# Patient Record
Sex: FEMALE | Race: WHITE | NOT HISPANIC OR LATINO | Employment: OTHER | URBAN - METROPOLITAN AREA
[De-identification: names, ages, dates, MRNs, and addresses within clinical notes are randomized per-mention and may not be internally consistent; named-entity substitution may affect disease eponyms.]

---

## 2017-01-12 ENCOUNTER — HOSPITAL ENCOUNTER (OUTPATIENT)
Dept: LAB | Facility: MEDICAL CENTER | Age: 67
End: 2017-01-12
Attending: INTERNAL MEDICINE
Payer: MEDICARE

## 2017-01-12 ENCOUNTER — HOSPITAL ENCOUNTER (OUTPATIENT)
Dept: LAB | Facility: MEDICAL CENTER | Age: 67
End: 2017-01-12
Attending: FAMILY MEDICINE
Payer: MEDICARE

## 2017-01-12 LAB
ALBUMIN SERPL BCP-MCNC: 4.6 G/DL (ref 3.2–4.9)
ALBUMIN/GLOB SERPL: 1.5 G/DL
ALP SERPL-CCNC: 81 U/L (ref 30–99)
ALT SERPL-CCNC: 9 U/L (ref 2–50)
ANION GAP SERPL CALC-SCNC: 10 MMOL/L (ref 0–11.9)
AST SERPL-CCNC: 15 U/L (ref 12–45)
BASOPHILS # BLD AUTO: 0.11 K/UL (ref 0–0.12)
BASOPHILS NFR BLD AUTO: 1.5 % (ref 0–1.8)
BILIRUB SERPL-MCNC: 0.6 MG/DL (ref 0.1–1.5)
BUN SERPL-MCNC: 16 MG/DL (ref 8–22)
CALCIUM SERPL-MCNC: 9.7 MG/DL (ref 8.5–10.5)
CHLORIDE SERPL-SCNC: 105 MMOL/L (ref 96–112)
CHOLEST SERPL-MCNC: 195 MG/DL (ref 100–199)
CHOLEST SERPL-MCNC: 198 MG/DL (ref 100–199)
CO2 SERPL-SCNC: 26 MMOL/L (ref 20–33)
CREAT SERPL-MCNC: 0.93 MG/DL (ref 0.5–1.4)
EOSINOPHIL # BLD: 0.37 K/UL (ref 0–0.51)
EOSINOPHIL NFR BLD AUTO: 5.1 % (ref 0–6.9)
ERYTHROCYTE [DISTWIDTH] IN BLOOD BY AUTOMATED COUNT: 39.6 FL (ref 35.9–50)
GLOBULIN SER CALC-MCNC: 3 G/DL (ref 1.9–3.5)
GLUCOSE SERPL-MCNC: 89 MG/DL (ref 65–99)
HCT VFR BLD AUTO: 44.5 % (ref 37–47)
HDLC SERPL-MCNC: 53 MG/DL
HDLC SERPL-MCNC: 54 MG/DL
HGB BLD-MCNC: 15.3 G/DL (ref 12–16)
IMM GRANULOCYTES # BLD AUTO: 0.02 K/UL (ref 0–0.11)
IMM GRANULOCYTES NFR BLD AUTO: 0.3 % (ref 0–0.9)
LDLC SERPL CALC-MCNC: 121 MG/DL
LDLC SERPL CALC-MCNC: 124 MG/DL
LYMPHOCYTES # BLD: 1.97 K/UL (ref 1–4.8)
LYMPHOCYTES NFR BLD AUTO: 27.1 % (ref 22–41)
MCH RBC QN AUTO: 28.9 PG (ref 27–33)
MCHC RBC AUTO-ENTMCNC: 34.4 G/DL (ref 33.6–35)
MCV RBC AUTO: 84 FL (ref 81.4–97.8)
MONOCYTES # BLD: 0.58 K/UL (ref 0–0.85)
MONOCYTES NFR BLD AUTO: 8 % (ref 0–13.4)
NEUTROPHILS # BLD: 4.21 K/UL (ref 2–7.15)
NEUTROPHILS NFR BLD AUTO: 58 % (ref 44–72)
NRBC # BLD AUTO: 0 K/UL
NRBC BLD-RTO: 0 /100 WBC
PLATELET # BLD AUTO: 340 K/UL (ref 164–446)
PMV BLD AUTO: 10.1 FL (ref 9–12.9)
POTASSIUM SERPL-SCNC: 3.8 MMOL/L (ref 3.6–5.5)
PROT SERPL-MCNC: 7.6 G/DL (ref 6–8.2)
RBC # BLD AUTO: 5.3 M/UL (ref 4.2–5.4)
SODIUM SERPL-SCNC: 141 MMOL/L (ref 135–145)
T4 FREE SERPL-MCNC: 0.75 NG/DL (ref 0.53–1.43)
TRIGL SERPL-MCNC: 104 MG/DL (ref 0–149)
TRIGL SERPL-MCNC: 98 MG/DL (ref 0–149)
TSH SERPL DL<=0.005 MIU/L-ACNC: 1.39 UIU/ML (ref 0.3–3.7)
WBC # BLD AUTO: 7.3 K/UL (ref 4.8–10.8)

## 2017-01-12 PROCEDURE — 80061 LIPID PANEL: CPT

## 2017-01-13 ENCOUNTER — HOSPITAL ENCOUNTER (OUTPATIENT)
Dept: LAB | Facility: MEDICAL CENTER | Age: 67
End: 2017-01-13
Attending: FAMILY MEDICINE
Payer: MEDICARE

## 2017-01-13 LAB
APPEARANCE UR: CLEAR
BILIRUB UR QL STRIP.AUTO: NEGATIVE
COLOR UR AUTO: NORMAL
CULTURE IF INDICATED INDCX: NO UA CULTURE
GLUCOSE UR STRIP.AUTO-MCNC: NEGATIVE MG/DL
KETONES UR STRIP.AUTO-MCNC: NEGATIVE MG/DL
LEUKOCYTE ESTERASE UR QL STRIP.AUTO: NEGATIVE
MICRO URNS: NORMAL
NITRITE UR QL STRIP.AUTO: NEGATIVE
PH UR: 6 [PH]
PROT UR QL STRIP: NEGATIVE MG/DL
RBC UR QL AUTO: NEGATIVE
SP GR UR STRIP.AUTO: 1.01

## 2017-01-13 PROCEDURE — 81003 URINALYSIS AUTO W/O SCOPE: CPT

## 2017-01-20 ENCOUNTER — HOSPITAL ENCOUNTER (OUTPATIENT)
Dept: RADIOLOGY | Facility: MEDICAL CENTER | Age: 67
End: 2017-01-20
Attending: NEUROLOGICAL SURGERY
Payer: MEDICARE

## 2017-01-20 DIAGNOSIS — M48.061 SPINAL STENOSIS, LUMBAR REGION, WITHOUT NEUROGENIC CLAUDICATION: ICD-10-CM

## 2017-01-20 PROCEDURE — 85610 PROTHROMBIN TIME: CPT

## 2017-01-20 PROCEDURE — 36415 COLL VENOUS BLD VENIPUNCTURE: CPT

## 2017-01-20 PROCEDURE — 72110 X-RAY EXAM L-2 SPINE 4/>VWS: CPT

## 2017-01-20 PROCEDURE — 71020 DX-CHEST-2 VIEWS: CPT

## 2017-01-20 PROCEDURE — 87086 URINE CULTURE/COLONY COUNT: CPT

## 2017-01-20 PROCEDURE — 85730 THROMBOPLASTIN TIME PARTIAL: CPT

## 2017-01-20 PROCEDURE — 81001 URINALYSIS AUTO W/SCOPE: CPT

## 2017-01-20 PROCEDURE — 80048 BASIC METABOLIC PNL TOTAL CA: CPT

## 2017-01-20 PROCEDURE — 85027 COMPLETE CBC AUTOMATED: CPT

## 2017-01-20 RX ORDER — OMEPRAZOLE 20 MG/1
20 CAPSULE, DELAYED RELEASE ORAL DAILY
COMMUNITY
End: 2017-07-12

## 2017-01-20 RX ORDER — ASCORBIC ACID 500 MG
1000 TABLET ORAL DAILY
COMMUNITY
End: 2017-07-17

## 2017-01-20 RX ORDER — HYDROCODONE BITARTRATE AND ACETAMINOPHEN 7.5; 3 MG/1; MG/1
TABLET ORAL PRN
COMMUNITY
End: 2017-07-17

## 2017-01-20 RX ORDER — ASPIRIN 81 MG/1
TABLET ORAL DAILY
Status: ON HOLD | COMMUNITY
End: 2017-02-05

## 2017-01-20 RX ORDER — DULOXETIN HYDROCHLORIDE 60 MG/1
60 CAPSULE, DELAYED RELEASE ORAL DAILY
COMMUNITY
End: 2017-07-12 | Stop reason: SDUPTHER

## 2017-01-27 ENCOUNTER — HOSPITAL ENCOUNTER (OUTPATIENT)
Facility: MEDICAL CENTER | Age: 67
End: 2017-01-27
Attending: UROLOGY
Payer: MEDICARE

## 2017-01-27 PROCEDURE — 81003 URINALYSIS AUTO W/O SCOPE: CPT

## 2017-01-27 PROCEDURE — 87086 URINE CULTURE/COLONY COUNT: CPT

## 2017-01-28 LAB
APPEARANCE UR: CLEAR
BILIRUB UR QL STRIP.AUTO: NEGATIVE
COLOR UR AUTO: COLORLESS
GLUCOSE UR STRIP.AUTO-MCNC: NEGATIVE MG/DL
KETONES UR STRIP.AUTO-MCNC: NEGATIVE MG/DL
LEUKOCYTE ESTERASE UR QL STRIP.AUTO: NEGATIVE
MICRO URNS: NORMAL
NITRITE UR QL STRIP.AUTO: NEGATIVE
PH UR: 5.5 [PH]
PROT UR QL STRIP: NEGATIVE MG/DL
RBC UR QL AUTO: NEGATIVE
SP GR UR STRIP.AUTO: 1

## 2017-01-30 LAB
BACTERIA UR CULT: NORMAL
SIGNIFICANT IND 70042: NORMAL
SOURCE SOURCE: NORMAL

## 2017-02-04 ENCOUNTER — APPOINTMENT (OUTPATIENT)
Dept: RADIOLOGY | Facility: MEDICAL CENTER | Age: 67
End: 2017-02-04
Attending: NEUROLOGICAL SURGERY
Payer: MEDICARE

## 2017-02-04 ENCOUNTER — HOSPITAL ENCOUNTER (OUTPATIENT)
Facility: MEDICAL CENTER | Age: 67
End: 2017-02-05
Attending: NEUROLOGICAL SURGERY | Admitting: NEUROLOGICAL SURGERY
Payer: MEDICARE

## 2017-02-04 PROBLEM — M48.061 SPINAL STENOSIS, LUMBAR: Status: ACTIVE | Noted: 2017-02-04

## 2017-02-04 PROCEDURE — 160035 HCHG PACU - 1ST 60 MINS PHASE I: Performed by: NEUROLOGICAL SURGERY

## 2017-02-04 PROCEDURE — 500367 HCHG DRAIN KIT, HEMOVAC: Performed by: NEUROLOGICAL SURGERY

## 2017-02-04 PROCEDURE — 700111 HCHG RX REV CODE 636 W/ 250 OVERRIDE (IP)

## 2017-02-04 PROCEDURE — 700101 HCHG RX REV CODE 250

## 2017-02-04 PROCEDURE — 700101 HCHG RX REV CODE 250: Performed by: NURSE PRACTITIONER

## 2017-02-04 PROCEDURE — 110382 HCHG SHELL REV 271: Performed by: NEUROLOGICAL SURGERY

## 2017-02-04 PROCEDURE — 160002 HCHG RECOVERY MINUTES (STAT): Performed by: NEUROLOGICAL SURGERY

## 2017-02-04 PROCEDURE — G0378 HOSPITAL OBSERVATION PER HR: HCPCS

## 2017-02-04 PROCEDURE — 160036 HCHG PACU - EA ADDL 30 MINS PHASE I: Performed by: NEUROLOGICAL SURGERY

## 2017-02-04 PROCEDURE — 160041 HCHG SURGERY MINUTES - EA ADDL 1 MIN LEVEL 4: Performed by: NEUROLOGICAL SURGERY

## 2017-02-04 PROCEDURE — 502626 HCHG SURGIFLO HEMOSTATIC MATRIX 6ML: Performed by: NEUROLOGICAL SURGERY

## 2017-02-04 PROCEDURE — A4606 OXYGEN PROBE USED W OXIMETER: HCPCS | Performed by: NEUROLOGICAL SURGERY

## 2017-02-04 PROCEDURE — A6402 STERILE GAUZE <= 16 SQ IN: HCPCS | Performed by: NEUROLOGICAL SURGERY

## 2017-02-04 PROCEDURE — 96374 THER/PROPH/DIAG INJ IV PUSH: CPT

## 2017-02-04 PROCEDURE — 501838 HCHG SUTURE GENERAL: Performed by: NEUROLOGICAL SURGERY

## 2017-02-04 PROCEDURE — 502240 HCHG MISC OR SUPPLY RC 0272: Performed by: NEUROLOGICAL SURGERY

## 2017-02-04 PROCEDURE — A9270 NON-COVERED ITEM OR SERVICE: HCPCS | Performed by: NURSE PRACTITIONER

## 2017-02-04 PROCEDURE — 160029 HCHG SURGERY MINUTES - 1ST 30 MINS LEVEL 4: Performed by: NEUROLOGICAL SURGERY

## 2017-02-04 PROCEDURE — 110371 HCHG SHELL REV 272: Performed by: NEUROLOGICAL SURGERY

## 2017-02-04 PROCEDURE — 72020 X-RAY EXAM OF SPINE 1 VIEW: CPT

## 2017-02-04 PROCEDURE — 160009 HCHG ANES TIME/MIN: Performed by: NEUROLOGICAL SURGERY

## 2017-02-04 PROCEDURE — 160048 HCHG OR STATISTICAL LEVEL 1-5: Performed by: NEUROLOGICAL SURGERY

## 2017-02-04 PROCEDURE — 96375 TX/PRO/DX INJ NEW DRUG ADDON: CPT

## 2017-02-04 PROCEDURE — 700102 HCHG RX REV CODE 250 W/ 637 OVERRIDE(OP): Performed by: NURSE PRACTITIONER

## 2017-02-04 PROCEDURE — 700111 HCHG RX REV CODE 636 W/ 250 OVERRIDE (IP): Performed by: NURSE PRACTITIONER

## 2017-02-04 RX ORDER — OMEPRAZOLE 20 MG/1
20 CAPSULE, DELAYED RELEASE ORAL DAILY
Status: DISCONTINUED | OUTPATIENT
Start: 2017-02-05 | End: 2017-02-05 | Stop reason: HOSPADM

## 2017-02-04 RX ORDER — SODIUM CHLORIDE AND POTASSIUM CHLORIDE 150; 900 MG/100ML; MG/100ML
INJECTION, SOLUTION INTRAVENOUS CONTINUOUS
Status: DISCONTINUED | OUTPATIENT
Start: 2017-02-04 | End: 2017-02-05

## 2017-02-04 RX ORDER — ASCORBIC ACID 500 MG
1000 TABLET ORAL DAILY
Status: DISCONTINUED | OUTPATIENT
Start: 2017-02-04 | End: 2017-02-05 | Stop reason: HOSPADM

## 2017-02-04 RX ORDER — SODIUM CHLORIDE, SODIUM LACTATE, POTASSIUM CHLORIDE, CALCIUM CHLORIDE 600; 310; 30; 20 MG/100ML; MG/100ML; MG/100ML; MG/100ML
1000 INJECTION, SOLUTION INTRAVENOUS
Status: COMPLETED | OUTPATIENT
Start: 2017-02-04 | End: 2017-02-04

## 2017-02-04 RX ORDER — ENEMA 19; 7 G/133ML; G/133ML
1 ENEMA RECTAL
Status: DISCONTINUED | OUTPATIENT
Start: 2017-02-04 | End: 2017-02-05 | Stop reason: HOSPADM

## 2017-02-04 RX ORDER — AMOXICILLIN 250 MG
1 CAPSULE ORAL
Status: DISCONTINUED | OUTPATIENT
Start: 2017-02-04 | End: 2017-02-05 | Stop reason: HOSPADM

## 2017-02-04 RX ORDER — BUPIVACAINE HYDROCHLORIDE AND EPINEPHRINE 5; 5 MG/ML; UG/ML
INJECTION, SOLUTION EPIDURAL; INTRACAUDAL; PERINEURAL
Status: DISCONTINUED | OUTPATIENT
Start: 2017-02-04 | End: 2017-02-04 | Stop reason: HOSPADM

## 2017-02-04 RX ORDER — DIPHENHYDRAMINE HYDROCHLORIDE 50 MG/ML
INJECTION INTRAMUSCULAR; INTRAVENOUS
Status: COMPLETED
Start: 2017-02-04 | End: 2017-02-04

## 2017-02-04 RX ORDER — DIPHENHYDRAMINE HYDROCHLORIDE 50 MG/ML
25 INJECTION INTRAMUSCULAR; INTRAVENOUS EVERY 6 HOURS PRN
Status: DISCONTINUED | OUTPATIENT
Start: 2017-02-04 | End: 2017-02-05 | Stop reason: HOSPADM

## 2017-02-04 RX ORDER — DIPHENHYDRAMINE HCL 25 MG
25 TABLET ORAL EVERY 6 HOURS PRN
Status: DISCONTINUED | OUTPATIENT
Start: 2017-02-04 | End: 2017-02-05 | Stop reason: HOSPADM

## 2017-02-04 RX ORDER — POLYETHYLENE GLYCOL 3350 17 G/17G
1 POWDER, FOR SOLUTION ORAL 2 TIMES DAILY PRN
Status: DISCONTINUED | OUTPATIENT
Start: 2017-02-04 | End: 2017-02-05 | Stop reason: HOSPADM

## 2017-02-04 RX ORDER — MEPERIDINE HYDROCHLORIDE 25 MG/ML
INJECTION INTRAMUSCULAR; INTRAVENOUS; SUBCUTANEOUS
Status: COMPLETED
Start: 2017-02-04 | End: 2017-02-04

## 2017-02-04 RX ORDER — DULOXETIN HYDROCHLORIDE 60 MG/1
60 CAPSULE, DELAYED RELEASE ORAL DAILY
Status: DISCONTINUED | OUTPATIENT
Start: 2017-02-05 | End: 2017-02-05 | Stop reason: HOSPADM

## 2017-02-04 RX ORDER — TIZANIDINE 4 MG/1
2 TABLET ORAL EVERY 6 HOURS PRN
Status: DISCONTINUED | OUTPATIENT
Start: 2017-02-04 | End: 2017-02-05

## 2017-02-04 RX ORDER — CEFAZOLIN SODIUM 2 G/100ML
2 INJECTION, SOLUTION INTRAVENOUS EVERY 8 HOURS
Status: COMPLETED | OUTPATIENT
Start: 2017-02-04 | End: 2017-02-05

## 2017-02-04 RX ORDER — ONDANSETRON 4 MG/1
4 TABLET, ORALLY DISINTEGRATING ORAL EVERY 4 HOURS PRN
Status: DISCONTINUED | OUTPATIENT
Start: 2017-02-04 | End: 2017-02-05 | Stop reason: HOSPADM

## 2017-02-04 RX ORDER — HYDROCODONE BITARTRATE AND ACETAMINOPHEN 10; 325 MG/1; MG/1
1-2 TABLET ORAL EVERY 4 HOURS PRN
Status: DISCONTINUED | OUTPATIENT
Start: 2017-02-04 | End: 2017-02-04

## 2017-02-04 RX ORDER — ONDANSETRON 2 MG/ML
4 INJECTION INTRAMUSCULAR; INTRAVENOUS EVERY 4 HOURS PRN
Status: DISCONTINUED | OUTPATIENT
Start: 2017-02-04 | End: 2017-02-05 | Stop reason: HOSPADM

## 2017-02-04 RX ORDER — DOCUSATE SODIUM 100 MG/1
100 CAPSULE, LIQUID FILLED ORAL 2 TIMES DAILY
Status: DISCONTINUED | OUTPATIENT
Start: 2017-02-04 | End: 2017-02-05 | Stop reason: HOSPADM

## 2017-02-04 RX ORDER — DILTIAZEM HYDROCHLORIDE 240 MG/1
240 CAPSULE, COATED, EXTENDED RELEASE ORAL DAILY
Status: DISCONTINUED | OUTPATIENT
Start: 2017-02-05 | End: 2017-02-05 | Stop reason: HOSPADM

## 2017-02-04 RX ORDER — BISACODYL 10 MG
10 SUPPOSITORY, RECTAL RECTAL
Status: DISCONTINUED | OUTPATIENT
Start: 2017-02-04 | End: 2017-02-05 | Stop reason: HOSPADM

## 2017-02-04 RX ORDER — LIDOCAINE HYDROCHLORIDE 10 MG/ML
INJECTION, SOLUTION INFILTRATION; PERINEURAL
Status: COMPLETED
Start: 2017-02-04 | End: 2017-02-04

## 2017-02-04 RX ORDER — BACITRACIN 50000 [IU]/1
INJECTION, POWDER, FOR SOLUTION INTRAMUSCULAR
Status: DISCONTINUED | OUTPATIENT
Start: 2017-02-04 | End: 2017-02-04 | Stop reason: HOSPADM

## 2017-02-04 RX ADMIN — MEPERIDINE HYDROCHLORIDE 12.5 MG: 25 INJECTION INTRAMUSCULAR; INTRAVENOUS; SUBCUTANEOUS at 13:25

## 2017-02-04 RX ADMIN — OXYCODONE HYDROCHLORIDE AND ACETAMINOPHEN 1000 MG: 500 TABLET ORAL at 14:44

## 2017-02-04 RX ADMIN — LIDOCAINE HYDROCHLORIDE: 10 INJECTION, SOLUTION INFILTRATION; PERINEURAL at 09:22

## 2017-02-04 RX ADMIN — DIPHENHYDRAMINE HYDROCHLORIDE 6.25 MG: 50 INJECTION INTRAMUSCULAR; INTRAVENOUS at 13:56

## 2017-02-04 RX ADMIN — FENTANYL CITRATE 50 MCG: 50 INJECTION, SOLUTION INTRAMUSCULAR; INTRAVENOUS at 13:10

## 2017-02-04 RX ADMIN — FENTANYL CITRATE 50 MCG: 50 INJECTION, SOLUTION INTRAMUSCULAR; INTRAVENOUS at 13:05

## 2017-02-04 RX ADMIN — HYDROMORPHONE HYDROCHLORIDE: 2 INJECTION INTRAMUSCULAR; INTRAVENOUS; SUBCUTANEOUS at 13:45

## 2017-02-04 RX ADMIN — MEPERIDINE HYDROCHLORIDE 12.5 MG: 25 INJECTION INTRAMUSCULAR; INTRAVENOUS; SUBCUTANEOUS at 13:15

## 2017-02-04 RX ADMIN — POTASSIUM CHLORIDE AND SODIUM CHLORIDE: 900; 150 INJECTION, SOLUTION INTRAVENOUS at 14:44

## 2017-02-04 RX ADMIN — SODIUM CHLORIDE, SODIUM LACTATE, POTASSIUM CHLORIDE, CALCIUM CHLORIDE 1000 ML: 600; 310; 30; 20 INJECTION, SOLUTION INTRAVENOUS at 09:22

## 2017-02-04 RX ADMIN — CEFAZOLIN SODIUM 2 G: 2 INJECTION, SOLUTION INTRAVENOUS at 20:53

## 2017-02-04 ASSESSMENT — PATIENT HEALTH QUESTIONNAIRE - PHQ9
SUM OF ALL RESPONSES TO PHQ QUESTIONS 1-9: 0
2. FEELING DOWN, DEPRESSED, IRRITABLE, OR HOPELESS: NOT AT ALL
1. LITTLE INTEREST OR PLEASURE IN DOING THINGS: NOT AT ALL
SUM OF ALL RESPONSES TO PHQ9 QUESTIONS 1 AND 2: 0
SUM OF ALL RESPONSES TO PHQ QUESTIONS 1-9: 0
SUM OF ALL RESPONSES TO PHQ9 QUESTIONS 1 AND 2: 0
1. LITTLE INTEREST OR PLEASURE IN DOING THINGS: NOT AT ALL
2. FEELING DOWN, DEPRESSED, IRRITABLE, OR HOPELESS: NOT AT ALL

## 2017-02-04 ASSESSMENT — PAIN SCALES - GENERAL
PAINLEVEL_OUTOF10: 10
PAINLEVEL_OUTOF10: 9
PAINLEVEL_OUTOF10: 9

## 2017-02-04 ASSESSMENT — LIFESTYLE VARIABLES
DO YOU DRINK ALCOHOL: NO
ALCOHOL_USE: NO
EVER_SMOKED: NEVER

## 2017-02-04 NOTE — OR NURSING
Pt c/o pain despite fentanyl demerol and dilaudid pca. Pt reports pain. Posisitonal. Pain when pain moving in bed repositioning self. Encouraged pt to lay quietly until pain medication takes effect. Pt is calm. . Eyes closed. Respiratory rate 16. Updated her that family waiting for her. Transport here shortly.

## 2017-02-04 NOTE — IP AVS SNAPSHOT
dotCloud Access Code: Activation code not generated  Current dotCloud Status: Patient Declined    Your email address is not on file at [facility]  Email Addresses are required for you to sign up for dotCloud, please contact [phone] to verify your personal information and to provide your email address prior to attempting to register for dotCloud.    Augusta Rincond  [address] [place] [address] [place] [address]    Anacompt  A secure, online tool to manage your health information     [facility]’s dotCloud® is a secure, online tool that connects you to your personalized health information from the privacy of your home -- day or night - making it very easy for you to manage your healthcare. Once the activation process is completed, you can even access your medical information using the dotCloud zeynep, which is available for free in the Apple Zeynep store or Google Play store.     To learn more about dotCloud, visit [email or web address]    There are two levels of access available (as shown below):   My Chart Features  [facility] Primary Care Doctor [facility]  Specialists [facility]  Urgent  Care Non-[facility] Primary Care Doctor   Email your healthcare team securely and privately 24/7 X X X    Manage appointments: schedule your next appointment; view details of past/upcoming appointments X      Request prescription refills. X      View recent personal medical records, including lab and immunizations X X X X   View health record, including health history, allergies, medications X X X X   Read reports about your outpatient visits, procedures, consult and ER notes X X X X   See your discharge summary, which is a recap of your hospital and/or ER visit that includes your diagnosis, lab results, and care plan X X  X     How to register for Anacompt:  Once your e-mail address has been verified, follow the following steps to sign up for [employer]om[employer].     1. Go to  [email or web address]  2. Click on the Sign Up Now box, which takes you to the New Member  Sign Up page. You will need to provide the following information:  a. Enter your [facility] Access Code exactly as it appears at the top of this page. (You will not need to use this code after you’ve completed the sign-up process. If you do not sign up before the expiration date, you must request a new code.)   b. Enter your date of birth.   c. Enter your home email address.   d. Click Submit, and follow the next screen’s instructions.  3. Create a [facility] ID. This will be your [facility] login ID and cannot be changed, so think of one that is secure and easy to remember.  4. Create a [facility] password. You can change your password at any time.  5. Enter your Password Reset Question and Answer. This can be used at a later time if you forget your password.   6. Enter your e-mail address. This allows you to receive e-mail notifications when new information is available in [facility].  7. Click Sign Up. You can now view your health information.    For assistance activating your [facility] account, call [phone]

## 2017-02-04 NOTE — PROGRESS NOTES
Pt up to floor,  at bedside. Pt c/o pain 10/10 in back, RN set up PCA, pt tolerating well. Pt ambulated to bed, handheld assist x1. Bed alarm on, pt educated to call prior to ambulating. Hemovac noted to sx site, moderate drainage. Bed is in low and locked position, call light within reach and hourly rounding in place.

## 2017-02-04 NOTE — IP AVS SNAPSHOT
" <p align=\"LEFT\"><IMG SRC=\"[email or web address]\" alt=\"Image\" WIDTH=\"50%\" HEIGHT=\"200\" BORDER=\"\"></p>                   Name:Augusta Rodriguez  Medical Record Number:[id number]  CSN: [id number]    YOB: 1950   Age: 66 y.o.  Sex: female  HT:1.524 m (5') WT: 64.2 kg (141 lb 8.6 oz)          Admit Date: 2/4/2017     Discharge Date:   Today's Date: 2/5/2017  Attending Doctor:  Emil Hitchcock M.D.                  Allergies:  Biaxin; Clarithromycin; Doxycycline; Ees; Imitrex; Pcn; Shellfish allergy; and Trazodone             Medication List      Take these Medications        Instructions    ascorbic acid 500 MG Tabs   Commonly known as:  ascorbic acid    Take 1,000 mg by mouth every day.   Dose:  1000 mg       CARDIZEM  MG Cp24   Generic drug:  diltiazem CD    Take 240 mg by mouth every day.   Dose:  240 mg       duloxetine 60 MG Cpep delayed-release capsule   Commonly known as:  CYMBALTA    Take 60 mg by mouth every day.   Dose:  60 mg       omeprazole 20 MG delayed-release capsule   Commonly known as:  PRILOSEC    Take 20 mg by mouth every day.   Dose:  20 mg       RED YEAST RICE PO    Take  by mouth.       tizanidine 2 MG tablet   Commonly known as:  ZANAFLEX    Take 1 Tab by mouth every 6 hours as needed (muscle spasm).   Dose:  2 mg       VICODIN ES 7.5-300 MG Tabs   Generic drug:  Hydrocodone-Acetaminophen    Take  by mouth as needed.         "

## 2017-02-04 NOTE — IP AVS SNAPSHOT
Home Care Instructions                                                                                                                  Name:Augusta Rodriguez  Medical Record Number:[id number]  CSN: [id number]    YOB: 1950   Age: 66 y.o.  Sex: female  HT:1.524 m (5') WT: 64.2 kg (141 lb 8.6 oz)          Admit Date: 2/4/2017     Discharge Date:   Today's Date: 2/5/2017  Attending Doctor:  Emil Hitchcock M.D.                  Allergies:  Biaxin; Clarithromycin; Doxycycline; Ees; Imitrex; Pcn; Shellfish allergy; and Trazodone            Discharge Instructions             Discharge Instructions    Discharged to home by car with relative. Discharged via wheelchair, hospital escort: Yes.  Special equipment needed: C-Collar    Be sure to schedule a follow-up appointment with your primary care doctor or any specialists as instructed.     Discharge Plan:   Influenza Vaccine Indication: Patient Refuses    I understand that a diet low in cholesterol, fat, and sodium is recommended for good health. Unless I have been given specific instructions below for another diet, I accept this instruction as my diet prescription.   Other diet: regular      Special Instructions: None    · Is patient discharged on Warfarin / Coumadin?   No     · Is patient Post Blood Transfusion?  No    Depression / Suicide Risk    As you are discharged from this [facility] facility, it is important to learn how to keep safe from harming yourself.    Recognize the warning signs:  · Abrupt changes in personality, positive or negative- including increase in energy   · Giving away possessions  · Change in eating patterns- significant weight changes-  positive or negative  · Change in sleeping patterns- unable to sleep or sleeping all the time   · Unwillingness or inability to communicate  · Depression  · Unusual sadness, discouragement and loneliness  · Talk of wanting to die  · Neglect of personal appearance   · Rebelliousness- reckless  behavior  · Withdrawal from people/activities they love  · Confusion- inability to concentrate     If you or a loved one observes any of these behaviors or has concerns about self-harm, here's what you can do:  · Talk about it- your feelings and reasons for harming yourself  · Remove any means that you might use to hurt yourself (examples: pills, rope, extension cords, firearm)  · Get professional help from the community (Mental Health, Substance Abuse, psychological counseling)  · Do not be alone:Call your Safe Contact- someone whom you trust who will be there for you.  · Call your local CRISIS HOTLINE 874-5636 or 592-775-8867  · Call your local Children's Mobile Crisis Response Team [place] (303) 921-1419 or www.[employer]  · Call the toll free National Suicide Prevention Hotlines   · National Suicide Prevention Lifeline 680-896-LWTR (6779)  · Tourist[employer] Line Network 800-SUICIDE (130-3313)    Back Surgery  Back problems bother many people in all age groups regardless of sex or occupation. There are many things you can do to take care of your back. It is best for you to manage your back problems rather than your back problems managing you. Back surgery can be a valuable aid for recovery if all other conservative means (exercise, anti-inflammatory medications, therapeutic bed, and physical therapy) have failed. The majority of back problems do respond to conservative therapy. You are having surgery because your back problem has not responded to conservative treatment.  DIAGNOSIS AND TREATMENT  Successful treatment begins with evaluation and learning what is wrong (diagnosis). Your caregiver will perform a thorough medical evaluation consisting of history, a physical exam, and diagnostic tests. These often will include: X-rays, MRI, or CT (special forms of X-rays). X-rays help locate the problem area. EMGs (electromyogram) are sometimes done to determine if the nerves involved are still working  properly. Blood work may be done. Treatment options include conservative management (non-surgical) or surgery.  SYMPTOMS  · Loss of range of motion or pain with range of motion.   · Pain shooting down into the legs (sciatica).   · Numbness or weakness in your legs.   · Trouble controlling bladder or bowels (these are usually very late stage problems).   ANATOMY OF THE BACK  The more you know about your back, the better job you will be able to do in taking care of it. There are 24 bones in your back (vertebrae) and the cartilage cushioning (discs) between them are arranged in three natural curves: the neck (cervical), upper back (thoracic), and lower back (lumbar) curves. When these curves are in their normal (good posture) alignment, your body is in a balanced position, distributing the weight evenly on your back. This posture is best maintained with strong and exercised back muscles. Activities which disrupt this alignment such as too much forward bending (flexion), or backward arching (extension), put extra pressure on the spine. As a result, the vertebrae and discs wear out (degenerate) faster than normal, contributing to bulging or broken (ruptured) discs, arthritis, and instability.  ANATOMY OF THE LUMBAR CURVE (LOWER BACK)  The lumbar curve consists of five back bones (vertebrae) and their discs (the cartilage structure and its fibrous surrounding). The spinal cord passes through the spinal canal formed by the bony arch (lamina) on the back of the vertebral bodies. Nerve roots leading to the legs branch out from the spinal cord. Each emerges through a side opening formed between vertebrae (the foramen) that are next to each other. When a piece of disc material ruptures out of its normal position and presses on a nerve root (ruptured disc) it creates the severe low back pain associated with this. The spinal canal and foramina, which are passageways for the nerves, vary in size from person to person. People with  smaller passageways (stenosis or narrowing) are at higher risk for nerve irritation (inflammation) and back and leg pain. Although ruptured discs can occur any place in the back; they most frequently occur in the lower back (lumbar) area.  PREPARING FOR SURGERY  2. Stop smoking at least one week prior to surgery. This lowers risk during surgery.   3. Your caregiver may advise that you stop taking certain medications that may affect the outcome of the surgery and your ability to heal. For example, you may need to stop taking anti-inflammatories, such as aspirin, because of possible bleeding problems. Other medications may have interactions with anesthesia.   4. BE SURE TO LET YOUR CAREGIVER KNOW IF YOU HAVE BEEN ON STEROIDS (INCLUDING CREAMS) FOR LONG PERIODS OF TIME. THIS IS CRITICAL.   5. Your caregiver will discuss possible risks and complications with you before surgery. In addition to the usual risks of anesthesia, other common risks and complications include: blood loss and replacement; temporary increase in pain due to surgery; uncorrected back pain; infection; and new nerve damage (tingling, numbness, and pain).   LET YOUR CAREGIVER KNOW ABOUT:  2. Allergies.   3. Medications taken including herbs, eye drops, over-the-counter medications, and creams.   4. Use of steroids (by mouth or creams).   5. Previous problems with anesthetics or numbing medication.   6. Possibility of pregnancy, if this applies.   7. History of blood clots (thrombophlebitis).   8. History of bleeding or blood problems.   9. Previous surgery.   10. Other health problems.   BEFORE THE PROCEDURE  You should be present 60 minutes prior to your procedure or as directed.   AFTER THE PROCEDURE  After surgery, you will be taken to the recovery area where a nurse will watch and check your progress. Once you are awake, stable, and taking fluids well, barring other problems, you will be allowed to go home.  HOME CARE INSTRUCTIONS   2. Once home,  an ice pack applied to your operative site may help with discomfort and keep the swelling down.   3. Follow your caregiver's instructions as to activities, exercises, physical therapy, and driving a car.   4. Weight reduction may be beneficial.   5. Daily exercise is helpful to prevent return of problems. Maintain strength and range of motion as instructed.   6. Only take over-the-counter or prescription medicines for pain, discomfort, or fever as directed by your caregiver.   SEEK IMMEDIATE MEDICAL CARE IF:   2. There is increased bleeding (more than a small spot) from the wound.   3. You notice redness, swelling, or increasing pain in the wound.   4. Pus is coming from wound.   5. An unexplained oral temperature above 102° F (38.9° C) develops.   6. You notice a foul smell coming from the wound or dressing.   7. You develop a rash.   8. You have difficulty breathing.   9. Have any allergic problems.   Document Released: 03/26/2002 Document Revised: 03/11/2013 Document Reviewed: 07/20/2009  [employer]® Patient Information ©2013 [employer]Wound Care  Taking care of your wound properly can help to prevent pain and infection. It can also help your wound to heal more quickly.   HOW TO CARE FOR YOUR WOUND   · Take or apply over-the-counter and prescription medicines only as told by your health care provider.  · If you were prescribed antibiotic medicine, take or apply it as told by your health care provider. Do not stop using the antibiotic even if your condition improves.  · Clean the wound each day or as told by your health care provider.  1. Wash the wound with mild soap and water.  2. Rinse the wound with water to remove all soap.  3. Pat the wound dry with a clean towel. Do not rub it.  · There are many different ways to close and cover a wound. For example, a wound can be covered with stitches (sutures), skin glue, or adhesive strips. Follow instructions from your health care provider about:  1. How to take care  of your wound.  2. When and how you should change your bandage (dressing).  3. When you should remove your dressing.  4. Removing whatever was used to close your wound.  · Check your wound every day for signs of infection. Watch for:  1. Redness, swelling, or pain.  2. Fluid, blood, or pus.  · Keep the dressing dry until your health care provider says it can be removed. Do not take baths, swim, use a hot tub, or do anything that would put your wound underwater until your health care provider approves.  · Raise (elevate) the injured area above the level of your heart while you are sitting or lying down.  · Do not scratch or pick at the wound.  · Keep all follow-up visits as told by your health care provider. This is important.  SEEK MEDICAL CARE IF:  6. You received a tetanus shot and you have swelling, severe pain, redness, or bleeding at the injection site.  7. You have a fever.  8. Your pain is not controlled with medicine.  9. You have increased redness, swelling, or pain at the site of your wound.  10. You have fluid, blood, or pus coming from your wound.  11. You notice a bad smell coming from your wound or your dressing.  SEEK IMMEDIATE MEDICAL CARE IF:  11. You have a red streak going away from your wound.     This information is not intended to replace advice given to you by your health care provider. Make sure you discuss any questions you have with your health care provider.     Document Released: 09/26/2009 Document Revised: 05/03/2016 Document Reviewed: 12/14/2015  [employer] Interactive Patient Education ©2016 [employer]         Discharge Medication Instructions:    Below are the medications your physician expects you to take upon discharge:    Review all your home medications and newly ordered medications with your doctor and/or pharmacist. Follow medication instructions as directed by your doctor and/or pharmacist.    Please keep your medication list with you and share with your physician.                Medication List      START taking these medications        Instructions    tizanidine 2 MG tablet   Last time this was given:  2 mg on 2/5/2017 12:25 AM   Commonly known as:  ZANAFLEX    Take 1 Tab by mouth every 6 hours as needed (muscle spasm).   Dose:  2 mg         CONTINUE taking these medications        Instructions    ascorbic acid 500 MG Tabs   Last time this was given:  1,000 mg on 2/5/2017  8:33 AM   Commonly known as:  ascorbic acid    Take 1,000 mg by mouth every day.   Dose:  1000 mg       CARDIZEM  MG Cp24   Generic drug:  diltiazem CD    Take 240 mg by mouth every day.   Dose:  240 mg       duloxetine 60 MG Cpep delayed-release capsule   Commonly known as:  CYMBALTA    Take 60 mg by mouth every day.   Dose:  60 mg       omeprazole 20 MG delayed-release capsule   Commonly known as:  PRILOSEC    Take 20 mg by mouth every day.   Dose:  20 mg       RED YEAST RICE PO    Take  by mouth.       VICODIN ES 7.5-300 MG Tabs   Generic drug:  Hydrocodone-Acetaminophen    Take  by mouth as needed.         STOP taking these medications     aspirin 81 MG EC tablet               Instructions           Diet / Nutrition:    Follow any diet instructions given to you by your doctor or the dietician, including how much salt (sodium) you are allowed each day.    If you are overweight, talk to your doctor about a weight reduction plan.    Activity:    Remain physically active following your doctor's instructions about exercise and activity.    Rest often.     Any time you become even a little tired or short of breath, SIT DOWN and rest.    Worsening Symptoms:    Report any of the following signs and symptoms to the doctor's office immediately:    *Pain of jaw, arm, or neck  *Chest pain not relieved by medication                               *Dizziness or loss of consciousness  *Difficulty breathing even when at rest   *More tired than usual                                       *Bleeding drainage or swelling of  surgical site  *Swelling of feet, ankles, legs or stomach                 *Fever (>100ºF)  *Pink or blood tinged sputum  *Weight gain (3lbs/day or 5lbs /week)           *Shock from internal defibrillator (if applicable)  *Palpitations or irregular heartbeats                *Cool and/or numb extremities    Stroke Awareness    Common Risk Factors for Stroke include:    Age  Atrial Fibrillation  Carotid Artery Stenosis  Diabetes Mellitus  Excessive alcohol consumption  High blood pressure  Overweight   Physical inactivity  Smoking    Warning signs and symptoms of a stroke include:    *Sudden numbness or weakness of the face, arm or leg (especially on one side of the body).  *Sudden confusion, trouble speaking or understanding.  *Sudden trouble seeing in one or both eyes.  *Sudden trouble walking, dizziness, loss of balance or coordination.Sudden severe headache with no known cause.    It is very important to get treatment quickly when a stroke occurs. If you experience any of the above warning signs, call 911 immediately.                   Disclaimer         Quit Smoking / Tobacco Use:    I understand the use of any tobacco products increases my chance of suffering from future heart disease or stroke and could cause other illnesses which may shorten my life. Quitting the use of tobacco products is the single most important thing I can do to improve my health. For further information on smoking / tobacco cessation call a Toll Free Quit Line at 1-191.991.8136 (*National Cancer [place]) or 1-360.718.7956 (American Lung Association) or you can access the web based program at www.lungusa.org.    [place] Tobacco Users Help Line:  (421) 371-3872       Toll Free: 1-925.350.6921  Quit Tobacco Program [facility] (616)377-7543    Crisis Hotline:    [place] Crisis Hotline:  9-306-TYOGMVW or 1-562.386.7969    [place] Crisis Hotline:    1-377.718.9462 or 867-660-9533    Discharge Survey:   Thank you for  choosing [facility]. We hope we did everything we could to make your hospital stay a pleasant one. You may be receiving a phone survey and we would appreciate your time and participation in answering the questions. Your input is very valuable to us in our efforts to improve our service to our patients and their families.        My signature on this form indicates that:    1. I have reviewed and understand the above information.  2. My questions regarding this information have been answered to my satisfaction.  3. I have formulated a plan with my discharge nurse to obtain my prescribed medications for home.                  Disclaimer         __________________________________                     __________       ________                       Patient Signature                                                 Date                    Time

## 2017-02-04 NOTE — IP AVS SNAPSHOT
2/5/2017          Augusta Rodriguez  [address] [place] [address]    Dear Augusta:    [facility] wants to ensure your discharge home is safe and you or your loved ones have had all your questions answered regarding your care after you leave the hospital.    You may receive a telephone call within two days of your discharge.  This call is to make certain you understand your discharge instructions as well as ensure we provided you with the best care possible during your stay with us.     The call will only last approximately 3-5 minutes and will be done by a nurse.    Once again, we want to ensure your discharge home is safe and that you have a clear understanding of any next steps in your care.  If you have any questions or concerns, please do not hesitate to contact us, we are here for you.  Thank you for choosing [facility] for your healthcare needs.    Sincerely,    Irineo Jeffries    [facility]

## 2017-02-04 NOTE — OR SURGEON
Immediate Post-Operative Note      PreOp Diagnosis: L45, L5-S1 lumbar stenosis    PostOp Diagnosis: same    Procedure(s):  LUMBAR LAMINECTOMY DISKECTOMY POSTERIOR L4-S1     Surgeon(s):  PATRICIA Pagan M.D.    Anesthesiologist/Type of Anesthesia:  Anesthesiologist: Gen Mota Jr., M.D./* No anesthesia type entered *    Surgical Staff:  Circulator: Jason Dickson R.N.  Scrub Person: Yanyc Morris    Specimen: none    Estimated Blood Loss: 50cc    Findings: as above    Complications: none        2/4/2017 12:53 PM Emil Hitchcock

## 2017-02-05 VITALS
HEIGHT: 60 IN | SYSTOLIC BLOOD PRESSURE: 118 MMHG | DIASTOLIC BLOOD PRESSURE: 69 MMHG | RESPIRATION RATE: 18 BRPM | OXYGEN SATURATION: 97 % | WEIGHT: 141.54 LBS | BODY MASS INDEX: 27.79 KG/M2 | HEART RATE: 63 BPM | TEMPERATURE: 98 F

## 2017-02-05 PROCEDURE — G8980 MOBILITY D/C STATUS: HCPCS | Mod: CI

## 2017-02-05 PROCEDURE — G8979 MOBILITY GOAL STATUS: HCPCS | Mod: CI

## 2017-02-05 PROCEDURE — 96376 TX/PRO/DX INJ SAME DRUG ADON: CPT

## 2017-02-05 PROCEDURE — A9270 NON-COVERED ITEM OR SERVICE: HCPCS | Performed by: NURSE PRACTITIONER

## 2017-02-05 PROCEDURE — 97161 PT EVAL LOW COMPLEX 20 MIN: CPT

## 2017-02-05 PROCEDURE — 700111 HCHG RX REV CODE 636 W/ 250 OVERRIDE (IP): Performed by: NEUROLOGICAL SURGERY

## 2017-02-05 PROCEDURE — 700101 HCHG RX REV CODE 250: Performed by: NURSE PRACTITIONER

## 2017-02-05 PROCEDURE — 700111 HCHG RX REV CODE 636 W/ 250 OVERRIDE (IP): Performed by: NURSE PRACTITIONER

## 2017-02-05 PROCEDURE — 700102 HCHG RX REV CODE 250 W/ 637 OVERRIDE(OP): Performed by: NURSE PRACTITIONER

## 2017-02-05 PROCEDURE — G8978 MOBILITY CURRENT STATUS: HCPCS | Mod: CI

## 2017-02-05 PROCEDURE — G0378 HOSPITAL OBSERVATION PER HR: HCPCS

## 2017-02-05 PROCEDURE — 700112 HCHG RX REV CODE 229: Performed by: NURSE PRACTITIONER

## 2017-02-05 RX ORDER — TIZANIDINE 2 MG/1
2 TABLET ORAL EVERY 6 HOURS PRN
Qty: 90 TAB | Refills: 3 | Status: SHIPPED | OUTPATIENT
Start: 2017-02-05 | End: 2017-07-17

## 2017-02-05 RX ORDER — TIZANIDINE 4 MG/1
4 TABLET ORAL EVERY 6 HOURS PRN
Status: DISCONTINUED | OUTPATIENT
Start: 2017-02-05 | End: 2017-02-05 | Stop reason: HOSPADM

## 2017-02-05 RX ORDER — HYDROCODONE BITARTRATE AND ACETAMINOPHEN 7.5; 325 MG/1; MG/1
1-2 TABLET ORAL EVERY 4 HOURS PRN
Status: DISCONTINUED | OUTPATIENT
Start: 2017-02-05 | End: 2017-02-05 | Stop reason: HOSPADM

## 2017-02-05 RX ADMIN — DOCUSATE SODIUM 100 MG: 100 CAPSULE ORAL at 08:33

## 2017-02-05 RX ADMIN — OXYCODONE HYDROCHLORIDE AND ACETAMINOPHEN 1000 MG: 500 TABLET ORAL at 08:33

## 2017-02-05 RX ADMIN — HYDROMORPHONE HYDROCHLORIDE 5 MG: 2 INJECTION INTRAMUSCULAR; INTRAVENOUS; SUBCUTANEOUS at 02:40

## 2017-02-05 RX ADMIN — CEFAZOLIN SODIUM 2 G: 2 INJECTION, SOLUTION INTRAVENOUS at 04:09

## 2017-02-05 RX ADMIN — TIZANIDINE HYDROCHLORIDE 2 MG: 4 TABLET ORAL at 00:25

## 2017-02-05 RX ADMIN — POTASSIUM CHLORIDE AND SODIUM CHLORIDE: 900; 150 INJECTION, SOLUTION INTRAVENOUS at 00:25

## 2017-02-05 ASSESSMENT — GAIT ASSESSMENTS
DISTANCE (FEET): 500
GAIT LEVEL OF ASSIST: SUPERVISED

## 2017-02-05 ASSESSMENT — PAIN SCALES - GENERAL: PAINLEVEL_OUTOF10: 9

## 2017-02-05 NOTE — CARE PLAN
Problem: Safety  Goal: Will remain free from falls  Intervention: Implement fall precautions  Safety and fall precautions in place.  Bed in low position, upper side rails X 2, treaded socks applied.  Call light within reach.      Problem: Pain Management  Goal: Pain level will decrease to patient’s comfort goal  Outcome: PROGRESSING AS EXPECTED  Pain controlled with PCA pump ( see MAR).  Observed in bed resting comfortable.

## 2017-02-05 NOTE — DISCHARGE INSTRUCTIONS
Discharge Instructions    Discharged to home by car with relative. Discharged via wheelchair, hospital escort: Yes.  Special equipment needed: C-Collar    Be sure to schedule a follow-up appointment with your primary care doctor or any specialists as instructed.     Discharge Plan:   Influenza Vaccine Indication: Patient Refuses    I understand that a diet low in cholesterol, fat, and sodium is recommended for good health. Unless I have been given specific instructions below for another diet, I accept this instruction as my diet prescription.   Other diet: regular      Special Instructions: None    · Is patient discharged on Warfarin / Coumadin?   No     · Is patient Post Blood Transfusion?  No    Depression / Suicide Risk    As you are discharged from this [facility] facility, it is important to learn how to keep safe from harming yourself.    Recognize the warning signs:  · Abrupt changes in personality, positive or negative- including increase in energy   · Giving away possessions  · Change in eating patterns- significant weight changes-  positive or negative  · Change in sleeping patterns- unable to sleep or sleeping all the time   · Unwillingness or inability to communicate  · Depression  · Unusual sadness, discouragement and loneliness  · Talk of wanting to die  · Neglect of personal appearance   · Rebelliousness- reckless behavior  · Withdrawal from people/activities they love  · Confusion- inability to concentrate     If you or a loved one observes any of these behaviors or has concerns about self-harm, here's what you can do:  · Talk about it- your feelings and reasons for harming yourself  · Remove any means that you might use to hurt yourself (examples: pills, rope, extension cords, firearm)  · Get professional help from the community (Mental Health, Substance Abuse, psychological counseling)  · Do not be alone:Call your Safe Contact- someone whom you trust who will be there for you.  · Call your  local CRISIS HOTLINE 810-8722 or 839-357-3399  · Call your local Children's Mobile Crisis Response Team [place] (212) 529-5775 or www.[employer]  · Call the toll free National Suicide Prevention Hotlines   · National Suicide Prevention Lifeline 477-774-ITDT (1801)  · National Hope Line Network 800-SUICIDE (023-9909)    Back Surgery  Back problems bother many people in all age groups regardless of sex or occupation. There are many things you can do to take care of your back. It is best for you to manage your back problems rather than your back problems managing you. Back surgery can be a valuable aid for recovery if all other conservative means (exercise, anti-inflammatory medications, therapeutic bed, and physical therapy) have failed. The majority of back problems do respond to conservative therapy. You are having surgery because your back problem has not responded to conservative treatment.  DIAGNOSIS AND TREATMENT  Successful treatment begins with evaluation and learning what is wrong (diagnosis). Your caregiver will perform a thorough medical evaluation consisting of history, a physical exam, and diagnostic tests. These often will include: X-rays, MRI, or CT (special forms of X-rays). X-rays help locate the problem area. EMGs (electromyogram) are sometimes done to determine if the nerves involved are still working properly. Blood work may be done. Treatment options include conservative management (non-surgical) or surgery.  SYMPTOMS  · Loss of range of motion or pain with range of motion.   · Pain shooting down into the legs (sciatica).   · Numbness or weakness in your legs.   · Trouble controlling bladder or bowels (these are usually very late stage problems).   ANATOMY OF THE BACK  The more you know about your back, the better job you will be able to do in taking care of it. There are 24 bones in your back (vertebrae) and the cartilage cushioning (discs) between them are arranged in three natural  curves: the neck (cervical), upper back (thoracic), and lower back (lumbar) curves. When these curves are in their normal (good posture) alignment, your body is in a balanced position, distributing the weight evenly on your back. This posture is best maintained with strong and exercised back muscles. Activities which disrupt this alignment such as too much forward bending (flexion), or backward arching (extension), put extra pressure on the spine. As a result, the vertebrae and discs wear out (degenerate) faster than normal, contributing to bulging or broken (ruptured) discs, arthritis, and instability.  ANATOMY OF THE LUMBAR CURVE (LOWER BACK)  The lumbar curve consists of five back bones (vertebrae) and their discs (the cartilage structure and its fibrous surrounding). The spinal cord passes through the spinal canal formed by the bony arch (lamina) on the back of the vertebral bodies. Nerve roots leading to the legs branch out from the spinal cord. Each emerges through a side opening formed between vertebrae (the foramen) that are next to each other. When a piece of disc material ruptures out of its normal position and presses on a nerve root (ruptured disc) it creates the severe low back pain associated with this. The spinal canal and foramina, which are passageways for the nerves, vary in size from person to person. People with smaller passageways (stenosis or narrowing) are at higher risk for nerve irritation (inflammation) and back and leg pain. Although ruptured discs can occur any place in the back; they most frequently occur in the lower back (lumbar) area.  PREPARING FOR SURGERY  2. Stop smoking at least one week prior to surgery. This lowers risk during surgery.   3. Your caregiver may advise that you stop taking certain medications that may affect the outcome of the surgery and your ability to heal. For example, you may need to stop taking anti-inflammatories, such as aspirin, because of possible  bleeding problems. Other medications may have interactions with anesthesia.   4. BE SURE TO LET YOUR CAREGIVER KNOW IF YOU HAVE BEEN ON STEROIDS (INCLUDING CREAMS) FOR LONG PERIODS OF TIME. THIS IS CRITICAL.   5. Your caregiver will discuss possible risks and complications with you before surgery. In addition to the usual risks of anesthesia, other common risks and complications include: blood loss and replacement; temporary increase in pain due to surgery; uncorrected back pain; infection; and new nerve damage (tingling, numbness, and pain).   LET YOUR CAREGIVER KNOW ABOUT:  2. Allergies.   3. Medications taken including herbs, eye drops, over-the-counter medications, and creams.   4. Use of steroids (by mouth or creams).   5. Previous problems with anesthetics or numbing medication.   6. Possibility of pregnancy, if this applies.   7. History of blood clots (thrombophlebitis).   8. History of bleeding or blood problems.   9. Previous surgery.   10. Other health problems.   BEFORE THE PROCEDURE  You should be present 60 minutes prior to your procedure or as directed.   AFTER THE PROCEDURE  After surgery, you will be taken to the recovery area where a nurse will watch and check your progress. Once you are awake, stable, and taking fluids well, barring other problems, you will be allowed to go home.  HOME CARE INSTRUCTIONS   2. Once home, an ice pack applied to your operative site may help with discomfort and keep the swelling down.   3. Follow your caregiver's instructions as to activities, exercises, physical therapy, and driving a car.   4. Weight reduction may be beneficial.   5. Daily exercise is helpful to prevent return of problems. Maintain strength and range of motion as instructed.   6. Only take over-the-counter or prescription medicines for pain, discomfort, or fever as directed by your caregiver.   SEEK IMMEDIATE MEDICAL CARE IF:   2. There is increased bleeding (more than a small spot) from the wound.    3. You notice redness, swelling, or increasing pain in the wound.   4. Pus is coming from wound.   5. An unexplained oral temperature above 102° F (38.9° C) develops.   6. You notice a foul smell coming from the wound or dressing.   7. You develop a rash.   8. You have difficulty breathing.   9. Have any allergic problems.   Document Released: 03/26/2002 Document Revised: 03/11/2013 Document Reviewed: 07/20/2009  ExitCare® Patient Information ©2013 [employer].Wound Care  Taking care of your wound properly can help to prevent pain and infection. It can also help your wound to heal more quickly.   HOW TO CARE FOR YOUR WOUND   · Take or apply over-the-counter and prescription medicines only as told by your health care provider.  · If you were prescribed antibiotic medicine, take or apply it as told by your health care provider. Do not stop using the antibiotic even if your condition improves.  · Clean the wound each day or as told by your health care provider.  1. Wash the wound with mild soap and water.  2. Rinse the wound with water to remove all soap.  3. Pat the wound dry with a clean towel. Do not rub it.  · There are many different ways to close and cover a wound. For example, a wound can be covered with stitches (sutures), skin glue, or adhesive strips. Follow instructions from your health care provider about:  1. How to take care of your wound.  2. When and how you should change your bandage (dressing).  3. When you should remove your dressing.  4. Removing whatever was used to close your wound.  · Check your wound every day for signs of infection. Watch for:  1. Redness, swelling, or pain.  2. Fluid, blood, or pus.  · Keep the dressing dry until your health care provider says it can be removed. Do not take baths, swim, use a hot tub, or do anything that would put your wound underwater until your health care provider approves.  · Raise (elevate) the injured area above the level of your heart while you are  sitting or lying down.  · Do not scratch or pick at the wound.  · Keep all follow-up visits as told by your health care provider. This is important.  SEEK MEDICAL CARE IF:  6. You received a tetanus shot and you have swelling, severe pain, redness, or bleeding at the injection site.  7. You have a fever.  8. Your pain is not controlled with medicine.  9. You have increased redness, swelling, or pain at the site of your wound.  10. You have fluid, blood, or pus coming from your wound.  11. You notice a bad smell coming from your wound or your dressing.  SEEK IMMEDIATE MEDICAL CARE IF:  11. You have a red streak going away from your wound.     This information is not intended to replace advice given to you by your health care provider. Make sure you discuss any questions you have with your health care provider.     Document Released: 09/26/2009 Document Revised: 05/03/2016 Document Reviewed: 12/14/2015  [employer] Interactive Patient Education ©2016 [employer]

## 2017-02-05 NOTE — THERAPY
"65 y/o female presents with lumbar spinal stenosis S/P decompressive laminectomy L4-S1. Pt did not c/o pain , only grimacing with supine and OOB, no increased pain after mobilit.  pressent and very attentive.  Pt and  education (handout dispensed ) re: spinal precautions, lifting restrictions and proper body mechanics. Safe functional mobility without ambulatory assistive device. No further acute PT services required at this time.    Physical Therapy Evaluation completed.   Bed Mobility:  Supine to Sit: Supervised  Transfers: Sit to Stand: Supervised  Gait: Level Of Assist: Supervised with No Equipment Needed       Plan of Care: Patient with no further skilled PT needs in the acute care setting at this time  Discharge Recommendations: Equipment: No Equipment Needed. Post-acute therapy recommended after discharged home.    See \"Rehab Therapy-Acute\" Patient Summary Report for complete documentation.     "

## 2017-02-05 NOTE — OP REPORT
DATE OF SERVICE:  02/04/2017    PREOPERATIVE DIAGNOSIS:  L4-L5 and L5-S1 lumbar stenosis with neurogenic   claudication.    POSTOPERATIVE DIAGNOSIS:  L4-L5 and L5-S1 lumbar stenosis with neurogenic   claudication.    PROCEDURES:  Decompressive lumbar laminectomy L4-L5, L5-S1 with medial   facetectomy and foraminotomy.    SURGEON:  Emil Hitchcock MD    ASSISTANT:  Drake Granados MD    ANESTHESIA:  General anesthetic.    ANESTHESIOLOGIST:  Gen Mota MD    PREPARATION:  Routine.    DESCRIPTION OF PROCEDURE:  Patient was brought to the operating room and   following induction, patient was intubated and placed under general   anesthetic, rolled prone onto the operating table.  All pressure points were   padded.  Sequential stockings were in place and the back was prepped and   draped in sterile fashion.  We injected approximately 15 mL of Marcaine 0.5%   with epinephrine in the subcutaneous tissue and made an incision after   time-out at L4-L5 and L5-S1 and did a subperiosteal dissection.  With   retractors placed to maintain exposure spinous processes of L4 and L5 were   removed after intraoperative x-ray confirmed location.  We drilled the   junction of the lamina facet to a very thin shelf bilaterally and removed the   center portion of the bone with Leksell.  Remaining center portion of the   lamina and ligamentum flavum was removed with a Kerrison.  We were then able   to undercut at L4-L5 and L5-S1 with #3 and #4 Kerrisons.  At the facet joint   disk space complex, lateral recess stenosis was significant mostly at L4-L5,   but also at L5-S1, decompressed around the pedicle of L4-L5, and also around   the pedicle of S1, removing ligamentum flavum and hypertrophied facet done   bilaterally.  We decompressed the L4, L5 and S1 nerve roots bilateral.  The   area was copiously irrigated.  Meticulous hemostasis was obtained with   thrombin-soaked Gelfoam and bipolar cautery on the subcutaneous and muscular    tissue.    Fascia was closed over a medium size Hemovac with #1 Vicryl suture,   subcutaneous tissue with 2-0 Vicryl, subcuticular with 3-0 Vicryl, skin with   Steri-Strips.  All sponge and needle counts were correct.  Estimated blood   loss was less than 50 mL.       ____________________________________     MD DENNIS SHAH / JOANNA    DD:  02/04/2017 12:57:09  DT:  02/04/2017 16:14:57    D#:  [id number]  Job#:  [id number]

## 2017-02-23 ENCOUNTER — OFFICE VISIT (OUTPATIENT)
Dept: URGENT CARE | Facility: PHYSICIAN GROUP | Age: 67
End: 2017-02-23
Payer: MEDICARE

## 2017-02-23 ENCOUNTER — HOSPITAL ENCOUNTER (OUTPATIENT)
Dept: RADIOLOGY | Facility: MEDICAL CENTER | Age: 67
End: 2017-02-23
Attending: FAMILY MEDICINE
Payer: MEDICARE

## 2017-02-23 VITALS
DIASTOLIC BLOOD PRESSURE: 64 MMHG | HEIGHT: 61 IN | HEART RATE: 114 BPM | BODY MASS INDEX: 27.56 KG/M2 | WEIGHT: 146 LBS | RESPIRATION RATE: 16 BRPM | OXYGEN SATURATION: 96 % | TEMPERATURE: 98.1 F | SYSTOLIC BLOOD PRESSURE: 102 MMHG

## 2017-02-23 DIAGNOSIS — M54.50 ACUTE MIDLINE LOW BACK PAIN WITHOUT SCIATICA: ICD-10-CM

## 2017-02-23 PROCEDURE — 1036F TOBACCO NON-USER: CPT | Performed by: FAMILY MEDICINE

## 2017-02-23 PROCEDURE — 99213 OFFICE O/P EST LOW 20 MIN: CPT | Performed by: FAMILY MEDICINE

## 2017-02-23 PROCEDURE — 1101F PT FALLS ASSESS-DOCD LE1/YR: CPT | Mod: 8P | Performed by: FAMILY MEDICINE

## 2017-02-23 PROCEDURE — 3017F COLORECTAL CA SCREEN DOC REV: CPT | Performed by: FAMILY MEDICINE

## 2017-02-23 PROCEDURE — 4040F PNEUMOC VAC/ADMIN/RCVD: CPT | Mod: 8P | Performed by: FAMILY MEDICINE

## 2017-02-23 PROCEDURE — G8420 CALC BMI NORM PARAMETERS: HCPCS | Performed by: FAMILY MEDICINE

## 2017-02-23 PROCEDURE — 3014F SCREEN MAMMO DOC REV: CPT | Mod: 8P | Performed by: FAMILY MEDICINE

## 2017-02-23 PROCEDURE — G8432 DEP SCR NOT DOC, RNG: HCPCS | Performed by: FAMILY MEDICINE

## 2017-02-23 PROCEDURE — G8484 FLU IMMUNIZE NO ADMIN: HCPCS | Performed by: FAMILY MEDICINE

## 2017-02-23 PROCEDURE — 72100 X-RAY EXAM L-S SPINE 2/3 VWS: CPT

## 2017-02-23 ASSESSMENT — ENCOUNTER SYMPTOMS
FOCAL WEAKNESS: 0
WEIGHT LOSS: 0
SENSORY CHANGE: 0
FLANK PAIN: 0
BACK PAIN: 1

## 2017-02-23 NOTE — MR AVS SNAPSHOT
"        Augusta Rodriguez   2017 6:00 PM   Office Visit   MRN: [id number]    Department:  [facility]   Dept Phone:  [phone]    Description:  Female : 1950   Provider:  Francisco Gonzalez M.D.           Reason for Visit     Back Pain lower back pain, chronic issue, states back popped while having a BM. Has sutures in lower back from surgery       Allergies as of 2017     Allergen Noted Reactions    Biaxin [Clarithromycin] 11/15/2012       Clarithromycin 2007       Doxycycline 10/24/2013       Possible allergic reaction    Ees [Erythromycin] 2007       Imitrex [Sumatriptan] 2015   Swelling    Tongue swell    Pcn [Penicillins] 2007       Shellfish Allergy 2017       Trazodone 2009   Swelling      You were diagnosed with     Acute midline low back pain without sciatica   [[id number]]         Vital Signs     Blood Pressure Pulse Temperature Respirations Height Weight    102/64 mmHg 114 36.7 °C (98.1 °F) 16 1.549 m (5' 0.98\") 66.225 kg (146 lb)    Body Mass Index Oxygen Saturation Smoking Status             27.60 kg/m2 96% Never Smoker          Basic Information     Date Of Birth Sex Race Ethnicity Preferred Language    1950 Female White Non- English      Problem List              ICD-10-CM Priority Class Noted - Resolved    HTN (hypertension) I10   2010 - Present    Hyperlipidemia E78.5   2010 - Present    Osteopenia M85.80   2010 - Present    Tremor, essential G25.0   2010 - Present    Depression F32.9   2010 - Present    Tear of meniscus of knee joint S83.209A   2010 - Present    GERD (gastroesophageal reflux disease) K21.9   2010 - Present    Vitamin d deficiency    2010 - Present    Impaired fasting glucose R73.01   2010 - Present    Preventative health care Z00.00   2010 - Present    Trigger finger, acquired M65.30   2012 - Present    Spinal stenosis, lumbar M48.06   2017 - Present      "   Health Maintenance        Date Due Completion Dates    IMM DTaP/Tdap/Td Vaccine (1 - Tdap) 6/15/1970 ---    PAP SMEAR 6/15/1972 ---    COLONOSCOPY 6/15/2001 ---    IMM ZOSTER VACCINE 6/15/2011 ---    MAMMOGRAM 1/28/2016 1/28/2015, 1/8/2014, 10/6/2011, 2/25/2010, 2/25/2010, 1/26/2007    BONE DENSITY 6/15/2016 12/17/2009    IMM PNEUMOCOCCAL 65+ (ADULT) LOW/MEDIUM RISK SERIES (1 of 2 - PCV13) 6/15/2016 ---    IMM INFLUENZA (1) 9/1/2016 ---            Current Immunizations     No immunizations on file.      Below and/or attached are the medications your provider expects you to take. Review all of your home medications and newly ordered medications with your provider and/or pharmacist. Follow medication instructions as directed by your provider and/or pharmacist. Please keep your medication list with you and share with your provider. Update the information when medications are discontinued, doses are changed, or new medications (including over-the-counter products) are added; and carry medication information at all times in the event of emergency situations     Allergies:  BIAXIN - (reactions not documented)     CLARITHROMYCIN - (reactions not documented)     DOXYCYCLINE - (reactions not documented)     EES - (reactions not documented)     IMITREX - Swelling     PCN - (reactions not documented)     SHELLFISH ALLERGY - (reactions not documented)     TRAZODONE - Swelling               Medications  Valid as of: February 23, 2017 -  6:50 PM    Generic Name Brand Name Tablet Size Instructions for use    Ascorbic Acid (Tab) ascorbic acid 500 MG Take 1,000 mg by mouth every day.        DiltiaZEM HCl Coated Beads (CAPSULE SR 24 HR) CARDIZEM  MG Take 240 mg by mouth every day.        DULoxetine HCl (Cap DR Particles) CYMBALTA 60 MG Take 60 mg by mouth every day.        Hydrocodone-Acetaminophen (Tab) Hydrocodone-Acetaminophen 7.5-300 MG Take  by mouth as needed.        Omeprazole (CAPSULE DELAYED RELEASE) PRILOSEC 20 MG  Take 20 mg by mouth every day.        Red Yeast Rice Extract   Take  by mouth.        TiZANidine HCl (Tab) ZANAFLEX 2 MG Take 1 Tab by mouth every 6 hours as needed (muscle spasm).        .                 Medicines prescribed today were sent to:     [facility] [address] [place] [address], [place] - [address] [facility]    [address] [facility] [place] [address]    Phone: [phone] Fax: [phone]    Open 24 Hours?: No      Medication refill instructions:       If your prescription bottle indicates you have medication refills left, it is not necessary to call your provider’s office. Please contact your pharmacy and they will refill your medication.    If your prescription bottle indicates you do not have any refills left, you may request refills at any time through one of the following ways: The online [employer] system (except Urgent Care), by calling your provider’s office, or by asking your pharmacy to contact your provider’s office with a refill request. Medication refills are processed only during regular business hours and may not be available until the next business day. Your provider may request additional information or to have a follow-up visit with you prior to refilling your medication.   *Please Note: Medication refills are assigned a new Rx number when refilled electronically. Your pharmacy may indicate that no refills were authorized even though a new prescription for the same medication is available at the pharmacy. Please request the medicine by name with the pharmacy before contacting your provider for a refill.        Your To Do List     Future Labs/Procedures Complete By Expires    DX-LUMBAR SPINE-2 OR 3 VIEWS  As directed 2/23/2018         [employer] Status: Patient Declined

## 2017-02-24 NOTE — PROGRESS NOTES
"Subjective:      Augusta Rodriguez is a 66 y.o. female who presents with Back Pain            Back Pain  This is a recurrent problem. The current episode started today (Sudden onset of severe low back pain while straining with BM today. She is s/p lumbar laminectomy 2/4/2017 with Dr. Hitchcock. No radiation. No fever. No myelopathy. No OTC meds,  tried ice with min improvment. She has required opiate pain meds since surgery.). Pertinent negatives include no weight loss.   No other aggravating or alleviating factors.      Review of Systems   Constitutional: Negative for weight loss and malaise/fatigue.   Genitourinary: Negative for hematuria and flank pain.   Musculoskeletal: Positive for back pain.   Skin: Negative for itching and rash.   Neurological: Negative for sensory change and focal weakness.   .  Medications, Allergies, and current problem list reviewed today in Epic         Objective:     /64 mmHg  Pulse 114  Temp(Src) 36.7 °C (98.1 °F)  Resp 16  Ht 1.549 m (5' 0.98\")  Wt 66.225 kg (146 lb)  BMI 27.60 kg/m2  SpO2 96%     Physical Exam   Constitutional: She appears well-developed and well-nourished. No distress.   HENT:   Head: Normocephalic and atraumatic.   Musculoskeletal:        Back:    Neurological:   Bilateral lower extremity strength and sensory intact.  Negative straight leg raise.     Skin: Skin is warm and dry.               Assessment/Plan:   Xray per radiology  1.  Multiple compressed lower thoracic and lumbar vertebral bodies unchanged.  2.  Multilevel degenerative changes.  3.  Interval L3-4 through L5-S1 laminectomies.      1. Acute midline low back pain without sciatica  DX-LUMBAR SPINE-2 OR 3 VIEWS     F/u with back surgeon if pain persists.   "

## 2017-06-23 ENCOUNTER — HOSPITAL ENCOUNTER (OUTPATIENT)
Dept: RADIOLOGY | Facility: MEDICAL CENTER | Age: 67
End: 2017-06-23
Attending: NEUROLOGICAL SURGERY
Payer: MEDICARE

## 2017-06-23 DIAGNOSIS — M54.16 LUMBAR RADICULOPATHY: ICD-10-CM

## 2017-06-23 PROCEDURE — 72148 MRI LUMBAR SPINE W/O DYE: CPT

## 2017-07-01 ENCOUNTER — OFFICE VISIT (OUTPATIENT)
Dept: URGENT CARE | Facility: PHYSICIAN GROUP | Age: 67
End: 2017-07-01
Payer: MEDICARE

## 2017-07-01 ENCOUNTER — HOSPITAL ENCOUNTER (OUTPATIENT)
Facility: MEDICAL CENTER | Age: 67
End: 2017-07-01
Attending: PHYSICIAN ASSISTANT
Payer: MEDICARE

## 2017-07-01 VITALS
BODY MASS INDEX: 28.32 KG/M2 | HEIGHT: 61 IN | TEMPERATURE: 98.1 F | HEART RATE: 93 BPM | OXYGEN SATURATION: 98 % | RESPIRATION RATE: 18 BRPM | SYSTOLIC BLOOD PRESSURE: 130 MMHG | WEIGHT: 150 LBS | DIASTOLIC BLOOD PRESSURE: 96 MMHG

## 2017-07-01 DIAGNOSIS — R19.7 DIARRHEA, UNSPECIFIED TYPE: ICD-10-CM

## 2017-07-01 DIAGNOSIS — R11.0 NAUSEA: ICD-10-CM

## 2017-07-01 PROCEDURE — 87045 FECES CULTURE AEROBIC BACT: CPT

## 2017-07-01 PROCEDURE — 87324 CLOSTRIDIUM AG IA: CPT

## 2017-07-01 PROCEDURE — 99214 OFFICE O/P EST MOD 30 MIN: CPT | Performed by: PHYSICIAN ASSISTANT

## 2017-07-01 PROCEDURE — 87046 STOOL CULTR AEROBIC BACT EA: CPT

## 2017-07-01 PROCEDURE — 87493 C DIFF AMPLIFIED PROBE: CPT

## 2017-07-01 RX ORDER — ONDANSETRON 4 MG/1
4 TABLET, FILM COATED ORAL EVERY 4 HOURS PRN
Qty: 30 TAB | Refills: 0 | Status: SHIPPED | OUTPATIENT
Start: 2017-07-01 | End: 2017-07-17

## 2017-07-01 ASSESSMENT — ENCOUNTER SYMPTOMS
DIZZINESS: 0
DIAPHORESIS: 0
VOMITING: 1
WEAKNESS: 1
HEARTBURN: 0
NUMBER OF EPISODES OF EMESIS TODAY: 1
WEIGHT LOSS: 0
SHORTNESS OF BREATH: 0
COUGH: 0
BLOOD IN STOOL: 0
FEVER: 0
CONSTIPATION: 0
DIARRHEA: 1
FATIGUE: 1
NAUSEA: 1
ANOREXIA: 1
ABDOMINAL PAIN: 1
PALPITATIONS: 0
CHILLS: 0

## 2017-07-01 NOTE — PATIENT INSTRUCTIONS
Food Choices to Help Relieve Diarrhea, Adult  When you have diarrhea, the foods you eat and your eating habits are very important. Choosing the right foods and drinks can help relieve diarrhea. Also, because diarrhea can last up to 7 days, you need to replace lost fluids and electrolytes (such as sodium, potassium, and chloride) in order to help prevent dehydration.   WHAT GENERAL GUIDELINES DO I NEED TO FOLLOW?  · Slowly drink 1 cup (8 oz) of fluid for each episode of diarrhea. If you are getting enough fluid, your urine will be clear or pale yellow.  · Eat starchy foods. Some good choices include white rice, white toast, pasta, low-fiber cereal, baked potatoes (without the skin), saltine crackers, and bagels.  · Avoid large servings of any cooked vegetables.  · Limit fruit to two servings per day. A serving is ½ cup or 1 small piece.  · Choose foods with less than 2 g of fiber per serving.  · Limit fats to less than 8 tsp (38 g) per day.  · Avoid fried foods.  · Eat foods that have probiotics in them. Probiotics can be found in certain dairy products.  · Avoid foods and beverages that may increase the speed at which food moves through the stomach and intestines (gastrointestinal tract). Things to avoid include:  ¨ High-fiber foods, such as dried fruit, raw fruits and vegetables, nuts, seeds, and whole grain foods.  ¨ Spicy foods and high-fat foods.  ¨ Foods and beverages sweetened with high-fructose corn syrup, honey, or sugar alcohols such as xylitol, sorbitol, and mannitol.  WHAT FOODS ARE RECOMMENDED?  Grains  White rice. White, Finnish, or rhina breads (fresh or toasted), including plain rolls, buns, or bagels. White pasta. Saltine, soda, or hayden crackers. Pretzels. Low-fiber cereal. Cooked cereals made with water (such as cornmeal, farina, or cream cereals). Plain muffins. Matzo. Anita toast. Zwieback.   Vegetables  Potatoes (without the skin). Strained tomato and vegetable juices. Most well-cooked and canned  vegetables without seeds. Tender lettuce.  Fruits  Cooked or canned applesauce, apricots, cherries, fruit cocktail, grapefruit, peaches, pears, or plums. Fresh bananas, apples without skin, cherries, grapes, cantaloupe, grapefruit, peaches, oranges, or plums.   Meat and Other Protein Products  Baked or boiled chicken. Eggs. Tofu. Fish. Seafood. Smooth peanut butter. Ground or well-cooked tender beef, ham, veal, lamb, pork, or poultry.   Dairy  Plain yogurt, kefir, and unsweetened liquid yogurt. Lactose-free milk, buttermilk, or soy milk. Plain hard cheese.  Beverages  Sport drinks. Clear broths. Diluted fruit juices (except prune). Regular, caffeine-free sodas such as ginger ale. Water. Decaffeinated teas. Oral rehydration solutions. Sugar-free beverages not sweetened with sugar alcohols.  Other  Bouillon, broth, or soups made from recommended foods.   The items listed above may not be a complete list of recommended foods or beverages. Contact your dietitian for more options.  WHAT FOODS ARE NOT RECOMMENDED?  Grains  Whole grain, whole wheat, bran, or rye breads, rolls, pastas, crackers, and cereals. Wild or brown rice. Cereals that contain more than 2 g of fiber per serving. Corn tortillas or taco shells. Cooked or dry oatmeal. Granola. Popcorn.  Vegetables  Raw vegetables. Cabbage, broccoli, [place] sprouts, artichokes, baked beans, beet greens, corn, kale, legumes, peas, sweet potatoes, and yams. Potato skins. Cooked spinach and cabbage.  Fruits  Dried fruit, including raisins and dates. Raw fruits. Stewed or dried prunes. Fresh apples with skin, apricots, mangoes, pears, raspberries, and strawberries.   Meat and Other Protein Products  [place] peanut butter. Nuts and seeds. Beans and lentils. Leal.   Dairy  High-fat cheeses. Milk, chocolate milk, and beverages made with milk, such as milk shakes. Cream. Ice cream.  Sweets and Desserts  Sweet rolls, doughnuts, and sweet breads. Pancakes and waffles.  Fats and  Oils  Butter. Cream sauces. Margarine. Salad oils. Plain salad dressings. Olives. Avocados.   Beverages  Caffeinated beverages (such as coffee, tea, soda, or energy drinks). Alcoholic beverages. Fruit juices with pulp. Prune juice. Soft drinks sweetened with high-fructose corn syrup or sugar alcohols.  Other  Coconut. Hot sauce. Chili powder. Mayonnaise. Gravy. Cream-based or milk-based soups.   The items listed above may not be a complete list of foods and beverages to avoid. Contact your dietitian for more information.  WHAT SHOULD I DO IF I BECOME DEHYDRATED?  Diarrhea can sometimes lead to dehydration. Signs of dehydration include dark urine and dry mouth and skin. If you think you are dehydrated, you should rehydrate with an oral rehydration solution. These solutions can be purchased at pharmacies, retail stores, or online.   Drink ½-1 cup (120-240 mL) of oral rehydration solution each time you have an episode of diarrhea. If drinking this amount makes your diarrhea worse, try drinking smaller amounts more often. For example, drink 1-3 tsp (5-15 mL) every 5-10 minutes.   A general rule for staying hydrated is to drink 1½-2 L of fluid per day. Talk to your health care provider about the specific amount you should be drinking each day. Drink enough fluids to keep your urine clear or pale yellow.     This information is not intended to replace advice given to you by your health care provider. Make sure you discuss any questions you have with your health care provider.     Document Released: 03/09/2005 Document Revised: 01/08/2016 Document Reviewed: 11/10/2014  [employer] Interactive Patient Education ©2016 [employer]

## 2017-07-01 NOTE — MR AVS SNAPSHOT
"        Augusta Rodriguez   2017 12:45 PM   Office Visit   MRN: [id number]    Department:  [facility]   Dept Phone:  [phone]    Description:  Female : 1950   Provider:  Juancho Davis PA-C           Reason for Visit     Emesis NVD off and on x1 month      Allergies as of 2017     Allergen Noted Reactions    Biaxin [Clarithromycin] 11/15/2012       Clarithromycin 2007       Doxycycline 10/24/2013       Possible allergic reaction    Ees [Erythromycin] 2007       Imitrex [Sumatriptan] 2015   Swelling    Tongue swell    Pcn [Penicillins] 2007       Shellfish Allergy 2017       Trazodone 2009   Swelling      You were diagnosed with     Nausea   [499038]       Diarrhea, unspecified type   [5191231]         Vital Signs     Blood Pressure Pulse Temperature Respirations Height Weight    130/96 mmHg 93 36.7 °C (98.1 °F) 18 1.549 m (5' 0.98\") 68.04 kg (150 lb)    Body Mass Index Oxygen Saturation Smoking Status             28.36 kg/m2 98% Never Smoker          Basic Information     Date Of Birth Sex Race Ethnicity Preferred Language    1950 Female White Non- English      Problem List              ICD-10-CM Priority Class Noted - Resolved    HTN (hypertension) I10   2010 - Present    Hyperlipidemia E78.5   2010 - Present    Osteopenia M85.80   2010 - Present    Tremor, essential G25.0   2010 - Present    Depression F32.9   2010 - Present    Tear of meniscus of knee joint S83.209A   2010 - Present    GERD (gastroesophageal reflux disease) K21.9   2010 - Present    Vitamin d deficiency    2010 - Present    Impaired fasting glucose R73.01   2010 - Present    Preventative health care Z00.00   2010 - Present    Trigger finger, acquired M65.30   2012 - Present    Spinal stenosis, lumbar M48.06   2017 - Present      Health Maintenance        Date Due Completion Dates    IMM DTaP/Tdap/Td Vaccine (1 " - Tdap) 6/15/1970 ---    PAP SMEAR 6/15/1972 ---    COLONOSCOPY 6/15/2001 ---    IMM ZOSTER VACCINE 6/15/2011 ---    MAMMOGRAM 1/28/2016 1/28/2015, 1/8/2014, 10/6/2011, 2/25/2010, 2/25/2010, 1/26/2007    BONE DENSITY 6/15/2016 12/17/2009    IMM PNEUMOCOCCAL 65+ (ADULT) LOW/MEDIUM RISK SERIES (1 of 2 - PCV13) 6/15/2016 ---    IMM INFLUENZA (1) 9/1/2017 ---            Current Immunizations     No immunizations on file.      Below and/or attached are the medications your provider expects you to take. Review all of your home medications and newly ordered medications with your provider and/or pharmacist. Follow medication instructions as directed by your provider and/or pharmacist. Please keep your medication list with you and share with your provider. Update the information when medications are discontinued, doses are changed, or new medications (including over-the-counter products) are added; and carry medication information at all times in the event of emergency situations     Allergies:  BIAXIN - (reactions not documented)     CLARITHROMYCIN - (reactions not documented)     DOXYCYCLINE - (reactions not documented)     EES - (reactions not documented)     IMITREX - Swelling     PCN - (reactions not documented)     SHELLFISH ALLERGY - (reactions not documented)     TRAZODONE - Swelling               Medications  Valid as of: July 01, 2017 -  1:35 PM    Generic Name Brand Name Tablet Size Instructions for use    Ascorbic Acid (Tab) ascorbic acid 500 MG Take 1,000 mg by mouth every day.        DilTIAZem HCl Coated Beads (CAPSULE SR 24 HR) CARDIZEM  MG Take 240 mg by mouth every day.        DULoxetine HCl (Cap DR Particles) CYMBALTA 60 MG Take 60 mg by mouth every day.        Hydrocodone-Acetaminophen (Tab) Hydrocodone-Acetaminophen 7.5-300 MG Take  by mouth as needed.        Omeprazole (CAPSULE DELAYED RELEASE) PRILOSEC 20 MG Take 20 mg by mouth every day.        Ondansetron HCl (Tab) ZOFRAN 4 MG Take 1 Tab by  mouth every four hours as needed for Nausea/Vomiting.        Red Yeast Rice Extract   Take  by mouth.        TiZANidine HCl (Tab) ZANAFLEX 2 MG Take 1 Tab by mouth every 6 hours as needed (muscle spasm).        .                 Medicines prescribed today were sent to:     [facility] [address] [place] [address], [place] - [address] [facility]    [address] [facility] [place] [address]    Phone: [phone] Fax: [phone]    Open 24 Hours?: No      Medication refill instructions:       If your prescription bottle indicates you have medication refills left, it is not necessary to call your provider’s office. Please contact your pharmacy and they will refill your medication.    If your prescription bottle indicates you do not have any refills left, you may request refills at any time through one of the following ways: The online [employer] system (except Urgent Care), by calling your provider’s office, or by asking your pharmacy to contact your provider’s office with a refill request. Medication refills are processed only during regular business hours and may not be available until the next business day. Your provider may request additional information or to have a follow-up visit with you prior to refilling your medication.   *Please Note: Medication refills are assigned a new Rx number when refilled electronically. Your pharmacy may indicate that no refills were authorized even though a new prescription for the same medication is available at the pharmacy. Please request the medicine by name with the pharmacy before contacting your provider for a refill.        Your To Do List     Future Labs/Procedures Complete By Brian REDDINGFF BY PCR  As directed 7/2/2017    CULTURE STOOL  As directed 7/1/2018      Referral     A referral request has been sent to our patient care coordination department. Please allow 3-5 business days for us to process this request and contact you either by phone or mail. If you do not hear from us by the  5th business day, please call us at [phone].        Instructions    Food Choices to Help Relieve Diarrhea, Adult  When you have diarrhea, the foods you eat and your eating habits are very important. Choosing the right foods and drinks can help relieve diarrhea. Also, because diarrhea can last up to 7 days, you need to replace lost fluids and electrolytes (such as sodium, potassium, and chloride) in order to help prevent dehydration.   WHAT GENERAL GUIDELINES DO I NEED TO FOLLOW?  · Slowly drink 1 cup (8 oz) of fluid for each episode of diarrhea. If you are getting enough fluid, your urine will be clear or pale yellow.  · Eat starchy foods. Some good choices include white rice, white toast, pasta, low-fiber cereal, baked potatoes (without the skin), saltine crackers, and bagels.  · Avoid large servings of any cooked vegetables.  · Limit fruit to two servings per day. A serving is ½ cup or 1 small piece.  · Choose foods with less than 2 g of fiber per serving.  · Limit fats to less than 8 tsp (38 g) per day.  · Avoid fried foods.  · Eat foods that have probiotics in them. Probiotics can be found in certain dairy products.  · Avoid foods and beverages that may increase the speed at which food moves through the stomach and intestines (gastrointestinal tract). Things to avoid include:  ¨ High-fiber foods, such as dried fruit, raw fruits and vegetables, nuts, seeds, and whole grain foods.  ¨ Spicy foods and high-fat foods.  ¨ Foods and beverages sweetened with high-fructose corn syrup, honey, or sugar alcohols such as xylitol, sorbitol, and mannitol.  WHAT FOODS ARE RECOMMENDED?  Grains  White rice. White, Danish, or rhina breads (fresh or toasted), including plain rolls, buns, or bagels. White pasta. Saltine, soda, or hayden crackers. Pretzels. Low-fiber cereal. Cooked cereals made with water (such as cornmeal, farina, or cream cereals). Plain muffins. Matzo. Anita toast. Zwieback.   Vegetables  Potatoes (without  the skin). Strained tomato and vegetable juices. Most well-cooked and canned vegetables without seeds. Tender lettuce.  Fruits  Cooked or canned applesauce, apricots, cherries, fruit cocktail, grapefruit, peaches, pears, or plums. Fresh bananas, apples without skin, cherries, grapes, cantaloupe, grapefruit, peaches, oranges, or plums.   Meat and Other Protein Products  Baked or boiled chicken. Eggs. Tofu. Fish. Seafood. Smooth peanut butter. Ground or well-cooked tender beef, ham, veal, lamb, pork, or poultry.   Dairy  Plain yogurt, kefir, and unsweetened liquid yogurt. Lactose-free milk, buttermilk, or soy milk. Plain hard cheese.  Beverages  Sport drinks. Clear broths. Diluted fruit juices (except prune). Regular, caffeine-free sodas such as ginger ale. Water. Decaffeinated teas. Oral rehydration solutions. Sugar-free beverages not sweetened with sugar alcohols.  Other  Bouillon, broth, or soups made from recommended foods.   The items listed above may not be a complete list of recommended foods or beverages. Contact your dietitian for more options.  WHAT FOODS ARE NOT RECOMMENDED?  Grains  Whole grain, whole wheat, bran, or rye breads, rolls, pastas, crackers, and cereals. Wild or brown rice. Cereals that contain more than 2 g of fiber per serving. Corn tortillas or taco shells. Cooked or dry oatmeal. Granola. Popcorn.  Vegetables  Raw vegetables. Cabbage, broccoli, [place] sprouts, artichokes, baked beans, beet greens, corn, kale, legumes, peas, sweet potatoes, and yams. Potato skins. Cooked spinach and cabbage.  Fruits  Dried fruit, including raisins and dates. Raw fruits. Stewed or dried prunes. Fresh apples with skin, apricots, mangoes, pears, raspberries, and strawberries.   Meat and Other Protein Products  [place] peanut butter. Nuts and seeds. Beans and lentils. Leal.   Dairy  High-fat cheeses. Milk, chocolate milk, and beverages made with milk, such as milk shakes. Cream. Ice cream.  Sweets and  Desserts  Sweet rolls, doughnuts, and sweet breads. Pancakes and waffles.  Fats and Oils  Butter. Cream sauces. Margarine. Salad oils. Plain salad dressings. Olives. Avocados.   Beverages  Caffeinated beverages (such as coffee, tea, soda, or energy drinks). Alcoholic beverages. Fruit juices with pulp. Prune juice. Soft drinks sweetened with high-fructose corn syrup or sugar alcohols.  Other  Coconut. Hot sauce. Chili powder. Mayonnaise. Gravy. Cream-based or milk-based soups.   The items listed above may not be a complete list of foods and beverages to avoid. Contact your dietitian for more information.  WHAT SHOULD I DO IF I BECOME DEHYDRATED?  Diarrhea can sometimes lead to dehydration. Signs of dehydration include dark urine and dry mouth and skin. If you think you are dehydrated, you should rehydrate with an oral rehydration solution. These solutions can be purchased at pharmacies, retail stores, or online.   Drink ½-1 cup (120-240 mL) of oral rehydration solution each time you have an episode of diarrhea. If drinking this amount makes your diarrhea worse, try drinking smaller amounts more often. For example, drink 1-3 tsp (5-15 mL) every 5-10 minutes.   A general rule for staying hydrated is to drink 1½-2 L of fluid per day. Talk to your health care provider about the specific amount you should be drinking each day. Drink enough fluids to keep your urine clear or pale yellow.     This information is not intended to replace advice given to you by your health care provider. Make sure you discuss any questions you have with your health care provider.     Document Released: 03/09/2005 Document Revised: 01/08/2016 Document Reviewed: 11/10/2014  [employer] Interactive Patient Education ©2016 [employer]            MyChart Status: Patient Declined

## 2017-07-01 NOTE — PROGRESS NOTES
Subjective:      Augusta Rodriguez is a 67 y.o. female who presents with Emesis            Emesis  This is a recurrent problem. The current episode started more than 1 month ago. The problem occurs intermittently. The problem has been waxing and waning. Associated symptoms include abdominal pain, anorexia, fatigue, nausea, vomiting and weakness. Pertinent negatives include no chest pain, chills, coughing, diaphoresis or fever. Associated symptoms comments: Diarrhea  . Nothing aggravates the symptoms. Treatments tried: prilosec.       Review of Systems   Constitutional: Positive for malaise/fatigue and fatigue. Negative for fever, chills, weight loss and diaphoresis.   Respiratory: Negative for cough and shortness of breath.    Cardiovascular: Negative for chest pain and palpitations.   Gastrointestinal: Positive for nausea, vomiting, abdominal pain, diarrhea and anorexia. Negative for heartburn, constipation, blood in stool and melena.   Neurological: Positive for weakness. Negative for dizziness.     All other systems reviewed and are negative.  PMH:  has a past medical history of Psychiatric problem; Hypertension; Hypercholesteremia; Restless leg syndrome; Incontinence of urine; HTN (hypertension) (2/24/2010); Hyperlipidemia (2/24/2010); Osteopenia (2/24/2010); Tremor, essential (2/24/2010); Depression (2/24/2010); Tear of meniscus of knee joint (2/27/2010); GERD (gastroesophageal reflux disease) (2/27/2010); Vitamin d deficiency (2/27/2010); Impaired fasting glucose (2/27/2010); Preventative health care (2/27/2010); Indigestion; Urinary bladder disorder; Cold; Other specified disorder of intestines; Arthritis; Arrhythmia; Dental disorder; and Pain (11/5/12). She also has no past medical history of Ulcer (CMS-HCC) or Breast cancer (CMS-HCC).  MEDS:   Current outpatient prescriptions:   •  ondansetron (ZOFRAN) 4 MG Tab tablet, Take 1 Tab by mouth every four hours as needed for Nausea/Vomiting., Disp: 30 Tab, Rfl:  0  •  tizanidine (ZANAFLEX) 2 MG tablet, Take 1 Tab by mouth every 6 hours as needed (muscle spasm)., Disp: 90 Tab, Rfl: 3  •  duloxetine (CYMBALTA) 60 MG Cap DR Particles delayed-release capsule, Take 60 mg by mouth every day., Disp: , Rfl:   •  Hydrocodone-Acetaminophen (VICODIN ES) 7.5-300 MG Tab, Take  by mouth as needed., Disp: , Rfl:   •  omeprazole (PRILOSEC) 20 MG delayed-release capsule, Take 20 mg by mouth every day., Disp: , Rfl:   •  ascorbic acid (ASCORBIC ACID) 500 MG Tab, Take 1,000 mg by mouth every day., Disp: , Rfl:   •  Red Yeast Rice Extract (RED YEAST RICE PO), Take  by mouth., Disp: , Rfl:   •  diltiazem CD (CARDIZEM CD) 240 MG CP24, Take 240 mg by mouth every day., Disp: , Rfl:   ALLERGIES:   Allergies   Allergen Reactions   • Biaxin [Clarithromycin]    • Clarithromycin    • Doxycycline      Possible allergic reaction   • Ees [Erythromycin]    • Imitrex [Sumatriptan] Swelling     Tongue swell   • Pcn [Penicillins]    • Shellfish Allergy    • Trazodone Swelling     SURGHX:   Past Surgical History   Procedure Laterality Date   • Bladder suspension       bladder sling   • Grecia by laparoscopy  1997   • Primary c section  1970/1975   • Hysterectomy, total abdominal  1979   • Hip arthroscopy  2/23/2009     Performed by SHERLYN CALVO at [facility]   • Acetabular osteotomy  2/23/2009     Performed by SHERLYN CALVO at [facility]   • Mass excision ortho  2/23/2009     Performed by SHERLYN CALVO at [facility]   • Carpal tunnel endoscopic  11/17/2012     Performed by Heriberto Quiñones M.D. at [facility]   • Trigger finger release  11/17/2012     Performed by Heriberto Quiñones M.D. at [facility]   • Lumbar laminectomy diskectomy Bilateral 2/4/2017     Procedure: LUMBAR LAMINECTOMY DISKECTOMY POSTERIOR L4-S1 ;  Surgeon: Emil Hitchcock M.D.;  Location: [facility];  Service:      SOCHX:  reports that she has  "never smoked. She has never used smokeless tobacco. She reports that she drinks alcohol. She reports that she does not use illicit drugs.  FH: Family history was reviewed, no pertinent findings to report  Medications, Allergies, and current problem list reviewed today in Epic       Objective:     /96 mmHg  Pulse 93  Temp(Src) 36.7 °C (98.1 °F)  Resp 18  Ht 1.549 m (5' 0.98\")  Wt 68.04 kg (150 lb)  BMI 28.36 kg/m2  SpO2 98%     Physical Exam   Constitutional: She is oriented to person, place, and time. Vital signs are normal. She appears well-developed and well-nourished.  Non-toxic appearance. She does not have a sickly appearance. She does not appear ill. No distress.   Neck: Normal range of motion. Neck supple.   Cardiovascular: Normal rate and regular rhythm.    Pulmonary/Chest: Effort normal and breath sounds normal.   Abdominal: Soft. Bowel sounds are normal. She exhibits no shifting dullness, no distension, no abdominal bruit, no pulsatile midline mass and no mass. There is tenderness in the epigastric area. There is no rigidity, no rebound, no guarding, no CVA tenderness, no tenderness at McBurney's point and negative Palmer's sign. No hernia.   Neurological: She is alert and oriented to person, place, and time.   Skin: Skin is warm and dry.   Psychiatric: She has a normal mood and affect. Her behavior is normal. Judgment and thought content normal.   Vitals reviewed.              Assessment/Plan:   Patient is a 67-year-old female who presents with emesis and diarrhea for over one month. Currently she is not having symptoms. She states that the nausea, vomiting and diarrhea has been off-and-on. She has had a decreased appetite. She has taken antibiotics recently. Previous surgeries of gallbladder and hysterectomy. Vital signs normal.  Abdomen: Soft, nontender, nondistended. Normal bowel sounds. No hepatosplenomegaly or masses, or hernias. No rebound or guarding.      1. Nausea    - ondansetron " (ZOFRAN) 4 MG Tab tablet; Take 1 Tab by mouth every four hours as needed for Nausea/Vomiting.  Dispense: 30 Tab; Refill: 0    2. Diarrhea, unspecified type    - CDIFF BY PCR; Future  - CULTURE STOOL; Future  - REFERRAL TO GASTROENTEROLOGY    Differential diagnosis, natural history, supportive care, and indications for immediate follow-up discussed at length.   Follow-up with primary care provider within 4-5 days, emergency room precautions discussed.  Patient and/or family appears understanding of information.

## 2017-07-03 DIAGNOSIS — R19.7 DIARRHEA, UNSPECIFIED TYPE: ICD-10-CM

## 2017-07-03 LAB
C DIFF DNA SPEC QL NAA+PROBE: NEGATIVE
C DIFF TOX A+B STL QL IA: NEGATIVE
C DIFF TOX GENS STL QL NAA+PROBE: NEGATIVE

## 2017-07-06 LAB
BACTERIA STL CULT: NORMAL
SIGNIFICANT IND 70042: NORMAL
SOURCE SOURCE: NORMAL

## 2017-07-12 ENCOUNTER — OFFICE VISIT (OUTPATIENT)
Dept: MEDICAL GROUP | Facility: PHYSICIAN GROUP | Age: 67
End: 2017-07-12
Payer: MEDICARE

## 2017-07-12 VITALS
BODY MASS INDEX: 28.13 KG/M2 | WEIGHT: 149 LBS | OXYGEN SATURATION: 97 % | SYSTOLIC BLOOD PRESSURE: 118 MMHG | DIASTOLIC BLOOD PRESSURE: 100 MMHG | RESPIRATION RATE: 16 BRPM | TEMPERATURE: 98.2 F | HEART RATE: 95 BPM | HEIGHT: 61 IN

## 2017-07-12 DIAGNOSIS — F10.20 ALCOHOLISM (HCC): ICD-10-CM

## 2017-07-12 DIAGNOSIS — K21.9 GASTROESOPHAGEAL REFLUX DISEASE WITHOUT ESOPHAGITIS: ICD-10-CM

## 2017-07-12 DIAGNOSIS — E78.5 HYPERLIPIDEMIA, UNSPECIFIED HYPERLIPIDEMIA TYPE: ICD-10-CM

## 2017-07-12 DIAGNOSIS — R10.30 LOWER ABDOMINAL PAIN: ICD-10-CM

## 2017-07-12 DIAGNOSIS — I10 ESSENTIAL HYPERTENSION: ICD-10-CM

## 2017-07-12 DIAGNOSIS — N39.3 STRESS INCONTINENCE OF URINE: ICD-10-CM

## 2017-07-12 DIAGNOSIS — F32.A DEPRESSION, UNSPECIFIED DEPRESSION TYPE: ICD-10-CM

## 2017-07-12 PROBLEM — R32 URINARY INCONTINENCE: Status: ACTIVE | Noted: 2017-07-12

## 2017-07-12 PROCEDURE — 99214 OFFICE O/P EST MOD 30 MIN: CPT | Performed by: NURSE PRACTITIONER

## 2017-07-12 RX ORDER — DULOXETIN HYDROCHLORIDE 60 MG/1
60 CAPSULE, DELAYED RELEASE ORAL 2 TIMES DAILY
Qty: 180 CAP | Refills: 1 | Status: SHIPPED | OUTPATIENT
Start: 2017-07-12 | End: 2018-12-07 | Stop reason: CLARIF

## 2017-07-12 RX ORDER — PANTOPRAZOLE SODIUM 40 MG/1
40 TABLET, DELAYED RELEASE ORAL DAILY
COMMUNITY
End: 2022-09-13 | Stop reason: SDUPTHER

## 2017-07-12 ASSESSMENT — PATIENT HEALTH QUESTIONNAIRE - PHQ9
5. POOR APPETITE OR OVEREATING: 3 - NEARLY EVERY DAY
CLINICAL INTERPRETATION OF PHQ2 SCORE: 5
SUM OF ALL RESPONSES TO PHQ QUESTIONS 1-9: 17

## 2017-07-12 NOTE — ASSESSMENT & PLAN NOTE
Patient reports urinary incontinence for many years. She states that she gets frequent urinary tract infections. Symptoms include intermittent lower abdominal pain. She is requesting to have her urine checked. She has lab request from her cardiologist and will do the specimen at the same time. She denies any urinary frequency, urgency or dysuria.

## 2017-07-12 NOTE — ASSESSMENT & PLAN NOTE
Chronic Condition: Patient is currently treated with Cymbalta for ongoing depression without any side effects. She continues to have uncontrolled crying and anxiety and agitation. She denies insomnia, hallucinations, delusions, feelings of worthlessness,  loss of interest, suicidal or homicidal ideation. Patient was treated by previous PCP without psychiatry involvement

## 2017-07-12 NOTE — MR AVS SNAPSHOT
"        Augusta Rodriguez   2017 11:20 AM   Office Visit   MRN: [id number]    Department:  [facility]   Dept Phone:  [phone]    Description:  Female : 1950   Provider:  VIKAS Hoang           Reason for Visit     Establish Care     Depression Score 17       Allergies as of 2017     Allergen Noted Reactions    Biaxin [Clarithromycin] 11/15/2012       Clarithromycin 2007       Doxycycline 10/24/2013       Possible allergic reaction    Ees [Erythromycin] 2007       Imitrex [Sumatriptan] 2015   Swelling    Tongue swell    Pcn [Penicillins] 2007       Shellfish Allergy 2017       Trazodone 2009   Swelling      You were diagnosed with     Alcoholism (CMS-[facility])   [919023]       Depression, unspecified depression type   [6721944]       Gastroesophageal reflux disease without esophagitis   [833553]       Essential hypertension   [7196070]       Hyperlipidemia, unspecified hyperlipidemia type   [7939989]       Stress incontinence of urine   [5132891]       Lower abdominal pain   [413405]         Vital Signs     Blood Pressure Pulse Temperature Respirations Height Weight    118/100 mmHg 95 36.8 °C (98.2 °F) 16 1.549 m (5' 0.98\") 67.586 kg (149 lb)    Body Mass Index Oxygen Saturation Smoking Status             28.17 kg/m2 97% Never Smoker          Basic Information     Date Of Birth Sex Race Ethnicity Preferred Language    1950 Female White Non- English      Your appointments     2017 11:45 AM   Adult Draw/Collection with [facility]   LAB [facility] (--)    [address] [facility] [address]   [phone]            2017  1:45 PM   PROCEDURE 15 with Bertram Talavera M.D.   PAIN MANAGEMENT  (--)    [address] [place] [address] [place] [address] [phone]           Your procedure is scheduled at [facility] at [facility] located at [address] [place] [address] just east of the main campus. Please check in at the front " [employer] desk 1 hour prior to your appointment time. For your safety, please have a ride home with a responsible adult.               Problem List              ICD-10-CM Priority Class Noted - Resolved    HTN (hypertension) I10   2/24/2010 - Present    Hyperlipidemia E78.5   2/24/2010 - Present    Osteopenia M85.80   2/24/2010 - Present    Tremor, essential G25.0   2/24/2010 - Present    Depression F32.9   2/24/2010 - Present    Tear of meniscus of knee joint S83.209A   2/27/2010 - Present    GERD (gastroesophageal reflux disease) K21.9   2/27/2010 - Present    Vitamin d deficiency    2/27/2010 - Present    Impaired fasting glucose R73.01   2/27/2010 - Present    Preventative health care Z00.00   2/27/2010 - Present    Trigger finger, acquired M65.30   11/17/2012 - Present    Spinal stenosis, lumbar M48.06   2/4/2017 - Present    Alcoholism (CMS-HCC) F10.20   7/12/2017 - Present    Urinary incontinence R32   7/12/2017 - Present    Lower abdominal pain R10.30   7/12/2017 - Present      Health Maintenance        Date Due Completion Dates    IMM DTaP/Tdap/Td Vaccine (1 - Tdap) 6/15/1970 ---    PAP SMEAR 6/15/1972 ---    COLONOSCOPY 6/15/2001 ---    IMM ZOSTER VACCINE 6/15/2011 ---    MAMMOGRAM 1/28/2016 1/28/2015, 1/8/2014, 10/6/2011, 2/25/2010, 2/25/2010, 1/26/2007    BONE DENSITY 6/15/2016 12/17/2009    IMM PNEUMOCOCCAL 65+ (ADULT) LOW/MEDIUM RISK SERIES (1 of 2 - PCV13) 6/15/2016 ---    IMM INFLUENZA (1) 9/1/2017 ---            Current Immunizations     No immunizations on file.      Below and/or attached are the medications your provider expects you to take. Review all of your home medications and newly ordered medications with your provider and/or pharmacist. Follow medication instructions as directed by your provider and/or pharmacist. Please keep your medication list with you and share with your provider. Update the information when medications are discontinued, doses are changed, or new medications (including  over-the-counter products) are added; and carry medication information at all times in the event of emergency situations     Allergies:  BIAXIN - (reactions not documented)     CLARITHROMYCIN - (reactions not documented)     DOXYCYCLINE - (reactions not documented)     EES - (reactions not documented)     IMITREX - Swelling     PCN - (reactions not documented)     SHELLFISH ALLERGY - (reactions not documented)     TRAZODONE - Swelling               Medications  Valid as of: July 12, 2017 -  5:33 PM    Generic Name Brand Name Tablet Size Instructions for use    Ascorbic Acid (Tab) ascorbic acid 500 MG Take 1,000 mg by mouth every day.        DilTIAZem HCl Coated Beads (CAPSULE SR 24 HR) CARDIZEM  MG Take 240 mg by mouth every day.        DULoxetine HCl (Cap DR Particles) CYMBALTA 60 MG Take 1 Cap by mouth 2 times a day.        Hydrocodone-Acetaminophen (Tab) Hydrocodone-Acetaminophen 7.5-300 MG Take  by mouth as needed.        Ondansetron HCl (Tab) ZOFRAN 4 MG Take 1 Tab by mouth every four hours as needed for Nausea/Vomiting.        Pantoprazole Sodium (Tablet Delayed Response) PROTONIX 40 MG Take 40 mg by mouth every day.        Red Yeast Rice Extract   Take  by mouth.        TiZANidine HCl (Tab) ZANAFLEX 2 MG Take 1 Tab by mouth every 6 hours as needed (muscle spasm).        .                 Medicines prescribed today were sent to:     [facility] [address] [place] [id number]    Phone: [phone] Fax: [phone]    Open 24 Hours?: No      Medication refill instructions:       If your prescription bottle indicates you have medication refills left, it is not necessary to call your provider’s office. Please contact your pharmacy and they will refill your medication.    If your prescription bottle indicates you do not have any refills left, you may request refills at any time through one of the following ways: The online [employer] system (except  Urgent Care), by calling your provider’s office, or by asking your pharmacy to contact your provider’s office with a refill request. Medication refills are processed only during regular business hours and may not be available until the next business day. Your provider may request additional information or to have a follow-up visit with you prior to refilling your medication.   *Please Note: Medication refills are assigned a new Rx number when refilled electronically. Your pharmacy may indicate that no refills were authorized even though a new prescription for the same medication is available at the pharmacy. Please request the medicine by name with the pharmacy before contacting your provider for a refill.        Your To Do List     Future Labs/Procedures Complete By Expires    URINALYSIS  As directed 7/12/2018         MyChart Status: Patient Declined

## 2017-07-12 NOTE — ASSESSMENT & PLAN NOTE
Chronic Condition: Patient has hyperlipidemia and is currently taking red yeast rice. She denies any chest pain, diaphoresis, shortness of breath, headaches, dizziness, blurred vision or myalgias.

## 2017-07-12 NOTE — ASSESSMENT & PLAN NOTE
Patient has a history of alcoholism for approximately 50 years. She started drinking at the age of 16. Stop drinking 45 days ago and is currently attending AA.

## 2017-07-12 NOTE — ASSESSMENT & PLAN NOTE
Chronic condition: Patient is treated for hypertension with Cardizem 240 mg without side effects.  She denies any chest pain, diaphoresis, shortness of breath, or blurred vision. No She monitors blood pressure at home randomly.

## 2017-07-12 NOTE — ASSESSMENT & PLAN NOTE
Chronic condition: Patient reports GERD for years controlled well with Protonix. Patient denies chest pain, pyrosis, regurgitation, chronic cough, dysphagia, chronic sore throat or hoarseness. Patient is a  Non-smoker and has a history of alcoholism. She has never had an EGD and will be having EGD and colonoscopy at the end of the month.

## 2017-07-12 NOTE — PROGRESS NOTES
Chief Complaint   Patient presents with   • Establish Care   • Depression     Score 17        HPI:    Augusta Rodriguez is a 67 y.o. female here to establish care and to discuss the following:    Alcoholism (CMS-[facility])  Patient has a history of alcoholism for approximately 50 years. She started drinking at the age of 16. Stop drinking 45 days ago and is currently attending AA.    Depression  Chronic Condition: Patient is currently treated with Cymbalta for ongoing depression without any side effects. She continues to have uncontrolled crying and anxiety and agitation. She denies insomnia, hallucinations, delusions, feelings of worthlessness,  loss of interest, suicidal or homicidal ideation. Patient was treated by previous PCP without psychiatry involvement    GERD (Gastroesophageal Reflux Disease)  Chronic condition: Patient reports GERD for years controlled well with Protonix. Patient denies chest pain, pyrosis, regurgitation, chronic cough, dysphagia, chronic sore throat or hoarseness. Patient is a  Non-smoker and has a history of alcoholism. She has never had an EGD and will be having EGD and colonoscopy at the end of the month.    HTN (Hypertension)  Chronic condition: Patient is treated for hypertension with Cardizem 240 mg without side effects.  She denies any chest pain, diaphoresis, shortness of breath, or blurred vision. No She monitors blood pressure at home randomly.    Hyperlipidemia  Chronic Condition: Patient has hyperlipidemia and is currently taking red yeast rice. She denies any chest pain, diaphoresis, shortness of breath, headaches, dizziness, blurred vision or myalgias.     Urinary incontinence  Patient reports urinary incontinence for many years. She states that she gets frequent urinary tract infections. Symptoms include intermittent lower abdominal pain. She is requesting to have her urine checked. She has lab request from her cardiologist and will do the specimen at the same time. She denies any  urinary frequency, urgency or dysuria.        Current medicines (including changes today)  Current Outpatient Prescriptions   Medication Sig Dispense Refill   • pantoprazole (PROTONIX) 40 MG Tablet Delayed Response Take 40 mg by mouth every day.     • duloxetine (CYMBALTA) 60 MG Cap DR Particles delayed-release capsule Take 1 Cap by mouth 2 times a day. 180 Cap 1   • ondansetron (ZOFRAN) 4 MG Tab tablet Take 1 Tab by mouth every four hours as needed for Nausea/Vomiting. 30 Tab 0   • tizanidine (ZANAFLEX) 2 MG tablet Take 1 Tab by mouth every 6 hours as needed (muscle spasm). 90 Tab 3   • Hydrocodone-Acetaminophen (VICODIN ES) 7.5-300 MG Tab Take  by mouth as needed.     • ascorbic acid (ASCORBIC ACID) 500 MG Tab Take 1,000 mg by mouth every day.     • Red Yeast Rice Extract (RED YEAST RICE PO) Take  by mouth.     • diltiazem CD (CARDIZEM CD) 240 MG CP24 Take 240 mg by mouth every day.       No current facility-administered medications for this visit.     She  has a past medical history of Psychiatric problem; Hypertension; Hypercholesteremia; Restless leg syndrome; Incontinence of urine; HTN (hypertension) (2/24/2010); Hyperlipidemia (2/24/2010); Osteopenia (2/24/2010); Tremor, essential (2/24/2010); Depression (2/24/2010); Tear of meniscus of knee joint (2/27/2010); GERD (gastroesophageal reflux disease) (2/27/2010); Vitamin d deficiency (2/27/2010); Impaired fasting glucose (2/27/2010); Preventative health care (2/27/2010); Indigestion; Urinary bladder disorder; Cold; Other specified disorder of intestines; Arthritis; Arrhythmia; Dental disorder; and Pain (11/5/12). She also has no past medical history of Ulcer (CMS-HCC) or Breast cancer (CMS-HCC).  She  has past surgical history that includes bladder suspension; stephon by laparoscopy (1997); primary c section (1970/1975); hysterectomy, total abdominal (1979); hip arthroscopy (2/23/2009); acetabular osteotomy (2/23/2009); mass excision ortho (2/23/2009); carpal  "tunnel endoscopic (2012); trigger finger release (2012); lumbar laminectomy diskectomy (Bilateral, 2017); and hip arthroscopy ().  Social History   Substance Use Topics   • Smoking status: Never Smoker    • Smokeless tobacco: Never Used   • Alcohol Use: No      Comment: Stopped drinking 45 days ago 17 Going to      Social History     Social History Narrative     Family History   Problem Relation Age of Onset   • Hypertension     • GI Father      Crohn's   • Heart Disease Father      First MI age 30     Family Status   Relation Status Death Age   • Mother  44     Urosepsis   • Father  74     MI. Also had Crohn's   • Brother  56     Gallbladder cancer   • Sister Alive      Crohn's         ROS    Review of Systems   Constitutional: Negative for fever, chills, weight loss and malaise/fatigue.   HENT: Negative for ear pain, nosebleeds, congestion, sore throat and neck pain.    Eyes: Negative for blurred vision.   Respiratory: Negative for cough, sputum production, shortness of breath and wheezing.    Cardiovascular: Negative for chest pain, palpitations,  and leg swelling.   Gastrointestinal: Negative for  nausea, vomiting, diarrhea. Positive for intermittent lower abdominal pain and heartburn.   Genitourinary: Negative for dysuria, urgency and frequency.   Musculoskeletal: Negative for myalgias, back pain and joint pain.   Skin: Negative for rash and itching.   Neurological: Negative for dizziness, tingling, tremors, sensory change, focal weakness and headaches.   Endo/Heme/Allergies: Does not bruise/bleed easily.   Psychiatric/Behavioral: Negative for suicidal ideas, insomnia and memory loss. Positive for depression and alcoholism    All other systems reviewed and are negative except as in HPI.     Objective:     Blood pressure 118/100, pulse 95, temperature 36.8 °C (98.2 °F), resp. rate 16, height 1.549 m (5' 0.98\"), weight 67.586 kg (149 lb), SpO2 97 %. Body mass " index is 28.17 kg/(m^2).  Physical Exam:  Constitutional: Alert, no distress.  Skin: Warm, dry, good turgor, no rashes in visible areas.  Eye: Equal, round and reactive, conjunctiva clear, lids normal.  ENMT: Lips without lesions, good dentition, oropharynx clear. Ear canals are clear, TMs within normal limits bilaterally.   Neck: Trachea midline, no masses, no thyromegaly. No cervical or supraclavicular lymphadenopathy.  Respiratory: Unlabored respiratory effort, lungs clear to auscultation, no wheezes, no ronchi.  Cardiovascular: Normal S1, S2, no murmur, no edema.  Abdomen: Soft, tender to palpate right upper quadrant,no masses, no hepatosplenomegaly.  Psych: Alert and oriented x3, normal affect and mood.  MS: Ambulates independently with steady gait. Has full range of motion of all extremities and spine.  Neuro: Cranial nerves intact. DTRs within normal limits. No neurological deficits.    Assessment and Plan:   The following treatment plan was discussed   1. Alcoholism (CMS-HCC)     2. Depression, unspecified depression type  duloxetine (CYMBALTA) 60 MG Cap DR Particles delayed-release capsule   3. Gastroesophageal reflux disease without esophagitis     4. Essential hypertension     5. Hyperlipidemia, unspecified hyperlipidemia type     6. Stress incontinence of urine     7. Lower abdominal pain  URINALYSIS     Records requested.  Followup: Return in about 4 weeks (around 8/9/2017) for depression, Lab Review.   Please note that this dictation was created using voice recognition software. I have made every reasonable attempt to correct obvious errors, but I expect that there are errors of grammar and possibly content that I did not discover before finalizing the note.

## 2017-07-13 ENCOUNTER — HOSPITAL ENCOUNTER (OUTPATIENT)
Dept: LAB | Facility: MEDICAL CENTER | Age: 67
End: 2017-07-13
Attending: NURSE PRACTITIONER
Payer: MEDICARE

## 2017-07-13 ENCOUNTER — HOSPITAL ENCOUNTER (OUTPATIENT)
Dept: LAB | Facility: MEDICAL CENTER | Age: 67
End: 2017-07-13
Attending: INTERNAL MEDICINE
Payer: MEDICARE

## 2017-07-13 DIAGNOSIS — R10.30 LOWER ABDOMINAL PAIN: ICD-10-CM

## 2017-07-13 LAB
APPEARANCE UR: ABNORMAL
BACTERIA #/AREA URNS HPF: NEGATIVE /HPF
BILIRUB UR QL STRIP.AUTO: NEGATIVE
CHOLEST SERPL-MCNC: 190 MG/DL (ref 100–199)
COLOR UR: YELLOW
EPI CELLS #/AREA URNS HPF: NEGATIVE /HPF
GLUCOSE UR STRIP.AUTO-MCNC: NEGATIVE MG/DL
HDLC SERPL-MCNC: 50 MG/DL
HYALINE CASTS #/AREA URNS LPF: ABNORMAL /LPF
KETONES UR STRIP.AUTO-MCNC: NEGATIVE MG/DL
LDLC SERPL CALC-MCNC: 117 MG/DL
LEUKOCYTE ESTERASE UR QL STRIP.AUTO: NEGATIVE
MICRO URNS: ABNORMAL
NITRITE UR QL STRIP.AUTO: NEGATIVE
PH UR STRIP.AUTO: 6.5 [PH]
PROT UR QL STRIP: NEGATIVE MG/DL
RBC # URNS HPF: ABNORMAL /HPF
RBC UR QL AUTO: NEGATIVE
SP GR UR STRIP.AUTO: 1.02
TRIGL SERPL-MCNC: 114 MG/DL (ref 0–149)
UROBILINOGEN UR STRIP.AUTO-MCNC: 0.2 MG/DL
WBC #/AREA URNS HPF: ABNORMAL /HPF

## 2017-07-13 PROCEDURE — 80061 LIPID PANEL: CPT

## 2017-07-13 PROCEDURE — 36415 COLL VENOUS BLD VENIPUNCTURE: CPT

## 2017-07-17 ENCOUNTER — HOSPITAL ENCOUNTER (OUTPATIENT)
Dept: RADIOLOGY | Facility: REHABILITATION | Age: 67
End: 2017-07-17
Attending: ANESTHESIOLOGY
Payer: MEDICARE

## 2017-07-17 ENCOUNTER — HOSPITAL ENCOUNTER (OUTPATIENT)
Dept: PAIN MANAGEMENT | Facility: REHABILITATION | Age: 67
End: 2017-07-17
Attending: ANESTHESIOLOGY
Payer: MEDICARE

## 2017-07-17 VITALS
HEART RATE: 96 BPM | SYSTOLIC BLOOD PRESSURE: 183 MMHG | BODY MASS INDEX: 28.55 KG/M2 | OXYGEN SATURATION: 95 % | DIASTOLIC BLOOD PRESSURE: 119 MMHG | RESPIRATION RATE: 18 BRPM | WEIGHT: 151.24 LBS | HEIGHT: 61 IN | TEMPERATURE: 97.6 F

## 2017-07-17 PROCEDURE — 700111 HCHG RX REV CODE 636 W/ 250 OVERRIDE (IP)

## 2017-07-17 PROCEDURE — 700117 HCHG RX CONTRAST REV CODE 255

## 2017-07-17 PROCEDURE — 64483 NJX AA&/STRD TFRM EPI L/S 1: CPT

## 2017-07-17 RX ORDER — DEXAMETHASONE SODIUM PHOSPHATE 10 MG/ML
INJECTION, SOLUTION INTRAMUSCULAR; INTRAVENOUS
Status: COMPLETED
Start: 2017-07-17 | End: 2017-07-17

## 2017-07-17 RX ORDER — BUPIVACAINE HYDROCHLORIDE 2.5 MG/ML
INJECTION, SOLUTION EPIDURAL; INFILTRATION; INTRACAUDAL
Status: COMPLETED
Start: 2017-07-17 | End: 2017-07-17

## 2017-07-17 RX ADMIN — IOHEXOL 1 ML: 240 INJECTION, SOLUTION INTRATHECAL; INTRAVASCULAR; INTRAVENOUS; ORAL at 14:02

## 2017-07-17 RX ADMIN — DEXAMETHASONE SODIUM PHOSPHATE 10 MG: 10 INJECTION, SOLUTION INTRAMUSCULAR; INTRAVENOUS at 14:03

## 2017-07-17 RX ADMIN — BUPIVACAINE HYDROCHLORIDE 2 ML: 2.5 INJECTION, SOLUTION EPIDURAL; INFILTRATION; INTRACAUDAL; PERINEURAL at 14:03

## 2017-07-17 ASSESSMENT — PAIN SCALES - GENERAL
PAINLEVEL_OUTOF10: 1
PAINLEVEL_OUTOF10: 8

## 2017-07-17 NOTE — PROGRESS NOTES
Current medications reviewed with pt, see medications reconciliation form. Pt angel taking ASA or other blood thinners or anti-inflammatories.  Pt has a ride post-procedure( to drive).  Printed and verbal discharge instructions given to pt who verbalized understanding.

## 2017-08-03 ENCOUNTER — OFFICE VISIT (OUTPATIENT)
Dept: MEDICAL GROUP | Facility: PHYSICIAN GROUP | Age: 67
End: 2017-08-03
Payer: MEDICARE

## 2017-08-03 VITALS
TEMPERATURE: 99.3 F | DIASTOLIC BLOOD PRESSURE: 80 MMHG | OXYGEN SATURATION: 94 % | RESPIRATION RATE: 16 BRPM | HEIGHT: 60 IN | BODY MASS INDEX: 29.64 KG/M2 | WEIGHT: 151 LBS | HEART RATE: 121 BPM | SYSTOLIC BLOOD PRESSURE: 126 MMHG

## 2017-08-03 DIAGNOSIS — J01.40 ACUTE PANSINUSITIS, RECURRENCE NOT SPECIFIED: ICD-10-CM

## 2017-08-03 PROCEDURE — 99214 OFFICE O/P EST MOD 30 MIN: CPT | Performed by: NURSE PRACTITIONER

## 2017-08-03 RX ORDER — CEFDINIR 300 MG/1
300 CAPSULE ORAL 2 TIMES DAILY
Qty: 20 CAP | Refills: 0 | Status: SHIPPED | OUTPATIENT
Start: 2017-08-03 | End: 2017-08-13

## 2017-08-03 ASSESSMENT — PATIENT HEALTH QUESTIONNAIRE - PHQ9
CLINICAL INTERPRETATION OF PHQ2 SCORE: 1
5. POOR APPETITE OR OVEREATING: 1 - SEVERAL DAYS
SUM OF ALL RESPONSES TO PHQ QUESTIONS 1-9: 6

## 2017-08-04 NOTE — PROGRESS NOTES
Subjective:     Chief Complaint   Patient presents with   • Cough     x2 days/ headache/pt states they are feeling weak       HPI:  Augusta Rodriguez is a 67 y.o. female here today to discuss the following:    Acute pansinusitis  Patient reports non productive cough for 2 days, with headache, fever, chills and weakness. She denies any ear pain or sore throat. She has facial tenderness.           Current medicines (including changes today)  Current Outpatient Prescriptions   Medication Sig Dispense Refill   • cefdinir (OMNICEF) 300 MG Cap Take 1 Cap by mouth 2 times a day for 10 days. 20 Cap 0   • pantoprazole (PROTONIX) 40 MG Tablet Delayed Response Take 40 mg by mouth every day.     • duloxetine (CYMBALTA) 60 MG Cap DR Particles delayed-release capsule Take 1 Cap by mouth 2 times a day. 180 Cap 1   • Red Yeast Rice Extract (RED YEAST RICE PO) Take  by mouth.     • diltiazem CD (CARDIZEM CD) 240 MG CP24 Take 240 mg by mouth every day.       No current facility-administered medications for this visit.       She  has a past medical history of Psychiatric problem; Hypertension; Hypercholesteremia; Restless leg syndrome; Incontinence of urine; HTN (hypertension) (2/24/2010); Hyperlipidemia (2/24/2010); Osteopenia (2/24/2010); Tremor, essential (2/24/2010); Depression (2/24/2010); Tear of meniscus of knee joint (2/27/2010); GERD (gastroesophageal reflux disease) (2/27/2010); Vitamin d deficiency (2/27/2010); Impaired fasting glucose (2/27/2010); Preventative health care (2/27/2010); Indigestion; Urinary bladder disorder; Cold; Other specified disorder of intestines; Arthritis; Arrhythmia; Dental disorder; and Pain (11/5/12). She also has no past medical history of Ulcer (CMS-HCC) or Breast cancer (CMS-HCC).    ROS   Review of Systems   Constitutional: Negative for fever, chills, weight loss and malaise/fatigue. Positive for fever and chills  HENT: Negative for ear pain, nosebleeds,  sore throat and neck pain.  Positive for  nasal congestion  Respiratory: Negative for sputum production, shortness of breath and wheezing.  Positive for cough  Cardiovascular: Negative for chest pain, palpitations,  and leg swelling.   Gastrointestinal: Negative for heartburn, nausea, vomiting, diarrhea and abdominal pain.   Neurological: Negative for dizziness, tingling, tremors, sensory change, focal weakness and headaches.   Psychiatric/Behavioral: Negative for depression, anxiety, suicidal ideas, insomnia and memory loss.    All other systems reviewed and are negative except as in HPI.     Objective:   Physical Exam:  Blood pressure 126/80, pulse 121, temperature 37.4 °C (99.3 °F), resp. rate 16, height 1.524 m (5'), weight 68.493 kg (151 lb), SpO2 94 %. Body mass index is 29.49 kg/(m^2).   Physical Exam:  Alert, oriented in no acute distress.  Eye contact is good, speech goal directed, affect calm  HEENT: conjunctiva non-injected, sclera non-icteric.  Pinna normal. Ear canals are clear and TMs are within normal limits. Oropharynx is clear without erythema, edema or exudate. There is visible thck yellow post nasal drip. She has maxillary and tenderness to palpation.  Neck No adenopathy or masses in the neck or supraclavicular regions.  Lungs: clear to auscultation bilaterally with good excursion.  CV: regular rate and rhythm.   MS: Normal gait and station    Assessment and Plan:   The following treatment plan was discussed   1. Acute pansinusitis, recurrence not specified  cefdinir (OMNICEF) 300 MG Cap       Followup: Return if symptoms worsen or fail to improve.   Please note that this dictation was created using voice recognition software. I have made every reasonable attempt to correct obvious errors, but I expect that there are errors of grammar and possibly content that I did not discover before finalizing the note.

## 2017-08-04 NOTE — ASSESSMENT & PLAN NOTE
Patient reports non productive cough for 2 days, with headache, fever, chills and weakness. She denies any ear pain or sore throat. She has facial tenderness.

## 2017-08-16 ENCOUNTER — TELEPHONE (OUTPATIENT)
Dept: MEDICAL GROUP | Facility: PHYSICIAN GROUP | Age: 67
End: 2017-08-16

## 2017-08-16 RX ORDER — CEFDINIR 300 MG/1
300 CAPSULE ORAL 2 TIMES DAILY
Qty: 14 CAP | Refills: 0 | Status: SHIPPED | OUTPATIENT
Start: 2017-08-16 | End: 2017-11-10

## 2017-08-16 NOTE — TELEPHONE ENCOUNTER
1. Caller Name: Augusta Rodirguez                                           Call Back Number: Augusta Rodriguez        Patient approves a detailed voicemail message: N\A    Pt was seen 08/03 for acute pansinusitis and given rx for cifdiner.  She states she finished 08/13 and still has a cough.  She is asking if she can have 1 refill on abx.

## 2017-10-24 ENCOUNTER — OFFICE VISIT (OUTPATIENT)
Dept: URGENT CARE | Facility: PHYSICIAN GROUP | Age: 67
End: 2017-10-24
Payer: MEDICARE

## 2017-10-24 ENCOUNTER — HOSPITAL ENCOUNTER (OUTPATIENT)
Dept: RADIOLOGY | Facility: MEDICAL CENTER | Age: 67
End: 2017-10-24
Attending: PHYSICIAN ASSISTANT
Payer: MEDICARE

## 2017-10-24 VITALS
TEMPERATURE: 97.7 F | RESPIRATION RATE: 18 BRPM | SYSTOLIC BLOOD PRESSURE: 126 MMHG | OXYGEN SATURATION: 97 % | BODY MASS INDEX: 29.45 KG/M2 | HEIGHT: 60 IN | WEIGHT: 150 LBS | HEART RATE: 98 BPM | DIASTOLIC BLOOD PRESSURE: 78 MMHG

## 2017-10-24 DIAGNOSIS — J06.9 URI WITH COUGH AND CONGESTION: ICD-10-CM

## 2017-10-24 DIAGNOSIS — J40 BRONCHITIS: ICD-10-CM

## 2017-10-24 DIAGNOSIS — J40 BRONCHITIS: Primary | ICD-10-CM

## 2017-10-24 PROCEDURE — 99214 OFFICE O/P EST MOD 30 MIN: CPT | Performed by: PHYSICIAN ASSISTANT

## 2017-10-24 PROCEDURE — 71020 DX-CHEST-2 VIEWS: CPT

## 2017-10-24 RX ORDER — ALBUTEROL SULFATE 90 UG/1
2 AEROSOL, METERED RESPIRATORY (INHALATION) EVERY 6 HOURS PRN
Qty: 8.5 G | Refills: 0 | Status: SHIPPED | OUTPATIENT
Start: 2017-10-24 | End: 2017-11-03

## 2017-10-24 RX ORDER — ASPIRIN 81 MG/1
81 TABLET ORAL DAILY
COMMUNITY
End: 2023-08-21

## 2017-10-24 RX ORDER — IPRATROPIUM BROMIDE AND ALBUTEROL SULFATE 2.5; .5 MG/3ML; MG/3ML
3 SOLUTION RESPIRATORY (INHALATION) ONCE
Status: COMPLETED | OUTPATIENT
Start: 2017-10-24 | End: 2017-10-24

## 2017-10-24 RX ORDER — PROMETHAZINE HYDROCHLORIDE AND CODEINE PHOSPHATE 6.25; 1 MG/5ML; MG/5ML
5 SYRUP ORAL 4 TIMES DAILY PRN
Qty: 120 ML | Refills: 0 | Status: SHIPPED | OUTPATIENT
Start: 2017-10-24 | End: 2017-10-31

## 2017-10-24 RX ORDER — LEVOFLOXACIN 500 MG/1
500 TABLET, FILM COATED ORAL DAILY
Qty: 10 TAB | Refills: 0 | Status: SHIPPED | OUTPATIENT
Start: 2017-10-24 | End: 2017-11-03

## 2017-10-24 RX ADMIN — IPRATROPIUM BROMIDE AND ALBUTEROL SULFATE 3 ML: 2.5; .5 SOLUTION RESPIRATORY (INHALATION) at 11:50

## 2017-10-24 NOTE — PROGRESS NOTES
Subjective:      Pt is a 67 y.o. female who presents with Cough (x 1 weeks)            HPI  PT presents to  clinic today complaining of sore throat, pressure in ears, cough, fatigue, runny nose, wheezing and SOB. PT denies CP, NVD, abdominal pain, joint pain. PT states these symptoms began around 7 days ago. PT states the pain is a 7/10 with coughing, aching in nature and worse at night.  Pt has not taken any RX medications for this condition. The pt's medication list, problem list, and allergies have been evaluated and reviewed during today's visit.      PMH:  Past Medical History:   Diagnosis Date   • Arrhythmia     afib   • Arthritis     osteo   • Cold    • Dental disorder     upper dentures   • Depression 2/24/2010   • GERD (gastroesophageal reflux disease) 2/27/2010   • HTN (hypertension) 2/24/2010   • Hypercholesteremia    • Hyperlipidemia 2/24/2010   • Hypertension    • Impaired fasting glucose 2/27/2010   • Incontinence of urine    • Indigestion    • Osteopenia 2/24/2010   • Other specified disorder of intestines     constipation r/t meds   • Pain 11/5/12    left wrist/lower back   • Preventative health care 2/27/2010   • Psychiatric problem     anxiety   • Restless leg syndrome    • Tear of meniscus of knee joint 2/27/2010   • Tremor, essential 2/24/2010   • Urinary bladder disorder    • Vitamin d deficiency 2/27/2010       PSH:  Past Surgical History:   Procedure Laterality Date   • LUMBAR LAMINECTOMY DISKECTOMY Bilateral 2/4/2017    Procedure: LUMBAR LAMINECTOMY DISKECTOMY POSTERIOR L4-S1 ;  Surgeon: Emil Hitchcock M.D.;  Location: [facility];  Service:    • CARPAL TUNNEL ENDOSCOPIC  11/17/2012    Performed by Heriberto Quiñones M.D. at [facility]   • TRIGGER FINGER RELEASE  11/17/2012    Performed by Heriberto Quiñones M.D. at [facility]   • HIP ARTHROSCOPY  2/23/2009    Performed by SHERLYN CALVO at [facility]   • ACETABULAR OSTEOTOMY   2009    Performed by SHERLYN CALVO at SURGERY [facility]   • MASS EXCISION ORTHO  2009    Performed by SHERLYN CALVO at SURGERY [facility]   • HIP ARTHROSCOPY      done at [facility]   • HAJA BY LAPAROSCOPY     • HYSTERECTOMY, TOTAL ABDOMINAL  1979    oopherectomy lacie   • BLADDER SUSPENSION      bladder sling   • PRIMARY C SECTION         Fam Hx:    family history includes GI in her father; Heart Disease in her father.  Family Status   Relation Status   • Mother  at age 44    Urosepsis   • Father  at age 74    MI. Also had Crohn's   • Brother  at age 56    Gallbladder cancer   • Sister Alive    Crohn's   •         Soc HX:  Social History     Social History   • Marital status:      Spouse name: N/A   • Number of children: N/A   • Years of education: N/A     Occupational History   • Not on file.     Social History Main Topics   • Smoking status: Never Smoker   • Smokeless tobacco: Never Used   • Alcohol use No      Comment: Stopped drinking 45 days ago 17 Going to    • Drug use: No   • Sexual activity: Not on file     Other Topics Concern   • Not on file     Social History Narrative   • No narrative on file         Medications:    Current Outpatient Prescriptions:   •  aspirin (ASA) 81 MG Chew Tab chewable tablet, Take 81 mg by mouth every day., Disp: , Rfl:   •  levofloxacin (LEVAQUIN) 500 MG tablet, Take 1 Tab by mouth every day for 10 days., Disp: 10 Tab, Rfl: 0  •  albuterol 108 (90 Base) MCG/ACT Aero Soln inhalation aerosol, Inhale 2 Puffs by mouth every 6 hours as needed for Shortness of Breath for up to 10 days., Disp: 8.5 g, Rfl: 0  •  promethazine-codeine (PHENERGAN-CODEINE) 6.25-10 MG/5ML Syrup, Take 5 mL by mouth 4 times a day as needed for Cough for up to 7 days., Disp: 120 mL, Rfl: 0  •  pantoprazole (PROTONIX) 40 MG Tablet Delayed Response, Take 40 mg by mouth every day., Disp: , Rfl:   •  duloxetine (CYMBALTA) 60 MG Cap   Particles delayed-release capsule, Take 1 Cap by mouth 2 times a day., Disp: 180 Cap, Rfl: 1  •  diltiazem CD (CARDIZEM CD) 240 MG CP24, Take 240 mg by mouth every day., Disp: , Rfl:   •  cefdinir (OMNICEF) 300 MG Cap, Take 1 Cap by mouth 2 times a day., Disp: 14 Cap, Rfl: 0  •  Red Yeast Rice Extract (RED YEAST RICE PO), Take  by mouth., Disp: , Rfl:     Current Facility-Administered Medications:   •  ipratropium-albuterol (DUONEB) nebulizer solution 3 mL, 3 mL, Nebulization, Once, Rosendo Medina P.A.-C.      Allergies:  Biaxin [clarithromycin]; Clarithromycin; Doxycycline; Ees [erythromycin]; Imitrex [sumatriptan]; Pcn [penicillins]; Shellfish allergy; and Trazodone    ROS  Review of Systems   Constitutional: Positive for chills and malaise/fatigue. Negative for fever and diaphoresis.   HENT: Positive for congestion, ear pain and sore throat. Negative for ear discharge, hearing loss, nosebleeds and tinnitus.    Eyes: Negative for blurred vision, double vision and photophobia.   Respiratory: Positive for cough, sputum production, shortness of breath and wheezing. Negative for hemoptysis.    Cardiovascular: Negative for chest pain and palpitations.   Gastrointestinal: Negative for nausea, vomiting, abdominal pain, diarrhea and constipation.   Genitourinary: Negative for dysuria and flank pain.   Musculoskeletal: Negative for joint pain and myalgias.   Skin: Negative for itching and rash.   Neurological: Positive for headaches. Negative for dizziness, tingling and weakness.   Endo/Heme/Allergies: Does not bruise/bleed easily.   Psychiatric/Behavioral: Negative for depression. The patient is not nervous/anxious.             Objective:     /78   Pulse 98   Temp 36.5 °C (97.7 °F)   Resp 18   Ht 1.524 m (5')   Wt 68 kg (150 lb)   SpO2 97%   BMI 29.29 kg/m²      Physical Exam       Physical Exam   Constitutional: PT is oriented to person, place, and time. PT appears well-developed and well-nourished. No  distress.   HENT:   Head: Normocephalic and atraumatic.   Right Ear: Hearing, tympanic membrane, external ear and ear canal normal.   Left Ear: Hearing, tympanic membrane, external ear and ear canal normal.   Nose: Mucosal edema, rhinorrhea and sinus tenderness present. Right sinus exhibits frontal sinus tenderness. Left sinus exhibits frontal sinus tenderness.   Mouth/Throat: Uvula is midline. Mucous membranes are pale. Posterior oropharyngeal edema and posterior oropharyngeal erythema present. No oropharyngeal exudate.   Eyes: Conjunctivae normal and EOM are normal. Pupils are equal, round, and reactive to light. Right eye exhibits no discharge. Left eye exhibits no discharge.   Neck: Normal range of motion. Neck supple. No thyromegaly present.   Cardiovascular: Normal rate, regular rhythm, normal heart sounds and intact distal pulses.  Exam reveals no gallop and no friction rub.    No murmur heard.  Pulmonary/Chest: Effort normal. No respiratory distress. PT has wheezes. PT has no rales. PT exhibits tenderness.   Abdominal: Soft. Bowel sounds are normal. PT exhibits no distension and no mass. There is no tenderness. There is no rebound and no guarding.   Musculoskeletal: Normal range of motion. PT exhibits no edema and no tenderness.   Lymphadenopathy:     PT has no cervical adenopathy.   Neurological: Pt is alert and oriented to person, place, and time. Pt has normal reflexes. No cranial nerve deficit.   Skin: Skin is warm and dry. No rash noted. No erythema.   Psychiatric: PT has a normal mood and affect. Pt behavior is normal. Judgment and thought content normal.     RADS:  Narrative     10/24/2017 11:58 AM    HISTORY/REASON FOR EXAM:  Cough      TECHNIQUE/EXAM DESCRIPTION AND NUMBER OF VIEWS:  Two views of the chest.    COMPARISON:  1/20/2017    FINDINGS:    The cardiac silhouette  and mediastinal contours are normal.    No discrete opacity, pleural fluid or pneumothorax.    A compression deformity in the T12  vertebral body is unchanged.    Surgical clips project over the upper abdomen.   Impression     No acute cardiopulmonary findings.      Reading Provider Reading Date   Akua Alegria M.D. Oct 24, 2017      Signing Provider Signing Date Signing Time   Akua Alegria M.D. Oct 24, 2017 12:23 PM          Assessment/Plan:     1. Bronchitis    - DX-CHEST-2 VIEWS; Future  - ipratropium-albuterol (DUONEB) nebulizer solution 3 mL; 3 mL by Nebulization route Once.  - levofloxacin (LEVAQUIN) 500 MG tablet; Take 1 Tab by mouth every day for 10 days.  Dispense: 10 Tab; Refill: 0  - albuterol 108 (90 Base) MCG/ACT Aero Soln inhalation aerosol; Inhale 2 Puffs by mouth every 6 hours as needed for Shortness of Breath for up to 10 days.  Dispense: 8.5 g; Refill: 0    2. URI with cough and congestion    - DX-CHEST-2 VIEWS; Future  - ipratropium-albuterol (DUONEB) nebulizer solution 3 mL; 3 mL by Nebulization route Once.  - albuterol 108 (90 Base) MCG/ACT Aero Soln inhalation aerosol; Inhale 2 Puffs by mouth every 6 hours as needed for Shortness of Breath for up to 10 days.  Dispense: 8.5 g; Refill: 0  - promethazine-codeine (PHENERGAN-CODEINE) 6.25-10 MG/5ML Syrup; Take 5 mL by mouth 4 times a day as needed for Cough for up to 7 days.  Dispense: 120 mL; Refill: 0    Rest, fluids encouraged.  OTC decongestant for congestion/cough  AVS with medical info given.  Pt was in full understanding and agreement with the plan.  Follow-up as needed if symptoms worsen or fail to improve.

## 2017-10-24 NOTE — PATIENT INSTRUCTIONS
Acute Bronchitis  Bronchitis is inflammation of the airways that extend from the windpipe into the lungs (bronchi). The inflammation often causes mucus to develop. This leads to a cough, which is the most common symptom of bronchitis.   In acute bronchitis, the condition usually develops suddenly and goes away over time, usually in a couple weeks. Smoking, allergies, and asthma can make bronchitis worse. Repeated episodes of bronchitis may cause further lung problems.   CAUSES  Acute bronchitis is most often caused by the same virus that causes a cold. The virus can spread from person to person (contagious) through coughing, sneezing, and touching contaminated objects.  SIGNS AND SYMPTOMS   · Cough.    · Fever.    · Coughing up mucus.    · Body aches.    · Chest congestion.    · Chills.    · Shortness of breath.    · Sore throat.    DIAGNOSIS   Acute bronchitis is usually diagnosed through a physical exam. Your health care provider will also ask you questions about your medical history. Tests, such as chest X-rays, are sometimes done to rule out other conditions.   TREATMENT   Acute bronchitis usually goes away in a couple weeks. Oftentimes, no medical treatment is necessary. Medicines are sometimes given for relief of fever or cough. Antibiotic medicines are usually not needed but may be prescribed in certain situations. In some cases, an inhaler may be recommended to help reduce shortness of breath and control the cough. A cool mist vaporizer may also be used to help thin bronchial secretions and make it easier to clear the chest.   HOME CARE INSTRUCTIONS  · Get plenty of rest.    · Drink enough fluids to keep your urine clear or pale yellow (unless you have a medical condition that requires fluid restriction). Increasing fluids may help thin your respiratory secretions (sputum) and reduce chest congestion, and it will prevent dehydration.    · Take medicines only as directed by your health care provider.  · If  you were prescribed an antibiotic medicine, finish it all even if you start to feel better.  · Avoid smoking and secondhand smoke. Exposure to cigarette smoke or irritating chemicals will make bronchitis worse. If you are a smoker, consider using nicotine gum or skin patches to help control withdrawal symptoms. Quitting smoking will help your lungs heal faster.    · Reduce the chances of another bout of acute bronchitis by washing your hands frequently, avoiding people with cold symptoms, and trying not to touch your hands to your mouth, nose, or eyes.    · Keep all follow-up visits as directed by your health care provider.    SEEK MEDICAL CARE IF:  Your symptoms do not improve after 1 week of treatment.   SEEK IMMEDIATE MEDICAL CARE IF:  · You develop an increased fever or chills.    · You have chest pain.    · You have severe shortness of breath.  · You have bloody sputum.    · You develop dehydration.  · You faint or repeatedly feel like you are going to pass out.  · You develop repeated vomiting.  · You develop a severe headache.  MAKE SURE YOU:   · Understand these instructions.  · Will watch your condition.  · Will get help right away if you are not doing well or get worse.     This information is not intended to replace advice given to you by your health care provider. Make sure you discuss any questions you have with your health care provider.     Document Released: 01/25/2006 Document Revised: 01/08/2016 Document Reviewed: 06/10/2014  [employer] Patient Education ©2016 [employer]

## 2017-11-10 ENCOUNTER — OFFICE VISIT (OUTPATIENT)
Dept: MEDICAL GROUP | Facility: PHYSICIAN GROUP | Age: 67
End: 2017-11-10
Payer: MEDICARE

## 2017-11-10 VITALS
SYSTOLIC BLOOD PRESSURE: 142 MMHG | OXYGEN SATURATION: 98 % | RESPIRATION RATE: 18 BRPM | HEART RATE: 102 BPM | WEIGHT: 150 LBS | HEIGHT: 60 IN | DIASTOLIC BLOOD PRESSURE: 102 MMHG | TEMPERATURE: 99 F | BODY MASS INDEX: 29.45 KG/M2

## 2017-11-10 DIAGNOSIS — F32.A DEPRESSION, UNSPECIFIED DEPRESSION TYPE: ICD-10-CM

## 2017-11-10 DIAGNOSIS — R51.9 NONINTRACTABLE HEADACHE, UNSPECIFIED CHRONICITY PATTERN, UNSPECIFIED HEADACHE TYPE: ICD-10-CM

## 2017-11-10 PROCEDURE — 99214 OFFICE O/P EST MOD 30 MIN: CPT | Performed by: NURSE PRACTITIONER

## 2017-11-10 RX ORDER — AMITRIPTYLINE HYDROCHLORIDE 25 MG/1
25 TABLET, FILM COATED ORAL
Qty: 30 TAB | Refills: 2 | Status: SHIPPED | OUTPATIENT
Start: 2017-11-10 | End: 2018-02-20 | Stop reason: SDUPTHER

## 2017-11-10 RX ORDER — BUTALBITAL, ACETAMINOPHEN AND CAFFEINE 50; 325; 40 MG/1; MG/1; MG/1
1 TABLET ORAL EVERY 4 HOURS PRN
Qty: 30 TAB | Refills: 0 | Status: SHIPPED | OUTPATIENT
Start: 2017-11-10 | End: 2018-12-07 | Stop reason: CLARIF

## 2017-11-13 NOTE — PROGRESS NOTES
Subjective:     Chief Complaint   Patient presents with   • Migraine   • Follow-Up     depression       HPI:  Augusta Rodriguez is a 67 y.o. female here today to discuss the following:    Depression  Patient is currently taking cymbalta 60mg twice daily for depression. She denies any side effects, suicidal or homicidal ideation    Headache  Patient reports generalized headache and migraines three times weekly accompanied by nausea, phonophobia oand photophobia.. She has been treating it with OTC medications without relief. She denies sudden loss of vision or visual changes, weight loss, lacrimation, ptosis, scintillating light, or vomiting,            Current medicines (including changes today)  Current Outpatient Prescriptions   Medication Sig Dispense Refill   • acetaminophen/caffeine/butalbital 325-40-50 mg (FIORICET) -40 MG Tab Take 1 Tab by mouth every four hours as needed for Headache (No more than 2days a week, no more than 6 tabs in 24h). 30 Tab 0   • amitriptyline (ELAVIL) 25 MG Tab Take 1 Tab by mouth every bedtime. 30 Tab 2   • aspirin (ASA) 81 MG Chew Tab chewable tablet Take 81 mg by mouth every day.     • pantoprazole (PROTONIX) 40 MG Tablet Delayed Response Take 40 mg by mouth every day.     • duloxetine (CYMBALTA) 60 MG Cap DR Particles delayed-release capsule Take 1 Cap by mouth 2 times a day. 180 Cap 1   • Red Yeast Rice Extract (RED YEAST RICE PO) Take  by mouth.     • diltiazem CD (CARDIZEM CD) 240 MG CP24 Take 240 mg by mouth every day.       No current facility-administered medications for this visit.        She  has a past medical history of Arrhythmia; Arthritis; Cold; Dental disorder; Depression (2/24/2010); GERD (gastroesophageal reflux disease) (2/27/2010); HTN (hypertension) (2/24/2010); Hypercholesteremia; Hyperlipidemia (2/24/2010); Hypertension; Impaired fasting glucose (2/27/2010); Incontinence of urine; Indigestion; Osteopenia (2/24/2010); Other specified disorder of intestines;  Pain (11/5/12); Preventative health care (2/27/2010); Psychiatric problem; Restless leg syndrome; Tear of meniscus of knee joint (2/27/2010); Tremor, essential (2/24/2010); Urinary bladder disorder; and Vitamin d deficiency (2/27/2010). She also has no past medical history of Breast cancer (CMS-HCC) or Ulcer (CMS-HCC).    ROS   Review of Systems   Constitutional: Negative for fever, chills, weight loss and malaise/fatigue.   HENT: Negative for ear pain, nosebleeds, congestion, sore throat and neck pain.    Respiratory: Negative for cough, sputum production, shortness of breath and wheezing.    Cardiovascular: Negative for chest pain, palpitations,  and leg swelling.   Gastrointestinal: Negative for heartburn, nausea, vomiting, diarrhea and abdominal pain.   Neurological: Negative for dizziness, tingling, tremors, sensory change, focal weakness and Positive for headaches.   Psychiatric/Behavioral: Negative for anxiety, suicidal ideas, insomnia and memory loss.  Positive for depression  All other systems reviewed and are negative except as in HPI.     Objective:   Physical Exam:  Blood pressure 142/102, pulse (!) 102, temperature 37.2 °C (99 °F), resp. rate 18, height 1.524 m (5'), weight 68 kg (150 lb), SpO2 98 %. Body mass index is 29.29 kg/m².   Physical Exam:  Alert, oriented in no acute distress.  Eye contact is good, speech goal directed, affect calm  HEENT: conjunctiva non-injected, sclera non-icteric.  Pinna normal.   Neck No adenopathy or masses in the neck or supraclavicular regions.  Lungs: clear to auscultation bilaterally with good excursion.  CV: regular rate and rhythm.   Abdomen: soft, nontender, No CVAT. Normal bowel sounds.  Ext: no edema, color normal, vascularity normal, temperature normal  MS: Normal gait and station  Head is smooth and symmetrical without lacerations, hemotympanum, mastoid bruising, raccoon eyes, otorrhea or rhinorrhea. There is no  scalp tenderness or temporal tenderness. Pupils  are equal and reactive, and funduscopic exam is within normal limits, without papilledema. There is no lacrimation, or ptosis. There is full range of motion of the temporomandibular joint without crepitus or tenderness. Patient has full range of motion of neck without nuchal rigidity. Cranial nerves II through XII are intact. There are no cerebellar deficits as indicated by alternating hands, finger to nose, and steady gait.        Assessment and Plan:   The following treatment plan was discussed   1. Depression, unspecified depression type      continue current medication   2. Nonintractable headache, unspecified chronicity pattern, unspecified headache type      Elavil 25mg at bedtime       Followup: Return in about 4 weeks (around 12/8/2017) for headache.   Please note that this dictation was created using voice recognition software. I have made every reasonable attempt to correct obvious errors, but I expect that there are errors of grammar and possibly content that I did not discover before finalizing the note.

## 2017-11-13 NOTE — ASSESSMENT & PLAN NOTE
Patient reports generalized headache and migraines three times weekly accompanied by nausea, phonophobia oand photophobia.. She has been treating it with OTC medications without relief. She denies sudden loss of vision or visual changes, weight loss, lacrimation, ptosis, scintillating light, or vomiting,

## 2017-11-13 NOTE — ASSESSMENT & PLAN NOTE
Patient is currently taking cymbalta 60mg twice daily for depression. She denies any side effects, suicidal or homicidal ideation

## 2017-11-14 ENCOUNTER — OFFICE VISIT (OUTPATIENT)
Dept: MEDICAL GROUP | Facility: PHYSICIAN GROUP | Age: 67
End: 2017-11-14
Payer: MEDICARE

## 2017-11-14 VITALS
TEMPERATURE: 98.8 F | DIASTOLIC BLOOD PRESSURE: 92 MMHG | OXYGEN SATURATION: 97 % | HEART RATE: 86 BPM | HEIGHT: 60 IN | RESPIRATION RATE: 16 BRPM | SYSTOLIC BLOOD PRESSURE: 140 MMHG | BODY MASS INDEX: 29.45 KG/M2 | WEIGHT: 150 LBS

## 2017-11-14 DIAGNOSIS — I10 ESSENTIAL HYPERTENSION: ICD-10-CM

## 2017-11-14 DIAGNOSIS — F10.20 ALCOHOLISM (HCC): ICD-10-CM

## 2017-11-14 DIAGNOSIS — K21.9 GASTROESOPHAGEAL REFLUX DISEASE WITHOUT ESOPHAGITIS: ICD-10-CM

## 2017-11-14 DIAGNOSIS — F32.A DEPRESSION, UNSPECIFIED DEPRESSION TYPE: ICD-10-CM

## 2017-11-14 DIAGNOSIS — E78.5 HYPERLIPIDEMIA, UNSPECIFIED HYPERLIPIDEMIA TYPE: Chronic | ICD-10-CM

## 2017-11-14 DIAGNOSIS — M85.80 OSTEOPENIA, UNSPECIFIED LOCATION: ICD-10-CM

## 2017-11-14 DIAGNOSIS — M65.30 TRIGGER FINGER, ACQUIRED: ICD-10-CM

## 2017-11-14 DIAGNOSIS — Z12.31 ENCOUNTER FOR SCREENING MAMMOGRAM FOR BREAST CANCER: ICD-10-CM

## 2017-11-14 DIAGNOSIS — R73.01 IMPAIRED FASTING GLUCOSE: ICD-10-CM

## 2017-11-14 DIAGNOSIS — M48.061 SPINAL STENOSIS OF LUMBAR REGION, UNSPECIFIED WHETHER NEUROGENIC CLAUDICATION PRESENT: ICD-10-CM

## 2017-11-14 DIAGNOSIS — R51.9 NONINTRACTABLE HEADACHE, UNSPECIFIED CHRONICITY PATTERN, UNSPECIFIED HEADACHE TYPE: ICD-10-CM

## 2017-11-14 DIAGNOSIS — E55.9 VITAMIN D DEFICIENCY: ICD-10-CM

## 2017-11-14 DIAGNOSIS — N39.46 MIXED STRESS AND URGE URINARY INCONTINENCE: Chronic | ICD-10-CM

## 2017-11-14 PROBLEM — R10.30 LOWER ABDOMINAL PAIN: Status: RESOLVED | Noted: 2017-07-12 | Resolved: 2017-11-14

## 2017-11-14 PROBLEM — J01.40 ACUTE PANSINUSITIS: Status: RESOLVED | Noted: 2017-08-03 | Resolved: 2017-11-14

## 2017-11-14 PROCEDURE — 99999 PR INITIAL ANNUAL WELLNESS VISIT-INCLUDES PPPS: CPT | Performed by: NURSE PRACTITIONER

## 2017-11-14 PROCEDURE — G0402 INITIAL PREVENTIVE EXAM: HCPCS | Performed by: NURSE PRACTITIONER

## 2017-11-14 ASSESSMENT — PATIENT HEALTH QUESTIONNAIRE - PHQ9: CLINICAL INTERPRETATION OF PHQ2 SCORE: 0

## 2017-11-14 NOTE — ASSESSMENT & PLAN NOTE
Chronic Condition: Patient is currently taking cymbalta 60mg twice daily. for ongoing depression without any side effects. She denies agitation, insomnia, hallucinations, delusions, feelings of worthlessness,  loss of interest, suicidal or homicidal ideation.

## 2017-11-14 NOTE — ASSESSMENT & PLAN NOTE
Chronic condition: Patient reports GERDis controlled well with Protonix. Patient denies chest pain, pyrosis, regurgitation, chronic cough, dysphagia, chronic sore throat or hoarseness. Patient is a  Non-smoker and has a history of alcoholism

## 2017-11-14 NOTE — PROGRESS NOTES
Chief Complaint   Patient presents with   • Annual Wellness Visit         HPI:  Augusta is a 67 y.o. here for Medicare Annual Wellness Visit        Patient Active Problem List    Diagnosis Date Noted   • Headache 11/10/2017   • Alcoholism (CMS-HCC) 07/12/2017   • Urinary incontinence 07/12/2017   • Spinal stenosis, lumbar 02/04/2017   • Trigger finger, acquired 11/17/2012   • GERD (gastroesophageal reflux disease) 02/27/2010   • Vitamin D deficiency 02/27/2010   • Impaired fasting glucose 02/27/2010   • Preventative health care 02/27/2010   • HTN (hypertension) 02/24/2010   • Hyperlipidemia 02/24/2010   • Osteopenia 02/24/2010   • Depression 02/24/2010     Alcoholism (CMS-HCC)  Patient has had alcoholism for approximately 50 years. She is not attending . Last drink was  6 months ago.    Depression  Chronic Condition: Patient is currently taking cymbalta 60mg twice daily. for ongoing depression without any side effects. She denies agitation, insomnia, hallucinations, delusions, feelings of worthlessness,  loss of interest, suicidal or homicidal ideation.           GERD (Gastroesophageal Reflux Disease)  Chronic condition: Patient reports GERDis controlled well with Protonix. Patient denies chest pain, pyrosis, regurgitation, chronic cough, dysphagia, chronic sore throat or hoarseness. Patient is a  Non-smoker and has a history of alcoholism    Headache  Patient has headache with migraine 3 times weekly. She just started on amitryptyline prophylactically  And has not had a headache for 2 days    Trigger finger, acquired  Patient reports intermittent trigger finger Left 3 & 4 digit    Urinary incontinence  Patient has had urinary incontinence for many years and has a bladder sling. She gets frequent UTIs that affect both ADLs and sleep.    HTN (Hypertension)  Chronic condition: Patient is treated for hypertension with Cardizem 240 mg and blood pressure is controlled. She monitors her blood pressure at home randomly.   She denies any chest pain, diaphoresis, shortness of breath, headaches, dizziness or blurred vision.     Hyperlipidemia  Chronic Condition: Patient has hyperlipidemia and is currently taking Red Rice Yeast. She denies any chest pain, diaphoresis, shortness of breath, headaches, dizziness, blurred vision or myalgias.     Osteopenia  Patient was previously diagnosed with osteopenia, but is currently not taking medication. She denies recent fractures    Vitamin D deficiency  Patient was diagnosed with vitamin D deficiency 8 years ago. She is currently not taking medication.    Impaired Fasting Glucose  Patient was diagnosed with impaired glucose many years ago. Glucose is normal on recent labs    Spinal stenosis, lumbar  Stable with current regimen.  Appropriate medications and lab monitored routinely.    Current Outpatient Prescriptions   Medication Sig Dispense Refill   • acetaminophen/caffeine/butalbital 325-40-50 mg (FIORICET) -40 MG Tab Take 1 Tab by mouth every four hours as needed for Headache (No more than 2days a week, no more than 6 tabs in 24h). 30 Tab 0   • amitriptyline (ELAVIL) 25 MG Tab Take 1 Tab by mouth every bedtime. 30 Tab 2   • aspirin (ASA) 81 MG Chew Tab chewable tablet Take 81 mg by mouth every day.     • pantoprazole (PROTONIX) 40 MG Tablet Delayed Response Take 40 mg by mouth every day.     • duloxetine (CYMBALTA) 60 MG Cap DR Particles delayed-release capsule Take 1 Cap by mouth 2 times a day. 180 Cap 1   • Red Yeast Rice Extract (RED YEAST RICE PO) Take  by mouth.     • diltiazem CD (CARDIZEM CD) 240 MG CP24 Take 240 mg by mouth every day.       No current facility-administered medications for this visit.         Patient is taking medications as noted in medication list.  Current supplements as per medication list.     Allergies: Biaxin [clarithromycin]; Clarithromycin; Doxycycline; Ees [erythromycin]; Imitrex [sumatriptan]; Levaquin; Pcn [penicillins]; Shellfish allergy; and  Trazodone    Current social contact/activities: None     Is patient current with immunizations? No, due for Zostavax, Tdap. Patient is interested in receiving TDAP and ZOSTAVAX (Shingles) today.    She  reports that she has never smoked. She has never used smokeless tobacco. She reports that she does not drink alcohol or use drugs.  Counseling given: Yes        DPA/Advanced directive: Patient does not have an Advanced Directive.  A packet and workshop information was given on Advanced Directives.    ROS:    Gait: Uses no assistive device   Ostomy: no   Other tubes: no   Amputations: no   Chronic oxygen use no   Last eye exam 2016   Wears hearing aids: no   : Reports incontinence. Has a bladder sling. Affects ADLs and gets up 3 times at night    Depression Screening    Little interest or pleasure in doing things?  0 - not at all  Feeling down, depressed, or hopeless? 0 - not at all  Patient Health Questionnaire Score: 0    If depressive symptoms identified deferred to follow up visit unless specifically addressed in assessment and plan.    Interpretation of PHQ-9 Total Score   Score Severity   1-4 No Depression   5-9 Mild Depression   10-14 Moderate Depression   15-19 Moderately Severe Depression   20-27 Severe Depression    Screening for Cognitive Impairment    Three Minute Recall (apple, watch, dylan)  3/3    Draw clock face with all 12 numbers set to the hand to show 10 minutes past 11 o'clock  1    If cognitive concerns identified, deferred for follow up unless specifically addressed in assessment and plan.    Fall Risk Assessment    Has the patient had two or more falls in the last year or any fall with injury in the last year?  No  If fall risk identified, deferred for follow up unless specifically addressed in assessment and plan.    Safety Assessment    Throw rugs on floor.  No  Handrails on all stairs.  No  Good lighting in all hallways.  Yes  Difficulty hearing.  Yes  Patient counseled about all safety  risks that were identified.    Functional Assessment ADLs    Are there any barriers preventing you from cooking for yourself or meeting nutritional needs?  No.    Are there any barriers preventing you from driving safely or obtaining transportation?  No.    Are there any barriers preventing you from using a telephone or calling for help?  No.    Are there any barriers preventing you from shopping?  No.    Are there any barriers preventing you from taking care of your own finances?  No.    Are there any barriers preventing you from managing your medications?  No.    Are you currently engaging any exercise or physical activity?  No.       Health Maintenance Summary                Annual Wellness Visit Overdue 1950     IMM DTaP/Tdap/Td Vaccine Overdue 6/15/1970     PAP SMEAR Overdue 6/15/1972     IMM ZOSTER VACCINE Overdue 6/15/2011     MAMMOGRAM Overdue 1/28/2016      Done 1/28/2015 MA-SCREENING MAMMOGRAM W/ CAD     Patient has more history with this topic...    BONE DENSITY Overdue 6/15/2016      Done 12/17/2009 DS-BONE DENSITY STUDY (DEXA)    IMM PNEUMOCOCCAL 65+ (ADULT) LOW/MEDIUM RISK SERIES Overdue 6/15/2016     IMM INFLUENZA Postponed 11/24/2017 Originally 9/1/2017. Provider advises postponing for medical reasons    COLONOSCOPY Next Due 7/26/2022      Done 7/26/2017 REFERRAL TO GI FOR COLONOSCOPY          Patient Care Team:  VIKAS Hoang as PCP - General (Family Medicine)    Social History   Substance Use Topics   • Smoking status: Never Smoker   • Smokeless tobacco: Never Used   • Alcohol use No      Comment: Stopped drinking 45 days ago 7/12/17 Going to AA     Family History   Problem Relation Age of Onset   • GI Father      Crohn's   • Heart Disease Father      First MI age 30   • Hypertension       She  has a past medical history of Arrhythmia; Arthritis; Cold; Dental disorder; Depression (2/24/2010); GERD (gastroesophageal reflux disease) (2/27/2010); HTN (hypertension) (2/24/2010);  Hypercholesteremia; Hyperlipidemia (2/24/2010); Hypertension; Impaired fasting glucose (2/27/2010); Incontinence of urine; Indigestion; Osteopenia (2/24/2010); Other specified disorder of intestines; Pain (11/5/12); Preventative health care (2/27/2010); Psychiatric problem; Restless leg syndrome; Tear of meniscus of knee joint (2/27/2010); Tremor, essential (2/24/2010); Urinary bladder disorder; and Vitamin d deficiency (2/27/2010). She also has no past medical history of Breast cancer (CMS-HCC) or Ulcer (CMS-HCC).   Past Surgical History:   Procedure Laterality Date   • LUMBAR LAMINECTOMY DISKECTOMY Bilateral 2/4/2017    Procedure: LUMBAR LAMINECTOMY DISKECTOMY POSTERIOR L4-S1 ;  Surgeon: Emil Hitchcock M.D.;  Location: [facility];  Service:    • CARPAL TUNNEL ENDOSCOPIC  11/17/2012    Performed by Heriberto Quiñones M.D. at [facility]   • TRIGGER FINGER RELEASE  11/17/2012    Performed by Heriberto Quiñones M.D. at [facility]   • HIP ARTHROSCOPY  2/23/2009    Performed by SHERLYN CALVO at [facility]   • ACETABULAR OSTEOTOMY  2/23/2009    Performed by SHERLYN CALVO at [facility]   • MASS EXCISION ORTHO  2/23/2009    Performed by SHERLYN CALVO at [facility]   • HIP ARTHROSCOPY  2005    done at [facility]   • HAJA BY LAPAROSCOPY  1997   • HYSTERECTOMY, TOTAL ABDOMINAL  1979    oopherectomy lacie   • BLADDER SUSPENSION      bladder sling   • PRIMARY C SECTION  1970/1975           Exam:     Blood pressure 140/92, pulse 86, temperature 37.1 °C (98.8 °F), resp. rate 16, height 1.524 m (5'), weight 68 kg (150 lb), SpO2 97 %. Body mass index is 29.29 kg/m².    Hearing fair.    Dentition upper dentures. Sees dentist regularly  Alert, oriented in no acute distress.  Eye contact is good, speech goal directed, affect calm    Assessment and Plan. The following treatment and monitoring plan is recommended:    1. Alcoholism (CMS-[facility])  Initial  Wellness Visit - Includes PPPS ()    Stable. Continue to monitor   2. Depression, unspecified depression type  Initial Wellness Visit - Includes PPPS ()    Continue Cymbalta   3. Gastroesophageal reflux disease without esophagitis  Initial Wellness Visit - Includes PPPS ()    Continue Protonix   4. Nonintractable headache, unspecified chronicity pattern, unspecified headache type  Initial Wellness Visit - Includes PPPS ()    Continue Elavil   5. Trigger finger, acquired  Initial Wellness Visit - Includes PPPS ()    Stable. Continue to monitor   6. Vitamin D deficiency  Initial Wellness Visit - Includes PPPS ()    VITAMIN D,25 HYDROXY    Stable. Check labs   7. Impaired fasting glucose  Initial Wellness Visit - Includes PPPS ()    COMP METABOLIC PANEL    HEMOGLOBIN A1C    Stable. Check labs   8. Encounter for screening mammogram for breast cancer  Initial Wellness Visit - Includes PPPS ()    MA-SCREEN MAMMO W/CAD-BILAT    mammogram ordered   9. Mixed stress and urge urinary incontinence  Initial Wellness Visit - Includes PPPS ()    Stable. Continue to monitor   10. Essential hypertension  Initial Wellness Visit - Includes PPPS ()    Stable. Continue cardizem   11. Hyperlipidemia, unspecified hyperlipidemia type  Initial Wellness Visit - Includes PPPS ()    Stable. Continue Red Rice Yeast   12. Osteopenia, unspecified location  Initial Wellness Visit - Includes PPPS ()    Stable. no fractures. Continue to monitor   13. Spinal stenosis of lumbar region, unspecified whether neurogenic claudication present  Initial Wellness Visit - Includes PPPS ()    Stable. no fractures. Continue to monitor         Services suggested: No services needed at this time  Health Care Screening recommendations as per orders if indicated.  Referrals offered: PT/OT/Nutrition counseling/Behavioral Health/Smoking cessation as per orders if indicated.    Discussion today about  general wellness and lifestyle habits:    · Prevent falls and reduce trip hazards; Cautioned about securing or removing rugs.  · Have a working fire alarm and carbon monoxide detector;   · Engage in regular physical activity and social activities       Follow-up: Return if symptoms worsen or fail to improve, for Lab Review.

## 2017-11-14 NOTE — ASSESSMENT & PLAN NOTE
Patient has headache with migraine 3 times weekly. She just started on amitryptyline prophylactically  And has not had a headache for 2 days

## 2017-11-15 NOTE — ASSESSMENT & PLAN NOTE
Patient was previously diagnosed with osteopenia, but is currently not taking medication. She denies recent fractures

## 2017-11-15 NOTE — ASSESSMENT & PLAN NOTE
Chronic Condition: Patient has hyperlipidemia and is currently taking Red Rice Yeast. She denies any chest pain, diaphoresis, shortness of breath, headaches, dizziness, blurred vision or myalgias.

## 2017-11-15 NOTE — ASSESSMENT & PLAN NOTE
Patient has had urinary incontinence for many years and has a bladder sling. She gets frequent UTIs that affect both ADLs and sleep.

## 2017-11-15 NOTE — ASSESSMENT & PLAN NOTE
Patient was diagnosed with vitamin D deficiency 8 years ago. She is currently not taking medication.

## 2017-11-15 NOTE — ASSESSMENT & PLAN NOTE
Chronic condition: Patient is treated for hypertension with Cardizem 240 mg and blood pressure is controlled. She monitors her blood pressure at home randomly.  She denies any chest pain, diaphoresis, shortness of breath, headaches, dizziness or blurred vision.

## 2018-01-02 ENCOUNTER — OFFICE VISIT (OUTPATIENT)
Dept: MEDICAL GROUP | Facility: PHYSICIAN GROUP | Age: 68
End: 2018-01-02
Payer: MEDICARE

## 2018-01-02 VITALS
SYSTOLIC BLOOD PRESSURE: 140 MMHG | HEIGHT: 60 IN | DIASTOLIC BLOOD PRESSURE: 82 MMHG | BODY MASS INDEX: 31.22 KG/M2 | HEART RATE: 98 BPM | TEMPERATURE: 97.2 F | RESPIRATION RATE: 16 BRPM | WEIGHT: 159 LBS | OXYGEN SATURATION: 97 %

## 2018-01-02 DIAGNOSIS — N89.8 VAGINAL DISCHARGE: ICD-10-CM

## 2018-01-02 DIAGNOSIS — M79.601 PAIN OF RIGHT UPPER EXTREMITY: ICD-10-CM

## 2018-01-02 DIAGNOSIS — R51.9 NONINTRACTABLE HEADACHE, UNSPECIFIED CHRONICITY PATTERN, UNSPECIFIED HEADACHE TYPE: ICD-10-CM

## 2018-01-02 DIAGNOSIS — R04.0 EPISTAXIS: ICD-10-CM

## 2018-01-02 PROCEDURE — 99214 OFFICE O/P EST MOD 30 MIN: CPT | Performed by: NURSE PRACTITIONER

## 2018-01-02 RX ORDER — BACLOFEN 10 MG/1
10 TABLET ORAL 3 TIMES DAILY
Qty: 90 TAB | Refills: 1 | Status: SHIPPED | OUTPATIENT
Start: 2018-01-02 | End: 2018-12-07 | Stop reason: CLARIF

## 2018-01-02 RX ORDER — FLUCONAZOLE 150 MG/1
150 TABLET ORAL DAILY
Qty: 2 TAB | Refills: 0 | Status: SHIPPED | OUTPATIENT
Start: 2018-01-02 | End: 2018-02-05

## 2018-01-02 NOTE — ASSESSMENT & PLAN NOTE
Patient was having migraine headaches 3 times weekly. She started on prophylactic amitriptyline 25 mg at bedtime and has not had subsequent headache.

## 2018-01-02 NOTE — ASSESSMENT & PLAN NOTE
Patient reports pain in the right upper arm for 3 months which is described as constant aching. Aggravating factor are movement or lifting. Pain started after she completed a course of Levaquin.

## 2018-01-05 ENCOUNTER — TELEPHONE (OUTPATIENT)
Dept: MEDICAL GROUP | Facility: PHYSICIAN GROUP | Age: 68
End: 2018-01-05

## 2018-01-05 NOTE — TELEPHONE ENCOUNTER
That is a very unusual side effect for Baclofen.  I would recommend that she stop that medication for the next week.  My guess is that she likely has a cold on top of it.  She may use warm heating pad to sore/aching muscles with tylenol to help with pain.

## 2018-01-05 NOTE — ASSESSMENT & PLAN NOTE
Patient reports having white thick vaginal discharge with itching and is requesting medication for yeast infection.

## 2018-01-05 NOTE — PROGRESS NOTES
Subjective:     Chief Complaint   Patient presents with   • Arm Pain     Rt    • Epistaxis     x 1 week        HPI:  Augusta Rodriguez is a 67 y.o. female here today to discuss the following:    Headache  Patient was having migraine headaches 3 times weekly. She started on prophylactic amitriptyline 25 mg at bedtime and has not had subsequent headache.    Epistaxis  Patient reports epistaxis x 1 week. Denies any injury or trauma. States blood is bright red and occurs when she blows her nose.    Pain of right upper extremity  Patient reports pain in the right upper arm for 3 months which is described as constant aching. Aggravating factor are movement or lifting. Pain started after she completed a course of Levaquin.    Vaginal discharge  Patient reports having white thick vaginal discharge with itching and is requesting medication for yeast infection.     Current medicines (including changes today)  Current Outpatient Prescriptions   Medication Sig Dispense Refill   • baclofen (LIORESAL) 10 MG Tab Take 1 Tab by mouth 3 times a day. 90 Tab 1   • fluconazole (DIFLUCAN) 150 MG tablet Take 1 Tab by mouth every day. 2 Tab 0   • acetaminophen/caffeine/butalbital 325-40-50 mg (FIORICET) -40 MG Tab Take 1 Tab by mouth every four hours as needed for Headache (No more than 2days a week, no more than 6 tabs in 24h). 30 Tab 0   • amitriptyline (ELAVIL) 25 MG Tab Take 1 Tab by mouth every bedtime. 30 Tab 2   • aspirin (ASA) 81 MG Chew Tab chewable tablet Take 81 mg by mouth every day.     • pantoprazole (PROTONIX) 40 MG Tablet Delayed Response Take 40 mg by mouth every day.     • duloxetine (CYMBALTA) 60 MG Cap DR Particles delayed-release capsule Take 1 Cap by mouth 2 times a day. 180 Cap 1   • Red Yeast Rice Extract (RED YEAST RICE PO) Take  by mouth.     • diltiazem CD (CARDIZEM CD) 240 MG CP24 Take 240 mg by mouth every day.       No current facility-administered medications for this visit.        She  has a past medical  history of Arrhythmia; Arthritis; Cold; Dental disorder; Depression (2/24/2010); GERD (gastroesophageal reflux disease) (2/27/2010); HTN (hypertension) (2/24/2010); Hypercholesteremia; Hyperlipidemia (2/24/2010); Hypertension; Impaired fasting glucose (2/27/2010); Incontinence of urine; Indigestion; Osteopenia (2/24/2010); Other specified disorder of intestines; Pain (11/5/12); Preventative health care (2/27/2010); Psychiatric problem; Restless leg syndrome; Tear of meniscus of knee joint (2/27/2010); Tremor, essential (2/24/2010); Urinary bladder disorder; and Vitamin d deficiency (2/27/2010). She also has no past medical history of Breast cancer (CMS-HCC) or Ulcer (CMS-HCC).    ROS   Review of Systems   Constitutional: Negative for fever, chills, weight loss and malaise/fatigue.   HENT: Negative for ear pain, nosebleeds, congestion, sore throat and neck pain.  positive for epistaxis  Respiratory: Negative for cough, sputum production, shortness of breath and wheezing.    Cardiovascular: Negative for chest pain, palpitations,  and leg swelling.   Gastrointestinal: Negative for heartburn, nausea, vomiting, diarrhea and abdominal pain.   MS: Positive for right upper arm pain  Neurological: Negative for dizziness, tingling, tremors, sensory change, focal weakness and headaches.   Psychiatric/Behavioral: Negative for depression, anxiety, suicidal ideas, insomnia and memory loss.    All other systems reviewed and are negative except as in HPI.   Objective:   Physical Exam:  Blood pressure 140/82, pulse 98, temperature 36.2 °C (97.2 °F), resp. rate 16, height 1.524 m (5'), weight 72.1 kg (159 lb), SpO2 97 %. Body mass index is 31.05 kg/m².   Physical Exam:  Alert, oriented in no acute distress.  Eye contact is good, speech goal directed, affect calm  HEENT: conjunctiva non-injected, sclera non-icteric.  Pinna normal. Ear canals are clear and TMs within normal limits. Oropharynx is clear without erythema or edema. Nares  patent and erythematous bilaterally. Lesion right nares.  Neck No adenopathy or masses in the neck or supraclavicular regions.  Lungs: clear to auscultation bilaterally with good excursion.  CV: regular rate and rhythm.   Ext:right upper arm  Tender to palpate. Full range of motion  MS: Normal gait and station    Health Maintenance Due   Topic Date Due   • IMM DTaP/Tdap/Td Vaccine (1 - Tdap) 06/15/1970   • IMM ZOSTER VACCINE  06/15/2011   • MAMMOGRAM  01/28/2016   • BONE DENSITY  06/15/2016   • IMM PNEUMOCOCCAL 65+ (ADULT) LOW/MEDIUM RISK SERIES (1 of 2 - PCV13) 06/15/2016   • Annual Wellness Visit  06/15/2016   • IMM INFLUENZA (1) 09/01/2017     Assessment and Plan:   The following treatment plan was discussed   1. Epistaxis  REFERRAL TO ENT   2. Nonintractable headache, unspecified chronicity pattern, unspecified headache type     3. Pain of right upper extremity  baclofen (LIORESAL) 10 MG Tab   4. Vaginal discharge  fluconazole (DIFLUCAN) 150 MG tablet       Followup: Return if symptoms worsen or fail to improve.   Please note that this dictation was created using voice recognition software. I have made every reasonable attempt to correct obvious errors, but I expect that there are errors of grammar and possibly content that I did not discover before finalizing the note.

## 2018-01-05 NOTE — TELEPHONE ENCOUNTER
1. Caller Name: Augusta Rodriguez                                           Call Back Number: [phone] (home)         Patient approves a detailed voicemail message: N\A    Pt states she started taking baclofen and it has caused her to cough.  She states it is keeping her up at night.  She is asking if something else can be tried

## 2018-01-18 ENCOUNTER — HOSPITAL ENCOUNTER (OUTPATIENT)
Dept: LAB | Facility: MEDICAL CENTER | Age: 68
End: 2018-01-18
Attending: NURSE PRACTITIONER
Payer: MEDICARE

## 2018-01-18 ENCOUNTER — HOSPITAL ENCOUNTER (OUTPATIENT)
Dept: RADIOLOGY | Facility: MEDICAL CENTER | Age: 68
End: 2018-01-18
Attending: NURSE PRACTITIONER
Payer: MEDICARE

## 2018-01-18 DIAGNOSIS — Z12.31 ENCOUNTER FOR SCREENING MAMMOGRAM FOR BREAST CANCER: ICD-10-CM

## 2018-01-18 DIAGNOSIS — E55.9 VITAMIN D DEFICIENCY: ICD-10-CM

## 2018-01-18 DIAGNOSIS — R73.01 IMPAIRED FASTING GLUCOSE: ICD-10-CM

## 2018-01-18 LAB
25(OH)D3 SERPL-MCNC: 27 NG/ML (ref 30–100)
ALBUMIN SERPL BCP-MCNC: 4 G/DL (ref 3.2–4.9)
ALBUMIN/GLOB SERPL: 1.4 G/DL
ALP SERPL-CCNC: 101 U/L (ref 30–99)
ALT SERPL-CCNC: 13 U/L (ref 2–50)
ANION GAP SERPL CALC-SCNC: 8 MMOL/L (ref 0–11.9)
AST SERPL-CCNC: 15 U/L (ref 12–45)
BILIRUB SERPL-MCNC: 0.9 MG/DL (ref 0.1–1.5)
BUN SERPL-MCNC: 18 MG/DL (ref 8–22)
CALCIUM SERPL-MCNC: 9.3 MG/DL (ref 8.5–10.5)
CHLORIDE SERPL-SCNC: 105 MMOL/L (ref 96–112)
CO2 SERPL-SCNC: 29 MMOL/L (ref 20–33)
CREAT SERPL-MCNC: 1.06 MG/DL (ref 0.5–1.4)
EST. AVERAGE GLUCOSE BLD GHB EST-MCNC: 117 MG/DL
GLOBULIN SER CALC-MCNC: 2.9 G/DL (ref 1.9–3.5)
GLUCOSE SERPL-MCNC: 89 MG/DL (ref 65–99)
HBA1C MFR BLD: 5.7 % (ref 0–5.6)
POTASSIUM SERPL-SCNC: 4.3 MMOL/L (ref 3.6–5.5)
PROT SERPL-MCNC: 6.9 G/DL (ref 6–8.2)
SODIUM SERPL-SCNC: 142 MMOL/L (ref 135–145)

## 2018-01-18 PROCEDURE — 80053 COMPREHEN METABOLIC PANEL: CPT

## 2018-01-18 PROCEDURE — 82306 VITAMIN D 25 HYDROXY: CPT

## 2018-01-18 PROCEDURE — 36415 COLL VENOUS BLD VENIPUNCTURE: CPT

## 2018-01-18 PROCEDURE — 77067 SCR MAMMO BI INCL CAD: CPT

## 2018-01-18 PROCEDURE — 83036 HEMOGLOBIN GLYCOSYLATED A1C: CPT

## 2018-02-05 ENCOUNTER — OFFICE VISIT (OUTPATIENT)
Dept: MEDICAL GROUP | Facility: PHYSICIAN GROUP | Age: 68
End: 2018-02-05
Payer: MEDICARE

## 2018-02-05 VITALS
HEART RATE: 98 BPM | TEMPERATURE: 97.5 F | RESPIRATION RATE: 16 BRPM | DIASTOLIC BLOOD PRESSURE: 88 MMHG | BODY MASS INDEX: 31.02 KG/M2 | SYSTOLIC BLOOD PRESSURE: 122 MMHG | WEIGHT: 158 LBS | HEIGHT: 60 IN | OXYGEN SATURATION: 98 %

## 2018-02-05 DIAGNOSIS — R05.9 COUGH: ICD-10-CM

## 2018-02-05 PROCEDURE — 99213 OFFICE O/P EST LOW 20 MIN: CPT | Performed by: INTERNAL MEDICINE

## 2018-02-05 NOTE — PROGRESS NOTES
PRIMARY CARE CLINIC ACUTE VISIT  Chief Complaint   Patient presents with   • Sinus Problem     x 1 week      History of Present Illness     Cough  Has been having a persistent cough for the past week. Has post nasal drip. She was prescribed levaquin 10/2017 but she thinks she got a biceps tendon rupture from it on her right arm. Denies fevers, chills. Cough is worse at night. Has been taking cough drops which helps for only about 10 minutes. Has controlled reflux on protonix (from her GI doctor). Has a history of epistaxis s/p cautery with ENT but had another episode of epistaxis this morning.     Current Outpatient Prescriptions   Medication Sig Dispense Refill   • baclofen (LIORESAL) 10 MG Tab Take 1 Tab by mouth 3 times a day. 90 Tab 1   • amitriptyline (ELAVIL) 25 MG Tab Take 1 Tab by mouth every bedtime. 30 Tab 2   • aspirin (ASA) 81 MG Chew Tab chewable tablet Take 81 mg by mouth every day.     • pantoprazole (PROTONIX) 40 MG Tablet Delayed Response Take 40 mg by mouth every day.     • duloxetine (CYMBALTA) 60 MG Cap DR Particles delayed-release capsule Take 1 Cap by mouth 2 times a day. 180 Cap 1   • diltiazem CD (CARDIZEM CD) 240 MG CP24 Take 240 mg by mouth every day.     • acetaminophen/caffeine/butalbital 325-40-50 mg (FIORICET) -40 MG Tab Take 1 Tab by mouth every four hours as needed for Headache (No more than 2days a week, no more than 6 tabs in 24h). 30 Tab 0   • Red Yeast Rice Extract (RED YEAST RICE PO) Take  by mouth.       No current facility-administered medications for this visit.      Past Medical History:   Diagnosis Date   • Arrhythmia     afib   • Arthritis     osteo   • Cold    • Dental disorder     upper dentures   • Depression 2/24/2010   • GERD (gastroesophageal reflux disease) 2/27/2010   • HTN (hypertension) 2/24/2010   • Hypercholesteremia    • Hyperlipidemia 2/24/2010   • Hypertension    • Impaired fasting glucose 2/27/2010   • Incontinence of urine    • Indigestion    •  Osteopenia 2010   • Other specified disorder of intestines     constipation r/t meds   • Pain 12    left wrist/lower back   • Preventative health care 2010   • Psychiatric problem     anxiety   • Restless leg syndrome    • Tear of meniscus of knee joint 2010   • Tremor, essential 2010   • Urinary bladder disorder    • Vitamin d deficiency 2010     Past Surgical History:   Procedure Laterality Date   • LUMBAR LAMINECTOMY DISKECTOMY Bilateral 2017    Procedure: LUMBAR LAMINECTOMY DISKECTOMY POSTERIOR L4-S1 ;  Surgeon: Emil Hitchcock M.D.;  Location: SURGERY [facility];  Service:    • CARPAL TUNNEL ENDOSCOPIC  2012    Performed by Heriberto Quiñones M.D. at SURGERY [facility]   • TRIGGER FINGER RELEASE  2012    Performed by Heriberto Quiñones M.D. at SURGERY [facility]   • HIP ARTHROSCOPY  2009    Performed by SHERLYN CALVO at SURGERY [facility]   • ACETABULAR OSTEOTOMY  2009    Performed by SHERLYN CALVO at SURGERY [facility]   • MASS EXCISION ORTHO  2009    Performed by SHERLYN CALVO at SURGERY [facility]   • HIP ARTHROSCOPY      done at [facility]   • HAJA BY LAPAROSCOPY     • HYSTERECTOMY, TOTAL ABDOMINAL  1979    oopherectomy lacie   • BLADDER SUSPENSION      bladder sling   • PRIMARY C SECTION       Social History   Substance Use Topics   • Smoking status: Never Smoker   • Smokeless tobacco: Never Used   • Alcohol use No      Comment: Stopped drinking 45 days ago 17 Going to      Social History     Social History Narrative   • No narrative on file     Family History   Problem Relation Age of Onset   • GI Father      Crohn's   • Heart Disease Father      First MI age 30   • Hypertension       Family Status   Relation Status   • Mother  at age 44    Urosepsis   • Father  at age 74    MI. Also had Crohn's   • Brother  at age 56    Gallbladder cancer   • Sister Alive     Crohn's   •       Allergies: Biaxin [clarithromycin]; Clarithromycin; Doxycycline; Ees [erythromycin]; Imitrex [sumatriptan]; Levaquin; Pcn [penicillins]; Shellfish allergy; and Trazodone    ROS  As per HPI above. All other systems reviewed and negative.        Objective   Blood pressure 122/88, pulse 98, temperature 36.4 °C (97.5 °F), resp. rate 16, height 1.524 m (5'), weight 71.7 kg (158 lb), SpO2 98 %. Body mass index is 30.86 kg/m².    General: alert and oriented, pleasant, cooperative  HEENT: Normocephalic, atraumatic. No thyroid masses. Oropharynx clear with mild injection. Erythema of bilateral nares  Cardiovascular: regular rate and rhythm, normal S1/S2  Pulmonary: lungs clear to auscultation bilaterally  Lymphatics: no cervical or supraclavicular lymphadenopathy   Skin: warm and dry, no lesions or rashes  Psychiatric: appropriate mood and affect. Good insight and appropriate judgment       Assessment and Plan   The following treatment plan was discussed     1. Cough  Most likely exacerbated by recurrent epistaxis. No signs and symptoms of an infection at this time. Advised to use afrin spray and return to follow up with her ENT Dr. Puente since she likely needs cautery again.       Healthcare maintenance     Health Maintenance Due   Topic Date Due   • IMM DTaP/Tdap/Td Vaccine (1 - Tdap) 06/15/1970   • IMM ZOSTER VACCINE  06/15/2011   • BONE DENSITY  06/15/2016   • IMM PNEUMOCOCCAL 65+ (ADULT) LOW/MEDIUM RISK SERIES (1 of 2 - PCV13) 06/15/2016   • Annual Wellness Visit  06/15/2016   • IMM INFLUENZA (1) 09/01/2017       Return if symptoms worsen or fail to improve.    Ehsan Finney MD  Internal Medicine  [facility]

## 2018-02-05 NOTE — ASSESSMENT & PLAN NOTE
Has been having a persistent cough for the past week. Has post nasal drip. She was prescribed levaquin 10/2017 but she thinks she got a biceps tendon rupture from it on her right arm. Denies fevers, chills. Cough is worse at night. Has been taking cough drops which helps for only about 10 minutes. Has controlled reflux on protonix (from her GI doctor). Has a history of epistaxis s/p cautery with ENT but had another episode of epistaxis this morning.

## 2018-02-20 RX ORDER — AMITRIPTYLINE HYDROCHLORIDE 25 MG/1
25 TABLET, FILM COATED ORAL
Qty: 90 TAB | Refills: 0 | Status: SHIPPED | OUTPATIENT
Start: 2018-02-20 | End: 2018-06-18 | Stop reason: SDUPTHER

## 2018-04-02 ENCOUNTER — OFFICE VISIT (OUTPATIENT)
Dept: MEDICAL GROUP | Facility: PHYSICIAN GROUP | Age: 68
End: 2018-04-02
Payer: MEDICARE

## 2018-04-02 VITALS
WEIGHT: 158 LBS | OXYGEN SATURATION: 95 % | TEMPERATURE: 97.5 F | DIASTOLIC BLOOD PRESSURE: 80 MMHG | RESPIRATION RATE: 16 BRPM | HEIGHT: 61 IN | HEART RATE: 98 BPM | SYSTOLIC BLOOD PRESSURE: 130 MMHG | BODY MASS INDEX: 29.83 KG/M2

## 2018-04-02 DIAGNOSIS — N76.0 ACUTE VAGINITIS: ICD-10-CM

## 2018-04-02 DIAGNOSIS — Z76.89 ENCOUNTER TO ESTABLISH CARE WITH NEW DOCTOR: ICD-10-CM

## 2018-04-02 DIAGNOSIS — F32.A DEPRESSION, UNSPECIFIED DEPRESSION TYPE: ICD-10-CM

## 2018-04-02 DIAGNOSIS — K21.9 GASTROESOPHAGEAL REFLUX DISEASE WITHOUT ESOPHAGITIS: ICD-10-CM

## 2018-04-02 DIAGNOSIS — M79.601 PAIN OF RIGHT UPPER EXTREMITY: ICD-10-CM

## 2018-04-02 DIAGNOSIS — I10 ESSENTIAL HYPERTENSION: ICD-10-CM

## 2018-04-02 DIAGNOSIS — M25.511 RIGHT SHOULDER PAIN, UNSPECIFIED CHRONICITY: ICD-10-CM

## 2018-04-02 PROBLEM — R04.0 EPISTAXIS: Status: RESOLVED | Noted: 2018-01-02 | Resolved: 2018-04-02

## 2018-04-02 PROBLEM — R05.9 COUGH: Status: RESOLVED | Noted: 2018-02-05 | Resolved: 2018-04-02

## 2018-04-02 PROBLEM — N89.8 VAGINAL DISCHARGE: Status: RESOLVED | Noted: 2018-01-02 | Resolved: 2018-04-02

## 2018-04-02 PROBLEM — F10.20 ALCOHOLISM (HCC): Status: RESOLVED | Noted: 2017-07-12 | Resolved: 2018-04-02

## 2018-04-02 PROCEDURE — 99214 OFFICE O/P EST MOD 30 MIN: CPT | Performed by: NURSE PRACTITIONER

## 2018-04-02 RX ORDER — CELECOXIB 200 MG/1
200 CAPSULE ORAL DAILY
Qty: 30 CAP | Refills: 0 | Status: SHIPPED | OUTPATIENT
Start: 2018-04-02 | End: 2018-12-07 | Stop reason: CLARIF

## 2018-04-02 RX ORDER — FLUCONAZOLE 150 MG/1
TABLET ORAL
Qty: 1 TAB | Refills: 1 | Status: SHIPPED | OUTPATIENT
Start: 2018-04-02 | End: 2018-12-07 | Stop reason: CLARIF

## 2018-04-02 NOTE — ASSESSMENT & PLAN NOTE
Had had vaginal itching and discharge since taking an antibiotic.  Would like a prescription for Diflucan.

## 2018-04-02 NOTE — PROGRESS NOTES
Chief Complaint   Patient presents with   • Nasal Congestion   • Vaginal Itching       HISTORY OF PRESENT ILLNESS: Patient is a 67 y.o. female new patient who presents today to discuss the following issues:    Encounter to establish care with new doctor  Is here to establish with a new primary care provider.  Was previously seen by Jazzmine Lopez.    Pain of right upper extremity  Reports that's she has had pain in her right upper arm, shoulder, and into her clavicle since she took a prescription for Levaquin.  She was told that she needs an MRI.  Will proceed with MRI.  States that baclofen helps a little bit, but she needs a refill.  Discussed that she would probably benefit more from an antiinflammatory.  Will try Celebrex.    HTN (Hypertension)  Chronic in nature.  Stable on meds.      Acute vaginitis  Had had vaginal itching and discharge since taking an antibiotic.  Would like a prescription for Diflucan.    Depression  Mood stable on Cymbalta and Elavil at bedtime.  Denies SI/HI.    GERD (Gastroesophageal Reflux Disease)  Stable on protonix.      Patient Active Problem List    Diagnosis Date Noted   • Encounter to establish care with new doctor 04/02/2018   • Acute vaginitis 04/02/2018   • Pain of right upper extremity 01/02/2018   • Headache 11/10/2017   • Urinary incontinence 07/12/2017   • Spinal stenosis, lumbar 02/04/2017   • Trigger finger, acquired 11/17/2012   • GERD (gastroesophageal reflux disease) 02/27/2010   • Vitamin D deficiency 02/27/2010   • Impaired fasting glucose 02/27/2010   • HTN (hypertension) 02/24/2010   • Hyperlipidemia 02/24/2010   • Osteopenia 02/24/2010   • Depression 02/24/2010       Allergies:Biaxin [clarithromycin]; Clarithromycin; Doxycycline; Ees [erythromycin]; Imitrex [sumatriptan]; Levaquin; Pcn [penicillins]; Shellfish allergy; and Trazodone    Current Outpatient Prescriptions   Medication Sig Dispense Refill   • fluconazole (DIFLUCAN) 150 MG tablet Take one tab by  mouth now. 1 Tab 1   • celecoxib (CELEBREX) 200 MG Cap Take 1 Cap by mouth every day. 30 Cap 0   • amitriptyline (ELAVIL) 25 MG Tab Take 1 Tab by mouth every bedtime. 90 Tab 0   • baclofen (LIORESAL) 10 MG Tab Take 1 Tab by mouth 3 times a day. 90 Tab 1   • acetaminophen/caffeine/butalbital 325-40-50 mg (FIORICET) -40 MG Tab Take 1 Tab by mouth every four hours as needed for Headache (No more than 2days a week, no more than 6 tabs in 24h). 30 Tab 0   • duloxetine (CYMBALTA) 60 MG Cap DR Particles delayed-release capsule Take 1 Cap by mouth 2 times a day. 180 Cap 1   • aspirin (ASA) 81 MG Chew Tab chewable tablet Take 81 mg by mouth every day.     • pantoprazole (PROTONIX) 40 MG Tablet Delayed Response Take 40 mg by mouth every day.     • Red Yeast Rice Extract (RED YEAST RICE PO) Take  by mouth.     • diltiazem CD (CARDIZEM CD) 240 MG CP24 Take 240 mg by mouth every day.       No current facility-administered medications for this visit.        Social History   Substance Use Topics   • Smoking status: Never Smoker   • Smokeless tobacco: Never Used   • Alcohol use No      Comment: Stopped drinking 45 days ago 17 Going to AA       Family Status   Relation Status   • Mother  at age 44    Urosepsis   • Father  at age 74    MI. Also had Crohn's   • Brother  at age 56    Gallbladder cancer   • Sister Alive    Crohn's   •       Family History   Problem Relation Age of Onset   • GI Father      Crohn's   • Heart Disease Father      First MI age 30   • Hypertension         Review of Systems:   Constitutional: Negative for fever, chills, weight loss and malaise/fatigue.   HENT: Negative for ear pain, nosebleeds, congestion, sore throat and neck pain.    Eyes: Negative for blurred vision.   Respiratory: Negative for cough, sputum production, shortness of breath and wheezing.    Cardiovascular: Negative for chest pain, palpitations, orthopnea and leg swelling.   Gastrointestinal: Negative for  "heartburn, nausea, vomiting and abdominal pain.   Genitourinary: Negative for dysuria, urgency and frequency. Positive for vaginal discharge with itch.  Musculoskeletal: Negative for myalgias, joint pain, and back pain.  Positive for right upper arm/shoulder pain.   Skin: Negative for rash and itching.   Neurological: Negative for dizziness, tingling, tremors, sensory change, focal weakness and headaches.   Endo/Heme/Allergies: Does not bruise/bleed easily.   Psychiatric/Behavioral: Negative for depression, suicidal ideas and memory loss.  The patient is not nervous/anxious and does not have insomnia.    All other systems reviewed and are negative except as in HPI.    Exam:  Blood pressure 130/80, pulse 98, temperature 36.4 °C (97.5 °F), resp. rate 16, height 1.549 m (5' 1\"), weight 71.7 kg (158 lb), SpO2 95 %.  General:  Well nourished, well developed female in NAD  Head: Grossly normal.  Neck: Supple without JVD or bruit. Thyroid is not enlarged.  Pulmonary: Clear to ausculation. Normal effort. No rales, ronchi, or wheezing.  Cardiovascular: Regular rate and rhythm without murmur.   Abdomen:  Bowel sounds + x 4. Soft, non-tender, nondistended.  Extremities: No clubbing, cyanosis, or edema. Right bicep tender to touch and shoulder with decreased ROM due to pain.  Skin: Intact with no obvious rashes or lesions.  Neuro: Grossly intact.  Psych: Alert and oriented x 3.  Mood and affect appropriate.    Medical decision-making and discussion: Augusta is here to establish with a new primary care provider.  We reviewed her past medical history and discussed her current medications.  Prescriptions for Celebrex and diflucan were sent to her pharmacy and an MRI was ordered. She will sign a records release for her previous provider, she will [facility] with [facility] [facility], and she will plan to follow-up here as needed.         Assessment/Plan:  1. Encounter to establish care with new doctor     2. Acute vaginitis  fluconazole (DIFLUCAN) " 150 MG tablet   3. Right shoulder pain, unspecified chronicity  MR-SHOULDER-W/O RIGHT    celecoxib (CELEBREX) 200 MG Cap   4. Pain of right upper extremity     5. Essential hypertension     6. Depression, unspecified depression type     7. Gastroesophageal reflux disease without esophagitis         Return if symptoms worsen or fail to improve.    Please note that this dictation was created using voice recognition software. I have made every reasonable attempt to correct obvious errors, but I expect that there are errors of grammar and possibly content that I did not discover before finalizing the note.

## 2018-04-02 NOTE — ASSESSMENT & PLAN NOTE
Reports that's she has had pain in her right upper arm, shoulder, and into her clavicle since she took a prescription for Levaquin.  She was told that she needs an MRI.  Will proceed with MRI.  States that baclofen helps a little bit, but she needs a refill.  Discussed that she would probably benefit more from an antiinflammatory.  Will try Celebrex.

## 2018-04-18 ENCOUNTER — HOSPITAL ENCOUNTER (OUTPATIENT)
Dept: RADIOLOGY | Facility: MEDICAL CENTER | Age: 68
End: 2018-04-18
Attending: NURSE PRACTITIONER
Payer: MEDICARE

## 2018-04-18 DIAGNOSIS — M25.511 RIGHT SHOULDER PAIN, UNSPECIFIED CHRONICITY: ICD-10-CM

## 2018-04-18 PROCEDURE — 73221 MRI JOINT UPR EXTREM W/O DYE: CPT | Mod: RT

## 2018-04-24 ENCOUNTER — TELEPHONE (OUTPATIENT)
Dept: MEDICAL GROUP | Facility: PHYSICIAN GROUP | Age: 68
End: 2018-04-24

## 2018-04-24 DIAGNOSIS — M25.511 RIGHT SHOULDER PAIN, UNSPECIFIED CHRONICITY: ICD-10-CM

## 2018-04-24 NOTE — TELEPHONE ENCOUNTER
1. Caller Name: Augusta Rodriguez                                           Call Back Number: [phone] (home)         Patient approves a detailed voicemail message: N\A    Pt was unable to get on my chart.  Gave her results of MRI done 04/18/18.  She does not have a preference on specialist to be referred to.

## 2018-05-06 ENCOUNTER — OFFICE VISIT (OUTPATIENT)
Dept: URGENT CARE | Facility: PHYSICIAN GROUP | Age: 68
End: 2018-05-06
Payer: MEDICARE

## 2018-05-06 ENCOUNTER — HOSPITAL ENCOUNTER (OUTPATIENT)
Facility: MEDICAL CENTER | Age: 68
End: 2018-05-06
Attending: FAMILY MEDICINE
Payer: MEDICARE

## 2018-05-06 VITALS
WEIGHT: 158 LBS | HEIGHT: 61 IN | OXYGEN SATURATION: 94 % | DIASTOLIC BLOOD PRESSURE: 102 MMHG | HEART RATE: 83 BPM | SYSTOLIC BLOOD PRESSURE: 140 MMHG | TEMPERATURE: 97.7 F | BODY MASS INDEX: 29.83 KG/M2 | RESPIRATION RATE: 16 BRPM

## 2018-05-06 DIAGNOSIS — N39.0 ACUTE UTI: ICD-10-CM

## 2018-05-06 LAB
APPEARANCE UR: NORMAL
BILIRUB UR STRIP-MCNC: NORMAL MG/DL
COLOR UR AUTO: YELLOW
GLUCOSE UR STRIP.AUTO-MCNC: NORMAL MG/DL
KETONES UR STRIP.AUTO-MCNC: NORMAL MG/DL
LEUKOCYTE ESTERASE UR QL STRIP.AUTO: NORMAL
NITRITE UR QL STRIP.AUTO: NORMAL
PH UR STRIP.AUTO: 7 [PH] (ref 5–8)
PROT UR QL STRIP: NORMAL MG/DL
RBC UR QL AUTO: NORMAL
SP GR UR STRIP.AUTO: 1
UROBILINOGEN UR STRIP-MCNC: NORMAL MG/DL

## 2018-05-06 PROCEDURE — 87077 CULTURE AEROBIC IDENTIFY: CPT

## 2018-05-06 PROCEDURE — 87186 SC STD MICRODIL/AGAR DIL: CPT

## 2018-05-06 PROCEDURE — 87086 URINE CULTURE/COLONY COUNT: CPT

## 2018-05-06 PROCEDURE — 99214 OFFICE O/P EST MOD 30 MIN: CPT | Performed by: FAMILY MEDICINE

## 2018-05-06 PROCEDURE — 81002 URINALYSIS NONAUTO W/O SCOPE: CPT | Performed by: FAMILY MEDICINE

## 2018-05-06 RX ORDER — NITROFURANTOIN 25; 75 MG/1; MG/1
100 CAPSULE ORAL 2 TIMES DAILY
Qty: 14 CAP | Refills: 0 | Status: SHIPPED | OUTPATIENT
Start: 2018-05-06 | End: 2018-05-13

## 2018-05-06 ASSESSMENT — ENCOUNTER SYMPTOMS
MYALGIAS: 0
EYE REDNESS: 0
EYE DISCHARGE: 0

## 2018-05-06 NOTE — PROGRESS NOTES
"Subjective:      Augusta Rodriguez is a 67 y.o. female who presents with Dysuria (& pelvic pain x1-2 week)            1.5wk urinary burning, urgency, frequency. No hematuria. No fever. No flank pain. Pelvic pressure/pain. Moderate. Some relief with Azo. No other aggravating or alleviating factors.          Review of Systems   Eyes: Negative for discharge and redness.   Musculoskeletal: Negative for joint pain and myalgias.   Skin: Negative for itching and rash.     .  Medications, Allergies, and current problem list reviewed today in Epic       Objective:     /102   Pulse 83   Temp 36.5 °C (97.7 °F)   Resp 16   Ht 1.549 m (5' 1\")   Wt 71.7 kg (158 lb)   SpO2 94%   BMI 29.85 kg/m²      Physical Exam   Constitutional: She appears well-developed and well-nourished. No distress.   HENT:   Head: Normocephalic and atraumatic.   Eyes: Conjunctivae are normal.   Cardiovascular: Normal rate, regular rhythm and normal heart sounds.    No murmur heard.  Pulmonary/Chest: Effort normal and breath sounds normal. She has no wheezes.   Genitourinary:   Genitourinary Comments: No CVAT. Suprapubic tenderness.    Neurological:   Speech is clear. Patient is appropriate and cooperative.     Skin: Skin is dry. No rash noted.               Assessment/Plan:   UA reviewed    1. Acute UTI  URINE CULTURE(NEW)    nitrofurantoin monohydr macro (MACROBID) 100 MG Cap    POCT Urinalysis     Differential diagnosis, natural history, supportive care, and indications for immediate follow-up discussed at length.     "

## 2018-05-07 ENCOUNTER — TELEPHONE (OUTPATIENT)
Dept: MEDICAL GROUP | Facility: PHYSICIAN GROUP | Age: 68
End: 2018-05-07

## 2018-05-07 NOTE — TELEPHONE ENCOUNTER
1. Caller Name: Elizabeth SarkarhanieMaddi Cummings                      Call Back Number: [phone]    2. Message: Calling in regards of Pt's MRI of her Rt Shoulder. Concerns in regards to the marrow. The provider would like to discuss the results and plan of care in regards to recent imaging.     3. Patient approves office to leave a detailed voicemail/MyChart message: no

## 2018-05-08 LAB
BACTERIA UR CULT: ABNORMAL
BACTERIA UR CULT: ABNORMAL
SIGNIFICANT IND 70042: ABNORMAL
SITE SITE: ABNORMAL
SOURCE SOURCE: ABNORMAL

## 2018-05-16 ENCOUNTER — OFFICE VISIT (OUTPATIENT)
Dept: MEDICAL GROUP | Facility: PHYSICIAN GROUP | Age: 68
End: 2018-05-16
Payer: MEDICARE

## 2018-05-16 ENCOUNTER — HOSPITAL ENCOUNTER (OUTPATIENT)
Dept: LAB | Facility: MEDICAL CENTER | Age: 68
End: 2018-05-16
Attending: NURSE PRACTITIONER
Payer: MEDICARE

## 2018-05-16 VITALS
WEIGHT: 160 LBS | DIASTOLIC BLOOD PRESSURE: 80 MMHG | BODY MASS INDEX: 30.21 KG/M2 | SYSTOLIC BLOOD PRESSURE: 140 MMHG | RESPIRATION RATE: 16 BRPM | HEART RATE: 70 BPM | HEIGHT: 61 IN | TEMPERATURE: 97.6 F | OXYGEN SATURATION: 98 %

## 2018-05-16 DIAGNOSIS — D64.9 ANEMIA, UNSPECIFIED TYPE: ICD-10-CM

## 2018-05-16 PROBLEM — N76.0 ACUTE VAGINITIS: Status: RESOLVED | Noted: 2018-04-02 | Resolved: 2018-05-16

## 2018-05-16 PROBLEM — Z76.89 ENCOUNTER TO ESTABLISH CARE WITH NEW DOCTOR: Status: RESOLVED | Noted: 2018-04-02 | Resolved: 2018-05-16

## 2018-05-16 LAB
BASOPHILS # BLD AUTO: 1.1 % (ref 0–1.8)
BASOPHILS # BLD: 0.1 K/UL (ref 0–0.12)
EOSINOPHIL # BLD AUTO: 0.42 K/UL (ref 0–0.51)
EOSINOPHIL NFR BLD: 4.5 % (ref 0–6.9)
ERYTHROCYTE [DISTWIDTH] IN BLOOD BY AUTOMATED COUNT: 42.7 FL (ref 35.9–50)
HCT VFR BLD AUTO: 44.8 % (ref 37–47)
HGB BLD-MCNC: 14.3 G/DL (ref 12–16)
IMM GRANULOCYTES # BLD AUTO: 0.03 K/UL (ref 0–0.11)
IMM GRANULOCYTES NFR BLD AUTO: 0.3 % (ref 0–0.9)
LYMPHOCYTES # BLD AUTO: 2.12 K/UL (ref 1–4.8)
LYMPHOCYTES NFR BLD: 22.6 % (ref 22–41)
MCH RBC QN AUTO: 27 PG (ref 27–33)
MCHC RBC AUTO-ENTMCNC: 31.9 G/DL (ref 33.6–35)
MCV RBC AUTO: 84.5 FL (ref 81.4–97.8)
MONOCYTES # BLD AUTO: 0.87 K/UL (ref 0–0.85)
MONOCYTES NFR BLD AUTO: 9.3 % (ref 0–13.4)
NEUTROPHILS # BLD AUTO: 5.86 K/UL (ref 2–7.15)
NEUTROPHILS NFR BLD: 62.2 % (ref 44–72)
NRBC # BLD AUTO: 0 K/UL
NRBC BLD-RTO: 0 /100 WBC
PLATELET # BLD AUTO: 354 K/UL (ref 164–446)
PMV BLD AUTO: 9.6 FL (ref 9–12.9)
RBC # BLD AUTO: 5.3 M/UL (ref 4.2–5.4)
WBC # BLD AUTO: 9.4 K/UL (ref 4.8–10.8)

## 2018-05-16 PROCEDURE — 36415 COLL VENOUS BLD VENIPUNCTURE: CPT

## 2018-05-16 PROCEDURE — 85025 COMPLETE CBC W/AUTO DIFF WBC: CPT

## 2018-05-16 PROCEDURE — 99213 OFFICE O/P EST LOW 20 MIN: CPT | Performed by: NURSE PRACTITIONER

## 2018-05-29 ENCOUNTER — PATIENT OUTREACH (OUTPATIENT)
Dept: HEALTH INFORMATION MANAGEMENT | Facility: OTHER | Age: 68
End: 2018-05-29

## 2018-05-29 NOTE — PROGRESS NOTES
1. Attempt #: Final    2. [facility]Connect Verified: yes    3. Verify PCP: yes    4. Care Team Updated:       •   DME Company (gait device, O2, CPAP, etc.): NO       •   Other Specialists (eye doctor, derm, GYN, cardiology, endo, etc): NO    5.  Reviewed/Updated the following with patient:       •   Communication Preference Obtained? YES       •   Preferred Pharmacy? YES       •   Preferred Lab? YES       •   Family History (document living status of immediate family members and if + hx of cancer, diabetes, hypertension, hyperlipidemia, heart attack, stroke) YES. Was Abstract Encounter opened and chart updated? YES    6. [employer] Activation: already active    7. [employer] Zeynep: no    8. Annual Wellness Visit Scheduling  Scheduling Status:Scheduled      9. Care Gap Scheduling (Attempt to Schedule EACH Overdue Care Gap!)     Health Maintenance Due   Topic Date Due   • IMM DTaP/Tdap/Td Vaccine (1 - Tdap) 06/15/1970   • BONE DENSITY  06/15/2016   • IMM PNEUMOCOCCAL 65+ (ADULT) LOW/MEDIUM RISK SERIES (1 of 2 - PCV13) 06/15/2016   • Annual Wellness Visit  06/15/2016 // Will schedule at later time        Scheduled patient for Annual Wellness Visit    10. Patient was advised: “This is a free wellness visit. The provider will screen for medical conditions to help you stay healthy. If you have other concerns to address you may be asked to discuss these at a separate visit or there may be an additional fee.”     11. Patient was informed to arrive 15 min prior to their scheduled appointment and bring in their medication bottles.

## 2018-06-07 PROBLEM — D64.9 ANEMIA: Status: ACTIVE | Noted: 2018-06-07

## 2018-06-07 NOTE — PROGRESS NOTES
Chief Complaint   Patient presents with   • Arm Pain       HISTORY OF PRESENT ILLNESS: Patient is a 67 y.o. female established patient who presents today to discuss the following issues:    BMI 30.0-30.9,adult  Patient is aware of BMI elevation.  Brief discussion of diet, exercise, and lifestyle modification.      Anemia  Has a history of anemia and has been feeling tired.  Would like to check labs.      Patient Active Problem List    Diagnosis Date Noted   • Anemia 06/07/2018   • BMI 30.0-30.9,adult 05/16/2018   • Pain of right upper extremity 01/02/2018   • Headache 11/10/2017   • Urinary incontinence 07/12/2017   • Spinal stenosis, lumbar 02/04/2017   • Trigger finger, acquired 11/17/2012   • GERD (gastroesophageal reflux disease) 02/27/2010   • Vitamin D deficiency 02/27/2010   • Impaired fasting glucose 02/27/2010   • HTN (hypertension) 02/24/2010   • Hyperlipidemia 02/24/2010   • Osteopenia 02/24/2010   • Depression 02/24/2010       Allergies:Biaxin [clarithromycin]; Clarithromycin; Doxycycline; Ees [erythromycin]; Imitrex [sumatriptan]; Levaquin; Pcn [penicillins]; Shellfish allergy; and Trazodone    Current Outpatient Prescriptions   Medication Sig Dispense Refill   • fluconazole (DIFLUCAN) 150 MG tablet Take one tab by mouth now. 1 Tab 1   • celecoxib (CELEBREX) 200 MG Cap Take 1 Cap by mouth every day. 30 Cap 0   • amitriptyline (ELAVIL) 25 MG Tab Take 1 Tab by mouth every bedtime. 90 Tab 0   • baclofen (LIORESAL) 10 MG Tab Take 1 Tab by mouth 3 times a day. 90 Tab 1   • acetaminophen/caffeine/butalbital 325-40-50 mg (FIORICET) -40 MG Tab Take 1 Tab by mouth every four hours as needed for Headache (No more than 2days a week, no more than 6 tabs in 24h). 30 Tab 0   • aspirin (ASA) 81 MG Chew Tab chewable tablet Take 81 mg by mouth every day.     • pantoprazole (PROTONIX) 40 MG Tablet Delayed Response Take 40 mg by mouth every day.     • duloxetine (CYMBALTA) 60 MG Cap DR Particles delayed-release  capsule Take 1 Cap by mouth 2 times a day. 180 Cap 1   • Red Yeast Rice Extract (RED YEAST RICE PO) Take  by mouth.     • diltiazem CD (CARDIZEM CD) 240 MG CP24 Take 240 mg by mouth every day.       No current facility-administered medications for this visit.        Social History   Substance Use Topics   • Smoking status: Never Smoker   • Smokeless tobacco: Never Used   • Alcohol use No      Comment: Stopped drinking 45 days ago 17 Going to AA       Family Status   Relation Status   • Mother  at age 44    Urosepsis   • Father  at age 74    MI. Also had Crohn's   • Brother Alive    Gallbladder cancer   • Sister Alive    Crohn's   •     • Brother      Family History   Problem Relation Age of Onset   • GI Father      Crohn's   • Heart Disease Father      First MI age 30   • Hypertension     • Cancer Brother      ESOPHAGEAL CANCER       Review of Systems:   Constitutional: Negative for fever, chills, weight loss and malaise.  Positive for fatigue.   HENT: Negative for ear pain, nosebleeds, congestion, sore throat and neck pain.    Eyes: Negative for blurred vision.   Respiratory: Negative for cough, sputum production, shortness of breath and wheezing.    Cardiovascular: Negative for chest pain, palpitations, orthopnea and leg swelling.   Gastrointestinal: Negative for heartburn, nausea, vomiting and abdominal pain.   Genitourinary: Negative for dysuria, urgency and frequency.   Musculoskeletal: Negative for myalgias, joint pain, and back pain.  Skin: Negative for rash and itching.   Neurological: Negative for dizziness, tingling, tremors, sensory change, focal weakness and headaches.   Endo/Heme/Allergies: Does not bruise/bleed easily.   Psychiatric/Behavioral: Negative for depression, suicidal ideas and memory loss.  The patient is not nervous/anxious and does not have insomnia.    All other systems reviewed and are negative except as in HPI.    Exam:  Blood pressure 140/80, pulse 70,  "temperature 36.4 °C (97.6 °F), resp. rate 16, height 1.549 m (5' 1\"), weight 72.6 kg (160 lb), SpO2 98 %.  General:  Well nourished, well developed female in NAD  Head: Grossly normal.  Neck: Supple without JVD or bruit. Thyroid is not enlarged.  Pulmonary: Clear to ausculation. Normal effort. No rales, ronchi, or wheezing.  Cardiovascular: Regular rate and rhythm without murmur.   Abdomen:  Soft, nontender, nondistended.  Extremities: No clubbing, cyanosis, or edema.  Skin: Intact with no obvious rashes or lesions.  Neuro: Grossly intact.  Psych: Alert and oriented x 3.  Mood and affect appropriate.    Medical decision-making and discussion: Augusta is here today to discuss fatigue.  A CBC was ordered, and she will follow-up here as needed.          Assessment/Plan:  1. Anemia, unspecified type  CBC WITH DIFFERENTIAL   2. BMI 30.0-30.9,adult  Patient identified as having weight management issue.  Appropriate orders and counseling given.       Return if symptoms worsen or fail to improve.    Please note that this dictation was created using voice recognition software. I have made every reasonable attempt to correct obvious errors, but I expect that there are errors of grammar and possibly content that I did not discover before finalizing the note.  "

## 2018-06-08 ENCOUNTER — TELEPHONE (OUTPATIENT)
Dept: MEDICAL GROUP | Facility: PHYSICIAN GROUP | Age: 68
End: 2018-06-08

## 2018-06-08 NOTE — TELEPHONE ENCOUNTER
Future Appointments       Provider Department Center    6/14/2018 3:25 PM KE[facility]THI Garcia.; [facility]        ANNUAL WELLNESS VISIT PRE-VISIT PLANNING WITH OUTREACH    1.  Immunizations were updated in Epic using [employer]?:Yes       •  [employer] Recommendations: FLU, PREVNAR (PCV13) , TDAP and SHINGRIX (Shingles)       •  Is patient due for Tdap? YES. Patient was notified of copay/out of pocket cost. Declined        •  Is patient due for Shingles?YES. Patient was notified of copay/out of pocket cost.Declined     2.  MDX printed for Provider? NO  Completed and compliant on 04/02/18    3.  Reviewed note from last office visit with PCP: YES 05/16/18    4.  If any orders were placed at last visit, do we have Results/Consult Notes?        •  Labs - Labs ordered, completed on 05/16/18 and results are in chart..       •  Imaging - Imaging was not ordered at last office visit.       •  Referrals - No referrals were ordered at last office visit.    5.  Patient is due for the following Health Maintenance Topics:   Health Maintenance Due   Topic Date Due   • IMM DTaP/Tdap/Td Vaccine (1 - Tdap) 06/15/1970   • BONE DENSITY  06/15/2016   • IMM PNEUMOCOCCAL (1 of 2 - PCV13) 06/15/2016   • Annual Wellness Visit  06/15/2016       6.  Patient has the following Care Path diagnoses on Problem List:  DEPRESSION

## 2018-06-11 ENCOUNTER — TELEPHONE (OUTPATIENT)
Dept: MEDICAL GROUP | Facility: PHYSICIAN GROUP | Age: 68
End: 2018-06-11

## 2018-06-11 NOTE — TELEPHONE ENCOUNTER
1. Caller Name: Augusta Rodriguez                                           Call Back Number: [phone] (home)         Patient approves a detailed voicemail message: N\A    Pt was seen 05/06/18 for a UTI and given Mocrobid and states it never went completely away.  She states she is starting to have a lot of pain and is asking if she can get a rx for doxycycline.  She states that is the only thing that will get rid of it.

## 2018-06-12 RX ORDER — DOXYCYCLINE HYCLATE 100 MG
100 TABLET ORAL 2 TIMES DAILY
Qty: 14 TAB | Refills: 0 | Status: SHIPPED | OUTPATIENT
Start: 2018-06-12 | End: 2018-12-07 | Stop reason: CLARIF

## 2018-06-12 NOTE — TELEPHONE ENCOUNTER
RX for doxycycline sent to her pharmacy.  She needs to follow-up if all of her symptoms don't resolve.  Thank you!

## 2018-06-19 NOTE — TELEPHONE ENCOUNTER
Was the patient seen in the last year in this department? Yes     Does patient have an active prescription for medications requested? No     Received Request Via: Pharmacy      Pt met protocol?: Yes pt last ov 5/2018

## 2018-06-20 ENCOUNTER — HOSPITAL ENCOUNTER (OUTPATIENT)
Dept: RADIOLOGY | Facility: MEDICAL CENTER | Age: 68
End: 2018-06-20
Attending: NURSE PRACTITIONER
Payer: MEDICARE

## 2018-06-20 ENCOUNTER — OFFICE VISIT (OUTPATIENT)
Dept: MEDICAL GROUP | Facility: PHYSICIAN GROUP | Age: 68
End: 2018-06-20
Payer: MEDICARE

## 2018-06-20 VITALS
HEIGHT: 61 IN | WEIGHT: 162 LBS | RESPIRATION RATE: 16 BRPM | SYSTOLIC BLOOD PRESSURE: 138 MMHG | DIASTOLIC BLOOD PRESSURE: 80 MMHG | BODY MASS INDEX: 30.58 KG/M2 | HEART RATE: 86 BPM | OXYGEN SATURATION: 93 % | TEMPERATURE: 97 F

## 2018-06-20 DIAGNOSIS — R06.02 SHORTNESS OF BREATH: ICD-10-CM

## 2018-06-20 PROCEDURE — 99214 OFFICE O/P EST MOD 30 MIN: CPT | Performed by: NURSE PRACTITIONER

## 2018-06-20 PROCEDURE — 71046 X-RAY EXAM CHEST 2 VIEWS: CPT

## 2018-06-20 RX ORDER — AMITRIPTYLINE HYDROCHLORIDE 25 MG/1
TABLET, FILM COATED ORAL
Qty: 90 TAB | Refills: 0 | Status: SHIPPED | OUTPATIENT
Start: 2018-06-20 | End: 2018-12-07 | Stop reason: CLARIF

## 2018-06-20 NOTE — ASSESSMENT & PLAN NOTE
Patient reports that she has been having worsening shortness of breath lately.  She states that even brief activities cause symptoms.  She denies chest pain.  Discussed plan to proceed with a chest xray and a stress test.

## 2018-06-20 NOTE — PROGRESS NOTES
Chief Complaint   Patient presents with   • Shortness of Breath       HISTORY OF PRESENT ILLNESS: Patient is a 68 y.o. female established patient who presents today to discuss the following issues:    Shortness of breath  Patient reports that she has been having worsening shortness of breath lately.  She states that even brief activities cause symptoms.  She denies chest pain.  Discussed plan to proceed with a chest xray and a stress test.      Patient Active Problem List    Diagnosis Date Noted   • Shortness of breath 06/20/2018   • Anemia 06/07/2018   • BMI 30.0-30.9,adult 05/16/2018   • Pain of right upper extremity 01/02/2018   • Headache 11/10/2017   • Urinary incontinence 07/12/2017   • Spinal stenosis, lumbar 02/04/2017   • Trigger finger, acquired 11/17/2012   • GERD (gastroesophageal reflux disease) 02/27/2010   • Vitamin D deficiency 02/27/2010   • Impaired fasting glucose 02/27/2010   • HTN (hypertension) 02/24/2010   • Hyperlipidemia 02/24/2010   • Osteopenia 02/24/2010   • Depression 02/24/2010       Allergies:Biaxin [clarithromycin]; Clarithromycin; Doxycycline; Ees [erythromycin]; Imitrex [sumatriptan]; Levaquin; Pcn [penicillins]; Shellfish allergy; and Trazodone    Current Outpatient Prescriptions   Medication Sig Dispense Refill   • amitriptyline (ELAVIL) 25 MG Tab TAKE 1 TABLET BY MOUTH ONCE DAILY AT BEDTIME 90 Tab 0   • doxycycline (VIBRAMYCIN) 100 MG Tab Take 1 Tab by mouth 2 times a day. 14 Tab 0   • fluconazole (DIFLUCAN) 150 MG tablet Take one tab by mouth now. 1 Tab 1   • celecoxib (CELEBREX) 200 MG Cap Take 1 Cap by mouth every day. 30 Cap 0   • baclofen (LIORESAL) 10 MG Tab Take 1 Tab by mouth 3 times a day. 90 Tab 1   • acetaminophen/caffeine/butalbital 325-40-50 mg (FIORICET) -40 MG Tab Take 1 Tab by mouth every four hours as needed for Headache (No more than 2days a week, no more than 6 tabs in 24h). 30 Tab 0   • aspirin (ASA) 81 MG Chew Tab chewable tablet Take 81 mg by mouth  every day.     • pantoprazole (PROTONIX) 40 MG Tablet Delayed Response Take 40 mg by mouth every day.     • duloxetine (CYMBALTA) 60 MG Cap DR Particles delayed-release capsule Take 1 Cap by mouth 2 times a day. 180 Cap 1   • Red Yeast Rice Extract (RED YEAST RICE PO) Take  by mouth.     • diltiazem CD (CARDIZEM CD) 240 MG CP24 Take 240 mg by mouth every day.       No current facility-administered medications for this visit.        Social History   Substance Use Topics   • Smoking status: Never Smoker   • Smokeless tobacco: Never Used   • Alcohol use No      Comment: Stopped drinking 45 days ago 18 Going to AA       Family Status   Relation Status   • Mother  at age 44    Urosepsis   • Father  at age 74    MI. Also had Crohn's   • Brother Alive    Gallbladder cancer   • Sister Alive    Crohn's   •     • Brother      Family History   Problem Relation Age of Onset   • GI Father      Crohn's   • Heart Disease Father      First MI age 30   • Hypertension     • Cancer Brother      ESOPHAGEAL CANCER       Review of Systems:   Constitutional: Negative for fever, chills, weight loss and malaise/fatigue.   HENT: Negative for ear pain, nosebleeds, congestion, sore throat and neck pain.    Eyes: Negative for blurred vision.   Respiratory: Negative for cough, sputum production, and wheezing.  Positive for shortness of breath on exertion.  Cardiovascular: Negative for chest pain, palpitations, orthopnea and leg swelling.   Gastrointestinal: Negative for heartburn, nausea, vomiting and abdominal pain.   Genitourinary: Negative for dysuria, urgency and frequency.   Musculoskeletal: Negative for myalgias, joint pain, and back pain.  Skin: Negative for rash and itching.   Neurological: Negative for dizziness, tingling, tremors, sensory change, focal weakness and headaches.   Endo/Heme/Allergies: Does not bruise/bleed easily.   Psychiatric/Behavioral: Negative for depression, suicidal ideas and memory  "loss.  The patient is not nervous/anxious and does not have insomnia.    All other systems reviewed and are negative except as in HPI.    Exam:  Blood pressure 138/80, pulse 86, temperature 36.1 °C (97 °F), resp. rate 16, height 1.549 m (5' 1\"), weight 73.5 kg (162 lb), SpO2 93 %.  General:  Well nourished, well developed female in NAD  Head: Grossly normal.  Neck: Supple without JVD or bruit. Thyroid is not enlarged.  Pulmonary: Clear to ausculation. Normal effort. No rales, ronchi, or wheezing.  Cardiovascular: Regular rate and rhythm without murmur.   Abdomen:  Soft, nontender, nondistended.  Extremities: No clubbing, cyanosis, or edema.  Skin: Intact with no obvious rashes or lesions.  Neuro: Grossly intact.  Psych: Alert and oriented x 3.  Mood and affect appropriate.    Medical decision-making and discussion: Augusta is here today to discuss shortness of breath.  A chest xray and a stress test were ordered.  If her symptoms worsen she was instructed to go to the ER immediately.  She will follow-up here as needed.          Assessment/Plan:  1. Shortness of breath  NM-CARDIAC STRESS TEST    DX-CHEST-2 VIEWS       Return if symptoms worsen or fail to improve.    Please note that this dictation was created using voice recognition software. I have made every reasonable attempt to correct obvious errors, but I expect that there are errors of grammar and possibly content that I did not discover before finalizing the note.  "

## 2018-06-25 ENCOUNTER — TELEPHONE (OUTPATIENT)
Dept: MEDICAL GROUP | Facility: PHYSICIAN GROUP | Age: 68
End: 2018-06-25

## 2018-06-25 RX ORDER — CEFDINIR 300 MG/1
300 CAPSULE ORAL 2 TIMES DAILY
Qty: 14 CAP | Refills: 0 | Status: SHIPPED | OUTPATIENT
Start: 2018-06-25 | End: 2018-12-07 | Stop reason: CLARIF

## 2018-06-25 NOTE — TELEPHONE ENCOUNTER
1. Caller Name: Augusta Rodriguez                                           Call Back Number: [phone] (home)         Patient approves a detailed voicemail message: N\A    Pt was seen 06/20/18 and states she had an inner ear infection but was not given any medication for it.  She states it is getting worse and is asking if she can get an abx sent in.

## 2018-06-28 ENCOUNTER — HOSPITAL ENCOUNTER (OUTPATIENT)
Dept: RADIOLOGY | Facility: MEDICAL CENTER | Age: 68
End: 2018-06-28
Attending: NURSE PRACTITIONER
Payer: MEDICARE

## 2018-06-28 DIAGNOSIS — R06.02 SHORTNESS OF BREATH: ICD-10-CM

## 2018-06-28 PROCEDURE — 700111 HCHG RX REV CODE 636 W/ 250 OVERRIDE (IP)

## 2018-06-28 PROCEDURE — A9502 TC99M TETROFOSMIN: HCPCS

## 2018-06-28 RX ORDER — REGADENOSON 0.08 MG/ML
INJECTION, SOLUTION INTRAVENOUS
Status: COMPLETED
Start: 2018-06-28 | End: 2018-06-28

## 2018-06-28 RX ADMIN — REGADENOSON 0.4 MG: 0.08 INJECTION, SOLUTION INTRAVENOUS at 12:23

## 2018-07-07 ENCOUNTER — OFFICE VISIT (OUTPATIENT)
Dept: URGENT CARE | Facility: PHYSICIAN GROUP | Age: 68
End: 2018-07-07
Payer: MEDICARE

## 2018-07-07 VITALS
RESPIRATION RATE: 16 BRPM | HEART RATE: 78 BPM | BODY MASS INDEX: 30.78 KG/M2 | DIASTOLIC BLOOD PRESSURE: 78 MMHG | SYSTOLIC BLOOD PRESSURE: 124 MMHG | TEMPERATURE: 97.3 F | WEIGHT: 163 LBS | OXYGEN SATURATION: 97 % | HEIGHT: 61 IN

## 2018-07-07 DIAGNOSIS — L29.9 ITCHING: ICD-10-CM

## 2018-07-07 DIAGNOSIS — L30.9 DERMATITIS: ICD-10-CM

## 2018-07-07 PROCEDURE — 99214 OFFICE O/P EST MOD 30 MIN: CPT | Performed by: FAMILY MEDICINE

## 2018-07-07 RX ORDER — PREDNISONE 20 MG/1
40 TABLET ORAL DAILY
Qty: 10 TAB | Refills: 0 | Status: SHIPPED | OUTPATIENT
Start: 2018-07-07 | End: 2018-07-12

## 2018-07-07 RX ORDER — TRIAMCINOLONE ACETONIDE 1 MG/G
OINTMENT TOPICAL
Qty: 1 TUBE | Refills: 1 | Status: SHIPPED | OUTPATIENT
Start: 2018-07-07 | End: 2018-12-07 | Stop reason: CLARIF

## 2018-07-07 NOTE — PROGRESS NOTES
"Subjective:      Augusta Rodriguez is a 68 y.o. female who presents with Rash (all over her legs x 2 days just finished antibiotics 2 days ago)            New problem. 2 days itching rash bilateral lower extremity. No clear trigger. Rash came on after completing course of bactrim. No oral lesions or swelling. No SOB/wheeze. No PMH anaphylaxis. No relief of moderate sx's with OTC antihistamine. No other aggravating or alleviating factors.          Review of Systems   Constitutional: Negative for fever, malaise/fatigue and weight loss.   HENT: Negative for ear discharge, ear pain and sore throat.    Eyes: Negative for discharge and redness.   .  Medications, Allergies, and current problem list reviewed today in Epic         Objective:     /78   Pulse 78   Temp 36.3 °C (97.3 °F)   Resp 16   Ht 1.549 m (5' 1\")   Wt 73.9 kg (163 lb)   SpO2 97%   BMI 30.80 kg/m²      Physical Exam   Constitutional: She appears well-developed and well-nourished. No distress.   HENT:   Head: Normocephalic and atraumatic.   Right Ear: External ear normal.   Left Ear: External ear normal.   Mouth/Throat: Oropharynx is clear and moist.   Eyes: Conjunctivae are normal.   Neck: Neck supple. No tracheal deviation present.   Cardiovascular: Normal rate, regular rhythm and normal heart sounds.    No murmur heard.  Pulmonary/Chest: Effort normal and breath sounds normal. She has no wheezes.   Neurological:   Speech is clear. Patient is appropriate and cooperative.     Skin: Skin is warm and dry. Rash (pruritic papules and plaques bilateral lower extremity) noted.               Assessment/Plan:     1. Dermatitis    - triamcinolone acetonide (KENALOG) 0.1 % Ointment; Apply thin layer to affected area twice daily as needed  Dispense: 1 Tube; Refill: 1  - predniSONE (DELTASONE) 20 MG Tab; Take 2 Tabs by mouth every day for 5 days.  Dispense: 10 Tab; Refill: 0    Differential diagnosis, natural history, supportive care, and indications for " immediate follow-up discussed at length.     Suspect more likely contact dermatitis than delayed hypersensitivity to sulfa. Discussed that sulfa allergy is still possible and to be careful in the future.

## 2018-07-10 ASSESSMENT — ENCOUNTER SYMPTOMS
EYE DISCHARGE: 0
EYE REDNESS: 0
WEIGHT LOSS: 0
SORE THROAT: 0
FEVER: 0

## 2018-07-16 RX ORDER — HYDROXYZINE HYDROCHLORIDE 25 MG/1
25 TABLET, FILM COATED ORAL 3 TIMES DAILY PRN
Qty: 30 TAB | Refills: 0 | Status: SHIPPED | OUTPATIENT
Start: 2018-07-16 | End: 2018-12-07 | Stop reason: CLARIF

## 2018-07-27 ENCOUNTER — TELEPHONE (OUTPATIENT)
Dept: MEDICAL GROUP | Facility: PHYSICIAN GROUP | Age: 68
End: 2018-07-27

## 2018-07-27 NOTE — TELEPHONE ENCOUNTER
1. Caller Name: Augusta Rodriguez                                           Call Back Number: [phone] (home)         Patient approves a detailed voicemail message: N\A    Pt states you have given her a rx for doxycycline for a UTI in the past without seeing her.  She states she has another UTI and is asking for a refill.

## 2018-12-07 ENCOUNTER — HOSPITAL ENCOUNTER (OUTPATIENT)
Facility: MEDICAL CENTER | Age: 68
End: 2018-12-08
Attending: EMERGENCY MEDICINE | Admitting: HOSPITALIST
Payer: MEDICARE

## 2018-12-07 ENCOUNTER — APPOINTMENT (OUTPATIENT)
Dept: RADIOLOGY | Facility: MEDICAL CENTER | Age: 68
End: 2018-12-07
Payer: MEDICARE

## 2018-12-07 DIAGNOSIS — R07.2 PRECORDIAL PAIN: ICD-10-CM

## 2018-12-07 DIAGNOSIS — I10 ESSENTIAL HYPERTENSION: ICD-10-CM

## 2018-12-07 DIAGNOSIS — R42 LIGHTHEADEDNESS: ICD-10-CM

## 2018-12-07 PROBLEM — I48.91 ATRIAL FIBRILLATION (HCC): Status: ACTIVE | Noted: 2018-12-07

## 2018-12-07 LAB
ALBUMIN SERPL BCP-MCNC: 4.4 G/DL (ref 3.2–4.9)
ALBUMIN/GLOB SERPL: 1.6 G/DL
ALP SERPL-CCNC: 109 U/L (ref 30–99)
ALT SERPL-CCNC: 13 U/L (ref 2–50)
ANION GAP SERPL CALC-SCNC: 10 MMOL/L (ref 0–11.9)
APTT PPP: 28.8 SEC (ref 24.7–36)
AST SERPL-CCNC: 17 U/L (ref 12–45)
BASOPHILS # BLD AUTO: 1 % (ref 0–1.8)
BASOPHILS # BLD: 0.09 K/UL (ref 0–0.12)
BILIRUB SERPL-MCNC: 0.7 MG/DL (ref 0.1–1.5)
BNP SERPL-MCNC: 22 PG/ML (ref 0–100)
BUN SERPL-MCNC: 13 MG/DL (ref 8–22)
CALCIUM SERPL-MCNC: 9.1 MG/DL (ref 8.5–10.5)
CHLORIDE SERPL-SCNC: 104 MMOL/L (ref 96–112)
CO2 SERPL-SCNC: 25 MMOL/L (ref 20–33)
CREAT SERPL-MCNC: 0.85 MG/DL (ref 0.5–1.4)
EOSINOPHIL # BLD AUTO: 0.55 K/UL (ref 0–0.51)
EOSINOPHIL NFR BLD: 6.1 % (ref 0–6.9)
ERYTHROCYTE [DISTWIDTH] IN BLOOD BY AUTOMATED COUNT: 40.3 FL (ref 35.9–50)
GLOBULIN SER CALC-MCNC: 2.8 G/DL (ref 1.9–3.5)
GLUCOSE SERPL-MCNC: 99 MG/DL (ref 65–99)
HCT VFR BLD AUTO: 43.4 % (ref 37–47)
HGB BLD-MCNC: 14.4 G/DL (ref 12–16)
IMM GRANULOCYTES # BLD AUTO: 0.02 K/UL (ref 0–0.11)
IMM GRANULOCYTES NFR BLD AUTO: 0.2 % (ref 0–0.9)
INR PPP: 1 (ref 0.87–1.13)
LIPASE SERPL-CCNC: 23 U/L (ref 11–82)
LYMPHOCYTES # BLD AUTO: 2.08 K/UL (ref 1–4.8)
LYMPHOCYTES NFR BLD: 23 % (ref 22–41)
MCH RBC QN AUTO: 27.4 PG (ref 27–33)
MCHC RBC AUTO-ENTMCNC: 33.2 G/DL (ref 33.6–35)
MCV RBC AUTO: 82.7 FL (ref 81.4–97.8)
MONOCYTES # BLD AUTO: 0.74 K/UL (ref 0–0.85)
MONOCYTES NFR BLD AUTO: 8.2 % (ref 0–13.4)
NEUTROPHILS # BLD AUTO: 5.55 K/UL (ref 2–7.15)
NEUTROPHILS NFR BLD: 61.5 % (ref 44–72)
NRBC # BLD AUTO: 0 K/UL
NRBC BLD-RTO: 0 /100 WBC
PLATELET # BLD AUTO: 348 K/UL (ref 164–446)
PMV BLD AUTO: 9.8 FL (ref 9–12.9)
POTASSIUM SERPL-SCNC: 3.7 MMOL/L (ref 3.6–5.5)
PROT SERPL-MCNC: 7.2 G/DL (ref 6–8.2)
PROTHROMBIN TIME: 13.3 SEC (ref 12–14.6)
RBC # BLD AUTO: 5.25 M/UL (ref 4.2–5.4)
SODIUM SERPL-SCNC: 139 MMOL/L (ref 135–145)
TROPONIN I SERPL-MCNC: <0.01 NG/ML (ref 0–0.04)
TROPONIN I SERPL-MCNC: <0.01 NG/ML (ref 0–0.04)
WBC # BLD AUTO: 9 K/UL (ref 4.8–10.8)

## 2018-12-07 PROCEDURE — 99220 PR INITIAL OBSERVATION CARE,LEVL III: CPT | Performed by: HOSPITALIST

## 2018-12-07 PROCEDURE — 85610 PROTHROMBIN TIME: CPT

## 2018-12-07 PROCEDURE — A9270 NON-COVERED ITEM OR SERVICE: HCPCS | Performed by: EMERGENCY MEDICINE

## 2018-12-07 PROCEDURE — 71045 X-RAY EXAM CHEST 1 VIEW: CPT

## 2018-12-07 PROCEDURE — 93005 ELECTROCARDIOGRAM TRACING: CPT

## 2018-12-07 PROCEDURE — 700102 HCHG RX REV CODE 250 W/ 637 OVERRIDE(OP): Performed by: HOSPITALIST

## 2018-12-07 PROCEDURE — 36415 COLL VENOUS BLD VENIPUNCTURE: CPT

## 2018-12-07 PROCEDURE — 84484 ASSAY OF TROPONIN QUANT: CPT

## 2018-12-07 PROCEDURE — 83880 ASSAY OF NATRIURETIC PEPTIDE: CPT

## 2018-12-07 PROCEDURE — 83690 ASSAY OF LIPASE: CPT

## 2018-12-07 PROCEDURE — 93005 ELECTROCARDIOGRAM TRACING: CPT | Performed by: EMERGENCY MEDICINE

## 2018-12-07 PROCEDURE — G0378 HOSPITAL OBSERVATION PER HR: HCPCS

## 2018-12-07 PROCEDURE — 700111 HCHG RX REV CODE 636 W/ 250 OVERRIDE (IP): Performed by: HOSPITALIST

## 2018-12-07 PROCEDURE — 96374 THER/PROPH/DIAG INJ IV PUSH: CPT

## 2018-12-07 PROCEDURE — 99285 EMERGENCY DEPT VISIT HI MDM: CPT

## 2018-12-07 PROCEDURE — 700102 HCHG RX REV CODE 250 W/ 637 OVERRIDE(OP): Performed by: EMERGENCY MEDICINE

## 2018-12-07 PROCEDURE — A9270 NON-COVERED ITEM OR SERVICE: HCPCS | Performed by: HOSPITALIST

## 2018-12-07 PROCEDURE — 80053 COMPREHEN METABOLIC PANEL: CPT

## 2018-12-07 PROCEDURE — 85025 COMPLETE CBC W/AUTO DIFF WBC: CPT

## 2018-12-07 PROCEDURE — 85730 THROMBOPLASTIN TIME PARTIAL: CPT

## 2018-12-07 RX ORDER — DULOXETIN HYDROCHLORIDE 60 MG/1
60 CAPSULE, DELAYED RELEASE ORAL DAILY
Status: DISCONTINUED | OUTPATIENT
Start: 2018-12-08 | End: 2018-12-08 | Stop reason: HOSPADM

## 2018-12-07 RX ORDER — ACETAMINOPHEN 325 MG/1
650 TABLET ORAL EVERY 6 HOURS PRN
Status: DISCONTINUED | OUTPATIENT
Start: 2018-12-07 | End: 2018-12-08 | Stop reason: HOSPADM

## 2018-12-07 RX ORDER — AMITRIPTYLINE HYDROCHLORIDE 25 MG/1
25 TABLET, FILM COATED ORAL EVERY EVENING
Status: DISCONTINUED | OUTPATIENT
Start: 2018-12-08 | End: 2018-12-07

## 2018-12-07 RX ORDER — ASPIRIN 81 MG/1
162 TABLET, CHEWABLE ORAL ONCE
Status: COMPLETED | OUTPATIENT
Start: 2018-12-07 | End: 2018-12-07

## 2018-12-07 RX ORDER — ONDANSETRON 4 MG/1
4 TABLET, FILM COATED ORAL EVERY 4 HOURS PRN
COMMUNITY
End: 2018-12-19 | Stop reason: SDUPTHER

## 2018-12-07 RX ORDER — LISINOPRIL 20 MG/1
20 TABLET ORAL
Status: DISCONTINUED | OUTPATIENT
Start: 2018-12-07 | End: 2018-12-08 | Stop reason: HOSPADM

## 2018-12-07 RX ORDER — AMITRIPTYLINE HYDROCHLORIDE 25 MG/1
25 TABLET, FILM COATED ORAL DAILY
COMMUNITY
End: 2019-01-02

## 2018-12-07 RX ORDER — CELECOXIB 200 MG/1
200 CAPSULE ORAL DAILY
Status: DISCONTINUED | OUTPATIENT
Start: 2018-12-07 | End: 2018-12-07

## 2018-12-07 RX ORDER — HYDROXYZINE HYDROCHLORIDE 25 MG/1
25 TABLET, FILM COATED ORAL 3 TIMES DAILY PRN
Status: DISCONTINUED | OUTPATIENT
Start: 2018-12-07 | End: 2018-12-07

## 2018-12-07 RX ORDER — ASPIRIN 81 MG/1
81 TABLET, CHEWABLE ORAL DAILY
Status: DISCONTINUED | OUTPATIENT
Start: 2018-12-08 | End: 2018-12-08 | Stop reason: HOSPADM

## 2018-12-07 RX ORDER — ONDANSETRON 2 MG/ML
4 INJECTION INTRAMUSCULAR; INTRAVENOUS EVERY 4 HOURS PRN
Status: DISCONTINUED | OUTPATIENT
Start: 2018-12-07 | End: 2018-12-08 | Stop reason: HOSPADM

## 2018-12-07 RX ORDER — DULOXETIN HYDROCHLORIDE 60 MG/1
60 CAPSULE, DELAYED RELEASE ORAL DAILY
COMMUNITY
End: 2019-03-14 | Stop reason: SDUPTHER

## 2018-12-07 RX ORDER — POLYETHYLENE GLYCOL 3350 17 G/17G
1 POWDER, FOR SOLUTION ORAL
Status: DISCONTINUED | OUTPATIENT
Start: 2018-12-07 | End: 2018-12-08 | Stop reason: HOSPADM

## 2018-12-07 RX ORDER — DULOXETIN HYDROCHLORIDE 60 MG/1
60 CAPSULE, DELAYED RELEASE ORAL DAILY
Status: DISCONTINUED | OUTPATIENT
Start: 2018-12-08 | End: 2018-12-07

## 2018-12-07 RX ORDER — ENALAPRILAT 1.25 MG/ML
1.25 INJECTION INTRAVENOUS EVERY 6 HOURS PRN
Status: DISCONTINUED | OUTPATIENT
Start: 2018-12-07 | End: 2018-12-08 | Stop reason: HOSPADM

## 2018-12-07 RX ORDER — BACLOFEN 10 MG/1
10 TABLET ORAL 3 TIMES DAILY
Status: DISCONTINUED | OUTPATIENT
Start: 2018-12-07 | End: 2018-12-07

## 2018-12-07 RX ORDER — AMITRIPTYLINE HYDROCHLORIDE 25 MG/1
25 TABLET, FILM COATED ORAL DAILY
Status: DISCONTINUED | OUTPATIENT
Start: 2018-12-08 | End: 2018-12-08 | Stop reason: HOSPADM

## 2018-12-07 RX ORDER — ONDANSETRON 4 MG/1
4 TABLET, ORALLY DISINTEGRATING ORAL EVERY 4 HOURS PRN
Status: DISCONTINUED | OUTPATIENT
Start: 2018-12-07 | End: 2018-12-08 | Stop reason: HOSPADM

## 2018-12-07 RX ORDER — OMEPRAZOLE 20 MG/1
20 CAPSULE, DELAYED RELEASE ORAL DAILY
Status: DISCONTINUED | OUTPATIENT
Start: 2018-12-08 | End: 2018-12-08 | Stop reason: HOSPADM

## 2018-12-07 RX ORDER — AMOXICILLIN 250 MG
2 CAPSULE ORAL 2 TIMES DAILY
Status: DISCONTINUED | OUTPATIENT
Start: 2018-12-07 | End: 2018-12-08 | Stop reason: HOSPADM

## 2018-12-07 RX ORDER — BISACODYL 10 MG
10 SUPPOSITORY, RECTAL RECTAL
Status: DISCONTINUED | OUTPATIENT
Start: 2018-12-07 | End: 2018-12-08 | Stop reason: HOSPADM

## 2018-12-07 RX ORDER — DILTIAZEM HYDROCHLORIDE 240 MG/1
240 CAPSULE, COATED, EXTENDED RELEASE ORAL DAILY
Status: DISCONTINUED | OUTPATIENT
Start: 2018-12-08 | End: 2018-12-08 | Stop reason: HOSPADM

## 2018-12-07 RX ADMIN — NITROGLYCERIN 1 INCH: 20 OINTMENT TOPICAL at 12:19

## 2018-12-07 RX ADMIN — ENALAPRILAT 1.25 MG: 1.25 INJECTION INTRAVENOUS at 17:10

## 2018-12-07 RX ADMIN — LISINOPRIL 20 MG: 20 TABLET ORAL at 17:10

## 2018-12-07 RX ADMIN — ASPIRIN 162 MG: 81 TABLET, CHEWABLE ORAL at 12:19

## 2018-12-07 ASSESSMENT — PAIN DESCRIPTION - DESCRIPTORS: DESCRIPTORS: PRESSURE

## 2018-12-07 ASSESSMENT — ENCOUNTER SYMPTOMS
CONSTITUTIONAL NEGATIVE: 1
SHORTNESS OF BREATH: 1
PSYCHIATRIC NEGATIVE: 1
TINGLING: 1
GASTROINTESTINAL NEGATIVE: 1
MUSCULOSKELETAL NEGATIVE: 1

## 2018-12-07 ASSESSMENT — PATIENT HEALTH QUESTIONNAIRE - PHQ9
SUM OF ALL RESPONSES TO PHQ9 QUESTIONS 1 AND 2: 0
2. FEELING DOWN, DEPRESSED, IRRITABLE, OR HOPELESS: NOT AT ALL
1. LITTLE INTEREST OR PLEASURE IN DOING THINGS: NOT AT ALL

## 2018-12-07 ASSESSMENT — PAIN SCALES - GENERAL
PAINLEVEL_OUTOF10: 0
PAINLEVEL_OUTOF10: 0
PAINLEVEL_OUTOF10: 5

## 2018-12-07 ASSESSMENT — LIFESTYLE VARIABLES
ALCOHOL_USE: NO
DO YOU DRINK ALCOHOL: NO

## 2018-12-07 NOTE — ED TRIAGE NOTES
.  Chief Complaint   Patient presents with   • Chest Pain     apx 11 am   • Dizziness     Ambulated to triage with s/o. Pt reports pain onset apx 11 am, mid cp radiates to neck and back. Pt at rest when pain started. Pain currently 5/10.   ekg complete prior to traige.

## 2018-12-07 NOTE — DISCHARGE PLANNING
Care Transition Team Assessment    Spoke with patient and spouse at bedside with verbal consent. Anticpate no needs @ present. Spouse will be ride home.    Information Source  Orientation : Oriented x 4  Information Given By: Patient    Readmission Evaluation  Is this a readmission?: No      Interdisciplinary Discharge Planning  Does Admitting Nurse Feel This Could be a Complex Discharge?: No  Primary Care Physician: Ankit  Lives with - Patient's Self Care Capacity: Spouse  Support Systems: Spouse / Significant Other  Do You Take your Prescribed Medications Regularly: Yes  Able to Return to Previous ADL's: Yes  Mobility Issues: No  Prior Services: None  Patient Expects to be Discharged to:: Home  Assistance Needed: No  Durable Medical Equipment: Not Applicable    Discharge Preparedness  What are your discharge supports?: Spouse  Prior Functional Level: Ambulatory  Difficulity with ADLs: None    Functional Assesment  Prior Functional Level: Ambulatory    Finances  Prescription Coverage: Yes    Anticipated Discharge Information  Anticipated discharge disposition: Home  Discharge Address: [address] [place] [address] [address]  Discharge Contact Phone Number: [phone]

## 2018-12-07 NOTE — PROGRESS NOTES
PT ARRIVED TO THE  UNIT ORIENTED TO ROOM DISCUSSED PLAN OF CARE BED IN LOW POSITION  AT BEDSIDE  CALL LIGHT WITHIN REACH NURSING HISTORY AND ASSESSMENT DONE CONDITION STABLE PATIENT REFUSING FLU SHOT

## 2018-12-07 NOTE — H&P
Hospital Medicine History & Physical Note    Date of Service  12/7/2018    Primary Care Physician  ALBINA Garcia    Consultants  None    Code Status  Full code    Chief Complaint  Chest pain    History of Presenting Illness  68 y.o. female who presented 12/7/2018 with possible history of depression GERD hypertension, arrhythmia comes emergent complaining of chest pain.  It is a stabbing chest pain and center chest radiating to her back and upper throat.. Intensity 5 out of 10.  She is short of breath she is dizzy.  No palpitation.  No nausea or vomiting.  It occurred at 11 AM this morning.  She comes to the emergency room and emergency room her blood pressure was markedly elevated patient for further care and management.  She already had a stress test done within the last 6 months that was within normal limits        Review of Systems  Review of Systems   Constitutional: Negative.    HENT: Negative.    Respiratory: Positive for shortness of breath.    Cardiovascular: Positive for chest pain.   Gastrointestinal: Negative.    Genitourinary: Negative.    Musculoskeletal: Negative.    Skin: Negative.    Neurological: Positive for tingling.   Psychiatric/Behavioral: Negative.    All other systems reviewed and are negative.      Past Medical History   has a past medical history of Arrhythmia; Arthritis; Cold; Dental disorder; Depression (2/24/2010); GERD (gastroesophageal reflux disease) (2/27/2010); HTN (hypertension) (2/24/2010); Hypercholesteremia; Hyperlipidemia (2/24/2010); Hypertension; Impaired fasting glucose (2/27/2010); Incontinence of urine; Indigestion; Osteopenia (2/24/2010); Other specified disorder of intestines; Pain (11/5/12); Preventative health care (2/27/2010); Psychiatric problem; Restless leg syndrome; Tear of meniscus of knee joint (2/27/2010); Tremor, essential (2/24/2010); Urinary bladder disorder; and Vitamin d deficiency (2/27/2010). She also has no past medical history of Breast  cancer (HCC) or Ulcer.    Surgical History   has a past surgical history that includes bladder suspension; stephon by laparoscopy (1997); primary c section (1970/1975); hysterectomy, total abdominal (1979); hip arthroscopy (2/23/2009); acetabular osteotomy (2/23/2009); mass excision ortho (2/23/2009); carpal tunnel endoscopic (11/17/2012); trigger finger release (11/17/2012); lumbar laminectomy diskectomy (Bilateral, 2/4/2017); and hip arthroscopy (2005).     Family History  family history includes Cancer in her brother; GI in her father; Heart Disease in her father; Hypertension in her unknown relative.     Social History   reports that she has never smoked. She has never used smokeless tobacco. She reports that she does not drink alcohol or use drugs.    Allergies  Allergies   Allergen Reactions   • Clarithromycin      Swelling/Itching/Nausea   • Doxycycline      Possible allergic reaction   • Ees [Erythromycin]      Swelling/Itching/Nausea   • Levaquin      Muscle soreness    • Pcn [Penicillins]      Swelling/Itching/Nausea    • Shellfish Allergy      Swelling/Itching/Nausea   • Sumatriptan Swelling     Tongue swell   • Trazodone Swelling       Medications  Prior to Admission Medications   Prescriptions Last Dose Informant Patient Reported? Taking?   Alum Hydroxide-Mag Carbonate (GAVISCON PO) 12/7/2018 at AM Patient Yes Yes   Sig: Take 2 Doses by mouth every 4 hours. OTC   DULoxetine (CYMBALTA) 60 MG Cap DR Particles delayed-release capsule 12/7/2018 at AM Patient Yes Yes   Sig: Take 60 mg by mouth every day.   amitriptyline (ELAVIL) 25 MG Tab 12/7/2018 at AM Patient Yes Yes   Sig: Take 25 mg by mouth every day.   aspirin 81 MG EC tablet 12/7/2018 at AM Patient Yes No   Sig: Take 81 mg by mouth every day.   diltiazem CD (CARDIZEM CD) 240 MG CP24 12/7/2018 at AM Patient Yes No   Sig: Take 240 mg by mouth every day.   ondansetron (ZOFRAN) 4 MG Tab tablet 12/6/2018 at PRN Patient Yes Yes   Sig: Take 4 mg by mouth  every four hours as needed for Nausea/Vomiting.   pantoprazole (PROTONIX) 40 MG Tablet Delayed Response 12/7/2018 at AM Patient Yes No   Sig: Take 40 mg by mouth every day.      Facility-Administered Medications: None       Physical Exam  Temp:  [36.1 °C (97 °F)-36.8 °C (98.2 °F)] 36.8 °C (98.2 °F)  Pulse:  [70-89] 70  Resp:  [12-18] 12  BP: (159-193)/() 193/93    Physical Exam   Constitutional: She is oriented to person, place, and time. She appears well-developed and well-nourished. No distress.   HENT:   Head: Normocephalic and atraumatic.   Mouth/Throat: No oropharyngeal exudate.   Eyes: Pupils are equal, round, and reactive to light. EOM are normal. No scleral icterus.   Neck: Neck supple. No JVD present. No tracheal deviation present. No thyromegaly present.   Cardiovascular: Normal rate and regular rhythm.  Exam reveals no gallop.    No murmur heard.  Pulmonary/Chest: Effort normal. No respiratory distress. She has no wheezes.   Abdominal: Soft. Bowel sounds are normal. She exhibits no distension. There is no tenderness.   Musculoskeletal: Normal range of motion. She exhibits no edema.   Neurological: She is alert and oriented to person, place, and time. No cranial nerve deficit. Coordination normal.   Skin: Skin is warm and dry. No rash noted. No erythema.   Psychiatric: She has a normal mood and affect.       Laboratory:  Recent Labs      12/07/18   1203   WBC  9.0   RBC  5.25   HEMOGLOBIN  14.4   HEMATOCRIT  43.4   MCV  82.7   MCH  27.4   MCHC  33.2*   RDW  40.3   PLATELETCT  348   MPV  9.8     Recent Labs      12/07/18   1203   SODIUM  139   POTASSIUM  3.7   CHLORIDE  104   CO2  25   GLUCOSE  99   BUN  13   CREATININE  0.85   CALCIUM  9.1     Recent Labs      12/07/18   1203   ALTSGPT  13   ASTSGOT  17   ALKPHOSPHAT  109*   TBILIRUBIN  0.7   LIPASE  23   GLUCOSE  99     Recent Labs      12/07/18   1203   APTT  28.8   INR  1.00     Recent Labs      12/07/18   1203   BNPBTYPENAT  22         Recent  Labs      12/07/18   1203   TROPONINI  <0.01       Urinalysis:    No results found     Imaging:  DX-CHEST-LIMITED (1 VIEW)   Final Result      No consolidation.        EKG reviewed by me normal sinus rhythm rate of 71 T wave inversion noted in V2 V3  Assessment/Plan:  I anticipate this patient is appropriate for observation status at this time.    * Precordial pain- (present on admission)   Assessment & Plan    Noncardiac    Follow troponin    Control blood pressure     Atrial fibrillation (HCC)- (present on admission)   Assessment & Plan    Currently in sinus rhythm    Monitor on telemetry     BMI 30.0-30.9,adult- (present on admission)   Assessment & Plan    Weight loss and exercise recommended     GERD (gastroesophageal reflux disease)- (present on admission)   Assessment & Plan    Continue Prilosec     Depression- (present on admission)   Assessment & Plan    Continue Cymbalta     HTN (hypertension)- (present on admission)   Assessment & Plan    Uncontrolled    Continue Cardizem    Added lisinopril 20 mg daily         VTE prophylaxis: scd

## 2018-12-07 NOTE — ED NOTES
Med Rec Updated and Complete per Pt at bedside with permission to do so in front of family/visitor  Allergies Reviewed  No PO ABX last 30 days

## 2018-12-07 NOTE — CARE PLAN
Problem: Knowledge Deficit  Goal: Patient/Significant other demonstrates understanding of disease process, treatment plan, medications and discharge instructions    Intervention: Learning assessment and teaching  Oriented to room discussed plan of care       Problem: Pain  Goal: Alleviation of Pain or a reduction in pain to the patient's comfort goal  Denies pain at this time

## 2018-12-07 NOTE — ED PROVIDER NOTES
ED Provider Note    Scribed for Courtney Minaya M.D. by Israel Milton. 12/7/2018, 12:06 PM.    Primary care provider: ALBINA Garcia  Means of arrival: Walk in  History obtained from: Patient  History limited by: None    CHIEF COMPLAINT  Chief Complaint   Patient presents with   • Chest Pain     apx 11 am   • Dizziness     HPI  Augusta Rodriguez is a 68 y.o. female who presents to the Emergency Department complaining of chest pain onset 1 hour ago.  She describes the pain as a sharp stabbing pain and radiating into the back and up into the throat.  This is described as 5/10 pain.  There is associated shortness of breath and dizziness and she denies nausea or diarrhea.  She has a history of hypertension, hypercholesterolemia,  and atrial fibrillation and has had previous episodes of what feel like anxiety attacks, this feels different.  Patient utilizes 81 mg aspirin daily and that she took one today as well as her hypertension medication.  The patient does not utilize her prescribed cholesterol medication  She denies a history of diabetes, is a non-smoker, and does not use drugs.  She denies any history of myocardial infarction.  Her cardiologist is Dr. Mosquera and she has had a stress tress performed recently.  There are no recent flights or road trips.    REVIEW OF SYSTEMS  Pertinent positives include chest pain, shortness of breath, dizziness, neck pain, back pain. Pertinent negatives include no nausea, diarrhea. As above, all other systems reviewed and are negative.   See HPI for further details.     PAST MEDICAL HISTORY  Past Medical History:   Diagnosis Date   • Arrhythmia     afib   • Arthritis     osteo   • Cold    • Dental disorder     upper dentures   • Depression 2/24/2010   • GERD (gastroesophageal reflux disease) 2/27/2010   • HTN (hypertension) 2/24/2010   • Hypercholesteremia    • Hyperlipidemia 2/24/2010   • Hypertension    • Impaired fasting glucose 2/27/2010   • Incontinence of urine    •  Indigestion    • Osteopenia 2/24/2010   • Other specified disorder of intestines     constipation r/t meds   • Pain 11/5/12    left wrist/lower back   • Preventative health care 2/27/2010   • Psychiatric problem     anxiety   • Restless leg syndrome    • Tear of meniscus of knee joint 2/27/2010   • Tremor, essential 2/24/2010   • Urinary bladder disorder    • Vitamin d deficiency 2/27/2010   No history of diabetes.    SURGICAL HISTORY  Past Surgical History:   Procedure Laterality Date   • LUMBAR LAMINECTOMY DISKECTOMY Bilateral 2/4/2017    Procedure: LUMBAR LAMINECTOMY DISKECTOMY POSTERIOR L4-S1 ;  Surgeon: Emil Hitchcock M.D.;  Location: SURGERY [facility];  Service:    • CARPAL TUNNEL ENDOSCOPIC  11/17/2012    Performed by Heriberto Quiñones M.D. at SURGERY [facility]   • TRIGGER FINGER RELEASE  11/17/2012    Performed by Heriberto Quiñones M.D. at SURGERY [facility]   • HIP ARTHROSCOPY  2/23/2009    Performed by SHERLYN CALVO at SURGERY [facility]   • ACETABULAR OSTEOTOMY  2/23/2009    Performed by SHERLYN CALVO at SURGERY [facility]   • MASS EXCISION ORTHO  2/23/2009    Performed by SHERLYN CALVO at SURGERY [facility]   • HIP ARTHROSCOPY  2005    done at [facility] [place]   • HAJA BY LAPAROSCOPY  1997   • HYSTERECTOMY, TOTAL ABDOMINAL  1979    oopherectomy lacie   • BLADDER SUSPENSION      bladder sling   • PRIMARY C SECTION  1970/1975       SOCIAL HISTORY  Social History   Substance Use Topics   • Smoking status: Never Smoker   • Smokeless tobacco: Never Used   • Alcohol use No      Comment: Stopped drinking 45 days ago 7/12/17 Going to AA      History   Drug Use No       FAMILY HISTORY  Family History   Problem Relation Age of Onset   • GI Father         Crohn's   • Heart Disease Father         First MI age 30   • Hypertension Unknown    • Cancer Brother         ESOPHAGEAL CANCER       CURRENT MEDICATIONS  Home Medications     Reviewed by Chloé Reyes (Pharmacy  Tech) on 12/07/18 at 1427  Med List Status: Complete   Medication Last Dose Status   Alum Hydroxide-Mag Carbonate (GAVISCON PO) 12/7/2018 Active   amitriptyline (ELAVIL) 25 MG Tab 12/7/2018 Active   aspirin 81 MG EC tablet 12/7/2018 Active   diltiazem CD (CARDIZEM CD) 240 MG CP24 12/7/2018 Active   DULoxetine (CYMBALTA) 60 MG Cap DR Particles delayed-release capsule 12/7/2018 Active   ondansetron (ZOFRAN) 4 MG Tab tablet 12/6/2018 Active   pantoprazole (PROTONIX) 40 MG Tablet Delayed Response 12/7/2018 Active                ALLERGIES  Allergies   Allergen Reactions   • Biaxin [Clarithromycin]    • Clarithromycin    • Doxycycline      Possible allergic reaction   • Ees [Erythromycin]    • Imitrex [Sumatriptan] Swelling     Tongue swell   • Levaquin      Muscle soreness    • Pcn [Penicillins]    • Shellfish Allergy    • Trazodone Swelling       PHYSICAL EXAM  VITAL SIGNS: /118   Pulse 89   Temp 36.1 °C (97 °F) (Temporal)   Resp 16   Wt 74.3 kg (163 lb 12.8 oz)   SpO2 96%   BMI 30.95 kg/m²   Vitals reviewed.    Consitutional: Well-developed, well-nourished. Negative for: distress.  HENT: Normocephalic, right external ear normal, left external ear normal, oropharynx clear and moist.  Eyes: Conjunctivae normal, extraocular movements normal. Negative for: discharge in right and left eye, icterus.  Neck: Range of motion normal, supple. Negative for cervical adenopathy.  Cardiovascular: Normal rate, regular rhythm, heart sounds normal, intact distal pulses. Negative for: murmur, rub, gallop.  Pulmonary/Chest Wall: Effort normal, breath sounds normal. Negative for: respiratory distress, wheezes, rales, rhonchi.   Abdominal: Soft, bowel sounds normal. Negative for: distention, tenderness, rebound, guarding.  Musculoskeletal: Normal range of motion. Negative for edema.  Neurological: Alert and oriented x3. No focal deficits.  Skin: Warm, dry. Negative for rash.  Psych: Mood/affect normal, behavior normal, judgment  normal.    DIAGNOSTIC STUDIES / PROCEDURES    LABS  Results for orders placed or performed during the hospital encounter of 12/07/18   Troponin   Result Value Ref Range    Troponin I <0.01 0.00 - 0.04 ng/mL   Btype Natriuretic Peptide   Result Value Ref Range    B Natriuretic Peptide 22 0 - 100 pg/mL   CBC with Differential   Result Value Ref Range    WBC 9.0 4.8 - 10.8 K/uL    RBC 5.25 4.20 - 5.40 M/uL    Hemoglobin 14.4 12.0 - 16.0 g/dL    Hematocrit 43.4 37.0 - 47.0 %    MCV 82.7 81.4 - 97.8 fL    MCH 27.4 27.0 - 33.0 pg    MCHC 33.2 (L) 33.6 - 35.0 g/dL    RDW 40.3 35.9 - 50.0 fL    Platelet Count 348 164 - 446 K/uL    MPV 9.8 9.0 - 12.9 fL    Neutrophils-Polys 61.50 44.00 - 72.00 %    Lymphocytes 23.00 22.00 - 41.00 %    Monocytes 8.20 0.00 - 13.40 %    Eosinophils 6.10 0.00 - 6.90 %    Basophils 1.00 0.00 - 1.80 %    Immature Granulocytes 0.20 0.00 - 0.90 %    Nucleated RBC 0.00 /100 WBC    Neutrophils (Absolute) 5.55 2.00 - 7.15 K/uL    Lymphs (Absolute) 2.08 1.00 - 4.80 K/uL    Monos (Absolute) 0.74 0.00 - 0.85 K/uL    Eos (Absolute) 0.55 (H) 0.00 - 0.51 K/uL    Baso (Absolute) 0.09 0.00 - 0.12 K/uL    Immature Granulocytes (abs) 0.02 0.00 - 0.11 K/uL    NRBC (Absolute) 0.00 K/uL   Complete Metabolic Panel (CMP)   Result Value Ref Range    Sodium 139 135 - 145 mmol/L    Potassium 3.7 3.6 - 5.5 mmol/L    Chloride 104 96 - 112 mmol/L    Co2 25 20 - 33 mmol/L    Anion Gap 10.0 0.0 - 11.9    Glucose 99 65 - 99 mg/dL    Bun 13 8 - 22 mg/dL    Creatinine 0.85 0.50 - 1.40 mg/dL    Calcium 9.1 8.5 - 10.5 mg/dL    AST(SGOT) 17 12 - 45 U/L    ALT(SGPT) 13 2 - 50 U/L    Alkaline Phosphatase 109 (H) 30 - 99 U/L    Total Bilirubin 0.7 0.1 - 1.5 mg/dL    Albumin 4.4 3.2 - 4.9 g/dL    Total Protein 7.2 6.0 - 8.2 g/dL    Globulin 2.8 1.9 - 3.5 g/dL    A-G Ratio 1.6 g/dL   Prothrombin Time   Result Value Ref Range    PT 13.3 12.0 - 14.6 sec    INR 1.00 0.87 - 1.13   APTT   Result Value Ref Range    APTT 28.8 24.7 - 36.0 sec    Lipase   Result Value Ref Range    Lipase 23 11 - 82 U/L   ESTIMATED GFR   Result Value Ref Range    GFR If African American >60 >60 mL/min/1.73 m 2    GFR If Non African American >60 >60 mL/min/1.73 m 2   EKG   Result Value Ref Range    Report       [facility] Emergency Dept.    Test Date:  2018  Pt Name:    CONNIE NORIEGA                    Department: ER  MRN:        [id number]                      Room:  Gender:     Female                       Technician: [id number]  :        1950                   Requested By:ER TRIAGE PROTOCOL  Order #:    [id number]                    Reading MD:    Measurements  Intervals                                Axis  Rate:       71                           P:          -18  TX:         132                          QRS:        -4  QRSD:       88                           T:          60  QT:         424  QTc:        461    Interpretive Statements  SINUS RHYTHM  LOW VOLTAGE IN FRONTAL LEADS  NONSPECIFIC T ABNORMALITIES, ANTERIOR LEADS  Compared to ECG 2017 14:18:37  T-wave abnormality now present        All labs reviewed by me.    EKG Interpretation:   Twelve-lead EKG by my interpretation shows normal sinus rhythm at a rate of 71.  Low voltage throughout.  T wave inversions in the anterior precordial leads with otherwise global flattening.  No ST segment changes to indicate ischemia or infarct.  An old EKG from 17 had T wave flattening, so this is a nonspecific change in the interim.    RADIOLOGY  DX-CHEST-LIMITED (1 VIEW)   Final Result      No consolidation.        The radiologist's interpretation of all radiological studies have been reviewed by me.    COURSE & MEDICAL DECISION MAKING  Nursing notes, VS, PMSFHx reviewed in chart.    Obtained and reviewed past medical records from her last visit with her primary care provider, Dr. Gonzalez, in July of this year when she had a rash    12:06 PM Patient seen and examined at bedside. The patient presents  with chest pain with associated shortness of breath and dizziness and the differential diagnosis includes but is not limited to  cardiac, GI, musculoskeletal.  Likely cardiac.  GUSTABO score greater than 4.  Low probability of aortic injury or PE. Ordered Chest Xray, Troponin, BNP, CBC, CMP, Prothrombin time, APTT, lipase, and an EKG. Patient will be treated with 2% Nitro-bid, 162 mg Aspirin for her symptoms.  I discussed the plan of care with the patient and her  who agree with it. They understand the potential for admission into the hospital.       2:05 PM patient feels somewhat better after nitroglycerin and aspirin.  Discussed the patient's case with the CDU hospitalist, Dr. Ocampo, who agrees to admit the patient.    DISPOSITION:  Patient will be admitted to CDU hospitalist in guarded condition.     FINAL IMPRESSION  1. Precordial pain    2. Lightheadedness    3. Essential hypertension          Israel VILLEDA (Scribe), am scribing for, and in the presence of, Courtney Minaya M.D..    Electronically signed by: Isarel Milton (Scribe), 12/7/2018    Courtney VILLEDA M.D. personally performed the services described in this documentation, as scribed by Israel Milton in my presence, and it is both accurate and complete.  C.    The note accurately reflects work and decisions made by me.  Courtney Minaya  12/7/2018  6:21 PM

## 2018-12-08 ENCOUNTER — PATIENT OUTREACH (OUTPATIENT)
Dept: HEALTH INFORMATION MANAGEMENT | Facility: OTHER | Age: 68
End: 2018-12-08

## 2018-12-08 VITALS
HEIGHT: 61 IN | HEART RATE: 67 BPM | OXYGEN SATURATION: 94 % | DIASTOLIC BLOOD PRESSURE: 78 MMHG | WEIGHT: 163.8 LBS | RESPIRATION RATE: 12 BRPM | SYSTOLIC BLOOD PRESSURE: 139 MMHG | BODY MASS INDEX: 30.93 KG/M2 | TEMPERATURE: 97.6 F

## 2018-12-08 PROBLEM — R07.2 PRECORDIAL PAIN: Status: RESOLVED | Noted: 2018-12-07 | Resolved: 2018-12-08

## 2018-12-08 LAB — TROPONIN I SERPL-MCNC: <0.01 NG/ML (ref 0–0.04)

## 2018-12-08 PROCEDURE — 36415 COLL VENOUS BLD VENIPUNCTURE: CPT

## 2018-12-08 PROCEDURE — 99217 PR OBSERVATION CARE DISCHARGE: CPT | Performed by: HOSPITALIST

## 2018-12-08 PROCEDURE — G0378 HOSPITAL OBSERVATION PER HR: HCPCS

## 2018-12-08 PROCEDURE — A9270 NON-COVERED ITEM OR SERVICE: HCPCS | Performed by: HOSPITALIST

## 2018-12-08 PROCEDURE — 84484 ASSAY OF TROPONIN QUANT: CPT

## 2018-12-08 PROCEDURE — 700102 HCHG RX REV CODE 250 W/ 637 OVERRIDE(OP): Performed by: HOSPITALIST

## 2018-12-08 RX ORDER — LISINOPRIL 20 MG/1
20 TABLET ORAL DAILY
Qty: 30 TAB | Refills: 2 | Status: SHIPPED | OUTPATIENT
Start: 2018-12-09 | End: 2019-02-06 | Stop reason: SINTOL

## 2018-12-08 RX ADMIN — OMEPRAZOLE 20 MG: 20 CAPSULE, DELAYED RELEASE ORAL at 05:28

## 2018-12-08 RX ADMIN — ASPIRIN 81 MG: 81 TABLET, CHEWABLE ORAL at 05:28

## 2018-12-08 RX ADMIN — DULOXETINE HYDROCHLORIDE 60 MG: 60 CAPSULE, DELAYED RELEASE ORAL at 05:28

## 2018-12-08 RX ADMIN — STANDARDIZED SENNA CONCENTRATE AND DOCUSATE SODIUM 2 TABLET: 8.6; 5 TABLET, FILM COATED ORAL at 05:28

## 2018-12-08 RX ADMIN — DILTIAZEM HYDROCHLORIDE 240 MG: 240 CAPSULE, COATED, EXTENDED RELEASE ORAL at 05:28

## 2018-12-08 RX ADMIN — LISINOPRIL 20 MG: 20 TABLET ORAL at 05:28

## 2018-12-08 RX ADMIN — AMITRIPTYLINE HYDROCHLORIDE 25 MG: 25 TABLET, FILM COATED ORAL at 05:28

## 2018-12-08 ASSESSMENT — PAIN SCALES - GENERAL
PAINLEVEL_OUTOF10: 0
PAINLEVEL_OUTOF10: 0

## 2018-12-08 NOTE — PROGRESS NOTES
Assessment completed. Pt A&Ox4. Respirations are even and unlabored on RA. Pt denies pain at this time. Denies dizziness/SOB. Monitors applied, VS stable, call light and belongings within reach. POC updated. Pt educated on room and call light, pt verbalized understanding. Communication board updated. Needs met. Family at bedside.

## 2018-12-08 NOTE — DISCHARGE INSTRUCTIONS
Discharge Instructions    Discharged to home by car with relative. Discharged via walking, hospital escort: Refused.  Special equipment needed: Not Applicable    Be sure to schedule a follow-up appointment with your primary care doctor or any specialists as instructed.     Discharge Plan:   Diet Plan: Discussed  Activity Level: Discussed  Confirmed Follow up Appointment: Patient to Call and Schedule Appointment  Confirmed Symptoms Management: Discussed  Medication Reconciliation Updated: Yes  Influenza Vaccine Indication: Patient Refuses    I understand that a diet low in cholesterol, fat, and sodium is recommended for good health. Unless I have been given specific instructions below for another diet, I accept this instruction as my diet prescription.   Other diet: heart healthy    Special Instructions: None    · Is patient discharged on Warfarin / Coumadin?   No           Instructions:   Activity: As tolerated.   Diet: Cardiac   Followup: With PCP   Instructions:   -Please take all medications as prescribed   -Given instructions to return to the ER if any new or worsening symptoms, worsening condition, or failure to improve   -Call PCP for followup   -No smoking, no alcohol, no caffeine   -Encourage risk factor reduction with tobacco and alcohol abstinence, diet changes, weight loss, and exercise.              Depression / Suicide Risk    As you are discharged from this [facility] facility, it is important to learn how to keep safe from harming yourself.    Recognize the warning signs:  · Abrupt changes in personality, positive or negative- including increase in energy   · Giving away possessions  · Change in eating patterns- significant weight changes-  positive or negative  · Change in sleeping patterns- unable to sleep or sleeping all the time   · Unwillingness or inability to communicate  · Depression  · Unusual sadness, discouragement and loneliness  · Talk of wanting to die  · Neglect of personal  appearance   · Rebelliousness- reckless behavior  · Withdrawal from people/activities they love  · Confusion- inability to concentrate     If you or a loved one observes any of these behaviors or has concerns about self-harm, here's what you can do:  · Talk about it- your feelings and reasons for harming yourself  · Remove any means that you might use to hurt yourself (examples: pills, rope, extension cords, firearm)  · Get professional help from the community (Mental Health, Substance Abuse, psychological counseling)  · Do not be alone:Call your Safe Contact- someone whom you trust who will be there for you.  · Call your local CRISIS HOTLINE 523-8295 or 673-734-6960  · Call your local Children's Mobile Crisis Response Team [place] (937) 110-4106 or www.[employer]  · Call the toll free National Suicide Prevention Hotlines   · National Suicide Prevention Lifeline 721-551-SMUB (0989)  · National Hope Line Network 800-SUICIDE (081-9448)

## 2018-12-08 NOTE — PROGRESS NOTES
Assumed care of patient at 1900. Bedside report received from MIKE Jiménez. Initial assessment completed. Patient is A&Ox4 and able to make needs known;  at bedside. Patient denies chest, jaw, or back pain.  SR on monitor; VSS and WNL on RA.  POC discussed with pt: cardiac monitoring, troponin. No further questions at this time. Fall precautions in place. Bed locked and in the lowest position, call light instructions provided, call light within reach.

## 2018-12-08 NOTE — PROGRESS NOTES
IV Dc 'd. Discharge instructions provided to patient. Pt verbalizes understanding. Pt states all questions have been answered. Copy of DC provided to patient. Signed copy in chart. 1 prescription sent to her pharmacy. Pt states all personal belongings are in possession.  Pt escorted off unit by this RN without incident, refused w/c.

## 2018-12-08 NOTE — DISCHARGE SUMMARY
Hospital Medicine Discharge Note     Patient ID:  Augusta Rodriguez  [id number]  68 y.o.female  1950    Admit Date:  12/7/2018       Discharge Date:   12/8/2018    Primary Care Provider: ALBINA Garcia    Admitting Physician: Thierno Ocampo M.D.  Discharging Physician: Thierno Ocampo M.D.    Chief Complaint: Chest Pain     Discharge Diagnoses:   Principal Problem:    Precordial pain-resolved no further pain, likely related to hypertension, patient has been initiated on lisinopril and will continue this on discharge.  Active Problems:    HTN (hypertension)-change in medications patient will follow up with PCP    Depression-continue outpatient management follow-up with PCP    GERD (gastroesophageal reflux disease)-continue current outpatient management follow-up with PCP    BMI 30.0-30.9,adult-encouraged dietary lifestyle modifications    Atrial fibrillation (HCC)-continue outpatient management follow-up with PCP and cardiology      Chronic Medical Problems:  Past Medical History:   Diagnosis Date   • Arrhythmia     afib   • Arthritis     osteo   • Cold    • Dental disorder     upper dentures   • Depression 2/24/2010   • GERD (gastroesophageal reflux disease) 2/27/2010   • HTN (hypertension) 2/24/2010   • Hypercholesteremia    • Hyperlipidemia 2/24/2010   • Hypertension    • Impaired fasting glucose 2/27/2010   • Incontinence of urine    • Indigestion    • Osteopenia 2/24/2010   • Other specified disorder of intestines     constipation r/t meds   • Pain 11/5/12    left wrist/lower back   • Preventative health care 2/27/2010   • Psychiatric problem     anxiety   • Restless leg syndrome    • Tear of meniscus of knee joint 2/27/2010   • Tremor, essential 2/24/2010   • Urinary bladder disorder    • Vitamin d deficiency 2/27/2010       Code Status: Full Code    Hospital Summary:       Please refer to H&P by Thierno Ocampo M.D. on 12/7/2018 for full details.      In brief, Augusta Rodriguez is a 68 y.o.  female who was admitted 12/7/2018 for chest pain.  Patient has extensive history including A. fib, osteo-arthritis, depression, GERD, hypertension, hypercholesteremia, hyperlipidemia, impaired fasting glucose, urine incontinency, osteopenia, anxiety, vitamin D deficiency, and restless leg syndrome.  Patient presented to the emergency room with stabbing chest pain located in the center of her chest radiating to her back and upper throat with an intensity of 5 out of 10.  This pain was accompanied with shortness of breath and dizziness.  Patient's blood pressure was noted to be markedly elevated on admission in the emergency room.  Patient had stress test performed within the last 6 months which was within normal limits.  Patient was admitted to CDU for further workup and monitoring.  Patient was placed on telemetry monitoring with no acute cardiac event is noted.  EKG on admission showed sinus rhythm with borderline T wave abnormalities however no acute ST changes are noted.  Chest x-ray shows no acute cardiopulmonary process.  CBC was essentially benign and noncontributory.  CMP essentially benign and noncontributory with the exception of slightly elevated alk phos at 109 for which patient will follow up with her PCP.  BNP was 22.  Troponins remain negative x3.  Patient was noted to have markedly elevated high blood pressure and was started on lisinopril 20 mg daily with good results.  Chest pain dissipated with the improvement in blood pressure.  Patient had negative stress test within the last 6 months, therefore we will not repeat this exam.  Chest pain likely related to hypertensive urgency.  Patient's vital signs are now stable and controlled.  Patient states no further chest pain, shortness of breath, dizziness, nausea or vomiting.  Patient's lungs are clear bilaterally on auscultation.  Patient is maintaining oxygen saturations on room air.  Patient is up ambulating independently.  Patient states no pain on  palpation of chest wall.  Patient will continue with lisinopril 20 mg in addition to her home medications and will follow up with PCP and cardiology as needed.  Patient requesting discharge at this time.     Therefore, she is discharged in good and stable condition with close outpatient follow-up.    Consultants:      None     Studies:    Imaging/ Testing:      DX-CHEST-LIMITED (1 VIEW)   Final Result      No consolidation.            Laboratory:   Recent Labs      12/07/18   1203   WBC  9.0   RBC  5.25   HEMOGLOBIN  14.4   HEMATOCRIT  43.4   MCV  82.7   MCH  27.4   MCHC  33.2*   RDW  40.3   PLATELETCT  348   MPV  9.8       Recent Labs      12/07/18   1203   SODIUM  139   POTASSIUM  3.7   CHLORIDE  104   CO2  25   GLUCOSE  99   BUN  13   CREATININE  0.85   CALCIUM  9.1       Recent Labs      12/07/18   1203   ALTSGPT  13   ASTSGOT  17   ALKPHOSPHAT  109*   TBILIRUBIN  0.7   LIPASE  23   GLUCOSE  99        Recent Labs      12/07/18   1203   BNPBTYPENAT  22             Recent Labs      12/07/18   1203  12/07/18   1825  12/08/18   0147   TROPONINI  <0.01  <0.01  <0.01           Procedures/Surgeries:        None     Disposition:    Discharge home    Condition:  Stable    Instructions:   Activity: As tolerated.  Diet: Cardiac   Followup: With PCP   Instructions:  -Please take all medications as prescribed   -Given instructions to return to the ER if any new or worsening symptoms, worsening condition, or failure to improve  -Call PCP for followup  -No smoking, no alcohol, no caffeine  -Encourage risk factor reduction with tobacco and alcohol abstinence, diet changes, weight loss, and exercise.     Follow-Up  Future Appointments  Date Time Provider Department Center   12/19/2018 1:45 PM ALBINA Garcia [place]         Discharge Medications:             Medication List      START taking these medications      Instructions   lisinopril 20 MG Tabs  Start taking on:  12/9/2018  Commonly known as:  PRINIVIL    Take 1 Tab by mouth every day.  Dose:  20 mg        CONTINUE taking these medications      Instructions   amitriptyline 25 MG Tabs  Commonly known as:  ELAVIL   Take 25 mg by mouth every day.  Dose:  25 mg     aspirin 81 MG EC tablet   Take 81 mg by mouth every day.  Dose:  81 mg     CARDIZEM  MG Cp24  Generic drug:  DILTIAZem CD   Take 240 mg by mouth every day.  Dose:  240 mg     DULoxetine 60 MG Cpep delayed-release capsule  Commonly known as:  CYMBALTA   Take 60 mg by mouth every day.  Dose:  60 mg     GAVISCON PO   Take 2 Doses by mouth every 4 hours. OTC  Dose:  2 Dose     PROTONIX 40 MG Tbec  Generic drug:  pantoprazole   Take 40 mg by mouth every day.  Dose:  40 mg     ZOFRAN 4 MG Tabs tablet  Generic drug:  ondansetron   Take 4 mg by mouth every four hours as needed for Nausea/Vomiting.  Dose:  4 mg                  Please CC the above physicians    VIKAS Patricio  12/8/2018  8:32 AM

## 2018-12-18 ENCOUNTER — TELEPHONE (OUTPATIENT)
Dept: MEDICAL GROUP | Facility: PHYSICIAN GROUP | Age: 68
End: 2018-12-18

## 2018-12-18 NOTE — TELEPHONE ENCOUNTER
Future Appointments       Provider Department Center    12/19/2018 1:45 PM ALBINA Garcia [facility]        ESTABLISHED PATIENT PRE-VISIT PLANNING     Patient was NOT contacted to complete PVP.       1.  Reviewed notes from the last few office visits within the medical group: Yes    2.  If any orders were placed at last visit or intended to be done for this visit (i.e. 6 mos follow-up), do we have Results/Consult Notes?        •  Labs - Labs ordered, completed on 12/07/2018 and results are in chart.       •  Imaging - Imaging ordered, completed and results are in chart.       •  Referrals - No referrals were ordered at last office visit.    3. Is this appointment scheduled as a Hospital Follow-Up? No    4.  Immunizations were updated in [employer] using [employer]?: Yes       •  [employer] Recommendations: FLU, PREVNAR (PCV13) , TDAP and SHINGRIX (Shingles)    5.  Patient is due for the following Health Maintenance Topics:   Health Maintenance Due   Topic Date Due   • IMM DTaP/Tdap/Td Vaccine (1 - Tdap) 06/15/1970   • IMM ZOSTER VACCINES (1 of 2) 06/15/2001   • BONE DENSITY  06/15/2016   • IMM PNEUMOCOCCAL 65+ (ADULT) LOW/MEDIUM RISK SERIES (1 of 2 - PCV13) 06/15/2016   • Annual Wellness Visit  06/15/2016   • IMM INFLUENZA (1) 09/01/2018     6.  MDX printed for Provider? NO- Compliant     7.  Patient was NOT informed to arrive 15 min prior to their scheduled appointment and bring in their medication bottles.

## 2018-12-19 ENCOUNTER — OFFICE VISIT (OUTPATIENT)
Dept: MEDICAL GROUP | Facility: PHYSICIAN GROUP | Age: 68
End: 2018-12-19
Payer: MEDICARE

## 2018-12-19 VITALS
HEIGHT: 61 IN | BODY MASS INDEX: 30.02 KG/M2 | RESPIRATION RATE: 16 BRPM | WEIGHT: 159 LBS | HEART RATE: 82 BPM | TEMPERATURE: 97.2 F | DIASTOLIC BLOOD PRESSURE: 82 MMHG | OXYGEN SATURATION: 94 % | SYSTOLIC BLOOD PRESSURE: 120 MMHG

## 2018-12-19 DIAGNOSIS — F32.A DEPRESSION, UNSPECIFIED DEPRESSION TYPE: ICD-10-CM

## 2018-12-19 DIAGNOSIS — I10 ESSENTIAL HYPERTENSION: ICD-10-CM

## 2018-12-19 PROCEDURE — 99213 OFFICE O/P EST LOW 20 MIN: CPT | Performed by: NURSE PRACTITIONER

## 2018-12-19 RX ORDER — ONDANSETRON 4 MG/1
4 TABLET, FILM COATED ORAL EVERY 4 HOURS PRN
Qty: 30 TAB | Refills: 2 | Status: SHIPPED | OUTPATIENT
Start: 2018-12-19 | End: 2019-12-31 | Stop reason: SDUPTHER

## 2018-12-19 ASSESSMENT — PATIENT HEALTH QUESTIONNAIRE - PHQ9
SUM OF ALL RESPONSES TO PHQ QUESTIONS 1-9: 0
3. TROUBLE FALLING OR STAYING ASLEEP OR SLEEPING TOO MUCH: NOT AT ALL
7. TROUBLE CONCENTRATING ON THINGS, SUCH AS READING THE NEWSPAPER OR WATCHING TELEVISION: NOT AT ALL
8. MOVING OR SPEAKING SO SLOWLY THAT OTHER PEOPLE COULD HAVE NOTICED. OR THE OPPOSITE, BEING SO FIGETY OR RESTLESS THAT YOU HAVE BEEN MOVING AROUND A LOT MORE THAN USUAL: NOT AT ALL
2. FEELING DOWN, DEPRESSED, IRRITABLE, OR HOPELESS: NOT AT ALL
1. LITTLE INTEREST OR PLEASURE IN DOING THINGS: NOT AT ALL
SUM OF ALL RESPONSES TO PHQ9 QUESTIONS 1 AND 2: 0
9. THOUGHTS THAT YOU WOULD BE BETTER OFF DEAD, OR OF HURTING YOURSELF: NOT AT ALL
4. FEELING TIRED OR HAVING LITTLE ENERGY: NOT AT ALL
6. FEELING BAD ABOUT YOURSELF - OR THAT YOU ARE A FAILURE OR HAVE LET YOURSELF OR YOUR FAMILY DOWN: NOT AL ALL
5. POOR APPETITE OR OVEREATING: NOT AT ALL

## 2018-12-19 NOTE — ASSESSMENT & PLAN NOTE
Woke up with sharp chest pain about 1 week ago.  Her  rushed her to the ER and they ran a bunch of tests.  Per the patient, it wasn't her heart, it was her blood pressure.  She states that she has been feeling great since she was sent home.

## 2018-12-19 NOTE — PROGRESS NOTES
Chief Complaint   Patient presents with   • Follow-Up     hospital fv    • Medication Refill     zofran        HISTORY OF PRESENT ILLNESS: Patient is a 68 y.o. female established patient who presents today to discuss the following issues:    HTN (hypertension)  Woke up with sharp chest pain about 1 week ago.  Her  rushed her to the ER and they ran a bunch of tests.  Per the patient, it wasn't her heart, it was her blood pressure.  She states that she has been feeling great since she was sent home.      Depression  Patient reports that her mood is great on Cymbalta, however she cannot afford to continue it right now.  She will fill it when she can.      Patient Active Problem List    Diagnosis Date Noted   • Atrial fibrillation (HCC) 12/07/2018   • Shortness of breath 06/20/2018   • Anemia 06/07/2018   • BMI 30.0-30.9,adult 05/16/2018   • Pain of right upper extremity 01/02/2018   • Headache 11/10/2017   • Urinary incontinence 07/12/2017   • Spinal stenosis, lumbar 02/04/2017   • Trigger finger, acquired 11/17/2012   • GERD (gastroesophageal reflux disease) 02/27/2010   • Vitamin D deficiency 02/27/2010   • Impaired fasting glucose 02/27/2010   • HTN (hypertension) 02/24/2010   • Hyperlipidemia 02/24/2010   • Osteopenia 02/24/2010   • Depression 02/24/2010       Allergies:Clarithromycin; Doxycycline; Ees [erythromycin]; Levaquin; Pcn [penicillins]; Shellfish allergy; Sumatriptan; and Trazodone    Current Outpatient Prescriptions   Medication Sig Dispense Refill   • ondansetron (ZOFRAN) 4 MG Tab tablet Take 1 Tab by mouth every four hours as needed for Nausea/Vomiting. 30 Tab 2   • lisinopril (PRINIVIL) 20 MG Tab Take 1 Tab by mouth every day. 30 Tab 2   • amitriptyline (ELAVIL) 25 MG Tab Take 25 mg by mouth every day.     • DULoxetine (CYMBALTA) 60 MG Cap DR Particles delayed-release capsule Take 60 mg by mouth every day.     • Alum Hydroxide-Mag Carbonate (GAVISCON PO) Take 2 Doses by mouth every 4 hours. OTC      • aspirin 81 MG EC tablet Take 81 mg by mouth every day.     • pantoprazole (PROTONIX) 40 MG Tablet Delayed Response Take 40 mg by mouth every day.     • diltiazem CD (CARDIZEM CD) 240 MG CP24 Take 240 mg by mouth every day.       No current facility-administered medications for this visit.        Social History   Substance Use Topics   • Smoking status: Never Smoker   • Smokeless tobacco: Never Used   • Alcohol use No      Comment: Stopped drinking 45 days ago 17 Going to AA       Family Status   Relation Status   • Mo  at age 44        Urosepsis   • Fa  at age 74        MI. Also had Crohn's   • Bro Alive        Gallbladder cancer   • Sis Alive        Crohn's   • Unknown (Not Specified)   • Bro      Family History   Problem Relation Age of Onset   • GI Father         Crohn's   • Heart Disease Father         First MI age 30   • Hypertension Unknown    • Cancer Brother         ESOPHAGEAL CANCER       Review of Systems:   Constitutional: Negative for fever, chills, weight loss and malaise/fatigue.   HENT: Negative for ear pain, nosebleeds, congestion, sore throat and neck pain.    Eyes: Negative for blurred vision.   Respiratory: Negative for cough, sputum production, shortness of breath and wheezing.    Cardiovascular: Negative for chest pain, palpitations, orthopnea and leg swelling.   Gastrointestinal: Negative for heartburn, nausea, vomiting and abdominal pain.   Genitourinary: Negative for dysuria, urgency and frequency.   Musculoskeletal: Negative for myalgias, joint pain, and back pain.  Skin: Negative for rash and itching.   Neurological: Negative for dizziness, tingling, tremors, sensory change, focal weakness and headaches.   Endo/Heme/Allergies: Does not bruise/bleed easily.   Psychiatric/Behavioral: Negative for depression, suicidal ideas and memory loss.  The patient is not nervous/anxious and does not have insomnia.    All other systems reviewed and are negative except as  "in HPI.    Exam:  Blood pressure 120/82, pulse 82, temperature 36.2 °C (97.2 °F), resp. rate 16, height 1.549 m (5' 1\"), weight 72.1 kg (159 lb), SpO2 94 %, not currently breastfeeding.  General:  Well nourished, well developed female in NAD  Head: Grossly normal.  Neck: Supple without JVD or bruit. Thyroid is not enlarged.  Pulmonary: Clear to ausculation. Normal effort. No rales, ronchi, or wheezing.  Cardiovascular: Regular rate and rhythm without murmur.   Abdomen:  Soft, nontender, nondistended.  Extremities: No clubbing, cyanosis, or edema.  Skin: Intact with no obvious rashes or lesions.  Neuro: Grossly intact.  Psych: Alert and oriented x 3.  Mood and affect appropriate.    Medical decision-making and discussion: Augusta is here today for follow-up.  Her hospital records were reviewed.  She was encouraged to take all of her medications as prescribed, and she will follow-up here as needed.          Assessment/Plan:  1. Essential hypertension     2. Depression, unspecified depression type         Return if symptoms worsen or fail to improve.    Please note that this dictation was created using voice recognition software. I have made every reasonable attempt to correct obvious errors, but I expect that there are errors of grammar and possibly content that I did not discover before finalizing the note.  "

## 2018-12-19 NOTE — ASSESSMENT & PLAN NOTE
Patient reports that her mood is great on Cymbalta, however she cannot afford to continue it right now.  She will fill it when she can.

## 2018-12-24 RX ORDER — DEXLANSOPRAZOLE 60 MG/1
CAPSULE, DELAYED RELEASE ORAL
COMMUNITY
Start: 2018-11-24 | End: 2019-01-22

## 2018-12-31 NOTE — TELEPHONE ENCOUNTER
Was the patient seen in the last year in this department? Yes    Does patient have an active prescription for medications requested? No     Received Request Via: Pharmacy      Pt met protocol?: Yes

## 2019-01-01 ENCOUNTER — OFFICE VISIT (OUTPATIENT)
Dept: URGENT CARE | Facility: PHYSICIAN GROUP | Age: 69
End: 2019-01-01
Payer: MEDICARE

## 2019-01-01 VITALS
SYSTOLIC BLOOD PRESSURE: 132 MMHG | DIASTOLIC BLOOD PRESSURE: 68 MMHG | TEMPERATURE: 98.1 F | RESPIRATION RATE: 16 BRPM | WEIGHT: 160 LBS | OXYGEN SATURATION: 95 % | BODY MASS INDEX: 30.21 KG/M2 | HEART RATE: 109 BPM | HEIGHT: 61 IN

## 2019-01-01 DIAGNOSIS — J01.00 ACUTE NON-RECURRENT MAXILLARY SINUSITIS: ICD-10-CM

## 2019-01-01 DIAGNOSIS — H66.90 ACUTE OTITIS MEDIA, UNSPECIFIED OTITIS MEDIA TYPE: ICD-10-CM

## 2019-01-01 PROCEDURE — 99214 OFFICE O/P EST MOD 30 MIN: CPT | Performed by: PHYSICIAN ASSISTANT

## 2019-01-01 RX ORDER — CEFDINIR 300 MG/1
300 CAPSULE ORAL 2 TIMES DAILY
Qty: 14 CAP | Refills: 0 | Status: SHIPPED | OUTPATIENT
Start: 2019-01-01 | End: 2019-01-08

## 2019-01-01 ASSESSMENT — ENCOUNTER SYMPTOMS
ABDOMINAL PAIN: 0
DIARRHEA: 0
SINUS PAIN: 1
NECK PAIN: 0
SHORTNESS OF BREATH: 0
HOARSE VOICE: 0
HEADACHES: 1
SPUTUM PRODUCTION: 1
DIAPHORESIS: 0
COUGH: 1
CONSTIPATION: 0
SORE THROAT: 0
FEVER: 1
SWOLLEN GLANDS: 0
CHILLS: 0
VOMITING: 0
MYALGIAS: 0
EYE DISCHARGE: 0
EYE PAIN: 0
SINUS PRESSURE: 1
NAUSEA: 0
DIZZINESS: 0

## 2019-01-01 NOTE — PROGRESS NOTES
Subjective:   Augusta Rodriguez is a 68 y.o. female who presents for Cough (congestion x 4 days)       Sinus Problem   This is a new problem. The current episode started in the past 7 days. The problem has been gradually worsening since onset. The maximum temperature recorded prior to her arrival was 101 - 101.9 F. The fever has been present for less than 1 day. The pain is moderate. Associated symptoms include congestion, coughing, ear pain, headaches and sinus pressure. Pertinent negatives include no chills, diaphoresis, hoarse voice, neck pain, shortness of breath, sore throat or swollen glands. Treatments tried: Ibuprofen, Nasocort. The treatment provided mild relief.     Review of Systems   Constitutional: Positive for fever. Negative for chills and diaphoresis.   HENT: Positive for congestion, ear pain, sinus pain and sinus pressure. Negative for ear discharge, hoarse voice and sore throat.    Eyes: Negative for pain and discharge.   Respiratory: Positive for cough and sputum production. Negative for shortness of breath.    Cardiovascular: Negative for chest pain.   Gastrointestinal: Negative for abdominal pain, constipation, diarrhea, nausea and vomiting.   Musculoskeletal: Negative for joint pain, myalgias and neck pain.   Neurological: Positive for headaches. Negative for dizziness.       PMH:  has a past medical history of Arrhythmia; Arthritis; Cold; Dental disorder; Depression (2/24/2010); GERD (gastroesophageal reflux disease) (2/27/2010); HTN (hypertension) (2/24/2010); Hypercholesteremia; Hyperlipidemia (2/24/2010); Hypertension; Impaired fasting glucose (2/27/2010); Incontinence of urine; Indigestion; Osteopenia (2/24/2010); Other specified disorder of intestines; Pain (11/5/12); Preventative health care (2/27/2010); Psychiatric problem; Restless leg syndrome; Tear of meniscus of knee joint (2/27/2010); Tremor, essential (2/24/2010); Urinary bladder disorder; and Vitamin d deficiency (2/27/2010). She  also has no past medical history of Breast cancer (HCC) or Ulcer.    MEDS:   Current Outpatient Prescriptions:   •  cefdinir (OMNICEF) 300 MG Cap, Take 1 Cap by mouth 2 times a day for 7 days., Disp: 14 Cap, Rfl: 0  •  DEXILANT 60 MG CAPSULE DELAYED RELEASE delayed-release capsule, , Disp: , Rfl:   •  lisinopril (PRINIVIL) 20 MG Tab, Take 1 Tab by mouth every day., Disp: 30 Tab, Rfl: 2  •  amitriptyline (ELAVIL) 25 MG Tab, Take 25 mg by mouth every day., Disp: , Rfl:   •  DULoxetine (CYMBALTA) 60 MG Cap DR Particles delayed-release capsule, Take 60 mg by mouth every day., Disp: , Rfl:   •  pantoprazole (PROTONIX) 40 MG Tablet Delayed Response, Take 40 mg by mouth every day., Disp: , Rfl:   •  diltiazem CD (CARDIZEM CD) 240 MG CP24, Take 240 mg by mouth every day., Disp: , Rfl:   •  ondansetron (ZOFRAN) 4 MG Tab tablet, Take 1 Tab by mouth every four hours as needed for Nausea/Vomiting., Disp: 30 Tab, Rfl: 2  •  Alum Hydroxide-Mag Carbonate (GAVISCON PO), Take 2 Doses by mouth every 4 hours. OTC, Disp: , Rfl:   •  aspirin 81 MG EC tablet, Take 81 mg by mouth every day., Disp: , Rfl:     ALLERGIES:   Allergies   Allergen Reactions   • Clarithromycin      Swelling/Itching/Nausea   • Doxycycline      Possible allergic reaction   • Ees [Erythromycin]      Swelling/Itching/Nausea   • Levaquin      Muscle soreness    • Pcn [Penicillins]      Swelling/Itching/Nausea    • Shellfish Allergy      Swelling/Itching/Nausea   • Sumatriptan Swelling     Tongue swell   • Trazodone Swelling       SURGHX:   Past Surgical History:   Procedure Laterality Date   • LUMBAR LAMINECTOMY DISKECTOMY Bilateral 2/4/2017    Procedure: LUMBAR LAMINECTOMY DISKECTOMY POSTERIOR L4-S1 ;  Surgeon: Emil Hitchcock M.D.;  Location: [facility];  Service:    • CARPAL TUNNEL ENDOSCOPIC  11/17/2012    Performed by Heriberto Quiñones M.D. at SURGERY [facility]   • TRIGGER FINGER RELEASE  11/17/2012    Performed by Heriberto Quiñones M.D.  "at SURGERY [facility] ORS   • HIP ARTHROSCOPY  2/23/2009    Performed by SHERLYN CALVO at SURGERY [facility] ORS   • ACETABULAR OSTEOTOMY  2/23/2009    Performed by SHERLYN CALVO at SURGERY [facility] ORS   • MASS EXCISION ORTHO  2/23/2009    Performed by SHERLYN CALVO at SURGERY [facility] ORS   • HIP ARTHROSCOPY  2005    done at [facility]   • HAJA BY LAPAROSCOPY  1997   • HYSTERECTOMY, TOTAL ABDOMINAL  1979    oopherectomy lacie   • BLADDER SUSPENSION      bladder sling   • PRIMARY C SECTION  1970/1975       SOCHX:  reports that she has never smoked. She has never used smokeless tobacco. She reports that she does not drink alcohol or use drugs.    FH: Reviewed with patient, not pertinent to this visit.     Objective:   /68   Pulse (!) 109   Temp 36.7 °C (98.1 °F)   Resp 16   Ht 1.549 m (5' 1\")   Wt 72.6 kg (160 lb)   SpO2 95%   BMI 30.23 kg/m²   Physical Exam   Constitutional: She is oriented to person, place, and time. She appears well-developed and well-nourished. No distress.   HENT:   Head: Normocephalic and atraumatic.   Right Ear: External ear normal. There is tenderness. Tympanic membrane is erythematous. Tympanic membrane is not retracted and not bulging. No middle ear effusion.   Left Ear: External ear normal. There is tenderness. Tympanic membrane is erythematous. Tympanic membrane is not retracted and not bulging.  No middle ear effusion.   Nose: Mucosal edema and rhinorrhea present. No sinus tenderness. Right sinus exhibits maxillary sinus tenderness and frontal sinus tenderness. Left sinus exhibits maxillary sinus tenderness and frontal sinus tenderness.   Mouth/Throat: Uvula is midline, oropharynx is clear and moist and mucous membranes are normal.   Eyes: Conjunctivae and EOM are normal.   Neck: Normal range of motion. Neck supple. No tracheal deviation present.   Cardiovascular: Normal rate, regular rhythm and normal heart sounds.    Pulmonary/Chest: Effort normal and " breath sounds normal. No respiratory distress.   Musculoskeletal:   ROM normal all four extremities   Lymphadenopathy:     She has no cervical adenopathy.   Neurological: She is alert and oriented to person, place, and time.   Skin: Skin is warm and dry.   Psychiatric: She has a normal mood and affect. Her behavior is normal. Judgment and thought content normal.       Assessment/Plan:   1. Acute non-recurrent maxillary sinusitis  - cefdinir (OMNICEF) 300 MG Cap; Take 1 Cap by mouth 2 times a day for 7 days.  Dispense: 14 Cap; Refill: 0    2. Acute otitis media, unspecified otitis media type  - cefdinir (OMNICEF) 300 MG Cap; Take 1 Cap by mouth 2 times a day for 7 days.  Dispense: 14 Cap; Refill: 0    - Patient has allergies to many antibiotics. She states that Cefdinir has worked well for her before  - Advised to take abx with food/yogurt and to complete course  - Advised to continue OTC Ibuprofen, Nasocort for symptom relief  - Advised to try OTC Mucinex for cough  - Advised to return or follow up with PCP if symptoms worsen or do not improve    Differential diagnosis, natural history, supportive care, and indications for immediate follow-up discussed.

## 2019-01-02 RX ORDER — AMITRIPTYLINE HYDROCHLORIDE 25 MG/1
TABLET, FILM COATED ORAL
Qty: 90 TAB | Refills: 1 | Status: SHIPPED | OUTPATIENT
Start: 2019-01-02 | End: 2019-08-12

## 2019-01-14 ENCOUNTER — PATIENT OUTREACH (OUTPATIENT)
Dept: HEALTH INFORMATION MANAGEMENT | Facility: OTHER | Age: 69
End: 2019-01-14

## 2019-01-14 NOTE — PROGRESS NOTES
1. Attempt #:Final  2. HealthConnect Verified: yes    3. Verify PCP: yes    4. Review Care Team: yes    5. WebIZ Checked & Epic Updated: Yes  · WebIZ Recommendations: FLU, PREVNAR (PCV13)  and TDAP  · Is patient due for Tdap? YES. Patient was not notified of copay/out of pocket cost.  · Is patient due for Shingles? YES. Patient was not notified of copay/out of pocket cost.    6. Reviewed/Updated the following with patient:       •   Communication Preference Obtained? YES       •   Preferred Pharmacy? YES       •   Preferred Lab? YES       •   Family History (document living status of immediate family members and if + hx of cancer, diabetes, hypertension, hyperlipidemia, heart attack, stroke) YES    7. Annual Wellness Visit Scheduling  · Scheduling Status:Scheduled     8. Care Gap Scheduling (Attempt to Schedule EACH Overdue Care Gap!)/ Pt declined Immunizations and Dexa.     Health Maintenance Due   Topic Date Due   • IMM DTaP/Tdap/Td Vaccine (1 - Tdap) 06/15/1970   • IMM ZOSTER VACCINES (1 of 2) 06/15/2001   • BONE DENSITY  06/15/2016   • IMM PNEUMOCOCCAL 65+ (ADULT) LOW/MEDIUM RISK SERIES (1 of 2 - PCV13) 06/15/2016   • Annual Wellness Visit  06/15/2016   • IMM INFLUENZA (1) 09/01/2018        Scheduled patient for Annual Wellness Visit     9. Press-sense Activation: already active    10. Press-sense Zeynep: no    11. Virtual Visits: no    12. Opt In to Text Messages: no    13. Patient was advised: “This is a free wellness visit. The provider will screen for medical conditions to help you stay healthy. If you have other concerns to address you may be asked to discuss these at a separate visit or there may be an additional fee.”     14. Patient was informed to arrive 15 min prior to their scheduled appointment and bring in their medication bottles.

## 2019-01-15 ENCOUNTER — TELEPHONE (OUTPATIENT)
Dept: MEDICAL GROUP | Facility: PHYSICIAN GROUP | Age: 69
End: 2019-01-15

## 2019-01-15 NOTE — TELEPHONE ENCOUNTER
Future Appointments       Provider Department Center    1/17/2019 11:05 AM KEERTHI Garcia. [facility]    2/7/2019 10:40 AM KEERTHI Garcia.; [facility]  [facility]        ESTABLISHED PATIENT PRE-VISIT PLANNING     Patient was NOT contacted to complete PVP.       1.  Reviewed notes from the last few office visits within the medical group: Yes    2.  If any orders were placed at last visit or intended to be done for this visit (i.e. 6 mos follow-up), do we have Results/Consult Notes?        •  Labs - Labs were not ordered at last office visit.       •  Imaging - Imaging was not ordered at last office visit.       •  Referrals - No referrals were ordered at last office visit.    3. Is this appointment scheduled as a Hospital Follow-Up? No    4.  Immunizations were updated in [email or web address] using WebIZ?: Yes       •  Web Iz Recommendations: FLU, PREVNAR (PCV13) , TDAP and VARICELLA (Chicken Pox)     5.  Patient is due for the following Health Maintenance Topics:   Health Maintenance Due   Topic Date Due   • IMM DTaP/Tdap/Td Vaccine (1 - Tdap) 06/15/1970   • IMM ZOSTER VACCINES (1 of 2) 06/15/2001   • BONE DENSITY  06/15/2016   • IMM PNEUMOCOCCAL 65+ (ADULT) LOW/MEDIUM RISK SERIES (1 of 2 - PCV13) 06/15/2016   • Annual Wellness Visit  06/15/2016   • IMM INFLUENZA (1) 09/01/2018     6. Orders for overdue Health Maintenance topics pended in Pre-Charting? YES    7.  AHA (MDX) form printed for Provider? YES    8.  Patient was NOT informed to arrive 15 min prior to their scheduled appointment and bring in their medication bottles.  1/15/2019- Tried calling pt but VM was not set up. Needed to know what she is coming in for, and to remind her to be here 15 min prior.

## 2019-01-17 ENCOUNTER — OFFICE VISIT (OUTPATIENT)
Dept: MEDICAL GROUP | Facility: PHYSICIAN GROUP | Age: 69
End: 2019-01-17
Payer: MEDICARE

## 2019-01-17 VITALS
DIASTOLIC BLOOD PRESSURE: 80 MMHG | HEART RATE: 91 BPM | SYSTOLIC BLOOD PRESSURE: 120 MMHG | TEMPERATURE: 97.7 F | RESPIRATION RATE: 16 BRPM | WEIGHT: 158 LBS | OXYGEN SATURATION: 95 % | BODY MASS INDEX: 29.85 KG/M2

## 2019-01-17 DIAGNOSIS — J40 BRONCHITIS: ICD-10-CM

## 2019-01-17 DIAGNOSIS — I48.0 PAROXYSMAL ATRIAL FIBRILLATION (HCC): ICD-10-CM

## 2019-01-17 DIAGNOSIS — Z78.0 POSTMENOPAUSAL: ICD-10-CM

## 2019-01-17 DIAGNOSIS — Z23 NEED FOR VACCINATION: ICD-10-CM

## 2019-01-17 PROCEDURE — 99214 OFFICE O/P EST MOD 30 MIN: CPT | Performed by: NURSE PRACTITIONER

## 2019-01-17 PROCEDURE — 8041 PR SCP AHA: Performed by: NURSE PRACTITIONER

## 2019-01-17 RX ORDER — SULFAMETHOXAZOLE AND TRIMETHOPRIM 800; 160 MG/1; MG/1
1 TABLET ORAL 2 TIMES DAILY
Qty: 20 TAB | Refills: 0 | Status: SHIPPED | OUTPATIENT
Start: 2019-01-17 | End: 2019-02-06

## 2019-01-17 NOTE — PROGRESS NOTES
Annual Health Assessment Questions:    1.  Are you currently engaging in any exercise or physical activity? No    2.  How would you describe your mood or emotional well-being today? good    3.  Have you had any falls in the last year? No    4.  Have you noticed any problems with your balance or had difficulty walking? No    5.  In the last six months have you experienced any leakage of urine? Yes    6. DPA/Advanced Directive: Patient does not have an Advanced Directive.  A packet and workshop information was given on Advanced Directives.     I attest that I saw this patient on this date and due to technical issues am not showing as the author of the note.

## 2019-01-22 PROBLEM — J40 BRONCHITIS: Status: ACTIVE | Noted: 2019-01-22

## 2019-01-22 PROBLEM — Z78.0 POSTMENOPAUSAL: Status: ACTIVE | Noted: 2019-01-22

## 2019-01-22 NOTE — ASSESSMENT & PLAN NOTE
Started about a week ago with cold symptoms.  Now has lots of pressure in her ears and a deep productive cough.  Discussed plan to proceed with antibiotics.

## 2019-01-22 NOTE — PROGRESS NOTES
Chief Complaint   Patient presents with   • Pain     Ears/x7 days    • Cough     x7 days        HISTORY OF PRESENT ILLNESS: Patient is a 68 y.o. female established patient who presents today to discuss the following issues:    Atrial fibrillation (HCC)  Chronic in nature.  Stable on meds.  Followed by cardiology.  Was told by her cardiologist that even if her rhythm is normal, she will always have a fib.      Bronchitis  Started about a week ago with cold symptoms.  Now has lots of pressure in her ears and a deep productive cough.  Discussed plan to proceed with antibiotics.    Postmenopausal  Due for DEXA.      Patient Active Problem List    Diagnosis Date Noted   • Bronchitis 01/22/2019   • Postmenopausal 01/22/2019   • Atrial fibrillation (HCC) 12/07/2018   • Shortness of breath 06/20/2018   • Anemia 06/07/2018   • BMI 30.0-30.9,adult 05/16/2018   • Pain of right upper extremity 01/02/2018   • Headache 11/10/2017   • Urinary incontinence 07/12/2017   • Spinal stenosis, lumbar 02/04/2017   • Trigger finger, acquired 11/17/2012   • GERD (gastroesophageal reflux disease) 02/27/2010   • Vitamin D deficiency 02/27/2010   • Impaired fasting glucose 02/27/2010   • HTN (hypertension) 02/24/2010   • Hyperlipidemia 02/24/2010   • Osteopenia 02/24/2010   • Depression 02/24/2010       Allergies:Clarithromycin; Ees [erythromycin]; Levaquin; Pcn [penicillins]; Shellfish allergy; Sumatriptan; and Trazodone    Current Outpatient Prescriptions   Medication Sig Dispense Refill   • sulfamethoxazole-trimethoprim (BACTRIM DS) 800-160 MG tablet Take 1 Tab by mouth 2 times a day. 20 Tab 0   • amitriptyline (ELAVIL) 25 MG Tab TAKE 1 TABLET BY MOUTH ONCE DAILY AT BEDTIME 90 Tab 1   • lisinopril (PRINIVIL) 20 MG Tab Take 1 Tab by mouth every day. 30 Tab 2   • DULoxetine (CYMBALTA) 60 MG Cap DR Particles delayed-release capsule Take 60 mg by mouth every day.     • aspirin 81 MG EC tablet Take 81 mg by mouth every day.     • pantoprazole  (PROTONIX) 40 MG Tablet Delayed Response Take 40 mg by mouth every day.     • diltiazem CD (CARDIZEM CD) 240 MG CP24 Take 240 mg by mouth every day.     • ondansetron (ZOFRAN) 4 MG Tab tablet Take 1 Tab by mouth every four hours as needed for Nausea/Vomiting. 30 Tab 2     No current facility-administered medications for this visit.        Social History   Substance Use Topics   • Smoking status: Never Smoker   • Smokeless tobacco: Never Used   • Alcohol use No      Comment: Stopped drinking 45 days ago 17 Going to AA       Family Status   Relation Status   • Mo  at age 44        Urosepsis   • Fa  at age 74        MI. Also had Crohn's   • Bro Alive        Gallbladder cancer   • Sis Alive        Crohn's   • Unknown (Not Specified)   • Bro      Family History   Problem Relation Age of Onset   • GI Father         Crohn's   • Heart Disease Father         First MI age 30   • Hypertension Father    • Hypertension Unknown    • Cancer Brother         ESOPHAGEAL CANCER       Review of Systems:   Constitutional: Negative for fever, chills, weight loss and malaise/fatigue.   HENT: Negative for nosebleeds, sore throat,and neck pain.  Positive for ear pain and congestion.  Eyes: Negative for blurred vision.   Respiratory: Positive for cough and sputum production.  Negative for shortness of breath and wheezing.    Cardiovascular: Negative for chest pain, palpitations, orthopnea and leg swelling.   Gastrointestinal: Negative for heartburn, nausea, vomiting and abdominal pain.   Genitourinary: Negative for dysuria, urgency and frequency.   Musculoskeletal: Negative for myalgias, joint pain, and back pain.  Skin: Negative for rash and itching.   Neurological: Negative for dizziness, tingling, tremors, sensory change, focal weakness and headaches.   Endo/Heme/Allergies: Does not bruise/bleed easily.   Psychiatric/Behavioral: Negative for depression, suicidal ideas and memory loss.  The patient is not  nervous/anxious and does not have insomnia.    All other systems reviewed and are negative except as in HPI.    Exam:  Blood pressure 120/80, pulse 91, temperature 36.5 °C (97.7 °F), resp. rate 16, weight 71.7 kg (158 lb), SpO2 95 %, not currently breastfeeding.  General:  Well nourished, well developed female in NAD  Head: Grossly normal. Ears with bilat serous effusions.  Neck: Supple without JVD or bruit. Thyroid is not enlarged.  Pulmonary: Clear to ausculation. Normal effort. No rales, ronchi, or wheezing.  Cardiovascular: Regular rate and rhythm without murmur.   Abdomen:  Soft, nontender, nondistended.  Extremities: No clubbing, cyanosis, or edema.  Skin: Intact with no obvious rashes or lesions.  Neuro: Grossly intact.  Psych: Alert and oriented x 3.  Mood and affect appropriate.    Medical decision-making and discussion: Augusta is here today to discuss a few things.  She was encouraged to rest, stay hydrated, and to treat her symptoms with otc meds.  Bactrim was sent to her pharmacy and a DEXA was ordered.  Her MDX form was also completed.  She will follow-up here as needed.          Assessment/Plan:  1. Postmenopausal  DS-BONE DENSITY STUDY (DEXA)   2. Need for vaccination  CANCELED: URINALYSIS,CULTURE IF INDICATED   3. Bronchitis  sulfamethoxazole-trimethoprim (BACTRIM DS) 800-160 MG tablet   4. Paroxysmal atrial fibrillation (HCC)         Return if symptoms worsen or fail to improve.    Please note that this dictation was created using voice recognition software. I have made every reasonable attempt to correct obvious errors, but I expect that there are errors of grammar and possibly content that I did not discover before finalizing the note.

## 2019-01-22 NOTE — ASSESSMENT & PLAN NOTE
Chronic in nature.  Stable on meds.  Followed by cardiology.  Was told by her cardiologist that even if her rhythm is normal, she will always have a fib.

## 2019-01-30 ENCOUNTER — TELEPHONE (OUTPATIENT)
Dept: MEDICAL GROUP | Facility: PHYSICIAN GROUP | Age: 69
End: 2019-01-30

## 2019-01-30 NOTE — TELEPHONE ENCOUNTER
1. Caller Name: Augusta                                          Call Back Number: [phone] (home)        Patient approves a detailed voicemail message: N\A    pt called and states that she is still experiencing the same sx that she had when she was last seen 01/17/19. Pt would like to know what to do since she doesn't feel any better.

## 2019-01-31 LAB — EKG IMPRESSION: NORMAL

## 2019-02-01 NOTE — TELEPHONE ENCOUNTER
If she is still having symptoms she needs to be seen again.  The antibiotic should have killed everything, so we need to make sure that there's nothing else going on.  Thanks.

## 2019-02-05 ENCOUNTER — TELEPHONE (OUTPATIENT)
Dept: MEDICAL GROUP | Facility: PHYSICIAN GROUP | Age: 69
End: 2019-02-05

## 2019-02-05 NOTE — TELEPHONE ENCOUNTER
Future Appointments       Provider Department Center    2/6/2019 5:25 PM ALBINA Garcia [facility]        ESTABLISHED PATIENT PRE-VISIT PLANNING     Patient was contacted to complete PVP.      1.  Reviewed notes from the last few office visits within the medical group: Yes    2.  If any orders were placed at last visit or intended to be done for this visit (i.e. 6 mos follow-up), do we have Results/Consult Notes?        •  Labs - Labs were not ordered at last office visit.       •  Imaging - Imaging ordered, NOT completed. Patient advised to complete prior to next appointment. - Called pt and she cannot afford the DEXA scan at this time.        •  Referrals - No referrals were ordered at last office visit.    3. Is this appointment scheduled as a Hospital Follow-Up? No    4.  Immunizations were [facility] using [facility]?: Yes       •  Web Iz Recommendations: FLU, PREVNAR (PCV13) , TDAP and SHINGRIX (Shingles) - PT refuses to have immunizations.     5.  Patient is due for the following Health Maintenance Topics:   Health Maintenance Due   Topic Date Due   • IMM DTaP/Tdap/Td Vaccine (1 - Tdap) 06/15/1970   • IMM ZOSTER VACCINES (1 of 2) 06/15/2001   • BONE DENSITY  06/15/2016   • IMM PNEUMOCOCCAL 65+ (ADULT) LOW/MEDIUM RISK SERIES (1 of 2 - PCV13) 06/15/2016   • Annual Wellness Visit  06/15/2016   • IMM INFLUENZA (1) 09/01/2018   • MAMMOGRAM  01/18/2019     6. Orders for overdue Health Maintenance topics pended in Pre-Charting? YES    7.  AHA (MDX) form printed for Provider? YES    8.  Patient was informed to arrive 15 min prior to their scheduled appointment and bring in their medication bottles.

## 2019-02-06 ENCOUNTER — OFFICE VISIT (OUTPATIENT)
Dept: MEDICAL GROUP | Facility: PHYSICIAN GROUP | Age: 69
End: 2019-02-06
Payer: MEDICARE

## 2019-02-06 VITALS
HEART RATE: 91 BPM | WEIGHT: 157 LBS | TEMPERATURE: 97.3 F | DIASTOLIC BLOOD PRESSURE: 70 MMHG | RESPIRATION RATE: 16 BRPM | OXYGEN SATURATION: 95 % | SYSTOLIC BLOOD PRESSURE: 110 MMHG | HEIGHT: 61 IN | BODY MASS INDEX: 29.64 KG/M2

## 2019-02-06 DIAGNOSIS — Z12.31 ENCOUNTER FOR SCREENING MAMMOGRAM FOR BREAST CANCER: ICD-10-CM

## 2019-02-06 DIAGNOSIS — I10 HYPERTENSION, UNSPECIFIED TYPE: ICD-10-CM

## 2019-02-06 DIAGNOSIS — J40 BRONCHITIS: ICD-10-CM

## 2019-02-06 PROCEDURE — 99214 OFFICE O/P EST MOD 30 MIN: CPT | Performed by: NURSE PRACTITIONER

## 2019-02-06 RX ORDER — TELMISARTAN 40 MG/1
40 TABLET ORAL DAILY
Qty: 30 TAB | Refills: 3 | Status: SHIPPED | OUTPATIENT
Start: 2019-02-06 | End: 2019-03-13 | Stop reason: SDUPTHER

## 2019-02-07 NOTE — PROGRESS NOTES
Chief Complaint   Patient presents with   • Follow-Up     Office visit.    • Pain     Ears   • Cough       HISTORY OF PRESENT ILLNESS: Patient is a 68 y.o. female established patient who presents today to discuss the following issues:    Encounter for screening mammogram for breast cancer  Due for a mammogram.    HTN (hypertension)  Lisinopril makes her cough.  Discussed options.  She will proceed with a trial of telmisartan.    Bronchitis  She is feeling better since her last visit.  Her sputum is now clear, and she has more energy.  She believes that the residual cough is caused by her lisinopril.  She was encouraged to rest, stay hydrated, and to treat her symptoms with otc meds.        Patient Active Problem List    Diagnosis Date Noted   • Encounter for screening mammogram for breast cancer 02/11/2019   • Bronchitis 01/22/2019   • Postmenopausal 01/22/2019   • Atrial fibrillation (HCC) 12/07/2018   • Shortness of breath 06/20/2018   • Anemia 06/07/2018   • BMI 30.0-30.9,adult 05/16/2018   • Pain of right upper extremity 01/02/2018   • Headache 11/10/2017   • Urinary incontinence 07/12/2017   • Spinal stenosis, lumbar 02/04/2017   • Trigger finger, acquired 11/17/2012   • GERD (gastroesophageal reflux disease) 02/27/2010   • Vitamin D deficiency 02/27/2010   • Impaired fasting glucose 02/27/2010   • HTN (hypertension) 02/24/2010   • Hyperlipidemia 02/24/2010   • Osteopenia 02/24/2010   • Depression 02/24/2010       Allergies:Clarithromycin; Ees [erythromycin]; Levaquin; Pcn [penicillins]; Shellfish allergy; Sumatriptan; and Trazodone    Current Outpatient Prescriptions   Medication Sig Dispense Refill   • telmisartan (MICARDIS) 40 MG Tab Take 1 Tab by mouth every day. 30 Tab 3   • amitriptyline (ELAVIL) 25 MG Tab TAKE 1 TABLET BY MOUTH ONCE DAILY AT BEDTIME 90 Tab 1   • ondansetron (ZOFRAN) 4 MG Tab tablet Take 1 Tab by mouth every four hours as needed for Nausea/Vomiting. 30 Tab 2   • DULoxetine (CYMBALTA) 60  MG Cap DR Particles delayed-release capsule Take 60 mg by mouth every day.     • aspirin 81 MG EC tablet Take 81 mg by mouth every day.     • pantoprazole (PROTONIX) 40 MG Tablet Delayed Response Take 40 mg by mouth every day.     • diltiazem CD (CARDIZEM CD) 240 MG CP24 Take 240 mg by mouth every day.       No current facility-administered medications for this visit.        Social History   Substance Use Topics   • Smoking status: Never Smoker   • Smokeless tobacco: Never Used   • Alcohol use No      Comment: Stopped drinking 45 days ago 17 Going to AA       Family Status   Relation Status   • Mo  at age 44        Urosepsis   • Fa  at age 74        MI. Also had Crohn's   • Bro Alive        Gallbladder cancer   • Sis Alive        Crohn's   • Unknown (Not Specified)   • Bro      Family History   Problem Relation Age of Onset   • GI Father         Crohn's   • Heart Disease Father         First MI age 30   • Hypertension Father    • Hypertension Unknown    • Cancer Brother         ESOPHAGEAL CANCER       Review of Systems:   Constitutional: Negative for fever, chills, weight loss and malaise/fatigue.   HENT: Negative for ear pain, nosebleeds, congestion, sore throat and neck pain.  Positive for ears feel plugged off and on.  Eyes: Negative for blurred vision.   Respiratory: Positive for cough.  Negative for sputum production, shortness of breath and wheezing.    Cardiovascular: Negative for chest pain, palpitations, orthopnea and leg swelling.   Gastrointestinal: Negative for heartburn, nausea, vomiting and abdominal pain.   Genitourinary: Negative for dysuria, urgency and frequency.   Musculoskeletal: Negative for myalgias, joint pain, and back pain.  Skin: Negative for rash and itching.   Neurological: Negative for dizziness, tingling, tremors, sensory change, focal weakness and headaches.   Endo/Heme/Allergies: Does not bruise/bleed easily.   Psychiatric/Behavioral: Negative for  "depression, suicidal ideas and memory loss.  The patient is not nervous/anxious and does not have insomnia.    All other systems reviewed and are negative except as in HPI.    Exam:  Blood pressure 110/70, pulse 91, temperature 36.3 °C (97.3 °F), resp. rate 16, height 1.549 m (5' 1\"), weight 71.2 kg (157 lb), SpO2 95 %, not currently breastfeeding.  General:  Well nourished, well developed female in NAD  Head: Grossly normal.  Neck: Supple without JVD or bruit. Thyroid is not enlarged.  Pulmonary: Clear to ausculation. Normal effort. No rales, ronchi, or wheezing.  Cardiovascular: Regular rate and rhythm without murmur.   Abdomen:  Soft, nontender, nondistended.  Extremities: No clubbing, cyanosis, or edema.  Skin: Intact with no obvious rashes or lesions.  Neuro: Grossly intact.  Psych: Alert and oriented x 3.  Mood and affect appropriate.    Medical decision-making and discussion: Augusta is here today to discuss a few things.  A mammogram was ordered and her lisinopril was changed to telmisartan.  She was encouraged to rest, stay hydrated, and to treat her symptoms with otc meds.  She will follow-up here as needed.          Assessment/Plan:  1. Encounter for screening mammogram for breast cancer  MA-SCREEN MAMMO W/CAD-BILAT   2. Hypertension, unspecified type  telmisartan (MICARDIS) 40 MG Tab   3. Bronchitis         Return if symptoms worsen or fail to improve.    Please note that this dictation was created using voice recognition software. I have made every reasonable attempt to correct obvious errors, but I expect that there are errors of grammar and possibly content that I did not discover before finalizing the note.  "

## 2019-02-11 PROBLEM — Z12.31 ENCOUNTER FOR SCREENING MAMMOGRAM FOR BREAST CANCER: Status: ACTIVE | Noted: 2019-02-11

## 2019-02-11 NOTE — ASSESSMENT & PLAN NOTE
She is feeling better since her last visit.  Her sputum is now clear, and she has more energy.  She believes that the residual cough is caused by her lisinopril.  She was encouraged to rest, stay hydrated, and to treat her symptoms with otc meds.

## 2019-02-28 ENCOUNTER — HOSPITAL ENCOUNTER (OUTPATIENT)
Facility: MEDICAL CENTER | Age: 69
End: 2019-02-28
Attending: PHYSICIAN ASSISTANT
Payer: MEDICARE

## 2019-02-28 ENCOUNTER — OFFICE VISIT (OUTPATIENT)
Dept: URGENT CARE | Facility: PHYSICIAN GROUP | Age: 69
End: 2019-02-28
Payer: MEDICARE

## 2019-02-28 VITALS
SYSTOLIC BLOOD PRESSURE: 128 MMHG | RESPIRATION RATE: 20 BRPM | WEIGHT: 158 LBS | HEART RATE: 77 BPM | DIASTOLIC BLOOD PRESSURE: 82 MMHG | OXYGEN SATURATION: 96 % | BODY MASS INDEX: 29.85 KG/M2 | TEMPERATURE: 97 F

## 2019-02-28 DIAGNOSIS — N30.00 ACUTE CYSTITIS WITHOUT HEMATURIA: Primary | ICD-10-CM

## 2019-02-28 DIAGNOSIS — N30.00 ACUTE CYSTITIS WITHOUT HEMATURIA: ICD-10-CM

## 2019-02-28 LAB
APPEARANCE UR: CLEAR
BILIRUB UR STRIP-MCNC: NEGATIVE MG/DL
COLOR UR AUTO: NORMAL
GLUCOSE UR STRIP.AUTO-MCNC: NEGATIVE MG/DL
KETONES UR STRIP.AUTO-MCNC: NEGATIVE MG/DL
LEUKOCYTE ESTERASE UR QL STRIP.AUTO: NEGATIVE
NITRITE UR QL STRIP.AUTO: POSITIVE
PH UR STRIP.AUTO: 5.5 [PH] (ref 5–8)
PROT UR QL STRIP: NEGATIVE MG/DL
RBC UR QL AUTO: NEGATIVE
SP GR UR STRIP.AUTO: 1.01
UROBILINOGEN UR STRIP-MCNC: 0.2 MG/DL

## 2019-02-28 PROCEDURE — 87186 SC STD MICRODIL/AGAR DIL: CPT

## 2019-02-28 PROCEDURE — 87086 URINE CULTURE/COLONY COUNT: CPT

## 2019-02-28 PROCEDURE — 99000 SPECIMEN HANDLING OFFICE-LAB: CPT | Performed by: PHYSICIAN ASSISTANT

## 2019-02-28 PROCEDURE — 87077 CULTURE AEROBIC IDENTIFY: CPT

## 2019-02-28 PROCEDURE — 99214 OFFICE O/P EST MOD 30 MIN: CPT | Performed by: PHYSICIAN ASSISTANT

## 2019-02-28 PROCEDURE — 81002 URINALYSIS NONAUTO W/O SCOPE: CPT | Performed by: PHYSICIAN ASSISTANT

## 2019-02-28 RX ORDER — PHENAZOPYRIDINE HYDROCHLORIDE 200 MG/1
200 TABLET, FILM COATED ORAL 3 TIMES DAILY
Qty: 6 TAB | Refills: 0 | Status: SHIPPED | OUTPATIENT
Start: 2019-02-28 | End: 2019-03-02

## 2019-02-28 RX ORDER — SULFAMETHOXAZOLE AND TRIMETHOPRIM 800; 160 MG/1; MG/1
1 TABLET ORAL 2 TIMES DAILY
Qty: 14 TAB | Refills: 0 | Status: SHIPPED | OUTPATIENT
Start: 2019-02-28 | End: 2019-03-04

## 2019-02-28 ASSESSMENT — ENCOUNTER SYMPTOMS
COUGH: 0
SHORTNESS OF BREATH: 0
FLANK PAIN: 0
ABDOMINAL PAIN: 0
FEVER: 0
CONSTIPATION: 0
NAUSEA: 0
DIARRHEA: 0
VOMITING: 0
CHILLS: 0

## 2019-02-28 NOTE — PROGRESS NOTES
Subjective:   Augusta Rodriguez is a 68 y.o. female who presents for UTI (x3 days )        Dysuria    This is a new problem. Episode onset: 3 days. There has been no fever. There is no history of pyelonephritis. Associated symptoms include frequency, hematuria, hesitancy and urgency. Pertinent negatives include no chills, discharge, flank pain, nausea or vomiting. She has tried nothing for the symptoms. Her past medical history is significant for recurrent UTIs. There is no history of kidney stones.     Review of Systems   Constitutional: Negative for chills, fever and malaise/fatigue.   Respiratory: Negative for cough and shortness of breath.    Gastrointestinal: Negative for abdominal pain, constipation, diarrhea, nausea and vomiting.   Genitourinary: Positive for dysuria, frequency, hematuria, hesitancy and urgency. Negative for flank pain.   All other systems reviewed and are negative.      PMH:  has a past medical history of Arrhythmia; Arthritis; Cold; Dental disorder; Depression (2/24/2010); GERD (gastroesophageal reflux disease) (2/27/2010); HTN (hypertension) (2/24/2010); Hypercholesteremia; Hyperlipidemia (2/24/2010); Hypertension; Impaired fasting glucose (2/27/2010); Incontinence of urine; Indigestion; Osteopenia (2/24/2010); Other specified disorder of intestines; Pain (11/5/12); Preventative health care (2/27/2010); Psychiatric problem; Restless leg syndrome; Tear of meniscus of knee joint (2/27/2010); Tremor, essential (2/24/2010); Urinary bladder disorder; and Vitamin d deficiency (2/27/2010). She also has no past medical history of Breast cancer (HCC) or Ulcer.  MEDS:   Current Outpatient Prescriptions:   •  sulfamethoxazole-trimethoprim (BACTRIM DS) 800-160 MG tablet, Take 1 Tab by mouth 2 times a day for 7 days., Disp: 14 Tab, Rfl: 0  •  phenazopyridine (PYRIDIUM) 200 MG Tab, Take 1 Tab by mouth 3 times a day for 2 days., Disp: 6 Tab, Rfl: 0  •  telmisartan (MICARDIS) 40 MG Tab, Take 1 Tab by mouth  every day., Disp: 30 Tab, Rfl: 3  •  amitriptyline (ELAVIL) 25 MG Tab, TAKE 1 TABLET BY MOUTH ONCE DAILY AT BEDTIME, Disp: 90 Tab, Rfl: 1  •  ondansetron (ZOFRAN) 4 MG Tab tablet, Take 1 Tab by mouth every four hours as needed for Nausea/Vomiting., Disp: 30 Tab, Rfl: 2  •  DULoxetine (CYMBALTA) 60 MG Cap DR Particles delayed-release capsule, Take 60 mg by mouth every day., Disp: , Rfl:   •  aspirin 81 MG EC tablet, Take 81 mg by mouth every day., Disp: , Rfl:   •  pantoprazole (PROTONIX) 40 MG Tablet Delayed Response, Take 40 mg by mouth every day., Disp: , Rfl:   •  diltiazem CD (CARDIZEM CD) 240 MG CP24, Take 240 mg by mouth every day., Disp: , Rfl:   ALLERGIES:   Allergies   Allergen Reactions   • Clarithromycin      Swelling/Itching/Nausea   • Ees [Erythromycin]      Swelling/Itching/Nausea   • Levaquin      Muscle soreness    • Pcn [Penicillins]      Swelling/Itching/Nausea    • Shellfish Allergy      Swelling/Itching/Nausea   • Sumatriptan Swelling     Tongue swell   • Trazodone Swelling     SURGHX:   Past Surgical History:   Procedure Laterality Date   • LUMBAR LAMINECTOMY DISKECTOMY Bilateral 2/4/2017    Procedure: LUMBAR LAMINECTOMY DISKECTOMY POSTERIOR L4-S1 ;  Surgeon: Emil Hitchcock M.D.;  Location: [facility];  Service:    • CARPAL TUNNEL ENDOSCOPIC  11/17/2012    Performed by Heriberto Quiñones M.D. at [facility]   • TRIGGER FINGER RELEASE  11/17/2012    Performed by Heriberto Quiñones M.D. at [facility]   • HIP ARTHROSCOPY  2/23/2009    Performed by SHERLYN CALVO at [facility]   • ACETABULAR OSTEOTOMY  2/23/2009    Performed by SHERLYN CALVO at [facility]   • MASS EXCISION ORTHO  2/23/2009    Performed by SHERLYN CALVO at [facility]   • HIP ARTHROSCOPY  2005    done at [facility]   • HAJA BY LAPAROSCOPY  1997   • HYSTERECTOMY, TOTAL ABDOMINAL  1979    oopherectomy lacie   • BLADDER SUSPENSION      bladder sling    • PRIMARY C SECTION  1970/1975     SOCHX:  reports that she has never smoked. She has never used smokeless tobacco. She reports that she does not drink alcohol or use drugs.  Family History   Problem Relation Age of Onset   • GI Father         Crohn's   • Heart Disease Father         First MI age 30   • Hypertension Father    • Hypertension Unknown    • Cancer Brother         ESOPHAGEAL CANCER        Objective:   /82   Pulse 77   Temp 36.1 °C (97 °F)   Resp 20   Wt 71.7 kg (158 lb)   SpO2 96%   BMI 29.85 kg/m²     Physical Exam   Constitutional: She is oriented to person, place, and time. She appears well-developed and well-nourished. No distress.   HENT:   Head: Normocephalic and atraumatic.   Eyes: Pupils are equal, round, and reactive to light. Conjunctivae are normal.   Neck: Normal range of motion. Neck supple. No tracheal deviation present.   Cardiovascular: Normal rate and regular rhythm.    Pulmonary/Chest: Effort normal and breath sounds normal.   Abdominal: There is tenderness in the suprapubic area. There is no CVA tenderness.   Neurological: She is alert and oriented to person, place, and time.   Skin: Skin is warm and dry. Capillary refill takes less than 2 seconds.   Psychiatric: She has a normal mood and affect. Her behavior is normal.   Vitals reviewed.    Lab Results   Component Value Date/Time    POCCOLOR Dark Yellow 02/28/2019 10:14 AM    POCAPPEAR Clear 02/28/2019 10:14 AM    POCLEUKEST Negative 02/28/2019 10:14 AM    POCNITRITE Positive 02/28/2019 10:14 AM    POCUROBILIGE 0.2 02/28/2019 10:14 AM    POCPROTEIN Negative 02/28/2019 10:14 AM    POCURPH 5.5 02/28/2019 10:14 AM    POCBLOOD Negative 02/28/2019 10:14 AM    POCSPGRV 1.015 02/28/2019 10:14 AM    POCKETONES Negative 02/28/2019 10:14 AM    POCBILIRUBIN Negative 02/28/2019 10:14 AM    POCGLUCUA Negative 02/28/2019 10:14 AM            Assessment/Plan:     1. Acute cystitis without hematuria  POCT Urinalysis    Urine Culture     sulfamethoxazole-trimethoprim (BACTRIM DS) 800-160 MG tablet    phenazopyridine (PYRIDIUM) 200 MG Tab     Pt will be notified of final culture results.     Patient directed to take full course of abx. Supportive care reviewed. UTI educational handout given to patient.    Follow-up with primary care provider within 7-10 days.  If symptoms worsen or persist patient can return to clinic for reevaluation.  Red flags and emergency room precautions discussed.  Patient and  verbalized understanding of information.

## 2019-02-28 NOTE — PATIENT INSTRUCTIONS
Urinary Tract Infection, Adult  Introduction  A urinary tract infection (UTI) is an infection of any part of the urinary tract. The urinary tract includes the:  · Kidneys.  · Ureters.  · Bladder.  · Urethra.  These organs make, store, and get rid of pee (urine) in the body.  Follow these instructions at home:  · Take over-the-counter and prescription medicines only as told by your doctor.  · If you were prescribed an antibiotic medicine, take it as told by your doctor. Do not stop taking the antibiotic even if you start to feel better.  · Avoid the following drinks:  ¨ Alcohol.  ¨ Caffeine.  ¨ Tea.  ¨ Carbonated drinks.  · Drink enough fluid to keep your pee clear or pale yellow.  · Keep all follow-up visits as told by your doctor. This is important.  · Make sure to:  ¨ Empty your bladder often and completely. Do not to hold pee for long periods of time.  ¨ Empty your bladder before and after sex.  ¨ Wipe from front to back after a bowel movement if you are female. Use each tissue one time when you wipe.  Contact a doctor if:  · You have back pain.  · You have a fever.  · You feel sick to your stomach (nauseous).  · You throw up (vomit).  · Your symptoms do not get better after 3 days.  · Your symptoms go away and then come back.  Get help right away if:  · You have very bad back pain.  · You have very bad lower belly (abdominal) pain.  · You are throwing up and cannot keep down any medicines or water.  This information is not intended to replace advice given to you by your health care provider. Make sure you discuss any questions you have with your health care provider.  Document Released: 06/05/2009 Document Revised: 05/25/2017 Document Reviewed: 11/07/2016  © 2017 Elsevier          Urinary Tract Infection, Adult  Introduction  A urinary tract infection (UTI) is an infection of any part of the urinary tract. The urinary tract includes the:  · Kidneys.  · Ureters.  · Bladder.  · Urethra.  These organs make, store,  and get rid of pee (urine) in the body.  Follow these instructions at home:  · Take over-the-counter and prescription medicines only as told by your doctor.  · If you were prescribed an antibiotic medicine, take it as told by your doctor. Do not stop taking the antibiotic even if you start to feel better.  · Avoid the following drinks:  ¨ Alcohol.  ¨ Caffeine.  ¨ Tea.  ¨ Carbonated drinks.  · Drink enough fluid to keep your pee clear or pale yellow.  · Keep all follow-up visits as told by your doctor. This is important.  · Make sure to:  ¨ Empty your bladder often and completely. Do not to hold pee for long periods of time.  ¨ Empty your bladder before and after sex.  ¨ Wipe from front to back after a bowel movement if you are female. Use each tissue one time when you wipe.  Contact a doctor if:  · You have back pain.  · You have a fever.  · You feel sick to your stomach (nauseous).  · You throw up (vomit).  · Your symptoms do not get better after 3 days.  · Your symptoms go away and then come back.  Get help right away if:  · You have very bad back pain.  · You have very bad lower belly (abdominal) pain.  · You are throwing up and cannot keep down any medicines or water.  This information is not intended to replace advice given to you by your health care provider. Make sure you discuss any questions you have with your health care provider.  Document Released: 06/05/2009 Document Revised: 05/25/2017 Document Reviewed: 11/07/2016  © 2017 [employer]

## 2019-03-04 ENCOUNTER — TELEPHONE (OUTPATIENT)
Dept: URGENT CARE | Facility: PHYSICIAN GROUP | Age: 69
End: 2019-03-04

## 2019-03-04 DIAGNOSIS — N30.01 ACUTE CYSTITIS WITH HEMATURIA: Primary | ICD-10-CM

## 2019-03-04 RX ORDER — CEFUROXIME AXETIL 250 MG/1
250 TABLET ORAL 2 TIMES DAILY
Qty: 14 TAB | Refills: 0 | Status: SHIPPED | OUTPATIENT
Start: 2019-03-04 | End: 2019-03-11

## 2019-03-04 NOTE — TELEPHONE ENCOUNTER
Spoke with patient informed of culture results.  She was switch from Bactrim to Ceftin.  Patient directed to take full course of antibiotics.  Return to clinic if symptoms persist.  She can call me with any further questions.

## 2019-03-13 DIAGNOSIS — I10 HYPERTENSION, UNSPECIFIED TYPE: ICD-10-CM

## 2019-03-13 NOTE — TELEPHONE ENCOUNTER
Was the patient seen in the last year in this department? Yes    Does patient have an active prescription for medications requested? Yes    Received Request Via: Pharmacy       INSURANCE IS REQUESTING A 100 DAY SUPPLY

## 2019-03-13 NOTE — TELEPHONE ENCOUNTER
Was the patient seen in the last year in this department? Yes    Does patient have an active prescription for medications requested? No     Received Request Via: Pharmacy      Pt met protocol?: Yes, ov pcp 2/6/19 bp 2/28/19 128/82 rx sent to pharmacy 2/6/19 #30 x3 ins asking for 100ds

## 2019-03-14 ENCOUNTER — TELEPHONE (OUTPATIENT)
Dept: MEDICAL GROUP | Facility: PHYSICIAN GROUP | Age: 69
End: 2019-03-14

## 2019-03-14 RX ORDER — TELMISARTAN 40 MG/1
40 TABLET ORAL DAILY
Qty: 100 TAB | Refills: 0 | Status: SHIPPED | OUTPATIENT
Start: 2019-03-14 | End: 2019-09-23 | Stop reason: SDUPTHER

## 2019-03-16 RX ORDER — DULOXETIN HYDROCHLORIDE 60 MG/1
60 CAPSULE, DELAYED RELEASE ORAL DAILY
Qty: 30 CAP | Refills: 2 | Status: SHIPPED | OUTPATIENT
Start: 2019-03-16 | End: 2019-06-24 | Stop reason: SDUPTHER

## 2019-03-25 ENCOUNTER — OFFICE VISIT (OUTPATIENT)
Dept: URGENT CARE | Facility: PHYSICIAN GROUP | Age: 69
End: 2019-03-25
Payer: MEDICARE

## 2019-03-25 ENCOUNTER — HOSPITAL ENCOUNTER (OUTPATIENT)
Dept: RADIOLOGY | Facility: MEDICAL CENTER | Age: 69
End: 2019-03-25
Attending: PHYSICIAN ASSISTANT
Payer: MEDICARE

## 2019-03-25 VITALS
SYSTOLIC BLOOD PRESSURE: 144 MMHG | HEART RATE: 96 BPM | WEIGHT: 158 LBS | HEIGHT: 61 IN | BODY MASS INDEX: 29.83 KG/M2 | RESPIRATION RATE: 20 BRPM | OXYGEN SATURATION: 95 % | DIASTOLIC BLOOD PRESSURE: 76 MMHG | TEMPERATURE: 97 F

## 2019-03-25 DIAGNOSIS — R05.9 COUGH: ICD-10-CM

## 2019-03-25 DIAGNOSIS — J22 LOWER RESPIRATORY INFECTION (E.G., BRONCHITIS, PNEUMONIA, PNEUMONITIS, PULMONITIS): ICD-10-CM

## 2019-03-25 PROCEDURE — 71046 X-RAY EXAM CHEST 2 VIEWS: CPT

## 2019-03-25 PROCEDURE — 99214 OFFICE O/P EST MOD 30 MIN: CPT | Performed by: PHYSICIAN ASSISTANT

## 2019-03-25 RX ORDER — BENZONATATE 100 MG/1
100 CAPSULE ORAL 3 TIMES DAILY PRN
Qty: 30 CAP | Refills: 0 | Status: SHIPPED | OUTPATIENT
Start: 2019-03-25 | End: 2019-08-11

## 2019-03-25 RX ORDER — DOXYCYCLINE HYCLATE 100 MG
100 TABLET ORAL 2 TIMES DAILY
Qty: 20 TAB | Refills: 0 | Status: SHIPPED | OUTPATIENT
Start: 2019-03-25 | End: 2019-04-04

## 2019-03-25 ASSESSMENT — ENCOUNTER SYMPTOMS
FEVER: 0
SHORTNESS OF BREATH: 0
HEADACHES: 0
SORE THROAT: 1
CHILLS: 0
COUGH: 1

## 2019-03-25 ASSESSMENT — COPD QUESTIONNAIRES: COPD: 0

## 2019-03-25 NOTE — PROGRESS NOTES
Subjective:   Augusta Rodriguez is a 68 y.o. female who presents for Cough (x 4 days )        Cough   This is a new problem. The current episode started in the past 7 days (4 days). The problem has been gradually worsening. The cough is non-productive. Associated symptoms include postnasal drip and a sore throat (intermittent). Pertinent negatives include no chills, ear congestion, ear pain, fever, headaches or shortness of breath. Associated symptoms comments: Ribs hurt with cough.. The symptoms are aggravated by stress. Treatments tried: robitussin, ibuprofen. The treatment provided mild relief. There is no history of bronchitis, COPD, emphysema or pneumonia.     Review of Systems   Constitutional: Negative for chills and fever.   HENT: Positive for postnasal drip and sore throat (intermittent). Negative for ear pain.    Respiratory: Positive for cough. Negative for shortness of breath.    Neurological: Negative for headaches.       PMH:  has a past medical history of Arrhythmia; Arthritis; Cold; Dental disorder; Depression (2/24/2010); GERD (gastroesophageal reflux disease) (2/27/2010); HTN (hypertension) (2/24/2010); Hypercholesteremia; Hyperlipidemia (2/24/2010); Hypertension; Impaired fasting glucose (2/27/2010); Incontinence of urine; Indigestion; Osteopenia (2/24/2010); Other specified disorder of intestines; Pain (11/5/12); Preventative health care (2/27/2010); Psychiatric problem; Restless leg syndrome; Tear of meniscus of knee joint (2/27/2010); Tremor, essential (2/24/2010); Urinary bladder disorder; and Vitamin d deficiency (2/27/2010). She also has no past medical history of Breast cancer (HCC) or Ulcer.  MEDS:   Current Outpatient Prescriptions:   •  doxycycline (VIBRAMYCIN) 100 MG Tab, Take 1 Tab by mouth 2 times a day for 10 days., Disp: 20 Tab, Rfl: 0  •  benzonatate (TESSALON) 100 MG Cap, Take 1 Cap by mouth 3 times a day as needed., Disp: 30 Cap, Rfl: 0  •  DULoxetine (CYMBALTA) 60 MG Cap DR  Particles delayed-release capsule, Take 1 Cap by mouth every day., Disp: 30 Cap, Rfl: 2  •  telmisartan (MICARDIS) 40 MG Tab, Take 1 Tab by mouth every day., Disp: 100 Tab, Rfl: 0  •  amitriptyline (ELAVIL) 25 MG Tab, TAKE 1 TABLET BY MOUTH ONCE DAILY AT BEDTIME, Disp: 90 Tab, Rfl: 1  •  aspirin 81 MG EC tablet, Take 81 mg by mouth every day., Disp: , Rfl:   •  pantoprazole (PROTONIX) 40 MG Tablet Delayed Response, Take 40 mg by mouth every day., Disp: , Rfl:   •  diltiazem CD (CARDIZEM CD) 240 MG CP24, Take 240 mg by mouth every day., Disp: , Rfl:   •  ondansetron (ZOFRAN) 4 MG Tab tablet, Take 1 Tab by mouth every four hours as needed for Nausea/Vomiting., Disp: 30 Tab, Rfl: 2  ALLERGIES:   Allergies   Allergen Reactions   • Clarithromycin      Swelling/Itching/Nausea   • Ees [Erythromycin]      Swelling/Itching/Nausea   • Levaquin      Muscle soreness    • Pcn [Penicillins]      Swelling/Itching/Nausea    • Shellfish Allergy      Swelling/Itching/Nausea   • Sumatriptan Swelling     Tongue swell   • Trazodone Swelling     SURGHX:   Past Surgical History:   Procedure Laterality Date   • LUMBAR LAMINECTOMY DISKECTOMY Bilateral 2/4/2017    Procedure: LUMBAR LAMINECTOMY DISKECTOMY POSTERIOR L4-S1 ;  Surgeon: Emil Hitchcock M.D.;  Location: [facility];  Service:    • CARPAL TUNNEL ENDOSCOPIC  11/17/2012    Performed by Heriberto Quiñones M.D. at [facility]   • TRIGGER FINGER RELEASE  11/17/2012    Performed by Herbierto Quiñones M.D. at [facility]   • HIP ARTHROSCOPY  2/23/2009    Performed by SHERLYN CALVO at [facility]   • ACETABULAR OSTEOTOMY  2/23/2009    Performed by SH[facility]LYN CALVO at [facility]   • MASS EXCISION ORTHO  2/23/2009    Performed by SHERLYN CALVO at [facility]   • HIP ARTHROSCOPY  2005    done at [facility]   • HAJA BY LAPAROSCOPY  1997   • HYSTERECTOMY, TOTAL ABDOMINAL  1979    oopherectomy lacie   • BLADDER  "SUSPENSION      bladder sling   • PRIMARY C SECTION  1970/1975     SOCHX:  reports that she has never smoked. She has never used smokeless tobacco. She reports that she does not drink alcohol or use drugs.  FH: Family history was reviewed, no pertinent findings to report   Objective:   /76 (BP Location: Right arm, Patient Position: Sitting, BP Cuff Size: Adult)   Pulse 96   Temp 36.1 °C (97 °F) (Temporal)   Resp 20   Ht 1.549 m (5' 1\")   Wt 71.7 kg (158 lb)   SpO2 95%   BMI 29.85 kg/m²   Physical Exam   Constitutional: Vital signs are normal. She appears well-developed and well-nourished.  Non-toxic appearance. No distress.   HENT:   Head: Normocephalic and atraumatic.   Right Ear: Tympanic membrane, external ear and ear canal normal.   Left Ear: Tympanic membrane, external ear and ear canal normal.   Nose: Nose normal.   Mouth/Throat: Uvula is midline, oropharynx is clear and moist and mucous membranes are normal.   Neck: Neck supple.   Cardiovascular: Normal rate, regular rhythm and normal heart sounds.  Exam reveals no gallop and no friction rub.    No murmur heard.  Pulmonary/Chest: Effort normal. No respiratory distress. She has no decreased breath sounds. She has no wheezes. She has rhonchi (diffuse). She has no rales.   Persistent wet cough.   Neurological: She is alert.   Skin: Skin is warm and dry. Capillary refill takes less than 2 seconds.   Psychiatric: She has a normal mood and affect. Her speech is normal and behavior is normal. Judgment and thought content normal. Cognition and memory are normal.   Vitals reviewed.        Assessment/Plan:   1. Lower respiratory infection (e.g., bronchitis, pneumonia, pneumonitis, pulmonitis)  - doxycycline (VIBRAMYCIN) 100 MG Tab; Take 1 Tab by mouth 2 times a day for 10 days.  Dispense: 20 Tab; Refill: 0  - benzonatate (TESSALON) 100 MG Cap; Take 1 Cap by mouth 3 times a day as needed.  Dispense: 30 Cap; Refill: 0    2. Cough  - DX-CHEST-2 VIEWS; " Future  - benzonatate (TESSALON) 100 MG Cap; Take 1 Cap by mouth 3 times a day as needed.  Dispense: 30 Cap; Refill: 0    CXR: no evidence of pleural effusion, focal consolidation, infiltrates, or cardiomegaly by my read. Radiology impression:  LUNGS: Clear. No effusions.  PNEUMOTHORAX: None.  LINES AND TUBES: None.  MEDIASTINUM: No cardiomegaly. Atherosclerosis.  MUSCULOSKELETAL STRUCTURES: Stable compression deformity of one of the lower thoracic vertebral bodies.  Cholecystectomy.    Physical exam consistent with bronchitis.  Patient started on doxycycline and Tessalon Perles as needed for cough.    Pt instructed to complete full course of medication despite symptomatic improvement. Pt to take med with meals to alleviate GI upset. Pt to take a probiotic or eat Andreea’s yogurt/ kefir while taking the medication.    If patient's symptoms worsen or fail to resolve she should return to clinic or see her PCP for reevaluation.  If patient develops severe symptoms such as shortness of breath, dyspnea, elevated fevers or other concerning symptoms-I would like her to go to the emergency room for evaluation.    Differential diagnosis, natural history, supportive care, and indications for immediate follow-up discussed.

## 2019-04-02 ENCOUNTER — HOSPITAL ENCOUNTER (OUTPATIENT)
Dept: RADIOLOGY | Facility: MEDICAL CENTER | Age: 69
End: 2019-04-02
Attending: NURSE PRACTITIONER
Payer: MEDICARE

## 2019-04-02 DIAGNOSIS — Z12.31 ENCOUNTER FOR SCREENING MAMMOGRAM FOR BREAST CANCER: ICD-10-CM

## 2019-04-02 PROCEDURE — 77063 BREAST TOMOSYNTHESIS BI: CPT

## 2019-04-09 ENCOUNTER — PATIENT OUTREACH (OUTPATIENT)
Dept: HEALTH INFORMATION MANAGEMENT | Facility: OTHER | Age: 69
End: 2019-04-09

## 2019-04-09 NOTE — PROGRESS NOTES
Outcome: no answer no VM    Please transfer to Patient Outreach Team at [phone] when patient returns call.    [facility] Verified: yes    Attempt # 1

## 2019-04-17 ENCOUNTER — OFFICE VISIT (OUTPATIENT)
Dept: URGENT CARE | Facility: PHYSICIAN GROUP | Age: 69
End: 2019-04-17
Payer: MEDICARE

## 2019-04-17 VITALS
WEIGHT: 154 LBS | OXYGEN SATURATION: 97 % | SYSTOLIC BLOOD PRESSURE: 128 MMHG | HEART RATE: 94 BPM | RESPIRATION RATE: 16 BRPM | TEMPERATURE: 97.5 F | HEIGHT: 61 IN | DIASTOLIC BLOOD PRESSURE: 78 MMHG | BODY MASS INDEX: 29.07 KG/M2

## 2019-04-17 DIAGNOSIS — R05.2 SUBACUTE COUGH: ICD-10-CM

## 2019-04-17 PROCEDURE — 99214 OFFICE O/P EST MOD 30 MIN: CPT | Performed by: FAMILY MEDICINE

## 2019-04-17 RX ORDER — FLUTICASONE PROPIONATE 110 UG/1
2 AEROSOL, METERED RESPIRATORY (INHALATION) 2 TIMES DAILY
Qty: 1 INHALER | Refills: 0 | Status: SHIPPED | OUTPATIENT
Start: 2019-04-17 | End: 2019-05-17

## 2019-04-17 RX ORDER — ALBUTEROL SULFATE 90 UG/1
2 AEROSOL, METERED RESPIRATORY (INHALATION) EVERY 4 HOURS PRN
Qty: 1 INHALER | Refills: 0 | Status: SHIPPED | OUTPATIENT
Start: 2019-04-17 | End: 2019-09-03 | Stop reason: SDUPTHER

## 2019-04-17 ASSESSMENT — ENCOUNTER SYMPTOMS
HEADACHES: 0
FOCAL WEAKNESS: 0
EYE DISCHARGE: 0
SENSORY CHANGE: 0
WEIGHT LOSS: 0
MYALGIAS: 0
EYE REDNESS: 0

## 2019-04-17 NOTE — PROGRESS NOTES
"Subjective:      Augusta Rodriguez is a 68 y.o. female who presents with Cough (x 3 weeks)            3 weeks dry persistent cough.  No fever.  No shortness of breath or wheezing.  No past medical history of asthma/COPD/pneumonia. Minimal relief with OTC medications.  She notes she has had similar in the past and responded to albuterol.  Initial nasal congestion has resolved with course of doxycycline.  Chest x-ray on 3/25 was negative.  No ST.  No other aggravating or alleviating factors.        Review of Systems   Constitutional: Positive for malaise/fatigue. Negative for weight loss.   Eyes: Negative for discharge and redness.   Cardiovascular: Negative for leg swelling.   Musculoskeletal: Negative for joint pain and myalgias.   Skin: Negative for itching and rash.   Neurological: Negative for sensory change, focal weakness and headaches.     .  Medications, Allergies, and current problem list reviewed today in Epic       Objective:     /78 (BP Location: Left arm, Patient Position: Sitting, BP Cuff Size: Adult)   Pulse 94   Temp 36.4 °C (97.5 °F) (Temporal)   Resp 16   Ht 1.549 m (5' 1\")   Wt 69.9 kg (154 lb)   SpO2 97%   BMI 29.10 kg/m²      Physical Exam   Constitutional: She is oriented to person, place, and time. She appears well-developed and well-nourished. No distress.   HENT:   Head: Normocephalic and atraumatic.   Right Ear: External ear normal.   Left Ear: External ear normal.   Mouth/Throat: Oropharynx is clear and moist.   Eyes: Conjunctivae are normal.   Neck: Neck supple.   Cardiovascular: Normal rate, regular rhythm and normal heart sounds.    No murmur heard.  Pulmonary/Chest: Effort normal and breath sounds normal. She has no wheezes.   Lymphadenopathy:     She has no cervical adenopathy.   Neurological: She is alert and oriented to person, place, and time.   Skin: Skin is warm and dry. No rash noted.               Assessment/Plan:     1. Subacute cough  albuterol 108 (90 Base) MCG/ACT " Aero Soln inhalation aerosol    fluticasone (FLOVENT HFA) 110 MCG/ACT Aerosol     Differential diagnosis, natural history, supportive care, and indications for immediate follow-up discussed at length.     Patient will continue to use Robitussin as needed.  She suspects that the cough is related to her ARB hypertension medication.  She will follow-up with her cardiologist to discuss this.

## 2019-05-23 ENCOUNTER — HOSPITAL ENCOUNTER (OUTPATIENT)
Facility: MEDICAL CENTER | Age: 69
End: 2019-05-23
Attending: INTERNAL MEDICINE
Payer: MEDICARE

## 2019-05-23 ENCOUNTER — OFFICE VISIT (OUTPATIENT)
Dept: MEDICAL GROUP | Facility: PHYSICIAN GROUP | Age: 69
End: 2019-05-23
Payer: MEDICARE

## 2019-05-23 VITALS
HEART RATE: 76 BPM | SYSTOLIC BLOOD PRESSURE: 142 MMHG | BODY MASS INDEX: 29.64 KG/M2 | TEMPERATURE: 98.1 F | DIASTOLIC BLOOD PRESSURE: 92 MMHG | OXYGEN SATURATION: 97 % | WEIGHT: 157 LBS | RESPIRATION RATE: 16 BRPM | HEIGHT: 61 IN

## 2019-05-23 DIAGNOSIS — N94.9 VAGINAL DISCOMFORT: ICD-10-CM

## 2019-05-23 DIAGNOSIS — R30.0 DYSURIA: ICD-10-CM

## 2019-05-23 LAB
APPEARANCE UR: NORMAL
BILIRUB UR STRIP-MCNC: NEGATIVE MG/DL
COLOR UR AUTO: NORMAL
GLUCOSE UR STRIP.AUTO-MCNC: NEGATIVE MG/DL
KETONES UR STRIP.AUTO-MCNC: NEGATIVE MG/DL
LEUKOCYTE ESTERASE UR QL STRIP.AUTO: NORMAL
NITRITE UR QL STRIP.AUTO: POSITIVE
PH UR STRIP.AUTO: 6 [PH] (ref 5–8)
PROT UR QL STRIP: NEGATIVE MG/DL
RBC UR QL AUTO: NEGATIVE
SP GR UR STRIP.AUTO: 1.01
UROBILINOGEN UR STRIP-MCNC: 0.2 MG/DL

## 2019-05-23 PROCEDURE — 87186 SC STD MICRODIL/AGAR DIL: CPT

## 2019-05-23 PROCEDURE — 87510 GARDNER VAG DNA DIR PROBE: CPT

## 2019-05-23 PROCEDURE — 87077 CULTURE AEROBIC IDENTIFY: CPT

## 2019-05-23 PROCEDURE — 87660 TRICHOMONAS VAGIN DIR PROBE: CPT

## 2019-05-23 PROCEDURE — 81002 URINALYSIS NONAUTO W/O SCOPE: CPT | Performed by: INTERNAL MEDICINE

## 2019-05-23 PROCEDURE — 99214 OFFICE O/P EST MOD 30 MIN: CPT | Performed by: INTERNAL MEDICINE

## 2019-05-23 PROCEDURE — 87086 URINE CULTURE/COLONY COUNT: CPT

## 2019-05-23 PROCEDURE — 87480 CANDIDA DNA DIR PROBE: CPT

## 2019-05-23 NOTE — ASSESSMENT & PLAN NOTE
Has been having a week of vaginal itching and gummy, yellowish vaginal discharge. Tried monistat without relief. Having dysuria but denies hematuria.

## 2019-05-23 NOTE — PROGRESS NOTES
PRIMARY CARE CLINIC FOLLOW UP VISIT  Chief Complaint   Patient presents with   • Other     Vaginal Discomfort w/ odor x 1 week     History of Present Illness     Vaginal discomfort  Has been having a week of vaginal itching and gummy, yellowish vaginal discharge. Tried monistat without relief. Having dysuria but denies hematuria.     Current Outpatient Prescriptions   Medication Sig Dispense Refill   • albuterol 108 (90 Base) MCG/ACT Aero Soln inhalation aerosol Inhale 2 Puffs by mouth every four hours as needed for Shortness of Breath. 1 Inhaler 0   • benzonatate (TESSALON) 100 MG Cap Take 1 Cap by mouth 3 times a day as needed. (Patient not taking: Reported on 4/17/2019) 30 Cap 0   • DULoxetine (CYMBALTA) 60 MG Cap DR Particles delayed-release capsule Take 1 Cap by mouth every day. 30 Cap 2   • telmisartan (MICARDIS) 40 MG Tab Take 1 Tab by mouth every day. 100 Tab 0   • amitriptyline (ELAVIL) 25 MG Tab TAKE 1 TABLET BY MOUTH ONCE DAILY AT BEDTIME 90 Tab 1   • ondansetron (ZOFRAN) 4 MG Tab tablet Take 1 Tab by mouth every four hours as needed for Nausea/Vomiting. 30 Tab 2   • aspirin 81 MG EC tablet Take 81 mg by mouth every day.     • pantoprazole (PROTONIX) 40 MG Tablet Delayed Response Take 40 mg by mouth every day.     • diltiazem CD (CARDIZEM CD) 240 MG CP24 Take 240 mg by mouth every day.       No current facility-administered medications for this visit.      Past Medical History:   Diagnosis Date   • Arrhythmia     afib   • Arthritis     osteo   • Dental disorder     upper dentures   • GERD (gastroesophageal reflux disease) 2/27/2010   • Impaired fasting glucose 2/27/2010   • Incontinence of urine    • Indigestion    • Osteopenia 2/24/2010   • Other specified disorder of intestines     constipation r/t meds   • Restless leg syndrome    • Tremor, essential 2/24/2010   • Urinary bladder disorder      Past Surgical History:   Procedure Laterality Date   • LUMBAR LAMINECTOMY DISKECTOMY Bilateral 2/4/2017     "Procedure: LUMBAR LAMINECTOMY DISKECTOMY POSTERIOR L4-S1 ;  Surgeon: Emil Hitchcock M.D.;  Location: SUR[facility]ERY [facility];  Service:    • CARPAL TUNNEL ENDOSCOPIC  2012    Performed by Heriberto Quiñones M.D. at SUR[facility]ERY [facility]   • TRIGGER FINGER RELEASE  2012    Performed by Heriberto Quiñones M.D. at [facility]   • HIP ARTHROSCOPY  2009    Performed by SHERLYN CALVO at [facility]ERY [facility]   • ACETABULAR OSTEOTOMY  2009    Performed by SHERLYN CALVO at [facility]   • MASS EXCISION ORTHO  2009    Performed by SHERLYN CALVO at [facility]   • HIP ARTHROSCOPY      done at [facility]   • HAJA BY LAPAROSCOPY     • HYSTERECTOMY, TOTAL ABDOMINAL  1979    oopherectomy lacie   • BLADDER SUSPENSION      bladder sling   • PRIMARY C SECTION       Social History   Substance Use Topics   • Smoking status: Never Smoker   • Smokeless tobacco: Never Used   • Alcohol use No      Comment: Stopped drinking 45 days ago 17 Going to      Social History     Social History Narrative   • No narrative on file     Family History   Problem Relation Age of Onset   • GI Father         Crohn's   • Heart Disease Father         First MI age 30   • Hypertension Father    • Hypertension Unknown    • Cancer Brother         ESOPHAGEAL CANCER     Family Status   Relation Status   • Mo  at age 44        Urosepsis   • Fa  at age 74        MI. Also had Crohn's   • Bro Alive        Gallbladder cancer   • Sis Alive        Crohn's   • Unknown (Not Specified)   • Bro      Allergies: Clarithromycin; Ees [erythromycin]; Levaquin; Pcn [penicillins]; Shellfish allergy; Sumatriptan; and Trazodone    ROS  As per HPI above. All other systems reviewed and negative.        Objective   /92   Pulse 76   Temp 36.7 °C (98.1 °F)   Resp 16   Ht 1.549 m (5' 1\")   Wt 71.2 kg (157 lb)   SpO2 97%  Body mass index is 29.66 " kg/m².    General: alert and oriented, pleasant, cooperative  HEENT: Normocephalic, atraumatic.   : normal vaginal introitus with atrophy noted   Psychiatric: appropriate mood and affect. Good insight and appropriate judgment     Assessment and Plan   The following treatment plan was discussed     1. Vaginal discomfort  Rule out BV given her symptoms include vaginal discharge, which could also contribute to the nitrites and leukocytes seen in the urine. Also follow up urine culture and then treat for UTI and/or vaginal yeast/bacterial infection pending results.   - POCT Urinalysis  - VAGINAL PATHOGENS DNA PANEL; Future    2. Dysuria  - URINE CULTURE(NEW); Future      Healthcare maintenance     Health Maintenance Due   Topic Date Due   • IMM DTaP/Tdap/Td Vaccine (1 - Tdap) 06/15/1970   • IMM ZOSTER VACCINES (1 of 2) 06/15/2001   • BONE DENSITY  06/15/2016   • IMM PNEUMOCOCCAL 65+ (ADULT) LOW/MEDIUM RISK SERIES (1 of 2 - PCV13) 06/15/2016   • Annual Wellness Visit  06/15/2016       Return if symptoms worsen or fail to improve.    Ehsan Finney MD  Internal Medicine  [facility]

## 2019-05-24 DIAGNOSIS — R39.9 UTI SYMPTOMS: ICD-10-CM

## 2019-05-24 LAB
CANDIDA DNA VAG QL PROBE+SIG AMP: NEGATIVE
G VAGINALIS DNA VAG QL PROBE+SIG AMP: NEGATIVE
T VAGINALIS DNA VAG QL PROBE+SIG AMP: NEGATIVE

## 2019-05-24 RX ORDER — NITROFURANTOIN 25; 75 MG/1; MG/1
100 CAPSULE ORAL 2 TIMES DAILY
Qty: 14 CAP | Refills: 0 | Status: SHIPPED | OUTPATIENT
Start: 2019-05-24 | End: 2019-08-11

## 2019-05-28 ENCOUNTER — TELEPHONE (OUTPATIENT)
Dept: MEDICAL GROUP | Facility: PHYSICIAN GROUP | Age: 69
End: 2019-05-28

## 2019-05-28 NOTE — TELEPHONE ENCOUNTER
----- Message from Ehsan Finney M.D. sent at 5/24/2019  4:47 PM PDT -----  Please ensure she receives [employer] message

## 2019-06-14 ENCOUNTER — HOSPITAL ENCOUNTER (OUTPATIENT)
Dept: RADIOLOGY | Facility: MEDICAL CENTER | Age: 69
End: 2019-06-14
Attending: NURSE PRACTITIONER
Payer: MEDICARE

## 2019-06-14 ENCOUNTER — TELEPHONE (OUTPATIENT)
Dept: MEDICAL GROUP | Facility: PHYSICIAN GROUP | Age: 69
End: 2019-06-14

## 2019-06-14 DIAGNOSIS — M54.2 CERVICALGIA: ICD-10-CM

## 2019-06-14 PROCEDURE — 72050 X-RAY EXAM NECK SPINE 4/5VWS: CPT

## 2019-06-14 NOTE — TELEPHONE ENCOUNTER
1. Caller Name: Augusta                                           Call Back Number: [phone] (home)         Patient approves a detailed voicemail message: N\A    Tried calling pt to schedule AWV, no one answered, and there is no Voicemail setup.

## 2019-06-25 RX ORDER — DULOXETIN HYDROCHLORIDE 60 MG/1
CAPSULE, DELAYED RELEASE ORAL
Qty: 90 CAP | Refills: 1 | Status: SHIPPED | OUTPATIENT
Start: 2019-06-25 | End: 2020-01-02 | Stop reason: SDUPTHER

## 2019-06-25 NOTE — TELEPHONE ENCOUNTER
Was the patient seen in the last year in this department? Yes    Does patient have an active prescription for medications requested? No     Received Request Via: Pharmacy      Pt met protocol?: Yes   Pt last ov 5/2019

## 2019-08-11 ENCOUNTER — HOSPITAL ENCOUNTER (EMERGENCY)
Facility: MEDICAL CENTER | Age: 69
End: 2019-08-11
Attending: EMERGENCY MEDICINE
Payer: MEDICARE

## 2019-08-11 ENCOUNTER — APPOINTMENT (OUTPATIENT)
Dept: RADIOLOGY | Facility: MEDICAL CENTER | Age: 69
End: 2019-08-11
Attending: EMERGENCY MEDICINE
Payer: MEDICARE

## 2019-08-11 VITALS
RESPIRATION RATE: 16 BRPM | WEIGHT: 157.63 LBS | BODY MASS INDEX: 29.76 KG/M2 | HEIGHT: 61 IN | OXYGEN SATURATION: 95 % | SYSTOLIC BLOOD PRESSURE: 141 MMHG | HEART RATE: 74 BPM | DIASTOLIC BLOOD PRESSURE: 90 MMHG | TEMPERATURE: 96.8 F

## 2019-08-11 DIAGNOSIS — R51.9 NONINTRACTABLE EPISODIC HEADACHE, UNSPECIFIED HEADACHE TYPE: ICD-10-CM

## 2019-08-11 PROCEDURE — A9270 NON-COVERED ITEM OR SERVICE: HCPCS | Performed by: EMERGENCY MEDICINE

## 2019-08-11 PROCEDURE — 99284 EMERGENCY DEPT VISIT MOD MDM: CPT

## 2019-08-11 PROCEDURE — 700102 HCHG RX REV CODE 250 W/ 637 OVERRIDE(OP): Performed by: EMERGENCY MEDICINE

## 2019-08-11 PROCEDURE — 72125 CT NECK SPINE W/O DYE: CPT

## 2019-08-11 PROCEDURE — 70450 CT HEAD/BRAIN W/O DYE: CPT

## 2019-08-11 RX ORDER — HYDROCODONE BITARTRATE AND ACETAMINOPHEN 5; 325 MG/1; MG/1
1 TABLET ORAL ONCE
Status: COMPLETED | OUTPATIENT
Start: 2019-08-11 | End: 2019-08-11

## 2019-08-11 RX ORDER — HYDROCODONE BITARTRATE AND ACETAMINOPHEN 5; 325 MG/1; MG/1
1 TABLET ORAL EVERY 6 HOURS PRN
Qty: 12 TAB | Refills: 0 | Status: SHIPPED | OUTPATIENT
Start: 2019-08-11 | End: 2019-08-14

## 2019-08-11 RX ADMIN — HYDROCODONE BITARTRATE AND ACETAMINOPHEN 1 TABLET: 5; 325 TABLET ORAL at 09:24

## 2019-08-11 ASSESSMENT — LIFESTYLE VARIABLES: DO YOU DRINK ALCOHOL: NO

## 2019-08-11 NOTE — ED NOTES
Pt brought back to rm BL 22 from triage. Pt able to transfer self to bed, pt in gown, on monitor, family at bedside, call light in reach. Chart up for ERP.

## 2019-08-11 NOTE — ED TRIAGE NOTES
".  Chief Complaint   Patient presents with   • Head Pain      Pt ambulatory to triage accompanied by . Pt states she fell out of bed 1 month ago and hit the back of her head, has had a head ache off an on since then. Denies LOC at the time. Pt takes Ibuprofen for the head ache with some relief. Denies nausea, states she is \"a little dizzy.\"   Pt educated on triage process and returned to the lobby.    "

## 2019-08-11 NOTE — ED PROVIDER NOTES
ED Provider Note    CHIEF COMPLAINT  Chief Complaint   Patient presents with   • Head Pain       HPI  Augusta Rodriguez is a 69 y.o. female who presents to the emergency department complaining of posterior head pain.  Initially the patient told me she had this pain for 2 days but further discussion reveals that she fell 1 month ago and hit the back of her head and she has been having daily pain in that area since the fall but further discussion also revealed she has had chronic headaches and her doctor has started her on amitriptyline but she does not feel that that has been helpful and she is also taking ibuprofen.  She does not recognize any other exacerbating or alleviating factors or precipitating events, the patient says she did not seek medical attention after the fall 1 month ago.    REVIEW OF SYSTEMS no nausea or vomiting no numbness tingling weakness in the extremities no chest pain or difficulty breathing.  All other systems negative    PAST MEDICAL HISTORY  Past Medical History:   Diagnosis Date   • Arrhythmia     afib   • Arthritis     osteo   • Asthma    • Dental disorder     upper dentures   • GERD (gastroesophageal reflux disease) 2/27/2010   • Hypertension    • Impaired fasting glucose 2/27/2010   • Incontinence of urine    • Indigestion    • Osteopenia 2/24/2010   • Other specified disorder of intestines     constipation r/t meds   • Restless leg syndrome    • Tremor, essential 2/24/2010   • Urinary bladder disorder        FAMILY HISTORY  Family History   Problem Relation Age of Onset   • GI Disease Father         Crohn's   • Heart Disease Father         First MI age 30   • Hypertension Father    • Hypertension Other    • Cancer Brother         ESOPHAGEAL CANCER       SOCIAL HISTORY  Social History     Socioeconomic History   • Marital status:      Spouse name: Not on file   • Number of children: Not on file   • Years of education: Not on file   • Highest education level: Not on file    Occupational History   • Not on file   Social Needs   • Financial resource strain: Not on file   • Food insecurity:     Worry: Not on file     Inability: Not on file   • Transportation needs:     Medical: Not on file     Non-medical: Not on file   Tobacco Use   • Smoking status: Never Smoker   • Smokeless tobacco: Never Used   Substance and Sexual Activity   • Alcohol use: No     Alcohol/week: 0.0 oz     Comment: Stopped drinking 45 days ago 7/12/17 Going to    • Drug use: No   • Sexual activity: Never   Lifestyle   • Physical activity:     Days per week: Not on file     Minutes per session: Not on file   • Stress: Not on file   Relationships   • Social connections:     Talks on phone: Not on file     Gets together: Not on file     Attends Sabianism service: Not on file     Active member of club or organization: Not on file     Attends meetings of clubs or organizations: Not on file     Relationship status: Not on file   • Intimate partner violence:     Fear of current or ex partner: Not on file     Emotionally abused: Not on file     Physically abused: Not on file     Forced sexual activity: Not on file   Other Topics Concern   • Not on file   Social History Narrative   • Not on file       SURGICAL HISTORY  Past Surgical History:   Procedure Laterality Date   • LUMBAR LAMINECTOMY DISKECTOMY Bilateral 2/4/2017    Procedure: LUMBAR LAMINECTOMY DISKECTOMY POSTERIOR L4-S1 ;  Surgeon: Emil Hitchcock M.D.;  Location: [facility];  Service:    • CARPAL TUNNEL ENDOSCOPIC  11/17/2012    Performed by Heriberto Quiñones M.D. at [facility]   • TRIGGER FINGER RELEASE  11/17/2012    Performed by Heriberto Quiñones M.D. at [facility]   • HIP ARTHROSCOPY  2/23/2009    Performed by SHERLYN CALVO at [facility]   • ACETABULAR OSTEOTOMY  2/23/2009    Performed by SHERLYN CALVO at [facility]   • MASS EXCISION ORTHO  2/23/2009    Performed by SHERLYN CALVO  "Y at SURGERY [facility] ORS   • HIP ARTHROSCOPY  2005    done at [facility] [place]   • HAJA BY LAPAROSCOPY  1997   • HYSTERECTOMY, TOTAL ABDOMINAL  1979    oopherectomy lacie   • BLADDER SUSPENSION      bladder sling   • PRIMARY C SECTION  1970/1975       CURRENT MEDICATIONS  Home Medications     Reviewed by Anay Kelly R.N. (Registered Nurse) on 08/11/19 at 0838  Med List Status: Complete   Medication Last Dose Status   albuterol 108 (90 Base) MCG/ACT Aero Soln inhalation aerosol 8/10/2019 Active   amitriptyline (ELAVIL) 25 MG Tab 8/10/2019 Active   aspirin 81 MG EC tablet 8/11/2019 Active   benzonatate (TESSALON) 100 MG Cap  Active   diltiazem CD (CARDIZEM CD) 240 MG CP24 8/11/2019 Active   DULoxetine (CYMBALTA) 60 MG Cap DR Particles delayed-release capsule 8/10/2019 Active   nitrofurantoin monohyd macro (MACROBID) 100 MG Cap  Active   ondansetron (ZOFRAN) 4 MG Tab tablet  Active   pantoprazole (PROTONIX) 40 MG Tablet Delayed Response 8/11/2019 Active   telmisartan (MICARDIS) 40 MG Tab 8/11/2019 Active                ALLERGIES  Allergies   Allergen Reactions   • Clarithromycin      Swelling/Itching/Nausea   • Ees [Erythromycin]      Swelling/Itching/Nausea   • Levaquin      Muscle soreness    • Pcn [Penicillins]      Swelling/Itching/Nausea    • Shellfish Allergy      Swelling/Itching/Nausea   • Sumatriptan Swelling     Tongue swell   • Trazodone Swelling       PHYSICAL EXAM  VITAL SIGNS: /90   Pulse 74   Temp 36 °C (96.8 °F) (Temporal)   Resp 16   Ht 1.549 m (5' 1\")   Wt 71.5 kg (157 lb 10.1 oz)   SpO2 95%   Breastfeeding? No   BMI 29.78 kg/m²    Oxygen saturation is interpreted as adequate  Constitutional: Awake verbal nontoxic-appearing  HENT: No marks or contusions on the head, no scalp hematoma  Eyes: Pupils round extraocular motion present  Neck: Diffuse posterior neck discomfort with palpation but no point tenderness of the cervical spine  Cardiovascular: Regular rate and rhythm  Lungs: " Clear and equal bilaterally with no apparent difficulty breathing  Skin: Warm and dry  Musculoskeletal: No acute bony deformity  Neurologic: Awake verbal ambulatory face moves symmetrically the patient speaks in full complete lucid sentences and has good strength and coordination in the upper and lower extremities    Radiology  CT-HEAD W/O   Final Result      1.  No acute intracranial abnormality      2.  Moderate white matter changes and cerebral volume loss      CT-CSPINE WITHOUT PLUS RECONS   Final Result      Negative for cervical spine fracture or subluxation        MEDICAL DECISION MAKING and DISPOSITION  In the emergency department the patient was given one Norco tablet for discomfort.  I reviewed all the findings with her in general she looks well she feels that her amitriptyline is not controlling her head pain and would like something stronger and after discussing the risks and benefits of narcotic pain medication and obtaining informed consent I wrote a prescription for a 3-day course of low-dose Norco.  The patient understands that this medication will not be refilled and that she is not to stay on this medicine chronically.  The patient is to call her doctor in the morning and arrange office recheck during the week    In prescribing controlled substances to this patient, I certify that I have obtained and reviewed the medical history of Augusta Rodriguez. I have also made a good elli effort to obtain applicable records from other providers who have treated the patient and records did not demonstrate any increased risk of substance abuse that would prevent me from prescribing controlled substances.     I have conducted a physical exam and documented it. I have reviewed Ms. Rodriguez’s prescription history as maintained by the [place] Prescription Monitoring Program.     I have assessed the patient’s risk for abuse, dependency, and addiction using the validated Opioid Risk Tool available at  https://www.[employer].com/rmvddw-oxje-vyox-ort-narcotic-abuse.     Given the above, I believe the benefits of controlled substance therapy outweigh the risks. The reasons for prescribing controlled substances include non-narcotic, oral analgesic alternatives have been inadequate for pain control. Accordingly, I have discussed the risk and benefits, treatment plan, and alternative therapies with the patient.       IMPRESSION  1.  Chronic head pain  2.  Subacute head injury         Electronically signed by: Don Paredes, 8/11/2019 3:06 PM

## 2019-08-11 NOTE — ED NOTES
Discharge orders received, monitor discontinued, instructions and education given, follow-up discussed, prescription x1 given, pt verbalized understanding.

## 2019-08-12 RX ORDER — AMITRIPTYLINE HYDROCHLORIDE 25 MG/1
TABLET, FILM COATED ORAL
Qty: 90 TAB | Refills: 1 | Status: SHIPPED | OUTPATIENT
Start: 2019-08-12 | End: 2019-08-13 | Stop reason: SDUPTHER

## 2019-08-12 NOTE — PROGRESS NOTES
1. [facility] Verified: yes    2. Verify PCP: yes    3. Review and add  to Care Team: yes    5. Reviewed/Updated the following with patient:       •   Communication Preference Obtained? YES  • MyChart Activation: already active       •   E-Mail Address Obtained? NO       •   Appointment Day and Time Preferences? YES       •   Preferred Pharmacy? YES       •   Preferred Lab? YES    6. Care Gap Scheduling (Attempt to Schedule EACH Overdue Care Gap!)    Scheduled patient for Follow-Up for ED visit

## 2019-08-12 NOTE — PROGRESS NOTES
Outcome: Left Message with     Please transfer to Patient Outreach Team at [phone] when patient returns call.    HealthConnect Verified: yes    ED [facility]. NEEDS APPT WITH PCP PER ED DISCHARGE NOTES

## 2019-08-13 ENCOUNTER — OFFICE VISIT (OUTPATIENT)
Dept: MEDICAL GROUP | Facility: PHYSICIAN GROUP | Age: 69
End: 2019-08-13
Payer: MEDICARE

## 2019-08-13 VITALS
DIASTOLIC BLOOD PRESSURE: 70 MMHG | HEIGHT: 61 IN | OXYGEN SATURATION: 97 % | TEMPERATURE: 97.7 F | SYSTOLIC BLOOD PRESSURE: 118 MMHG | WEIGHT: 160 LBS | BODY MASS INDEX: 30.21 KG/M2 | RESPIRATION RATE: 16 BRPM | HEART RATE: 90 BPM

## 2019-08-13 DIAGNOSIS — R51.9 NONINTRACTABLE HEADACHE, UNSPECIFIED CHRONICITY PATTERN, UNSPECIFIED HEADACHE TYPE: ICD-10-CM

## 2019-08-13 PROCEDURE — 99214 OFFICE O/P EST MOD 30 MIN: CPT | Performed by: NURSE PRACTITIONER

## 2019-08-13 RX ORDER — AMITRIPTYLINE HYDROCHLORIDE 25 MG/1
50 TABLET, FILM COATED ORAL NIGHTLY PRN
Qty: 180 TAB | Refills: 1 | Status: SHIPPED | OUTPATIENT
Start: 2019-08-13 | End: 2020-07-15

## 2019-08-13 NOTE — PROGRESS NOTES
Chief Complaint   Patient presents with   • Hospital Follow-up       HISTORY OF PRESENT ILLNESS: Patient is a 69 y.o. female established patient who presents today to discuss the following issues:    Headache  Was seen in the ER for headaches.  Had been doing well on elavil 25 mg after a fall, but headaches have returned.  Discussed options.  We will proceed with a trial of Elavil 50 mg at bedtime, as she tolerates it well and I has worked very well for her.  If she continues to have issues we will proceed with a referral to neurology.      Patient Active Problem List    Diagnosis Date Noted   • Vaginal discomfort 05/23/2019   • Encounter for screening mammogram for breast cancer 02/11/2019   • Bronchitis 01/22/2019   • Postmenopausal 01/22/2019   • Atrial fibrillation (HCC) 12/07/2018   • Shortness of breath 06/20/2018   • Anemia 06/07/2018   • BMI 30.0-30.9,adult 05/16/2018   • Pain of right upper extremity 01/02/2018   • Headache 11/10/2017   • Urinary incontinence 07/12/2017   • Spinal stenosis, lumbar 02/04/2017   • Trigger finger, acquired 11/17/2012   • GERD (gastroesophageal reflux disease) 02/27/2010   • Vitamin D deficiency 02/27/2010   • Impaired fasting glucose 02/27/2010   • HTN (hypertension) 02/24/2010   • Hyperlipidemia 02/24/2010   • Osteopenia 02/24/2010   • Depression 02/24/2010       Allergies:Clarithromycin; Ees [erythromycin]; Levaquin; Pcn [penicillins]; Shellfish allergy; Sumatriptan; and Trazodone    Current Outpatient Medications   Medication Sig Dispense Refill   • amitriptyline (ELAVIL) 25 MG Tab Take 2 Tabs by mouth at bedtime as needed. 180 Tab 1   • DULoxetine (CYMBALTA) 60 MG Cap DR Particles delayed-release capsule TAKE 1 CAPSULE BY MOUTH ONCE DAILY 90 Cap 1   • albuterol 108 (90 Base) MCG/ACT Aero Soln inhalation aerosol Inhale 2 Puffs by mouth every four hours as needed for Shortness of Breath. 1 Inhaler 0   • telmisartan (MICARDIS) 40 MG Tab Take 1 Tab by mouth every day. 100 Tab  0   • ondansetron (ZOFRAN) 4 MG Tab tablet Take 1 Tab by mouth every four hours as needed for Nausea/Vomiting. 30 Tab 2   • aspirin 81 MG EC tablet Take 81 mg by mouth every day.     • pantoprazole (PROTONIX) 40 MG Tablet Delayed Response Take 40 mg by mouth every day.     • diltiazem CD (CARDIZEM CD) 240 MG CP24 Take 240 mg by mouth every day.       No current facility-administered medications for this visit.        Social History     Tobacco Use   • Smoking status: Never Smoker   • Smokeless tobacco: Never Used   Substance Use Topics   • Alcohol use: No     Alcohol/week: 0.0 oz     Comment: Stopped drinking 45 days ago 17 Going to AA   • Drug use: No       Family Status   Relation Name Status   • Mo   at age 44        Urosepsis   • Fa   at age 74        MI. Also had Crohn's   • Bro  Alive        Gallbladder cancer   • Sis  Alive        Crohn's   • OTHER  (Not Specified)   • Bro       Family History   Problem Relation Age of Onset   • GI Disease Father         Crohn's   • Heart Disease Father         First MI age 30   • Hypertension Father    • Hypertension Other    • Cancer Brother         ESOPHAGEAL CANCER       Review of Systems:   Constitutional: Negative for fever, chills, weight loss and malaise/fatigue.   HENT: Negative for ear pain, nosebleeds, congestion, sore throat and neck pain.    Eyes: Negative for blurred vision.   Respiratory: Negative for cough, sputum production, shortness of breath and wheezing.    Cardiovascular: Negative for chest pain, palpitations, orthopnea and leg swelling.   Gastrointestinal: Negative for heartburn, nausea, vomiting and abdominal pain.   Genitourinary: Negative for dysuria, urgency and frequency.   Musculoskeletal: Negative for myalgias, joint pain, and back pain.  Skin: Negative for rash and itching.   Neurological: Negative for dizziness, tingling, tremors, sensory change, focal weakness.  Positive for headaches.   Endo/Heme/Allergies: Does  "not bruise/bleed easily.   Psychiatric/Behavioral: Negative for depression, suicidal ideas and memory loss.  The patient is not nervous/anxious and does not have insomnia.    All other systems reviewed and are negative except as in HPI.    Exam:  /70   Pulse 90   Temp 36.5 °C (97.7 °F)   Resp 16   Ht 1.549 m (5' 1\")   Wt 72.6 kg (160 lb)   SpO2 97%   General:  Well nourished, well developed female in NAD  Head: Grossly normal.  Neck: Supple without JVD or bruit. Thyroid is not enlarged.  Pulmonary: Clear to ausculation. Normal effort. No rales, ronchi, or wheezing.  Cardiovascular: Regular rate and rhythm without murmur.   Abdomen:  Soft, nontender, nondistended.  Extremities: No clubbing, cyanosis, or edema.  Skin: Intact with no obvious rashes or lesions.  Neuro: Grossly intact.  Psych: Alert and oriented x 3.  Mood and affect appropriate.    Medical decision-making and discussion: Augusta is here today for follow-up.  Her records were reviewed, and she was given a prescription for elavil 50 mg.  She will follow-up here as needed.          Assessment/Plan:  1. Nonintractable headache, unspecified chronicity pattern, unspecified headache type  amitriptyline (ELAVIL) 25 MG Tab       Return if symptoms worsen or fail to improve.    Please note that this dictation was created using voice recognition software. I have made every reasonable attempt to correct obvious errors, but I expect that there are errors of grammar and possibly content that I did not discover before finalizing the note.  "

## 2019-08-19 NOTE — ASSESSMENT & PLAN NOTE
Was seen in the ER for headaches.  Had been doing well on elavil 25 mg after a fall, but headaches have returned.  Discussed options.  We will proceed with a trial of Elavil 50 mg at bedtime, as she tolerates it well and I has worked very well for her.  If she continues to have issues we will proceed with a referral to neurology.

## 2019-08-26 ENCOUNTER — TELEPHONE (OUTPATIENT)
Dept: MEDICAL GROUP | Facility: PHYSICIAN GROUP | Age: 69
End: 2019-08-26

## 2019-08-26 NOTE — TELEPHONE ENCOUNTER
ANNUAL WELLNESS VISIT PRE-VISIT PLANNING WITH OUTREACH    1.  If any orders were placed at last visit, do we have Results/Consult Notes?        •  Labs - Labs were not ordered at last office visit.       •  Imaging - Imaging was not ordered at last office visit.       •  Referrals - No referrals were ordered at last office visit.    2.  Immunizations were updated in [facility] using WebIZ?:Yes       •  WebIZ Recommendations: FLU, PREVNAR (PCV13) , TDAP, SHINGRIX (Shingles) and Varicella       •  Is patient due for Tdap? YES. Patient was not notified of copay/out of pocket cost.       •  Is patient due for Shingrx? YES. Patient was not notified of copay/out of pocket cost.    3.  Patient has the following Care Path diagnoses on Problem List:  DEPRESSION     4.  Orders for overdue Health Maintenance topics pended in Pre-Charting? NO

## 2019-09-03 ENCOUNTER — OFFICE VISIT (OUTPATIENT)
Dept: MEDICAL GROUP | Facility: PHYSICIAN GROUP | Age: 69
End: 2019-09-03
Payer: MEDICARE

## 2019-09-03 VITALS
SYSTOLIC BLOOD PRESSURE: 120 MMHG | RESPIRATION RATE: 16 BRPM | DIASTOLIC BLOOD PRESSURE: 84 MMHG | HEIGHT: 61 IN | TEMPERATURE: 98 F | BODY MASS INDEX: 30.58 KG/M2 | WEIGHT: 162 LBS | HEART RATE: 91 BPM | OXYGEN SATURATION: 95 %

## 2019-09-03 DIAGNOSIS — R05.2 SUBACUTE COUGH: ICD-10-CM

## 2019-09-03 DIAGNOSIS — R73.01 IMPAIRED FASTING GLUCOSE: ICD-10-CM

## 2019-09-03 DIAGNOSIS — F32.A DEPRESSION, UNSPECIFIED DEPRESSION TYPE: ICD-10-CM

## 2019-09-03 DIAGNOSIS — E55.9 AVITAMINOSIS D: ICD-10-CM

## 2019-09-03 DIAGNOSIS — F33.42 MAJOR DEPRESSIVE DISORDER, RECURRENT EPISODE, IN FULL REMISSION (HCC): ICD-10-CM

## 2019-09-03 DIAGNOSIS — I10 ESSENTIAL HYPERTENSION: ICD-10-CM

## 2019-09-03 DIAGNOSIS — G47.30 SLEEP APNEA, UNSPECIFIED TYPE: ICD-10-CM

## 2019-09-03 DIAGNOSIS — E78.5 HYPERLIPIDEMIA, UNSPECIFIED HYPERLIPIDEMIA TYPE: ICD-10-CM

## 2019-09-03 DIAGNOSIS — I48.0 PAROXYSMAL ATRIAL FIBRILLATION (HCC): ICD-10-CM

## 2019-09-03 DIAGNOSIS — Z11.59 SCREENING FOR VIRAL DISEASE: ICD-10-CM

## 2019-09-03 PROBLEM — N94.9 VAGINAL DISCOMFORT: Status: RESOLVED | Noted: 2019-05-23 | Resolved: 2019-09-03

## 2019-09-03 PROBLEM — J40 BRONCHITIS: Status: RESOLVED | Noted: 2019-01-22 | Resolved: 2019-09-03

## 2019-09-03 PROBLEM — Z12.31 ENCOUNTER FOR SCREENING MAMMOGRAM FOR BREAST CANCER: Status: RESOLVED | Noted: 2019-02-11 | Resolved: 2019-09-03

## 2019-09-03 PROCEDURE — G0439 PPPS, SUBSEQ VISIT: HCPCS | Performed by: NURSE PRACTITIONER

## 2019-09-03 RX ORDER — ALBUTEROL SULFATE 90 UG/1
2 AEROSOL, METERED RESPIRATORY (INHALATION) EVERY 4 HOURS PRN
Qty: 1 INHALER | Refills: 0 | Status: SHIPPED | OUTPATIENT
Start: 2019-09-03 | End: 2020-08-19 | Stop reason: SDUPTHER

## 2019-09-03 ASSESSMENT — ACTIVITIES OF DAILY LIVING (ADL): BATHING_REQUIRES_ASSISTANCE: 0

## 2019-09-03 ASSESSMENT — ENCOUNTER SYMPTOMS: GENERAL WELL-BEING: GOOD

## 2019-09-03 ASSESSMENT — PATIENT HEALTH QUESTIONNAIRE - PHQ9: CLINICAL INTERPRETATION OF PHQ2 SCORE: 0

## 2019-09-03 NOTE — PROGRESS NOTES
Chief Complaint   Patient presents with   • Annual Exam     AWV          HPI:  Augusta is a 69 y.o. here for Medicare Annual Wellness Visit        Patient Active Problem List    Diagnosis Date Noted   • Vaginal discomfort 05/23/2019   • Encounter for screening mammogram for breast cancer 02/11/2019   • Bronchitis 01/22/2019   • Postmenopausal 01/22/2019   • Atrial fibrillation (HCC) 12/07/2018   • Shortness of breath 06/20/2018   • Anemia 06/07/2018   • BMI 30.0-30.9,adult 05/16/2018   • Pain of right upper extremity 01/02/2018   • Headache 11/10/2017   • Urinary incontinence 07/12/2017   • Spinal stenosis, lumbar 02/04/2017   • Trigger finger, acquired 11/17/2012   • GERD (gastroesophageal reflux disease) 02/27/2010   • Vitamin D deficiency 02/27/2010   • Impaired fasting glucose 02/27/2010   • HTN (hypertension) 02/24/2010   • Hyperlipidemia 02/24/2010   • Osteopenia 02/24/2010   • Depression 02/24/2010       Current Outpatient Medications   Medication Sig Dispense Refill   • amitriptyline (ELAVIL) 25 MG Tab Take 2 Tabs by mouth at bedtime as needed. 180 Tab 1   • DULoxetine (CYMBALTA) 60 MG Cap DR Particles delayed-release capsule TAKE 1 CAPSULE BY MOUTH ONCE DAILY 90 Cap 1   • albuterol 108 (90 Base) MCG/ACT Aero Soln inhalation aerosol Inhale 2 Puffs by mouth every four hours as needed for Shortness of Breath. 1 Inhaler 0   • telmisartan (MICARDIS) 40 MG Tab Take 1 Tab by mouth every day. 100 Tab 0   • ondansetron (ZOFRAN) 4 MG Tab tablet Take 1 Tab by mouth every four hours as needed for Nausea/Vomiting. 30 Tab 2   • aspirin 81 MG EC tablet Take 81 mg by mouth every day.     • pantoprazole (PROTONIX) 40 MG Tablet Delayed Response Take 40 mg by mouth every day.     • diltiazem CD (CARDIZEM CD) 240 MG CP24 Take 240 mg by mouth every day.       No current facility-administered medications for this visit.         Patient is taking medications as noted in medication list.  Current supplements as per medication list.      Allergies: Clarithromycin; Ees [erythromycin]; Levaquin; Pcn [penicillins]; Shellfish allergy; Sumatriptan; and Trazodone    Current social contact/activities: NONE    Is patient current with immunizations? No, due for PREVNAR (PCV13)  and TDAP. Patient is interested in receiving NONE today.    She  reports that she has never smoked. She has never used smokeless tobacco. She reports that she does not drink alcohol or use drugs.  Counseling given: Yes        DPA/Advanced directive: Patient does not have an Advanced Directive.  A packet and workshop information was given on Advanced Directives.    ROS:    Gait: Uses no assistive device   Ostomy: No   Other tubes: No   Amputations: No   Chronic oxygen use No   Last eye exam 2017  Wears hearing aids: No   : Reports urinary leakage during the last 6 months that has interfered a lot with their daily activities or sleep.  Annual Health Assessment Questions:    1.  Are you currently engaging in any exercise or physical activity? No    2.  How would you describe your mood or emotional well-being today? good    3.  Have you had any falls in the last year? Yes    4.  Have you noticed any problems with your balance or had difficulty walking? No    5.  In the last six months have you experienced any leakage of urine? Yes    6. DPA/Advanced Directive: Patient does not have an Advanced Directive.  A packet and workshop information was given on Advanced Directives.    Screening:        Depression Screening    Little interest or pleasure in doing things?  0 - not at all  Feeling down, depressed, or hopeless? 0 - not at all  Patient Health Questionnaire Score: 0    If depressive symptoms identified deferred to follow up visit unless specifically addressed in assessment and plan.    Interpretation of PHQ-9 Total Score   Score Severity   1-4 No Depression   5-9 Mild Depression   10-14 Moderate Depression   15-19 Moderately Severe Depression   20-27 Severe Depression    Screening  for Cognitive Impairment    Three Minute Recall (village, kitchen, baby)  3/3 Village, Kitchen, Baby  Deric clock face with all 12 numbers and set the hands to show 10 past 10.  Yes 10:10 5/5  If cognitive concerns identified, deferred for follow up unless specifically addressed in assessment and plan.    Fall Risk Assessment    Has the patient had two or more falls in the last year or any fall with injury in the last year?  Yes  If fall risk identified, deferred for follow up unless specifically addressed in assessment and plan.    Safety Assessment    Throw rugs on floor.  Yes  Handrails on all stairs.  Yes  Good lighting in all hallways.  Yes  Difficulty hearing.  No  Patient counseled about all safety risks that were identified.    Functional Assessment ADLs    Are there any barriers preventing you from cooking for yourself or meeting nutritional needs?  No.    Are there any barriers preventing you from driving safely or obtaining transportation?  Yes. Reports that she doesn't drive because of road rage.   Are there any barriers preventing you from using a telephone or calling for help?  No.    Are there any barriers preventing you from shopping?  No.    Are there any barriers preventing you from taking care of your own finances?  Yes.  manages finances  Are there any barriers preventing you from managing your medications?  Yes.  manages medications.   Are there any barriers preventing you from showering, bathing or dressing yourself?  No.    Are you currently engaging in any exercise or physical activity?  No.     What is your perception of your health?  Good.    Health Maintenance Summary                HEPATITIS C SCREENING Overdue 1950     IMM DTaP/Tdap/Td Vaccine Overdue 6/15/1970     IMM ZOSTER VACCINES Overdue 6/15/2001     BONE DENSITY Overdue 6/15/2016      Done 12/17/2009 DS-BONE DENSITY STUDY (DEXA)    Annual Wellness Visit Overdue 6/15/2016     IMM PNEUMOCOCCAL VACCINE: 65+ Years  Overdue 6/15/2016     IMM INFLUENZA Overdue 9/1/2019      Done 11/6/2006 Imm Admin: Influenza, Unspecified - Historical Data     Patient has more history with this topic...    MAMMOGRAM Next Due 4/2/2020      Done 4/2/2019 MA-SCREENING MAMMO BILAT W/TOMOSYNTHESIS W/CAD     Patient has more history with this topic...    PAP SMEAR Next Due 11/14/2020      Not specified 11/14/2017 hysterectomy    COLONOSCOPY Next Due 7/26/2022      Done 7/26/2017 REFERRAL TO GI FOR COLONOSCOPY          Patient Care Team:  ALBINA Garcia as PCP - General (Family Medicine)  Ankit Puente M.D. (Otolaryngology)    Social History     Tobacco Use   • Smoking status: Never Smoker   • Smokeless tobacco: Never Used   Substance Use Topics   • Alcohol use: No     Alcohol/week: 0.0 oz     Comment: Stopped drinking 45 days ago 7/12/17 Going to    • Drug use: No     Family History   Problem Relation Age of Onset   • GI Disease Father         Crohn's   • Heart Disease Father         First MI age 30   • Hypertension Father    • Hypertension Other    • Cancer Brother         ESOPHAGEAL CANCER     She  has a past medical history of Arrhythmia, Arthritis, Asthma, Dental disorder, GERD (gastroesophageal reflux disease) (2/27/2010), Hypertension, Impaired fasting glucose (2/27/2010), Incontinence of urine, Indigestion, Osteopenia (2/24/2010), Other specified disorder of intestines, Restless leg syndrome, Tremor, essential (2/24/2010), and Urinary bladder disorder.   Past Surgical History:   Procedure Laterality Date   • LUMBAR LAMINECTOMY DISKECTOMY Bilateral 2/4/2017    Procedure: LUMBAR LAMINECTOMY DISKECTOMY POSTERIOR L4-S1 ;  Surgeon: Emil Hitchcock M.D.;  Location: SURGERY [facility];  Service:    • CARPAL TUNNEL ENDOSCOPIC  11/17/2012    Performed by Heriberto Quiñones M.D. at SURGERY [facility]   • TRIGGER FINGER RELEASE  11/17/2012    Performed by Heriberto Quiñones M.D. at [facility]   • HIP ARTHROSCOPY   "2/23/2009    Performed by SHERLYN CALVO at SURGERY [facility]   • ACETABULAR OSTEOTOMY  2/23/2009    Performed by SHERLYN CALVO at SURGERY [facility]   • MASS EXCISION ORTHO  2/23/2009    Performed by SHERLYN CALVO at SURGERY [facility]   • HIP ARTHROSCOPY  2005    done at [facility]   • HAJA BY LAPAROSCOPY  1997   • HYSTERECTOMY, TOTAL ABDOMINAL  1979    oopherectomy lacie   • BLADDER SUSPENSION      bladder sling   • PRIMARY C SECTION  1970/1975           Exam:     Blood Pressure 120/84   Pulse 91   Temperature 36.7 °C (98 °F)   Respiration 16   Height 1.549 m (5' 1\")   Weight 73.5 kg (162 lb)   Oxygen Saturation 95%  Body mass index is 30.61 kg/m².    Hearing good.    Dentition good  Alert, oriented in no acute distress.  Eye contact is good, speech goal directed, affect calm      Assessment and Plan. The following treatment and monitoring plan is recommended:    1. BMI 30.0-30.9,adult  Patient identified as having weight management issue.  Appropriate orders and counseling given.    Subsequent Annual Wellness Visit - Includes PPPS ()   2. Paroxysmal atrial fibrillation (HCC)  Subsequent Annual Wellness Visit - Includes PPPS ()   3. Depression, unspecified depression type  Subsequent Annual Wellness Visit - Includes PPPS ()   4. Essential hypertension  Subsequent Annual Wellness Visit - Includes PPPS ()    CBC WITH DIFFERENTIAL    Comp Metabolic Panel    Lipid Profile   5. Hyperlipidemia, unspecified hyperlipidemia type  Subsequent Annual Wellness Visit - Includes PPPS ()    CBC WITH DIFFERENTIAL    Comp Metabolic Panel    Lipid Profile   6. Impaired fasting glucose  Subsequent Annual Wellness Visit - Includes PPPS ()    CBC WITH DIFFERENTIAL    Comp Metabolic Panel    Lipid Profile    HEMOGLOBIN A1C   7. Screening for viral disease  HEP C VIRUS ANTIBODY   8. Avitaminosis D  VITAMIN D,25 HYDROXY     BMI 30.0-30.9,adult  Patient is aware of BMI elevation.  " Brief discussion of diet, exercise, and lifestyle modification.      Atrial fibrillation (HCC)  Chronic in nature.  Stable on meds.  Due for labs.  Followed by cardiology.    HTN (hypertension)  Chronic in nature.  Stable on meds.  Due for labs.    Hyperlipidemia  Chronic in nature.  Stable on meds.  Due for labs.    Impaired Fasting Glucose  Chronic in nature.  Stable on meds.  Due for labs.    Sleep apnea   reports that her breathing is relatively labored and irregular at night when she sleeps.  Discussed options and will proceed with a sleep study.    Major depressive disorder, recurrent episode, in full remission (HCC)  Chronic in nature.  Stable on meds.            Services suggested: No services needed at this time  Health Care Screening recommendations as per orders if indicated.  Referrals offered: PT/OT/Nutrition counseling/Behavioral Health/Smoking cessation as per orders if indicated.    Discussion today about general wellness and lifestyle habits:    · Prevent falls and reduce trip hazards; Cautioned about securing or removing rugs.  · Have a working fire alarm and carbon monoxide detector;   · Engage in regular physical activity and social activities       Follow-up: Return if symptoms worsen or fail to improve.

## 2019-09-03 NOTE — ASSESSMENT & PLAN NOTE
reports that her breathing is relatively labored and irregular at night when she sleeps.  Discussed options and will proceed with a sleep study.

## 2019-09-12 ENCOUNTER — HOSPITAL ENCOUNTER (OUTPATIENT)
Dept: LAB | Facility: MEDICAL CENTER | Age: 69
End: 2019-09-12
Attending: NURSE PRACTITIONER
Payer: MEDICARE

## 2019-09-12 DIAGNOSIS — E55.9 AVITAMINOSIS D: ICD-10-CM

## 2019-09-12 DIAGNOSIS — R73.01 IMPAIRED FASTING GLUCOSE: ICD-10-CM

## 2019-09-12 DIAGNOSIS — Z11.59 SCREENING FOR VIRAL DISEASE: ICD-10-CM

## 2019-09-12 DIAGNOSIS — E78.5 HYPERLIPIDEMIA, UNSPECIFIED HYPERLIPIDEMIA TYPE: ICD-10-CM

## 2019-09-12 DIAGNOSIS — I10 ESSENTIAL HYPERTENSION: ICD-10-CM

## 2019-09-12 PROBLEM — F33.42 MAJOR DEPRESSIVE DISORDER, RECURRENT EPISODE, IN FULL REMISSION (HCC): Status: ACTIVE | Noted: 2019-09-12

## 2019-09-12 LAB
25(OH)D3 SERPL-MCNC: 25 NG/ML (ref 30–100)
ALBUMIN SERPL BCP-MCNC: 4.3 G/DL (ref 3.2–4.9)
ALBUMIN/GLOB SERPL: 1.5 G/DL
ALP SERPL-CCNC: 97 U/L (ref 30–99)
ALT SERPL-CCNC: 14 U/L (ref 2–50)
ANION GAP SERPL CALC-SCNC: 10 MMOL/L (ref 0–11.9)
AST SERPL-CCNC: 18 U/L (ref 12–45)
BASOPHILS # BLD AUTO: 0.8 % (ref 0–1.8)
BASOPHILS # BLD: 0.07 K/UL (ref 0–0.12)
BILIRUB SERPL-MCNC: 0.7 MG/DL (ref 0.1–1.5)
BUN SERPL-MCNC: 16 MG/DL (ref 8–22)
CALCIUM SERPL-MCNC: 9.2 MG/DL (ref 8.5–10.5)
CHLORIDE SERPL-SCNC: 104 MMOL/L (ref 96–112)
CHOLEST SERPL-MCNC: 204 MG/DL (ref 100–199)
CO2 SERPL-SCNC: 26 MMOL/L (ref 20–33)
CREAT SERPL-MCNC: 1.09 MG/DL (ref 0.5–1.4)
EOSINOPHIL # BLD AUTO: 0.5 K/UL (ref 0–0.51)
EOSINOPHIL NFR BLD: 6 % (ref 0–6.9)
ERYTHROCYTE [DISTWIDTH] IN BLOOD BY AUTOMATED COUNT: 42.8 FL (ref 35.9–50)
EST. AVERAGE GLUCOSE BLD GHB EST-MCNC: 114 MG/DL
GLOBULIN SER CALC-MCNC: 2.8 G/DL (ref 1.9–3.5)
GLUCOSE SERPL-MCNC: 97 MG/DL (ref 65–99)
HBA1C MFR BLD: 5.6 % (ref 0–5.6)
HCT VFR BLD AUTO: 44.7 % (ref 37–47)
HCV AB SER QL: NEGATIVE
HDLC SERPL-MCNC: 55 MG/DL
HGB BLD-MCNC: 14.3 G/DL (ref 12–16)
IMM GRANULOCYTES # BLD AUTO: 0.03 K/UL (ref 0–0.11)
IMM GRANULOCYTES NFR BLD AUTO: 0.4 % (ref 0–0.9)
LDLC SERPL CALC-MCNC: 126 MG/DL
LYMPHOCYTES # BLD AUTO: 1.76 K/UL (ref 1–4.8)
LYMPHOCYTES NFR BLD: 21.2 % (ref 22–41)
MCH RBC QN AUTO: 26.9 PG (ref 27–33)
MCHC RBC AUTO-ENTMCNC: 32 G/DL (ref 33.6–35)
MCV RBC AUTO: 84 FL (ref 81.4–97.8)
MONOCYTES # BLD AUTO: 0.69 K/UL (ref 0–0.85)
MONOCYTES NFR BLD AUTO: 8.3 % (ref 0–13.4)
NEUTROPHILS # BLD AUTO: 5.25 K/UL (ref 2–7.15)
NEUTROPHILS NFR BLD: 63.3 % (ref 44–72)
NRBC # BLD AUTO: 0 K/UL
NRBC BLD-RTO: 0 /100 WBC
PLATELET # BLD AUTO: 371 K/UL (ref 164–446)
PMV BLD AUTO: 9.7 FL (ref 9–12.9)
POTASSIUM SERPL-SCNC: 4 MMOL/L (ref 3.6–5.5)
PROT SERPL-MCNC: 7.1 G/DL (ref 6–8.2)
RBC # BLD AUTO: 5.32 M/UL (ref 4.2–5.4)
SODIUM SERPL-SCNC: 140 MMOL/L (ref 135–145)
TRIGL SERPL-MCNC: 117 MG/DL (ref 0–149)
WBC # BLD AUTO: 8.3 K/UL (ref 4.8–10.8)

## 2019-09-12 PROCEDURE — 80061 LIPID PANEL: CPT

## 2019-09-12 PROCEDURE — 83036 HEMOGLOBIN GLYCOSYLATED A1C: CPT

## 2019-09-12 PROCEDURE — 85025 COMPLETE CBC W/AUTO DIFF WBC: CPT

## 2019-09-12 PROCEDURE — 86803 HEPATITIS C AB TEST: CPT

## 2019-09-12 PROCEDURE — 80053 COMPREHEN METABOLIC PANEL: CPT

## 2019-09-12 PROCEDURE — 82306 VITAMIN D 25 HYDROXY: CPT

## 2019-09-12 PROCEDURE — 36415 COLL VENOUS BLD VENIPUNCTURE: CPT

## 2019-09-23 DIAGNOSIS — I10 HYPERTENSION, UNSPECIFIED TYPE: ICD-10-CM

## 2019-09-24 RX ORDER — TELMISARTAN 40 MG/1
TABLET ORAL
Qty: 100 TAB | Refills: 1 | Status: SHIPPED | OUTPATIENT
Start: 2019-09-24 | End: 2020-04-16

## 2019-09-24 NOTE — TELEPHONE ENCOUNTER
Was the patient seen in the last year in this department? Yes    Does patient have an active prescription for medications requested? No     Received Request Via: Pharmacy    Pt met protocol?: Yes     Last OV 09/03/19    BP Readings from Last 1 Encounters:   09/03/19 120/84

## 2019-09-24 NOTE — TELEPHONE ENCOUNTER
Refill X 6 months, sent to pharmacy.Pt. Seen in the last 6 months per protocol.   Lab Results   Component Value Date/Time    SODIUM 140 09/12/2019 09:00 AM    POTASSIUM 4.0 09/12/2019 09:00 AM    CHLORIDE 104 09/12/2019 09:00 AM    CO2 26 09/12/2019 09:00 AM    GLUCOSE 97 09/12/2019 09:00 AM    BUN 16 09/12/2019 09:00 AM    CREATININE 1.09 09/12/2019 09:00 AM    CREATININE 0.89 04/27/2009

## 2019-11-08 ENCOUNTER — OFFICE VISIT (OUTPATIENT)
Dept: URGENT CARE | Facility: PHYSICIAN GROUP | Age: 69
End: 2019-11-08
Payer: MEDICARE

## 2019-11-08 VITALS
OXYGEN SATURATION: 94 % | SYSTOLIC BLOOD PRESSURE: 128 MMHG | RESPIRATION RATE: 20 BRPM | BODY MASS INDEX: 31.53 KG/M2 | HEART RATE: 106 BPM | HEIGHT: 61 IN | DIASTOLIC BLOOD PRESSURE: 74 MMHG | WEIGHT: 167 LBS | TEMPERATURE: 97 F

## 2019-11-08 DIAGNOSIS — J30.89 SEASONAL ALLERGIC RHINITIS DUE TO OTHER ALLERGIC TRIGGER: ICD-10-CM

## 2019-11-08 DIAGNOSIS — R05.9 COUGH: ICD-10-CM

## 2019-11-08 PROCEDURE — 99214 OFFICE O/P EST MOD 30 MIN: CPT | Performed by: NURSE PRACTITIONER

## 2019-11-08 RX ORDER — BENZONATATE 100 MG/1
100 CAPSULE ORAL 3 TIMES DAILY PRN
Qty: 60 CAP | Refills: 0 | Status: SHIPPED
Start: 2019-11-08 | End: 2020-02-03

## 2019-11-08 ASSESSMENT — ENCOUNTER SYMPTOMS
SINUS PAIN: 1
RHINORRHEA: 1
SWOLLEN GLANDS: 0
ABDOMINAL PAIN: 0
WHEEZING: 0
CHILLS: 0
COUGH: 1
FEVER: 0
NAUSEA: 1
NECK PAIN: 0
SORE THROAT: 0
HEADACHES: 1

## 2019-11-08 NOTE — PROGRESS NOTES
Subjective:      Augusta Rodriguez is a 69 y.o. female who presents with Cough (Cough, headaches, SOB, nausea  x3days )            URI    This is a new problem. Episode onset: 1 month. The problem has been gradually worsening. There has been no fever. Associated symptoms include coughing, headaches, nausea, a plugged ear sensation, rhinorrhea and sinus pain. Pertinent negatives include no abdominal pain, chest pain, congestion, neck pain, rash, sore throat, swollen glands or wheezing. Associated symptoms comments: Patient admits to ongoing dry cough with a need to constantly clear her throat for 30 days.  Patient is now noticing a feeling of plugged ears, sinus pressure and headache for 3 days and nausea with onset today.  Has a positive history of asthma and seasonal allergies.  Is currently on albuterol and Flovent for asthma and is not taking anything consistently for allergies.. She has tried increased fluids and decongestant (Delsym) for the symptoms. The treatment provided mild relief.       Review of Systems   Constitutional: Negative for chills and fever.   HENT: Positive for rhinorrhea and sinus pain. Negative for congestion and sore throat.    Respiratory: Positive for cough. Negative for wheezing.    Cardiovascular: Negative for chest pain.   Gastrointestinal: Positive for nausea. Negative for abdominal pain.   Musculoskeletal: Negative for neck pain.   Skin: Negative for rash.   Neurological: Positive for headaches.     Past Medical History:   Diagnosis Date   • Arrhythmia     afib   • Arthritis     osteo   • Asthma    • Dental disorder     upper dentures   • GERD (gastroesophageal reflux disease) 2/27/2010   • Hypertension    • Impaired fasting glucose 2/27/2010   • Incontinence of urine    • Indigestion    • Osteopenia 2/24/2010   • Other specified disorder of intestines     constipation r/t meds   • Restless leg syndrome    • Tremor, essential 2/24/2010   • Urinary bladder disorder       Past Surgical  History:   Procedure Laterality Date   • LUMBAR LAMINECTOMY DISKECTOMY Bilateral 2/4/2017    Procedure: LUMBAR LAMINECTOMY DISKECTOMY POSTERIOR L4-S1 ;  Surgeon: Emil Hitchcock M.D.;  Location: SURGERY [facility];  Service:    • CARPAL TUNNEL ENDOSCOPIC  11/17/2012    Performed by Heriberto Quiñones M.D. at SURGERY [facility]   • TRIGGER FINGER RELEASE  11/17/2012    Performed by Heriberto Quiñones M.D. at SURGERY [facility]   • HIP ARTHROSCOPY  2/23/2009    Performed by SHERLYN CALVO at SURGERY [facility]   • ACETABULAR OSTEOTOMY  2/23/2009    Performed by SHERLYN CALVO at SURGERY [facility]   • MASS EXCISION ORTHO  2/23/2009    Performed by SHERLYN CALVO at SURGERY [facility]   • HIP ARTHROSCOPY  2005    done at [facility]   • HAJA BY LAPAROSCOPY  1997   • HYSTERECTOMY, TOTAL ABDOMINAL  1979    oopherectomy lacie   • BLADDER SUSPENSION      bladder sling   • PRIMARY C SECTION  1970/1975      Social History     Socioeconomic History   • Marital status:      Spouse name: Not on file   • Number of children: Not on file   • Years of education: Not on file   • Highest education level: Not on file   Occupational History   • Not on file   Social Needs   • Financial resource strain: Not on file   • Food insecurity:     Worry: Not on file     Inability: Not on file   • Transportation needs:     Medical: Not on file     Non-medical: Not on file   Tobacco Use   • Smoking status: Never Smoker   • Smokeless tobacco: Never Used   Substance and Sexual Activity   • Alcohol use: No     Alcohol/week: 0.0 oz     Comment: Stopped drinking 45 days ago 7/12/17 Going to    • Drug use: No   • Sexual activity: Never   Lifestyle   • Physical activity:     Days per week: Not on file     Minutes per session: Not on file   • Stress: Not on file   Relationships   • Social connections:     Talks on phone: Not on file     Gets together: Not on file     Attends Restorationism service: Not on file      "Active member of club or organization: Not on file     Attends meetings of clubs or organizations: Not on file     Relationship status: Not on file   • Intimate partner violence:     Fear of current or ex partner: Not on file     Emotionally abused: Not on file     Physically abused: Not on file     Forced sexual activity: Not on file   Other Topics Concern   • Not on file   Social History Narrative   • Not on file    Allergies: Clarithromycin; Ees [erythromycin]; Levaquin; Pcn [penicillins]; Shellfish allergy; Sumatriptan; and Trazodone         Objective:     /74   Pulse (!) 106   Temp 36.1 °C (97 °F) (Temporal)   Resp 20   Ht 1.549 m (5' 1\")   Wt 75.8 kg (167 lb)   SpO2 94%   BMI 31.55 kg/m²      Physical Exam  Vitals signs reviewed.   Constitutional:       Appearance: Normal appearance.   HENT:      Head: Normocephalic and atraumatic.      Right Ear: Ear canal and external ear normal. Tympanic membrane is bulging.      Left Ear: Ear canal and external ear normal. Tympanic membrane is bulging.      Nose: Rhinorrhea present.      Right Sinus: No maxillary sinus tenderness or frontal sinus tenderness.      Left Sinus: No maxillary sinus tenderness or frontal sinus tenderness.      Mouth/Throat:      Lips: Pink.      Mouth: Mucous membranes are moist.      Pharynx: Uvula midline.      Comments: Postnasal drip noted, cobblestoning noted of the oropharynx  Cardiovascular:      Rate and Rhythm: Regular rhythm. Tachycardia present.      Pulses: Normal pulses.      Heart sounds: Normal heart sounds.   Pulmonary:      Effort: Pulmonary effort is normal.      Breath sounds: Normal breath sounds.   Skin:     General: Skin is warm and dry.   Neurological:      Mental Status: She is alert and oriented to person, place, and time.   Psychiatric:         Mood and Affect: Mood normal.         Behavior: Behavior normal.         Thought Content: Thought content normal.         Judgment: Judgment normal.               "   Assessment/Plan:       1. Cough  - benzonatate (TESSALON) 100 MG Cap; Take 1 Cap by mouth 3 times a day as needed for Cough.  Dispense: 60 Cap; Refill: 0    2. Seasonal allergic rhinitis due to other allergic trigger  - benzonatate (TESSALON) 100 MG Cap; Take 1 Cap by mouth 3 times a day as needed for Cough.  Dispense: 60 Cap; Refill: 0    Due to standing history of environmental allergies, and physical examination findings discussed with patient findings are or than likely indicative of environmental allergies due to postnasal drip and clear rhinorrhea and bulging TMs.    Patient increase fluids, may take over-the-counter NSAID and Tylenol per 's instructions, may take Benadryl and/or Sudafed for sinus symptoms.    Instructed patient to return to clinic for worsening symptoms or symptoms that persist for 7 to 10 days  Supportive care, differential diagnoses, and indications for immediate follow-up discussed with patient.    Pathogenesis of diagnosis discussed including typical length and natural progression. Patient expresses understanding and agrees to plan.       Please note that this dictation was created using voice recognition software. I have made every reasonable attempt to correct obvious errors, but I expect that there are errors of grammar and possibly content that I did not discover before finalizing the note.

## 2019-12-09 ENCOUNTER — TELEPHONE (OUTPATIENT)
Dept: HEALTH INFORMATION MANAGEMENT | Facility: OTHER | Age: 69
End: 2019-12-09

## 2019-12-09 ENCOUNTER — PATIENT OUTREACH (OUTPATIENT)
Dept: HEALTH INFORMATION MANAGEMENT | Facility: OTHER | Age: 69
End: 2019-12-09

## 2019-12-09 NOTE — TELEPHONE ENCOUNTER
Marin Evans,    It does not need to be rechecked at this time.  It is just something that we will keep an eye on when she is due for labs again.      Thanks!    Nahum

## 2019-12-09 NOTE — PROGRESS NOTES
Called pt to invite her to the information class for [facility]. She stated she will be out of town at the end of this week and cannot attend the 12/12 class.  She also stated that she was told she needed to repeat her A1c lab. There are no orders in the chart, message sent to pcp

## 2019-12-09 NOTE — TELEPHONE ENCOUNTER
Good morning Nahum,    This pt stated that when she did her last A1c in September, she was told she will have to repeat it. I do not see it due in her health maintenance topics. Is this a lab you would like to re order?    Thank you,    Cristina

## 2019-12-10 ENCOUNTER — PATIENT MESSAGE (OUTPATIENT)
Dept: HEALTH INFORMATION MANAGEMENT | Facility: OTHER | Age: 69
End: 2019-12-10

## 2019-12-16 PROBLEM — G47.33 OSA (OBSTRUCTIVE SLEEP APNEA): Status: ACTIVE | Noted: 2019-09-03

## 2019-12-16 NOTE — PROGRESS NOTES
"CC: \"My doctor prescribed for sleep apnea\"     HPI:  Ms. Augusta Rodriguez is a 69-year-old retired female kindly referred by KEERTHI Bettencourt.    The patient generally goes to bed at 11 PM and gets up at 9-11 AM.  She may work on crossword puzzles just prior to turn out the lights and attempting to go to sleep.  She reports 5 episodes of nocturia each night.    Symptoms include difficulty awakening and getting out of bed after sleeping, sleepiness during the day, tiredness during the day, too little sleep at night, and snoring.  She reports restless legs occasionally.  She endorses morning headaches but denies other issues with her sleep.  She has not been told of the legs or arms jerking twitching when she is asleep.  She does have a regular bed partner.    She may fall asleep accidentally when watching TV.  Her total [place] score is normal 2 out of 24.    Response to questionnaire notes loud snoring, twitching of legs and feet during sleep, kicking with legs during sleep, getting out of bed but not awake, and pauses in breathing for the last 6 months to 3 years.   is bothered by the \"heaving breathing\".    Significant comorbidities and modifying factors include obesity, GERD, asthma, bronchitis, postnasal drip, restless legs, hypertension, dyslipidemia, depression, atrial fibrillation, need for influenza vaccination, and non-smoker.      Patient Active Problem List    Diagnosis Date Noted   • Major depressive disorder, recurrent episode, in full remission (HCC) 09/12/2019   • BRIANA (obstructive sleep apnea) 09/03/2019   • Postmenopausal 01/22/2019   • Atrial fibrillation (HCC) 12/07/2018   • Shortness of breath 06/20/2018   • Anemia 06/07/2018   • BMI 30.0-30.9,adult 05/16/2018   • Pain of right upper extremity 01/02/2018   • Headache 11/10/2017   • Urinary incontinence 07/12/2017   • Spinal stenosis, lumbar 02/04/2017   • Trigger finger, acquired 11/17/2012   • GERD (gastroesophageal reflux disease) " 02/27/2010   • Vitamin D deficiency 02/27/2010   • Impaired fasting glucose 02/27/2010   • HTN (hypertension) 02/24/2010   • Hyperlipidemia 02/24/2010   • Osteopenia 02/24/2010       Past Medical History:   Diagnosis Date   • Arrhythmia     afib   • Arthritis     osteo   • Asthma    • Atrial fibrillation (HCC)    • Bronchitis    • Chickenpox    • Dental disorder     upper dentures   • GERD (gastroesophageal reflux disease) 2/27/2010   • Amharic measles    • Hypertension    • Impaired fasting glucose 2/27/2010   • Incontinence of urine    • Indigestion    • Mumps    • Osteopenia 2/24/2010   • Osteoporosis    • Other specified disorder of intestines     constipation r/t meds   • Restless leg syndrome    • Tremor, essential 2/24/2010   • Urinary bladder disorder         Past Surgical History:   Procedure Laterality Date   • LUMBAR LAMINECTOMY DISKECTOMY Bilateral 2/4/2017    Procedure: LUMBAR LAMINECTOMY DISKECTOMY POSTERIOR L4-S1 ;  Surgeon: Emil Hitchcock M.D.;  Location: [facility];  Service:    • CARPAL TUNNEL ENDOSCOPIC  11/17/2012    Performed by Heriberto Quiñones M.D. at SURGERY [facility]   • TRIGGER FINGER RELEASE  11/17/2012    Performed by Heriberto Quiñones M.D. at [facility]   • HIP ARTHROSCOPY  2/23/2009    Performed by SHERLYN CALVO at SURGERY [facility]   • ACETABULAR OSTEOTOMY  2/23/2009    Performed by SHERLYN CALVO at [facility]   • MASS EXCISION ORTHO  2/23/2009    Performed by SHERLYN CALVO at [facility]   • HIP ARTHROSCOPY  2005    done at [facility]   • HAJA BY LAPAROSCOPY  1997   • HYSTERECTOMY, TOTAL ABDOMINAL  1979    oopherectomy lacie   • BLADDER SUSPENSION      bladder sling   • CARPAL TUNNEL RELEASE     • LAMINOTOMY     • PRIMARY C SECTION  1970/1975       Family History   Problem Relation Age of Onset   • GI Disease Father         Crohn's   • Heart Disease Father         First MI age 30   • Hypertension Father    •  Stroke Father    • Hypertension Other    • Cancer Brother         ESOPHAGEAL CANCER       Social History     Socioeconomic History   • Marital status:      Spouse name: Not on file   • Number of children: Not on file   • Years of education: Not on file   • Highest education level: Not on file   Occupational History   • Not on file   Social Needs   • Financial resource strain: Not on file   • Food insecurity:     Worry: Not on file     Inability: Not on file   • Transportation needs:     Medical: Not on file     Non-medical: Not on file   Tobacco Use   • Smoking status: Never Smoker   • Smokeless tobacco: Never Used   Substance and Sexual Activity   • Alcohol use: Not Currently     Alcohol/week: 0.0 oz     Comment: Stopped drinking 45 days ago 7/12/17 Going to    • Drug use: No   • Sexual activity: Never   Lifestyle   • Physical activity:     Days per week: Not on file     Minutes per session: Not on file   • Stress: Not on file   Relationships   • Social connections:     Talks on phone: Not on file     Gets together: Not on file     Attends Voodoo service: Not on file     Active member of club or organization: Not on file     Attends meetings of clubs or organizations: Not on file     Relationship status: Not on file   • Intimate partner violence:     Fear of current or ex partner: Not on file     Emotionally abused: Not on file     Physically abused: Not on file     Forced sexual activity: Not on file   Other Topics Concern   • Not on file   Social History Narrative   • Not on file       Current Outpatient Medications   Medication Sig Dispense Refill   • telmisartan (MICARDIS) 40 MG Tab TAKE 1 TABLET BY MOUTH ONCE DAILY 100 Tab 1   • albuterol 108 (90 Base) MCG/ACT Aero Soln inhalation aerosol Inhale 2 Puffs by mouth every four hours as needed for Shortness of Breath. 1 Inhaler 0   • amitriptyline (ELAVIL) 25 MG Tab Take 2 Tabs by mouth at bedtime as needed. 180 Tab 1   • DULoxetine (CYMBALTA) 60 MG Cap  "DR Particles delayed-release capsule TAKE 1 CAPSULE BY MOUTH ONCE DAILY 90 Cap 1   • ondansetron (ZOFRAN) 4 MG Tab tablet Take 1 Tab by mouth every four hours as needed for Nausea/Vomiting. 30 Tab 2   • aspirin 81 MG EC tablet Take 81 mg by mouth every day.     • pantoprazole (PROTONIX) 40 MG Tablet Delayed Response Take 40 mg by mouth every day.     • diltiazem CD (CARDIZEM CD) 240 MG CP24 Take 240 mg by mouth every day.     • benzonatate (TESSALON) 100 MG Cap Take 1 Cap by mouth 3 times a day as needed for Cough. (Patient not taking: Reported on 12/17/2019) 60 Cap 0     No current facility-administered medications for this visit.     \"CURRENT RX\"    ALLERGIES: Clarithromycin; Ees [erythromycin]; Levaquin; Pcn [penicillins]; Shellfish allergy; Sumatriptan; and Trazodone    ROS    Constitutional: Denies fever, chills, sweats,  weight loss, positive for fatigue.  Eyes: Denies vision loss, pain, drainage, double vision, wears glasses.  Ears/Nose/Mouth/Throat: Positive for tooth aches  Cardiovascular: Denies chest pain, tightness, palpitations, swelling in legs/feet, fainting, difficulty breathing when lying down but gets better when sitting up.   Respiratory: Positive for nocturnal wheezing and cough.   Sleep: per HPI  Gastrointestinal: Denies  difficulty swallowing, nausea, abdominal pain, diarrhea, constipation, heartburn.  Genitourinary: Denies  blood in urine, discharge, positive for frequent urination.   Musculoskeletal: Denies painful joints, sore muscles, back pain.   Integumentary: Denies rashes, lumps, color changes.   Neurological: Positive for frequent headaches, denies weakness, dizziness.    PHYSICAL EXAM  Obese    /90 (BP Location: Right arm, Patient Position: Sitting, BP Cuff Size: Adult)   Pulse 100   Resp 14   Ht 1.549 m (5' 1\")   Wt 75.8 kg (167 lb)   SpO2 95%   BMI 31.55 kg/m²   Appearance: Well-nourished, well-developed, no acute distress  Eyes:  PERRLA, EOMI  ENMT: without lesions, " deformities;hearing grossly intact; tongue normal, posterior pharynx without erythema or exudate;   Modified Mallampati (AKA Santo) Score:1; edentulous  Neck: Supple, trachea midline, no masses  Respiratory effort:  No intercostal retractions or use of accessory muscles  Lung auscultation:  No wheezes rhonchi rubs or rales  Cardiac: No murmurs, rubs, or gallops; regular rhythm, normal rate; no edema  Abdomen:  No tenderness, no organomegaly.  Obese  Musculoskeletal:  Grossly normal; gait and station normal; digits and nails normal; dip arthritis  Skin:  No rashes, petechiae, cyanosis  Neurologic: without focal signs; oriented to person, time, place, and purpose; judgement intact  Psychiatric:  No depression, anxiety, agitation        PROBLEMS:  1. BRIANA (obstructive sleep apnea)    - zolpidem (AMBIEN) 5 MG Tab; Take 1 Tab by mouth at bedtime as needed for Sleep (1 to 2 po qhs prn insomnia/sleep study. Bring to sleep study.) for up to 2 doses.  Dispense: 2 Tab; Refill: 0  - Polysomnography, 4 or More; Future    2. Essential hypertension      3. Hyperlipidemia, unspecified hyperlipidemia type      4. Gastroesophageal reflux disease without esophagitis      5. Major depressive disorder, recurrent episode, in full remission (HCC)      6. Class 1 obesity with body mass index (BMI) of 30.0 to 30.9 in adult, unspecified obesity type, unspecified whether serious comorbidity present    - OBESITY COUNSELING (No Charge): Patient identified as having weight management issue.  Appropriate orders and counseling given.      PLAN:   The patient has signs and symptoms consistent with obstructive sleep apnea hypopnea syndrome. Will schedule to have a nocturnal polysomnogram using zolpidem to assist with sleep onset and maintenance should the need arise. Will return after the results are available to determine further diagnostic needs and/or treatment options.    The risks of untreated sleep apnea were discussed with the patient at  length. Patients with BRIANA are at increased risk of cardiovascular disease including coronary artery disease, systemic arterial hypertension, pulmonary arterial hypertension, cardiac arrythmias, and stroke. BRIANA patients have an increased risk of motor vehicle accidents, type 2 diabetes, chronic kidney disease, and non-alcoholic liver disease. The patient was advised to avoid driving a motor vehicle when drowsy.    Positive airway pressure, such as CPAP, is considered first-line and preferred therapy for sleep apnea and may reverse both symptoms and risks.    Have advised the patient to follow up with the appropriate healthcare practitioners for all other medical problems and issues.    Return for after sleep study.

## 2019-12-17 ENCOUNTER — SLEEP CENTER VISIT (OUTPATIENT)
Dept: SLEEP MEDICINE | Facility: MEDICAL CENTER | Age: 69
End: 2019-12-17
Payer: MEDICARE

## 2019-12-17 VITALS
OXYGEN SATURATION: 95 % | DIASTOLIC BLOOD PRESSURE: 90 MMHG | SYSTOLIC BLOOD PRESSURE: 148 MMHG | RESPIRATION RATE: 14 BRPM | HEIGHT: 61 IN | WEIGHT: 167 LBS | BODY MASS INDEX: 31.53 KG/M2 | HEART RATE: 100 BPM

## 2019-12-17 DIAGNOSIS — I10 ESSENTIAL HYPERTENSION: ICD-10-CM

## 2019-12-17 DIAGNOSIS — G47.33 OSA (OBSTRUCTIVE SLEEP APNEA): ICD-10-CM

## 2019-12-17 DIAGNOSIS — K21.9 GASTROESOPHAGEAL REFLUX DISEASE WITHOUT ESOPHAGITIS: ICD-10-CM

## 2019-12-17 DIAGNOSIS — F33.42 MAJOR DEPRESSIVE DISORDER, RECURRENT EPISODE, IN FULL REMISSION (HCC): ICD-10-CM

## 2019-12-17 DIAGNOSIS — E78.5 HYPERLIPIDEMIA, UNSPECIFIED HYPERLIPIDEMIA TYPE: ICD-10-CM

## 2019-12-17 DIAGNOSIS — E66.9 CLASS 1 OBESITY WITH BODY MASS INDEX (BMI) OF 30.0 TO 30.9 IN ADULT, UNSPECIFIED OBESITY TYPE, UNSPECIFIED WHETHER SERIOUS COMORBIDITY PRESENT: ICD-10-CM

## 2019-12-17 PROCEDURE — 99204 OFFICE O/P NEW MOD 45 MIN: CPT | Performed by: INTERNAL MEDICINE

## 2019-12-17 RX ORDER — ZOLPIDEM TARTRATE 5 MG/1
5 TABLET ORAL NIGHTLY PRN
Qty: 2 TAB | Refills: 0 | Status: SHIPPED | OUTPATIENT
Start: 2019-12-17 | End: 2020-01-17

## 2019-12-31 RX ORDER — ONDANSETRON 4 MG/1
4 TABLET, FILM COATED ORAL EVERY 4 HOURS PRN
Qty: 30 TAB | Refills: 0 | Status: SHIPPED | OUTPATIENT
Start: 2019-12-31 | End: 2020-07-14

## 2020-01-02 ENCOUNTER — OFFICE VISIT (OUTPATIENT)
Dept: URGENT CARE | Facility: PHYSICIAN GROUP | Age: 70
End: 2020-01-02
Payer: MEDICARE

## 2020-01-02 VITALS
DIASTOLIC BLOOD PRESSURE: 80 MMHG | OXYGEN SATURATION: 97 % | TEMPERATURE: 98.2 F | SYSTOLIC BLOOD PRESSURE: 122 MMHG | HEIGHT: 61 IN | HEART RATE: 83 BPM | WEIGHT: 166.2 LBS | RESPIRATION RATE: 20 BRPM | BODY MASS INDEX: 31.38 KG/M2

## 2020-01-02 DIAGNOSIS — Q17.8 EUSTACHIAN TUBE ANOMALY: ICD-10-CM

## 2020-01-02 DIAGNOSIS — R42 VERTIGO: ICD-10-CM

## 2020-01-02 DIAGNOSIS — H93.8X3 EAR FULLNESS, BILATERAL: ICD-10-CM

## 2020-01-02 DIAGNOSIS — H92.03 OTALGIA OF BOTH EARS: ICD-10-CM

## 2020-01-02 DIAGNOSIS — N89.8 VAGINAL ITCHING: ICD-10-CM

## 2020-01-02 DIAGNOSIS — R30.0 DYSURIA: ICD-10-CM

## 2020-01-02 LAB
APPEARANCE UR: CLEAR
BILIRUB UR STRIP-MCNC: NEGATIVE MG/DL
COLOR UR AUTO: YELLOW
GLUCOSE UR STRIP.AUTO-MCNC: NEGATIVE MG/DL
KETONES UR STRIP.AUTO-MCNC: NEGATIVE MG/DL
LEUKOCYTE ESTERASE UR QL STRIP.AUTO: NEGATIVE
NITRITE UR QL STRIP.AUTO: NEGATIVE
PH UR STRIP.AUTO: 6.5 [PH] (ref 5–8)
PROT UR QL STRIP: NEGATIVE MG/DL
RBC UR QL AUTO: NEGATIVE
SP GR UR STRIP.AUTO: 1.02
UROBILINOGEN UR STRIP-MCNC: NORMAL MG/DL

## 2020-01-02 PROCEDURE — 81002 URINALYSIS NONAUTO W/O SCOPE: CPT | Performed by: PHYSICIAN ASSISTANT

## 2020-01-02 PROCEDURE — 99214 OFFICE O/P EST MOD 30 MIN: CPT | Performed by: PHYSICIAN ASSISTANT

## 2020-01-02 RX ORDER — FLUTICASONE PROPIONATE 50 MCG
1 SPRAY, SUSPENSION (ML) NASAL DAILY
Qty: 16 G | Refills: 0 | Status: SHIPPED | OUTPATIENT
Start: 2020-01-02 | End: 2020-10-15

## 2020-01-02 RX ORDER — DULOXETIN HYDROCHLORIDE 60 MG/1
60 CAPSULE, DELAYED RELEASE ORAL DAILY
Qty: 90 CAP | Refills: 3 | Status: SHIPPED
Start: 2020-01-02 | End: 2020-02-03

## 2020-01-02 RX ORDER — FLUCONAZOLE 150 MG/1
TABLET ORAL
Qty: 2 TAB | Refills: 0 | Status: SHIPPED
Start: 2020-01-02 | End: 2020-02-03

## 2020-01-02 RX ORDER — CEFDINIR 300 MG/1
300 CAPSULE ORAL 2 TIMES DAILY
Qty: 14 CAP | Refills: 0 | Status: SHIPPED | OUTPATIENT
Start: 2020-01-02 | End: 2020-01-09

## 2020-01-02 ASSESSMENT — ENCOUNTER SYMPTOMS
SPEECH CHANGE: 0
DIZZINESS: 1
WHEEZING: 0
RHINORRHEA: 1
CHILLS: 0
SORE THROAT: 0
EYE REDNESS: 0
COUGH: 0
MYALGIAS: 0
VOMITING: 0
DIARRHEA: 0
SENSORY CHANGE: 0
NAUSEA: 0
FEVER: 0
HEADACHES: 0
PALPITATIONS: 0
EYE DISCHARGE: 0
SINUS PAIN: 0

## 2020-01-02 NOTE — PROGRESS NOTES
"Subjective:      Augusta Rodriguez is a 69 y.o. female who presents with Otalgia (with dizziness with yeast infection )            Otalgia    There is pain in both ears. This is a new problem. The current episode started in the past 7 days. The problem occurs constantly. The problem has been gradually worsening. There has been no fever. Associated symptoms include rhinorrhea. Pertinent negatives include no coughing, diarrhea, ear discharge, headaches, hearing loss, rash, sore throat or vomiting. Associated symptoms comments: Reports intermittent vertigo.   . Treatments tried: Mucinex, nasal sprays,  The treatment provided mild relief. HX of ear infections- this feels similar.    Patient is also with her  today who gives history.  Patient also believes that she may have a yeast infection at this time.  She does report slight burning and burning with urination with notable itching.    Review of Systems   Constitutional: Negative for chills and fever.   HENT: Positive for congestion, ear pain and rhinorrhea. Negative for ear discharge, hearing loss, sinus pain and sore throat.    Eyes: Negative for discharge and redness.        Slight blurry vision when she gets vertigo.      Respiratory: Negative for cough and wheezing.    Cardiovascular: Negative for palpitations.   Gastrointestinal: Negative for diarrhea, nausea and vomiting.   Genitourinary: Positive for dysuria. Negative for urgency.   Musculoskeletal: Negative for myalgias.   Skin: Positive for itching. Negative for rash.   Neurological: Positive for dizziness. Negative for sensory change, speech change and headaches.   All other systems reviewed and are negative.         Objective:     /80 (BP Location: Left arm, Patient Position: Sitting, BP Cuff Size: Adult)   Pulse 83   Temp 36.8 °C (98.2 °F) (Temporal)   Resp 20   Ht 1.549 m (5' 1\")   Wt 75.4 kg (166 lb 3.2 oz)   SpO2 97%   BMI 31.40 kg/m²    PMH:  has a past medical history of Arrhythmia, " Arthritis, Asthma, Atrial fibrillation (HCC), Bronchitis, Chickenpox, Dental disorder, GERD (gastroesophageal reflux disease) (2/27/2010), Georgian measles, Hypertension, Impaired fasting glucose (2/27/2010), Incontinence of urine, Indigestion, Mumps, Osteopenia (2/24/2010), Osteoporosis, Other specified disorder of intestines, Restless leg syndrome, Tremor, essential (2/24/2010), and Urinary bladder disorder.  MEDS: Reviewed .   ALLERGIES:   Allergies   Allergen Reactions   • Clarithromycin      Swelling/Itching/Nausea   • Ees [Erythromycin]      Swelling/Itching/Nausea   • Levaquin      Muscle soreness    • Pcn [Penicillins]      Swelling/Itching/Nausea    • Shellfish Allergy      Swelling/Itching/Nausea   • Sumatriptan Swelling     Tongue swell   • Trazodone Swelling     SURGHX:   Past Surgical History:   Procedure Laterality Date   • LUMBAR LAMINECTOMY DISKECTOMY Bilateral 2/4/2017    Procedure: LUMBAR LAMINECTOMY DISKECTOMY POSTERIOR L4-S1 ;  Surgeon: Emil Hitchcock M.D.;  Location: [facility];  Service:    • CARPAL TUNNEL ENDOSCOPIC  11/17/2012    Performed by Heriberto Quiñones M.D. at SURGERY [facility]   • TRIGGER FINGER RELEASE  11/17/2012    Performed by Heriberto Quiñones M.D. at [facility]   • HIP ARTHROSCOPY  2/23/2009    Performed by SHERLYN CALVO at [facility]   • ACETABULAR OSTEOTOMY  2/23/2009    Performed by SHERLYN CALVO at [facility]   • MASS EXCISION ORTHO  2/23/2009    Performed by SHERLYN CALVO at [facility]   • HIP ARTHROSCOPY  2005    done at [facility]   • HAJA BY LAPAROSCOPY  1997   • HYSTERECTOMY, TOTAL ABDOMINAL  1979    oopherectomy lacie   • BLADDER SUSPENSION      bladder sling   • CARPAL TUNNEL RELEASE     • LAMINOTOMY     • PRIMARY C SECTION  1970/1975     SOCHX:  reports that she has never smoked. She has never used smokeless tobacco. She reports previous alcohol use. She reports that she does not use  drugs.  FH: Family history was reviewed, no pertinent findings to report    Physical Exam  Vitals signs reviewed.   Constitutional:       Appearance: Normal appearance. She is well-developed.   HENT:      Head: Normocephalic and atraumatic.      Ears:      Comments: Bilateral clear middle ear effusions.     Nose:      Comments: Rhinorrhea noted.     Mouth/Throat:      Comments: Posterior oropharynx is erythematous, positive postnasal drainage.  No evidence of exudate.  Eyes:      Conjunctiva/sclera: Conjunctivae normal.      Pupils: Pupils are equal, round, and reactive to light.   Neck:      Musculoskeletal: Normal range of motion and neck supple.   Cardiovascular:      Rate and Rhythm: Normal rate and regular rhythm.      Heart sounds: No murmur.   Pulmonary:      Effort: Pulmonary effort is normal. No respiratory distress.      Breath sounds: No wheezing.   Musculoskeletal: Normal range of motion.      Right lower leg: No edema.      Left lower leg: No edema.   Lymphadenopathy:      Cervical: No cervical adenopathy.   Skin:     General: Skin is warm.      Findings: No rash.   Neurological:      General: No focal deficit present.      Mental Status: She is alert and oriented to person, place, and time.      Motor: No weakness.      Coordination: Coordination normal.   Psychiatric:         Mood and Affect: Mood normal.         Behavior: Behavior normal.         Thought Content: Thought content normal.               poc ua- clear.     Assessment/Plan:       1. Dysuria  - POCT Urinalysis  - fluconazole (DIFLUCAN) 150 MG tablet; Take 1 tab today, then repeat x1 in 72 hours.  Dispense: 2 Tab; Refill: 0    2. Vaginal itching  - POCT Urinalysis  - fluconazole (DIFLUCAN) 150 MG tablet; Take 1 tab today, then repeat x1 in 72 hours.  Dispense: 2 Tab; Refill: 0    3. Ear fullness, bilateral  - fluticasone (FLONASE) 50 MCG/ACT nasal spray; Spray 1 Spray in nose every day.  Dispense: 16 g; Refill: 0  - cefdinir (OMNICEF) 300  MG Cap; Take 1 Cap by mouth 2 times a day for 7 days.  Dispense: 14 Cap; Refill: 0    4. Otalgia of both ears  - cefdinir (OMNICEF) 300 MG Cap; Take 1 Cap by mouth 2 times a day for 7 days.  Dispense: 14 Cap; Refill: 0    5. Vertigo  6. Eustachian tube anomaly  - cefdinir (OMNICEF) 300 MG Cap; Take 1 Cap by mouth 2 times a day for 7 days.  Dispense: 14 Cap; Refill: 0      Continue OTC supportive therapies- Flonase, OTC allergy meds, avoid night time dairy. Increase fluids. Humidification.   Patient without any neurological changes at this time, headache or worrisome symptoms indicating vertigo secondary to central cause.  Discussed worrisome symptoms with patient and her  today that would require emergent follow-up.  Patient given precautionary s/sx that mandate immediate follow up and evaluation in the ED. Advised of risks of not doing so.    DDX, Supportive care, and indications for immediate follow-up discussed with patient.    Instructed to return to clinic or nearest emergency department if we are not available for any change in condition, further concerns, or worsening of symptoms.    The patient demonstrated a good understanding and agreed with the treatment plan    Please note that this dictation was created using voice recognition software. I have made every reasonable attempt to correct obvious errors, but I expect that there are errors of grammar and possibly content that I did not discover before finalizing the note.

## 2020-01-06 RX ORDER — DULOXETIN HYDROCHLORIDE 60 MG/1
CAPSULE, DELAYED RELEASE ORAL
Qty: 90 CAP | Refills: 1 | Status: SHIPPED | OUTPATIENT
Start: 2020-01-06 | End: 2021-01-11 | Stop reason: SDUPTHER

## 2020-01-16 ENCOUNTER — SLEEP STUDY (OUTPATIENT)
Dept: SLEEP MEDICINE | Facility: MEDICAL CENTER | Age: 70
End: 2020-01-16
Attending: INTERNAL MEDICINE
Payer: MEDICARE

## 2020-01-16 DIAGNOSIS — G47.33 OSA (OBSTRUCTIVE SLEEP APNEA): ICD-10-CM

## 2020-01-16 PROCEDURE — 95811 POLYSOM 6/>YRS CPAP 4/> PARM: CPT | Performed by: INTERNAL MEDICINE

## 2020-01-17 NOTE — PROCEDURES
Clinical Comments:    The patient underwent a split night polysomnogram with a CPAP titration using the standard montage for measurement of paramaters of sleep, respiratory events, movement abnormalities, heart rate and rhythm.  A Microphone was used to monitior snoring.  Interpretation:  Study start time was 09:49:01 PM.  Total recording time was 4h 27.5m (267 minutes) with a total sleep time of 2h 15.5m (135 minutes) resulting in a sleep efficiency of 50.65%.  Sleep latency from the start fo the study was 76 minutes minutes and REM latency from sleep onset was 00 minutes minutes.  Respiratory:   There were 11 apneas in total consisting of 10 obstructive apneas, 0 mixed apneas, and 1 central apneas.  There were 57 hypopneas in total.  The apnea index was 4.87 per hour and the hypopnea index was 25.24 per hour.  The overall AHI was 30.1, with a REM AHI of 0.00, and a supine AHI of 85.95.  Limb Movements:  There were a total of 90 periodic leg movements, of which 29 were PLMS arousals.  This resulted in a PLMS index of 39.9 and a PLMS arousal index of 12.8  Oximetry:  The mean SaO2 was 90.0% for the diagnostic portion of the study, with a minimum SaO2 of 82.0%.    Treatment:  Interpretation:  Treatment recording time was 3h 40.0m (220 minutes) with a total sleep time of 2h 39.5m (159 minutes) resulting in a sleep efficiency of 72.5%.    Sleep latency from the start of treatment was 00 minutes minutes and REM latency from sleep onset was 0h 12.0m minutes.    The patient had 180 arousals in total for an arousal index of 67.7.  Respiratory:   There were 38 apneas in total consisting of  4 obstructive apneas, 33 central apneas, and 1 mixed apneas for an apnea index of 14.29.    The patient had 43 hypopneas in total, which resulted in a hypopnea index of 16.18.    The overall AHI was 30.47, with a REM AHI of 9.23, and a supine AHI of 30.47.     Limb Movements:  There were a total of 302 periodic leg movements, of which  59 were PLMS arousals.  This resulted in a PLMS index of 113.6 and a PLMS arousal index of 22.2.  Oximetry:  The mean SaO2 during treatment was 92.0%, with a minimum oxygen saturation of 82.0%.     CPAP was tried from 5 to 16cm H2O.    On the baseline component of the study sleep efficiency was 50%.  Sleep architecture showed reduced stage III and REM sleep.  Sleep latency was prolonged at 76 minutes.  EKG showed sinus rhythm with occasional PVCs.  PLM index of 34/h.    Once asleep snoring was noted associated with obstructive respiratory apneas and hypopneas.  The overall AHI was 30/h and associated with desaturations to a carol of 82% SPO2.    After meeting split-night criteria CPAP therapy was started with sleep efficiency improving to 72%.  CPAP was titrated to 16 cm of water with resultant AHI of 1.9 and normal oximetry.  PLM index of 99/h on CPAP.    Interpretation:  Severe BRIANA, AHI 30/h with desaturations to 82% SPO2.  Successful CPAP titration at 16 cm water.  Elevated periodic limb movements.    Recommendations:  CPAP therapy is considered the optimal treatment for severe BRIANA.  CPAP: 16 cm of water recommended using a small AirFit 20 mask.  Clinical correlation regarding periodic limb movements, which may be secondary to BRIANA or represent a secondary sleep disorder.

## 2020-02-03 ENCOUNTER — OFFICE VISIT (OUTPATIENT)
Dept: MEDICAL GROUP | Facility: PHYSICIAN GROUP | Age: 70
End: 2020-02-03
Payer: MEDICARE

## 2020-02-03 VITALS
WEIGHT: 162 LBS | HEIGHT: 61 IN | HEART RATE: 73 BPM | TEMPERATURE: 97.7 F | BODY MASS INDEX: 30.58 KG/M2 | DIASTOLIC BLOOD PRESSURE: 92 MMHG | RESPIRATION RATE: 16 BRPM | OXYGEN SATURATION: 97 % | SYSTOLIC BLOOD PRESSURE: 132 MMHG

## 2020-02-03 DIAGNOSIS — G47.33 OSA (OBSTRUCTIVE SLEEP APNEA): ICD-10-CM

## 2020-02-03 DIAGNOSIS — R30.0 DYSURIA: ICD-10-CM

## 2020-02-03 DIAGNOSIS — I48.0 PAROXYSMAL ATRIAL FIBRILLATION (HCC): ICD-10-CM

## 2020-02-03 DIAGNOSIS — F33.42 MAJOR DEPRESSIVE DISORDER, RECURRENT EPISODE, IN FULL REMISSION (HCC): ICD-10-CM

## 2020-02-03 DIAGNOSIS — N89.8 VAGINAL ITCHING: ICD-10-CM

## 2020-02-03 PROCEDURE — 8041 PR SCP AHA: Performed by: NURSE PRACTITIONER

## 2020-02-03 PROCEDURE — 99214 OFFICE O/P EST MOD 30 MIN: CPT | Performed by: NURSE PRACTITIONER

## 2020-02-03 RX ORDER — OXYCODONE HYDROCHLORIDE AND ACETAMINOPHEN 5; 325 MG/1; MG/1
1 TABLET ORAL EVERY 6 HOURS PRN
COMMUNITY
Start: 2020-01-30 | End: 2020-02-03

## 2020-02-03 RX ORDER — CHLORHEXIDINE GLUCONATE ORAL RINSE 1.2 MG/ML
SOLUTION DENTAL
COMMUNITY
Start: 2020-01-30 | End: 2020-07-15

## 2020-02-03 RX ORDER — CLINDAMYCIN HYDROCHLORIDE 300 MG/1
300 CAPSULE ORAL
COMMUNITY
Start: 2020-01-30 | End: 2020-07-15

## 2020-02-03 RX ORDER — FLUCONAZOLE 150 MG/1
150 TABLET ORAL
Qty: 4 TAB | Refills: 0 | Status: SHIPPED | OUTPATIENT
Start: 2020-02-03 | End: 2020-07-15

## 2020-02-03 RX ORDER — IBUPROFEN 200 MG
200 TABLET ORAL EVERY 6 HOURS PRN
COMMUNITY
End: 2020-10-15

## 2020-02-03 NOTE — ASSESSMENT & PLAN NOTE
Chronic in nature.  Stable on Cymbalta.  Both she and her  report that her mood has been very good.  Denies SI/HI.

## 2020-02-03 NOTE — ASSESSMENT & PLAN NOTE
Is still having issues with vaginal itching.  The diflucan worked, but her symptoms returned.  Discussed plan to proceed with 4 doses of weekly diflucan.

## 2020-02-03 NOTE — PROGRESS NOTES
Chief Complaint   Patient presents with   • Results     Sleep Study    • Vaginal Itching     FV   • Ear Pain     Bilateral       HISTORY OF PRESENT ILLNESS: Patient is a 69 y.o. female established patient who presents today to discuss the following issues:    Major depressive disorder, recurrent episode, in full remission (HCC)  Chronic in nature.  Stable on Cymbalta.  Both she and her  report that her mood has been very good.  Denies SI/HI.    BRIANA (obstructive sleep apnea)  Reviewed recent sleep study results.  Study showed severe sleep apnea as well as periodic limb movements and prolonged sleep latency.  She has an appointment with sleep medicine/pulmonology on April 1st.    Vaginal itching  Is still having issues with vaginal itching.  The diflucan worked, but her symptoms returned.  Discussed plan to proceed with 4 doses of weekly diflucan.    Atrial fibrillation (HCC)  Chronic in nature.  Stable on meds.  Followed by cardiology.      Patient Active Problem List    Diagnosis Date Noted   • Vaginal itching 02/03/2020   • Major depressive disorder, recurrent episode, in full remission (HCC) 09/12/2019   • BRIANA (obstructive sleep apnea) 09/03/2019   • Postmenopausal 01/22/2019   • Atrial fibrillation (HCC) 12/07/2018   • Shortness of breath 06/20/2018   • Anemia 06/07/2018   • BMI 30.0-30.9,adult 05/16/2018   • Pain of right upper extremity 01/02/2018   • Headache 11/10/2017   • Urinary incontinence 07/12/2017   • Spinal stenosis, lumbar 02/04/2017   • Trigger finger, acquired 11/17/2012   • GERD (gastroesophageal reflux disease) 02/27/2010   • Vitamin D deficiency 02/27/2010   • Impaired fasting glucose 02/27/2010   • HTN (hypertension) 02/24/2010   • Hyperlipidemia 02/24/2010   • Osteopenia 02/24/2010       Allergies:Clarithromycin; Ees [erythromycin]; Levaquin; Pcn [penicillins]; Shellfish allergy; Sumatriptan; and Trazodone    Current Outpatient Medications   Medication Sig Dispense Refill   •  chlorhexidine (PERIDEX) 0.12 % Solution RINSE WITH 15ML FOR 60 SECONDS AND SPIT USE THREE TIMES DAILY AFTER BRUSHING AND FLOSSING .     • ibuprofen (MOTRIN) 200 MG Tab Take 200 mg by mouth every 6 hours as needed.     • Cetirizine HCl (ZYRTEC PO) Take  by mouth.     • fluconazole (DIFLUCAN) 150 MG tablet Take 1 Tab by mouth every 7 days. 4 Tab 0   • DULoxetine (CYMBALTA) 60 MG Cap DR Particles delayed-release capsule TAKE 1 CAPSULE BY MOUTH ONCE DAILY 90 Cap 1   • fluticasone (FLONASE) 50 MCG/ACT nasal spray Spray 1 Spray in nose every day. 16 g 0   • telmisartan (MICARDIS) 40 MG Tab TAKE 1 TABLET BY MOUTH ONCE DAILY 100 Tab 1   • albuterol 108 (90 Base) MCG/ACT Aero Soln inhalation aerosol Inhale 2 Puffs by mouth every four hours as needed for Shortness of Breath. 1 Inhaler 0   • amitriptyline (ELAVIL) 25 MG Tab Take 2 Tabs by mouth at bedtime as needed. 180 Tab 1   • aspirin 81 MG EC tablet Take 81 mg by mouth every day.     • pantoprazole (PROTONIX) 40 MG Tablet Delayed Response Take 40 mg by mouth every day.     • diltiazem CD (CARDIZEM CD) 240 MG CP24 Take 240 mg by mouth every day.     • clindamycin (CLEOCIN) 300 MG Cap Take 300 mg by mouth.       No current facility-administered medications for this visit.        Social History     Tobacco Use   • Smoking status: Never Smoker   • Smokeless tobacco: Never Used   Substance Use Topics   • Alcohol use: Not Currently     Alcohol/week: 0.0 oz     Comment: Stopped drinking 45 days ago 17 Going to    • Drug use: No       Family Status   Relation Name Status   • Mo   at age 44        Urosepsis   • Fa   at age 74        MI. Also had Crohn's   • Bro  Alive        Gallbladder cancer   • Sis  Alive        Crohn's   • OTHER  (Not Specified)   • Bro       Family History   Problem Relation Age of Onset   • GI Disease Father         Crohn's   • Heart Disease Father         First MI age 30   • Hypertension Father    • Stroke Father    •  "Hypertension Other    • Cancer Brother         ESOPHAGEAL CANCER       Review of Systems:   Constitutional: Negative for fever, chills, weight loss and malaise/fatigue.   HENT: Negative for ear pain, nosebleeds, congestion, sore throat and neck pain.    Eyes: Negative for blurred vision.   Respiratory: Negative for cough, sputum production, shortness of breath and wheezing.    Cardiovascular: Negative for chest pain, palpitations, orthopnea and leg swelling.   Gastrointestinal: Negative for heartburn, nausea, vomiting and abdominal pain.   Genitourinary: Negative for dysuria, urgency and frequency. Positive for vaginal itching and discharge.  Musculoskeletal: Negative for myalgias, joint pain, and back pain.  Skin: Negative for rash and itching.   Neurological: Negative for dizziness, tingling, tremors, sensory change, focal weakness and headaches.   Endo/Heme/Allergies: Does not bruise/bleed easily.   Psychiatric/Behavioral: Negative for depression, suicidal ideas and memory loss.  The patient is not nervous/anxious and does not have insomnia.    All other systems reviewed and are negative except as in HPI.    Exam:  Blood Pressure 132/92   Pulse 73   Temperature 36.5 °C (97.7 °F)   Respiration 16   Height 1.549 m (5' 1\")   Weight 73.5 kg (162 lb)   Oxygen Saturation 97%   General:  Well nourished, well developed female in NAD  Head: Grossly normal.  Neck: Supple without JVD or bruit. Thyroid is not enlarged.  Pulmonary: Clear to ausculation. Normal effort. No rales, ronchi, or wheezing.  Cardiovascular: Regular rate and rhythm without murmur.   Abdomen:  Soft, nontender, nondistended.  Extremities: No clubbing, cyanosis, or edema.  Skin: Intact with no obvious rashes or lesions.  Neuro: Grossly intact.  Psych: Alert and oriented x 3.  Mood and affect appropriate.    Medical decision-making and discussion: Augusta is here today to discuss a few things.  Her records were reviewed, her sleep study results were " discussed at length, diflucan was sent to her pharmacy, and she will follow-up here as needed.          Assessment/Plan:  1. Major depressive disorder, recurrent episode, in full remission (HCC)     2. Dysuria  fluconazole (DIFLUCAN) 150 MG tablet   3. Vaginal itching  fluconazole (DIFLUCAN) 150 MG tablet   4. BRIANA (obstructive sleep apnea)     5. Paroxysmal atrial fibrillation (HCC)         Return if symptoms worsen or fail to improve.    Please note that this dictation was created using voice recognition software. I have made every reasonable attempt to correct obvious errors, but I expect that there are errors of grammar and possibly content that I did not discover before finalizing the note.

## 2020-02-03 NOTE — PROGRESS NOTES

## 2020-02-03 NOTE — ASSESSMENT & PLAN NOTE
Reviewed recent sleep study results.  Study showed severe sleep apnea as well as periodic limb movements and prolonged sleep latency.  She has an appointment with sleep medicine/pulmonology on April 1st.

## 2020-02-03 NOTE — PATIENT INSTRUCTIONS
Today, your Healthcare Provider may have discussed the following recommendations:    1. Exercise and Physical Activity  According to the American Heart Association, it is recommended to engage in physical activity regularly and to aim for 150 minutes of moderate-intensity aerobic activity per week.  Your Healthcare Provider may have recommended taking the stairs instead of the elevator, starting or maintaining a walking program or strength-training program.    2. Emotional Well-being  Mental and emotional well-being is essential to overall health.  Your Healthcare Provider may have encouraged you to build strong, positive relationships with family and friends, become more involved in your community (by volunteering or joining a spiritual community), or focus on self-care.    3. Fall and Injury Prevention  To prevent falls and injuries and also improve your balance, your Healthcare Provider may have suggested that you use a cane or walker, start an exercise of physical therapy program, or have your vision and/or hearing tested.    4. Urinary Leakage (Urinary Incontinence)  To control or manage the leakage of urine, your Healthcare Provider may have recommended you start bladder training exercises (such as Kegel exercises), a trial of a medication or a referral to see a specialist to discuss surgical options.'

## 2020-02-29 ENCOUNTER — OFFICE VISIT (OUTPATIENT)
Dept: URGENT CARE | Facility: PHYSICIAN GROUP | Age: 70
End: 2020-02-29
Payer: MEDICARE

## 2020-02-29 VITALS
OXYGEN SATURATION: 94 % | DIASTOLIC BLOOD PRESSURE: 90 MMHG | HEART RATE: 106 BPM | RESPIRATION RATE: 20 BRPM | HEIGHT: 61 IN | SYSTOLIC BLOOD PRESSURE: 136 MMHG | BODY MASS INDEX: 30.58 KG/M2 | TEMPERATURE: 98.3 F | WEIGHT: 162 LBS

## 2020-02-29 DIAGNOSIS — J02.9 SORE THROAT: ICD-10-CM

## 2020-02-29 DIAGNOSIS — J01.40 ACUTE PANSINUSITIS, RECURRENCE NOT SPECIFIED: ICD-10-CM

## 2020-02-29 LAB
INT CON NEG: NEGATIVE
INT CON POS: POSITIVE
S PYO AG THROAT QL: NEGATIVE

## 2020-02-29 PROCEDURE — 99214 OFFICE O/P EST MOD 30 MIN: CPT | Performed by: NURSE PRACTITIONER

## 2020-02-29 PROCEDURE — 87880 STREP A ASSAY W/OPTIC: CPT | Performed by: NURSE PRACTITIONER

## 2020-02-29 RX ORDER — DOXYCYCLINE HYCLATE 100 MG
100 TABLET ORAL 2 TIMES DAILY
Qty: 14 TAB | Refills: 0 | Status: SHIPPED | OUTPATIENT
Start: 2020-02-29 | End: 2020-03-07

## 2020-02-29 ASSESSMENT — ENCOUNTER SYMPTOMS
SORE THROAT: 1
NECK PAIN: 0
NAUSEA: 0
FEVER: 0
CONSTIPATION: 0
EYE REDNESS: 0
ABDOMINAL PAIN: 0
COUGH: 1
VOMITING: 0
SHORTNESS OF BREATH: 0
DIZZINESS: 0
CHILLS: 0
SPUTUM PRODUCTION: 0
SINUS PAIN: 1
HEADACHES: 0
ORTHOPNEA: 0
PALPITATIONS: 0
MYALGIAS: 0
WHEEZING: 0
WEAKNESS: 0
DIARRHEA: 0
EYE DISCHARGE: 0

## 2020-02-29 ASSESSMENT — FIBROSIS 4 INDEX: FIB4 SCORE: 0.88

## 2020-02-29 NOTE — PROGRESS NOTES
Subjective:      Augusta Rodriguez is a 69 y.o. female who presents with Congestion (sore throat, cough, fatigue x4days)            HPI  C/o sinus facial pressure x 1 week. States prone to sinus infections. Using Flonase, zyrtec, Sudafed x 3 days. Green nasal d/c. Denies fever, but fatigue. H/o asthma. Has Albuterol, Flovent: BID. No difficulty with SOB, chest tightness or wheeze. No salt water gargle, no saline rinse. Loss of voice, sore throat, hurts to swallow.    PMH:  has a past medical history of Arrhythmia, Arthritis, Asthma, Atrial fibrillation (HCC), Bronchitis, Chickenpox, Dental disorder, GERD (gastroesophageal reflux disease) (2/27/2010), [place] measles, Hypertension, Impaired fasting glucose (2/27/2010), Incontinence of urine, Indigestion, Mumps, Osteopenia (2/24/2010), Osteoporosis, Other specified disorder of intestines, Restless leg syndrome, Tremor, essential (2/24/2010), and Urinary bladder disorder.  MEDS:   Current Outpatient Medications:   •  chlorhexidine (PERIDEX) 0.12 % Solution, RINSE WITH 15ML FOR 60 SECONDS AND SPIT USE THREE TIMES DAILY AFTER BRUSHING AND FLOSSING ., Disp: , Rfl:   •  clindamycin (CLEOCIN) 300 MG Cap, Take 300 mg by mouth., Disp: , Rfl:   •  ibuprofen (MOTRIN) 200 MG Tab, Take 200 mg by mouth every 6 hours as needed., Disp: , Rfl:   •  Cetirizine HCl (ZYRTEC PO), Take  by mouth., Disp: , Rfl:   •  fluconazole (DIFLUCAN) 150 MG tablet, Take 1 Tab by mouth every 7 days., Disp: 4 Tab, Rfl: 0  •  DULoxetine (CYMBALTA) 60 MG Cap DR Particles delayed-release capsule, TAKE 1 CAPSULE BY MOUTH ONCE DAILY, Disp: 90 Cap, Rfl: 1  •  fluticasone (FLONASE) 50 MCG/ACT nasal spray, Spray 1 Spray in nose every day., Disp: 16 g, Rfl: 0  •  telmisartan (MICARDIS) 40 MG Tab, TAKE 1 TABLET BY MOUTH ONCE DAILY, Disp: 100 Tab, Rfl: 1  •  albuterol 108 (90 Base) MCG/ACT Aero Soln inhalation aerosol, Inhale 2 Puffs by mouth every four hours as needed for Shortness of Breath., Disp: 1 Inhaler, Rfl:  0  •  amitriptyline (ELAVIL) 25 MG Tab, Take 2 Tabs by mouth at bedtime as needed., Disp: 180 Tab, Rfl: 1  •  aspirin 81 MG EC tablet, Take 81 mg by mouth every day., Disp: , Rfl:   •  pantoprazole (PROTONIX) 40 MG Tablet Delayed Response, Take 40 mg by mouth every day., Disp: , Rfl:   •  diltiazem CD (CARDIZEM CD) 240 MG CP24, Take 240 mg by mouth every day., Disp: , Rfl:   ALLERGIES:   Allergies   Allergen Reactions   • Clarithromycin      Swelling/Itching/Nausea   • Ees [Erythromycin]      Swelling/Itching/Nausea   • Levaquin      Muscle soreness    • Pcn [Penicillins]      Swelling/Itching/Nausea    • Shellfish Allergy      Swelling/Itching/Nausea   • Sumatriptan Swelling     Tongue swell   • Trazodone Swelling     SURGHX:   Past Surgical History:   Procedure Laterality Date   • LUMBAR LAMINECTOMY DISKECTOMY Bilateral 2/4/2017    Procedure: LUMBAR LAMINECTOMY DISKECTOMY POSTERIOR L4-S1 ;  Surgeon: Emil Hitchcock M.D.;  Location: [facility];  Service:    • CARPAL TUNNEL ENDOSCOPIC  11/17/2012    Performed by Heriberto Quiñones M.D. at [facility]   • TRIGGER FINGER RELEASE  11/17/2012    Performed by Heriberto Quiñones M.D. at [facility]   • HIP ARTHROSCOPY  2/23/2009    Performed by SHERLYN CALVO at [facility]   • ACETABULAR OSTEOTOMY  2/23/2009    Performed by SHERLYN CALVO at [facility]   • MASS EXCISION ORTHO  2/23/2009    Performed by SHERLYN CALVO at [facility]   • HIP ARTHROSCOPY  2005    done at [facility]   • HAJA BY LAPAROSCOPY  1997   • HYSTERECTOMY, TOTAL ABDOMINAL  1979    oopherectomy lacie   • BLADDER SUSPENSION      bladder sling   • CARPAL TUNNEL RELEASE     • LAMINOTOMY     • PRIMARY C SECTION  1970/1975     SOCHX:  reports that she has never smoked. She has never used smokeless tobacco. She reports previous alcohol use. She reports that she does not use drugs.  FH: Family history was reviewed, no pertinent  "findings to report    Review of Systems   Constitutional: Positive for malaise/fatigue. Negative for chills and fever.   HENT: Positive for congestion, ear pain, sinus pain and sore throat.    Eyes: Negative for discharge and redness.   Respiratory: Positive for cough. Negative for sputum production, shortness of breath and wheezing.    Cardiovascular: Negative for chest pain, palpitations and orthopnea.   Gastrointestinal: Negative for abdominal pain, constipation, diarrhea, nausea and vomiting.   Musculoskeletal: Negative for myalgias and neck pain.   Skin: Negative for itching and rash.   Neurological: Negative for dizziness, weakness and headaches.   Endo/Heme/Allergies: Negative for environmental allergies.   All other systems reviewed and are negative.         Objective:     /90 (BP Location: Left arm, Patient Position: Sitting, BP Cuff Size: Adult)   Pulse (!) 106   Temp 36.8 °C (98.3 °F) (Temporal)   Resp 20   Ht 1.549 m (5' 1\")   Wt 73.5 kg (162 lb)   SpO2 94%   BMI 30.61 kg/m²      Physical Exam  Vitals signs reviewed.   Constitutional:       General: She is awake. She is not in acute distress.     Appearance: Normal appearance. She is well-developed. She is not ill-appearing, toxic-appearing or diaphoretic.   HENT:      Head: Normocephalic.      Right Ear: Ear canal and external ear normal. A middle ear effusion is present.      Left Ear: Ear canal and external ear normal. A middle ear effusion is present.      Nose: Nasal tenderness, mucosal edema, congestion and rhinorrhea present.      Right Sinus: Maxillary sinus tenderness and frontal sinus tenderness present.      Left Sinus: Maxillary sinus tenderness and frontal sinus tenderness present.      Mouth/Throat:      Mouth: Mucous membranes are dry.      Pharynx: Uvula midline. Posterior oropharyngeal erythema present. No uvula swelling.   Eyes:      Conjunctiva/sclera: Conjunctivae normal.      Pupils: Pupils are equal, round, and reactive " to light.   Neck:      Musculoskeletal: Normal range of motion and neck supple.   Cardiovascular:      Rate and Rhythm: Normal rate and regular rhythm.   Pulmonary:      Effort: Pulmonary effort is normal. No accessory muscle usage or respiratory distress.      Breath sounds: Normal breath sounds and air entry. No stridor, decreased air movement or transmitted upper airway sounds. No decreased breath sounds, wheezing, rhonchi or rales.   Musculoskeletal: Normal range of motion.   Lymphadenopathy:      Cervical: No cervical adenopathy.   Skin:     General: Skin is warm and dry.   Neurological:      Mental Status: She is alert and oriented to person, place, and time.   Psychiatric:         Behavior: Behavior is cooperative.                 Assessment/Plan:       1. Acute pansinusitis, recurrence not specified    - doxycycline (VIBRAMYCIN) 100 MG Tab; Take 1 Tab by mouth 2 times a day for 7 days.  Dispense: 14 Tab; Refill: 0    2. Sore throat    - POCT Rapid Strep A    Increase water intake  Get rest  May use Ibuprofen/Tylenol prn for any fever, body aches or throat pain  May take longer acting antihistamine for seasonal allergy symptoms prn  May use saline nasal spray for nasal congestion  May use Flonase or Nasocort for allergen nasal congestion  May gargle with salt water prn for throat discomfort  May drink smoothies for nutrition if too painful to swallow solid foods  Monitor for productive cough, SOB and chest pain/tightness- need re-evaluation

## 2020-03-30 NOTE — PROGRESS NOTES
CC:  Sleep study results     HPI:  Ms. Augusta Rodriguez is a 69-year-old retired female kindly referred by ALBINA Bettencourt and seen as a new patient on 12/17/2019.  Symptoms include excessive daytime somnolence, loud snoring, frequent nocturnal awakenings and nocturia.    The patient's PSG on 1/16/2020 showed an AHI of 30/carol saturation 82%.  On CPAP at 16 cm H2O her AHI normalized to 1.9 with normal saturations.  She was noted to have an elevated PLMS arousal index of 22.2.      Significant comorbidities and modifying factors include obesity, GERD, asthma, bronchitis, postnasal drip, restless legs, hypertension, dyslipidemia, depression, atrial fibrillation, need for influenza vaccination, and non-smoker      Patient Active Problem List    Diagnosis Date Noted   • Vaginal itching 02/03/2020   • Major depressive disorder, recurrent episode, in full remission (HCC) 09/12/2019   • BRIANA (obstructive sleep apnea) 09/03/2019   • Postmenopausal 01/22/2019   • Atrial fibrillation (HCC) 12/07/2018   • Shortness of breath 06/20/2018   • Anemia 06/07/2018   • BMI 30.0-30.9,adult 05/16/2018   • Pain of right upper extremity 01/02/2018   • Headache 11/10/2017   • Urinary incontinence 07/12/2017   • Spinal stenosis, lumbar 02/04/2017   • Trigger finger, acquired 11/17/2012   • GERD (gastroesophageal reflux disease) 02/27/2010   • Vitamin D deficiency 02/27/2010   • Impaired fasting glucose 02/27/2010   • HTN (hypertension) 02/24/2010   • Hyperlipidemia 02/24/2010   • Osteopenia 02/24/2010       Past Medical History:   Diagnosis Date   • Arrhythmia     afib   • Arthritis     osteo   • Asthma    • Atrial fibrillation (HCC)    • Bronchitis    • Chickenpox    • Dental disorder     upper dentures   • GERD (gastroesophageal reflux disease) 2/27/2010   • Mosotho measles    • Hypertension    • Impaired fasting glucose 2/27/2010   • Incontinence of urine    • Indigestion    • Mumps    • Osteopenia 2/24/2010   • Osteoporosis    •  Other specified disorder of intestines     constipation r/t meds   • Restless leg syndrome    • Tremor, essential 2/24/2010   • Urinary bladder disorder         Past Surgical History:   Procedure Laterality Date   • LUMBAR LAMINECTOMY DISKECTOMY Bilateral 2/4/2017    Procedure: LUMBAR LAMINECTOMY DISKECTOMY POSTERIOR L4-S1 ;  Surgeon: Emil Hitchcock M.D.;  Location: SURGERY [facility];  Service:    • CARPAL TUNNEL ENDOSCOPIC  11/17/2012    Performed by Heriberto Quiñones M.D. at SURGERY [facility]   • TRIGGER FINGER RELEASE  11/17/2012    Performed by Heriberto Quiñones M.D. at SURGERY [facility]   • HIP ARTHROSCOPY  2/23/2009    Performed by SHERLYN CALVO at SURGERY [facility]   • ACETABULAR OSTEOTOMY  2/23/2009    Performed by SHERLYN CALVO at SURGERY [facility]   • MASS EXCISION ORTHO  2/23/2009    Performed by SHERLYN CALVO at SURGERY [facility]   • HIP ARTHROSCOPY  2005    done at [facility]   • HAJA BY LAPAROSCOPY  1997   • HYSTERECTOMY, TOTAL ABDOMINAL  1979    oopherectomy lacie   • BLADDER SUSPENSION      bladder sling   • CARPAL TUNNEL RELEASE     • LAMINOTOMY     • PRIMARY C SECTION  1970/1975       Family History   Problem Relation Age of Onset   • GI Disease Father         Crohn's   • Heart Disease Father         First MI age 30   • Hypertension Father    • Stroke Father    • Hypertension Other    • Cancer Brother         ESOPHAGEAL CANCER       Social History     Socioeconomic History   • Marital status:      Spouse name: Not on file   • Number of children: Not on file   • Years of education: Not on file   • Highest education level: Not on file   Occupational History   • Not on file   Social Needs   • Financial resource strain: Not on file   • Food insecurity     Worry: Not on file     Inability: Not on file   • Transportation needs     Medical: Not on file     Non-medical: Not on file   Tobacco Use   • Smoking status: Never Smoker   • Smokeless tobacco:  Never Used   Substance and Sexual Activity   • Alcohol use: Not Currently     Alcohol/week: 0.0 oz     Comment: Stopped drinking 45 days ago 7/12/17 Going to    • Drug use: No   • Sexual activity: Never   Lifestyle   • Physical activity     Days per week: Not on file     Minutes per session: Not on file   • Stress: Not on file   Relationships   • Social connections     Talks on phone: Not on file     Gets together: Not on file     Attends [employer] service: Not on file     Active member of club or organization: Not on file     Attends meetings of clubs or organizations: Not on file     Relationship status: Not on file   • Intimate partner violence     Fear of current or ex partner: Not on file     Emotionally abused: Not on file     Physically abused: Not on file     Forced sexual activity: Not on file   Other Topics Concern   • Not on file   Social History Narrative   • Not on file       Current Outpatient Medications   Medication Sig Dispense Refill   • chlorhexidine (PERIDEX) 0.12 % Solution RINSE WITH 15ML FOR 60 SECONDS AND SPIT USE THREE TIMES DAILY AFTER BRUSHING AND FLOSSING .     • clindamycin (CLEOCIN) 300 MG Cap Take 300 mg by mouth.     • ibuprofen (MOTRIN) 200 MG Tab Take 200 mg by mouth every 6 hours as needed.     • Cetirizine HCl (ZYRTEC PO) Take  by mouth.     • fluconazole (DIFLUCAN) 150 MG tablet Take 1 Tab by mouth every 7 days. 4 Tab 0   • DULoxetine (CYMBALTA) 60 MG Cap DR Particles delayed-release capsule TAKE 1 CAPSULE BY MOUTH ONCE DAILY 90 Cap 1   • fluticasone (FLONASE) 50 MCG/ACT nasal spray Spray 1 Spray in nose every day. 16 g 0   • telmisartan (MICARDIS) 40 MG Tab TAKE 1 TABLET BY MOUTH ONCE DAILY 100 Tab 1   • albuterol 108 (90 Base) MCG/ACT Aero Soln inhalation aerosol Inhale 2 Puffs by mouth every four hours as needed for Shortness of Breath. 1 Inhaler 0   • amitriptyline (ELAVIL) 25 MG Tab Take 2 Tabs by mouth at bedtime as needed. 180 Tab 1   • aspirin 81 MG EC tablet Take 81 mg  "by mouth every day.     • pantoprazole (PROTONIX) 40 MG Tablet Delayed Response Take 40 mg by mouth every day.     • diltiazem CD (CARDIZEM CD) 240 MG CP24 Take 240 mg by mouth every day.       No current facility-administered medications for this visit.     \"CURRENT RX\"    ALLERGIES: Clarithromycin; Ees [erythromycin]; Levaquin; Pcn [penicillins]; Shellfish allergy; Sumatriptan; and Trazodone    ROS    Constitutional: Denies fever, chills, sweats,  weight loss, fatigue  Cardiovascular: Denies chest pain, tightness, palpitations, swelling in legs/feet, fainting, difficulty breathing when lying down but gets better when sitting up.   Respiratory: Denies shortness of breath, cough, sputum, wheezing, painful breathing, coughing up blood.   Sleep: per HPI  Gastrointestinal: Denies  difficulty swallowing, nausea, abdominal pain, diarrhea, constipation, heartburn.  Musculoskeletal: Denies painful joints, sore muscles, back pain.        PHYSICAL EXAM  Obese    /88 (BP Location: Right arm, Patient Position: Sitting, BP Cuff Size: Adult)   Pulse 86   Resp 14   Ht 1.549 m (5' 1\")   Wt 75.3 kg (166 lb)   SpO2 97%   BMI 31.37 kg/m²   Appearance: Well-nourished, well-developed, no acute distress  Eyes:  PERRLA, EOMI  ENMT: without lesions, deformities;hearing grossly intact; tongue normal, posterior pharynx without erythema or exudate;   Modified Mallampati (AKA Freidman) Score: 1  Neck: Supple, trachea midline, no masses  Respiratory effort:  No intercostal retractions or use of accessory muscles  Lung auscultation:  No wheezes rhonchi rubs or rales  Cardiac: No murmurs, rubs, or gallops; regular rhythm, normal rate; no edema  Abdomen:  No tenderness, no organomegaly  Musculoskeletal:  Grossly normal; gait and station normal; digits and nails normal  Skin:  No rashes, petechiae, cyanosis  Neurologic: without focal signs; oriented to person, time, place, and purpose; judgement intact  Psychiatric:  No depression, " anxiety, agitation        PROBLEMS:  1. BRIANA (obstructive sleep apnea)      2. Essential hypertension      3. Gastroesophageal reflux disease without esophagitis      4. BMI 30.0-30.9,adult      5. Paroxysmal atrial fibrillation (HCC)      6. Obstructive sleep apnea (adult) (pediatric)      7. Class 3 severe obesity due to excess calories with serious comorbidity in adult, unspecified BMI (HCC)        PLAN:   The patient's PSG on 1/16/2020 showed an AHI of 30/carol saturation 82%.  On CPAP at 16 cm H2O her AHI normalized to 1.9 with normal saturations.  She was noted to have an elevated PLMS arousal index of 22.2.    We will place her on CPAP at 16 cm water using a small air fit 20 mask and heated humidification followed by clinical and compliance review in 10 weeks approximately.      Have advised the patient to follow up with the appropriate healthcare practitioners for all other medical problems and issues.    Return in about 10 weeks (around 6/10/2020).

## 2020-04-01 ENCOUNTER — SLEEP CENTER VISIT (OUTPATIENT)
Dept: SLEEP MEDICINE | Facility: MEDICAL CENTER | Age: 70
End: 2020-04-01
Payer: MEDICARE

## 2020-04-01 VITALS
HEIGHT: 61 IN | BODY MASS INDEX: 31.34 KG/M2 | RESPIRATION RATE: 14 BRPM | HEART RATE: 86 BPM | SYSTOLIC BLOOD PRESSURE: 132 MMHG | OXYGEN SATURATION: 97 % | DIASTOLIC BLOOD PRESSURE: 88 MMHG | WEIGHT: 166 LBS

## 2020-04-01 DIAGNOSIS — E66.01 CLASS 3 SEVERE OBESITY DUE TO EXCESS CALORIES WITH SERIOUS COMORBIDITY IN ADULT, UNSPECIFIED BMI (HCC): ICD-10-CM

## 2020-04-01 DIAGNOSIS — G47.33 OBSTRUCTIVE SLEEP APNEA (ADULT) (PEDIATRIC): ICD-10-CM

## 2020-04-01 PROCEDURE — 99214 OFFICE O/P EST MOD 30 MIN: CPT | Performed by: INTERNAL MEDICINE

## 2020-04-01 ASSESSMENT — FIBROSIS 4 INDEX: FIB4 SCORE: 0.88

## 2020-04-06 ENCOUNTER — TELEPHONE (OUTPATIENT)
Dept: PULMONOLOGY | Facility: HOSPICE | Age: 70
End: 2020-04-06

## 2020-04-06 DIAGNOSIS — G47.33 OSA (OBSTRUCTIVE SLEEP APNEA): ICD-10-CM

## 2020-04-15 DIAGNOSIS — I10 HYPERTENSION, UNSPECIFIED TYPE: ICD-10-CM

## 2020-04-16 RX ORDER — TELMISARTAN 40 MG/1
TABLET ORAL
Qty: 100 TAB | Refills: 1 | Status: SHIPPED | OUTPATIENT
Start: 2020-04-16 | End: 2020-10-15 | Stop reason: SDUPTHER

## 2020-04-16 NOTE — TELEPHONE ENCOUNTER
Was the patient seen in the last year in this department? Yes    Does patient have an active prescription for medications requested? No     Received Request Via: Pharmacy      Pt met protocol?: Yes, last ov 2/20  Last labs 9/19  BP Readings from Last 1 Encounters:   04/01/20 132/88

## 2020-06-02 ENCOUNTER — OFFICE VISIT (OUTPATIENT)
Dept: MEDICAL GROUP | Facility: PHYSICIAN GROUP | Age: 70
End: 2020-06-02
Payer: MEDICARE

## 2020-06-02 VITALS
WEIGHT: 169.4 LBS | HEIGHT: 61 IN | BODY MASS INDEX: 31.98 KG/M2 | TEMPERATURE: 97.7 F | RESPIRATION RATE: 20 BRPM | DIASTOLIC BLOOD PRESSURE: 80 MMHG | HEART RATE: 78 BPM | OXYGEN SATURATION: 96 % | SYSTOLIC BLOOD PRESSURE: 120 MMHG

## 2020-06-02 DIAGNOSIS — G47.33 OSA (OBSTRUCTIVE SLEEP APNEA): ICD-10-CM

## 2020-06-02 DIAGNOSIS — R51.9 NONINTRACTABLE HEADACHE, UNSPECIFIED CHRONICITY PATTERN, UNSPECIFIED HEADACHE TYPE: ICD-10-CM

## 2020-06-02 PROCEDURE — 99214 OFFICE O/P EST MOD 30 MIN: CPT | Performed by: NURSE PRACTITIONER

## 2020-06-02 RX ORDER — DILTIAZEM HYDROCHLORIDE 240 MG/1
240 CAPSULE, COATED, EXTENDED RELEASE ORAL DAILY
Qty: 90 CAP | Refills: 3 | Status: SHIPPED | OUTPATIENT
Start: 2020-06-02 | End: 2021-06-02 | Stop reason: SDUPTHER

## 2020-06-02 ASSESSMENT — FIBROSIS 4 INDEX: FIB4 SCORE: 0.88

## 2020-06-04 NOTE — ASSESSMENT & PLAN NOTE
Headaches have been worse.  Started taking 50 mg of amitriptyline at bedtime.  Reports anaphylaxis when given Imitrex.  Discussed options.  I am hesitant to try her on other triptans, therefore we will proceed with a referral to neurology.

## 2020-06-04 NOTE — PROGRESS NOTES
Chief Complaint   Patient presents with   • COPD     Wants to be tested for COPD   • Migraine     Experiencing everyday       HISTORY OF PRESENT ILLNESS: Patient is a 69 y.o. female established patient who presents today to discuss the following issues:    BRIANA (obstructive sleep apnea)  On CPAP, followed by pulmonology.  Thinks that she needs supplemental oxygen at night.  Also reports that her nose is very dry in spite of a humidifier.  She was encouraged to discuss her concerns at her next appointment with sleep medicine.      Headache  Headaches have been worse.  Started taking 50 mg of amitriptyline at bedtime.  Reports anaphylaxis when given Imitrex.  Discussed options.  I am hesitant to try her on other triptans, therefore we will proceed with a referral to neurology.       Patient Active Problem List    Diagnosis Date Noted   • Vaginal itching 02/03/2020   • Major depressive disorder, recurrent episode, in full remission (HCC) 09/12/2019   • BRIANA (obstructive sleep apnea) 09/03/2019   • Postmenopausal 01/22/2019   • Atrial fibrillation (HCC) 12/07/2018   • Shortness of breath 06/20/2018   • Anemia 06/07/2018   • BMI 30.0-30.9,adult 05/16/2018   • Pain of right upper extremity 01/02/2018   • Headache 11/10/2017   • Urinary incontinence 07/12/2017   • Spinal stenosis, lumbar 02/04/2017   • Trigger finger, acquired 11/17/2012   • GERD (gastroesophageal reflux disease) 02/27/2010   • Vitamin D deficiency 02/27/2010   • Impaired fasting glucose 02/27/2010   • HTN (hypertension) 02/24/2010   • Hyperlipidemia 02/24/2010   • Osteopenia 02/24/2010       Allergies:Clarithromycin; Ees [erythromycin]; Levaquin; Pcn [penicillins]; Shellfish allergy; Sumatriptan; and Trazodone    Current Outpatient Medications   Medication Sig Dispense Refill   • DILTIAZem CD (CARDIZEM CD) 240 MG CAPSULE SR 24 HR Take 1 Cap by mouth every day. 90 Cap 3   • telmisartan (MICARDIS) 40 MG Tab Take 1 tablet by mouth once daily 100 Tab 1   •  chlorhexidine (PERIDEX) 0.12 % Solution RINSE WITH 15ML FOR 60 SECONDS AND SPIT USE THREE TIMES DAILY AFTER BRUSHING AND FLOSSING .     • clindamycin (CLEOCIN) 300 MG Cap Take 300 mg by mouth.     • ibuprofen (MOTRIN) 200 MG Tab Take 200 mg by mouth every 6 hours as needed.     • Cetirizine HCl (ZYRTEC PO) Take  by mouth.     • fluconazole (DIFLUCAN) 150 MG tablet Take 1 Tab by mouth every 7 days. 4 Tab 0   • DULoxetine (CYMBALTA) 60 MG Cap DR Particles delayed-release capsule TAKE 1 CAPSULE BY MOUTH ONCE DAILY 90 Cap 1   • fluticasone (FLONASE) 50 MCG/ACT nasal spray Spray 1 Spray in nose every day. 16 g 0   • albuterol 108 (90 Base) MCG/ACT Aero Soln inhalation aerosol Inhale 2 Puffs by mouth every four hours as needed for Shortness of Breath. 1 Inhaler 0   • amitriptyline (ELAVIL) 25 MG Tab Take 2 Tabs by mouth at bedtime as needed. 180 Tab 1   • aspirin 81 MG EC tablet Take 81 mg by mouth every day.     • pantoprazole (PROTONIX) 40 MG Tablet Delayed Response Take 40 mg by mouth every day.       No current facility-administered medications for this visit.        Social History     Tobacco Use   • Smoking status: Never Smoker   • Smokeless tobacco: Never Used   Substance Use Topics   • Alcohol use: Not Currently     Alcohol/week: 0.0 oz     Comment: Stopped drinking 45 days ago 17 Going to    • Drug use: No       Family Status   Relation Name Status   • Mo   at age 44        Urosepsis   • Fa   at age 74        MI. Also had Crohn's   • Bro  Alive        Gallbladder cancer   • Sis  Alive        Crohn's   • OTHER  (Not Specified)   • Bro       Family History   Problem Relation Age of Onset   • GI Disease Father         Crohn's   • Heart Disease Father         First MI age 30   • Hypertension Father    • Stroke Father    • Hypertension Other    • Cancer Brother         ESOPHAGEAL CANCER       Review of Systems:   Constitutional: Negative for fever, chills, weight loss and  "malaise/fatigue.   HENT: Negative for ear pain, nosebleeds, congestion, sore throat and neck pain.    Eyes: Negative for blurred vision.   Respiratory: Negative for cough, sputum production, shortness of breath and wheezing.  Positive for sleep apnea.  Cardiovascular: Negative for chest pain, palpitations, orthopnea and leg swelling.   Gastrointestinal: Negative for heartburn, nausea, vomiting and abdominal pain.   Genitourinary: Negative for dysuria, urgency and frequency.   Musculoskeletal: Negative for myalgias, joint pain, and back pain.  Skin: Negative for rash and itching.   Neurological: Negative for dizziness, tingling, tremors, sensory change, focal weakness.  Positive for headaches.   Endo/Heme/Allergies: Does not bruise/bleed easily.   Psychiatric/Behavioral: Negative for depression, suicidal ideas and memory loss.  The patient is not nervous/anxious and does not have insomnia.    All other systems reviewed and are negative except as in HPI.    Exam:  Blood Pressure 120/80 (BP Location: Left arm, Patient Position: Sitting, BP Cuff Size: Adult)   Pulse 78   Temperature 36.5 °C (97.7 °F) (Temporal)   Respiration 20   Height 1.549 m (5' 1\")   Weight 76.8 kg (169 lb 6.4 oz)   Oxygen Saturation 96%   General:  Well nourished, well developed female in NAD  Head: Grossly normal.  Neck: Supple without JVD or bruit. Thyroid is not enlarged.  Pulmonary: Clear to ausculation. Normal effort. No rales, ronchi, or wheezing.  Cardiovascular: Regular rate and rhythm without murmur.   Abdomen:  Soft, nontender, nondistended.  Extremities: No clubbing, cyanosis, or edema.  Skin: Intact with no obvious rashes or lesions.  Neuro: Grossly intact.  Psych: Alert and oriented x 3.  Mood and affect appropriate.    Medical decision-making and discussion: Augusta is here today to discuss a few things.  Her chart was reviewed, a referral was sent to neurology, and she will follow-up here as needed.          Assessment/Plan:  1. " Nonintractable headache, unspecified chronicity pattern, unspecified headache type  REFERRAL TO NEUROLOGY   2. BRIANA (obstructive sleep apnea)         Return if symptoms worsen or fail to improve.    Please note that this dictation was created using voice recognition software. I have made every reasonable attempt to correct obvious errors, but I expect that there are errors of grammar and possibly content that I did not discover before finalizing the note.

## 2020-06-04 NOTE — ASSESSMENT & PLAN NOTE
On CPAP, followed by pulmonology.  Thinks that she needs supplemental oxygen at night.  Also reports that her nose is very dry in spite of a humidifier.  She was encouraged to discuss her concerns at her next appointment with sleep medicine.

## 2020-07-12 NOTE — PROGRESS NOTES
CC: Clinical and compliance review for BRIANA on cpap at 16 cm water  HPI:   Ms. Augusta Rodriguez is a 69-year-old retired female kindly referred by ALBINA Bettencourt and seen as a new patient on 12/17/2019. Symptoms include excessive daytime somnolence, loud snoring, frequent nocturnal awakenings and nocturia.   The patient's PSG on 1/16/2020 showed an AHI of 30/carol saturation 82%. On CPAP at 16 cm H2O her AHI normalized to 1.9 with normal saturations. She was noted to have an elevated PLMS arousal index of 22.2.   Today, 7/13/2020, her 30-day compliance is 100% for 5 hours and 1 minute.  Her average residual apnea hypopnea index is 3.5 and she has no significant leak on CPAP at 16 cm water.    The patient relates that she wants to stop CPAP and simply going nocturnal oxygen.  She finds it by midday she is short of breath and gasping for air.  Discussed in detail.      Significant comorbidities and modifying factors include obesity, GERD, asthma, bronchitis, postnasal drip, restless legs, hypertension, dyslipidemia, depression, atrial fibrillation, need for influenza vaccination, and non-smoker         Patient Active Problem List    Diagnosis Date Noted   • Vaginal itching 02/03/2020   • Major depressive disorder, recurrent episode, in full remission (HCC) 09/12/2019   • BRIANA (obstructive sleep apnea) 09/03/2019   • Postmenopausal 01/22/2019   • Atrial fibrillation (HCC) 12/07/2018   • Shortness of breath 06/20/2018   • Anemia 06/07/2018   • BMI 30.0-30.9,adult 05/16/2018   • Pain of right upper extremity 01/02/2018   • Headache 11/10/2017   • Urinary incontinence 07/12/2017   • Spinal stenosis, lumbar 02/04/2017   • Trigger finger, acquired 11/17/2012   • GERD (gastroesophageal reflux disease) 02/27/2010   • Vitamin D deficiency 02/27/2010   • Impaired fasting glucose 02/27/2010   • HTN (hypertension) 02/24/2010   • Hyperlipidemia 02/24/2010   • Osteopenia 02/24/2010       Past Medical History:   Diagnosis Date    • Arrhythmia     afib   • Arthritis     osteo   • Asthma    • Atrial fibrillation (HCC)    • Bronchitis    • Chickenpox    • Dental disorder     upper dentures   • GERD (gastroesophageal reflux disease) 2/27/2010   • Yi measles    • Hypertension    • Impaired fasting glucose 2/27/2010   • Incontinence of urine    • Indigestion    • Mumps    • Osteopenia 2/24/2010   • Osteoporosis    • Other specified disorder of intestines     constipation r/t meds   • Restless leg syndrome    • Tremor, essential 2/24/2010   • Urinary bladder disorder         Past Surgical History:   Procedure Laterality Date   • LUMBAR LAMINECTOMY DISKECTOMY Bilateral 2/4/2017    Procedure: LUMBAR LAMINECTOMY DISKECTOMY POSTERIOR L4-S1 ;  Surgeon: Emil Hitchcock M.D.;  Location: SURGERY [facility];  Service:    • CARPAL TUNNEL ENDOSCOPIC  11/17/2012    Performed by Heriberto Quiñones M.D. at SURGERY [facility]   • TRIGGER FINGER RELEASE  11/17/2012    Performed by Heriberto Quiñones M.D. at SURGERY [facility]   • HIP ARTHROSCOPY  2/23/2009    Performed by SHERLYN CALVO at SURGERY [facility]   • ACETABULAR OSTEOTOMY  2/23/2009    Performed by SHERLYN CALVO at [facility]   • MASS EXCISION ORTHO  2/23/2009    Performed by SHERLYN CALVO at [facility]   • HIP ARTHROSCOPY  2005    done at [facility]   • HAJA BY LAPAROSCOPY  1997   • HYSTERECTOMY, TOTAL ABDOMINAL  1979    oopherectomy lacie   • BLADDER SUSPENSION      bladder sling   • CARPAL TUNNEL RELEASE     • LAMINOTOMY     • PRIMARY C SECTION  1970/1975       Family History   Problem Relation Age of Onset   • GI Disease Father         Crohn's   • Heart Disease Father         First MI age 30   • Hypertension Father    • Stroke Father    • Hypertension Other    • Cancer Brother         ESOPHAGEAL CANCER       Social History     Socioeconomic History   • Marital status:      Spouse name: Not on file   • Number of children: Not on file    • Years of education: Not on file   • Highest education level: Not on file   Occupational History   • Not on file   Social Needs   • Financial resource strain: Not on file   • Food insecurity     Worry: Not on file     Inability: Not on file   • Transportation needs     Medical: Not on file     Non-medical: Not on file   Tobacco Use   • Smoking status: Never Smoker   • Smokeless tobacco: Never Used   Substance and Sexual Activity   • Alcohol use: Not Currently     Alcohol/week: 0.0 oz     Comment: Stopped drinking 45 days ago 7/12/17 Going to    • Drug use: No   • Sexual activity: Never   Lifestyle   • Physical activity     Days per week: Not on file     Minutes per session: Not on file   • Stress: Not on file   Relationships   • Social connections     Talks on phone: Not on file     Gets together: Not on file     Attends Alevism service: Not on file     Active member of club or organization: Not on file     Attends meetings of clubs or organizations: Not on file     Relationship status: Not on file   • Intimate partner violence     Fear of current or ex partner: Not on file     Emotionally abused: Not on file     Physically abused: Not on file     Forced sexual activity: Not on file   Other Topics Concern   • Not on file   Social History Narrative   • Not on file       Current Outpatient Medications   Medication Sig Dispense Refill   • DILTIAZem CD (CARDIZEM CD) 240 MG CAPSULE SR 24 HR Take 1 Cap by mouth every day. 90 Cap 3   • telmisartan (MICARDIS) 40 MG Tab Take 1 tablet by mouth once daily 100 Tab 1   • chlorhexidine (PERIDEX) 0.12 % Solution RINSE WITH 15ML FOR 60 SECONDS AND SPIT USE THREE TIMES DAILY AFTER BRUSHING AND FLOSSING .     • clindamycin (CLEOCIN) 300 MG Cap Take 300 mg by mouth.     • ibuprofen (MOTRIN) 200 MG Tab Take 200 mg by mouth every 6 hours as needed.     • Cetirizine HCl (ZYRTEC PO) Take  by mouth.     • fluconazole (DIFLUCAN) 150 MG tablet Take 1 Tab by mouth every 7 days. 4 Tab 0  "  • DULoxetine (CYMBALTA) 60 MG Cap DR Particles delayed-release capsule TAKE 1 CAPSULE BY MOUTH ONCE DAILY 90 Cap 1   • fluticasone (FLONASE) 50 MCG/ACT nasal spray Spray 1 Spray in nose every day. 16 g 0   • albuterol 108 (90 Base) MCG/ACT Aero Soln inhalation aerosol Inhale 2 Puffs by mouth every four hours as needed for Shortness of Breath. 1 Inhaler 0   • amitriptyline (ELAVIL) 25 MG Tab Take 2 Tabs by mouth at bedtime as needed. 180 Tab 1   • aspirin 81 MG EC tablet Take 81 mg by mouth every day.     • pantoprazole (PROTONIX) 40 MG Tablet Delayed Response Take 40 mg by mouth every day.       No current facility-administered medications for this visit.     \"CURRENT RX\"    ALLERGIES: Clarithromycin; Ees [erythromycin]; Levaquin; Pcn [penicillins]; Shellfish allergy; Sumatriptan; and Trazodone    ROS    Constitutional: Denies fever, chills, sweats,  weight loss, fatigue  Cardiovascular: Denies chest pain, tightness, palpitations, swelling in legs/feet, fainting, difficulty breathing when lying down but gets better when sitting up.   Respiratory: MCADAMS  Sleep: per HPI  Gastrointestinal: Denies  difficulty swallowing, nausea, abdominal pain, diarrhea, constipation, heartburn.  Musculoskeletal: Denies painful joints, sore muscles, back pain.        PHYSICAL EXAM      Ht 1.575 m (5' 2\")   Wt 76.2 kg (168 lb)   BMI 30.73 kg/m²   Appearance: Well-nourished, well-developed, no acute distress  Eyes:  PERRLA, EOMI  ENMT: without lesions, deformities;hearing grossly intact; tongue normal, posterior pharynx without erythema or exudate;   Modified Mallampati (AKA Freidman) Score:  Neck: Supple, trachea midline, no masses  Respiratory effort:  No intercostal retractions or use of accessory muscles  Lung auscultation:  No wheezes rhonchi rubs or rales  Cardiac: No murmurs, rubs, or gallops; regular rhythm, normal rate; no edema  Abdomen:  No tenderness, no organomegaly.  Musculoskeletal:  Grossly normal; gait and station " normal; digits and nails normal  Skin:  No rashes, petechiae, cyanosis  Neurologic: without focal signs; oriented to person, time, place, and purpose; judgement intact  Psychiatric:  No depression, anxiety, agitation        PROBLEMS:    1. BRIANA (obstructive sleep apnea)    2. Impaired fasting glucose    3. Essential hypertension    4. Paroxysmal atrial fibrillation (HCC)    5. BMI 30.0-30.9,adult    6. Major depressive disorder, recurrent episode, in full remission (HCC)      PLAN:     Has been advised to continue the current CPAP 16 cm water clean equipment frequently, and get new mask and supplies as allowed by insurance and DME. Advised about potential problems including nasal obstruction/stuffiness, mask leak or discomfort , frequent awakenings, chill or dryness of the upper airway, noise, inconvenience, and lack of benefit. Recommend an earlier appointment, if significant treatment barriers develop.    Will refer to pulmonary for MCADAMS w/u.      Have advised the patient to follow up with the appropriate healthcare practitioners for all other medical problems and issues.    Return in about 6 months (around 1/13/2021).

## 2020-07-13 ENCOUNTER — SLEEP CENTER VISIT (OUTPATIENT)
Dept: SLEEP MEDICINE | Facility: MEDICAL CENTER | Age: 70
End: 2020-07-13
Payer: MEDICARE

## 2020-07-13 VITALS
RESPIRATION RATE: 16 BRPM | DIASTOLIC BLOOD PRESSURE: 80 MMHG | BODY MASS INDEX: 30.91 KG/M2 | HEART RATE: 97 BPM | HEIGHT: 62 IN | SYSTOLIC BLOOD PRESSURE: 142 MMHG | OXYGEN SATURATION: 96 % | WEIGHT: 168 LBS

## 2020-07-13 DIAGNOSIS — I48.0 PAROXYSMAL ATRIAL FIBRILLATION (HCC): ICD-10-CM

## 2020-07-13 DIAGNOSIS — G47.33 OSA (OBSTRUCTIVE SLEEP APNEA): ICD-10-CM

## 2020-07-13 DIAGNOSIS — F33.42 MAJOR DEPRESSIVE DISORDER, RECURRENT EPISODE, IN FULL REMISSION (HCC): ICD-10-CM

## 2020-07-13 DIAGNOSIS — R06.09 DYSPNEA ON EXERTION: ICD-10-CM

## 2020-07-13 DIAGNOSIS — Z78.9 NON-SMOKER: ICD-10-CM

## 2020-07-13 DIAGNOSIS — R73.01 IMPAIRED FASTING GLUCOSE: ICD-10-CM

## 2020-07-13 DIAGNOSIS — I10 ESSENTIAL HYPERTENSION: ICD-10-CM

## 2020-07-13 PROCEDURE — 99214 OFFICE O/P EST MOD 30 MIN: CPT | Performed by: INTERNAL MEDICINE

## 2020-07-13 ASSESSMENT — FIBROSIS 4 INDEX: FIB4 SCORE: 0.89

## 2020-07-14 RX ORDER — ONDANSETRON 4 MG/1
TABLET, FILM COATED ORAL
Qty: 30 TAB | Refills: 0 | Status: SHIPPED | OUTPATIENT
Start: 2020-07-14 | End: 2021-01-12

## 2020-07-14 NOTE — TELEPHONE ENCOUNTER
ondansetron (ZOFRAN) 4 MG Tab tablet     Received request via: Pharmacy    Was the patient seen in the last year in this department? Yes    Does the patient have an active prescription (recently filled or refills available) for medication(s) requested? No

## 2020-07-15 ENCOUNTER — OFFICE VISIT (OUTPATIENT)
Dept: PULMONOLOGY | Facility: HOSPICE | Age: 70
End: 2020-07-15
Payer: MEDICARE

## 2020-07-15 VITALS
DIASTOLIC BLOOD PRESSURE: 78 MMHG | OXYGEN SATURATION: 96 % | SYSTOLIC BLOOD PRESSURE: 124 MMHG | BODY MASS INDEX: 33.4 KG/M2 | HEIGHT: 60 IN | HEART RATE: 74 BPM | WEIGHT: 170.13 LBS

## 2020-07-15 DIAGNOSIS — G47.33 OSA (OBSTRUCTIVE SLEEP APNEA): ICD-10-CM

## 2020-07-15 DIAGNOSIS — R06.09 DYSPNEA ON EXERTION: ICD-10-CM

## 2020-07-15 DIAGNOSIS — R05.3 CHRONIC COUGH: ICD-10-CM

## 2020-07-15 DIAGNOSIS — K21.9 GASTROESOPHAGEAL REFLUX DISEASE WITHOUT ESOPHAGITIS: ICD-10-CM

## 2020-07-15 PROCEDURE — 99214 OFFICE O/P EST MOD 30 MIN: CPT | Mod: 25 | Performed by: INTERNAL MEDICINE

## 2020-07-15 PROCEDURE — 94761 N-INVAS EAR/PLS OXIMETRY MLT: CPT | Performed by: PHYSICIAN ASSISTANT

## 2020-07-15 RX ORDER — UBROGEPANT 50 MG/1
TABLET ORAL
COMMUNITY
End: 2021-09-28

## 2020-07-15 RX ORDER — DEXAMETHASONE 4 MG/1
2 TABLET ORAL 2 TIMES DAILY
COMMUNITY
End: 2020-08-19 | Stop reason: SDUPTHER

## 2020-07-15 ASSESSMENT — ENCOUNTER SYMPTOMS
LOSS OF CONSCIOUSNESS: 0
SPUTUM PRODUCTION: 0
COUGH: 1
CHILLS: 0
WEIGHT LOSS: 0
MYALGIAS: 0
DIARRHEA: 0
DIZZINESS: 0
SORE THROAT: 0
EYE PAIN: 0
SENSORY CHANGE: 0
FOCAL WEAKNESS: 0
NAUSEA: 0
ABDOMINAL PAIN: 0
HEADACHES: 0
SINUS PAIN: 1
EYE DISCHARGE: 0
PSYCHIATRIC NEGATIVE: 1
STRIDOR: 0
ORTHOPNEA: 0
VOMITING: 0
FEVER: 0
SHORTNESS OF BREATH: 1
WHEEZING: 1

## 2020-07-15 ASSESSMENT — LIFESTYLE VARIABLES: SUBSTANCE_ABUSE: 0

## 2020-07-15 ASSESSMENT — FIBROSIS 4 INDEX: FIB4 SCORE: 0.89

## 2020-07-15 NOTE — PROCEDURES
Multi-Ox Readings  Multi Ox #1 Room air   O2 sat % at rest 96   O2 sat % on exertion 84   O2 sat average on exertion     Multi Ox #2     O2 sat % at rest     O2 sat % on exertion     O2 sat average on exertion       Oxygen Use     Oxygen Frequency     Duration of need     Is the patient mobile within the home?     CPAP Use? 16   BIPAP Use?     Servo Titration

## 2020-07-15 NOTE — PROCEDURES
Multi-Ox Readings  Multi Ox #1 Room air   O2 sat % at rest 96   O2 sat % on exertion 94   O2 sat average on exertion     Multi Ox #2     O2 sat % at rest     O2 sat % on exertion     O2 sat average on exertion       Oxygen Use     Oxygen Frequency     Duration of need     Is the patient mobile within the home?     CPAP Use? 16   BIPAP Use?     Servo Titration

## 2020-07-15 NOTE — ASSESSMENT & PLAN NOTE
Has every major risk factor for chronic cough including chronic sinusitis, asthma, GERD, history of ACE inhibitor induced cough, BRIANA and obesity.  -Fortunately, she has appropriately treated for most of these conditions, she refused consideration of saline rinses for her sinusitis which sounds to be the major problem.  -GERD also significant, appropriately on PPI

## 2020-07-15 NOTE — ASSESSMENT & PLAN NOTE
Long history of asthma, on Flovent and albuterol using 2 times per week  Severe symptoms, mMRC 3, NYHA III  No prior PFTs  + Atopic symptoms  No eosinophilia on peripheral differential historically, last 9/2019    Plan:  - PFTs next available  - multi oximetry  - Adjustments to inhalers based on results PFTs, ?  Addition of Singulair to her regimen

## 2020-07-15 NOTE — PROGRESS NOTES
Pulmonary Clinic- Initial Consult    Date of Service: 7/15/2020    Referring Physician: Drake Silva M.D.    Reason for Consult: Dyspnea on exertion    Chief Complaint:   Chief Complaint   Patient presents with   • Establish Care     Referred by Dr Silva for Dyspnea on exertion.    • Other     CT-C Spine 8/11/19     HPI:   Augusta Rodriguez is a very pleasant 70 y.o. female never tobacco smoker who is followed by Dr Silva in the sleep clinic and is referred to the pulmonary clinic for dyspnea on exertion. Patient has not been seen by pulmonary in the past, no prior PFTs.    Augusta is followed in the sleep clinic for obstructive sleep apnea, on CPAP 16 cm of water since 1/2020.  On her last clinic visit she reports that she finds that during the middle of the day she is short of breath and gasping for air and is requesting to stop CPAP.    Functionally, Augusta Rodriguez can only walk about 20-30 feet before stopping due to shortness of breath, wheezing and coughing.  She has chronic sinusitis for which she uses Flonase and saline nasal spray.  She has tried sinus rinses in the past and does not tolerate them.  She has severe GERD for which she is on Protonix.  She has a history of an ACE inhibitor cough.  She also has asthma for which she uses Flovent and albuterol about 2 times per week.  She does not require them at night.  She has never had prior PFTs. She says this severity of symptoms started before her sleep apnea diagnosis.  She reports a long history of asthma for which she uses Flovent and albuterol about 2 times per week.  She does endorse allergic symptoms, ear congestion, rhinorrhea.    Last chest imaging a CXR in 3/2019 personally reviewed, agree with radiology interpretation showing no acute radiographic cardiopulmonary disease.    She has a significant medical history for obesity, GERD, asthma, bronchitis, postnasal drip, restless legs, hypertension, dyslipidemia, depression, atrial fibrillation.   She is currently on no inhalers or respiratory medications.    Exacerbations within the past 12 months: 0    MMRC Grade: 3: Stops to catch breath on level ground after 100m  NYHA Class:  III: Moderate symptoms with less than normal physical activity. Only comfortable at rest    Past Medical History:   Diagnosis Date   • Apnea, sleep    • Arrhythmia     afib   • Arthritis     osteo   • Asthma    • Atrial fibrillation (HCC)    • Back pain    • Bronchitis    • Chickenpox    • Constipation    • Cough    • Daytime sleepiness    • Dental disorder     upper dentures   • Earache    • Frequent urination    • Gasping for breath    • GERD (gastroesophageal reflux disease) 2/27/2010   • Turk[place] measles    • Heartburn    • Hypertension    • Impaired fasting glucose 2/27/2010   • Incontinence of urine    • Indigestion    • Influenza    • Mumps    • Nasal drainage    • Nausea    • Osteopenia 2/24/2010   • Osteoporosis    • Other specified disorder of intestines     constipation r/t meds   • Restless leg syndrome    • Scarlet fever    • Shortness of breath    • Sleep apnea    • Snoring    • Tremor, essential 2/24/2010   • Urinary bladder disorder    • Wears glasses    • Wheezing    • Whooping cough        Past Surgical History:   Procedure Laterality Date   • LUMBAR LAMINECTOMY DISKECTOMY Bilateral 2/4/2017    Procedure: LUMBAR LAMINECTOMY DISKECTOMY POSTERIOR L4-S1 ;  Surgeon: Emil Hitchcock M.D.;  Location: [facility];  Service:    • CARPAL TUNNEL ENDOSCOPIC  11/17/2012    Performed by Heriberto Quiñones M.D. at [facility]   • TRIGGER FINGER RELEASE  11/17/2012    Performed by Heriberto Quiñones M.D. at [facility]   • HIP ARTHROSCOPY  2/23/2009    Performed by SHERLYN CALVO at [facility]   • ACETABULAR OSTEOTOMY  2/23/2009    Performed by SHERLYN CALVO at [facility]   • MASS EXCISION ORTHO  2/23/2009    Performed by SHERLYN CALVO at [facility]  [facility]   • HIP ARTHROSCOPY  2005    done at [facility]   • HAJA BY LAPAROSCOPY  1997   • HYSTERECTOMY, TOTAL ABDOMINAL  1979    oopherectomy lacie   • BLADDER SUSPENSION      bladder sling   • CARPAL TUNNEL RELEASE     • HIP REPLACEMENT, TOTAL     • HYSTERECTOMY LAPAROSCOPY     • LAMINOTOMY     • PRIMARY C SECTION  1970/1975       Social History     Socioeconomic History   • Marital status:      Spouse name: Not on file   • Number of children: Not on file   • Years of education: Not on file   • Highest education level: Not on file   Occupational History   • Not on file   Social Needs   • Financial resource strain: Not on file   • Food insecurity     Worry: Not on file     Inability: Not on file   • Transportation needs     Medical: Not on file     Non-medical: Not on file   Tobacco Use   • Smoking status: Never Smoker   • Smokeless tobacco: Never Used   Substance and Sexual Activity   • Alcohol use: Not Currently     Alcohol/week: 0.0 oz     Comment: Stopped drinking 45 days ago 7/12/17 Going to    • Drug use: No   • Sexual activity: Never   Lifestyle   • Physical activity     Days per week: Not on file     Minutes per session: Not on file   • Stress: Not on file   Relationships   • Social connections     Talks on phone: Not on file     Gets together: Not on file     Attends Jehovah's witness service: Not on file     Active member of club or organization: Not on file     Attends meetings of clubs or organizations: Not on file     Relationship status: Not on file   • Intimate partner violence     Fear of current or ex partner: Not on file     Emotionally abused: Not on file     Physically abused: Not on file     Forced sexual activity: Not on file   Other Topics Concern   • Not on file   Social History Narrative   • Not on file          Family History   Problem Relation Age of Onset   • GI Disease Father         Crohn's   • Heart Disease Father         First MI age 30   • Hypertension Father    • Stroke Father    •  Hypertension Other    • Cancer Brother         ESOPHAGEAL CANCER       Current Outpatient Medications on File Prior to Visit   Medication Sig Dispense Refill   • fluticasone (FLOVENT HFA) 110 MCG/ACT Aerosol Inhale 2 Puffs by mouth 2 times a day.     • Ubrogepant (UBRELVY) 50 MG Tab Take  by mouth 1 time daily as needed.     • SALINE NA Spray  in nose.     • ondansetron (ZOFRAN) 4 MG Tab tablet TAKE 1 TABLET BY MOUTH EVERY 4 HOURS AS NEEDED FOR NAUSEA AND VOMITING FOR  UP  TO  30  DAYS 30 Tab 0   • DILTIAZem CD (CARDIZEM CD) 240 MG CAPSULE SR 24 HR Take 1 Cap by mouth every day. 90 Cap 3   • telmisartan (MICARDIS) 40 MG Tab Take 1 tablet by mouth once daily 100 Tab 1   • ibuprofen (MOTRIN) 200 MG Tab Take 200 mg by mouth every 6 hours as needed.     • Cetirizine HCl (ZYRTEC PO) Take  by mouth.     • DULoxetine (CYMBALTA) 60 MG Cap DR Particles delayed-release capsule TAKE 1 CAPSULE BY MOUTH ONCE DAILY 90 Cap 1   • fluticasone (FLONASE) 50 MCG/ACT nasal spray Spray 1 Spray in nose every day. 16 g 0   • albuterol 108 (90 Base) MCG/ACT Aero Soln inhalation aerosol Inhale 2 Puffs by mouth every four hours as needed for Shortness of Breath. 1 Inhaler 0   • aspirin 81 MG EC tablet Take 81 mg by mouth every day.     • pantoprazole (PROTONIX) 40 MG Tablet Delayed Response Take 40 mg by mouth every day.       No current facility-administered medications on file prior to visit.        Allergies: Clarithromycin; Ees [erythromycin]; Levaquin; Pcn [penicillins]; Shellfish allergy; Sumatriptan; and Trazodone      ROS:   Review of Systems   Constitutional: Negative for chills, fever and weight loss.   HENT: Positive for congestion and sinus pain. Negative for sore throat.    Eyes: Negative for pain and discharge.   Respiratory: Positive for cough, shortness of breath and wheezing. Negative for sputum production and stridor.    Cardiovascular: Negative for chest pain, orthopnea and leg swelling.   Gastrointestinal: Negative for  abdominal pain, diarrhea, nausea and vomiting.   Genitourinary: Negative for dysuria, frequency and urgency.   Musculoskeletal: Negative for myalgias.   Skin: Negative for rash.   Neurological: Negative for dizziness, sensory change, focal weakness, loss of consciousness and headaches.   Psychiatric/Behavioral: Negative.  Negative for substance abuse and suicidal ideas.   All other systems reviewed and are negative.    Vitals:  /78 (BP Location: Left arm, Patient Position: Sitting, BP Cuff Size: Adult)   Pulse 74   Ht 1.524 m (5')   Wt 77.2 kg (170 lb 2 oz)   SpO2 96%     Physical Exam:  Physical Exam  Vitals signs and nursing note reviewed.   Constitutional:       General: She is not in acute distress.     Appearance: Normal appearance. She is well-developed. She is not diaphoretic.   Eyes:      General: No scleral icterus.        Right eye: No discharge.         Left eye: No discharge.      Conjunctiva/sclera: Conjunctivae normal.      Pupils: Pupils are equal, round, and reactive to light.   Neck:      Thyroid: No thyromegaly.      Vascular: No JVD.   Cardiovascular:      Rate and Rhythm: Normal rate and regular rhythm.      Heart sounds: Normal heart sounds. No murmur. No gallop.    Pulmonary:      Effort: Pulmonary effort is normal. No respiratory distress.      Breath sounds: Normal breath sounds. No wheezing or rales.   Abdominal:      General: There is no distension.      Palpations: Abdomen is soft.      Tenderness: There is no abdominal tenderness. There is no guarding.   Musculoskeletal:         General: No tenderness.   Lymphadenopathy:      Cervical: No cervical adenopathy.   Skin:     General: Skin is warm.      Capillary Refill: Capillary refill takes less than 2 seconds.      Findings: No erythema or rash.   Neurological:      Mental Status: She is alert and oriented to person, place, and time.      Cranial Nerves: No cranial nerve deficit.      Sensory: No sensory deficit.      Motor: No  abnormal muscle tone.   Psychiatric:         Mood and Affect: Mood normal.         Behavior: Behavior normal.         Thought Content: Thought content normal.         Judgment: Judgment normal.       Laboratory Data:    No past PFTs for review at time of consultation.    Last chest imaging a CXR in 3/2019 personally reviewed, agree with radiology interpretation showing no acute radiographic cardiopulmonary disease.    Assessment/Plan:    Problem List Items Addressed This Visit     Dyspnea on exertion     Long history of asthma, on Flovent and albuterol using 2 times per week  Severe symptoms, mMRC 3, NYHA III  No prior PFTs  + Atopic symptoms  No eosinophilia on peripheral differential historically, last 9/2019    Plan:  - PFTs next available  - multi oximetry  - Adjustments to inhalers based on results PFTs, ?  Addition of Singulair to her regimen         Relevant Orders    PULMONARY FUNCTION TESTS -Test requested: Complete Pulmonary Function Test    Multiple Oximetry    GERD (gastroesophageal reflux disease)     History of what sounds to be Shelton's esophagus or at least esophagitis  On Protonix         Chronic cough     Has every major risk factor for chronic cough including chronic sinusitis, asthma, GERD, history of ACE inhibitor induced cough, BRIANA and obesity.  -Fortunately, she has appropriately treated for most of these conditions, she refused consideration of saline rinses for her sinusitis which sounds to be the major problem.  -GERD also significant, appropriately on PPI         BMI 30.0-30.9,adult     Reviewed importance of weight loss for overall health and long-term health risk reduction.         BRIANA (obstructive sleep apnea)     On CPAP, followed by Dr. Silva in sleep clinic              Return in about 4 weeks (around 8/12/2020).     This note was generated using voice recognition software which has a chance of producing errors of grammar and possibly content.  I have made every reasonable attempt  to find and correct any obvious errors, but it should be expected that some may not be found prior to finalization of this note.  __________  Kayden Mckay MD  Pulmonary and Critical Care Medicine  [facility]

## 2020-07-20 ENCOUNTER — NON-PROVIDER VISIT (OUTPATIENT)
Dept: PULMONOLOGY | Facility: HOSPICE | Age: 70
End: 2020-07-20
Attending: INTERNAL MEDICINE
Payer: MEDICARE

## 2020-07-20 VITALS — BODY MASS INDEX: 33.01 KG/M2 | WEIGHT: 169 LBS

## 2020-07-20 PROCEDURE — 94060 EVALUATION OF WHEEZING: CPT | Performed by: INTERNAL MEDICINE

## 2020-07-20 PROCEDURE — 94729 DIFFUSING CAPACITY: CPT | Performed by: INTERNAL MEDICINE

## 2020-07-20 PROCEDURE — 94726 PLETHYSMOGRAPHY LUNG VOLUMES: CPT | Performed by: INTERNAL MEDICINE

## 2020-07-20 ASSESSMENT — PULMONARY FUNCTION TESTS
FEV1/FVC: 83
FEV1_PERCENT_CHANGE: 2
FEV1_LLN: 1.63
FEV1/FVC_PERCENT_PREDICTED: 104
FVC_PERCENT_PREDICTED: 116
FVC_PERCENT_PREDICTED: 117
FEV1/FVC_PREDICTED: 79
FEV1/FVC: 81
FEV1/FVC_PERCENT_CHANGE: 1
FEV1: 2.42
FEV1/FVC: 82
FEV1_PERCENT_PREDICTED: 124
FEV1/FVC_PERCENT_PREDICTED: 104
FVC_PREDICTED: 2.49
FVC_LLN: 2.08
FVC: 2.91
FEV1_PERCENT_PREDICTED: 121
FEV1/FVC_PERCENT_PREDICTED: 103
FEV1_PERCENT_CHANGE: 0
FEV1: 2.37
FVC: 2.93
FEV1/FVC: 82.59
FEV1/FVC_PERCENT_LLN: 66
FEV1/FVC_PERCENT_PREDICTED: 78
FEV1/FVC_PERCENT_PREDICTED: 106
FEV1_PREDICTED: 1.95

## 2020-07-20 ASSESSMENT — FIBROSIS 4 INDEX: FIB4 SCORE: 0.89

## 2020-07-20 NOTE — PROCEDURES
Technician: Petra Aguilar RRT   Good patient effort & cooperation.  The results of this test meet the ATS/ERS standards for acceptability & reproducibility.  Test was performed on the TabSys Body Plethysmograph-Elite DX system.  Predicted equations for Spirometry are GLI-2012, ITS for lung volumes, and GLI-2017 for DLCO.  The DLCO was uncorrected for Hgb.  A bronchodilator of Ventolin HFA -2puffs via spacer administered.  DLCO performed during dilation period.    Interpretation;   1.  Baseline spirometry shows normal airflows.  2.  There is no significant bronchodilator response.  3.  Lung volumes are within normal limits.  4.  DLCO is within normal limits.  Normal pulmonary function test with normal flow volume loop.  This does not rule out reactive airway disease, suggest clinical correlation.

## 2020-08-13 ENCOUNTER — HOSPITAL ENCOUNTER (OUTPATIENT)
Dept: RADIOLOGY | Facility: MEDICAL CENTER | Age: 70
End: 2020-08-13
Attending: NURSE PRACTITIONER
Payer: MEDICARE

## 2020-08-13 DIAGNOSIS — R51.9 NONINTRACTABLE HEADACHE, UNSPECIFIED CHRONICITY PATTERN, UNSPECIFIED HEADACHE TYPE: ICD-10-CM

## 2020-08-13 DIAGNOSIS — I48.20 CHRONIC ATRIAL FIBRILLATION (HCC): ICD-10-CM

## 2020-08-13 PROCEDURE — 70551 MRI BRAIN STEM W/O DYE: CPT

## 2020-08-19 ENCOUNTER — OFFICE VISIT (OUTPATIENT)
Dept: PULMONOLOGY | Facility: HOSPICE | Age: 70
End: 2020-08-19
Payer: MEDICARE

## 2020-08-19 VITALS
WEIGHT: 168.38 LBS | SYSTOLIC BLOOD PRESSURE: 132 MMHG | HEIGHT: 60 IN | HEART RATE: 76 BPM | OXYGEN SATURATION: 95 % | DIASTOLIC BLOOD PRESSURE: 76 MMHG | BODY MASS INDEX: 33.06 KG/M2

## 2020-08-19 DIAGNOSIS — R05.2 SUBACUTE COUGH: ICD-10-CM

## 2020-08-19 DIAGNOSIS — R06.09 DYSPNEA ON EXERTION: ICD-10-CM

## 2020-08-19 DIAGNOSIS — R06.02 SHORTNESS OF BREATH: ICD-10-CM

## 2020-08-19 PROCEDURE — 99213 OFFICE O/P EST LOW 20 MIN: CPT | Performed by: INTERNAL MEDICINE

## 2020-08-19 PROCEDURE — 94761 N-INVAS EAR/PLS OXIMETRY MLT: CPT | Performed by: INTERNAL MEDICINE

## 2020-08-19 RX ORDER — DEXAMETHASONE 4 MG/1
2 TABLET ORAL 2 TIMES DAILY
Qty: 1 EACH | Refills: 5 | Status: SHIPPED | OUTPATIENT
Start: 2020-08-19 | End: 2022-08-17 | Stop reason: SDUPTHER

## 2020-08-19 RX ORDER — ALBUTEROL SULFATE 90 UG/1
2 AEROSOL, METERED RESPIRATORY (INHALATION) EVERY 4 HOURS PRN
Qty: 1 EACH | Refills: 5 | Status: SHIPPED | OUTPATIENT
Start: 2020-08-19 | End: 2021-04-07

## 2020-08-19 ASSESSMENT — ENCOUNTER SYMPTOMS
PSYCHIATRIC NEGATIVE: 1
SHORTNESS OF BREATH: 1
EYES NEGATIVE: 1
NEUROLOGICAL NEGATIVE: 1
MUSCULOSKELETAL NEGATIVE: 1
GASTROINTESTINAL NEGATIVE: 1
CARDIOVASCULAR NEGATIVE: 1

## 2020-08-19 ASSESSMENT — FIBROSIS 4 INDEX: FIB4 SCORE: 0.89

## 2020-08-19 NOTE — ASSESSMENT & PLAN NOTE
Long hx of asthma on flovent and albuterol  PFTs normal and not supporting asthma as the cause of dyspnea on exertion with sats not dropping below 94% on exertion  She does have afib  CT chest may help if any abnormalities and if none will perform cardiopulmonary stress test  Pt would like phone calls for results as clinic appts are expensive  Will call pt post CT chest

## 2020-08-19 NOTE — PROCEDURES
Multi-Ox Readings  Multi Ox #1 Room air   O2 sat % at rest 95   O2 sat % on exertion 95   O2 sat average on exertion     Multi Ox #2     O2 sat % at rest     O2 sat % on exertion     O2 sat average on exertion       Oxygen Use     Oxygen Frequency     Duration of need     Is the patient mobile within the home?     CPAP Use? 16   BIPAP Use?     Servo Titration

## 2020-08-19 NOTE — PROGRESS NOTES
"Pulmonary Clinic follow up    Date of Service: 8/19/2020    Reason for follow up:  Follow-Up (Dyspnea on exertion/BRIANA. Last seen 07/15/20) and Results (PFT 07/20/20)      Problem List Items Addressed This Visit     Dyspnea on exertion     Long hx of asthma on flovent and albuterol  PFTs normal and not supporting asthma as the cause of dyspnea on exertion with sats not dropping below 94% on exertion  She does have afib  CT chest may help if any abnormalities and if none will perform cardiopulmonary stress test  Pt would like phone calls for results as clinic appts are expensive  Will call pt post CT chest            Other Visit Diagnoses     Subacute cough        Relevant Medications    albuterol 108 (90 Base) MCG/ACT Aero Soln inhalation aerosol    Other Relevant Orders    Multiple Oximetry    Shortness of breath        Relevant Orders    Multiple Oximetry    CT-CHEST (THORAX) W/O        All meds refilled    History of Present Illness: Augusta Rodriguez is a 70 y.o. female with a past medical history of chronic sinusitis, BRIANA on CPAP of 16 cmh20, GERD, HTN, Obesity, afib, RLS and asthma  Saw Dr. Mckay on 7/15/20 for Sob on exertion and only able to walk 20-30 feet  \"MMRC Grade: 3: Stops to catch breath on level ground after 100m  NYHA Class:  III: Moderate symptoms with less than normal physical activity. Only comfortable at rest \"    She is on flovent and albuterol    Plan was to get PFTs which were done on 7/20/20 which were normal  Her sinuses are playing a role but she does not want additional treatment    Her  and her are frustrated as the clinicappts are too costly  They are adamant she needs oxygen and multiox at last visit and repeated this visit sats do not get below 94%      Review of Systems   Constitutional: Positive for malaise/fatigue.   HENT: Negative.    Eyes: Negative.    Respiratory: Positive for shortness of breath.    Cardiovascular: Negative.    Gastrointestinal: Negative.  "   Genitourinary: Negative.    Musculoskeletal: Negative.    Skin: Negative.    Neurological: Negative.    Endo/Heme/Allergies: Negative.    Psychiatric/Behavioral: Negative.        Current Outpatient Medications on File Prior to Visit   Medication Sig Dispense Refill   • Ubrogepant (UBRELVY) 50 MG Tab Take  by mouth 1 time daily as needed.     • SALINE NA Spray  in nose.     • DILTIAZem CD (CARDIZEM CD) 240 MG CAPSULE SR 24 HR Take 1 Cap by mouth every day. 90 Cap 3   • telmisartan (MICARDIS) 40 MG Tab Take 1 tablet by mouth once daily 100 Tab 1   • ibuprofen (MOTRIN) 200 MG Tab Take 200 mg by mouth every 6 hours as needed.     • Cetirizine HCl (ZYRTEC PO) Take  by mouth.     • DULoxetine (CYMBALTA) 60 MG Cap DR Particles delayed-release capsule TAKE 1 CAPSULE BY MOUTH ONCE DAILY 90 Cap 1   • fluticasone (FLONASE) 50 MCG/ACT nasal spray Spray 1 Spray in nose every day. 16 g 0   • aspirin 81 MG EC tablet Take 81 mg by mouth every day.     • pantoprazole (PROTONIX) 40 MG Tablet Delayed Response Take 40 mg by mouth every day.       No current facility-administered medications on file prior to visit.        Social History     Tobacco Use   • Smoking status: Never Smoker   • Smokeless tobacco: Never Used   Substance Use Topics   • Alcohol use: Not Currently     Alcohol/week: 0.0 oz     Comment: Stopped drinking 45 days ago 7/12/17 Going to    • Drug use: No        Past Medical History:   Diagnosis Date   • Apnea, sleep    • Arrhythmia     afib   • Arthritis     osteo   • Asthma    • Atrial fibrillation (HCC)    • Back pain    • Bronchitis    • Chickenpox    • Constipation    • Cough    • Daytime sleepiness    • Dental disorder     upper dentures   • Earache    • Frequent urination    • Gasping for breath    • GERD (gastroesophageal reflux disease) 2/27/2010   • Ethiopian measles    • Heartburn    • Hypertension    • Impaired fasting glucose 2/27/2010   • Incontinence of urine    • Indigestion    • Influenza    • Mumps     • Nasal drainage    • Nausea    • Osteopenia 2/24/2010   • Osteoporosis    • Other specified disorder of intestines     constipation r/t meds   • Restless leg syndrome    • Scarlet fever    • Shortness of breath    • Sleep apnea    • Snoring    • Tremor, essential 2/24/2010   • Urinary bladder disorder    • Wears glasses    • Wheezing    • Whooping cough        Past Surgical History:   Procedure Laterality Date   • LUMBAR LAMINECTOMY DISKECTOMY Bilateral 2/4/2017    Procedure: LUMBAR LAMINECTOMY DISKECTOMY POSTERIOR L4-S1 ;  Surgeon: Emil Hitchcock M.D.;  Location: SURGERY [facility];  Service:    • CARPAL TUNNEL ENDOSCOPIC  11/17/2012    Performed by Heriberto Quiñones M.D. at SURGERY [facility]   • TRIGGER FINGER RELEASE  11/17/2012    Performed by Heriberto Quiñones M.D. at SURGERY [facility]   • HIP ARTHROSCOPY  2/23/2009    Performed by SHERLYN CALVO at SURGERY [facility]   • ACETABULAR OSTEOTOMY  2/23/2009    Performed by SHERLYN CALVO at SURGERY [facility]   • MASS EXCISION ORTHO  2/23/2009    Performed by SHERLYN CALVO at SURGERY [facility]   • HIP ARTHROSCOPY  2005    done at [facility]   • HAJA BY LAPAROSCOPY  1997   • HYSTERECTOMY, TOTAL ABDOMINAL  1979    oopherectomy lacie   • BLADDER SUSPENSION      bladder sling   • CARPAL TUNNEL RELEASE     • HIP REPLACEMENT, TOTAL     • HYSTERECTOMY LAPAROSCOPY     • LAMINOTOMY     • PRIMARY C SECTION  1970/1975       Allergies: Clarithromycin, Ees [erythromycin], Levaquin, Pcn [penicillins], Shellfish allergy, Sumatriptan, and Trazodone    Family History   Problem Relation Age of Onset   • GI Disease Father         Crohn's   • Heart Disease Father         First MI age 30   • Hypertension Father    • Stroke Father    • Hypertension Other    • Cancer Brother         ESOPHAGEAL CANCER       Vitals:    08/19/20 1053 08/19/20 1059   Height:  1.524 m (5')   Weight:  76.4 kg (168 lb 6 oz)   Weight % change since last entry.:  0 %    BP:  132/76   Pulse:  76   BMI (Calculated):  32.88   O2 sat % room air: 95 %        Physical Examination  Physical Exam   Constitutional: She is oriented to person, place, and time. No distress.   HENT:   Head: Normocephalic and atraumatic.   Right Ear: External ear normal.   Left Ear: External ear normal.   Mouth/Throat: Oropharynx is clear and moist.   Eyes: Pupils are equal, round, and reactive to light. Conjunctivae and EOM are normal.   Neck: Normal range of motion. Neck supple. No JVD present. No tracheal deviation present. No thyromegaly present.   Cardiovascular: Normal rate and intact distal pulses. Exam reveals no gallop and no friction rub.   No murmur heard.  Pulmonary/Chest: No stridor. No respiratory distress. She has no wheezes. She has no rales. She exhibits no tenderness.   Abdominal: Soft. Bowel sounds are normal.   Musculoskeletal:         General: No tenderness, deformity or edema.   Lymphadenopathy:     She has no cervical adenopathy.   Neurological: She is alert and oriented to person, place, and time. No cranial nerve deficit. Gait normal. Coordination normal. GCS score is 15.   Skin: Skin is warm and dry. No rash noted. She is not diaphoretic.   Psychiatric: Mood, affect and judgment normal.       Ree Escoto M.D., MD MPH [facility]  [facility]

## 2020-08-26 ENCOUNTER — HOSPITAL ENCOUNTER (OUTPATIENT)
Dept: RADIOLOGY | Facility: MEDICAL CENTER | Age: 70
End: 2020-08-26
Attending: INTERNAL MEDICINE
Payer: MEDICARE

## 2020-08-26 DIAGNOSIS — R06.02 SHORTNESS OF BREATH: ICD-10-CM

## 2020-08-26 PROCEDURE — 71250 CT THORAX DX C-: CPT

## 2020-09-28 ENCOUNTER — OFFICE VISIT (OUTPATIENT)
Dept: PULMONOLOGY | Facility: HOSPICE | Age: 70
End: 2020-09-28
Payer: MEDICARE

## 2020-09-28 VITALS
DIASTOLIC BLOOD PRESSURE: 86 MMHG | BODY MASS INDEX: 33.99 KG/M2 | HEART RATE: 76 BPM | HEIGHT: 60 IN | OXYGEN SATURATION: 96 % | SYSTOLIC BLOOD PRESSURE: 128 MMHG | WEIGHT: 173.13 LBS

## 2020-09-28 DIAGNOSIS — R06.02 SHORTNESS OF BREATH: ICD-10-CM

## 2020-09-28 DIAGNOSIS — R91.8 PULMONARY NODULES: ICD-10-CM

## 2020-09-28 PROCEDURE — 99213 OFFICE O/P EST LOW 20 MIN: CPT | Performed by: INTERNAL MEDICINE

## 2020-09-28 RX ORDER — FUROSEMIDE 20 MG/1
10 TABLET ORAL DAILY
Qty: 30 TAB | Refills: 2 | Status: SHIPPED | OUTPATIENT
Start: 2020-09-28 | End: 2021-01-11

## 2020-09-28 ASSESSMENT — FIBROSIS 4 INDEX: FIB4 SCORE: 0.89

## 2020-09-28 NOTE — PROGRESS NOTES
Chief Complaint   Patient presents with   • Follow-Up     [facility]   • Results     CT-Chest 08/26/2020       Problems:   1. Shortness of breath    2. Pulmonary nodules        Assessment/Plan:   # Shortness of breath  -- Review of CT chest showed no dense consolidation or lung parenchymal involvement. PFTs showed no airflow obstruction.    -- Differentials include decondition vs airway reactive disease vs pulmonary vascular disorder vs CAD.    -- Check echocardiogram    -- Start trial of breo once daily; sample and prescription provided.  -- Referral to cardiology for CAD workup  -- Given mild LE swelling and increased weight gain along with splayed left vijay, this is concerning for query component of volume overload. Will start a trial of diuresis with lasix 10 mg daily.   -- If all workup are negative then will proceed with CPET  -- Encourage patient to maintain at least 30 minutes a day of moderate to high intensity activity for a minimum of five days a week and to avoid two consecutive days without activity.     # Pulmonary nodules   -- Review of the CT chest multiple scattered nodules with the largest one measuring ~7 mm.   -- Calculated May clinic nodule -> 7.9% risk of malignancy (intermediate pretest)    -- No pathological mediastinal adenopathy for EBUS with TBNA and given the small peripheral lesions, Veran navigational bronchoscopy are considered inadequate to rule out malignancy.  -- Will obtain repeat CT chest in 6 month and then 18 months     HPI:  Augusta Rodriguez is a 70 y.o. female with a past medical history of chronic sinusitis, BRIANA on CPAP, GERD, HTN, Obesity, atrial fibrillation, RLS and asthma presents for follow up. Last office visit on 08/26/2020 with Dr. Nelson. CT chest personally reviewed and revealed emphysematous changes, multiple scattered pulmonary nodules bilaterally with largest one measuring 7 mm in size, and mildly engorged vessels. No prior CT chest available for review.  Review of CXR  03/2019 showed splayed left vijay.     Subjectively, she reports having ongoing shortness of breath on exertion. Able to walk about 20-30 feet before she has to stop and rest. Associated with a nonproductive cough. Denies chest pain, fever/chills, orthopnea, PND. She has gained about 20 pounds over the past months despite eating normal. She also reports having intermittent LE swelling. Had a stress test done back in 2018 showing no evidence of ischemia.     Review of PFTs showed no evidence of airflow obstruction or response to bronchodilator.     Due for flu vaccine but refused.      Past Medical History:   Diagnosis Date   • Apnea, sleep    • Arrhythmia     afib   • Arthritis     osteo   • Asthma    • Atrial fibrillation (HCC)    • Back pain    • Bronchitis    • Chickenpox    • Constipation    • Cough    • Daytime sleepiness    • Dental disorder     upper dentures   • Earache    • Frequent urination    • Gasping for breath    • GERD (gastroesophageal reflux disease) 2/27/2010   • Dutch measles    • Heartburn    • Hypertension    • Impaired fasting glucose 2/27/2010   • Incontinence of urine    • Indigestion    • Influenza    • Mumps    • Nasal drainage    • Nausea    • Osteopenia 2/24/2010   • Osteoporosis    • Other specified disorder of intestines     constipation r/t meds   • Restless leg syndrome    • Scarlet fever    • Shortness of breath    • Sleep apnea    • Snoring    • Tremor, essential 2/24/2010   • Urinary bladder disorder    • Wears glasses    • Wheezing    • Whooping cough        Past Surgical History:   Procedure Laterality Date   • LUMBAR LAMINECTOMY DISKECTOMY Bilateral 2/4/2017    Procedure: LUMBAR LAMINECTOMY DISKECTOMY POSTERIOR L4-S1 ;  Surgeon: Emil Hitchcock M.D.;  Location: [facility];  Service:    • CARPAL TUNNEL ENDOSCOPIC  11/17/2012    Performed by Heriberto Quiñones M.D. at [facility]   • TRIGGER FINGER RELEASE  11/17/2012    Performed by Heriberto OMALLEY  PATRICIA Quiñones at SURGERY [facility]   • HIP ARTHROSCOPY  2/23/2009    Performed by SHERLYN CALVO at SURGERY [facility]   • ACETABULAR OSTEOTOMY  2/23/2009    Performed by SHERLYN CALVO at SURGERY [facility]   • MASS EXCISION ORTHO  2/23/2009    Performed by SHERLYN CALVO at SURGERY [facility]   • HIP ARTHROSCOPY  2005    done at [facility]   • HAJA BY LAPAROSCOPY  1997   • HYSTERECTOMY, TOTAL ABDOMINAL  1979    oopherectomy lacie   • BLADDER SUSPENSION      bladder sling   • CARPAL TUNNEL RELEASE     • HIP REPLACEMENT, TOTAL     • HYSTERECTOMY LAPAROSCOPY     • LAMINOTOMY     • PRIMARY C SECTION  1970/1975       ROS:   Constitutional: Denies fevers, chills, night sweats, fatigue or weight loss  Eyes: Denies vision loss, pain, drainage, double vision  Ears, Nose, Throat: Denies earache, tinnitus, hoarseness  Cardiovascular: Denies chest pain, tightness, palpitations  Respiratory: See HPI  GI: Denies abdominal pain, nausea, vomiting, diarrhea  : Denies frequent urination, hematuria, painful urination  Musculoskeletal: Denies back pain, painful joints, sore muscles  Neurological: Denies headaches, seizures  Skin: Denies rashes, color changes  Psychiatric: Denies depression or thoughts of suicide  Hematologic: Denies bleeding tendency or clotting tendency  Allergic/Immunologic: Denies rhinitis, skin sensitivity    Social History     Socioeconomic History   • Marital status:      Spouse name: Not on file   • Number of children: Not on file   • Years of education: Not on file   • Highest education level: Not on file   Occupational History   • Not on file   Social Needs   • Financial resource strain: Not on file   • Food insecurity     Worry: Not on file     Inability: Not on file   • Transportation needs     Medical: Not on file     Non-medical: Not on file   Tobacco Use   • Smoking status: Never Smoker   • Smokeless tobacco: Never Used   Substance and Sexual Activity   • Alcohol  use: Not Currently     Alcohol/week: 0.0 oz     Comment: Stopped drinking 45 days ago 7/12/17 Going to AA   • Drug use: No   • Sexual activity: Never   Lifestyle   • Physical activity     Days per week: Not on file     Minutes per session: Not on file   • Stress: Not on file   Relationships   • Social connections     Talks on phone: Not on file     Gets together: Not on file     Attends Evangelical service: Not on file     Active member of club or organization: Not on file     Attends meetings of clubs or organizations: Not on file     Relationship status: Not on file   • Intimate partner violence     Fear of current or ex partner: Not on file     Emotionally abused: Not on file     Physically abused: Not on file     Forced sexual activity: Not on file   Other Topics Concern   • Not on file   Social History Narrative   • Not on file     Clarithromycin, Ees [erythromycin], Levaquin, Pcn [penicillins], Shellfish allergy, Sumatriptan, and Trazodone  Current Outpatient Medications on File Prior to Visit   Medication Sig Dispense Refill   • albuterol 108 (90 Base) MCG/ACT Aero Soln inhalation aerosol Inhale 2 Puffs by mouth every four hours as needed for Shortness of Breath. 1 Each 5   • fluticasone (FLOVENT HFA) 110 MCG/ACT Aerosol Inhale 2 Puffs by mouth 2 times a day. 1 Each 5   • Ubrogepant (UBRELVY) 50 MG Tab Take  by mouth 1 time daily as needed.     • SALINE NA Spray  in nose.     • DILTIAZem CD (CARDIZEM CD) 240 MG CAPSULE SR 24 HR Take 1 Cap by mouth every day. 90 Cap 3   • telmisartan (MICARDIS) 40 MG Tab Take 1 tablet by mouth once daily 100 Tab 1   • ibuprofen (MOTRIN) 200 MG Tab Take 200 mg by mouth every 6 hours as needed.     • Cetirizine HCl (ZYRTEC PO) Take  by mouth.     • DULoxetine (CYMBALTA) 60 MG Cap DR Particles delayed-release capsule TAKE 1 CAPSULE BY MOUTH ONCE DAILY 90 Cap 1   • fluticasone (FLONASE) 50 MCG/ACT nasal spray Spray 1 Spray in nose every day. 16 g 0   • aspirin 81 MG EC tablet Take  81 mg by mouth every day.     • pantoprazole (PROTONIX) 40 MG Tablet Delayed Response Take 40 mg by mouth every day.       No current facility-administered medications on file prior to visit.      There were no vitals taken for this visit.  Family History   Problem Relation Age of Onset   • GI Disease Father         Crohn's   • Heart Disease Father         First MI age 30   • Hypertension Father    • Stroke Father    • Hypertension Other    • Cancer Brother         ESOPHAGEAL CANCER     Immunization History   Administered Date(s) Administered   • Influenza Vaccine Pediatric Split - Historical Data 10/26/2005, 11/06/2006   • Influenza, Unspecified - HISTORICAL DATA 10/26/2005, 11/06/2006         Physical Exam:  General: Obese, NAD, speaking in full sentence.   HEENT: PERRLA, EOMI, no scleral icterus, no nasal or oral lesions  Neck: No thyromegaly, no adenopathy, no bruits  Mallampatti: Grade II  Lungs: Equal breath sounds, no wheezes or crackles  Heart: Regular rate and rhythm, no gallops or murmurs  Abdomen: Soft, benign, no organomegaly  Extremities: No clubbing, cyanosis, or edema  Neurologic: Cranial nerve, motor, and sensory exam are normal    Rashawn Hull MD   Pulmonary Critical Care   [facility]

## 2020-10-02 ENCOUNTER — OFFICE VISIT (OUTPATIENT)
Dept: CARDIOLOGY | Facility: MEDICAL CENTER | Age: 70
End: 2020-10-02
Payer: MEDICARE

## 2020-10-02 VITALS
WEIGHT: 168 LBS | HEIGHT: 60 IN | RESPIRATION RATE: 16 BRPM | SYSTOLIC BLOOD PRESSURE: 138 MMHG | BODY MASS INDEX: 32.98 KG/M2 | OXYGEN SATURATION: 97 % | DIASTOLIC BLOOD PRESSURE: 88 MMHG | HEART RATE: 79 BPM

## 2020-10-02 DIAGNOSIS — I10 ESSENTIAL HYPERTENSION: ICD-10-CM

## 2020-10-02 DIAGNOSIS — G47.33 OSA (OBSTRUCTIVE SLEEP APNEA): ICD-10-CM

## 2020-10-02 DIAGNOSIS — E78.2 MIXED HYPERLIPIDEMIA: ICD-10-CM

## 2020-10-02 DIAGNOSIS — R73.03 PREDIABETES: ICD-10-CM

## 2020-10-02 DIAGNOSIS — R06.02 SOB (SHORTNESS OF BREATH): ICD-10-CM

## 2020-10-02 DIAGNOSIS — I48.91 ATRIAL FIBRILLATION, UNSPECIFIED TYPE (HCC): ICD-10-CM

## 2020-10-02 LAB — EKG IMPRESSION: NORMAL

## 2020-10-02 PROCEDURE — 99215 OFFICE O/P EST HI 40 MIN: CPT | Performed by: INTERNAL MEDICINE

## 2020-10-02 PROCEDURE — 93000 ELECTROCARDIOGRAM COMPLETE: CPT | Performed by: INTERNAL MEDICINE

## 2020-10-02 RX ORDER — TOPIRAMATE 25 MG/1
25 TABLET ORAL
COMMUNITY
Start: 2020-07-30 | End: 2020-10-15

## 2020-10-02 ASSESSMENT — ENCOUNTER SYMPTOMS
SORE THROAT: 0
LIGHT-HEADEDNESS: 0
PALPITATIONS: 0
PARESTHESIAS: 0
DIARRHEA: 0
NUMBNESS: 0
DECREASED APPETITE: 0
SLEEP DISTURBANCES DUE TO BREATHING: 0
DOUBLE VISION: 0
FEVER: 0
WEAKNESS: 0
SYNCOPE: 0
BLOATING: 0
MYALGIAS: 0
SHORTNESS OF BREATH: 1
ORTHOPNEA: 0
VOMITING: 0
LOSS OF BALANCE: 0
HEADACHES: 1
DIZZINESS: 0
HEARTBURN: 1
COUGH: 0
IRREGULAR HEARTBEAT: 0
EXCESSIVE DAYTIME SLEEPINESS: 0
BLURRED VISION: 0
WHEEZING: 1
FALLS: 0
PND: 0
BACK PAIN: 0
NAUSEA: 0
DYSPNEA ON EXERTION: 0
CONSTIPATION: 1
NECK PAIN: 1
DIAPHORESIS: 0
NIGHT SWEATS: 0
NEAR-SYNCOPE: 0

## 2020-10-02 ASSESSMENT — FIBROSIS 4 INDEX: FIB4 SCORE: 0.89

## 2020-10-02 NOTE — PROGRESS NOTES
Cardiology Initial Consultation Note    Date of note:    10/2/2020    Primary Care Provider: Pcp Pt States None  Referring Provider: Rashawn Hull M.D.     Patient Name: Augusta Rodriguez   YOB: 1950  MRN:              [id number]    Chief Complaint   Patient presents with   • Shortness of Breath       History of Present Illness: Ms. Augusta Rodriguez is a 70 y.o. female whose current medical problems include chronic sinusitis, obstructive sleep apnea on CPAP, hypertension, obesity, atrial fibrillation, RLS and asthma who is here for cardiac consultation for dyspnea.    Patient reports ongoing dyspnea on exertion for the past three years.  States that she is able to walk approximately half a block before having to stop and rest.  Has been progressively getting worse.  States that last time she was able to walk a full block without having to stop was approximately 5 years ago. Has gained about 15 lbs in the past 6 months. She underwent pulmonary function test which showed no evidence of obstructive lung disease.    Is currently using albuterol and flovent inhalers when she feels short of breath while cleaning the house and feels better.     In 2018, she underwent pharmacological nuclear stress test for symptoms of shortness of breath which was negative for inducible ischemia.    In regards to atrial fibrillation, had an isolated episode of palpitations 10 years ago, was diagnosed with ?atrial fibrillation and started on diltiazem and aspirin 81 mg.  Has not been on oral anticoagulation and reports no recurrent episodes since that time.      Cardiovascular Risk Factors:  1. Smoking status: Never smoker  2. Type II Diabetes Mellitus: No  Lab Results   Component Value Date/Time    HBA1C 5.6 09/12/2019 09:00 AM    HBA1C 5.7 (H) 01/18/2018 07:24 AM     3. Hypertension: Yes  4. Dyslipidemia:    Cholesterol,Tot   Date Value Ref Range Status   09/12/2019 204 (H) 100 - 199 mg/dL Final     LDL   Date Value Ref Range  Status   09/12/2019 126 (H) <100 mg/dL Final     HDL   Date Value Ref Range Status   09/12/2019 55 >=40 mg/dL Final     Triglycerides   Date Value Ref Range Status   09/12/2019 117 0 - 149 mg/dL Final     5. Family history of early Coronary Artery Disease in a first degree relative (Male less than 55 years of age; Female less than 65 years of age): Denies  6.  Obesity and/or Metabolic Syndrome: BMI 32.8  7. Sedentary lifestyle: No    Review of Systems   Constitution: Negative for decreased appetite, diaphoresis, fever, malaise/fatigue and night sweats.   HENT: Negative for congestion and sore throat.    Eyes: Negative for blurred vision and double vision.   Cardiovascular: Negative for chest pain, cyanosis, dyspnea on exertion, irregular heartbeat, leg swelling, near-syncope, orthopnea, palpitations, paroxysmal nocturnal dyspnea and syncope.   Respiratory: Positive for shortness of breath and wheezing. Negative for cough and sleep disturbances due to breathing.    Endocrine: Negative for cold intolerance and heat intolerance.   Musculoskeletal: Positive for neck pain. Negative for back pain, falls and myalgias.   Gastrointestinal: Positive for constipation and heartburn. Negative for bloating, diarrhea, nausea and vomiting.   Neurological: Positive for headaches. Negative for excessive daytime sleepiness, dizziness, light-headedness, loss of balance, numbness, paresthesias and weakness.         Past Medical History:   Diagnosis Date   • Apnea, sleep    • Arrhythmia     afib   • Arthritis     osteo   • Asthma    • Atrial fibrillation (HCC)    • Back pain    • Bronchitis    • Chickenpox    • Constipation    • Cough    • Daytime sleepiness    • Dental disorder     upper dentures   • Earache    • Frequent urination    • Gasping for breath    • GERD (gastroesophageal reflux disease) 2/27/2010   • Citizen of [place] measles    • Heartburn    • Hypertension    • Impaired fasting glucose 2/27/2010   • Incontinence of urine    •  Indigestion    • Influenza    • Mumps    • Nasal drainage    • Nausea    • Osteopenia 2/24/2010   • Osteoporosis    • Other specified disorder of intestines     constipation r/t meds   • Restless leg syndrome    • Scarlet fever    • Shortness of breath    • Sleep apnea    • Snoring    • Tremor, essential 2/24/2010   • Urinary bladder disorder    • Wears glasses    • Wheezing    • Whooping cough          Past Surgical History:   Procedure Laterality Date   • LUMBAR LAMINECTOMY DISKECTOMY Bilateral 2/4/2017    Procedure: LUMBAR LAMINECTOMY DISKECTOMY POSTERIOR L4-S1 ;  Surgeon: Emil Hitchcock M.D.;  Location: [facility];  Service:    • CARPAL TUNNEL ENDOSCOPIC  11/17/2012    Performed by Heriberto Quiñones M.D. at SURGERY [facility]   • TRIGGER FINGER RELEASE  11/17/2012    Performed by Heriberto Quiñones M.D. at SURGERY [facility]   • HIP ARTHROSCOPY  2/23/2009    Performed by SHERLYN CALVO at SURGERY [facility]   • ACETABULAR OSTEOTOMY  2/23/2009    Performed by SHERLYN CALVO at SURGERY [facility]   • MASS EXCISION ORTHO  2/23/2009    Performed by SHERLYN CALVO at SURGERY [facility]   • HIP ARTHROSCOPY  2005    done at [facility]   • HAJA BY LAPAROSCOPY  1997   • HYSTERECTOMY, TOTAL ABDOMINAL  1979    oopherectomy lacie   • BLADDER SUSPENSION      bladder sling   • CARPAL TUNNEL RELEASE     • HIP REPLACEMENT, TOTAL     • HYSTERECTOMY LAPAROSCOPY     • LAMINOTOMY     • PRIMARY C SECTION  1970/1975         Current Outpatient Medications   Medication Sig Dispense Refill   • Ondansetron HCl (ZOFRAN PO) Take  by mouth.     • albuterol 108 (90 Base) MCG/ACT Aero Soln inhalation aerosol Inhale 2 Puffs by mouth every four hours as needed for Shortness of Breath. 1 Each 5   • fluticasone (FLOVENT HFA) 110 MCG/ACT Aerosol Inhale 2 Puffs by mouth 2 times a day. 1 Each 5   • Ubrogepant (UBRELVY) 50 MG Tab Take  by mouth 1 time daily as needed.     • DILTIAZem CD (CARDIZEM CD) 240  MG CAPSULE SR 24 HR Take 1 Cap by mouth every day. 90 Cap 3   • telmisartan (MICARDIS) 40 MG Tab Take 1 tablet by mouth once daily 100 Tab 1   • DULoxetine (CYMBALTA) 60 MG Cap DR Particles delayed-release capsule TAKE 1 CAPSULE BY MOUTH ONCE DAILY 90 Cap 1   • aspirin 81 MG EC tablet Take 81 mg by mouth every day.     • pantoprazole (PROTONIX) 40 MG Tablet Delayed Response Take 40 mg by mouth every day.     • topiramate (TOPAMAX) 25 MG Tab Take 25 mg by mouth.     • furosemide (LASIX) 20 MG Tab Take 0.5 Tabs by mouth every day. (Patient not taking: Reported on 10/2/2020) 30 Tab 2   • Fluticasone Furoate-Vilanterol (BREO ELLIPTA) 100-25 MCG/INH AEROSOL POWDER, BREATH ACTIVATED Inhale 1 Puff by mouth every day. Rinse mouth after use. (Patient not taking: Reported on 10/2/2020) 1 Each 0   • Fluticasone Furoate-Vilanterol (BREO ELLIPTA) 100-25 MCG/INH AEROSOL POWDER, BREATH ACTIVATED Inhale 1 Puff by mouth every day. Rinse mouth after use. (Patient not taking: Reported on 10/2/2020) 1 Each 3   • SALINE NA Spray  in nose.     • ibuprofen (MOTRIN) 200 MG Tab Take 200 mg by mouth every 6 hours as needed.     • Cetirizine HCl (ZYRTEC PO) Take  by mouth.     • fluticasone (FLONASE) 50 MCG/ACT nasal spray Spray 1 Spray in nose every day. (Patient not taking: Reported on 10/2/2020) 16 g 0     No current facility-administered medications for this visit.          Allergies   Allergen Reactions   • Clarithromycin      Swelling/Itching/Nausea   • Ees [Erythromycin]      Swelling/Itching/Nausea   • Levaquin      Muscle soreness    • Pcn [Penicillins]      Swelling/Itching/Nausea    • Shellfish Allergy      Swelling/Itching/Nausea   • Sumatriptan Swelling     Tongue swell   • Trazodone Swelling         Family History   Problem Relation Age of Onset   • GI Disease Father         Crohn's   • Heart Disease Father         First MI age 30   • Hypertension Father    • Stroke Father    • Hypertension Other    • Cancer Brother          ESOPHAGEAL CANCER         Social History     Socioeconomic History   • Marital status:      Spouse name: Not on file   • Number of children: Not on file   • Years of education: Not on file   • Highest education level: Not on file   Occupational History   • Not on file   Social Needs   • Financial resource strain: Not on file   • Food insecurity     Worry: Not on file     Inability: Not on file   • Transportation needs     Medical: Not on file     Non-medical: Not on file   Tobacco Use   • Smoking status: Never Smoker   • Smokeless tobacco: Never Used   Substance and Sexual Activity   • Alcohol use: Not Currently     Alcohol/week: 0.0 oz     Comment: Stopped drinking 45 days ago 7/12/17 Going to    • Drug use: No   • Sexual activity: Never   Lifestyle   • Physical activity     Days per week: Not on file     Minutes per session: Not on file   • Stress: Not on file   Relationships   • Social connections     Talks on phone: Not on file     Gets together: Not on file     Attends Buddhist service: Not on file     Active member of club or organization: Not on file     Attends meetings of clubs or organizations: Not on file     Relationship status: Not on file   • Intimate partner violence     Fear of current or ex partner: Not on file     Emotionally abused: Not on file     Physically abused: Not on file     Forced sexual activity: Not on file   Other Topics Concern   • Not on file   Social History Narrative   • Not on file         Physical Exam:  Ambulatory Vitals  /88 (BP Location: Right arm, Patient Position: Sitting, BP Cuff Size: Adult)   Pulse 79   Resp 16   Ht 1.524 m (5')   Wt 76.2 kg (168 lb)   SpO2 97%    Oxygen Therapy:  Pulse Oximetry: 97 %  BP Readings from Last 4 Encounters:   10/02/20 138/88   09/28/20 128/86   08/19/20 132/76   07/15/20 124/78       Weight/BMI: Body mass index is 32.81 kg/m².  Wt Readings from Last 4 Encounters:   10/02/20 76.2 kg (168 lb)   09/28/20 78.5 kg (173 lb 2  oz)   08/19/20 76.4 kg (168 lb 6 oz)   07/20/20 76.7 kg (169 lb)         General: Well appearing and in no apparent distress  Eyes: nl conjunctiva, no icteric sclera  ENT: wearing a mask, normal external appearance of ears  Neck: no visible JVP,  no carotid bruits  Lungs: normal respiratory effort, CTAB  Heart: RRR, no murmurs, no rubs or gallops,  no edema bilateral lower extremities. No LV/RV heave on cardiac palpatation. + bilateral radial pulses.  + bilateral dp pulses.   Abdomen: soft, non tender, non distended, no masses, normal bowel sounds.  No HSM.  Extremities/MSK: no clubbing, no cyanosis  Neurological: No focal sensory deficits  Psychiatric: Appropriate affect, A/O x 3, intact judgement and insight  Skin: Warm extremities      Lab Data Review:  Lab Results   Component Value Date/Time    CHOLSTRLTOT 204 (H) 09/12/2019 09:00 AM     (H) 09/12/2019 09:00 AM    HDL 55 09/12/2019 09:00 AM    TRIGLYCERIDE 117 09/12/2019 09:00 AM       Lab Results   Component Value Date/Time    SODIUM 140 09/12/2019 09:00 AM    POTASSIUM 4.0 09/12/2019 09:00 AM    CHLORIDE 104 09/12/2019 09:00 AM    CO2 26 09/12/2019 09:00 AM    GLUCOSE 97 09/12/2019 09:00 AM    BUN 16 09/12/2019 09:00 AM    CREATININE 1.09 09/12/2019 09:00 AM    CREATININE 0.89 04/27/2009     Lab Results   Component Value Date/Time    ALKPHOSPHAT 97 09/12/2019 09:00 AM    ASTSGOT 18 09/12/2019 09:00 AM    ALTSGPT 14 09/12/2019 09:00 AM    TBILIRUBIN 0.7 09/12/2019 09:00 AM      Lab Results   Component Value Date/Time    WBC 8.3 09/12/2019 09:00 AM     Lab Results   Component Value Date/Time    HBA1C 5.6 09/12/2019 09:00 AM    HBA1C 5.7 (H) 01/18/2018 07:24 AM         Cardiac Imaging and Procedures Review:    EKG dated 10/2/2020: My personal interpretation is normal sinus rhythm, normal ECG.    Nuclear Perfusion Imaging (6/28/2018):   No evidence of significant jeopardized viable myocardium or prior myocardial    infarction.    Normal left ventricular  size, ejection fraction, and wall motion.    ECG INTERPRETATION    Negative stress ECG for ischemia.      Radiology test Review:  CT chest without contrast: IMPRESSION:     1.  Bilateral pulmonary nodules which measure up to 7 mm in size.  2.  No evidence of mediastinal or hilar jackie enlargement.  3.  Mild emphysematous change of the lungs with scattered mild bullous change.         Assessment & Plan     1. Essential hypertension  EKG    EC-ECHOCARDIOGRAM COMPLETE W/O CONT   2. SOB (shortness of breath)  EKG    EC-ECHOCARDIOGRAM COMPLETE W/O CONT   3. Atrial fibrillation, unspecified type (HCC)  EKG   4. Mixed hyperlipidemia  LIPID PANEL    HEMOGLOBIN A1C (Glycohemoglobin GHB Total/A1C with MBG Estimate)   5. BMI 30.0-30.9,adult     6. BRIANA (obstructive sleep apnea)     7. Prediabetes  HEMOGLOBIN A1C (Glycohemoglobin GHB Total/A1C with MBG Estimate)         Shared Medical Decision Making:  Obtain echocardiogram to evaluate cardiac structure and function given her symptoms of dyspnea on exertion.    Obtain lipid panel and hemoglobin A1c given previous A1c in prediabetic range.    Discussed with Mr. and Mrs. Rodriguez the option of repeating nuclear stress test given ongoing symptoms of dyspnea.  At this time, they would like to await results of transthoracic echocardiogram prior to considering repeat stress test.      For atrial fibrillation, patient denies recurrent episodes of palpitations.  Discussed doing cardiac monitor if palpitations recur for diagnosis of atrial fibrillation and then can discuss initiation of oral anticoagulation to which patient is in agreement with.    All of . and Mrs. Rodriguez's excellent questions were answered to the best of my knowledge and to their satisfaction.  It was a pleasure seeing Ms. Augusta Rodriguez in my clinic today. Return in about 1 year (around 10/2/2021). Patient is aware to call the cardiology clinic with any questions or concerns.      Isaiah Ruff MD  [facility]  [address] [address]  Phone: [phone]  Fax: [phone]

## 2020-10-15 ENCOUNTER — OFFICE VISIT (OUTPATIENT)
Dept: MEDICAL GROUP | Facility: PHYSICIAN GROUP | Age: 70
End: 2020-10-15
Payer: MEDICARE

## 2020-10-15 VITALS
DIASTOLIC BLOOD PRESSURE: 82 MMHG | HEART RATE: 88 BPM | SYSTOLIC BLOOD PRESSURE: 138 MMHG | RESPIRATION RATE: 14 BRPM | TEMPERATURE: 97.2 F | WEIGHT: 169.2 LBS | BODY MASS INDEX: 31.95 KG/M2 | HEIGHT: 61 IN

## 2020-10-15 DIAGNOSIS — G43.909 MIGRAINE WITHOUT STATUS MIGRAINOSUS, NOT INTRACTABLE, UNSPECIFIED MIGRAINE TYPE: ICD-10-CM

## 2020-10-15 DIAGNOSIS — R06.09 DYSPNEA ON EXERTION: ICD-10-CM

## 2020-10-15 DIAGNOSIS — Z12.31 ENCOUNTER FOR SCREENING MAMMOGRAM FOR BREAST CANCER: ICD-10-CM

## 2020-10-15 DIAGNOSIS — E78.5 HYPERLIPIDEMIA, UNSPECIFIED HYPERLIPIDEMIA TYPE: Chronic | ICD-10-CM

## 2020-10-15 DIAGNOSIS — F39 MOOD DISORDER (HCC): Chronic | ICD-10-CM

## 2020-10-15 DIAGNOSIS — M85.80 OSTEOPENIA, UNSPECIFIED LOCATION: ICD-10-CM

## 2020-10-15 DIAGNOSIS — Z78.0 POSTMENOPAUSAL STATUS (AGE-RELATED) (NATURAL): ICD-10-CM

## 2020-10-15 DIAGNOSIS — I10 HYPERTENSION, UNSPECIFIED TYPE: ICD-10-CM

## 2020-10-15 DIAGNOSIS — G47.33 OSA (OBSTRUCTIVE SLEEP APNEA): Chronic | ICD-10-CM

## 2020-10-15 DIAGNOSIS — K21.9 GASTROESOPHAGEAL REFLUX DISEASE WITHOUT ESOPHAGITIS: Chronic | ICD-10-CM

## 2020-10-15 DIAGNOSIS — J45.909 MILD ASTHMA, UNSPECIFIED WHETHER COMPLICATED, UNSPECIFIED WHETHER PERSISTENT: ICD-10-CM

## 2020-10-15 DIAGNOSIS — I48.0 PAROXYSMAL ATRIAL FIBRILLATION (HCC): Chronic | ICD-10-CM

## 2020-10-15 PROBLEM — F33.42 MAJOR DEPRESSIVE DISORDER, RECURRENT EPISODE, IN FULL REMISSION (HCC): Chronic | Status: ACTIVE | Noted: 2019-09-12

## 2020-10-15 PROCEDURE — 99214 OFFICE O/P EST MOD 30 MIN: CPT | Performed by: INTERNAL MEDICINE

## 2020-10-15 RX ORDER — TELMISARTAN 40 MG/1
TABLET ORAL
Qty: 100 TAB | Refills: 1 | Status: SHIPPED | OUTPATIENT
Start: 2020-10-15 | End: 2021-05-26 | Stop reason: SDUPTHER

## 2020-10-15 ASSESSMENT — FIBROSIS 4 INDEX: FIB4 SCORE: 0.89

## 2020-10-15 NOTE — PROGRESS NOTES
CC: New provider  Follow-up hypertension  Fill mycotic      HPI: This is a 70 y.o. pt.  Pt's medical history is notable for:     Dyspnea on exertion  This is been a chronic condition noted since last several months.  Patient reported history of asthma and paroxysmal atrial fibrillation.  The exact cause for her shortness of breath is unclear at this time.  She is being evaluated by cardiology service and pulmonologist service.  Patient has pending appointment for echo on October 22, 2020.  Rest patient is asymptomatic.    HTN (hypertension)  Chronic condition.  The patient currently taking diltiazem and Micardis.  She is requesting a refill for Micardis.    Hyperlipidemia  Chronic condition.  The patient is currently on diet therapy.  The patient is due for lab test.    GERD (gastroesophageal reflux disease)  Chronic condition.  Patient currently taking pantoprazole.  She denies nausea vomiting dysphagia or unexplained weight loss.    Paroxysmal atrial fibrillation (HCC)  This is a chronic condition.  Patient is followed by cardiology service.  Chads score of 1.  Patient currently taking aspirin 81 mg daily.    BRIANA (obstructive sleep apnea)  This is a chronic condition.  The patient is currently using CPAP.    Mood disorder (HCC)  Chronic stable condition.  The patient is now on duloxetine.    Asthma  Chronic condition patient is currently on Flovent and albuterol as needed.  She is followed by pulmonology service.    Migraine  Chronic condition.  The patient is currently taking Ubrelvy  Patient is followed by neurology service.    Osteopenia  Chronic condition.  The patient is due for bone density test.          REVIEW OF SYSTEMS:     Constitutional:  no fever / chills   Neurologic: no headaches, no numbness/tingling  Eyes: no changes in vision  ENT: no sore throat, no hearing loss  CV:  no chest pain, no palpitations  Pulmonary: no SOB, no cough    GI: no nausea / vomiting, no diarrhea, no constipation  :  no  dysuria, no hematuria   Skin: no rash  Hematologic: no bleeding      Allergies: Clarithromycin, Ees [erythromycin], Levaquin, Pcn [penicillins], Shellfish allergy, Sumatriptan, and Trazodone    Current Outpatient Medications Ordered in Epic   Medication Sig Dispense Refill   • telmisartan (MICARDIS) 40 MG Tab Take 1 tablet by mouth once daily 100 Tab 1   • albuterol 108 (90 Base) MCG/ACT Aero Soln inhalation aerosol Inhale 2 Puffs by mouth every four hours as needed for Shortness of Breath. 1 Each 5   • fluticasone (FLOVENT HFA) 110 MCG/ACT Aerosol Inhale 2 Puffs by mouth 2 times a day. 1 Each 5   • Ubrogepant (UBRELVY) 50 MG Tab Take  by mouth 1 time daily as needed.     • DILTIAZem CD (CARDIZEM CD) 240 MG CAPSULE SR 24 HR Take 1 Cap by mouth every day. 90 Cap 3   • DULoxetine (CYMBALTA) 60 MG Cap DR Particles delayed-release capsule TAKE 1 CAPSULE BY MOUTH ONCE DAILY 90 Cap 1   • aspirin 81 MG EC tablet Take 81 mg by mouth every day.     • pantoprazole (PROTONIX) 40 MG Tablet Delayed Response Take 40 mg by mouth every day.     • Ondansetron HCl (ZOFRAN PO) Take  by mouth.     • furosemide (LASIX) 20 MG Tab Take 0.5 Tabs by mouth every day. (Patient not taking: Reported on 10/2/2020) 30 Tab 2   • SALINE NA Spray  in nose.     • Cetirizine HCl (ZYRTEC PO) Take  by mouth.       No current Epic-ordered facility-administered medications on file.        Past Medical History:   Diagnosis Date   • Apnea, sleep    • Arrhythmia     afib   • Arthritis     osteo   • Asthma    • Atrial fibrillation (HCC)    • Back pain    • Bronchitis    • Chickenpox    • Constipation    • Cough    • Daytime sleepiness    • Dental disorder     upper dentures   • Earache    • Frequent urination    • Gasping for breath    • GERD (gastroesophageal reflux disease) 2/27/2010   • [place] measles    • Heartburn    • Hypertension    • Impaired fasting glucose 2/27/2010   • Incontinence of urine    • Indigestion    • Influenza    • Mumps    • Nasal  drainage    • Nausea    • Osteopenia 2/24/2010   • Osteoporosis    • Other specified disorder of intestines     constipation r/t meds   • Restless leg syndrome    • Scarlet fever    • Shortness of breath    • Sleep apnea    • Snoring    • Tremor, essential 2/24/2010   • Urinary bladder disorder    • Wears glasses    • Wheezing    • Whooping cough         Past Surgical History:   Procedure Laterality Date   • LUMBAR LAMINECTOMY DISKECTOMY Bilateral 2/4/2017    Procedure: LUMBAR LAMINECTOMY DISKECTOMY POSTERIOR L4-S1 ;  Surgeon: Emil Hitchcock M.D.;  Location: [facility];  Service:    • CARPAL TUNNEL ENDOSCOPIC  11/17/2012    Performed by Heriberto Quiñones M.D. at SUR[facility]ERY [facility]   • TRIGGER FINGER RELEASE  11/17/2012    Performed by Heriberto Quiñones M.D. at SUR[facility]   • HIP ARTHROSCOPY  2/23/2009    Performed by SHERLYN CALVO at SURGERY [facility]   • ACETABULAR OSTEOTOMY  2/23/2009    Performed by SHERLYN CALVO at SURGERY [facility]   • MASS EXCISION ORTHO  2/23/2009    Performed by SHERLYN CALVO at SURGERY [facility]   • HIP ARTHROSCOPY  2005    done at [facility]   • HAJA BY LAPAROSCOPY  1997   • HYSTERECTOMY, TOTAL ABDOMINAL  1979    oopherectomy lacie   • BLADDER SUSPENSION      bladder sling   • CARPAL TUNNEL RELEASE     • HIP REPLACEMENT, TOTAL     • HYSTERECTOMY LAPAROSCOPY     • LAMINOTOMY     • PRIMARY C SECTION  1970/1975        Family History   Problem Relation Age of Onset   • GI Disease Father         Crohn's   • Heart Disease Father         First MI age 30   • Hypertension Father    • Stroke Father    • Hypertension Other    • Cancer Brother         ESOPHAGEAL CANCER        Social History     Tobacco Use   Smoking Status Never Smoker   Smokeless Tobacco Never Used          Social History     Substance and Sexual Activity   Alcohol Use Not Currently   • Alcohol/week: 0.0 oz    Comment: Stopped drinking 45 days ago 7/12/17 Going to          ---------------------------------------------------------------------     PHYSICAL EXAM:   Vitals:    10/15/20 1406   BP: 138/82   Pulse: 88   Resp: 14   Temp: 36.2 °C (97.2 °F)      Body mass index is 31.97 kg/m².        Constitutional: no acute distress  Neck: supple, no JVD  CV: heart irregularly irregular  Resp: normal effort, no wheezing or rales.  GI: abdomen soft, no obvious mass, no tenderness  Neuro: CN 2-12 grossly intact          ---------------------------------------------------------------------     ASSESSMENT and PLAN:  1. Postmenopausal status (age-related) (natural)  Chronic stable condition.  Advised the patient to take calcium and vitamin D supplementation as well as weightbearing exercises.  - DS-BONE DENSITY STUDY (DEXA)  - VITAMIN D,25 HYDROXY; Future    2. Encounter for screening mammogram for breast cancer    - MA-SCREENING MAMMO BILAT W/TOMOSYNTHESIS W/CAD; Future    3. Mood disorder (HCC)  Neck stable condition.  Continue with duloxetine.    4. Mild asthma, unspecified whether complicated, unspecified whether persistent  Chronic condition.  Continue with Flovent daily and albuterol as needed.  She will continue follow-up with pulmonology service.    5. Dyspnea on exertion  This is a new condition.  The exact cause is unclear.  The patient is being evaluated by both pulmonology and cardiology service at this time.  At rest the patient asymptomatic.    6. Migraine without status migrainosus, not intractable, unspecified migraine type  Chronic stable condition.  Continue with current management.  Continue follow-up with neurology service.    7. Hypertension, unspecified type  Neck stable condition.  Continue current medications Micardis and diltiazem.  - telmisartan (MICARDIS) 40 MG Tab; Take 1 tablet by mouth once daily  Dispense: 100 Tab; Refill: 1  - ALANINE AMINO-TRANS; Future  - CBC WITH DIFFERENTIAL; Future  - Lipid Profile; Future  - TSH; Future  - MICROALBUMIN CREAT RATIO URINE;  Future  - Basic Metabolic Panel; Future    8. Paroxysmal atrial fibrillation (HCC)  Chronic stable condition.  Continue with diltiazem and aspirin.  As above Chads score of 1.  Patient will continue follow-up with cardiology service.    9. Hyperlipidemia, unspecified hyperlipidemia type  Chronic condition.  Recommend low-fat low-cholesterol diet.  Lipid panel requested.    10. Gastroesophageal reflux disease without esophagitis  Stable condition.  Continue with Protonix.    11. BRIANA (obstructive sleep apnea)  Chronic stable condition per continue with CPAP.    12. Osteopenia, unspecified location  Bone density test ordered today.            Return in about 6 months (around 4/15/2021).       PATIENT EDUCATION:  -If any problems should arise, patient was advised to contact our office or go to ER to be evaluated.  -Advised pt to follow a healthy diet and regular aerobic exercise regimen. Advised pt to avoid alcohol and tobacco use.    Please note that this dictation was created using voice recognition software. I have made every reasonable attempt to correct obvious errors, but it is possible there are errors of grammar and possibly content that I did not discover before finalizing the note.

## 2020-10-15 NOTE — ASSESSMENT & PLAN NOTE
Chronic condition.  The patient currently taking diltiazem and Micardis.  She is requesting a refill for Micardis.

## 2020-10-15 NOTE — ASSESSMENT & PLAN NOTE
This is a chronic condition.  Patient is followed by cardiology service.  Chads score of 1.  Patient currently taking aspirin 81 mg daily.

## 2020-10-15 NOTE — ASSESSMENT & PLAN NOTE
This is been a chronic condition noted since last several months.  Patient reported history of asthma and paroxysmal atrial fibrillation.  The exact cause for her shortness of breath is unclear at this time.  She is being evaluated by cardiology service and pulmonologist service.  Patient has pending appointment for echo on October 22, 2020.  Rest patient is asymptomatic.

## 2020-10-15 NOTE — ASSESSMENT & PLAN NOTE
Chronic condition.  Patient currently taking pantoprazole.  She denies nausea vomiting dysphagia or unexplained weight loss.

## 2020-10-15 NOTE — ASSESSMENT & PLAN NOTE
Chronic condition.  The patient is currently taking Ubrelvy  Patient is followed by neurology service.

## 2020-10-15 NOTE — ASSESSMENT & PLAN NOTE
Chronic condition patient is currently on Flovent and albuterol as needed.  She is followed by pulmonology service.

## 2020-10-19 ENCOUNTER — HOSPITAL ENCOUNTER (OUTPATIENT)
Dept: LAB | Facility: MEDICAL CENTER | Age: 70
End: 2020-10-19
Attending: INTERNAL MEDICINE
Payer: MEDICARE

## 2020-10-19 ENCOUNTER — TELEPHONE (OUTPATIENT)
Dept: MEDICAL GROUP | Facility: PHYSICIAN GROUP | Age: 70
End: 2020-10-19

## 2020-10-19 DIAGNOSIS — R73.03 PREDIABETES: ICD-10-CM

## 2020-10-19 DIAGNOSIS — E78.2 MIXED HYPERLIPIDEMIA: ICD-10-CM

## 2020-10-19 DIAGNOSIS — Z78.0 POSTMENOPAUSAL STATUS (AGE-RELATED) (NATURAL): ICD-10-CM

## 2020-10-19 DIAGNOSIS — I10 HYPERTENSION, UNSPECIFIED TYPE: ICD-10-CM

## 2020-10-19 DIAGNOSIS — R06.02 SHORTNESS OF BREATH: ICD-10-CM

## 2020-10-19 DIAGNOSIS — N18.31 STAGE 3A CHRONIC KIDNEY DISEASE: ICD-10-CM

## 2020-10-19 PROBLEM — N18.30 CKD (CHRONIC KIDNEY DISEASE) STAGE 3, GFR 30-59 ML/MIN: Chronic | Status: ACTIVE | Noted: 2020-10-19

## 2020-10-19 PROBLEM — N18.30 CKD (CHRONIC KIDNEY DISEASE) STAGE 3, GFR 30-59 ML/MIN: Status: ACTIVE | Noted: 2020-10-19

## 2020-10-19 LAB
25(OH)D3 SERPL-MCNC: 40 NG/ML (ref 30–100)
ALT SERPL-CCNC: 12 U/L (ref 2–50)
ANION GAP SERPL CALC-SCNC: 12 MMOL/L (ref 7–16)
ANION GAP SERPL CALC-SCNC: 13 MMOL/L (ref 7–16)
BASOPHILS # BLD AUTO: 0.9 % (ref 0–1.8)
BASOPHILS # BLD: 0.1 K/UL (ref 0–0.12)
BUN SERPL-MCNC: 15 MG/DL (ref 8–22)
BUN SERPL-MCNC: 15 MG/DL (ref 8–22)
CALCIUM SERPL-MCNC: 9.7 MG/DL (ref 8.5–10.5)
CALCIUM SERPL-MCNC: 9.8 MG/DL (ref 8.5–10.5)
CHLORIDE SERPL-SCNC: 100 MMOL/L (ref 96–112)
CHLORIDE SERPL-SCNC: 101 MMOL/L (ref 96–112)
CHOLEST SERPL-MCNC: 212 MG/DL (ref 100–199)
CHOLEST SERPL-MCNC: 212 MG/DL (ref 100–199)
CO2 SERPL-SCNC: 26 MMOL/L (ref 20–33)
CO2 SERPL-SCNC: 26 MMOL/L (ref 20–33)
CREAT SERPL-MCNC: 1.06 MG/DL (ref 0.5–1.4)
CREAT SERPL-MCNC: 1.09 MG/DL (ref 0.5–1.4)
CREAT UR-MCNC: 24.09 MG/DL
EOSINOPHIL # BLD AUTO: 0.57 K/UL (ref 0–0.51)
EOSINOPHIL NFR BLD: 5.2 % (ref 0–6.9)
ERYTHROCYTE [DISTWIDTH] IN BLOOD BY AUTOMATED COUNT: 44.9 FL (ref 35.9–50)
EST. AVERAGE GLUCOSE BLD GHB EST-MCNC: 123 MG/DL
FASTING STATUS PATIENT QL REPORTED: NORMAL
GLUCOSE SERPL-MCNC: 109 MG/DL (ref 65–99)
GLUCOSE SERPL-MCNC: 109 MG/DL (ref 65–99)
HBA1C MFR BLD: 5.9 % (ref 0–5.6)
HCT VFR BLD AUTO: 45.4 % (ref 37–47)
HDLC SERPL-MCNC: 54 MG/DL
HDLC SERPL-MCNC: 55 MG/DL
HGB BLD-MCNC: 14.1 G/DL (ref 12–16)
IMM GRANULOCYTES # BLD AUTO: 0.05 K/UL (ref 0–0.11)
IMM GRANULOCYTES NFR BLD AUTO: 0.5 % (ref 0–0.9)
LDLC SERPL CALC-MCNC: 135 MG/DL
LDLC SERPL CALC-MCNC: 137 MG/DL
LYMPHOCYTES # BLD AUTO: 2.6 K/UL (ref 1–4.8)
LYMPHOCYTES NFR BLD: 23.9 % (ref 22–41)
MCH RBC QN AUTO: 25.4 PG (ref 27–33)
MCHC RBC AUTO-ENTMCNC: 31.1 G/DL (ref 33.6–35)
MCV RBC AUTO: 81.8 FL (ref 81.4–97.8)
MICROALBUMIN UR-MCNC: <1.2 MG/DL
MICROALBUMIN/CREAT UR: NORMAL MG/G (ref 0–30)
MONOCYTES # BLD AUTO: 0.93 K/UL (ref 0–0.85)
MONOCYTES NFR BLD AUTO: 8.5 % (ref 0–13.4)
NEUTROPHILS # BLD AUTO: 6.64 K/UL (ref 2–7.15)
NEUTROPHILS NFR BLD: 61 % (ref 44–72)
NRBC # BLD AUTO: 0 K/UL
NRBC BLD-RTO: 0 /100 WBC
PLATELET # BLD AUTO: 410 K/UL (ref 164–446)
PMV BLD AUTO: 10.2 FL (ref 9–12.9)
POTASSIUM SERPL-SCNC: 4.4 MMOL/L (ref 3.6–5.5)
POTASSIUM SERPL-SCNC: 4.4 MMOL/L (ref 3.6–5.5)
RBC # BLD AUTO: 5.55 M/UL (ref 4.2–5.4)
SODIUM SERPL-SCNC: 139 MMOL/L (ref 135–145)
SODIUM SERPL-SCNC: 139 MMOL/L (ref 135–145)
TRIGL SERPL-MCNC: 105 MG/DL (ref 0–149)
TRIGL SERPL-MCNC: 112 MG/DL (ref 0–149)
TSH SERPL DL<=0.005 MIU/L-ACNC: 1.4 UIU/ML (ref 0.38–5.33)
WBC # BLD AUTO: 10.9 K/UL (ref 4.8–10.8)

## 2020-10-19 PROCEDURE — 80061 LIPID PANEL: CPT

## 2020-10-19 PROCEDURE — 82306 VITAMIN D 25 HYDROXY: CPT

## 2020-10-19 PROCEDURE — 80061 LIPID PANEL: CPT | Mod: 91

## 2020-10-19 PROCEDURE — 82043 UR ALBUMIN QUANTITATIVE: CPT

## 2020-10-19 PROCEDURE — 80048 BASIC METABOLIC PNL TOTAL CA: CPT

## 2020-10-19 PROCEDURE — 85025 COMPLETE CBC W/AUTO DIFF WBC: CPT

## 2020-10-19 PROCEDURE — 84460 ALANINE AMINO (ALT) (SGPT): CPT

## 2020-10-19 PROCEDURE — 84443 ASSAY THYROID STIM HORMONE: CPT

## 2020-10-19 PROCEDURE — 83036 HEMOGLOBIN GLYCOSYLATED A1C: CPT

## 2020-10-19 PROCEDURE — 82570 ASSAY OF URINE CREATININE: CPT

## 2020-10-19 PROCEDURE — 80048 BASIC METABOLIC PNL TOTAL CA: CPT | Mod: 91

## 2020-10-19 PROCEDURE — 36415 COLL VENOUS BLD VENIPUNCTURE: CPT

## 2020-10-19 RX ORDER — ATORVASTATIN CALCIUM 10 MG/1
10 TABLET, FILM COATED ORAL DAILY
Qty: 30 TAB | Refills: 6 | Status: SHIPPED | OUTPATIENT
Start: 2020-10-19 | End: 2020-10-22 | Stop reason: SDUPTHER

## 2020-10-19 NOTE — TELEPHONE ENCOUNTER
----- Message from Rudy Parsons M.D. sent at 10/19/2020  2:54 PM PDT -----    Please call patient : labs back    Sugar level slightly  High. Please start/continue with low sweet low carb diet      Cholesterol :     212                 [Desirable range = below 200]  LDL [bad cholesterol]:  137      [Desirable range = below 100]  Triglycerides :                    [Desirable range  = below 150]  Please start taking atorvastatin 10mg qhs.  Please start/continue with low fat low cholesterol diet, continue to exercise regularly and maintain an ideal weight.     Blood tests showed mild kidney insufficiency. This was noted before. Stable.  Please be sure to drink adequate fluid to avoid dehydration.  Please avoid taking medications such as ibuprofen, aleve, advil, motrin etc..[commonly known as NSAIDS]

## 2020-10-22 ENCOUNTER — HOSPITAL ENCOUNTER (OUTPATIENT)
Dept: CARDIOLOGY | Facility: MEDICAL CENTER | Age: 70
End: 2020-10-22
Attending: INTERNAL MEDICINE
Payer: MEDICARE

## 2020-10-22 DIAGNOSIS — R06.02 SOB (SHORTNESS OF BREATH): ICD-10-CM

## 2020-10-22 DIAGNOSIS — I10 ESSENTIAL HYPERTENSION: ICD-10-CM

## 2020-10-22 LAB
LV EJECT FRACT MOD 2C 99903: 66.79
LV EJECT FRACT MOD 4C 99902: 74.77
LV EJECT FRACT MOD BP 99901: 71.23

## 2020-10-22 PROCEDURE — 93306 TTE W/DOPPLER COMPLETE: CPT

## 2020-10-22 PROCEDURE — 93306 TTE W/DOPPLER COMPLETE: CPT | Mod: 26 | Performed by: INTERNAL MEDICINE

## 2020-10-22 RX ORDER — ATORVASTATIN CALCIUM 10 MG/1
10 TABLET, FILM COATED ORAL DAILY
Qty: 100 TAB | Refills: 1 | Status: SHIPPED | OUTPATIENT
Start: 2020-10-22 | End: 2021-03-17 | Stop reason: SDUPTHER

## 2020-10-22 NOTE — TELEPHONE ENCOUNTER
Received request via: Insurance    Was the patient seen in the last year in this department? Yes    Does the patient have an active prescription (recently filled or refills available) for medication(s) requested? Yes. Insurance pays for 100 day supply

## 2020-10-23 ENCOUNTER — TELEPHONE (OUTPATIENT)
Dept: CARDIOLOGY | Facility: MEDICAL CENTER | Age: 70
End: 2020-10-23

## 2020-10-23 NOTE — TELEPHONE ENCOUNTER
----- Message from Isaiah Ruff M.D. sent at 10/22/2020 12:20 PM PDT -----  Please let the patient know that the echo shows that she is hypovolemic and should consider increasing her fluid intake to approx 3 L/day or until her urine is clear.  This may help her with shortness of breath.  Otherwise normal results.  Thanks.    MABEL  ----- Message -----  From: Valdemar Geiger  Sent: 10/22/2020  12:16 PM PDT  To: Isaiah Ruff M.D.

## 2020-10-23 NOTE — TELEPHONE ENCOUNTER
----- Message from Isaiah Ruff M.D. sent at 10/23/2020  2:11 PM PDT -----  Please let the patient know that echo results are overall normal but it shows that she is a bit dehydrated.  Recommend increasing her fluid intake until her urine is clear.  This may help with her shortness of breath.      Thanks.    MABEL  ----- Message -----  From: Akua Thakkar L.P.N.  Sent: 10/23/2020   1:54 PM PDT  To: Isaiah Ruff M.D.    No FV.

## 2020-11-05 ENCOUNTER — HOSPITAL ENCOUNTER (OUTPATIENT)
Dept: RADIOLOGY | Facility: MEDICAL CENTER | Age: 70
End: 2020-11-05
Attending: INTERNAL MEDICINE
Payer: MEDICARE

## 2020-11-05 DIAGNOSIS — Z12.31 ENCOUNTER FOR SCREENING MAMMOGRAM FOR BREAST CANCER: ICD-10-CM

## 2020-11-05 PROCEDURE — 77067 SCR MAMMO BI INCL CAD: CPT

## 2020-11-05 PROCEDURE — 77080 DXA BONE DENSITY AXIAL: CPT

## 2020-12-24 DIAGNOSIS — R51.9 NONINTRACTABLE HEADACHE, UNSPECIFIED CHRONICITY PATTERN, UNSPECIFIED HEADACHE TYPE: ICD-10-CM

## 2020-12-28 RX ORDER — ONDANSETRON 4 MG/1
4 TABLET, FILM COATED ORAL EVERY 4 HOURS PRN
Qty: 30 TAB | Refills: 0 | OUTPATIENT
Start: 2020-12-28

## 2020-12-28 RX ORDER — AMITRIPTYLINE HYDROCHLORIDE 25 MG/1
50 TABLET, FILM COATED ORAL NIGHTLY PRN
Qty: 180 TAB | Refills: 0 | OUTPATIENT
Start: 2020-12-28

## 2021-01-11 ENCOUNTER — OFFICE VISIT (OUTPATIENT)
Dept: MEDICAL GROUP | Facility: PHYSICIAN GROUP | Age: 71
End: 2021-01-11
Payer: MEDICARE

## 2021-01-11 ENCOUNTER — HOSPITAL ENCOUNTER (OUTPATIENT)
Dept: RADIOLOGY | Facility: MEDICAL CENTER | Age: 71
End: 2021-01-11
Attending: INTERNAL MEDICINE
Payer: MEDICARE

## 2021-01-11 VITALS
TEMPERATURE: 98.6 F | BODY MASS INDEX: 31.15 KG/M2 | OXYGEN SATURATION: 96 % | SYSTOLIC BLOOD PRESSURE: 134 MMHG | DIASTOLIC BLOOD PRESSURE: 86 MMHG | RESPIRATION RATE: 12 BRPM | WEIGHT: 165 LBS | HEART RATE: 84 BPM | HEIGHT: 61 IN

## 2021-01-11 DIAGNOSIS — I48.0 PAROXYSMAL ATRIAL FIBRILLATION (HCC): Chronic | ICD-10-CM

## 2021-01-11 DIAGNOSIS — M79.672 LEFT FOOT PAIN: ICD-10-CM

## 2021-01-11 DIAGNOSIS — R21 RASH: ICD-10-CM

## 2021-01-11 DIAGNOSIS — Z13.6 SCREENING FOR CARDIOVASCULAR CONDITION: ICD-10-CM

## 2021-01-11 DIAGNOSIS — N18.31 STAGE 3A CHRONIC KIDNEY DISEASE: Chronic | ICD-10-CM

## 2021-01-11 DIAGNOSIS — J45.909 MILD ASTHMA, UNSPECIFIED WHETHER COMPLICATED, UNSPECIFIED WHETHER PERSISTENT: Chronic | ICD-10-CM

## 2021-01-11 DIAGNOSIS — E55.9 VITAMIN D DEFICIENCY: ICD-10-CM

## 2021-01-11 DIAGNOSIS — F39 MOOD DISORDER (HCC): Chronic | ICD-10-CM

## 2021-01-11 DIAGNOSIS — I10 ESSENTIAL HYPERTENSION: Chronic | ICD-10-CM

## 2021-01-11 DIAGNOSIS — H66.91 RIGHT OTITIS MEDIA, UNSPECIFIED OTITIS MEDIA TYPE: ICD-10-CM

## 2021-01-11 PROBLEM — R06.09 DYSPNEA ON EXERTION: Status: RESOLVED | Noted: 2018-06-20 | Resolved: 2021-01-11

## 2021-01-11 PROCEDURE — 99214 OFFICE O/P EST MOD 30 MIN: CPT | Performed by: INTERNAL MEDICINE

## 2021-01-11 PROCEDURE — 73630 X-RAY EXAM OF FOOT: CPT | Mod: LT

## 2021-01-11 RX ORDER — SULFAMETHOXAZOLE AND TRIMETHOPRIM 800; 160 MG/1; MG/1
1 TABLET ORAL 2 TIMES DAILY
Qty: 14 TAB | Refills: 0 | Status: SHIPPED | OUTPATIENT
Start: 2021-01-11 | End: 2021-03-18

## 2021-01-11 RX ORDER — DULOXETIN HYDROCHLORIDE 60 MG/1
CAPSULE, DELAYED RELEASE ORAL
Qty: 90 CAP | Refills: 1 | Status: SHIPPED | OUTPATIENT
Start: 2021-01-11 | End: 2021-07-01

## 2021-01-11 RX ORDER — AMITRIPTYLINE HYDROCHLORIDE 25 MG/1
TABLET, FILM COATED ORAL
Qty: 90 TAB | Refills: 1 | Status: SHIPPED | OUTPATIENT
Start: 2021-01-11 | End: 2022-10-18

## 2021-01-11 SDOH — HEALTH STABILITY: MENTAL HEALTH: HOW OFTEN DO YOU HAVE A DRINK CONTAINING ALCOHOL?: MONTHLY OR LESS

## 2021-01-11 ASSESSMENT — FIBROSIS 4 INDEX: FIB4 SCORE: 0.87

## 2021-01-11 ASSESSMENT — PATIENT HEALTH QUESTIONNAIRE - PHQ9: CLINICAL INTERPRETATION OF PHQ2 SCORE: 0

## 2021-01-11 NOTE — ASSESSMENT & PLAN NOTE
Chronic condition stable.  Patient is now taking Micardis and diltiazem daily.  Patient stated that her blood pressure has been fairly well controlled at home.

## 2021-01-11 NOTE — PROGRESS NOTES
CC: Rash  Follow-up hypertension  Refill duloxetine      HPI: This is a 70 y.o. pt.  Pt's medical history is notable for:     Rash  This has been a chronic condition noted since last 6 months noted in the groin/vaginal region.  Patient has been using topical hydrocortisone cream without significant improvement.  She denies fever chills or significant vaginal discharge.    Paroxysmal atrial fibrillation (HCC)  Chronic condition.  The patient is presently followed by cardiology service.  She is currently taking aspirin daily.  CHADS2 score of 1    Asthma  Chronic stable condition.  The patient denies shortness of breath or wheezing at this time.  She is a currently using albuterol as needed.    CKD (chronic kidney disease) stage 3, GFR 30-59 ml/min  This is a chronic condition.  The patient is due for lab test.  Advised the patient to avoid NSAIDs product.    HTN (hypertension)  Chronic condition stable.  Patient is now taking Micardis and diltiazem daily.  Patient stated that her blood pressure has been fairly well controlled at home.    Mood disorder (HCC)  This is a chronic and stable condition.  The patient is presently taking duloxetine.  No significant side effect reported.  Patient is requesting refills.  Offered but the patient declined behavioral health referral.    Left foot pain  This is a chronic condition noticed since last 3 months.  The patient denies recent trauma or injury..  She denies fever or chills.  Pain is worse with walking.  Described as sharp sensation.  Patient has been taking over-the-counter Tylenol without significant improvement.          REVIEW OF SYSTEMS:       Eyes: no changes in vision  ENT: no sore throat, no hearing loss  CV:  no chest pain, no palpitations  Pulmonary: no SOB, no cough    GI: no nausea / vomiting, no diarrhea, no constipation  :  no dysuria, no hematuria       Allergies: Clarithromycin, Ees [erythromycin], Levaquin, Pcn [penicillins], Shellfish allergy,  Sumatriptan, and Trazodone    Current Outpatient Medications Ordered in Epic   Medication Sig Dispense Refill   • sulfamethoxazole-trimethoprim (BACTRIM DS) 800-160 MG tablet Take 1 Tab by mouth 2 times a day. 14 Tab 0   • DULoxetine (CYMBALTA) 60 MG Cap DR Particles delayed-release capsule TAKE 1 CAPSULE BY MOUTH ONCE DAILY 90 Cap 1   • amitriptyline (ELAVIL) 25 MG Tab TAKE 1 TABLET BY MOUTH ONCE DAILY AT BEDTIME 90 Tab 1   • atorvastatin (LIPITOR) 10 MG Tab Take 1 Tab by mouth every day. 100 Tab 1   • telmisartan (MICARDIS) 40 MG Tab Take 1 tablet by mouth once daily 100 Tab 1   • Ondansetron HCl (ZOFRAN PO) Take  by mouth.     • albuterol 108 (90 Base) MCG/ACT Aero Soln inhalation aerosol Inhale 2 Puffs by mouth every four hours as needed for Shortness of Breath. 1 Each 5   • fluticasone (FLOVENT HFA) 110 MCG/ACT Aerosol Inhale 2 Puffs by mouth 2 times a day. 1 Each 5   • Ubrogepant (UBRELVY) 50 MG Tab Take  by mouth 1 time daily as needed.     • SALINE NA Spray  in nose.     • DILTIAZem CD (CARDIZEM CD) 240 MG CAPSULE SR 24 HR Take 1 Cap by mouth every day. 90 Cap 3   • Cetirizine HCl (ZYRTEC PO) Take  by mouth.     • aspirin 81 MG EC tablet Take 81 mg by mouth every day.     • pantoprazole (PROTONIX) 40 MG Tablet Delayed Response Take 40 mg by mouth every day.       No current [facility]-ordered facility-administered medications on file.        Past Medical History:   Diagnosis Date   • Apnea, sleep    • Arrhythmia     afib   • Arthritis     osteo   • Asthma    • Atrial fibrillation (HCC)    • Back pain    • Bronchitis    • Chickenpox    • Constipation    • Cough    • Daytime sleepiness    • Dental disorder     upper dentures   • Earache    • Frequent urination    • Gasping for breath    • GERD (gastroesophageal reflux disease) 2/27/2010   • [place] measles    • Heartburn    • Hypertension    • Impaired fasting glucose 2/27/2010   • Incontinence of urine    • Indigestion    • Influenza    • Mumps    • Nasal drainage     • Nausea    • Osteopenia 2/24/2010   • Osteoporosis    • Other specified disorder of intestines     constipation r/t meds   • Restless leg syndrome    • Scarlet fever    • Shortness of breath    • Sleep apnea    • Snoring    • Tremor, essential 2/24/2010   • Urinary bladder disorder    • Wears glasses    • Wheezing    • Whooping cough         Past Surgical History:   Procedure Laterality Date   • LUMBAR LAMINECTOMY DISKECTOMY Bilateral 2/4/2017    Procedure: LUMBAR LAMINECTOMY DISKECTOMY POSTERIOR L4-S1 ;  Surgeon: Emil Hitchcock M.D.;  Location: SURGERY [facility];  Service:    • CARPAL TUNNEL ENDOSCOPIC  11/17/2012    Performed by Heriberto Quiñones M.D. at SURGERY [facility]   • TRIGGER FINGER RELEASE  11/17/2012    Performed by Heriberto Quiñones M.D. at SURGERY [facility]   • HIP ARTHROSCOPY  2/23/2009    Performed by SHERLYN CALVO at SURGERY [facility]   • ACETABULAR OSTEOTOMY  2/23/2009    Performed by SHERLYN CALVO at SURGERY [facility]   • MASS EXCISION ORTHO  2/23/2009    Performed by SHERLYN CALVO at SURGERY [facility]   • HIP ARTHROSCOPY  2005    done at [facility]   • HAJA BY LAPAROSCOPY  1997   • HYSTERECTOMY, TOTAL ABDOMINAL  1979    oopherectomy lacie   • BLADDER SUSPENSION      bladder sling   • CARPAL TUNNEL RELEASE     • HIP REPLACEMENT, TOTAL     • HYSTERECTOMY LAPAROSCOPY     • LAMINOTOMY     • PRIMARY C SECTION  1970/1975        Family History   Problem Relation Age of Onset   • GI Disease Father         Crohn's   • Heart Disease Father         First MI age 30   • Hypertension Father    • Stroke Father    • Hypertension Other    • Cancer Brother         ESOPHAGEAL CANCER        Social History     Tobacco Use   Smoking Status Never Smoker   Smokeless Tobacco Never Used          Social History     Substance and Sexual Activity   Alcohol Use Yes   • Alcohol/week: 0.0 oz   • Frequency: Monthly or less    Comment: Stopped drinking 45 days ago 7/12/17 Going  to AA         ------------------------------------------------------------------------------     PHYSICAL EXAM:   Vitals:    01/11/21 1057   BP: 134/86   Pulse: 84   Resp: 12   Temp: 37 °C (98.6 °F)   SpO2: 96%      Body mass index is 31.18 kg/m².         Constitutional: no acute distress  Neck: supple, no JVD  CV: heart RRR  Resp: normal effort, no wheezing or rales.  GI: abdomen soft, no obvious mass, no tenderness  Neuro: CN 2-12 grossly intact  Skin examination with the presence of my medical assistant Ms Claudia in the examination room there is a erythematous macular rash noted inferior aspect of the vaginal region no discharge noted.  No fluctuance  Left foot mild soft tissue swelling in the dorsal aspect of the left foot peripheral pulses present range of motion of the ankle without significant pain no pain with palpation in the Achilles tendon.    -----------------------------------------------------------------------------    ASSESSMENT:   1. Rash  REFERRAL TO DERMATOLOGY   2. Left foot pain  DX-FOOT-COMPLETE 3+ LEFT    REFERRAL TO PODIATRY   3. Right otitis media, unspecified otitis media type  sulfamethoxazole-trimethoprim (BACTRIM DS) 800-160 MG tablet   4. Paroxysmal atrial fibrillation (HCC)     5. Mild asthma, unspecified whether complicated, unspecified whether persistent     6. Stage 3a chronic kidney disease     7. Essential hypertension     8. Mood disorder (HCC)             MEDICAL DECISION MAKING: DISCUSSION / STATUS / PLAN:    Rash.  Chronic condition.  The exact cause is unclear.  Patient has tried topical steroid cream without improvement.  Refer the patient to dermatology for further evaluation.    Left foot pain, chronic.  X-ray ordered today  Refer to podiatry    Patient also complaining of bilateral ear pain.  Examination today show mild erythema of the right tympanic membrane there is minimal fluid behind the eardrum noted  Started the patient on Bactrim 1 tablet p.o. twice daily for 7  days recommend follow-up if not better    Paroxysmal atrial fibrillation.  Chronic stable condition.  Continue follow-up with cardiology.  Continue with aspirin    Asthma.  Chronic stable condition per continue with current management.    CKD 3.  Chronic condition.  Lab tests ordered for follow-up.    Hypertension.  Stable continue current management    Mood disorder.  Chronic stable condition.  Continue with duloxetine     Return in about 6 months (around 7/11/2021).       PATIENT EDUCATION:  -If any problems should arise, patient was advised to contact our office or go to ER to be evaluated.      Please note that this dictation was created using voice recognition software. I have made every reasonable attempt to correct obvious errors, but it is possible there are errors of grammar and possibly content that I did not discover before finalizing the note.

## 2021-01-11 NOTE — ASSESSMENT & PLAN NOTE
This is a chronic and stable condition.  The patient is presently taking duloxetine.  No significant side effect reported.  Patient is requesting refills.  Offered but the patient declined behavioral health referral.

## 2021-01-11 NOTE — ASSESSMENT & PLAN NOTE
This has been a chronic condition noted since last 6 months noted in the groin/vaginal region.  Patient has been using topical hydrocortisone cream without significant improvement.  She denies fever chills or significant vaginal discharge.

## 2021-01-11 NOTE — ASSESSMENT & PLAN NOTE
Chronic condition.  The patient is presently followed by cardiology service.  She is currently taking aspirin daily.  CHADS2 score of 1

## 2021-01-11 NOTE — ASSESSMENT & PLAN NOTE
Chronic stable condition.  The patient denies shortness of breath or wheezing at this time.  She is a currently using albuterol as needed.

## 2021-01-11 NOTE — ASSESSMENT & PLAN NOTE
This is a chronic condition noticed since last 3 months.  The patient denies recent trauma or injury..  She denies fever or chills.  Pain is worse with walking.  Described as sharp sensation.  Patient has been taking over-the-counter Tylenol without significant improvement.

## 2021-01-11 NOTE — ASSESSMENT & PLAN NOTE
This is a chronic condition.  The patient is due for lab test.  Advised the patient to avoid NSAIDs product.

## 2021-01-12 RX ORDER — ONDANSETRON 4 MG/1
TABLET, FILM COATED ORAL
Qty: 30 TAB | Refills: 0 | Status: SHIPPED | OUTPATIENT
Start: 2021-01-12 | End: 2021-09-10

## 2021-02-22 ENCOUNTER — OFFICE VISIT (OUTPATIENT)
Dept: DERMATOLOGY | Facility: IMAGING CENTER | Age: 71
End: 2021-02-22
Payer: MEDICARE

## 2021-02-22 DIAGNOSIS — R21 VULVAR RASH: ICD-10-CM

## 2021-02-22 PROCEDURE — 99213 OFFICE O/P EST LOW 20 MIN: CPT | Performed by: NURSE PRACTITIONER

## 2021-02-22 RX ORDER — CLOTRIMAZOLE 1 %
1 CREAM (GRAM) TOPICAL 2 TIMES DAILY
Qty: 28 G | Refills: 1 | Status: SHIPPED | OUTPATIENT
Start: 2021-02-22 | End: 2021-03-08

## 2021-02-22 RX ORDER — TRIAMCINOLONE ACETONIDE 1 MG/G
1 OINTMENT TOPICAL 2 TIMES DAILY
Qty: 30 G | Refills: 1 | Status: SHIPPED | OUTPATIENT
Start: 2021-02-22 | End: 2021-04-15

## 2021-02-22 NOTE — PROGRESS NOTES
DERMATOLOGY NOTE  NEW VISIT       Chief complaint: Establish Care and Rash     HPI rash vaginal area out side , Hx of incontinence uses pads  . Very irritated   Uses dove bar soap and baby wipes to vulva  Onset:  4 months   Aggravating factors: pads   Alleviating factors: no   New creams/topicals: OTC cortisone , A&D ointment , baby diaper rash cream , triple antiseptic   New medications (up to last 6 months): no  New travel: no  Other exposures: no  Treatments: no       Past Medical History:   Diagnosis Date   • Apnea, sleep    • Arrhythmia     afib   • Arthritis     osteo   • Asthma    • Atrial fibrillation (HCC)    • Back pain    • Bronchitis    • Chickenpox    • Constipation    • Cough    • Daytime sleepiness    • Dental disorder     upper dentures   • Earache    • Frequent urination    • Gasping for breath    • GERD (gastroesophageal reflux disease) 2/27/2010   • Georgian measles    • Heartburn    • Hypertension    • Impaired fasting glucose 2/27/2010   • Incontinence of urine    • Indigestion    • Influenza    • Mumps    • Nasal drainage    • Nausea    • Osteopenia 2/24/2010   • Osteoporosis    • Other specified disorder of intestines     constipation r/t meds   • Restless leg syndrome    • Scarlet fever    • Shortness of breath    • Sleep apnea    • Snoring    • Tremor, essential 2/24/2010   • Urinary bladder disorder    • Wears glasses    • Wheezing    • Whooping cough         Family History   Problem Relation Age of Onset   • GI Disease Father         Crohn's   • Heart Disease Father         First MI age 30   • Hypertension Father    • Stroke Father    • Hypertension Other    • Cancer Brother         ESOPHAGEAL CANCER        Social History     Socioeconomic History   • Marital status:      Spouse name: Not on file   • Number of children: Not on file   • Years of education: Not on file   • Highest education level: Not on file   Occupational History   • Not on file   Tobacco Use   • Smoking status: Never  Smoker   • Smokeless tobacco: Never Used   Substance and Sexual Activity   • Alcohol use: Yes     Alcohol/week: 0.0 oz     Comment: Stopped drinking 45 days ago 7/12/17 Going to    • Drug use: No   • Sexual activity: Never   Other Topics Concern   • Not on file   Social History Narrative   • Not on file     Social Determinants of Health     Financial Resource Strain:    • Difficulty of Paying Living Expenses:    Food Insecurity:    • Worried About Running Out of Food in the Last Year:    • Ran Out of Food in the Last Year:    Transportation Needs:    • Lack of Transportation (Medical):    • Lack of Transportation (Non-Medical):    Physical Activity:    • Days of Exercise per Week:    • Minutes of Exercise per Session:    Stress:    • Feeling of Stress :    Social Connections:    • Frequency of Communication with Friends and Family:    • Frequency of Social Gatherings with Friends and Family:    • Attends Buddhism Services:    • Active Member of Clubs or Organizations:    • Attends Club or Organization Meetings:    • Marital Status:    Intimate Partner Violence:    • Fear of Current or Ex-Partner:    • Emotionally Abused:    • Physically Abused:    • Sexually Abused:         Allergies   Allergen Reactions   • Clarithromycin      Swelling/Itching/Nausea   • Ees [Erythromycin]      Swelling/Itching/Nausea   • Levaquin      Muscle soreness    • Pcn [Penicillins]      Swelling/Itching/Nausea    • Shellfish Allergy      Swelling/Itching/Nausea   • Sumatriptan Swelling     Tongue swell   • Trazodone Swelling        MEDICATIONS:  Medications relevant to specialty reviewed.    Current Outpatient Medications:   •  clotrimazole (LOTRIMIN) 1 % Cream, Apply 1 Application topically 2 times a day for 14 days., Disp: 28 g, Rfl: 1  •  triamcinolone acetonide (KENALOG) 0.1 % Ointment, Apply 1 Application topically 2 times a day. For 2-3 weeks, Disp: 30 g, Rfl: 1  •  ondansetron (ZOFRAN) 4 MG Tab tablet, TAKE 1 TABLET BY MOUTH  EVERY 4 HOURS AS NEEDED FOR  NAUSEA  AND  VOMITING, Disp: 30 Tab, Rfl: 0  •  DULoxetine (CYMBALTA) 60 MG Cap DR Particles delayed-release capsule, TAKE 1 CAPSULE BY MOUTH ONCE DAILY, Disp: 90 Cap, Rfl: 1  •  amitriptyline (ELAVIL) 25 MG Tab, TAKE 1 TABLET BY MOUTH ONCE DAILY AT BEDTIME, Disp: 90 Tab, Rfl: 1  •  atorvastatin (LIPITOR) 10 MG Tab, Take 1 Tab by mouth every day., Disp: 100 Tab, Rfl: 1  •  telmisartan (MICARDIS) 40 MG Tab, Take 1 tablet by mouth once daily, Disp: 100 Tab, Rfl: 1  •  Ondansetron HCl (ZOFRAN PO), Take  by mouth., Disp: , Rfl:   •  albuterol 108 (90 Base) MCG/ACT Aero Soln inhalation aerosol, Inhale 2 Puffs by mouth every four hours as needed for Shortness of Breath., Disp: 1 Each, Rfl: 5  •  fluticasone (FLOVENT HFA) 110 MCG/ACT Aerosol, Inhale 2 Puffs by mouth 2 times a day., Disp: 1 Each, Rfl: 5  •  Ubrogepant (UBRELVY) 50 MG Tab, Take  by mouth 1 time daily as needed., Disp: , Rfl:   •  SALINE NA, Spray  in nose., Disp: , Rfl:   •  DILTIAZem CD (CARDIZEM CD) 240 MG CAPSULE SR 24 HR, Take 1 Cap by mouth every day., Disp: 90 Cap, Rfl: 3  •  Cetirizine HCl (ZYRTEC PO), Take  by mouth., Disp: , Rfl:   •  aspirin 81 MG EC tablet, Take 81 mg by mouth every day., Disp: , Rfl:   •  pantoprazole (PROTONIX) 40 MG Tablet Delayed Response, Take 40 mg by mouth every day., Disp: , Rfl:   •  sulfamethoxazole-trimethoprim (BACTRIM DS) 800-160 MG tablet, Take 1 Tab by mouth 2 times a day. (Patient not taking: Reported on 2/22/2021), Disp: 14 Tab, Rfl: 0     REVIEW OF SYSTEMS:   Positive for skin (see HPI)  Negative for fevers and chills       EXAM:  There were no vitals taken for this visit.  Constitutional: Well-developed, well-nourished, and in no distress.     A focused skin exam was performed including the affected areas of the facea above mask and genitalia. Notable findings on exam today listed below and/or in assessment/plan.     Erythema, white scale and excoriations noted to vulva    IMPRESSION  / PLAN:    1. Vulvar rash  Suspect vulva candidiasis or potential contact allergy  Stop soap and baby wipes to genitalia  Clean with water only  Dry well  Start topical regimen as rx'd below  Diaper rash ointment at night  F/u in 4 weeks  If no improvement or worsening consider bx  - clotrimazole (LOTRIMIN) 1 % Cream; Apply 1 Application topically 2 times a day for 14 days.  Dispense: 28 g; Refill: 1  - triamcinolone acetonide (KENALOG) 0.1 % Ointment; Apply 1 Application topically 2 times a day. For 2-3 weeks  Dispense: 30 g; Refill: 1       Return to clinic in: Return in about 4 weeks (around 3/22/2021) for rash re-check. and as needed for any new or changing skin lesions.

## 2021-03-03 DIAGNOSIS — Z23 NEED FOR VACCINATION: ICD-10-CM

## 2021-03-15 ENCOUNTER — APPOINTMENT (OUTPATIENT)
Dept: RADIOLOGY | Facility: MEDICAL CENTER | Age: 71
End: 2021-03-15
Attending: EMERGENCY MEDICINE
Payer: MEDICARE

## 2021-03-15 ENCOUNTER — HOSPITAL ENCOUNTER (EMERGENCY)
Facility: MEDICAL CENTER | Age: 71
End: 2021-03-15
Attending: EMERGENCY MEDICINE
Payer: MEDICARE

## 2021-03-15 VITALS
RESPIRATION RATE: 26 BRPM | BODY MASS INDEX: 31.15 KG/M2 | SYSTOLIC BLOOD PRESSURE: 141 MMHG | HEIGHT: 61 IN | TEMPERATURE: 98.1 F | DIASTOLIC BLOOD PRESSURE: 74 MMHG | WEIGHT: 165 LBS | HEART RATE: 97 BPM | OXYGEN SATURATION: 91 %

## 2021-03-15 DIAGNOSIS — J45.901 MODERATE ASTHMA WITH ACUTE EXACERBATION, UNSPECIFIED WHETHER PERSISTENT: ICD-10-CM

## 2021-03-15 DIAGNOSIS — J22 LOWER RESPIRATORY TRACT INFECTION: ICD-10-CM

## 2021-03-15 LAB
ALBUMIN SERPL BCP-MCNC: 4.2 G/DL (ref 3.2–4.9)
ALBUMIN/GLOB SERPL: 1.3 G/DL
ALP SERPL-CCNC: 122 U/L (ref 30–99)
ALT SERPL-CCNC: 8 U/L (ref 2–50)
ANION GAP SERPL CALC-SCNC: 10 MMOL/L (ref 7–16)
AST SERPL-CCNC: 12 U/L (ref 12–45)
BASOPHILS # BLD AUTO: 1.1 % (ref 0–1.8)
BASOPHILS # BLD: 0.13 K/UL (ref 0–0.12)
BILIRUB SERPL-MCNC: 0.6 MG/DL (ref 0.1–1.5)
BUN SERPL-MCNC: 15 MG/DL (ref 8–22)
CALCIUM SERPL-MCNC: 9 MG/DL (ref 8.5–10.5)
CHLORIDE SERPL-SCNC: 102 MMOL/L (ref 96–112)
CO2 SERPL-SCNC: 24 MMOL/L (ref 20–33)
CREAT SERPL-MCNC: 0.82 MG/DL (ref 0.5–1.4)
EKG IMPRESSION: NORMAL
EOSINOPHIL # BLD AUTO: 0.83 K/UL (ref 0–0.51)
EOSINOPHIL NFR BLD: 6.8 % (ref 0–6.9)
ERYTHROCYTE [DISTWIDTH] IN BLOOD BY AUTOMATED COUNT: 44.1 FL (ref 35.9–50)
FLUAV RNA SPEC QL NAA+PROBE: NEGATIVE
FLUBV RNA SPEC QL NAA+PROBE: NEGATIVE
GLOBULIN SER CALC-MCNC: 3.2 G/DL (ref 1.9–3.5)
GLUCOSE SERPL-MCNC: 114 MG/DL (ref 65–99)
HCT VFR BLD AUTO: 42.6 % (ref 37–47)
HGB BLD-MCNC: 13.7 G/DL (ref 12–16)
IMM GRANULOCYTES # BLD AUTO: 0.05 K/UL (ref 0–0.11)
IMM GRANULOCYTES NFR BLD AUTO: 0.4 % (ref 0–0.9)
LACTATE BLD-SCNC: 2.6 MMOL/L (ref 0.5–2)
LACTATE BLD-SCNC: 3.5 MMOL/L (ref 0.5–2)
LYMPHOCYTES # BLD AUTO: 1.79 K/UL (ref 1–4.8)
LYMPHOCYTES NFR BLD: 14.6 % (ref 22–41)
MCH RBC QN AUTO: 26.5 PG (ref 27–33)
MCHC RBC AUTO-ENTMCNC: 32.2 G/DL (ref 33.6–35)
MCV RBC AUTO: 82.4 FL (ref 81.4–97.8)
MONOCYTES # BLD AUTO: 1.1 K/UL (ref 0–0.85)
MONOCYTES NFR BLD AUTO: 9 % (ref 0–13.4)
NEUTROPHILS # BLD AUTO: 8.33 K/UL (ref 2–7.15)
NEUTROPHILS NFR BLD: 68.1 % (ref 44–72)
NRBC # BLD AUTO: 0 K/UL
NRBC BLD-RTO: 0 /100 WBC
NT-PROBNP SERPL IA-MCNC: 66 PG/ML (ref 0–125)
PLATELET # BLD AUTO: 335 K/UL (ref 164–446)
PMV BLD AUTO: 9.4 FL (ref 9–12.9)
POTASSIUM SERPL-SCNC: 4.1 MMOL/L (ref 3.6–5.5)
PROT SERPL-MCNC: 7.4 G/DL (ref 6–8.2)
RBC # BLD AUTO: 5.17 M/UL (ref 4.2–5.4)
RSV RNA SPEC QL NAA+PROBE: NEGATIVE
SARS-COV-2 RNA RESP QL NAA+PROBE: NOTDETECTED
SODIUM SERPL-SCNC: 136 MMOL/L (ref 135–145)
SPECIMEN SOURCE: NORMAL
TROPONIN T SERPL-MCNC: <6 NG/L (ref 6–19)
WBC # BLD AUTO: 12.2 K/UL (ref 4.8–10.8)

## 2021-03-15 PROCEDURE — 84484 ASSAY OF TROPONIN QUANT: CPT

## 2021-03-15 PROCEDURE — 700101 HCHG RX REV CODE 250: Performed by: EMERGENCY MEDICINE

## 2021-03-15 PROCEDURE — 85025 COMPLETE CBC W/AUTO DIFF WBC: CPT

## 2021-03-15 PROCEDURE — 93005 ELECTROCARDIOGRAM TRACING: CPT | Performed by: EMERGENCY MEDICINE

## 2021-03-15 PROCEDURE — 87040 BLOOD CULTURE FOR BACTERIA: CPT | Mod: 91

## 2021-03-15 PROCEDURE — 83880 ASSAY OF NATRIURETIC PEPTIDE: CPT

## 2021-03-15 PROCEDURE — 80053 COMPREHEN METABOLIC PANEL: CPT

## 2021-03-15 PROCEDURE — C9803 HOPD COVID-19 SPEC COLLECT: HCPCS | Performed by: EMERGENCY MEDICINE

## 2021-03-15 PROCEDURE — 36415 COLL VENOUS BLD VENIPUNCTURE: CPT

## 2021-03-15 PROCEDURE — 94760 N-INVAS EAR/PLS OXIMETRY 1: CPT

## 2021-03-15 PROCEDURE — 700111 HCHG RX REV CODE 636 W/ 250 OVERRIDE (IP): Performed by: EMERGENCY MEDICINE

## 2021-03-15 PROCEDURE — 99285 EMERGENCY DEPT VISIT HI MDM: CPT

## 2021-03-15 PROCEDURE — 700102 HCHG RX REV CODE 250 W/ 637 OVERRIDE(OP): Performed by: EMERGENCY MEDICINE

## 2021-03-15 PROCEDURE — 700105 HCHG RX REV CODE 258: Performed by: EMERGENCY MEDICINE

## 2021-03-15 PROCEDURE — 0241U HCHG SARS-COV-2 COVID-19 NFCT DS RESP RNA 4 TRGT MIC: CPT

## 2021-03-15 PROCEDURE — A9270 NON-COVERED ITEM OR SERVICE: HCPCS | Performed by: EMERGENCY MEDICINE

## 2021-03-15 PROCEDURE — 96375 TX/PRO/DX INJ NEW DRUG ADDON: CPT

## 2021-03-15 PROCEDURE — 71045 X-RAY EXAM CHEST 1 VIEW: CPT

## 2021-03-15 PROCEDURE — 83605 ASSAY OF LACTIC ACID: CPT

## 2021-03-15 PROCEDURE — 96365 THER/PROPH/DIAG IV INF INIT: CPT

## 2021-03-15 RX ORDER — METHYLPREDNISOLONE SODIUM SUCCINATE 125 MG/2ML
125 INJECTION, POWDER, LYOPHILIZED, FOR SOLUTION INTRAMUSCULAR; INTRAVENOUS ONCE
Status: COMPLETED | OUTPATIENT
Start: 2021-03-15 | End: 2021-03-15

## 2021-03-15 RX ORDER — ALBUTEROL SULFATE 90 UG/1
2 AEROSOL, METERED RESPIRATORY (INHALATION) EVERY 6 HOURS PRN
Qty: 8.5 G | Refills: 0 | Status: SHIPPED | OUTPATIENT
Start: 2021-03-15 | End: 2021-07-14

## 2021-03-15 RX ORDER — DOXYCYCLINE 100 MG/1
100 TABLET ORAL ONCE
Status: COMPLETED | OUTPATIENT
Start: 2021-03-15 | End: 2021-03-15

## 2021-03-15 RX ORDER — IPRATROPIUM BROMIDE AND ALBUTEROL SULFATE 2.5; .5 MG/3ML; MG/3ML
3 SOLUTION RESPIRATORY (INHALATION) ONCE
Status: COMPLETED | OUTPATIENT
Start: 2021-03-15 | End: 2021-03-15

## 2021-03-15 RX ORDER — DOXYCYCLINE 100 MG/1
100 CAPSULE ORAL 2 TIMES DAILY
Qty: 20 CAPSULE | Refills: 0 | Status: SHIPPED | OUTPATIENT
Start: 2021-03-15 | End: 2021-03-25

## 2021-03-15 RX ORDER — SODIUM CHLORIDE 9 MG/ML
1000 INJECTION, SOLUTION INTRAVENOUS ONCE
Status: COMPLETED | OUTPATIENT
Start: 2021-03-15 | End: 2021-03-15

## 2021-03-15 RX ORDER — BENZONATATE 100 MG/1
200 CAPSULE ORAL 3 TIMES DAILY PRN
Qty: 21 CAPSULE | Refills: 0 | Status: SHIPPED | OUTPATIENT
Start: 2021-03-15 | End: 2021-03-18

## 2021-03-15 RX ORDER — PREDNISONE 20 MG/1
40 TABLET ORAL DAILY
Qty: 8 TABLET | Refills: 0 | Status: SHIPPED | OUTPATIENT
Start: 2021-03-16 | End: 2021-03-20

## 2021-03-15 RX ADMIN — IPRATROPIUM BROMIDE AND ALBUTEROL SULFATE 3 ML: .5; 3 SOLUTION RESPIRATORY (INHALATION) at 10:36

## 2021-03-15 RX ADMIN — DOXYCYCLINE 100 MG: 100 TABLET, FILM COATED ORAL at 12:57

## 2021-03-15 RX ADMIN — METHYLPREDNISOLONE SODIUM SUCCINATE 125 MG: 125 INJECTION, POWDER, FOR SOLUTION INTRAMUSCULAR; INTRAVENOUS at 10:34

## 2021-03-15 RX ADMIN — SODIUM CHLORIDE 1000 ML: 9 INJECTION, SOLUTION INTRAVENOUS at 13:30

## 2021-03-15 RX ADMIN — CEFTRIAXONE SODIUM 2 G: 2 INJECTION, POWDER, FOR SOLUTION INTRAMUSCULAR; INTRAVENOUS at 12:57

## 2021-03-15 ASSESSMENT — FIBROSIS 4 INDEX: FIB4 SCORE: 0.87

## 2021-03-15 NOTE — ED NOTES
Plan of care explained to pt. Labs including 1st of BC collected.  notified via voalte message to collect 2nd set of BC.  Xray done at bedside. covid swab collected. RT called for nebulizer treatment.

## 2021-03-15 NOTE — ED TRIAGE NOTES
69 y/o female ambulatory to triage with c/o cough and congestion. She states symptoms began as a head cold and moved into her chest, now has yellow sputum production. She denies any sick contacts. Hx of asthma.

## 2021-03-15 NOTE — ED PROVIDER NOTES
"ED Provider Note    Scribed for Shanti Adorno M.D. by Jose Lorenzo. 3/15/2021  10:00 AM    Primary care provider: Rudy Parsons M.D.  Means of arrival: Private auto  History obtained from: Patient  History limited by: None  CHIEF COMPLAINT  Chief Complaint   Patient presents with   • Congestion   • Cough       HPI  Augusta Rodriguez is a 70 y.o. female who presents for evaluation of shortness of breath and cough which began 4 days ago.  The patient states that she initially had congestion in her sinuses.  The congestion \"then moved into her chest.\"  She has history of asthma.  She feels wheezy.  She has been using her albuterol at home without any improvement in her symptoms.  She has never been hospitalized or intubated for her asthma.  She has not been on any prednisone or other steroid for over a year.  No fevers or chills.  No myalgias.  She does not think she could have COVID-19 as she \"does not leave the house.\"   She is coughing up some yellow phlegm.  No hemoptysis.  No chest pain unless she is coughing.  When she coughs she feels the chest pain in her upper sternum region.  No abdominal pain.  No nausea, vomiting or diarrhea.  No headaches.  She denies sore throat.  No pain or swelling in her legs.  She has history of atrial fibrillation but she says that she does not feel like she is in A. fib right now.        REVIEW OF SYSTEMS  See HPI for further details. All other systems are negative.    PAST MEDICAL HISTORY  Past Medical History:   Diagnosis Date   • Apnea, sleep    • Arrhythmia     afib   • Arthritis     osteo   • Asthma    • Atrial fibrillation (HCC)    • Back pain    • Bronchitis    • Chickenpox    • Constipation    • Cough    • Daytime sleepiness    • Dental disorder     upper dentures   • Earache    • Frequent urination    • Gasping for breath    • GERD (gastroesophageal reflux disease) 2/27/2010   • Bengali measles    • Heartburn    • Hypertension    • Impaired fasting glucose 2/27/2010   • " Incontinence of urine    • Indigestion    • Influenza    • Mumps    • Nasal drainage    • Nausea    • Osteopenia 2/24/2010   • Osteoporosis    • Other specified disorder of intestines     constipation r/t meds   • Restless leg syndrome    • Scarlet fever    • Shortness of breath    • Sleep apnea    • Snoring    • Tremor, essential 2/24/2010   • Urinary bladder disorder    • Wears glasses    • Wheezing    • Whooping cough        FAMILY HISTORY  Family History   Problem Relation Age of Onset   • GI Disease Father         Crohn's   • Heart Disease Father         First MI age 30   • Hypertension Father    • Stroke Father    • Hypertension Other    • Cancer Brother         ESOPHAGEAL CANCER       SOCIAL HISTORY    Tobacco Use   • Smoking status: Never Smoker   • Smokeless tobacco: Never Used   Substance and Sexual Activity   • Alcohol use: Yes     Alcohol/week: 0.0 oz     Comment: Stopped drinking 45 days ago 7/12/17 Going to    • Drug use: No   • Sexual activity: Never         SURGICAL HISTORY  Past Surgical History:   Procedure Laterality Date   • LUMBAR LAMINECTOMY DISKECTOMY Bilateral 2/4/2017    Procedure: LUMBAR LAMINECTOMY DISKECTOMY POSTERIOR L4-S1 ;  Surgeon: Emil Hitchcock M.D.;  Location: [facility];  Service:    • CARPAL TUNNEL ENDOSCOPIC  11/17/2012    Performed by Heriberto Quiñones M.D. at SUR[facility]ERY [facility]   • TRIGGER FINGER RELEASE  11/17/2012    Performed by Heriberto Quiñones M.D. at [facility]   • HIP ARTHROSCOPY  2/23/2009    Performed by SHERLYN CALVO at [facility]   • ACETABULAR OSTEOTOMY  2/23/2009    Performed by SHERLYN CALVO at [facility]   • MASS EXCISION ORTHO  2/23/2009    Performed by SHERLYN CALVO at [facility]   • HIP ARTHROSCOPY  2005    done at [facility]   • HAJA BY LAPAROSCOPY  1997   • HYSTERECTOMY, TOTAL ABDOMINAL  1979    oopherectomy lacie   • BLADDER SUSPENSION      bladder sling   • CARPAL TUNNEL  "RELEASE     • HIP REPLACEMENT, TOTAL     • HYSTERECTOMY LAPAROSCOPY     • LAMINOTOMY     • PRIMARY C SECTION  1970/1975       CURRENT MEDICATIONS  Home Medications    **Home medications have not yet been reviewed for this encounter**         ALLERGIES  Allergies   Allergen Reactions   • Clarithromycin      Swelling/Itching/Nausea   • Ees [Erythromycin]      Swelling/Itching/Nausea   • Levaquin      Muscle soreness    • Pcn [Penicillins]      Swelling/Itching/Nausea    • Shellfish Allergy      Swelling/Itching/Nausea   • Sumatriptan Swelling     Tongue swell   • Trazodone Swelling       PHYSICAL EXAM  VITAL SIGNS: /93   Pulse (!) 105   Temp 36.7 °C (98.1 °F) (Oral)   Resp 16   Ht 1.549 m (5' 1\")   Wt 74.8 kg (165 lb)   SpO2 95%   BMI 31.18 kg/m²      Constitutional: Elevated BMI. Well developed, well nourished; No acute distress; Non-toxic appearance.  Coughing frequently during the exam  HENT: Normocephalic, atraumatic; Bilateral external ears normal; Oropharynx exam was deferred due to COVID-19 outbreak  Eyes: PERRL, EOMI, Conjunctiva normal. No discharge.   Neck:  Supple, nontender midline; No stridor; No nuchal rigidity.   Lymphatic: No cervical lymphadenopathy noted.   Cardiovascular: Regular rate and rhythm without murmurs, rubs, or gallop.   Thorax & Lungs: No respiratory distress, but frequent cough. Cough sounds bronchospastic. Expiratory wheezes with scattered rhonchi throughout.  Nontender chest wall. No crepitus or subcutaneous air  Abdomen: Soft, nontender, bowel sounds normal. No obvious masses; No pulsatile masses; no rebound, guarding, or peritoneal signs.   Skin: Good color; warm and dry without rash or petechia.  Back: Nontender, No CVA tenderness.   Extremities: Distal radial, dorsalis pedis, posterior tibial pulses are equal bilaterally; No edema; Nontender calves or saphenous, No cyanosis, No clubbing.   Musculoskeletal: Good range of motion in all major joints. No tenderness to " palpation or major deformities noted.   Neurologic: Alert & oriented x 4, clear speech    EKG  Please see my EKG interpretation below    LABS/RADIOLOGY/PROCEDURES  Results for orders placed or performed during the hospital encounter of 03/15/21   CBC with Differential   Result Value Ref Range    WBC 12.2 (H) 4.8 - 10.8 K/uL    RBC 5.17 4.20 - 5.40 M/uL    Hemoglobin 13.7 12.0 - 16.0 g/dL    Hematocrit 42.6 37.0 - 47.0 %    MCV 82.4 81.4 - 97.8 fL    MCH 26.5 (L) 27.0 - 33.0 pg    MCHC 32.2 (L) 33.6 - 35.0 g/dL    RDW 44.1 35.9 - 50.0 fL    Platelet Count 335 164 - 446 K/uL    MPV 9.4 9.0 - 12.9 fL    Neutrophils-Polys 68.10 44.00 - 72.00 %    Lymphocytes 14.60 (L) 22.00 - 41.00 %    Monocytes 9.00 0.00 - 13.40 %    Eosinophils 6.80 0.00 - 6.90 %    Basophils 1.10 0.00 - 1.80 %    Immature Granulocytes 0.40 0.00 - 0.90 %    Nucleated RBC 0.00 /100 WBC    Neutrophils (Absolute) 8.33 (H) 2.00 - 7.15 K/uL    Lymphs (Absolute) 1.79 1.00 - 4.80 K/uL    Monos (Absolute) 1.10 (H) 0.00 - 0.85 K/uL    Eos (Absolute) 0.83 (H) 0.00 - 0.51 K/uL    Baso (Absolute) 0.13 (H) 0.00 - 0.12 K/uL    Immature Granulocytes (abs) 0.05 0.00 - 0.11 K/uL    NRBC (Absolute) 0.00 K/uL   Comp Metabolic Panel   Result Value Ref Range    Sodium 136 135 - 145 mmol/L    Potassium 4.1 3.6 - 5.5 mmol/L    Chloride 102 96 - 112 mmol/L    Co2 24 20 - 33 mmol/L    Anion Gap 10.0 7.0 - 16.0    Glucose 114 (H) 65 - 99 mg/dL    Bun 15 8 - 22 mg/dL    Creatinine 0.82 0.50 - 1.40 mg/dL    Calcium 9.0 8.5 - 10.5 mg/dL    AST(SGOT) 12 12 - 45 U/L    ALT(SGPT) 8 2 - 50 U/L    Alkaline Phosphatase 122 (H) 30 - 99 U/L    Total Bilirubin 0.6 0.1 - 1.5 mg/dL    Albumin 4.2 3.2 - 4.9 g/dL    Total Protein 7.4 6.0 - 8.2 g/dL    Globulin 3.2 1.9 - 3.5 g/dL    A-G Ratio 1.3 g/dL   TROPONIN   Result Value Ref Range    Troponin T <6 6 - 19 ng/L   proBrain Natriuretic Peptide, NT   Result Value Ref Range    NT-proBNP 66 0 - 125 pg/mL   LACTIC ACID   Result Value Ref Range     Lactic Acid 3.5 (H) 0.5 - 2.0 mmol/L   COV-2, FLU A/B, AND RSV BY PCR (2-4 HOURS CEPHEID): Collect NP swab in VTM    Specimen: Nasopharyngeal; Respirate   Result Value Ref Range    Influenza virus A RNA Negative Negative    Influenza virus B, PCR Negative Negative    RSV, PCR Negative Negative    SARS-CoV-2 by PCR NotDetected     SARS-CoV-2 Source NP Swab    ESTIMATED GFR   Result Value Ref Range    GFR If African American >60 >60 mL/min/1.73 m 2    GFR If Non African American >60 >60 mL/min/1.73 m 2   LACTIC ACID   Result Value Ref Range    Lactic Acid 2.6 (H) 0.5 - 2.0 mmol/L   EKG   Result Value Ref Range    Report       [facility] Emergency Dept.    Test Date:  2021-03-15  Pt Name:    CONNIE NORIEGA                    Department: ER  MRN:        [id number]                      Room:       [facility]  Gender:     Female                       Technician: [id number]  :        1950                   Requested By:ER TRIAGE PROTOCOL  Order #:    [id number]                    Reading MD: Shanti Adorno    Measurements  Intervals                                Axis  Rate:       89                           P:          40  VT:         148                          QRS:        268  QRSD:       84                           T:          73  QT:         356  QTc:        434    Interpretive Statements  SINUS RHYTHM rate 89  T waves inverted V2-V4  ST depression V3, V4  No ST elevaation  RIGHT AXIS DEVIATION  PROBABLE INFERIOR INFARCT, AGE INDETERMINATE  NONSPECIFIC T ABNORMALITIES, ANT-LAT LEADS  Compared to ECG 10/02/2020 10:10:31  Right-axis deviation now present  Myocardial infarct finding now present  T-wav e abnormality still present  Electronically Signed On 3- 13:07:28 PDT by Shanti Adorno     EKG (NOW)   Result Value Ref Range    Report       [facility] Emergency Dept.    Test Date:  2021-03-15  Pt Name:    CONNIE NORIEGA                    Department: ER  MRN:        [id number]                       Room:       [facility]  Gender:     Female                       Technician: [id number]  :        1950                   Requested By:PEDRITO ADORNO  Order #:    [id number]                    Reading MD: Pedrito Adorno    Measurements  Intervals                                Axis  Rate:       88                           P:          28  ME:         212                          QRS:        -77  QRSD:       84                           T:          76  QT:         364  QTc:        441    Interpretive Statements  SINUS RHYTHM rate 88  T waves inverted V2-V4  NO ST elevation  ST depression V2-V4  BORDERLINE AV CONDUCTION DELAY  MARKEDLY POSTERIOR QRS AXIS  LOW VOLTAGE IN FRONTAL LEADS  NONSPECIFIC T ABNORMALITIES, ANT-LAT LEADS  Compared to ECG 03/15/2021 10:15:43  Posterior QRS axis now present  Low QRS voltage now present  Eve ctronically Signed On 3- 13:06:09 PDT by Pedrito Adorno     EKG (NOW)   Result Value Ref Range    Report       [facility] Emergency Dept.    Test Date:  2021-03-15  Pt Name:    CONNIE NORIEGA                    Department: ER  MRN:        [id number]                      Room:       [facility]  Gender:     Female                       Technician: [id number]  :        1950                   Requested By:PEDRITO ADORNO  Order #:    [id number]                    Reading MD: Pedrito Adorno    Measurements  Intervals                                Axis  Rate:       92                           P:          19  ME:         144                          QRS:        -15  QRSD:       90                           T:          96  QT:         380  QTc:        471    Interpretive Statements  SINUS RHYTHM rate of 92  T waves inverted V2 through V4  Persistent ST depression V2 and V3  No ST elevation  BORDERLINE LEFT AXIS DEVIATION  LOW VOLTAGE IN FRONTAL LEADS  ABNORMAL T, CONSIDER ISCHEMIA, ANTERIOR LEADS  Compared to ECG 03/15/2021 10:16:17  Possible ischemia now present  ST (T wave) deviation no longer p  resent  Posterior QRS axis no longer present  T-wave abnormality still present  Electronically Signed On 3- 15:43:47 PDT by Shanti Adorno           DX-CHEST-PORTABLE (1 VIEW)   Final Result      Hypoinflation without other evidence for acute cardiopulmonary disease.          COURSE & MEDICAL DECISION MAKING  Pertinent Labs & Imaging studies reviewed. (See chart for details)    Reviewed patient's old medical records which showed no recent ED visits.     10:00 AM - Patient seen and examined at bedside. During exam, she cough frequently and is remarkable for expiratory wheezes. Discussed plan of care, including assessment of heart function and infection. Patient agrees to the plan of care. The patient will be medicated with Duoneb solution and Solu-Medrol 125 mg. Ordered for EKG, labs, and radiology to evaluate her symptoms. I will await lab and radiology results before determining whether interventions are necessary. The patient is agreeable and understanding of my plan of care.     12:36 PM - Treated patient with Adoxa 100 mg and Rocephin 2 g in  mL.    1245: patient feeling much better. No longer wheezing per patient.  She says she feels ready to go home.  Repeat lung examination reveals clear breath sounds bilaterally.  She definitely is coughing less when she takes a deep breath.  O2 sats are running in the 91 to 93% on room air.  Patient was told she has an elevated lactic acid level.  For this reason she may need to be hospitalized.  Patient says she does not want to be hospitalized.  She wants to go home.  She says she can take care of herself just as well at home.  I told her that I would like to at least repeat the lactic acid level to be sure it is on the downward trend.  She will be getting some IV fluids.  We will repeat her lactic after IV fluids.  She is agreeable to getting her lactic acid rechecked, but she is adamantly refusing hospitalization and insists on going home.  She says she feels  "much better.  She says she feels like this is an asthma exacerbation.      Patient presents to the ER complaining of nasal congestion over the last 4 days.  The nasal congestion resolved but then it \"moved into her chest.\"  Over the last few days she has had coughing of yellow phlegm with wheezing.  When she coughs she has some discomfort in her upper chest.  No chest pain unless she is coughing.  No fevers or chills.  No myalgias.  No sore throat.  She has asthma.  She says her symptoms are like an asthma exacerbation.  She is never been hospitalized or intubated for asthma.  O2 sats are running in the low 90s.  She uses CPAP with oxygen at nighttime.  She does not need oxygen during the day.  Patient was quite wheezy on initial examination.  She had a bronchospastic cough.  She coughed frequently as I auscultated her chest.  She was given albuterol and Atrovent treatment as well as some steroids.  Her wheezes completely cleared up.  She is feeling much better.  Her lactic acid level was elevated.  She was given a liter of fluids.  Lactic acid level was repeated.  It was improved but still high.  Patient had some EKG findings which look to be similar from previous, although a little bit more pronounced and that she had slightly more ST depression in V2 and V3 when compared to previous.  I told the patient I wanted to hospitalize her overnight for further evaluation and management of her lactic acidosis and her abnormal EKG as well as her wheezing/bronchospasm.  Patient refused.  She says she has dogs at home and she wants to get home to feed her dogs.  Her  is adamant that she is going home as well.  He does not want her to stay.  Patient does not want to stay.  She says she can do everything at home that there can I do in the hospital.  She says she feels much better.  She feels back to normal.  She wants to go home.  Patient will sign out AMA.  She has been given strict return precautions and discharge " instructions and she understands if she gets worse in any way she must return immediately.  Additionally, patient has been invited to return if she changes her mind about admission.  At this time I think the patient probably has an asthma exacerbation with bronchitis.  She will go home with prednisone, albuterol, Tessalon and some doxycycline.  Patient understands treatment plan and follow-up.      The patient is leaving against medical advice.  I discussed with the patient the risks of leaving without receiving appropriate care to include permanent disability or death.  I have discussed possible alternatives.  The patient is not intoxicated.  The patient is a capable decision maker and understands the risks and benefits of their decision.  While I certainly disagree with the patient's decision, I respect the patient's autonomy and will not keep them here against their will.  They understand that their decision to leave can be reversed at any time and they can return to us at any time for any reason at all.  Mateus is involved with the discussion and also understands my concern.  I have asked the nurses to have the patient sign an AMA form and to witness this signature.    FINAL IMPRESSION  1. Moderate asthma with acute exacerbation, unspecified whether persistent Acute   2. Lower respiratory tract infection Acute    AMA      This dictation has been created using voice recognition software. The accuracy of the dictation is limited by the abilities of the software. I expect there may be some errors of grammar and possibly content. I made every attempt to manually correct the errors within my dictation. However, errors related to voice recognition software may still exist and should be interpreted within the appropriate context.     Jose VILLEDA (Long), am scribing for, and in the presence of, Shanti Adorno M.D..    Electronically signed by: Jose Obregon), 3/15/2021    Shanti VILLEDA M.D.  personally performed the services described in this documentation, as scribed by Jose Lorenzo in my presence, and it is both accurate and complete.    C.     The note accurately reflects work and decisions made by me.  Shanti Adorno M.D.  3/15/2021  2:11 PM

## 2021-03-15 NOTE — ED NOTES
Ambulates to room w/steady gait. C/o cough and sob x 3 days. Pt coughing up yellowish sputum. Able to speak in full sentences.  Placed on cardiac monitor/pulse ox/02, alarms audible. Call light within reach.instructed to call staff for assistance.

## 2021-03-15 NOTE — ED NOTES
Pt has decided to leave BRUNILDA RAVI aware. Paperwork signed and on chart.  Prescription given and pt ambulates without assistance to the lobby

## 2021-03-17 RX ORDER — ATORVASTATIN CALCIUM 10 MG/1
10 TABLET, FILM COATED ORAL DAILY
Qty: 100 TABLET | Refills: 1 | Status: SHIPPED | OUTPATIENT
Start: 2021-03-17 | End: 2021-12-22 | Stop reason: SDUPTHER

## 2021-03-17 RX ORDER — ATORVASTATIN CALCIUM 10 MG/1
10 TABLET, FILM COATED ORAL DAILY
Qty: 100 TABLET | Refills: 1 | Status: SHIPPED | OUTPATIENT
Start: 2021-03-17 | End: 2021-03-17 | Stop reason: SDUPTHER

## 2021-03-17 NOTE — TELEPHONE ENCOUNTER
Right fax request to resend atorvastatin as 100 day supply.    Last appointment was on 01/11/2021  Lab Results   Component Value Date/Time    CHOLSTRLTOT 212 (H) 10/19/2020 10:33 AM     (H) 10/19/2020 10:33 AM    HDL 54 10/19/2020 10:33 AM    TRIGLYCERIDE 105 10/19/2020 10:33 AM       Lab Results   Component Value Date/Time    SODIUM 136 03/15/2021 09:56 AM    POTASSIUM 4.1 03/15/2021 09:56 AM    CHLORIDE 102 03/15/2021 09:56 AM    CO2 24 03/15/2021 09:56 AM    GLUCOSE 114 (H) 03/15/2021 09:56 AM    BUN 15 03/15/2021 09:56 AM    CREATININE 0.82 03/15/2021 09:56 AM    CREATININE 0.89 04/27/2009 12:00 AM     Lab Results   Component Value Date/Time    ALKPHOSPHAT 122 (H) 03/15/2021 09:56 AM    ASTSGOT 12 03/15/2021 09:56 AM    ALTSGPT 8 03/15/2021 09:56 AM    TBILIRUBIN 0.6 03/15/2021 09:56 AM

## 2021-03-18 ENCOUNTER — OFFICE VISIT (OUTPATIENT)
Dept: MEDICAL GROUP | Facility: PHYSICIAN GROUP | Age: 71
End: 2021-03-18
Payer: MEDICARE

## 2021-03-18 VITALS
TEMPERATURE: 96.7 F | WEIGHT: 172 LBS | DIASTOLIC BLOOD PRESSURE: 58 MMHG | BODY MASS INDEX: 32.47 KG/M2 | SYSTOLIC BLOOD PRESSURE: 106 MMHG | RESPIRATION RATE: 16 BRPM | OXYGEN SATURATION: 97 % | HEART RATE: 100 BPM | HEIGHT: 61 IN

## 2021-03-18 DIAGNOSIS — I48.0 PAROXYSMAL ATRIAL FIBRILLATION (HCC): Chronic | ICD-10-CM

## 2021-03-18 DIAGNOSIS — N18.31 STAGE 3A CHRONIC KIDNEY DISEASE: Chronic | ICD-10-CM

## 2021-03-18 DIAGNOSIS — R05.9 COUGH: ICD-10-CM

## 2021-03-18 DIAGNOSIS — J45.901 SEVERE ASTHMA WITH EXACERBATION, UNSPECIFIED WHETHER PERSISTENT: ICD-10-CM

## 2021-03-18 DIAGNOSIS — R25.1 TREMOR: ICD-10-CM

## 2021-03-18 PROCEDURE — 99214 OFFICE O/P EST MOD 30 MIN: CPT | Performed by: INTERNAL MEDICINE

## 2021-03-18 RX ORDER — MONTELUKAST SODIUM 10 MG/1
10 TABLET ORAL DAILY
Qty: 30 TABLET | Refills: 6 | Status: SHIPPED | OUTPATIENT
Start: 2021-03-18 | End: 2021-04-15

## 2021-03-18 RX ORDER — FUROSEMIDE 20 MG/1
TABLET ORAL
COMMUNITY
Start: 2021-01-30 | End: 2021-03-17

## 2021-03-18 RX ORDER — PROMETHAZINE HYDROCHLORIDE, PHENYLEPHRINE HYDROCHLORIDE AND CODEINE PHOSPHATE 6.25; 5; 1 MG/5ML; MG/5ML; MG/5ML
5 SOLUTION ORAL EVERY 8 HOURS PRN
Qty: 280 ML | Refills: 0 | Status: SHIPPED | OUTPATIENT
Start: 2021-03-18 | End: 2021-03-28

## 2021-03-18 ASSESSMENT — FIBROSIS 4 INDEX: FIB4 SCORE: 0.87

## 2021-03-18 NOTE — ASSESSMENT & PLAN NOTE
Chronic condition per the patient currently followed by cardiology service.  Patient take aspirin.  Patient denies chest pain shortness of breath or palpitation.

## 2021-03-18 NOTE — PROGRESS NOTES
CC: Follow-up ER visit, cough      HPI: This is a 70 y.o. pt.  Pt's medical history is notable for:     Paroxysmal atrial fibrillation (HCC)  Chronic condition per the patient currently followed by cardiology service.  Patient take aspirin.  Patient denies chest pain shortness of breath or palpitation.    CKD (chronic kidney disease) stage 3, GFR 30-59 ml/min  Chronic condition.  Baseline GFR in the 50s.  Advised the patient to avoid NSAIDs    Cough  New condition.  The patient was seen in the emergency room recently    Chest x-ray showed  IMPRESSION:     Hypoinflation without other evidence for acute cardiopulmonary disease.        Patient lab tests show elevated white blood count .  Patient however refused to be admitted to the hospital and she left AMA.    Patient is currently taking doxycycline, prednisone and Tessalon as needed for the cough.    Patient stated that the Tessalon does not seem to be very effective the patient wishes to get different cough medicine.  She denies fever or chills.    I strongly recommend for the patient to get tested for Covid infection however patient stated that she tested negative for Covid recently.  Patient does not wish to have another Covid test.    Patient has history of asthma.  She is currently using Flovent and albuterol as needed.  She has not seen a pulmonologist for over 1 year.    Tremor  This is a new condition noted since last 3 months.  The tremors mainly affecting the hands bilaterally.  No family history of Parkinson's.  She denies fever chills headache change in vision slow speech motor weakness or paresthesia.          REVIEW OF SYSTEMS:     Constitutional:  no fever / chills   Neurologic: no headaches  Eyes: no changes in vision  ENT: no sore throat, no hearing loss  CV:  no chest pain, no palpitations  Pulmonary: no SOB        Allergies: Clarithromycin, Ees [erythromycin], Levaquin, Pcn [penicillins], Shellfish allergy, Sumatriptan, and Trazodone    Current  Outpatient Medications Ordered in Epic   Medication Sig Dispense Refill   • Promethazine-Phenyleph-Codeine (PHENERGAN VC CODEINE) 6.25-5-10 MG/5ML Syrup Take 5 mL by mouth every 8 hours as needed for up to 10 days. 280 mL 0   • montelukast (SINGULAIR) 10 MG Tab Take 1 tablet by mouth every day. 30 tablet 6   • atorvastatin (LIPITOR) 10 MG Tab Take 1 tablet by mouth every day. 100 tablet 1   • predniSONE (DELTASONE) 20 MG Tab Take 2 Tablets by mouth every day for 4 days. 8 tablet 0   • doxycycline (MONODOX) 100 MG capsule Take 1 capsule by mouth 2 times a day for 10 days. 20 capsule 0   • albuterol 108 (90 Base) MCG/ACT Aero Soln inhalation aerosol Inhale 2 Puffs every 6 hours as needed for Shortness of Breath. 8.5 g 0   • triamcinolone acetonide (KENALOG) 0.1 % Ointment Apply 1 Application topically 2 times a day. For 2-3 weeks 30 g 1   • ondansetron (ZOFRAN) 4 MG Tab tablet TAKE 1 TABLET BY MOUTH EVERY 4 HOURS AS NEEDED FOR  NAUSEA  AND  VOMITING 30 Tab 0   • DULoxetine (CYMBALTA) 60 MG Cap DR Particles delayed-release capsule TAKE 1 CAPSULE BY MOUTH ONCE DAILY 90 Cap 1   • amitriptyline (ELAVIL) 25 MG Tab TAKE 1 TABLET BY MOUTH ONCE DAILY AT BEDTIME 90 Tab 1   • telmisartan (MICARDIS) 40 MG Tab Take 1 tablet by mouth once daily 100 Tab 1   • albuterol 108 (90 Base) MCG/ACT Aero Soln inhalation aerosol Inhale 2 Puffs by mouth every four hours as needed for Shortness of Breath. 1 Each 5   • fluticasone (FLOVENT HFA) 110 MCG/ACT Aerosol Inhale 2 Puffs by mouth 2 times a day. 1 Each 5   • Ubrogepant (UBRELVY) 50 MG Tab Take  by mouth 1 time daily as needed.     • SALINE NA Spray  in nose.     • DILTIAZem CD (CARDIZEM CD) 240 MG CAPSULE SR 24 HR Take 1 Cap by mouth every day. 90 Cap 3   • Cetirizine HCl (ZYRTEC PO) Take  by mouth.     • aspirin 81 MG EC tablet Take 81 mg by mouth every day.     • pantoprazole (PROTONIX) 40 MG Tablet Delayed Response Take 40 mg by mouth every day.       No current Epic-ordered  facility-administered medications on file.       Past Medical, Social, and Family history reviewed and updated in EPIC     ------------------------------------------------------------------------------     PHYSICAL EXAM:   Vitals:    03/18/21 1555   BP: 106/58   Pulse: 100   Resp: 16   Temp: 35.9 °C (96.7 °F)   SpO2: 97%      Body mass index is 32.5 kg/m².         Constitutional: no acute distress  Neck: supple, no JVD  CV: heart RRR  Resp: normal effort, minimal expiratory wheezing noted   GI: abdomen soft, no obvious mass, no tenderness  Neuro: CN 2-12 grossly intact.  Fine tremors noted in both upper extremity with outstretched hands        -----------------------------------------------------------------------------    ASSESSMENT:   1. Paroxysmal atrial fibrillation (HCC)     2. Stage 3a chronic kidney disease     3. Cough  CBC WITH DIFFERENTIAL    Promethazine-Phenyleph-Codeine (PHENERGAN VC CODEINE) 6.25-5-10 MG/5ML Syrup   4. Severe asthma with exacerbation, unspecified whether persistent  REFERRAL TO PULMONARY AND SLEEP MEDICINE   5. Tremor             MEDICAL DECISION MAKING: DISCUSSION / STATUS / PLAN:    Paroxysmal atrial fibrillation.  Chronic stable condition.  Advised the patient continue aspirin and continue follow-up with cardiology service.    CKD 3.  Recent GFR has improved.  Advised the patient to avoid NSAID and to drink fluids adequately to avoid dehydration.    Recent cough.  Chest x-ray result as above.  Examination today the ears are unremarkable with the membranes are intact  Nose with slight mucosal formation however no purulent drainage noted  Oropharynx no exudates noted  Suspect the patient may have asthma exacerbation secondary to bronchitis rule out others  Advised the patient to complete the antibiotic doxycycline given from the ER and prednisone.  Refer the patient to see pulmonologist.  Advised Singulair 10 mg p.o. at bedtime.  Continue with Flovent and albuterol as needed  After 2  weeks advised the patient to repeat CBC.  Phenergan with codeine prescribed for cough as the Tessalon is not effective per patient report.  The patient has no history of drug-seeking behavior.    Tremors.  Likely essential tremors..  No other features to suggest Parkinson's disease on exam..  Strongly recommend referral for the patient to see neurology service.  The patient stated that she has a private neurologist Dr. Cruz at [facility] and the patient will call to schedule an appointment.    PATIENT EDUCATION:  -If any problems should arise, patient was advised to contact our office or go to ER to be evaluated.      Please note that this dictation was created using voice recognition software. I have made every reasonable attempt to correct obvious errors, but it is possible there are errors of grammar and possibly content that I did not discover before finalizing the note.

## 2021-03-18 NOTE — ASSESSMENT & PLAN NOTE
New condition.  The patient was seen in the emergency room recently    Chest x-ray showed  IMPRESSION:     Hypoinflation without other evidence for acute cardiopulmonary disease.        Patient lab tests show elevated white blood count .  Patient however refused to be admitted to the hospital and she left AMA.    Patient is currently taking doxycycline, prednisone and Tessalon as needed for the cough.    Patient stated that the Tessalon does not seem to be very effective the patient wishes to get different cough medicine.  She denies fever or chills.    I strongly recommend for the patient to get tested for Covid infection however patient stated that she tested negative for Covid recently.  Patient does not wish to have another Covid test.    Patient has history of asthma.  She is currently using Flovent and albuterol as needed.  She has not seen a pulmonologist for over 1 year.

## 2021-03-18 NOTE — ASSESSMENT & PLAN NOTE
This is a new condition noted since last 3 months.  The tremors mainly affecting the hands bilaterally.  No family history of Parkinson's.  She denies fever chills headache change in vision slow speech motor weakness or paresthesia.

## 2021-03-20 LAB
BACTERIA BLD CULT: NORMAL
BACTERIA BLD CULT: NORMAL
SIGNIFICANT IND 70042: NORMAL
SIGNIFICANT IND 70042: NORMAL
SITE SITE: NORMAL
SITE SITE: NORMAL
SOURCE SOURCE: NORMAL
SOURCE SOURCE: NORMAL

## 2021-03-30 ENCOUNTER — OFFICE VISIT (OUTPATIENT)
Dept: DERMATOLOGY | Facility: IMAGING CENTER | Age: 71
End: 2021-03-30
Payer: MEDICARE

## 2021-03-30 DIAGNOSIS — R21 VULVAR RASH: ICD-10-CM

## 2021-03-30 PROCEDURE — 99213 OFFICE O/P EST LOW 20 MIN: CPT | Performed by: NURSE PRACTITIONER

## 2021-03-30 NOTE — PROGRESS NOTES
DERMATOLOGY NOTE  NEW VISIT       Chief complaint: Follow-Up and Rash     Patient returns today to follow up vulvar itching.    Patient states itching completely resolved.  Using tac and clotrimazole.    From previous note:  HPI rash vaginal area out side , Hx of incontinence uses pads  . Very irritated   Uses dove bar soap and baby wipes to vulva  Onset:  4 months   Aggravating factors: pads   Alleviating factors: no   New creams/topicals: OTC cortisone , A&D ointment , baby diaper rash cream , triple antiseptic   New medications (up to last 6 months): no  New travel: no  Other exposures: no  Treatments: no              Allergies   Allergen Reactions   • Clarithromycin      Swelling/Itching/Nausea   • Ees [Erythromycin]      Swelling/Itching/Nausea   • Levaquin      Muscle soreness    • Pcn [Penicillins]      Swelling/Itching/Nausea    • Shellfish Allergy      Swelling/Itching/Nausea   • Sumatriptan Swelling     Tongue swell   • Trazodone Swelling        MEDICATIONS:  Medications relevant to specialty reviewed.       REVIEW OF SYSTEMS:   Positive for skin (see HPI)  Negative for fevers and chills       EXAM:  There were no vitals taken for this visit.  Constitutional: Well-developed, well-nourished, and in no distress.     A focused skin exam was performed including the affected areas of the facea above mask and genitalia. Notable findings on exam today listed below and/or in assessment/plan.     Vulva clear of rash and erythema    IMPRESSION / PLAN:    1. Vulvar rash-resolved    Continue to not use baby wipes to genitalia  Clean with water only  Dry well  Stop  topical regimen as rx'd below-may re-start if needed  Continue Diaper rash ointment at night    - clotrimazole (LOTRIMIN) 1 % Cream; Apply 1 Application topically 2 times a day for 14 days.  Dispense: 28 g; Refill: 1  - triamcinolone acetonide (KENALOG) 0.1 % Ointment; Apply 1 Application topically 2 times a day. For 2-3 weeks  Dispense: 30 g; Refill:  1    I have performed a physical exam and reviewed and updated ROS and Plan today (3/30/2021). In review of dermatology visit (2/22/2021), there are no changes except as documented above.     Please note that this dictation was created using voice recognition software. I have made every reasonable attempt to correct obvious errors, but I expect that there are errors of grammar and possibly content that I did not discover before finalizing the note.     Return to clinic in: Return if symptoms worsen or fail to improve. and as needed for any new or changing skin lesions.

## 2021-03-31 NOTE — TELEPHONE ENCOUNTER
Has upcoming appt w/ PCP. Will send 3 months of fills to pharmacy.     [facility]  [address] [facility], [address] [place] [address]  [phone] fax      KECIA Austin        3/31/2021      RE: Radha ROSSI 1984    Dear Dr. Joseph Reis was evaluated in my office for pre-operative evaluation. Based on review of information available to me at this time, the patient appears to be at no increased risk for the planned procedure. If you have any questions, please feel free to contact me.     Sincerely,        KECIA Austin

## 2021-04-07 ENCOUNTER — OFFICE VISIT (OUTPATIENT)
Dept: SLEEP MEDICINE | Facility: MEDICAL CENTER | Age: 71
End: 2021-04-07
Payer: MEDICARE

## 2021-04-07 VITALS
HEART RATE: 104 BPM | WEIGHT: 169 LBS | BODY MASS INDEX: 31.91 KG/M2 | SYSTOLIC BLOOD PRESSURE: 120 MMHG | RESPIRATION RATE: 16 BRPM | DIASTOLIC BLOOD PRESSURE: 64 MMHG | OXYGEN SATURATION: 94 % | HEIGHT: 61 IN

## 2021-04-07 DIAGNOSIS — J45.40 MODERATE PERSISTENT ASTHMA, UNSPECIFIED WHETHER COMPLICATED: ICD-10-CM

## 2021-04-07 DIAGNOSIS — R05.9 COUGH: ICD-10-CM

## 2021-04-07 DIAGNOSIS — R06.02 SHORTNESS OF BREATH: ICD-10-CM

## 2021-04-07 DIAGNOSIS — G47.33 OSA (OBSTRUCTIVE SLEEP APNEA): Chronic | ICD-10-CM

## 2021-04-07 PROCEDURE — 99214 OFFICE O/P EST MOD 30 MIN: CPT | Performed by: NURSE PRACTITIONER

## 2021-04-07 ASSESSMENT — FIBROSIS 4 INDEX: FIB4 SCORE: 0.87

## 2021-04-07 NOTE — PROGRESS NOTES
Chief Complaint   Patient presents with   • Apnea     Last Seen 7/13/2020       HPI:  Augutsa Rodriguez is a 70 y.o. year old female here today for follow-up on asthma and BRIANA.  Last OV 9/28/20 with Dr. Hull for pulmonary  Prior sleep appt 7/13/20 with Dr. Silva    She presents with her . Her main complaint today is dyspnea. She is using BERKLEY 4x's daily. She never started Breo due to fear of palpitations with hx of A. Fib. We reviewed medication/side effects. She notes cost of inhalers to also be an issues. She notes SOB with ADL's, dressing and especially chores, bending over and inclines.  She is having sinus drip with cough she attributes to allergies. She tried mucinex without benefit and did take codeine cough syrup to reduce cough last night. She is using Afrin but discussed trying flonase and allegra instead with sinus rinse.  BMI 31 and suspect deconditioning - consider cardiopulmonary stress test if Breo does not improve symptoms and reduce BERKLEY use    Never smoker.  PFT 7/20/20 notes FVC 2.91L or 116%, FEV1 2.37L or 121%, FEV1/FVC ratio 82, RV 97%, % and DLCO 97% without significant bronchodilator response. Reviewed with patient.  Currently using Flovent 2 puffs BID with BERKLEY prn.  CXR 3/15/21 notes hypoinflation w/o evidence of acute cardiopulmoary disease.  Updated CT scan pending, patient notes cost issues; CT chest 8/26/20 noted multiple nodules with larges measuring 7mm with 6mos f/u CT recommended due 2/2021. Reviewed with patient.    PSG on 1/16/2020 showed an AHI of 30/carol saturation 82%. On CPAP at 16 cm H2O her AHI normalized to 1.9 with normal saturations. She was noted to have an elevated PLMS arousal index of 22.2. She remains on CPAP 16cm. Compliance report notes 3/8/21-4/6/21 93% compliance, avg ngithly use of 2hr 12min, minimal mask leak with nasal mask and reduced AHI 3.2/hr. She notes ever since starting CPAP to have migraine/headache when headgear in place to left  posterior ear area but notes strap not to rub there. She is also followed by Neuro for history of migraines since childhood. She is motivated to continue with use of device since she does sleep longer and feels better the next day when using >4hrs per night.      ROS: As per HPI and otherwise negative if not stated.    Past Medical History:   Diagnosis Date   • Apnea, sleep    • Arrhythmia     afib   • Arthritis     osteo   • Asthma    • Atrial fibrillation (HCC)    • Back pain    • Bronchitis    • Chickenpox    • Constipation    • Cough    • Daytime sleepiness    • Dental disorder     upper dentures   • Earache    • Frequent urination    • Gasping for breath    • GERD (gastroesophageal reflux disease) 2/27/2010   • Syriac measles    • Heartburn    • Hypertension    • Impaired fasting glucose 2/27/2010   • Incontinence of urine    • Indigestion    • Influenza    • Mumps    • Nasal drainage    • Nausea    • Osteopenia 2/24/2010   • Osteoporosis    • Other specified disorder of intestines     constipation r/t meds   • Restless leg syndrome    • Scarlet fever    • Shortness of breath    • Sleep apnea    • Snoring    • Tremor, essential 2/24/2010   • Urinary bladder disorder    • Wears glasses    • Wheezing    • Whooping cough        Past Surgical History:   Procedure Laterality Date   • LUMBAR LAMINECTOMY DISKECTOMY Bilateral 2/4/2017    Procedure: LUMBAR LAMINECTOMY DISKECTOMY POSTERIOR L4-S1 ;  Surgeon: Emil Hitchcock M.D.;  Location: [facility];  Service:    • CARPAL TUNNEL ENDOSCOPIC  11/17/2012    Performed by Heriberto Quiñones M.D. at [facility]   • TRIGGER FINGER RELEASE  11/17/2012    Performed by Heriberto Quiñones M.D. at [facility]   • HIP ARTHROSCOPY  2/23/2009    Performed by SHERLYN CALVO at [facility]   • ACETABULAR OSTEOTOMY  2/23/2009    Performed by SHERLYN CALVO at [facility]   • MASS EXCISION ORTHO  2/23/2009    Performed  by SHERLYN CALVO at SUR[facility]ERY [facility]   • HIP ARTHROSCOPY  2005    done at [facility]   • HAJA BY LAPAROSCOPY  1997   • HYSTERECTOMY, TOTAL ABDOMINAL  1979    oopherectomy lacie   • BLADDER SUSPENSION      bladder sling   • CARPAL TUNNEL RELEASE     • HIP REPLACEMENT, TOTAL     • HYSTERECTOMY LAPAROSCOPY     • LAMINOTOMY     • PRIMARY C SECTION  1970/1975       Family History   Problem Relation Age of Onset   • GI Disease Father         Crohn's   • Heart Disease Father         First MI age 30   • Hypertension Father    • Stroke Father    • Hypertension Other    • Cancer Brother         ESOPHAGEAL CANCER       Social History     Socioeconomic History   • Marital status:      Spouse name: Not on file   • Number of children: Not on file   • Years of education: Not on file   • Highest education level: Not on file   Occupational History   • Not on file   Tobacco Use   • Smoking status: Never Smoker   • Smokeless tobacco: Never Used   Substance and Sexual Activity   • Alcohol use: Yes     Alcohol/week: 0.0 oz     Comment: Stopped drinking 45 days ago 7/12/17 Going to AA   • Drug use: No   • Sexual activity: Never   Other Topics Concern   • Not on file   Social History Narrative   • Not on file     Social Determinants of Health     Financial Resource Strain:    • Difficulty of Paying Living Expenses:    Food Insecurity:    • Worried About Running Out of Food in the Last Year:    • Ran Out of Food in the Last Year:    Transportation Needs:    • Lack of Transportation (Medical):    • Lack of Transportation (Non-Medical):    Physical Activity:    • Days of Exercise per Week:    • Minutes of Exercise per Session:    Stress:    • Feeling of Stress :    Social Connections:    • Frequency of Communication with Friends and Family:    • Frequency of Social Gatherings with Friends and Family:    • Attends Hindu Services:    • Active Member of Clubs or Organizations:    • Attends Club or Organization Meetings:    •  "Marital Status:    Intimate Partner Violence:    • Fear of Current or Ex-Partner:    • Emotionally Abused:    • Physically Abused:    • Sexually Abused:        Allergies as of 04/07/2021 - Reviewed 04/07/2021   Allergen Reaction Noted   • Clarithromycin  08/13/2007   • Ees [erythromycin]  08/13/2007   • Levaquin  11/10/2017   • Pcn [penicillins]  08/13/2007   • Shellfish allergy  01/20/2017   • Sumatriptan Swelling 02/02/2015   • Trazodone Swelling 02/16/2009        Vitals:  /64 (BP Location: Left arm, Patient Position: Sitting, BP Cuff Size: Adult)   Pulse (!) 104   Resp 16   Ht 1.549 m (5' 1\")   Wt 76.7 kg (169 lb)   SpO2 94%     Current medications as of today   Current Outpatient Medications   Medication Sig Dispense Refill   • Fluticasone Furoate-Vilanterol (BREO ELLIPTA) 100-25 MCG/INH AEROSOL POWDER, BREATH ACTIVATED Inhale 1 Puff every day. Rinse mouth after use. 1 Each 5   • montelukast (SINGULAIR) 10 MG Tab Take 1 tablet by mouth every day. 30 tablet 6   • atorvastatin (LIPITOR) 10 MG Tab Take 1 tablet by mouth every day. 100 tablet 1   • albuterol 108 (90 Base) MCG/ACT Aero Soln inhalation aerosol Inhale 2 Puffs every 6 hours as needed for Shortness of Breath. 8.5 g 0   • triamcinolone acetonide (KENALOG) 0.1 % Ointment Apply 1 Application topically 2 times a day. For 2-3 weeks 30 g 1   • ondansetron (ZOFRAN) 4 MG Tab tablet TAKE 1 TABLET BY MOUTH EVERY 4 HOURS AS NEEDED FOR  NAUSEA  AND  VOMITING 30 Tab 0   • DULoxetine (CYMBALTA) 60 MG Cap DR Particles delayed-release capsule TAKE 1 CAPSULE BY MOUTH ONCE DAILY 90 Cap 1   • amitriptyline (ELAVIL) 25 MG Tab TAKE 1 TABLET BY MOUTH ONCE DAILY AT BEDTIME 90 Tab 1   • telmisartan (MICARDIS) 40 MG Tab Take 1 tablet by mouth once daily 100 Tab 1   • fluticasone (FLOVENT HFA) 110 MCG/ACT Aerosol Inhale 2 Puffs by mouth 2 times a day. 1 Each 5   • Ubrogepant (UBRELVY) 50 MG Tab Take  by mouth 1 time daily as needed.     • SALINE NA Spray  in nose.   "   • DILTIAZem CD (CARDIZEM CD) 240 MG CAPSULE SR 24 HR Take 1 Cap by mouth every day. 90 Cap 3   • Cetirizine HCl (ZYRTEC PO) Take  by mouth.     • aspirin 81 MG EC tablet Take 81 mg by mouth every day.     • pantoprazole (PROTONIX) 40 MG Tablet Delayed Response Take 40 mg by mouth every day.       No current facility-administered medications for this visit.         Physical Exam:   Gen:           Alert and oriented, No apparent distress. Mood and affect appropriate, normal interaction with examiner.  Eyes:          PERRL, EOM intact, sclere white, conjunctive moist.  Ears:          Not examined.   Hearing:     Grossly intact.  Nose:          Normal, no lesions or deformities.  Dentition:    mask  Oropharynx:   mask  Mallampati Classification: mask  Neck:        Supple, trachea midline, no masses.  Respiratory Effort: No intercostal retractions or use of accessory muscles.   Lung Auscultation:      Clear to auscultation bilaterally; no rales, rhonchi or wheezing. Dry cough during exam  CV:            Regular rate and rhythm. No murmurs, rubs or gallops.  Abd:           Not examined  Lymphadenopathy: Not examined.  Gait and Station: Normal.  Digits and Nails: No clubbing, cyanosis, petechiae, or nodes.   Cranial Nerves: II-XII grossly intact.  Skin:        No rashes, lesions or ulcers noted.               Ext:           No cyanosis or edema.      Assessment:  1. BRIANA (obstructive sleep apnea)  DME Mask and Supplies   2. Moderate persistent asthma, unspecified whether complicated     3. Shortness of breath     4. Cough     5. BMI 31.0-31.9,adult         Immunizations:    Flu:recommend  Pneumovax 23:recomend  Prevnar 13:recommend  COVID-19: 4/2/21, pending    Plan:  1. Hx of asthma with possible environmental triggers. Start Breo 100mcg 1 puff daily and continue BERKLEY prn. Reviewed potential side effects.  Stop Flovent. She will also contact insurance to find out about mailaway insurance to see if this will reduce cost  of inhalers  2. Complete CT of chest for f/u lung nodule now but patient notes unable to afford at this time  3. Resume CPAP nightly.  DME mask/supplies; mask fit for marizol loop mask to prevent head strap across head due to hx of migraines  4. Encouraged weight loss through diet/exercise  5. Recommend updating pneumonia vaccine at next OV  6. Recommend cardiopulmonary stress test if Breo does not improve asthma  7. F/u in 2 mos to review symptoms/compliance, sooner if needed.    Please note that this dictation was created using voice recognition software. I have made every reasonable attempt to correct obvious errors, but it is possible there are errors of grammar and possibly content that I did not discover before finalizing the note.

## 2021-04-08 ENCOUNTER — HOSPITAL ENCOUNTER (OUTPATIENT)
Dept: LAB | Facility: MEDICAL CENTER | Age: 71
End: 2021-04-08
Attending: INTERNAL MEDICINE
Payer: MEDICARE

## 2021-04-08 DIAGNOSIS — R73.9 HYPERGLYCEMIA: ICD-10-CM

## 2021-04-08 DIAGNOSIS — E55.9 VITAMIN D DEFICIENCY: ICD-10-CM

## 2021-04-08 DIAGNOSIS — Z13.6 SCREENING FOR CARDIOVASCULAR CONDITION: ICD-10-CM

## 2021-04-08 DIAGNOSIS — I10 ESSENTIAL HYPERTENSION: Chronic | ICD-10-CM

## 2021-04-08 LAB
ALT SERPL-CCNC: 10 U/L (ref 2–50)
ANION GAP SERPL CALC-SCNC: 8 MMOL/L (ref 7–16)
BASOPHILS # BLD AUTO: 1 % (ref 0–1.8)
BASOPHILS # BLD: 0.07 K/UL (ref 0–0.12)
BUN SERPL-MCNC: 9 MG/DL (ref 8–22)
CALCIUM SERPL-MCNC: 9.1 MG/DL (ref 8.5–10.5)
CHLORIDE SERPL-SCNC: 106 MMOL/L (ref 96–112)
CHOLEST SERPL-MCNC: 127 MG/DL (ref 100–199)
CO2 SERPL-SCNC: 25 MMOL/L (ref 20–33)
CREAT SERPL-MCNC: 0.93 MG/DL (ref 0.5–1.4)
CREAT UR-MCNC: 97.89 MG/DL
EOSINOPHIL # BLD AUTO: 0.6 K/UL (ref 0–0.51)
EOSINOPHIL NFR BLD: 8.8 % (ref 0–6.9)
ERYTHROCYTE [DISTWIDTH] IN BLOOD BY AUTOMATED COUNT: 44.6 FL (ref 35.9–50)
FASTING STATUS PATIENT QL REPORTED: NORMAL
GLUCOSE SERPL-MCNC: 144 MG/DL (ref 65–99)
HCT VFR BLD AUTO: 40.9 % (ref 37–47)
HDLC SERPL-MCNC: 46 MG/DL
HGB BLD-MCNC: 12.8 G/DL (ref 12–16)
IMM GRANULOCYTES # BLD AUTO: 0.02 K/UL (ref 0–0.11)
IMM GRANULOCYTES NFR BLD AUTO: 0.3 % (ref 0–0.9)
LDLC SERPL CALC-MCNC: 60 MG/DL
LYMPHOCYTES # BLD AUTO: 1.81 K/UL (ref 1–4.8)
LYMPHOCYTES NFR BLD: 26.5 % (ref 22–41)
MCH RBC QN AUTO: 26.2 PG (ref 27–33)
MCHC RBC AUTO-ENTMCNC: 31.3 G/DL (ref 33.6–35)
MCV RBC AUTO: 83.6 FL (ref 81.4–97.8)
MICROALBUMIN UR-MCNC: <1.2 MG/DL
MICROALBUMIN/CREAT UR: NORMAL MG/G (ref 0–30)
MONOCYTES # BLD AUTO: 0.46 K/UL (ref 0–0.85)
MONOCYTES NFR BLD AUTO: 6.7 % (ref 0–13.4)
NEUTROPHILS # BLD AUTO: 3.88 K/UL (ref 2–7.15)
NEUTROPHILS NFR BLD: 56.7 % (ref 44–72)
NRBC # BLD AUTO: 0 K/UL
NRBC BLD-RTO: 0 /100 WBC
PLATELET # BLD AUTO: 321 K/UL (ref 164–446)
PMV BLD AUTO: 10.3 FL (ref 9–12.9)
POTASSIUM SERPL-SCNC: 4 MMOL/L (ref 3.6–5.5)
RBC # BLD AUTO: 4.89 M/UL (ref 4.2–5.4)
SODIUM SERPL-SCNC: 139 MMOL/L (ref 135–145)
TRIGL SERPL-MCNC: 103 MG/DL (ref 0–149)
TSH SERPL DL<=0.005 MIU/L-ACNC: 1.32 UIU/ML (ref 0.38–5.33)
WBC # BLD AUTO: 6.8 K/UL (ref 4.8–10.8)

## 2021-04-08 PROCEDURE — 80048 BASIC METABOLIC PNL TOTAL CA: CPT

## 2021-04-08 PROCEDURE — 84443 ASSAY THYROID STIM HORMONE: CPT

## 2021-04-08 PROCEDURE — 80061 LIPID PANEL: CPT

## 2021-04-08 PROCEDURE — 82043 UR ALBUMIN QUANTITATIVE: CPT

## 2021-04-08 PROCEDURE — 85025 COMPLETE CBC W/AUTO DIFF WBC: CPT

## 2021-04-08 PROCEDURE — 36415 COLL VENOUS BLD VENIPUNCTURE: CPT

## 2021-04-08 PROCEDURE — 84460 ALANINE AMINO (ALT) (SGPT): CPT

## 2021-04-08 PROCEDURE — 82570 ASSAY OF URINE CREATININE: CPT

## 2021-04-08 PROCEDURE — 82306 VITAMIN D 25 HYDROXY: CPT

## 2021-04-09 ENCOUNTER — PATIENT MESSAGE (OUTPATIENT)
Dept: HEALTH INFORMATION MANAGEMENT | Facility: OTHER | Age: 71
End: 2021-04-09

## 2021-04-10 LAB — 25(OH)D3 SERPL-MCNC: 25 NG/ML (ref 30–80)

## 2021-04-12 ENCOUNTER — TELEPHONE (OUTPATIENT)
Dept: MEDICAL GROUP | Facility: PHYSICIAN GROUP | Age: 71
End: 2021-04-12

## 2021-04-12 RX ORDER — ERGOCALCIFEROL 1.25 MG/1
50000 CAPSULE ORAL
Qty: 15 CAPSULE | Refills: 3 | Status: SHIPPED | OUTPATIENT
Start: 2021-04-12 | End: 2021-09-28

## 2021-04-15 ENCOUNTER — OFFICE VISIT (OUTPATIENT)
Dept: MEDICAL GROUP | Facility: PHYSICIAN GROUP | Age: 71
End: 2021-04-15
Payer: MEDICARE

## 2021-04-15 VITALS
DIASTOLIC BLOOD PRESSURE: 88 MMHG | BODY MASS INDEX: 32.1 KG/M2 | SYSTOLIC BLOOD PRESSURE: 126 MMHG | OXYGEN SATURATION: 96 % | TEMPERATURE: 97.6 F | HEIGHT: 61 IN | WEIGHT: 170 LBS | HEART RATE: 76 BPM | RESPIRATION RATE: 16 BRPM

## 2021-04-15 DIAGNOSIS — R91.8 PULMONARY NODULES: ICD-10-CM

## 2021-04-15 DIAGNOSIS — Z13.29 SCREENING FOR ENDOCRINE DISORDER: ICD-10-CM

## 2021-04-15 DIAGNOSIS — G43.909 MIGRAINE WITHOUT STATUS MIGRAINOSUS, NOT INTRACTABLE, UNSPECIFIED MIGRAINE TYPE: Chronic | ICD-10-CM

## 2021-04-15 DIAGNOSIS — R73.9 HYPERGLYCEMIA: ICD-10-CM

## 2021-04-15 DIAGNOSIS — I48.0 PAROXYSMAL ATRIAL FIBRILLATION (HCC): ICD-10-CM

## 2021-04-15 DIAGNOSIS — E55.9 VITAMIN D DEFICIENCY: ICD-10-CM

## 2021-04-15 DIAGNOSIS — F39 MOOD DISORDER (HCC): Chronic | ICD-10-CM

## 2021-04-15 DIAGNOSIS — J45.40 MODERATE PERSISTENT ASTHMA, UNSPECIFIED WHETHER COMPLICATED: ICD-10-CM

## 2021-04-15 DIAGNOSIS — E78.5 HYPERLIPIDEMIA, UNSPECIFIED HYPERLIPIDEMIA TYPE: ICD-10-CM

## 2021-04-15 DIAGNOSIS — I10 ESSENTIAL HYPERTENSION: Chronic | ICD-10-CM

## 2021-04-15 DIAGNOSIS — N18.31 STAGE 3A CHRONIC KIDNEY DISEASE: ICD-10-CM

## 2021-04-15 PROBLEM — M79.672 LEFT FOOT PAIN: Status: RESOLVED | Noted: 2021-01-11 | Resolved: 2021-04-15

## 2021-04-15 PROBLEM — J45.909 ASTHMA: Chronic | Status: RESOLVED | Noted: 2020-10-15 | Resolved: 2021-04-15

## 2021-04-15 PROBLEM — R51.9 HEADACHE: Status: RESOLVED | Noted: 2017-11-10 | Resolved: 2021-04-15

## 2021-04-15 PROBLEM — D64.9 ANEMIA: Status: RESOLVED | Noted: 2018-06-07 | Resolved: 2021-04-15

## 2021-04-15 LAB
HBA1C MFR BLD: 5.8 % (ref 0–5.6)
INT CON NEG: ABNORMAL
INT CON POS: ABNORMAL

## 2021-04-15 PROCEDURE — 8041 PR SCP AHA: Performed by: INTERNAL MEDICINE

## 2021-04-15 PROCEDURE — 99214 OFFICE O/P EST MOD 30 MIN: CPT | Performed by: INTERNAL MEDICINE

## 2021-04-15 PROCEDURE — 83036 HEMOGLOBIN GLYCOSYLATED A1C: CPT | Performed by: INTERNAL MEDICINE

## 2021-04-15 ASSESSMENT — FIBROSIS 4 INDEX: FIB4 SCORE: 0.82

## 2021-04-15 NOTE — ASSESSMENT & PLAN NOTE
CHADS2 score of 1.  Patient currently taking aspirin 81 mg daily.  She denies chest pain shortness of breath or palpitation.

## 2021-04-15 NOTE — ASSESSMENT & PLAN NOTE
Chronic condition.  Patient followed by pulmonology service.  Patient is currently taking Breo Ellipta.  Stable

## 2021-04-15 NOTE — Clinical Note
This pt was seen this AM  Chart reviewed . Pt had chest CT done aug 2020 showed small nodules noted in both lungs>> I have ordered chest CT for f.u   Pls advised pt to call Radiology to schedule appt.

## 2021-04-15 NOTE — ASSESSMENT & PLAN NOTE
Chronic stable condition.  The patient is using Breo Ellipta.  She denies shortness of breath or wheezing

## 2021-04-15 NOTE — PROGRESS NOTES
CC: Follow-up hypertension  Lab test result      HPI: This is a 70 y.o. pt.  Pt's medical history is notable for:     Hyperglycemia  Recent blood test show glucose 144.  Point-of-care A1c done today in the office 5.8%.  Discussed with the patient about starting medication such as Metformin.  She is not interested in taking any medication at this time.  Patient would like to work on her diet and exercise and would like to lose some weight.    CKD (chronic kidney disease) stage 3, GFR 30-59 ml/min  Previous GFR 51  Recent lab test showed GFR above 60.  Advised the patient to avoid NSAIDs.    Paroxysmal atrial fibrillation (HCC)  CHADS2 score of 1.  Patient currently taking aspirin 81 mg daily.  She denies chest pain shortness of breath or palpitation.    Asthma  Chronic stable condition.  The patient is using Breo Ellipta.  She denies shortness of breath or wheezing    HTN (hypertension)  Chronic condition.  Patient takes Micardis daily.  No significant side effects reported.    Migraine  Patient is followed by neurology service every 6 months.  Presently she is taking amitriptyline and Ubrelvy.    Moderate persistent asthma  Chronic condition.  Patient followed by pulmonology service.  Patient is currently taking Breo Ellipta.  Stable    Mood disorder (HCC)  Chronic and stable condition.  Currently patient taking duloxetine.  No significant side effects reported.  The patient denies SI.    Vitamin D deficiency  Lab test showed low vitamin D level.  Advised the patient to take ergocalciferol 50,000 unit once a week    Pulmonary nodules  Chest CT scan August 2020 showed patient with bilateral pulmonary nodule.  We have order a follow-up chest CT scan for follow-up.          REVIEW OF SYSTEMS:     Constitutional:  no fever / chills   Neurologic: no headaches  Eyes: no changes in vision  ENT: no sore throat, no hearing loss  CV:  no chest pain, no palpitations  Pulmonary: no SOB, no cough          Allergies:  Clarithromycin, Ees [erythromycin], Levaquin, Pcn [penicillins], Shellfish allergy, Sumatriptan, and Trazodone    Current Outpatient Medications Ordered in Epic   Medication Sig Dispense Refill   • ergocalciferol (DRISDOL) 22746 UNIT capsule Take 1 capsule by mouth every 7 days. 15 capsule 3   • Fluticasone Furoate-Vilanterol (BREO ELLIPTA) 100-25 MCG/INH AEROSOL POWDER, BREATH ACTIVATED Inhale 1 Puff every day. Rinse mouth after use. 1 Each 5   • atorvastatin (LIPITOR) 10 MG Tab Take 1 tablet by mouth every day. 100 tablet 1   • DULoxetine (CYMBALTA) 60 MG Cap DR Particles delayed-release capsule TAKE 1 CAPSULE BY MOUTH ONCE DAILY 90 Cap 1   • amitriptyline (ELAVIL) 25 MG Tab TAKE 1 TABLET BY MOUTH ONCE DAILY AT BEDTIME 90 Tab 1   • telmisartan (MICARDIS) 40 MG Tab Take 1 tablet by mouth once daily 100 Tab 1   • Ubrogepant (UBRELVY) 50 MG Tab Take  by mouth 1 time daily as needed.     • DILTIAZem CD (CARDIZEM CD) 240 MG CAPSULE SR 24 HR Take 1 Cap by mouth every day. 90 Cap 3   • aspirin 81 MG EC tablet Take 81 mg by mouth every day.     • pantoprazole (PROTONIX) 40 MG Tablet Delayed Response Take 40 mg by mouth every day.     • albuterol 108 (90 Base) MCG/ACT Aero Soln inhalation aerosol Inhale 2 Puffs every 6 hours as needed for Shortness of Breath. (Patient not taking: Reported on 4/15/2021) 8.5 g 0   • ondansetron (ZOFRAN) 4 MG Tab tablet TAKE 1 TABLET BY MOUTH EVERY 4 HOURS AS NEEDED FOR  NAUSEA  AND  VOMITING 30 Tab 0   • fluticasone (FLOVENT HFA) 110 MCG/ACT Aerosol Inhale 2 Puffs by mouth 2 times a day. 1 Each 5   • SALINE NA Spray  in nose.     • Cetirizine HCl (ZYRTEC PO) Take  by mouth.       No current Epic-ordered facility-administered medications on file.       Past Medical, Social, and Family history reviewed and updated in EPIC     ------------------------------------------------------------------------------     PHYSICAL EXAM:   Vitals:    04/15/21 0928   BP: 126/88   Pulse: 76   Resp: 16   Temp:  36.4 °C (97.6 °F)   SpO2: 96%      Body mass index is 32.12 kg/m².         Constitutional: no acute distress  Neck: supple, no JVD  CV: heart RRR  Resp: normal effort, no wheezing or rales.  GI: abdomen soft, no obvious mass, no tenderness  Neuro: CN 2-12 grossly intact        -----------------------------------------------------------------------------    ASSESSMENT:   1. Migraine without status migrainosus, not intractable, unspecified migraine type     2. Moderate persistent asthma, unspecified whether complicated     3. Hyperglycemia  HEMOGLOBIN A1C    Basic Metabolic Panel    POCT Hemoglobin A1C    CANCELED: HEMOGLOBIN A1C   4. Vitamin D deficiency  VITAMIN D,25 HYDROXY   5. Screening for endocrine disorder  ALANINE AMINO-TRANS    TSH   6. Hyperlipidemia, unspecified hyperlipidemia type  Lipid Profile   7. Stage 3a chronic kidney disease     8. Paroxysmal atrial fibrillation (HCC)     9. Essential hypertension     10. Mood disorder (HCC)     11. Pulmonary nodules  CT-CHEST (THORAX) W/O           MEDICAL DECISION MAKING: DISCUSSION / STATUS / PLAN:    Lab test result discussed with the patient.  A1c 5.8%.  Discussed but the patient is not interested in taking any medication  Recommend diet and exercise.  The patient will try to lose some weight.  Continue to take current medications  Continue follow-up with neurologist pulmonologist psychiatrist and cardiologist         Return in about 6 months (around 10/15/2021).       PATIENT EDUCATION:  -If any problems should arise, patient was advised to contact our office or go to ER to be evaluated.      Please note that this dictation was created using voice recognition software. I have made every reasonable attempt to correct obvious errors, but it is possible there are errors of grammar and possibly content that I did not discover before finalizing the note.

## 2021-04-15 NOTE — ASSESSMENT & PLAN NOTE
Lab test showed low vitamin D level.  Advised the patient to take ergocalciferol 50,000 unit once a week

## 2021-04-15 NOTE — ASSESSMENT & PLAN NOTE
Recent blood test show glucose 144.  Point-of-care A1c done today in the office 5.8%.  Discussed with the patient about starting medication such as Metformin.  She is not interested in taking any medication at this time.  Patient would like to work on her diet and exercise and would like to lose some weight.

## 2021-04-15 NOTE — ASSESSMENT & PLAN NOTE
Patient is followed by neurology service every 6 months.  Presently she is taking amitriptyline and Ubrelvy.

## 2021-04-15 NOTE — ASSESSMENT & PLAN NOTE
Chest CT scan August 2020 showed patient with bilateral pulmonary nodule.  We have order a follow-up chest CT scan for follow-up.

## 2021-04-15 NOTE — ASSESSMENT & PLAN NOTE
Chronic and stable condition.  Currently patient taking duloxetine.  No significant side effects reported.  The patient denies SI.

## 2021-04-16 ENCOUNTER — PATIENT OUTREACH (OUTPATIENT)
Dept: HEALTH INFORMATION MANAGEMENT | Facility: OTHER | Age: 71
End: 2021-04-16

## 2021-04-16 NOTE — PROGRESS NOTES
SCHEDULED Comprehensive Geriatric Assessment   Tuesday, May 11, 2021  10:00 am (1 hour)  Dr. Khan - [address]

## 2021-04-21 ENCOUNTER — TELEPHONE (OUTPATIENT)
Dept: MEDICAL GROUP | Facility: PHYSICIAN GROUP | Age: 71
End: 2021-04-21

## 2021-04-21 ENCOUNTER — HOSPITAL ENCOUNTER (OUTPATIENT)
Dept: RADIOLOGY | Facility: MEDICAL CENTER | Age: 71
End: 2021-04-21
Attending: INTERNAL MEDICINE | Admitting: INTERNAL MEDICINE
Payer: MEDICARE

## 2021-04-21 DIAGNOSIS — R91.8 PULMONARY NODULES: ICD-10-CM

## 2021-04-21 PROCEDURE — 71250 CT THORAX DX C-: CPT | Mod: ME

## 2021-04-21 NOTE — TELEPHONE ENCOUNTER
Spoke with patient regarding chest CT results, repeat scan in 1 year, and follow-up with pulmonologist. No questions at this time.

## 2021-05-26 DIAGNOSIS — I10 HYPERTENSION, UNSPECIFIED TYPE: ICD-10-CM

## 2021-05-28 RX ORDER — TELMISARTAN 40 MG/1
TABLET ORAL
Qty: 100 TABLET | Refills: 0 | Status: SHIPPED | OUTPATIENT
Start: 2021-05-28 | End: 2021-09-10

## 2021-05-28 NOTE — TELEPHONE ENCOUNTER
Last appointment was on 04/12/2021   4/15/21 4/7/21 3/18/21   Blood Pressure  126/88 120/64 106/58

## 2021-06-07 RX ORDER — DILTIAZEM HYDROCHLORIDE 240 MG/1
240 CAPSULE, COATED, EXTENDED RELEASE ORAL DAILY
Qty: 90 CAPSULE | Refills: 0 | Status: SHIPPED | OUTPATIENT
Start: 2021-06-07 | End: 2021-09-10

## 2021-07-01 ENCOUNTER — APPOINTMENT (OUTPATIENT)
Dept: SLEEP MEDICINE | Facility: MEDICAL CENTER | Age: 71
End: 2021-07-01
Payer: MEDICARE

## 2021-07-01 RX ORDER — DULOXETIN HYDROCHLORIDE 60 MG/1
CAPSULE, DELAYED RELEASE ORAL
Qty: 90 CAPSULE | Refills: 0 | Status: SHIPPED | OUTPATIENT
Start: 2021-07-01 | End: 2021-10-06

## 2021-07-14 ENCOUNTER — OFFICE VISIT (OUTPATIENT)
Dept: MEDICAL GROUP | Facility: PHYSICIAN GROUP | Age: 71
End: 2021-07-14
Payer: MEDICARE

## 2021-07-14 VITALS
HEART RATE: 96 BPM | OXYGEN SATURATION: 97 % | BODY MASS INDEX: 31.34 KG/M2 | HEIGHT: 61 IN | DIASTOLIC BLOOD PRESSURE: 86 MMHG | TEMPERATURE: 97.8 F | WEIGHT: 166 LBS | SYSTOLIC BLOOD PRESSURE: 124 MMHG | RESPIRATION RATE: 16 BRPM

## 2021-07-14 DIAGNOSIS — E78.5 HYPERLIPIDEMIA, UNSPECIFIED HYPERLIPIDEMIA TYPE: Chronic | ICD-10-CM

## 2021-07-14 DIAGNOSIS — G43.909 MIGRAINE WITHOUT STATUS MIGRAINOSUS, NOT INTRACTABLE, UNSPECIFIED MIGRAINE TYPE: Chronic | ICD-10-CM

## 2021-07-14 DIAGNOSIS — J45.40 MODERATE PERSISTENT ASTHMA, UNSPECIFIED WHETHER COMPLICATED: Chronic | ICD-10-CM

## 2021-07-14 DIAGNOSIS — I10 ESSENTIAL HYPERTENSION: Chronic | ICD-10-CM

## 2021-07-14 PROCEDURE — 99214 OFFICE O/P EST MOD 30 MIN: CPT | Performed by: INTERNAL MEDICINE

## 2021-07-14 ASSESSMENT — FIBROSIS 4 INDEX: FIB4 SCORE: 0.83

## 2021-07-14 NOTE — ASSESSMENT & PLAN NOTE
Chronic stable condition.  Patient see pulmonologist on a regular basis she is currently taking Breo Ellipta and albuterol as needed.  Currently denies shortness of breath wheezing or significant cough.

## 2021-07-14 NOTE — ASSESSMENT & PLAN NOTE
Chronic condition for the patient being treated with amitriptyline and Ubrelvy.  The patient follow by neurology service.

## 2021-07-14 NOTE — ASSESSMENT & PLAN NOTE
This is a chronic condition.  The patient presently taking diltiazem and Micardis.  No significant side effects reported.  Patient denies chest pain shortness of breath.  No palpitations lightheadedness headache change in vision or paresthesia.

## 2021-07-14 NOTE — PROGRESS NOTES
CC: Follow-up hypertension  Lab results      HPI: This is a 71 y.o. pt.  Pt's medical history is notable for:     HTN (hypertension)  This is a chronic condition.  The patient presently taking diltiazem and Micardis.  No significant side effects reported.  Patient denies chest pain shortness of breath.  No palpitations lightheadedness headache change in vision or paresthesia.    Hyperlipidemia  Chronic condition.  Being treated with atorvastatin.  Recent lab test results with the patient and her husbandResults for CONNIE NORIEGA (MRN [id number]) as of 7/14/2021 09:38   Ref. Range 4/8/2021 10:01   Cholesterol,Tot Latest Ref Range: 100 - 199 mg/dL 127   Triglycerides Latest Ref Range: 0 - 149 mg/dL 103   HDL Latest Ref Range: >=40 mg/dL 46   LDL Latest Ref Range: <100 mg/dL 60       Migraine  Chronic condition for the patient being treated with amitriptyline and Ubrelvy.  The patient follow by neurology service.    Moderate persistent asthma  Chronic stable condition.  Patient see pulmonologist on a regular basis she is currently taking Breo Ellipta and albuterol as needed.  Currently denies shortness of breath wheezing or significant cough.          REVIEW OF SYSTEMS:     Constitutional:  no fever / chills   Eyes: no changes in vision  ENT: no sore throat, no hearing loss  CV:  no chest pain, no palpitations  Pulmonary: no SOB, no cough          Allergies: Clarithromycin, Ees [erythromycin], Levaquin, Pcn [penicillins], Shellfish allergy, Sumatriptan, and Trazodone    Current Outpatient Medications Ordered in Epic   Medication Sig Dispense Refill   • DULoxetine (CYMBALTA) 60 MG Cap DR Particles delayed-release capsule Take 1 capsule by mouth once daily 90 capsule 0   • DILTIAZem CD (CARDIZEM CD) 240 MG CAPSULE SR 24 HR Take 1 capsule by mouth every day. 90 capsule 0   • telmisartan (MICARDIS) 40 MG Tab Take 1 tablet by mouth once daily 100 tablet 0   • ergocalciferol (DRISDOL) 32248 UNIT capsule Take 1 capsule by  "mouth every 7 days. 15 capsule 3   • Fluticasone Furoate-Vilanterol (BREO ELLIPTA) 100-25 MCG/INH AEROSOL POWDER, BREATH ACTIVATED Inhale 1 Puff every day. Rinse mouth after use. 1 Each 5   • atorvastatin (LIPITOR) 10 MG Tab Take 1 tablet by mouth every day. 100 tablet 1   • ondansetron (ZOFRAN) 4 MG Tab tablet TAKE 1 TABLET BY MOUTH EVERY 4 HOURS AS NEEDED FOR  NAUSEA  AND  VOMITING 30 Tab 0   • amitriptyline (ELAVIL) 25 MG Tab TAKE 1 TABLET BY MOUTH ONCE DAILY AT BEDTIME 90 Tab 1   • fluticasone (FLOVENT HFA) 110 MCG/ACT Aerosol Inhale 2 Puffs by mouth 2 times a day. 1 Each 5   • Ubrogepant (UBRELVY) 50 MG Tab Take  by mouth 1 time daily as needed.     • SALINE NA Spray  in nose.     • aspirin 81 MG EC tablet Take 81 mg by mouth every day.     • pantoprazole (PROTONIX) 40 MG Tablet Delayed Response Take 40 mg by mouth every day.       No current [facility]-ordered facility-administered medications on file.       Past Medical, Social, and Family history reviewed and updated in EPIC     ------------------------------------------------------------------------------     PHYSICAL EXAM:   Vitals:    07/14/21 0923   BP: 124/86   Pulse: 96   Resp: 16   Temp: 36.6 °C (97.8 °F)   SpO2: 97%        Vitals:    07/14/21 0923   BP: 124/86   Weight: 75.3 kg (166 lb)   Height: 1.549 m (5' 1\")         Body mass index is 31.37 kg/m².    Constitutional: no acute distress  CV: heart RRR  Resp: normal effort, no wheezing or rales.  GI: abdomen soft, no obvious mass, no tenderness  Neuro: CN 2-12 grossly intact        -----------------------------------------------------------------------------    ASSESSMENT:   1. Essential hypertension     2. Hyperlipidemia, unspecified hyperlipidemia type     3. Migraine without status migrainosus, not intractable, unspecified migraine type     4. Moderate persistent asthma, unspecified whether complicated             MEDICAL DECISION MAKING: DISCUSSION / STATUS / PLAN:    BP stable.  We will continue with " current management.  Recommend sodium restriction.    Hyperlipidemia.  Lab test result discussed with patient.  Stable.  Continue with atorvastatin.    Migraine.  Chronic condition.  Stable continue follow-up with neurology service.  Continue with current management    Asthma.  Currently asymptomatic.  Continue to monitor     Return in about 6 months (around 1/14/2022).  Labs prior    -If any problems should arise, patient was advised to contact our office or go to ER to be evaluated.    Please note that this dictation was created using voice recognition software. I have made every reasonable attempt to correct obvious errors, but it is possible there are errors of grammar and possibly content that I did not discover before finalizing the note.

## 2021-07-14 NOTE — ASSESSMENT & PLAN NOTE
Chronic condition.  Being treated with atorvastatin.  Recent lab test results with the patient and her husbandResults for LEANN CONNIE OWEN (MRN [id number]) as of 7/14/2021 09:38   Ref. Range 4/8/2021 10:01   Cholesterol,Tot Latest Ref Range: 100 - 199 mg/dL 127   Triglycerides Latest Ref Range: 0 - 149 mg/dL 103   HDL Latest Ref Range: >=40 mg/dL 46   LDL Latest Ref Range: <100 mg/dL 60

## 2021-09-07 DIAGNOSIS — I10 HYPERTENSION, UNSPECIFIED TYPE: ICD-10-CM

## 2021-09-10 RX ORDER — TELMISARTAN 40 MG/1
TABLET ORAL
Qty: 100 TABLET | Refills: 0 | Status: SHIPPED | OUTPATIENT
Start: 2021-09-10 | End: 2021-12-22 | Stop reason: SDUPTHER

## 2021-09-10 RX ORDER — ONDANSETRON 4 MG/1
4 TABLET, FILM COATED ORAL EVERY 8 HOURS PRN
Qty: 30 TABLET | Refills: 1 | Status: SHIPPED | OUTPATIENT
Start: 2021-09-10 | End: 2021-09-20

## 2021-09-10 RX ORDER — DILTIAZEM HYDROCHLORIDE 240 MG/1
CAPSULE, COATED, EXTENDED RELEASE ORAL
Qty: 90 CAPSULE | Refills: 0 | Status: SHIPPED | OUTPATIENT
Start: 2021-09-10 | End: 2021-12-09 | Stop reason: SDUPTHER

## 2021-09-28 ENCOUNTER — SLEEP CENTER VISIT (OUTPATIENT)
Dept: SLEEP MEDICINE | Facility: MEDICAL CENTER | Age: 71
End: 2021-09-28
Payer: MEDICARE

## 2021-09-28 VITALS
SYSTOLIC BLOOD PRESSURE: 118 MMHG | RESPIRATION RATE: 16 BRPM | OXYGEN SATURATION: 95 % | WEIGHT: 166 LBS | DIASTOLIC BLOOD PRESSURE: 70 MMHG | BODY MASS INDEX: 31.34 KG/M2 | HEART RATE: 100 BPM | HEIGHT: 61 IN

## 2021-09-28 DIAGNOSIS — I10 ESSENTIAL HYPERTENSION: Chronic | ICD-10-CM

## 2021-09-28 DIAGNOSIS — G47.33 OSA (OBSTRUCTIVE SLEEP APNEA): Chronic | ICD-10-CM

## 2021-09-28 DIAGNOSIS — Z78.9 NONSMOKER: ICD-10-CM

## 2021-09-28 DIAGNOSIS — R91.8 PULMONARY NODULES: Chronic | ICD-10-CM

## 2021-09-28 DIAGNOSIS — J45.40 MODERATE PERSISTENT ASTHMA, UNSPECIFIED WHETHER COMPLICATED: Chronic | ICD-10-CM

## 2021-09-28 PROCEDURE — 99214 OFFICE O/P EST MOD 30 MIN: CPT | Performed by: NURSE PRACTITIONER

## 2021-09-28 RX ORDER — ONDANSETRON 4 MG/1
TABLET, FILM COATED ORAL
COMMUNITY
Start: 2021-09-23 | End: 2022-10-18

## 2021-09-28 RX ORDER — ZONISAMIDE 100 MG/1
100 CAPSULE ORAL DAILY
COMMUNITY

## 2021-09-28 ASSESSMENT — FIBROSIS 4 INDEX: FIB4 SCORE: 0.83

## 2021-09-28 NOTE — PROGRESS NOTES
Chief Complaint   Patient presents with   • Apnea     last seen 4/7/2021        HPI:  Augusta Rodriguez is a 71 y.o. year old female here today for follow-up on asthma and BRIANA.  Last OV 4/7/21     Today she presents with complaints of ongoing allergies that are year-round including occasional cough, postnasal drip at night and sneezing.  She was taking Zyrtec but did not feel was beneficial.  She uses saline spray to help relieve congestion.  She notes GERD controlled daily PPI.  She only uses her Breo on occasion and notes 3 times of use in the last month.  She still has Flovent on hand as well but not using.  She notes shortness of breath only when exerting herself and may be prior to bedtime if moving around the house and cleaning.  She generally uses her Breo if wheezing prior to bedtime.  She notes obtaining the Sally Loop mask since last office visit and tolerates this well to prevent headaches but it is too much pressure so she went back to her other mask.  She has been unable to tolerate the other mask because she feels it causes more headaches/migraines.  She recently obtained a daith piercing to both ears and feels this is also improved her migraines.  Compliance report notes 7 nights of use with greatest use under 4 hours and over AHI 2.2/h.  No significant mask leak.  Reviewed with patient.  CT scan of chest 4/21/2021 notes stable nodules and reviewed with patient.  Due to his size recommend annual follow-up and will then decide if further imaging is warranted.    PULM & SLEEP HX:  Never smoker.  PFT 7/20/20 notes FVC 2.91L or 116%, FEV1 2.37L or 121%, FEV1/FVC ratio 82, RV 97%, % and DLCO 97% without significant bronchodilator response. Reviewed with patient.  Currently using Flovent 2 puffs BID with BERKLEY prn.  CXR 3/15/21 notes hypoinflation w/o evidence of acute cardiopulmoary disease.  Updated CT scan pending, patient notes cost issues; CT chest 8/26/20 noted multiple nodules with larges  measuring 7mm with 6mos f/u CT recommended due 2/2021. Reviewed with patient.     PSG on 1/16/2020 showed an AHI of 30/carol saturation 82%. On CPAP at 16 cm H2O her AHI normalized to 1.9 with normal saturations. She was noted to have an elevated PLMS arousal index of 22.2. She remains on CPAP 16cm.     MMRC Grade: 0-1      ROS: As per HPI and otherwise negative if not stated.    Past Medical History:   Diagnosis Date   • Apnea, sleep    • Arrhythmia     afib   • Arthritis     osteo   • Asthma    • Atrial fibrillation (HCC)    • Back pain    • Bronchitis    • Chickenpox    • Constipation    • Cough    • Daytime sleepiness    • Dental disorder     upper dentures   • Earache    • Frequent urination    • Gasping for breath    • GERD (gastroesophageal reflux disease) 2/27/2010   • Uzbek measles    • Heartburn    • Hypertension    • Impaired fasting glucose 2/27/2010   • Incontinence of urine    • Indigestion    • Influenza    • Mumps    • Nasal drainage    • Nausea    • Osteopenia 2/24/2010   • Osteoporosis    • Other specified disorder of intestines     constipation r/t meds   • Restless leg syndrome    • Scarlet fever    • Shortness of breath    • Sleep apnea    • Snoring    • Tremor, essential 2/24/2010   • Urinary bladder disorder    • Wears glasses    • Wheezing    • Whooping cough        Past Surgical History:   Procedure Laterality Date   • LUMBAR LAMINECTOMY DISKECTOMY Bilateral 2/4/2017    Procedure: LUMBAR LAMINECTOMY DISKECTOMY POSTERIOR L4-S1 ;  Surgeon: Emil Hitchcock M.D.;  Location: [facility];  Service:    • CARPAL TUNNEL ENDOSCOPIC  11/17/2012    Performed by Heriberto Quiñones M.D. at SURGERY [facility]   • TRIGGER FINGER RELEASE  11/17/2012    Performed by Heriberto Quiñones M.D. at [facility]   • HIP ARTHROSCOPY  2/23/2009    Performed by SHERLYN CALVO at [facility]   • ACETABULAR OSTEOTOMY  2/23/2009    Performed by SHERLYN CALVO at SURGERY  [facility] ORS   • MASS EXCISION ORTHO  2/23/2009    Performed by SHERLYN CALVO at SURGERY [facility] ORS   • HIP ARTHROSCOPY  2005    done at [facility]   • HAJA BY LAPAROSCOPY  1997   • HYSTERECTOMY, TOTAL ABDOMINAL  1979    oopherectomy lacie   • BLADDER SUSPENSION      bladder sling   • CARPAL TUNNEL RELEASE     • HIP REPLACEMENT, TOTAL     • HYSTERECTOMY LAPAROSCOPY     • LAMINOTOMY     • PRIMARY C SECTION  1970/1975       Family History   Problem Relation Age of Onset   • GI Disease Father         Crohn's   • Heart Disease Father         First MI age 30   • Hypertension Father    • Stroke Father    • Hypertension Other    • Cancer Brother         ESOPHAGEAL CANCER       Social History     Socioeconomic History   • Marital status:      Spouse name: Not on file   • Number of children: Not on file   • Years of education: Not on file   • Highest education level: Not on file   Occupational History   • Not on file   Tobacco Use   • Smoking status: Never Smoker   • Smokeless tobacco: Never Used   Vaping Use   • Vaping Use: Never used   Substance and Sexual Activity   • Alcohol use: Yes     Alcohol/week: 0.0 oz     Comment: Stopped drinking 45 days ago 7/12/17 Going to    • Drug use: No   • Sexual activity: Never   Other Topics Concern   • Not on file   Social History Narrative   • Not on file     Social Determinants of Health     Financial Resource Strain:    • Difficulty of Paying Living Expenses:    Food Insecurity:    • Worried About Running Out of Food in the Last Year:    • Ran Out of Food in the Last Year:    Transportation Needs:    • Lack of Transportation (Medical):    • Lack of Transportation (Non-Medical):    Physical Activity:    • Days of Exercise per Week:    • Minutes of Exercise per Session:    Stress:    • Feeling of Stress :    Social Connections:    • Frequency of Communication with Friends and Family:    • Frequency of Social Gatherings with Friends and Family:    • Attends [employer]  "Services:    • Active Member of Clubs or Organizations:    • Attends Club or Organization Meetings:    • Marital Status:    Intimate Partner Violence:    • Fear of Current or Ex-Partner:    • Emotionally Abused:    • Physically Abused:    • Sexually Abused:        Allergies as of 09/28/2021 - Reviewed 09/28/2021   Allergen Reaction Noted   • Clarithromycin  08/13/2007   • Ees [erythromycin]  08/13/2007   • Levaquin  11/10/2017   • Pcn [penicillins]  08/13/2007   • Shellfish allergy  01/20/2017   • Sumatriptan Swelling 02/02/2015   • Trazodone Swelling 02/16/2009        Vitals:  /70 (BP Location: Left arm, Patient Position: Sitting, BP Cuff Size: Adult)   Pulse 100   Resp 16   Ht 1.549 m (5' 1\")   Wt 75.3 kg (166 lb)   SpO2 95%     Current medications as of today   Current Outpatient Medications   Medication Sig Dispense Refill   • zonisamide (ZONEGRAN) 100 MG Cap Take 100 mg by mouth every day.     • DILTIAZem CD (CARDIZEM CD) 240 MG CAPSULE SR 24 HR Take 1 capsule by mouth once daily 90 Capsule 0   • telmisartan (MICARDIS) 40 MG Tab Take 1 tablet by mouth once daily 100 Tablet 0   • DULoxetine (CYMBALTA) 60 MG Cap DR Particles delayed-release capsule Take 1 capsule by mouth once daily 90 capsule 0   • Fluticasone Furoate-Vilanterol (BREO ELLIPTA) 100-25 MCG/INH AEROSOL POWDER, BREATH ACTIVATED Inhale 1 Puff every day. Rinse mouth after use. 1 Each 5   • atorvastatin (LIPITOR) 10 MG Tab Take 1 tablet by mouth every day. 100 tablet 1   • amitriptyline (ELAVIL) 25 MG Tab TAKE 1 TABLET BY MOUTH ONCE DAILY AT BEDTIME 90 Tab 1   • fluticasone (FLOVENT HFA) 110 MCG/ACT Aerosol Inhale 2 Puffs by mouth 2 times a day. 1 Each 5   • SALINE NA Spray  in nose.     • aspirin 81 MG EC tablet Take 81 mg by mouth every day.     • pantoprazole (PROTONIX) 40 MG Tablet Delayed Response Take 40 mg by mouth every day.     • ondansetron (ZOFRAN) 4 MG Tab tablet TAKE 1 TABLET BY MOUTH ONCE DAILY AS NEEDED FOR NAUSEA       No " current facility-administered medications for this visit.         Physical Exam:   Gen:           Alert and oriented, No apparent distress. Mood and affect appropriate, normal interaction with examiner.  Eyes:          PERRL, EOM intact, sclere white, conjunctive moist.  Ears:          Not examined.   Hearing:     Grossly intact.  Nose:          Normal, no lesions or deformities.  Dentition:    Mask.  Oropharynx:   Mask.  Mallampati Classification: mask  Neck:        Supple, trachea midline, no masses.  Respiratory Effort: No intercostal retractions or use of accessory muscles.   Lung Auscultation:      Clear to auscultation bilaterally; no rales, rhonchi or wheezing.  CV:            Regular rate and rhythm. No murmurs, rubs or gallops.  Abd:           Not examined.   Lymphadenopathy: Not examined.  Gait and Station: Normal.  Digits and Nails: No clubbing, cyanosis, petechiae, or nodes.   Cranial Nerves: II-XII grossly intact.  Skin:        No rashes, lesions or ulcers noted.               Ext:           No cyanosis or edema.      Assessment:  1. Moderate persistent asthma, unspecified whether complicated  PULMONARY FUNCTION TESTS -Test requested: Complete Pulmonary Function Test; Include MIPS/MEPS? No   2. Pulmonary nodules     3. BRIANA (obstructive sleep apnea)  DME Other   4. Essential hypertension     5. BMI 31.0-31.9,adult  Height And Weight   6. Nonsmoker         Immunizations:    Flu:declines  Pneumovax 23:declines  Prevnar 13: declines  COVID-19: 4/30/21, 4/2/21    Plan:  1.  Patient does have occasional wheezing going on with environmental allergies.  Recommend treating environmental allergies with Allegra daily followed by Flonase as needed.  Reviewed importance of avoiding nosebleeds and she does have a history of cauterization of her nose.  She will start using Breo 1 puff prior to bedtime.  2.  CT scan of chest due April 2022  3.  Continue CPAP nightly to help with management of headaches/migraines  DME  other; pressure adjusted to auto CPAP 8 to 12 cm with humidifier at 2  4.  Encourage weight loss to diet and exercise   5.  Follow-up with primary care for the health concerns ice   6.  Follow-up in 4 to 5 months with PFT/compliance report, sooner if needed.    Please note that this dictation was created using voice recognition software. I have made every reasonable attempt to correct obvious errors, but it is possible there are errors of grammar and possibly content that I did not discover before finalizing the note.

## 2021-09-28 NOTE — PATIENT INSTRUCTIONS
Start using Breo every night before bed, rinse mouth  Start using CPAP every night as much as possible  Turning down humidifier on CPAP  Ct scan of chest due 4/2022  Start allegra every AM with Flonase in the AM to control allergies - hold flonase if noticing nose bleeds/bloody drainage  Continue to moisturize nose

## 2021-10-05 ENCOUNTER — TELEPHONE (OUTPATIENT)
Dept: DERMATOLOGY | Facility: IMAGING CENTER | Age: 71
End: 2021-10-05

## 2021-10-05 NOTE — TELEPHONE ENCOUNTER
Received request via: Patient    Was the patient seen in the last year in this department? Yes    Does the patient have an active prescription (recently filled or refills available) for medication(s) requested? No refills available    Clotrimazole 1% cream  TAC 0.1% ointment

## 2021-10-06 RX ORDER — CLOTRIMAZOLE 1 %
1 CREAM (GRAM) TOPICAL 2 TIMES DAILY
Qty: 30 G | Refills: 1 | Status: SHIPPED | OUTPATIENT
Start: 2021-10-06 | End: 2021-10-18

## 2021-10-06 RX ORDER — DULOXETIN HYDROCHLORIDE 60 MG/1
CAPSULE, DELAYED RELEASE ORAL
Qty: 90 CAPSULE | Refills: 0 | Status: SHIPPED | OUTPATIENT
Start: 2021-10-06 | End: 2022-01-11 | Stop reason: SDUPTHER

## 2021-10-06 RX ORDER — TRIAMCINOLONE ACETONIDE 1 MG/G
1 OINTMENT TOPICAL 2 TIMES DAILY
Qty: 30 G | Refills: 1 | Status: SHIPPED | OUTPATIENT
Start: 2021-10-06 | End: 2021-10-18

## 2021-10-14 ENCOUNTER — TELEPHONE (OUTPATIENT)
Dept: MEDICAL GROUP | Facility: PHYSICIAN GROUP | Age: 71
End: 2021-10-14

## 2021-10-14 RX ORDER — DOCUSATE SODIUM 100 MG/1
100 CAPSULE, LIQUID FILLED ORAL 2 TIMES DAILY
Qty: 60 CAPSULE | Refills: 3 | Status: SHIPPED | OUTPATIENT
Start: 2021-10-14 | End: 2021-10-18

## 2021-10-18 ENCOUNTER — OFFICE VISIT (OUTPATIENT)
Dept: MEDICAL GROUP | Facility: PHYSICIAN GROUP | Age: 71
End: 2021-10-18
Payer: MEDICARE

## 2021-10-18 VITALS
HEIGHT: 61 IN | TEMPERATURE: 97.6 F | BODY MASS INDEX: 31.15 KG/M2 | DIASTOLIC BLOOD PRESSURE: 84 MMHG | OXYGEN SATURATION: 97 % | HEART RATE: 83 BPM | WEIGHT: 165 LBS | RESPIRATION RATE: 16 BRPM | SYSTOLIC BLOOD PRESSURE: 134 MMHG

## 2021-10-18 DIAGNOSIS — N18.31 STAGE 3A CHRONIC KIDNEY DISEASE: ICD-10-CM

## 2021-10-18 DIAGNOSIS — I48.0 PAROXYSMAL ATRIAL FIBRILLATION (HCC): ICD-10-CM

## 2021-10-18 DIAGNOSIS — E78.5 HYPERLIPIDEMIA, UNSPECIFIED HYPERLIPIDEMIA TYPE: ICD-10-CM

## 2021-10-18 DIAGNOSIS — I10 PRIMARY HYPERTENSION: ICD-10-CM

## 2021-10-18 DIAGNOSIS — J45.40 MODERATE PERSISTENT ASTHMA, UNSPECIFIED WHETHER COMPLICATED: Chronic | ICD-10-CM

## 2021-10-18 DIAGNOSIS — F39 MOOD DISORDER (HCC): Chronic | ICD-10-CM

## 2021-10-18 DIAGNOSIS — E55.9 VITAMIN D DEFICIENCY: ICD-10-CM

## 2021-10-18 DIAGNOSIS — R73.9 HYPERGLYCEMIA: ICD-10-CM

## 2021-10-18 PROCEDURE — 99214 OFFICE O/P EST MOD 30 MIN: CPT | Performed by: INTERNAL MEDICINE

## 2021-10-18 ASSESSMENT — FIBROSIS 4 INDEX: FIB4 SCORE: 0.83

## 2021-10-18 NOTE — ASSESSMENT & PLAN NOTE
This is a chronic condition.  Patient reported that she has stopped using Breo Ellipta due to high cost  Patient uses albuterol as needed.  Currently she is asymptomatic.

## 2021-10-18 NOTE — ASSESSMENT & PLAN NOTE
Chronic stable condition.  The patient is currently taking Micardis.  She also take diltiazem.  No significant side effects reported.

## 2021-10-18 NOTE — ASSESSMENT & PLAN NOTE
Chronic condition.  Previous GFR noted to be in the 50s.  Recommend blood test to be done for follow-up.  Advised the patient to avoid NSAIDs.

## 2021-10-18 NOTE — ASSESSMENT & PLAN NOTE
Chronic condition.  Patient followed by cardiology service.  CHADS score 1  Patient take aspirin 81 mg daily.

## 2021-10-18 NOTE — ASSESSMENT & PLAN NOTE
This is a chronic and stable condition.  The patient currently being treated with Cymbalta.  No significant side effects reported.  Patient denies SI.

## 2021-10-18 NOTE — PROGRESS NOTES
CC: Follow-up hypertension  Discuss medical conditions      HPI: This is a 71 y.o. pt.  Pt's medical history is notable for:     CKD (chronic kidney disease) stage 3, GFR 30-59 ml/min (HCC)  Chronic condition.  Previous GFR noted to be in the 50s.  Recommend blood test to be done for follow-up.  Advised the patient to avoid NSAIDs.    Mood disorder (HCC)  This is a chronic and stable condition.  The patient currently being treated with Cymbalta.  No significant side effects reported.  Patient denies SI.    Moderate persistent asthma  This is a chronic condition.  Patient reported that she has stopped using Breo Ellipta due to high cost  Patient uses albuterol as needed.  Currently she is asymptomatic.    HTN (hypertension)  Chronic stable condition.  The patient is currently taking Micardis.  She also take diltiazem.  No significant side effects reported.    Hyperglycemia  Chronic condition   the patient currently on diet therapy.  Lab tests ordered for follow-up    Paroxysmal atrial fibrillation (HCC)  Chronic condition.  Patient followed by cardiology service.  CHADS score 1  Patient take aspirin 81 mg daily.          REVIEW OF SYSTEMS:     Constitutional:  no fever / chills   Eyes: no changes in vision  ENT: no sore throat, no hearing loss  CV:  no chest pain, no palpitations  Pulmonary: no SOB, no cough          Allergies: Clarithromycin, Ees [erythromycin], Levaquin, Pcn [penicillins], Shellfish allergy, Sumatriptan, and Trazodone    Current Outpatient Medications Ordered in Epic   Medication Sig Dispense Refill   • DULoxetine (CYMBALTA) 60 MG Cap DR Particles delayed-release capsule Take 1 capsule by mouth once daily 90 Capsule 0   • zonisamide (ZONEGRAN) 100 MG Cap Take 100 mg by mouth every day.     • ondansetron (ZOFRAN) 4 MG Tab tablet TAKE 1 TABLET BY MOUTH ONCE DAILY AS NEEDED FOR NAUSEA     • DILTIAZem CD (CARDIZEM CD) 240 MG CAPSULE SR 24 HR Take 1 capsule by mouth once daily 90 Capsule 0   •  "telmisartan (MICARDIS) 40 MG Tab Take 1 tablet by mouth once daily 100 Tablet 0   • atorvastatin (LIPITOR) 10 MG Tab Take 1 tablet by mouth every day. 100 tablet 1   • amitriptyline (ELAVIL) 25 MG Tab TAKE 1 TABLET BY MOUTH ONCE DAILY AT BEDTIME 90 Tab 1   • fluticasone (FLOVENT HFA) 110 MCG/ACT Aerosol Inhale 2 Puffs by mouth 2 times a day. 1 Each 5   • SALINE NA Spray  in nose.     • aspirin 81 MG EC tablet Take 81 mg by mouth every day.     • pantoprazole (PROTONIX) 40 MG Tablet Delayed Response Take 40 mg by mouth every day.       No current [facility]-ordered facility-administered medications on file.       Past Medical, Social, and Family history reviewed and updated in EPIC     ------------------------------------------------------------------------------     PHYSICAL EXAM:   Vitals:    10/18/21 1028   BP: 134/84   Pulse: 83   Resp: 16   Temp: 36.4 °C (97.6 °F)   SpO2: 97%        Vitals:    10/18/21 1028   BP: 134/84   Weight: 74.8 kg (165 lb)   Height: 1.549 m (5' 1\")         Body mass index is 31.18 kg/m².    Constitutional: no acute distress  CV: heart RRR  Resp: normal effort, no wheezing or rales.  GI: abdomen soft, no obvious mass, no tenderness  Neuro: CN 2-12 grossly intact        -----------------------------------------------------------------------------    ASSESSMENT:   1. Stage 3a chronic kidney disease (HCC)     2. Primary hypertension     3. Hyperlipidemia, unspecified hyperlipidemia type  ALANINE AMINO-TRANS    Lipid Profile   4. Hyperglycemia  HEMOGLOBIN A1C    Basic Metabolic Panel   5. Vitamin D deficiency  VITAMIN D,25 HYDROXY   6. Mood disorder ([facility])     7. Moderate persistent asthma, unspecified whether complicated     8. Paroxysmal atrial fibrillation (HCC)             MEDICAL DECISION MAKING: DISCUSSION / STATUS / PLAN:    Overall patient is clinically stable  Med list reviewed and updated.  Lab tests ordered.  Advised the patient to continue follow-up with cardiology and pulmonology " services as directed.  Advised the patient to call back for the lab test result after few days.    Health maintenance strongly recommend flu shot pneumococcal vaccine and Tdap.  The patient refused.         -If any problems should arise, patient was advised to contact our office for followup or go to ER to be evaluated.    Please note that this dictation was created using voice recognition software. I have made every reasonable attempt to correct obvious errors, but it is possible there are errors of grammar and possibly content that I did not discover before finalizing the note.

## 2021-10-19 ENCOUNTER — HOSPITAL ENCOUNTER (OUTPATIENT)
Dept: LAB | Facility: MEDICAL CENTER | Age: 71
End: 2021-10-19
Attending: INTERNAL MEDICINE
Payer: MEDICARE

## 2021-10-19 DIAGNOSIS — R73.9 HYPERGLYCEMIA: ICD-10-CM

## 2021-10-19 DIAGNOSIS — E55.9 VITAMIN D DEFICIENCY: ICD-10-CM

## 2021-10-19 DIAGNOSIS — E78.5 HYPERLIPIDEMIA, UNSPECIFIED HYPERLIPIDEMIA TYPE: ICD-10-CM

## 2021-10-19 LAB
25(OH)D3 SERPL-MCNC: 42 NG/ML (ref 30–100)
ALT SERPL-CCNC: 9 U/L (ref 2–50)
ANION GAP SERPL CALC-SCNC: 10 MMOL/L (ref 7–16)
BUN SERPL-MCNC: 16 MG/DL (ref 8–22)
CALCIUM SERPL-MCNC: 9.2 MG/DL (ref 8.5–10.5)
CHLORIDE SERPL-SCNC: 106 MMOL/L (ref 96–112)
CHOLEST SERPL-MCNC: 141 MG/DL (ref 100–199)
CO2 SERPL-SCNC: 25 MMOL/L (ref 20–33)
CREAT SERPL-MCNC: 1 MG/DL (ref 0.5–1.4)
EST. AVERAGE GLUCOSE BLD GHB EST-MCNC: 117 MG/DL
FASTING STATUS PATIENT QL REPORTED: NORMAL
GLUCOSE SERPL-MCNC: 107 MG/DL (ref 65–99)
HBA1C MFR BLD: 5.7 % (ref 4–5.6)
HDLC SERPL-MCNC: 49 MG/DL
LDLC SERPL CALC-MCNC: 71 MG/DL
POTASSIUM SERPL-SCNC: 4.7 MMOL/L (ref 3.6–5.5)
SODIUM SERPL-SCNC: 141 MMOL/L (ref 135–145)
TRIGL SERPL-MCNC: 106 MG/DL (ref 0–149)

## 2021-10-19 PROCEDURE — 80061 LIPID PANEL: CPT

## 2021-10-19 PROCEDURE — 80048 BASIC METABOLIC PNL TOTAL CA: CPT

## 2021-10-19 PROCEDURE — 83036 HEMOGLOBIN GLYCOSYLATED A1C: CPT

## 2021-10-19 PROCEDURE — 84460 ALANINE AMINO (ALT) (SGPT): CPT

## 2021-10-19 PROCEDURE — 36415 COLL VENOUS BLD VENIPUNCTURE: CPT

## 2021-10-19 PROCEDURE — 82306 VITAMIN D 25 HYDROXY: CPT

## 2021-12-09 RX ORDER — DILTIAZEM HYDROCHLORIDE 240 MG/1
240 CAPSULE, COATED, EXTENDED RELEASE ORAL DAILY
Qty: 90 CAPSULE | Refills: 0 | Status: SHIPPED | OUTPATIENT
Start: 2021-12-09 | End: 2022-03-07

## 2021-12-22 DIAGNOSIS — I10 HYPERTENSION, UNSPECIFIED TYPE: ICD-10-CM

## 2021-12-23 RX ORDER — TELMISARTAN 40 MG/1
40 TABLET ORAL DAILY
Qty: 100 TABLET | Refills: 0 | Status: SHIPPED | OUTPATIENT
Start: 2021-12-23 | End: 2022-03-07

## 2021-12-23 RX ORDER — ATORVASTATIN CALCIUM 10 MG/1
10 TABLET, FILM COATED ORAL DAILY
Qty: 100 TABLET | Refills: 1 | Status: SHIPPED | OUTPATIENT
Start: 2021-12-23 | End: 2022-07-11

## 2021-12-31 ENCOUNTER — APPOINTMENT (OUTPATIENT)
Dept: RADIOLOGY | Facility: MEDICAL CENTER | Age: 71
End: 2021-12-31
Attending: EMERGENCY MEDICINE
Payer: MEDICARE

## 2021-12-31 ENCOUNTER — HOSPITAL ENCOUNTER (EMERGENCY)
Facility: MEDICAL CENTER | Age: 71
End: 2021-12-31
Attending: EMERGENCY MEDICINE
Payer: MEDICARE

## 2021-12-31 VITALS
TEMPERATURE: 97.9 F | BODY MASS INDEX: 31.12 KG/M2 | HEART RATE: 70 BPM | RESPIRATION RATE: 16 BRPM | DIASTOLIC BLOOD PRESSURE: 80 MMHG | OXYGEN SATURATION: 95 % | WEIGHT: 164.68 LBS | SYSTOLIC BLOOD PRESSURE: 114 MMHG

## 2021-12-31 DIAGNOSIS — J45.21 MILD INTERMITTENT ASTHMA WITH ACUTE EXACERBATION: ICD-10-CM

## 2021-12-31 DIAGNOSIS — J06.9 UPPER RESPIRATORY TRACT INFECTION, UNSPECIFIED TYPE: ICD-10-CM

## 2021-12-31 DIAGNOSIS — R05.9 COUGH: ICD-10-CM

## 2021-12-31 LAB
EKG IMPRESSION: NORMAL
FLUAV RNA SPEC QL NAA+PROBE: NEGATIVE
FLUBV RNA SPEC QL NAA+PROBE: NEGATIVE
RSV RNA SPEC QL NAA+PROBE: NEGATIVE
SARS-COV-2 RNA RESP QL NAA+PROBE: NOTDETECTED
SPECIMEN SOURCE: NORMAL

## 2021-12-31 PROCEDURE — 93005 ELECTROCARDIOGRAM TRACING: CPT

## 2021-12-31 PROCEDURE — 99283 EMERGENCY DEPT VISIT LOW MDM: CPT

## 2021-12-31 PROCEDURE — C9803 HOPD COVID-19 SPEC COLLECT: HCPCS | Performed by: EMERGENCY MEDICINE

## 2021-12-31 PROCEDURE — 71046 X-RAY EXAM CHEST 2 VIEWS: CPT

## 2021-12-31 PROCEDURE — 0241U HCHG SARS-COV-2 COVID-19 NFCT DS RESP RNA 4 TRGT MIC: CPT

## 2021-12-31 PROCEDURE — 93005 ELECTROCARDIOGRAM TRACING: CPT | Performed by: EMERGENCY MEDICINE

## 2021-12-31 RX ORDER — ALBUTEROL SULFATE 90 UG/1
2 AEROSOL, METERED RESPIRATORY (INHALATION) EVERY 6 HOURS PRN
Qty: 8.5 G | Refills: 0 | Status: SHIPPED | OUTPATIENT
Start: 2021-12-31 | End: 2022-08-17 | Stop reason: SDUPTHER

## 2021-12-31 ASSESSMENT — LIFESTYLE VARIABLES
DO YOU DRINK ALCOHOL: NO
DOES PATIENT WANT TO STOP DRINKING: NO

## 2021-12-31 ASSESSMENT — ENCOUNTER SYMPTOMS
COUGH: 1
FEVER: 0
SHORTNESS OF BREATH: 0
SPUTUM PRODUCTION: 0

## 2021-12-31 ASSESSMENT — FIBROSIS 4 INDEX: FIB4 SCORE: 0.87

## 2021-12-31 NOTE — ED TRIAGE NOTES
Chief Complaint   Patient presents with   • Cough     pain in back with coughing, dry cough     Pt denies fever or covid contacts

## 2021-12-31 NOTE — DISCHARGE INSTRUCTIONS
Return to the emergency department for worsening cough, shortness of breath, fever, or other concerns.  Follow-up with your doctor.  Continue your inhalers.

## 2021-12-31 NOTE — ED PROVIDER NOTES
ED Provider Note    Scribed for Lonnie Doyle M.D. by Angie Aquino. 12/31/2021, 9:37 AM.    Primary care provider: Rudy Parsons M.D.  Means of arrival: Walk in  History obtained from: patient   History limited by: none    CHIEF COMPLAINT  Chief Complaint   Patient presents with   • Cough     pain in back with coughing, dry cough       HPI  Augusta Rodriguez is a 71 y.o. female who presents to the Emergency Department cough.  The patient has a history of asthma and now has had a cough.  Cough is present for the last several days.  She has some achiness in her chest wall and back when she coughs.  No other chest pain or back pain.  She does not have pleuritic discomfort.  No chest pressure.  Denies sputum production, fever, or shortness of breath. Patient has a past medical history of A fib and asthma. She takes ASA 81 mg, telmisartan, and cardizem. Endorses being vaccinated against COVID. Denies any known sick contacts.  No history of PE or DVT.  No recent travel or immobilization.  She has been on albuterol in the past currently is out of albuterol.  She denies any known sick contacts.  Denies any Covid exposure.  He is vaccinated for COVID.    REVIEW OF SYSTEMS  Review of Systems   Constitutional: Negative for fever.   Respiratory: Positive for cough. Negative for sputum production and shortness of breath.    All other systems reviewed and are negative.      PAST MEDICAL HISTORY   has a past medical history of Apnea, sleep, Arrhythmia, Arthritis, Asthma, Atrial fibrillation (HCC), Back pain, Bronchitis, Chickenpox, Constipation, Cough, Daytime sleepiness, Dental disorder, Earache, Frequent urination, Gasping for breath, GERD (gastroesophageal reflux disease) (2/27/2010), [place] measles, Heartburn, Hypertension, Impaired fasting glucose (2/27/2010), Incontinence of urine, Indigestion, Influenza, Mumps, Nasal drainage, Nausea, Osteopenia (2/24/2010), Osteoporosis, Other specified disorder of intestines, Restless leg  syndrome, Scarlet fever, Shortness of breath, Sleep apnea, Snoring, Tremor, essential (2/24/2010), Urinary bladder disorder, Wears glasses, Wheezing, and Whooping cough.    SURGICAL HISTORY   has a past surgical history that includes bladder suspension; stephon by laparoscopy (1997); primary c section (1970/1975); hysterectomy, total abdominal (1979); hip arthroscopy (2/23/2009); acetabular osteotomy (2/23/2009); mass excision ortho (2/23/2009); carpal tunnel endoscopic (11/17/2012); trigger finger release (11/17/2012); lumbar laminectomy diskectomy (Bilateral, 2/4/2017); hip arthroscopy (2005); laminotomy; carpal tunnel release; hip replacement, total; and hysterectomy laparoscopy.    SOCIAL HISTORY  Social History     Tobacco Use   • Smoking status: Never Smoker   • Smokeless tobacco: Never Used   Vaping Use   • Vaping Use: Never used   Substance Use Topics   • Alcohol use: Yes     Alcohol/week: 0.0 oz     Comment: Stopped drinking 45 days ago 7/12/17 Going to AA   • Drug use: No      Social History     Substance and Sexual Activity   Drug Use No       FAMILY HISTORY  Family History   Problem Relation Age of Onset   • GI Disease Father         Crohn's   • Heart Disease Father         First MI age 30   • Hypertension Father    • Stroke Father    • Hypertension Other    • Cancer Brother         ESOPHAGEAL CANCER       CURRENT MEDICATIONS  Home Medications    **Home medications have not yet been reviewed for this encounter**     Medications are on the nurses notes and have been reviewed    ALLERGIES  Allergies   Allergen Reactions   • Clarithromycin      Swelling/Itching/Nausea   • Ees [Erythromycin]      Swelling/Itching/Nausea   • Levaquin      Muscle soreness    • Pcn [Penicillins]      Swelling/Itching/Nausea    • Shellfish Allergy      Swelling/Itching/Nausea   • Sumatriptan Swelling     Tongue swell   • Trazodone Swelling       PHYSICAL EXAM  VITAL SIGNS: /89   Pulse 96   Temp 36.3 °C (97.3 °F) (Temporal)    Resp 16   Wt 74.7 kg (164 lb 10.9 oz)   SpO2 97%   BMI 31.12 kg/m²   Vitals reviewed.  Constitutional: Well developed, Well nourished, No acute distress, Non-toxic appearance.   HENT: Normocephalic, Atraumatic, Bilateral external ears normal,   Eyes: PERRL, EOMI, Conjunctiva normal, No discharge.   Neck: Normal range of motion  Cardiovascular: Normal heart rate, Normal rhythm, No murmurs, No rubs, No gallops.   Thorax & Lungs: Normal breath sounds, No respiratory distress, No wheezing  Abdomen: Bowel sounds normal, Soft, No tenderness  Skin: Warm, Dry, No erythema, No rash.   Back: No tenderness, No CVA tenderness.   Musculoskeletal: Good range of motion in all major joints.  No asymmetric edema  Neurologic: Alert, No focal deficits noted.   Psychiatric: Affect normal    LABS  Results for orders placed or performed during the hospital encounter of 21   COV-2, FLU A/B, AND RSV BY PCR (2-4 HOURS CEPHEID): Collect NP swab in VTM    Specimen: Respirate   Result Value Ref Range    Influenza virus A RNA Negative Negative    Influenza virus B, PCR Negative Negative    RSV, PCR Negative Negative    SARS-CoV-2 by PCR NotDetected     SARS-CoV-2 Source NP Swab    EKG (NOW)   Result Value Ref Range    Report       [facility] Emergency Dept.    Test Date:  2021  Pt Name:    CONNIE NORIEGA                    Department: ER  MRN:        [id number]                      Room:       [facility]  Gender:     Female                       Technician: [id number]  :        [id number]                   Requested By:ER TRIAGE PROTOCOL  Order #:    [id number]                    Reading MD: DARÍO SAWYER. AMD    Measurements  Intervals                                Axis  Rate:       74                           P:          36  AZ:         147                          QRS:        -13  QRSD:       101                          T:          65  QT:         396  QTc:        440    Interpretive Statements  Sinus  rhythm  Borderline T abnormalities, anterior leads  Compared to ECG 03/15/2021 14:25:28  ST (T wave) deviation no longer present  Possible ischemia no longer present  T-wave abnormality still present  Electronically Signed On 12- 11:58:16 PST by DARÍO SAWYER. [facility]       All labs reviewed by me.    EKG Interpretation  Interpreted by me as above    RADIOLOGY  DX-CHEST-2 VIEWS   Final Result      No acute cardiopulmonary disease.        The radiologist's interpretation of all radiological studies have been reviewed by me.    COURSE & MEDICAL DECISION MAKING  Pertinent Labs & Imaging studies reviewed. (See chart for details)    Chart is reviewed for baseline labs and previous work-up for comparison.  Including imaging, hospitalization records and labs.    9:37 AM Patient seen and examined at bedside. The patient presents with cough, and the differential diagnosis includes but is not limited to COVID-19, pneumonia, bronchitis, asthma exacerbation. We will obtain a chest x-ray and viral testing. Ordered for chest x-ray, CoV-2, Flu, RSV, and EKG to evaluate.      12:02 PM - Patient was reevaluated at bedside. Discussed lab and radiology results with the patient and informed them that her chest x-ray, flu, RSV, and COVID are negative. I informed her that her symptoms are still likely related to a viral illness.  I have reviewed her chart and she has had similar process in the past that was more remarkable sounding and she had bronchospasm at that time.  She was treated with steroids and an albuterol inhaler.  At this point I will hear any wheezes but her cough certainly could be from bronchospasm.  At this point I do not see any indication for antibiotics is not febrile, or hypoxemic.  I do not hear any crackles on her chest exam and her chest x-ray is reassuring.  Recommended rest and hydration. Discussed return precautions. Patient will be discharged at this time. She verbalizes agreement with discharge and plan  of care.     I did refill her albuterol metered-dose inhaler.  Questions are answered, she is agreeable with plan.  She is discharged in good condition.    The patient will return for new or worsening symptoms and is stable at the time of discharge.    DISPOSITION:  Patient will be discharged home in stable condition.    FOLLOW UP:  Rudy Parsons M.D.  [address] [facility] [address]  [phone]            FINAL IMPRESSION  1. Cough    2. Upper respiratory tract infection, unspecified type    3. Mild intermittent asthma with acute exacerbation          Angie VILLEDA (Scribtravon), am scribing for, and in the presence of, Lonnie Doyle M.D..    Electronically signed by: Angie Aquino (Natalyibtravon), 12/31/2021    Lonnie VILLEDA M.D. personally performed the services described in this documentation, as scribed by Angie Aquino in my presence, and it is both accurate and complete.    The note accurately reflects work and decisions made by me.  Lonnie Doyle M.D.  12/31/2021  3:58 PM

## 2021-12-31 NOTE — ED NOTES
Discharge instructions given, pt verbalized understanding.  Prescription instructions given & understands where to  prescription.  A&ox4.  VSS.  Ambulates out of ER with friend.

## 2022-01-11 RX ORDER — DULOXETIN HYDROCHLORIDE 60 MG/1
60 CAPSULE, DELAYED RELEASE ORAL DAILY
Qty: 90 CAPSULE | Refills: 0 | Status: SHIPPED | OUTPATIENT
Start: 2022-01-11 | End: 2022-03-07

## 2022-01-19 ENCOUNTER — OFFICE VISIT (OUTPATIENT)
Dept: MEDICAL GROUP | Facility: PHYSICIAN GROUP | Age: 72
End: 2022-01-19
Payer: MEDICARE

## 2022-01-19 VITALS
BODY MASS INDEX: 30.85 KG/M2 | HEART RATE: 82 BPM | RESPIRATION RATE: 18 BRPM | HEIGHT: 61 IN | SYSTOLIC BLOOD PRESSURE: 140 MMHG | DIASTOLIC BLOOD PRESSURE: 82 MMHG | OXYGEN SATURATION: 97 % | TEMPERATURE: 97.4 F | WEIGHT: 163.4 LBS

## 2022-01-19 DIAGNOSIS — R73.03 PREDIABETES: ICD-10-CM

## 2022-01-19 DIAGNOSIS — I10 PRIMARY HYPERTENSION: Chronic | ICD-10-CM

## 2022-01-19 DIAGNOSIS — I48.0 PAROXYSMAL ATRIAL FIBRILLATION (HCC): ICD-10-CM

## 2022-01-19 DIAGNOSIS — J45.40 MODERATE PERSISTENT ASTHMA, UNSPECIFIED WHETHER COMPLICATED: Chronic | ICD-10-CM

## 2022-01-19 DIAGNOSIS — Z12.31 ENCOUNTER FOR SCREENING MAMMOGRAM FOR MALIGNANT NEOPLASM OF BREAST: ICD-10-CM

## 2022-01-19 DIAGNOSIS — N18.31 STAGE 3A CHRONIC KIDNEY DISEASE: ICD-10-CM

## 2022-01-19 DIAGNOSIS — E78.5 HYPERLIPIDEMIA, UNSPECIFIED HYPERLIPIDEMIA TYPE: ICD-10-CM

## 2022-01-19 DIAGNOSIS — L98.9 SKIN LESION: ICD-10-CM

## 2022-01-19 DIAGNOSIS — F39 MOOD DISORDER (HCC): Chronic | ICD-10-CM

## 2022-01-19 DIAGNOSIS — J45.20 MILD INTERMITTENT ASTHMA WITHOUT COMPLICATION: ICD-10-CM

## 2022-01-19 DIAGNOSIS — E78.2 MIXED HYPERLIPIDEMIA: ICD-10-CM

## 2022-01-19 PROCEDURE — 8041 PR SCP AHA: Performed by: INTERNAL MEDICINE

## 2022-01-19 PROCEDURE — 99214 OFFICE O/P EST MOD 30 MIN: CPT | Performed by: INTERNAL MEDICINE

## 2022-01-19 RX ORDER — BENZONATATE 100 MG/1
100 CAPSULE ORAL 3 TIMES DAILY PRN
Qty: 60 CAPSULE | Refills: 0 | Status: SHIPPED | OUTPATIENT
Start: 2022-01-19 | End: 2022-04-27

## 2022-01-19 ASSESSMENT — FIBROSIS 4 INDEX: FIB4 SCORE: 0.87

## 2022-01-19 ASSESSMENT — PATIENT HEALTH QUESTIONNAIRE - PHQ9: CLINICAL INTERPRETATION OF PHQ2 SCORE: 0

## 2022-01-19 NOTE — PROGRESS NOTES
PRIMARY CARE CLINIC VISIT  Chief Complaint   Patient presents with   • Follow-Up     Follow-up hypertension/prediabetes    History of Present Illness     Mood disorder (HCC)  This is a chronic condition.  The patient currently taking duloxetine.  Stable condition per patient report.  No significant side effects reported.    Paroxysmal atrial fibrillation (HCC)  This is a chronic condition.  Patient followed by cardiology service.  CHADS2 score 1.  The patient currently taking aspirin daily.  Patient denies chest pain shortness of breath palpitation or near syncope.    CKD (chronic kidney disease) stage 3, GFR 30-59 ml/min (HCC)  Chronic condition.  Baseline GFR in the 50s.  Lab tests ordered for follow-up.    Prediabetes  Chronic condition.  The patient diet therapy.  Lab test requested for follow-up.    Moderate persistent asthma  This is a chronic condition.  Patient was seen in the ER recently due to recurrent coughing.  Chest x-ray done which was unremarkable.  Patient also had several testing done including influenza, RSV and COVID which were negative.  Is currently taking Mucinex as needed.  Patient denies fever chills shortness of breath or wheezing.  Patient stated that she has pending appointment to follow-up with pulmonologist.    HTN (hypertension)  Chronic condition.    The patient is currently taking Micardis.  She also takes diltiazem daily.  No significant side effects reported.      Current Outpatient Medications on File Prior to Visit   Medication Sig Dispense Refill   • DULoxetine (CYMBALTA) 60 MG Cap DR Particles delayed-release capsule Take 1 Capsule by mouth every day. 90 Capsule 0   • albuterol 108 (90 Base) MCG/ACT Aero Soln inhalation aerosol Inhale 2 Puffs every 6 hours as needed for Shortness of Breath. 8.5 g 0   • telmisartan (MICARDIS) 40 MG Tab Take 1 Tablet by mouth every day. 100 Tablet 0   • atorvastatin (LIPITOR) 10 MG Tab Take 1 Tablet by mouth every day. 100 Tablet 1   • DILTIAZem  "CD (CARDIZEM CD) 240 MG CAPSULE SR 24 HR Take 1 Capsule by mouth every day. 90 Capsule 0   • zonisamide (ZONEGRAN) 100 MG Cap Take 100 mg by mouth every day.     • ondansetron (ZOFRAN) 4 MG Tab tablet TAKE 1 TABLET BY MOUTH ONCE DAILY AS NEEDED FOR NAUSEA     • amitriptyline (ELAVIL) 25 MG Tab TAKE 1 TABLET BY MOUTH ONCE DAILY AT BEDTIME 90 Tab 1   • fluticasone (FLOVENT HFA) 110 MCG/ACT Aerosol Inhale 2 Puffs by mouth 2 times a day. 1 Each 5   • SALINE NA Spray  in nose.     • aspirin 81 MG EC tablet Take 81 mg by mouth every day.     • pantoprazole (PROTONIX) 40 MG Tablet Delayed Response Take 40 mg by mouth every day.       No current facility-administered medications on file prior to visit.        Allergies: Clarithromycin, Ees [erythromycin], Levaquin, Pcn [penicillins], Shellfish allergy, Sumatriptan, and Trazodone    ROS  As per HPI above. All other systems reviewed and negative.      Past Medical, Social, and Family history reviewed and updated in EPIC     Objective     /82 (BP Location: Left arm, Patient Position: Sitting, BP Cuff Size: Adult)   Pulse 82   Temp 36.3 °C (97.4 °F) (Temporal)   Resp 18   Ht 1.549 m (5' 1\")   Wt 74.1 kg (163 lb 6.4 oz)   SpO2 97%    Body mass index is 30.87 kg/m².    General: alert and oriented  Cardiovascular: regular rate and rhythm  Pulmonary: lungs : no wheezing   Gastrointestinal: BS present. No obvious mass noted          Assessment and Plan     1. Encounter for screening mammogram for malignant neoplasm of breast    - MA-SCREENING MAMMO BILAT W/TOMOSYNTHESIS W/CAD; Future    2. Mood disorder (HCC)  Chronic stable condition.  Continue with duloxetine.  No indication for behavioral health referral.  Continue to monitor.    3. Paroxysmal atrial fibrillation (HCC)  This is a chronic condition.  Patient asymptomatic.  Continue aspirin.  Continue follow-up with cardiology service as directed.    4. Stage 3a chronic kidney disease (HCC)  Chronic condition.  Lab " tests ordered for follow-up.  Advised the patient to avoid NSAIDs.    5. Primary hypertension  Stable condition.  Continue with diltiazem and telmisartan    6. Mild intermittent asthma without complication  Chronic condition.  Prescribed Tessalon 100 mg 3 times daily as needed for cough.  Potential side effect of medication discussed with the patient.  Advised the patient to follow-up with pulmonologist    7. Skin lesion    - Referral to Dermatology    8. Prediabetes  Chronic condition.  Lab test requested.  Recommend low sweet low-carb diet and regular exercise activity.  Also recommended patient to try to lose weight.  - HEMOGLOBIN A1C; Future  - Basic Metabolic Panel; Future    9. Mixed hyperlipidemia  Lipid panel ordered.  Continue with atorvastatin  - ALANINE AMINO-TRANS; Future  - Lipid Profile; Future                Rec pt to follow up: 4 months    -If any problems should arise, patient was advised to contact our office for followup or go to ER to be evaluated.                 Please note that this dictation was created using voice recognition software. I have made every reasonable attempt to correct obvious errors but there may be errors of grammar and content that I may have overlooked prior to finalization of this note.      Rudy Parsons MD  Internal Medicine  [facility] care [facility]

## 2022-01-19 NOTE — ASSESSMENT & PLAN NOTE
This is a chronic condition.  The patient currently taking duloxetine.  Stable condition per patient report.  No significant side effects reported.

## 2022-01-19 NOTE — ASSESSMENT & PLAN NOTE
This is a chronic condition.  Patient was seen in the ER recently due to recurrent coughing.  Chest x-ray done which was unremarkable.  Patient also had several testing done including influenza, RSV and COVID which were negative.  Is currently taking Mucinex as needed.  Patient denies fever chills shortness of breath or wheezing.  Patient stated that she has pending appointment to follow-up with pulmonologist.

## 2022-01-19 NOTE — ASSESSMENT & PLAN NOTE
This is a chronic condition.  Patient followed by cardiology service.  CHADS2 score 1.  The patient currently taking aspirin daily.  Patient denies chest pain shortness of breath palpitation or near syncope.

## 2022-01-19 NOTE — ASSESSMENT & PLAN NOTE
Chronic condition.    The patient is currently taking Micardis.  She also takes diltiazem daily.  No significant side effects reported.

## 2022-01-21 ENCOUNTER — HOSPITAL ENCOUNTER (OUTPATIENT)
Dept: RADIOLOGY | Facility: MEDICAL CENTER | Age: 72
End: 2022-01-21
Attending: INTERNAL MEDICINE
Payer: MEDICARE

## 2022-01-21 DIAGNOSIS — Z12.31 ENCOUNTER FOR SCREENING MAMMOGRAM FOR MALIGNANT NEOPLASM OF BREAST: ICD-10-CM

## 2022-01-21 PROCEDURE — 77063 BREAST TOMOSYNTHESIS BI: CPT

## 2022-02-17 ENCOUNTER — OFFICE VISIT (OUTPATIENT)
Dept: DERMATOLOGY | Facility: IMAGING CENTER | Age: 72
End: 2022-02-17
Payer: MEDICARE

## 2022-02-17 DIAGNOSIS — N90.4 LICHEN SCLEROSUS OF VULVA: ICD-10-CM

## 2022-02-17 PROCEDURE — 99213 OFFICE O/P EST LOW 20 MIN: CPT | Performed by: NURSE PRACTITIONER

## 2022-02-17 RX ORDER — CLOBETASOL PROPIONATE 0.5 MG/G
OINTMENT TOPICAL
Qty: 60 G | Refills: 1 | Status: SHIPPED | OUTPATIENT
Start: 2022-02-17 | End: 2022-10-18

## 2022-02-17 NOTE — PROGRESS NOTES
DERMATOLOGY NOTE  FOLLOW UP VISIT       Chief complaint: Follow-Up (Vulvar Rash)     Patient returns today to follow up vulvar itching.    Patient states itching had completely resolved previously with prescribed  TAC and Clotrimazole.  She now states that they have both stopped working.    From previous note:  HPI rash vaginal area out side , Hx of incontinence uses pads  . Very irritated   Uses dove bar soap and baby wipes to vulva  Onset:  4 months   Aggravating factors: pads   Alleviating factors: no   New creams/topicals: OTC cortisone , A&D ointment , baby diaper rash cream , triple antiseptic   New medications (up to last 6 months): no  New travel: no  Other exposures: no  Treatments: no       Allergies   Allergen Reactions   • Clarithromycin      Swelling/Itching/Nausea   • Ees [Erythromycin]      Swelling/Itching/Nausea   • Levaquin      Muscle soreness    • Pcn [Penicillins]      Swelling/Itching/Nausea    • Shellfish Allergy      Swelling/Itching/Nausea   • Sumatriptan Swelling     Tongue swell   • Trazodone Swelling        MEDICATIONS:  Medications relevant to specialty reviewed.     REVIEW OF SYSTEMS:   Positive for skin (see HPI)  Negative for fevers and chills       EXAM:  There were no vitals taken for this visit.  Constitutional: Well-developed, well-nourished, and in no distress.     A focused skin exam was performed including the affected areas of the vulva. Notable findings on exam today listed below and/or in assessment/plan.     Skin of labia majora pale pink with atrophic changes  Skin of labia minor pale with scattered areas of excoriation from scratching    IMPRESSION / PLAN:    1. Lichen sclerosus of vulva  Discussed course/nature of disease and known associated risks  Discussed recommended treatment includes ultra potent topical steroids in ointment form  Advised to use Rx as below daily for next 3 weeks, then can used 2-3 times per week as needed  Follow up in 4-6 weeks, sooner if  needed  - clobetasol (TEMOVATE) 0.05 % Ointment; AAA thin layer, daily for 3 weeks, then use 2-3 times a week  Dispense: 60 g; Refill: 1    Discussed risks, benefits, alternative treatments as well as common side effects associated with prescribed treatment  Patient verbalized understanding and agrees with plan regarding the above       I have performed a physical exam and reviewed and updated ROS and Plan today (2/17/2022). In review of dermatology visit (3/30/2021), there are no changes except as documented above.       Please note that this dictation was created using voice recognition software. I have made every reasonable attempt to correct obvious errors, but I expect that there are errors of grammar and possibly content that I did not discover before finalizing the note.      Return to clinic in: Return in about 6 weeks (around 3/31/2022) for rash follow up. and as needed for any new or changing skin lesions.

## 2022-02-28 ENCOUNTER — HOSPITAL ENCOUNTER (OUTPATIENT)
Dept: LAB | Facility: MEDICAL CENTER | Age: 72
End: 2022-02-28
Attending: INTERNAL MEDICINE
Payer: MEDICARE

## 2022-02-28 DIAGNOSIS — E78.2 MIXED HYPERLIPIDEMIA: ICD-10-CM

## 2022-02-28 DIAGNOSIS — R73.03 PREDIABETES: ICD-10-CM

## 2022-02-28 LAB
ALT SERPL-CCNC: 7 U/L (ref 2–50)
ANION GAP SERPL CALC-SCNC: 10 MMOL/L (ref 7–16)
BUN SERPL-MCNC: 13 MG/DL (ref 8–22)
CALCIUM SERPL-MCNC: 9.2 MG/DL (ref 8.5–10.5)
CHLORIDE SERPL-SCNC: 107 MMOL/L (ref 96–112)
CHOLEST SERPL-MCNC: 134 MG/DL (ref 100–199)
CO2 SERPL-SCNC: 23 MMOL/L (ref 20–33)
CREAT SERPL-MCNC: 0.85 MG/DL (ref 0.5–1.4)
EST. AVERAGE GLUCOSE BLD GHB EST-MCNC: 120 MG/DL
FASTING STATUS PATIENT QL REPORTED: NORMAL
GLUCOSE SERPL-MCNC: 95 MG/DL (ref 65–99)
HBA1C MFR BLD: 5.8 % (ref 4–5.6)
HDLC SERPL-MCNC: 59 MG/DL
LDLC SERPL CALC-MCNC: 56 MG/DL
POTASSIUM SERPL-SCNC: 3.8 MMOL/L (ref 3.6–5.5)
SODIUM SERPL-SCNC: 140 MMOL/L (ref 135–145)
TRIGL SERPL-MCNC: 96 MG/DL (ref 0–149)

## 2022-02-28 PROCEDURE — 80061 LIPID PANEL: CPT

## 2022-02-28 PROCEDURE — 36415 COLL VENOUS BLD VENIPUNCTURE: CPT

## 2022-02-28 PROCEDURE — 80048 BASIC METABOLIC PNL TOTAL CA: CPT

## 2022-02-28 PROCEDURE — 84460 ALANINE AMINO (ALT) (SGPT): CPT

## 2022-02-28 PROCEDURE — 83036 HEMOGLOBIN GLYCOSYLATED A1C: CPT

## 2022-03-07 DIAGNOSIS — I10 HYPERTENSION, UNSPECIFIED TYPE: ICD-10-CM

## 2022-03-07 RX ORDER — TELMISARTAN 40 MG/1
40 TABLET ORAL DAILY
Qty: 100 TABLET | Refills: 0 | Status: SHIPPED | OUTPATIENT
Start: 2022-03-07 | End: 2022-06-14

## 2022-03-07 RX ORDER — DILTIAZEM HYDROCHLORIDE 240 MG/1
240 CAPSULE, COATED, EXTENDED RELEASE ORAL DAILY
Qty: 90 CAPSULE | Refills: 0 | Status: SHIPPED | OUTPATIENT
Start: 2022-03-07 | End: 2022-05-25

## 2022-03-07 RX ORDER — DULOXETIN HYDROCHLORIDE 60 MG/1
60 CAPSULE, DELAYED RELEASE ORAL DAILY
Qty: 90 CAPSULE | Refills: 0 | Status: SHIPPED | OUTPATIENT
Start: 2022-03-07 | End: 2022-06-20

## 2022-03-31 ENCOUNTER — APPOINTMENT (OUTPATIENT)
Dept: DERMATOLOGY | Facility: IMAGING CENTER | Age: 72
End: 2022-03-31
Payer: MEDICARE

## 2022-04-07 ENCOUNTER — OFFICE VISIT (OUTPATIENT)
Dept: DERMATOLOGY | Facility: IMAGING CENTER | Age: 72
End: 2022-04-07
Payer: MEDICARE

## 2022-04-07 DIAGNOSIS — N90.4 LICHEN SCLEROSUS OF VULVA: ICD-10-CM

## 2022-04-07 DIAGNOSIS — L82.1 SK (SEBORRHEIC KERATOSIS): ICD-10-CM

## 2022-04-07 PROCEDURE — 99213 OFFICE O/P EST LOW 20 MIN: CPT | Mod: 25 | Performed by: NURSE PRACTITIONER

## 2022-04-07 PROCEDURE — 17110 DESTRUCTION B9 LES UP TO 14: CPT | Performed by: NURSE PRACTITIONER

## 2022-04-07 NOTE — PROGRESS NOTES
DERMATOLOGY NOTE  FOLLOW UP VISIT       Chief complaint: Follow-Up and Rash     Patient returns to follow up for rash symptoms that have resolved.  Patient states she used Clobetasol 0.05% ointment as prescribed.    HPI/location: RT and LT lower lash line  Time present: 1 mos  Painful lesion: No  Itching lesion: No  Enlarging lesion: Yes  Anything make it better or worse? no      From previous note:  HPI rash vaginal area out side , Hx of incontinence uses pads  . Very irritated   Uses dove bar soap and baby wipes to vulva  Onset:  4 months   Aggravating factors: pads   Alleviating factors: no   New creams/topicals: OTC cortisone , A&D ointment , baby diaper rash cream , triple antiseptic   New medications (up to last 6 months): no  New travel: no  Other exposures: no  Treatments: no       Allergies   Allergen Reactions   • Clarithromycin      Swelling/Itching/Nausea   • Ees [Erythromycin]      Swelling/Itching/Nausea   • Levaquin      Muscle soreness    • Pcn [Penicillins]      Swelling/Itching/Nausea    • Shellfish Allergy      Swelling/Itching/Nausea   • Sumatriptan Swelling     Tongue swell   • Trazodone Swelling        MEDICATIONS:  Medications relevant to specialty reviewed.     REVIEW OF SYSTEMS:   Positive for skin (see HPI)  Negative for fevers and chills       EXAM:  There were no vitals taken for this visit.  Constitutional: Well-developed, well-nourished, and in no distress.     A focused skin exam was performed including the affected areas of the face. Notable findings on exam today listed below and/or in assessment/plan.     2 skin-colored stuck-on waxy papules on the face, one each on bilateral superior aspect of infraorbital region, just below bilateral lower lash line  Declined PE of genital region since rash cleared    IMPRESSION / PLAN:    1. SK (seborrheic keratosis)  - Benign-appearing nature of lesions discussed. Advised to return to clinic for any new or concerning changes.  Due to location  and easily irritated and traumatized from rubbing eyes and concerned about affected vision, LN2 applied as below:  CRYOTHERAPY:  Risks (including, but not limited to: hypo or hyperpigmentation, redness, blister, blood blister, recurrence, need for further treatment, infection, scar) and benefits of cryotherapy discussed. Patient verbally agreed to proceed with treatment. 2 cryotherapy freeze thaw cycles of 10 seconds were applied to 1 lesion on R lower lash line, careful to cover eyeball and pulling lower lid down, with cryac. Patient tolerated procedure well, would like to wait and to lesion on the L at later time. Aftercare instructions given.      2. Lichen sclerosus of vulva  Advised to continue to use TCS as previously prescribed, daily 2-3 times per week  Follow up for any changes to s/sx, or worsening of, development of new associated s/sx's      Discussed risks, benefits, alternative treatments as well as common side effects associated with prescribed treatment  Patient verbalized understanding and agrees with plan regarding the above       I have performed a physical exam and reviewed and updated ROS and Plan today (4/7/2022). In review of dermatology visit (2/17/2022), there are no changes except as documented above.       Please note that this dictation was created using voice recognition software. I have made every reasonable attempt to correct obvious errors, but I expect that there are errors of grammar and possibly content that I did not discover before finalizing the note.      Return to clinic in: Return in about 6 weeks (around 5/19/2022) for spot check, SK treatment. and as needed for any new or changing skin lesions.

## 2022-04-20 ENCOUNTER — NON-PROVIDER VISIT (OUTPATIENT)
Dept: SLEEP MEDICINE | Facility: MEDICAL CENTER | Age: 72
End: 2022-04-20
Attending: NURSE PRACTITIONER
Payer: MEDICARE

## 2022-04-20 VITALS — HEIGHT: 60 IN | WEIGHT: 169 LBS | BODY MASS INDEX: 33.18 KG/M2

## 2022-04-20 DIAGNOSIS — J45.40 MODERATE PERSISTENT ASTHMA, UNSPECIFIED WHETHER COMPLICATED: Chronic | ICD-10-CM

## 2022-04-20 PROCEDURE — 94729 DIFFUSING CAPACITY: CPT | Performed by: INTERNAL MEDICINE

## 2022-04-20 PROCEDURE — 94726 PLETHYSMOGRAPHY LUNG VOLUMES: CPT | Performed by: INTERNAL MEDICINE

## 2022-04-20 PROCEDURE — 94060 EVALUATION OF WHEEZING: CPT | Performed by: INTERNAL MEDICINE

## 2022-04-20 ASSESSMENT — PULMONARY FUNCTION TESTS
FEV1/FVC_PERCENT_CHANGE: 17
FVC: 2.97
FVC_PERCENT_PREDICTED: 112
FEV1/FVC_PERCENT_PREDICTED: 99
FEV1/FVC_PERCENT_LLN: 66
FEV1_PERCENT_PREDICTED: 117
FEV1_LLN: 1.61
FEV1/FVC: 78
FEV1: 2.27
FEV1/FVC: 78
FVC_PREDICTED: 2.46
FEV1/FVC_PERCENT_PREDICTED: 104
FEV1/FVC_PERCENT_PREDICTED: 98
FEV1_PREDICTED: 1.93
FEV1_PERCENT_PREDICTED: 119
FEV1_PERCENT_CHANGE: -1
FEV1/FVC: 82
FEV1_PERCENT_CHANGE: -6
FVC_PERCENT_PREDICTED: 120
FEV1: 2.31
FEV1/FVC_PERCENT_PREDICTED: 104
FEV1/FVC_PERCENT_CHANGE: 5
FVC: 2.77
FEV1/FVC_PERCENT_PREDICTED: 78
FEV1/FVC: 81.95
FVC_LLN: 2.06
FEV1/FVC_PREDICTED: 79

## 2022-04-20 ASSESSMENT — FIBROSIS 4 INDEX: FIB4 SCORE: 0.99

## 2022-04-20 NOTE — PROCEDURES
Technician: MARGIE Conklin    Technician Comment:  Good patient effort & cooperation.  The results of this test meet the ATS/ERS standards for acceptability & reproducibility.  Test was performed on the [employer] Body Plethysmograph-Elite DX system.  Predicted values were GLI-2012 for spirometry, GLI-2017 for DLCO, ITS for Lung Volumes.  The DLCO was uncorrected for Hgb.  A bronchodilator of Ventolin HFA -2puffs via spacer administered.  DLCO performed during dilation period.    Interpretation:  Baseline spirometry shows normal airflows.  No significant bronchodilator response.  Lung volumes are within normal limits.  Diffusion capacity is within normal limits.  Normal pulmonary function testing with normal flow volume loop.

## 2022-04-27 ENCOUNTER — OFFICE VISIT (OUTPATIENT)
Dept: MEDICAL GROUP | Facility: PHYSICIAN GROUP | Age: 72
End: 2022-04-27
Payer: MEDICARE

## 2022-04-27 VITALS
SYSTOLIC BLOOD PRESSURE: 122 MMHG | HEART RATE: 83 BPM | DIASTOLIC BLOOD PRESSURE: 78 MMHG | WEIGHT: 167.5 LBS | OXYGEN SATURATION: 98 % | RESPIRATION RATE: 17 BRPM | HEIGHT: 61 IN | BODY MASS INDEX: 31.63 KG/M2 | TEMPERATURE: 96.8 F

## 2022-04-27 DIAGNOSIS — N18.31 STAGE 3A CHRONIC KIDNEY DISEASE: ICD-10-CM

## 2022-04-27 DIAGNOSIS — I48.0 PAROXYSMAL ATRIAL FIBRILLATION (HCC): ICD-10-CM

## 2022-04-27 DIAGNOSIS — R07.9 CHEST PAIN, UNSPECIFIED TYPE: ICD-10-CM

## 2022-04-27 DIAGNOSIS — R00.2 PALPITATIONS: ICD-10-CM

## 2022-04-27 DIAGNOSIS — R91.8 PULMONARY NODULES: Chronic | ICD-10-CM

## 2022-04-27 DIAGNOSIS — I10 PRIMARY HYPERTENSION: Chronic | ICD-10-CM

## 2022-04-27 PROCEDURE — 93000 ELECTROCARDIOGRAM COMPLETE: CPT | Performed by: INTERNAL MEDICINE

## 2022-04-27 PROCEDURE — 99214 OFFICE O/P EST MOD 30 MIN: CPT | Performed by: INTERNAL MEDICINE

## 2022-04-27 ASSESSMENT — FIBROSIS 4 INDEX: FIB4 SCORE: 0.99

## 2022-04-27 NOTE — PROGRESS NOTES
Annual Health Assessment Questions:    1.  Are you currently engaging in any exercise or physical activity? No    2.  How would you describe your mood or emotional well-being today? good    3.  Have you had any falls in the last year? No    4.  Have you noticed any problems with your balance or had difficulty walking? No    5.  In the last six months have you experienced any leakage of urine? Yes    6. DPA/Advanced Directive: Patient does not have an Advanced Directive.  A packet and workshop information was given on Advanced Directives.

## 2022-04-27 NOTE — ASSESSMENT & PLAN NOTE
71-year-old patient with history of paroxysmal atrial fibrillation, hypertension and prediabetes.  The patient reported that 2 days ago the patient was awakened from sleep with pressure/pain noted from her lower jaw extending to the chest.  Described as a tight pressure that lasted for approximately 1 minute.  After that the patient stated that she also noted palpitations.  Patient denies nausea vomiting or diaphoresis.  No history of fever or chills denies trauma or injury denies cough shortness of breath.  No further symptoms since that time.

## 2022-04-27 NOTE — ASSESSMENT & PLAN NOTE
Chronic condition.  Patient had the order for chest CT scan order previously.  Advised the patient to call radiology to schedule an appointment.  She also will follow-up with pulmonology service after that.

## 2022-04-27 NOTE — ASSESSMENT & PLAN NOTE
Patient was seen previously by cardiology service.  CHADS2 score of 1.  The patient presently taking aspirin 81 mg daily.

## 2022-04-27 NOTE — PROGRESS NOTES
PRIMARY CARE CLINIC VISIT  Chief Complaint   Patient presents with   • Follow-Up     Jaw pain/chest pain      History of Present Illness     Chest pain  71-year-old patient with history of paroxysmal atrial fibrillation, hypertension and prediabetes.  The patient reported that 2 days ago the patient was awakened from sleep with pressure/pain noted from her lower jaw extending to the chest.  Described as a tight pressure that lasted for approximately 1 minute.  After that the patient stated that she also noted palpitations.  Patient denies nausea vomiting or diaphoresis.  No history of fever or chills denies trauma or injury denies cough shortness of breath.  No further symptoms since that time.    CKD (chronic kidney disease) stage 3, GFR 30-59 ml/min (HCC)  Previous GFR in the 50s.  Advised the patient to avoid NSAIDs.  Recommend to continue to monitor.    Paroxysmal atrial fibrillation (HCC)  Patient was seen previously by cardiology service.  CHADS2 score of 1.  The patient presently taking aspirin 81 mg daily.    HTN (hypertension)  Patient is currently taking diltiazem and telmisartan.  Her blood pressure has been well controlled.    Pulmonary nodules  Chronic condition.  Patient had the order for chest CT scan order previously.  Advised the patient to call radiology to schedule an appointment.  She also will follow-up with pulmonology service after that.      Current Outpatient Medications on File Prior to Visit   Medication Sig Dispense Refill   • DULoxetine (CYMBALTA) 60 MG Cap DR Particles delayed-release capsule TAKE 1 CAPSULE BY MOUTH EVERY DAY 90 Capsule 0   • telmisartan (MICARDIS) 40 MG Tab TAKE 1 TABLET BY MOUTH EVERY  Tablet 0   • DILTIAZem CD (CARDIZEM CD) 240 MG CAPSULE SR 24 HR TAKE 1 CAPSULE BY MOUTH EVERY DAY 90 Capsule 0   • clobetasol (TEMOVATE) 0.05 % Ointment AAA thin layer, daily for 3 weeks, then use 2-3 times a week 60 g 1   • albuterol 108 (90 Base) MCG/ACT Aero Soln inhalation  "aerosol Inhale 2 Puffs every 6 hours as needed for Shortness of Breath. 8.5 g 0   • atorvastatin (LIPITOR) 10 MG Tab Take 1 Tablet by mouth every day. 100 Tablet 1   • zonisamide (ZONEGRAN) 100 MG Cap Take 100 mg by mouth every day.     • ondansetron (ZOFRAN) 4 MG Tab tablet TAKE 1 TABLET BY MOUTH ONCE DAILY AS NEEDED FOR NAUSEA     • amitriptyline (ELAVIL) 25 MG Tab TAKE 1 TABLET BY MOUTH ONCE DAILY AT BEDTIME 90 Tab 1   • fluticasone (FLOVENT HFA) 110 MCG/ACT Aerosol Inhale 2 Puffs by mouth 2 times a day. 1 Each 5   • SALINE NA Spray  in nose.     • aspirin 81 MG EC tablet Take 81 mg by mouth every day.     • pantoprazole (PROTONIX) 40 MG Tablet Delayed Response Take 40 mg by mouth every day.       No current facility-administered medications on file prior to visit.        Allergies: Clarithromycin, Ees [erythromycin], Levaquin, Pcn [penicillins], Shellfish allergy, Sumatriptan, and Trazodone    ROS  As per HPI above. All other systems reviewed and negative.      Past Medical, Social, and Family history reviewed and updated in EPIC     Objective     /78 (BP Location: Left arm, Patient Position: Sitting, BP Cuff Size: Adult)   Pulse 83   Temp 36 °C (96.8 °F) (Temporal)   Resp 17   Ht 1.549 m (5' 1\")   Wt 76 kg (167 lb 8 oz)   SpO2 98%    Body mass index is 31.65 kg/m².    General: alert and oriented  Cardiovascular: regular rate and rhythm  Pulmonary: lungs : no wheezing   Gastrointestinal: BS present. No obvious mass noted          Assessment and Plan     1. Chest pain, unspecified type  2. Palpitations  - EKG  - CBC WITHOUT DIFFERENTIAL; Future  - Basic Metabolic Panel; Future  - TSH; Future    Patient present today with episode of chest/jaw pain that awoke her from sleep 3 nights ago.  Rule out underlying coronary disease versus others.  At the present time the patient is asymptomatic.  EKG in the office today shows sinus rhythm rate 77 otherwise normal EKG. results discussed with the patient.  I " would like to refer the patient to cardiology for further evaluation.  In addition lab test requested.  Recommend follow-up with this provider after the above are completed.    At the present time the patient is asymptomatic.  Recommended that the patient go to the ER if her symptoms recur or any change in her condition.  The patient voiced understanding.    3. Stage 3a chronic kidney disease (HCC)  Blood tests ordered for follow-up.  Recommended the patient to avoid NSAIDs.  Continue to monitor.`  4. Paroxysmal atrial fibrillation (HCC)  Patient currently in sinus rhythm.  Continue with aspirin.    5. Primary hypertension  Stable continue with current management.    6. Pulmonary nodules  Reminded the patient to schedule an appointment for chest CT scan.  Patient also advised to follow-up with pulmonology service.  The patient voiced understanding.           Please note that this dictation was created using voice recognition software. I have made every reasonable attempt to correct obvious errors but there may be errors of grammar and content that I may have overlooked prior to finalization of this note.      Rudy Parsons MD  Internal Medicine  [facility]

## 2022-04-27 NOTE — ASSESSMENT & PLAN NOTE
Patient is currently taking diltiazem and telmisartan.  Her blood pressure has been well controlled.

## 2022-04-29 ENCOUNTER — TELEPHONE (OUTPATIENT)
Dept: MEDICAL GROUP | Facility: PHYSICIAN GROUP | Age: 72
End: 2022-04-29

## 2022-04-29 ENCOUNTER — HOSPITAL ENCOUNTER (OUTPATIENT)
Dept: LAB | Facility: MEDICAL CENTER | Age: 72
End: 2022-04-29
Attending: INTERNAL MEDICINE
Payer: MEDICARE

## 2022-04-29 DIAGNOSIS — R00.2 PALPITATIONS: ICD-10-CM

## 2022-04-29 LAB
ANION GAP SERPL CALC-SCNC: 10 MMOL/L (ref 7–16)
BUN SERPL-MCNC: 11 MG/DL (ref 8–22)
CALCIUM SERPL-MCNC: 9.3 MG/DL (ref 8.5–10.5)
CHLORIDE SERPL-SCNC: 104 MMOL/L (ref 96–112)
CO2 SERPL-SCNC: 24 MMOL/L (ref 20–33)
CREAT SERPL-MCNC: 0.97 MG/DL (ref 0.5–1.4)
ERYTHROCYTE [DISTWIDTH] IN BLOOD BY AUTOMATED COUNT: 44.3 FL (ref 35.9–50)
FASTING STATUS PATIENT QL REPORTED: NORMAL
GFR SERPLBLD CREATININE-BSD FMLA CKD-EPI: 63 ML/MIN/1.73 M 2
GLUCOSE SERPL-MCNC: 104 MG/DL (ref 65–99)
HCT VFR BLD AUTO: 43.2 % (ref 37–47)
HGB BLD-MCNC: 14 G/DL (ref 12–16)
MCH RBC QN AUTO: 26.9 PG (ref 27–33)
MCHC RBC AUTO-ENTMCNC: 32.4 G/DL (ref 33.6–35)
MCV RBC AUTO: 83.1 FL (ref 81.4–97.8)
PLATELET # BLD AUTO: 363 K/UL (ref 164–446)
PMV BLD AUTO: 10 FL (ref 9–12.9)
POTASSIUM SERPL-SCNC: 4.5 MMOL/L (ref 3.6–5.5)
RBC # BLD AUTO: 5.2 M/UL (ref 4.2–5.4)
SODIUM SERPL-SCNC: 138 MMOL/L (ref 135–145)
TSH SERPL DL<=0.005 MIU/L-ACNC: 1.58 UIU/ML (ref 0.38–5.33)
WBC # BLD AUTO: 9.8 K/UL (ref 4.8–10.8)

## 2022-04-29 PROCEDURE — 80048 BASIC METABOLIC PNL TOTAL CA: CPT

## 2022-04-29 PROCEDURE — 85027 COMPLETE CBC AUTOMATED: CPT

## 2022-04-29 PROCEDURE — 84443 ASSAY THYROID STIM HORMONE: CPT

## 2022-04-29 PROCEDURE — 36415 COLL VENOUS BLD VENIPUNCTURE: CPT

## 2022-04-29 NOTE — TELEPHONE ENCOUNTER
1. Caller Name: Augusta Rodriguez                          Call Back Number: [phone] (home)         How would the patient prefer to be contacted with a response:     pt states she was told she needed to get a chest CT done but needs the order before she can schedule

## 2022-05-02 DIAGNOSIS — R91.8 PULMONARY NODULES: ICD-10-CM

## 2022-05-18 ENCOUNTER — OFFICE VISIT (OUTPATIENT)
Dept: SLEEP MEDICINE | Facility: MEDICAL CENTER | Age: 72
End: 2022-05-18
Payer: MEDICARE

## 2022-05-18 VITALS
HEIGHT: 61 IN | WEIGHT: 166 LBS | HEART RATE: 105 BPM | OXYGEN SATURATION: 95 % | SYSTOLIC BLOOD PRESSURE: 132 MMHG | BODY MASS INDEX: 31.34 KG/M2 | RESPIRATION RATE: 16 BRPM | DIASTOLIC BLOOD PRESSURE: 80 MMHG

## 2022-05-18 DIAGNOSIS — I10 PRIMARY HYPERTENSION: Chronic | ICD-10-CM

## 2022-05-18 DIAGNOSIS — R91.8 PULMONARY NODULES: Chronic | ICD-10-CM

## 2022-05-18 DIAGNOSIS — Z78.9 NONSMOKER: ICD-10-CM

## 2022-05-18 DIAGNOSIS — G47.33 OSA (OBSTRUCTIVE SLEEP APNEA): ICD-10-CM

## 2022-05-18 DIAGNOSIS — J45.40 MODERATE PERSISTENT ASTHMA, UNSPECIFIED WHETHER COMPLICATED: Chronic | ICD-10-CM

## 2022-05-18 PROCEDURE — 99214 OFFICE O/P EST MOD 30 MIN: CPT | Performed by: NURSE PRACTITIONER

## 2022-05-18 ASSESSMENT — FIBROSIS 4 INDEX: FIB4 SCORE: 0.87

## 2022-05-18 NOTE — PATIENT INSTRUCTIONS
Restart CPAP to improve night breathing. Advise sleeping with it all night and sleeping regularly to improve sleeping through the night.  Reduce waster intake prior to bedtime to reduce waking at night to use restroom    Start using zyrtec daily and continue saline spray for allergies. May use mucinex for increased phlegm

## 2022-05-18 NOTE — PROGRESS NOTES
Chief Complaint   Patient presents with   • Follow-Up     last seen 9/28/2021   • Results     PFT 4/20/2022        HPI:  Augusta Rodriguez is a 71 y.o. year old female here today for follow-up on moderate asthma, pulm nodules and BRIANA.  Last OV 9/28/21    PFT 4/20/2022 indicated normal airflows with FVC 2.97 L 120%, FEV1 2.31 L 119%, FEV1/FVC ratio 78, TLC 5.3 L 118%, no hyperinflation with a DLCO of 102% predicted.  No significant bronchodilator response.  Overall normal spirometry.  Reviewed with patient.  She remains on Flovent 2 puffs twice daily and albuterol HFA inhaler use 1-2 times per week for cough during the night.  She notes having recurrent upper respiratory infections and felt her CPAP was causing this with the most recent 2 weeks ago.  She has not used her CPAP since March 2022.  She was treated with Zyrtec, saline spray and Mucinex and noted her symptoms to improve.  I reviewed this with patient noting this appeared to be an acute incident associated with allergies causing sinusitis.  That was not considered an actual asthma exacerbation or upper respiratory infection.  We reviewed the importance of sleep apnea therapy due to her history of severe sleep apnea.  She notes waking 4 times per night to urinate.  She does drink 2 bottles of water prior to bedtime.  We reviewed sleep hygiene and the need to reduce fluids 2 hours prior to bedtime.  She does have a history of chronic headaches on medication for this.  She notes having difficulty tolerating CPAP headgear in the past and could exacerbate a headache.  She washes her device with soap and water and also has a so clean device.    MMRC Grade: 0-1    PULM & SLEEP HX:  Never smoker.  PFT 7/20/20 notes FVC 2.91L or 116%, FEV1 2.37L or 121%, FEV1/FVC ratio 82, RV 97%, % and DLCO 97% without significant bronchodilator response. Reviewed with patient.  Currently using Flovent 2 puffs BID with BERKLEY prn.  CXR 3/15/21 notes hypoinflation w/o evidence of  acute cardiopulmoary disease.  Updated CT scan pending, patient notes cost issues; CT chest 8/26/20 noted multiple nodules with larges measuring 7mm with 6mos f/u CT recommended due 2/2021. Reviewed with patient.     PSG on 1/16/2020 showed an AHI of 30/carol saturation 82%. On CPAP at 16 cm H2O her AHI normalized to 1.9 with normal saturations. She was noted to have an elevated PLMS arousal index of 22.2. She remains on CPAP 16cm.          ROS: As per HPI and otherwise negative if not stated.    Past Medical History:   Diagnosis Date   • Apnea, sleep    • Arrhythmia     afib   • Arthritis     osteo   • Asthma    • Atrial fibrillation (HCC)    • Back pain    • Bronchitis    • Chickenpox    • Constipation    • Cough    • Daytime sleepiness    • Dental disorder     upper dentures   • Earache    • Frequent urination    • Gasping for breath    • GERD (gastroesophageal reflux disease) 2/27/2010   • [place] measles    • Heartburn    • Hypertension    • Impaired fasting glucose 2/27/2010   • Incontinence of urine    • Indigestion    • Influenza    • Mumps    • Nasal drainage    • Nausea    • Osteopenia 2/24/2010   • Osteoporosis    • Other specified disorder of intestines     constipation r/t meds   • Restless leg syndrome    • Scarlet fever    • Shortness of breath    • Sleep apnea    • Snoring    • Tremor, essential 2/24/2010   • Urinary bladder disorder    • Wears glasses    • Wheezing    • Whooping cough        Past Surgical History:   Procedure Laterality Date   • LUMBAR LAMINECTOMY DISKECTOMY Bilateral 2/4/2017    Procedure: LUMBAR LAMINECTOMY DISKECTOMY POSTERIOR L4-S1 ;  Surgeon: Emil Hitchcock M.D.;  Location: [facility];  Service:    • CARPAL TUNNEL ENDOSCOPIC  11/17/2012    Performed by Heriberto Quiñones M.D. at SURGERY [facility]   • TRIGGER FINGER RELEASE  11/17/2012    Performed by Heriberto Quiñones M.D. at SURGERY [facility]   • HIP ARTHROSCOPY  2/23/2009    Performed by UMESH  SHERLYN NICHOLS at SURGERY [facility]   • ACETABULAR OSTEOTOMY  2/23/2009    Performed by SHERLYN CALVO at SURGERY [facility]   • MASS EXCISION ORTHO  2/23/2009    Performed by SHERLYN CALVO at SURGERY [facility]   • HIP ARTHROSCOPY  2005    done at [facility]   • HAJA BY LAPAROSCOPY  1997   • HYSTERECTOMY, TOTAL ABDOMINAL  1979    oopherectomy lacie   • BLADDER SUSPENSION      bladder sling   • CARPAL TUNNEL RELEASE     • HIP REPLACEMENT, TOTAL     • HYSTERECTOMY LAPAROSCOPY     • LAMINOTOMY     • PRIMARY C SECTION  1970/1975       Family History   Problem Relation Age of Onset   • GI Disease Father         Crohn's   • Heart Disease Father         First MI age 30   • Hypertension Father    • Stroke Father    • Hypertension Other    • Cancer Brother         ESOPHAGEAL CANCER       Social History     Socioeconomic History   • Marital status:      Spouse name: Not on file   • Number of children: Not on file   • Years of education: Not on file   • Highest education level: Not on file   Occupational History   • Not on file   Tobacco Use   • Smoking status: Never Smoker   • Smokeless tobacco: Never Used   Vaping Use   • Vaping Use: Never used   Substance and Sexual Activity   • Alcohol use: Yes     Alcohol/week: 0.0 oz     Comment: Stopped drinking 45 days ago 7/12/17 Going to    • Drug use: No   • Sexual activity: Never   Other Topics Concern   • Not on file   Social History Narrative   • Not on file     Social Determinants of Health     Financial Resource Strain: Not on file   Food Insecurity: Not on file   Transportation Needs: Not on file   Physical Activity: Not on file   Stress: Not on file   Social Connections: Not on file   Intimate Partner Violence: Not on file   Housing Stability: Not on file       Allergies as of 05/18/2022 - Reviewed 05/18/2022   Allergen Reaction Noted   • Clarithromycin  08/13/2007   • Ees [erythromycin]  08/13/2007   • Levaquin  11/10/2017   • Pcn [penicillins]   "08/13/2007   • Shellfish allergy  01/20/2017   • Sumatriptan Swelling 02/02/2015   • Trazodone Swelling 02/16/2009        Vitals:  /80 (BP Location: Left arm, Patient Position: Sitting, BP Cuff Size: Adult)   Pulse (!) 105   Resp 16   Ht 1.549 m (5' 1\")   Wt 75.3 kg (166 lb)   SpO2 95%     Current medications as of today   Current Outpatient Medications   Medication Sig Dispense Refill   • DULoxetine (CYMBALTA) 60 MG Cap DR Particles delayed-release capsule TAKE 1 CAPSULE BY MOUTH EVERY DAY 90 Capsule 0   • telmisartan (MICARDIS) 40 MG Tab TAKE 1 TABLET BY MOUTH EVERY  Tablet 0   • DILTIAZem CD (CARDIZEM CD) 240 MG CAPSULE SR 24 HR TAKE 1 CAPSULE BY MOUTH EVERY DAY 90 Capsule 0   • clobetasol (TEMOVATE) 0.05 % Ointment AAA thin layer, daily for 3 weeks, then use 2-3 times a week 60 g 1   • albuterol 108 (90 Base) MCG/ACT Aero Soln inhalation aerosol Inhale 2 Puffs every 6 hours as needed for Shortness of Breath. 8.5 g 0   • atorvastatin (LIPITOR) 10 MG Tab Take 1 Tablet by mouth every day. 100 Tablet 1   • zonisamide (ZONEGRAN) 100 MG Cap Take 100 mg by mouth every day.     • ondansetron (ZOFRAN) 4 MG Tab tablet TAKE 1 TABLET BY MOUTH ONCE DAILY AS NEEDED FOR NAUSEA     • amitriptyline (ELAVIL) 25 MG Tab TAKE 1 TABLET BY MOUTH ONCE DAILY AT BEDTIME 90 Tab 1   • fluticasone (FLOVENT HFA) 110 MCG/ACT Aerosol Inhale 2 Puffs by mouth 2 times a day. 1 Each 5   • SALINE NA Spray  in nose.     • aspirin 81 MG EC tablet Take 81 mg by mouth every day.     • pantoprazole (PROTONIX) 40 MG Tablet Delayed Response Take 40 mg by mouth every day.       No current facility-administered medications for this visit.         Physical Exam:   Gen:           Alert and oriented, No apparent distress. Mood and affect appropriate, normal interaction with examiner.  Eyes:          PERRL, EOM intact, sclere white, conjunctive moist.  Ears:          Not examined.   Hearing:     Grossly intact.  Nose:          Normal, no " lesions or deformities.  Dentition:    Mask.  Oropharynx:   Mask.  Mallampati Classification: mask  Neck:        Supple, trachea midline, no masses.  Respiratory Effort: No intercostal retractions or use of accessory muscles.   Lung Auscultation:      Clear to auscultation bilaterally; no rales, rhonchi or wheezing.  CV:            Regular rate and rhythm. No murmurs, rubs or gallops.  Abd:           Not examined. .  Lymphadenopathy: Not examined..  Gait and Station: Normal.  Digits and Nails: No clubbing, cyanosis, petechiae, or nodes.   Cranial Nerves: II-XII grossly intact.  Skin:        No rashes, lesions or ulcers noted.               Ext:           No cyanosis or edema.      Assessment:  1. Moderate persistent asthma, unspecified whether complicated     2. BRIANA (obstructive sleep apnea)  DME Mask and Supplies   3. Pulmonary nodules     4. Primary hypertension     5. BMI 31.0-31.9,adult  Height And Weight   6. Nonsmoker         Immunizations:    Flu:recommend  Pneumovax 23:recommend  Prevnar 13:recommend  COVID-19: 1/20/22, 4/30/21, 4/2/21    Plan:  1.  Patient's asthma is clinically stable.  She will continue current inhaler regimen.  Due to seasonal allergies recommend treatment with Zyrtec daily, Flonase as needed and saline spray as needed.  This seems to be a trigger for her asthma.  2.  Patient has history of severe sleep apnea and off therapy.  We reviewed the pathophysiology of untreated sleep apnea and the benefit of therapy.  She is amenable to attempting to restart therapy.  We reviewed proper cleaning procedure.  Restart auto CPAP 8 to 12 cm nightly.  DME mask/supplies  3.  Patient has a history of pulmonary nodules and follow-up CT scan of chest without contrast was ordered by PCP.  Patient is scheduled to complete this.  4.    Please note that this dictation was created using voice recognition software. I have made every reasonable attempt to correct obvious errors, but it is possible there are  errors of grammar and possibly content that I did not discover before finalizing the note.

## 2022-05-19 ENCOUNTER — OFFICE VISIT (OUTPATIENT)
Dept: DERMATOLOGY | Facility: IMAGING CENTER | Age: 72
End: 2022-05-19
Payer: MEDICARE

## 2022-05-19 DIAGNOSIS — L82.1 SK (SEBORRHEIC KERATOSIS): ICD-10-CM

## 2022-05-19 PROCEDURE — 17110 DESTRUCTION B9 LES UP TO 14: CPT | Performed by: NURSE PRACTITIONER

## 2022-05-19 NOTE — PROGRESS NOTES
DERMATOLOGY NOTE  FOLLOW UP VISIT       Chief complaint: Follow-Up     Spot on eye previously tx w/ ln2 pt states its gone,  Spot on lt cheek growing would like that txd     From previous note:  HPI rash vaginal area out side , Hx of incontinence uses pads  . Very irritated   Uses dove bar soap and baby wipes to vulva  Onset:  4 months   Aggravating factors: pads   Alleviating factors: no   New creams/topicals: OTC cortisone , A&D ointment , baby diaper rash cream , triple antiseptic   New medications (up to last 6 months): no  New travel: no  Other exposures: no  Treatments: no       Allergies   Allergen Reactions   • Clarithromycin      Swelling/Itching/Nausea   • Ees [Erythromycin]      Swelling/Itching/Nausea   • Levaquin      Muscle soreness    • Pcn [Penicillins]      Swelling/Itching/Nausea    • Shellfish Allergy      Swelling/Itching/Nausea   • Sumatriptan Swelling     Tongue swell   • Trazodone Swelling        MEDICATIONS:  Medications relevant to specialty reviewed.     REVIEW OF SYSTEMS:   Positive for skin (see HPI)  Negative for fevers and chills       EXAM:  There were no vitals taken for this visit.  Constitutional: Well-developed, well-nourished, and in no distress.     A focused skin exam was performed including the affected areas of the face. Notable findings on exam today listed below and/or in assessment/plan.     2 skin-colored stuck-on waxy papules on the face, one each on bilateral superior aspect of infraorbital region, just below bilateral lower lash line  Declined PE of genital region since rash cleared    IMPRESSION / PLAN:    1. SK (seborrheic keratosis)  - Benign-appearing nature of lesions discussed. Advised to return to clinic for any new or concerning changes.  Due to location and easily irritated and traumatized from mask, LN2 applied as below:  CRYOTHERAPY:  Risks (including, but not limited to: hypo or hyperpigmentation, redness, blister, blood blister, recurrence, need for further  treatment, infection, scar) and benefits of cryotherapy discussed. Patient verbally agreed to proceed with treatment. 2 cryotherapy freeze thaw cycles of 10 seconds were applied to 1 lesion on L lateral cheek with cryac. Patient tolerated procedure well, would like to wait and to lesion on the L at later time. Aftercare instructions given.          Discussed risks, benefits, alternative treatments as well as common side effects associated with prescribed treatment  Patient verbalized understanding and agrees with plan regarding the above       I have performed a physical exam and reviewed and updated ROS and Plan today (5/19/2022). In review of dermatology visit (4/7/2022), there are no changes except as documented above.       Please note that this dictation was created using voice recognition software. I have made every reasonable attempt to correct obvious errors, but I expect that there are errors of grammar and possibly content that I did not discover before finalizing the note.      Return to clinic in: Return for as needed. and for any new or changing skin lesions.

## 2022-05-25 ENCOUNTER — HOSPITAL ENCOUNTER (OUTPATIENT)
Dept: RADIOLOGY | Facility: MEDICAL CENTER | Age: 72
End: 2022-05-25
Attending: INTERNAL MEDICINE
Payer: MEDICARE

## 2022-05-25 ENCOUNTER — OFFICE VISIT (OUTPATIENT)
Dept: MEDICAL GROUP | Facility: PHYSICIAN GROUP | Age: 72
End: 2022-05-25
Payer: MEDICARE

## 2022-05-25 VITALS
HEART RATE: 94 BPM | SYSTOLIC BLOOD PRESSURE: 124 MMHG | WEIGHT: 166 LBS | OXYGEN SATURATION: 96 % | RESPIRATION RATE: 17 BRPM | BODY MASS INDEX: 31.34 KG/M2 | DIASTOLIC BLOOD PRESSURE: 84 MMHG | HEIGHT: 61 IN | TEMPERATURE: 98.5 F

## 2022-05-25 DIAGNOSIS — R91.8 PULMONARY NODULES: ICD-10-CM

## 2022-05-25 DIAGNOSIS — J45.40 MODERATE PERSISTENT ASTHMA, UNSPECIFIED WHETHER COMPLICATED: Chronic | ICD-10-CM

## 2022-05-25 DIAGNOSIS — I10 PRIMARY HYPERTENSION: Chronic | ICD-10-CM

## 2022-05-25 DIAGNOSIS — R19.7 DIARRHEA, UNSPECIFIED TYPE: ICD-10-CM

## 2022-05-25 DIAGNOSIS — R11.2 NON-INTRACTABLE VOMITING WITH NAUSEA: ICD-10-CM

## 2022-05-25 PROBLEM — J44.9 COPD (CHRONIC OBSTRUCTIVE PULMONARY DISEASE) (HCC): Chronic | Status: ACTIVE | Noted: 2022-05-25

## 2022-05-25 PROBLEM — J44.9 COPD (CHRONIC OBSTRUCTIVE PULMONARY DISEASE) (HCC): Status: ACTIVE | Noted: 2022-05-25

## 2022-05-25 LAB
EXTERNAL QUALITY CONTROL: NORMAL
SARS-COV+SARS-COV-2 AG RESP QL IA.RAPID: NEGATIVE

## 2022-05-25 PROCEDURE — 99214 OFFICE O/P EST MOD 30 MIN: CPT | Mod: CS | Performed by: INTERNAL MEDICINE

## 2022-05-25 PROCEDURE — 71250 CT THORAX DX C-: CPT | Mod: ME

## 2022-05-25 PROCEDURE — 87426 SARSCOV CORONAVIRUS AG IA: CPT | Performed by: INTERNAL MEDICINE

## 2022-05-25 RX ORDER — DILTIAZEM HYDROCHLORIDE 240 MG/1
240 CAPSULE, COATED, EXTENDED RELEASE ORAL DAILY
Qty: 90 CAPSULE | Refills: 0 | Status: SHIPPED | OUTPATIENT
Start: 2022-05-25 | End: 2022-08-13

## 2022-05-25 ASSESSMENT — FIBROSIS 4 INDEX: FIB4 SCORE: 0.87

## 2022-05-25 NOTE — ASSESSMENT & PLAN NOTE
Chronic condition.  The patient is now taking diltiazem daily.  Her blood pressure is well controlled.  No significant side effects reported.

## 2022-05-25 NOTE — ASSESSMENT & PLAN NOTE
This is a new condition.  The patient reported symptoms started since the past 3 days associated with recurrent nausea and vomiting.  Patient denies fever or chills.  She denied recent travel history.  No recent known COVID exposure.

## 2022-05-25 NOTE — PROGRESS NOTES
PRIMARY CARE CLINIC VISIT  Chief Complaint   Patient presents with   • Follow-Up   Diarrhea, nausea/vomiting      History of Present Illness     Diarrhea  This is a new condition.  The patient reported symptoms started since the past 3 days associated with recurrent nausea and vomiting.  Patient denies fever or chills.  She denied recent travel history.  No recent known COVID exposure.    HTN (hypertension)  Chronic condition.  The patient is now taking diltiazem daily.  Her blood pressure is well controlled.  No significant side effects reported.    Moderate persistent asthma  Chronic condition.  The patient uses albuterol as needed.  Presently she denies shortness of breath wheezing.      Current Outpatient Medications on File Prior to Visit   Medication Sig Dispense Refill   • DULoxetine (CYMBALTA) 60 MG Cap DR Particles delayed-release capsule TAKE 1 CAPSULE BY MOUTH EVERY DAY 90 Capsule 0   • telmisartan (MICARDIS) 40 MG Tab TAKE 1 TABLET BY MOUTH EVERY  Tablet 0   • DILTIAZem CD (CARDIZEM CD) 240 MG CAPSULE SR 24 HR TAKE 1 CAPSULE BY MOUTH EVERY DAY 90 Capsule 0   • clobetasol (TEMOVATE) 0.05 % Ointment AAA thin layer, daily for 3 weeks, then use 2-3 times a week 60 g 1   • albuterol 108 (90 Base) MCG/ACT Aero Soln inhalation aerosol Inhale 2 Puffs every 6 hours as needed for Shortness of Breath. 8.5 g 0   • atorvastatin (LIPITOR) 10 MG Tab Take 1 Tablet by mouth every day. 100 Tablet 1   • zonisamide (ZONEGRAN) 100 MG Cap Take 100 mg by mouth every day.     • ondansetron (ZOFRAN) 4 MG Tab tablet TAKE 1 TABLET BY MOUTH ONCE DAILY AS NEEDED FOR NAUSEA     • amitriptyline (ELAVIL) 25 MG Tab TAKE 1 TABLET BY MOUTH ONCE DAILY AT BEDTIME 90 Tab 1   • fluticasone (FLOVENT HFA) 110 MCG/ACT Aerosol Inhale 2 Puffs by mouth 2 times a day. 1 Each 5   • SALINE NA Spray  in nose.     • aspirin 81 MG EC tablet Take 81 mg by mouth every day.     • pantoprazole (PROTONIX) 40 MG Tablet Delayed Response Take 40 mg by  "mouth every day.       No current facility-administered medications on file prior to visit.        Allergies: Clarithromycin, Ees [erythromycin], Levaquin, Pcn [penicillins], Shellfish allergy, Sumatriptan, and Trazodone    ROS  As per HPI above. All other systems reviewed and negative.      Past Medical, Social, and Family history reviewed and updated in EPIC     Objective     /84 (BP Location: Left arm, Patient Position: Sitting, BP Cuff Size: Adult)   Pulse 94   Temp 36.9 °C (98.5 °F) (Temporal)   Resp 17   Ht 1.549 m (5' 1\")   Wt 75.3 kg (166 lb)   SpO2 96%    Body mass index is 31.37 kg/m².    General: alert and oriented  Cardiovascular: regular rate and rhythm  Pulmonary: lungs : no wheezing   Gastrointestinal: BS present. No obvious mass noted          Assessment and Plan     1.  vomiting with nausea  2. Diarrhea    Suspect viral gastroenteritis.  Rule out bacterial causes.    - POCT SARS-COV Antigen JOVANNY Manual Result  - CULTURE STOOL; Future  Stool culture requested.  Advised the patient to increase fluid intake.  Recommend Zofran.  The patient stated that she already has prescription at home patient may take Zofran 1 pill 3 times a day as needed for nausea and vomiting.  For severe diarrhea the patient may take over-the-counter Imodium.  Advised the patient to use over-the-counter Pedialyte or another electrolyte replacement liquid  Patient to follow-up in 7 days.  Return sooner if symptoms worsen or any change in her condition.    3. Primary hypertension  BP stable.  Continue with current management.  4. Moderate persistent asthma, unspecified whether complicated  Stable.  Continue current management.               Please note that this dictation was created using voice recognition software. I have made every reasonable attempt to correct obvious errors but there may be errors of grammar and content that I may have overlooked prior to finalization of this note.      Rudy Parsons MD  Internal " [facility] primary care clinic

## 2022-05-25 NOTE — ASSESSMENT & PLAN NOTE
Chronic condition.  The patient uses albuterol as needed.  Presently she denies shortness of breath wheezing.

## 2022-05-30 ENCOUNTER — HOSPITAL ENCOUNTER (OUTPATIENT)
Facility: MEDICAL CENTER | Age: 72
End: 2022-05-30
Attending: INTERNAL MEDICINE
Payer: MEDICARE

## 2022-05-30 PROCEDURE — 87899 AGENT NOS ASSAY W/OPTIC: CPT

## 2022-05-30 PROCEDURE — 87045 FECES CULTURE AEROBIC BACT: CPT

## 2022-05-31 DIAGNOSIS — R11.2 NON-INTRACTABLE VOMITING WITH NAUSEA: ICD-10-CM

## 2022-06-01 LAB
E COLI SXT1+2 STL IA: NORMAL
SIGNIFICANT IND 70042: NORMAL
SITE SITE: NORMAL
SOURCE SOURCE: NORMAL

## 2022-06-02 LAB
BACTERIA STL CULT: NORMAL
C JEJUNI+C COLI AG STL QL: NORMAL
E COLI SXT1+2 STL IA: NORMAL
SIGNIFICANT IND 70042: NORMAL
SITE SITE: NORMAL
SOURCE SOURCE: NORMAL

## 2022-06-04 ENCOUNTER — APPOINTMENT (OUTPATIENT)
Dept: RADIOLOGY | Facility: MEDICAL CENTER | Age: 72
End: 2022-06-04
Attending: EMERGENCY MEDICINE
Payer: MEDICARE

## 2022-06-04 ENCOUNTER — HOSPITAL ENCOUNTER (EMERGENCY)
Facility: MEDICAL CENTER | Age: 72
End: 2022-06-04
Attending: EMERGENCY MEDICINE
Payer: MEDICARE

## 2022-06-04 VITALS
SYSTOLIC BLOOD PRESSURE: 108 MMHG | DIASTOLIC BLOOD PRESSURE: 65 MMHG | RESPIRATION RATE: 17 BRPM | BODY MASS INDEX: 31.51 KG/M2 | WEIGHT: 166.89 LBS | HEART RATE: 72 BPM | OXYGEN SATURATION: 96 % | HEIGHT: 61 IN | TEMPERATURE: 96.9 F

## 2022-06-04 DIAGNOSIS — R07.9 CHEST PAIN, UNSPECIFIED TYPE: ICD-10-CM

## 2022-06-04 LAB
ALBUMIN SERPL BCP-MCNC: 4.2 G/DL (ref 3.2–4.9)
ALBUMIN/GLOB SERPL: 1.4 G/DL
ALP SERPL-CCNC: 107 U/L (ref 30–99)
ALT SERPL-CCNC: 12 U/L (ref 2–50)
ANION GAP SERPL CALC-SCNC: 11 MMOL/L (ref 7–16)
AST SERPL-CCNC: 17 U/L (ref 12–45)
BASOPHILS # BLD AUTO: 1.4 % (ref 0–1.8)
BASOPHILS # BLD: 0.16 K/UL (ref 0–0.12)
BILIRUB SERPL-MCNC: 0.6 MG/DL (ref 0.1–1.5)
BUN SERPL-MCNC: 12 MG/DL (ref 8–22)
CALCIUM SERPL-MCNC: 9.4 MG/DL (ref 8.5–10.5)
CHLORIDE SERPL-SCNC: 101 MMOL/L (ref 96–112)
CO2 SERPL-SCNC: 24 MMOL/L (ref 20–33)
CREAT SERPL-MCNC: 1.03 MG/DL (ref 0.5–1.4)
EKG IMPRESSION: NORMAL
EOSINOPHIL # BLD AUTO: 1.56 K/UL (ref 0–0.51)
EOSINOPHIL NFR BLD: 13.2 % (ref 0–6.9)
ERYTHROCYTE [DISTWIDTH] IN BLOOD BY AUTOMATED COUNT: 42.4 FL (ref 35.9–50)
GFR SERPLBLD CREATININE-BSD FMLA CKD-EPI: 58 ML/MIN/1.73 M 2
GLOBULIN SER CALC-MCNC: 3 G/DL (ref 1.9–3.5)
GLUCOSE SERPL-MCNC: 99 MG/DL (ref 65–99)
HCT VFR BLD AUTO: 43.4 % (ref 37–47)
HGB BLD-MCNC: 14.2 G/DL (ref 12–16)
IMM GRANULOCYTES # BLD AUTO: 0.06 K/UL (ref 0–0.11)
IMM GRANULOCYTES NFR BLD AUTO: 0.5 % (ref 0–0.9)
LYMPHOCYTES # BLD AUTO: 1.94 K/UL (ref 1–4.8)
LYMPHOCYTES NFR BLD: 16.4 % (ref 22–41)
MCH RBC QN AUTO: 26.6 PG (ref 27–33)
MCHC RBC AUTO-ENTMCNC: 32.7 G/DL (ref 33.6–35)
MCV RBC AUTO: 81.3 FL (ref 81.4–97.8)
MONOCYTES # BLD AUTO: 1.06 K/UL (ref 0–0.85)
MONOCYTES NFR BLD AUTO: 9 % (ref 0–13.4)
NEUTROPHILS # BLD AUTO: 7.03 K/UL (ref 2–7.15)
NEUTROPHILS NFR BLD: 59.5 % (ref 44–72)
NRBC # BLD AUTO: 0 K/UL
NRBC BLD-RTO: 0 /100 WBC
PLATELET # BLD AUTO: 356 K/UL (ref 164–446)
PMV BLD AUTO: 9 FL (ref 9–12.9)
POTASSIUM SERPL-SCNC: 4.5 MMOL/L (ref 3.6–5.5)
PROT SERPL-MCNC: 7.2 G/DL (ref 6–8.2)
RBC # BLD AUTO: 5.34 M/UL (ref 4.2–5.4)
SODIUM SERPL-SCNC: 136 MMOL/L (ref 135–145)
TROPONIN T SERPL-MCNC: 6 NG/L (ref 6–19)
TROPONIN T SERPL-MCNC: 7 NG/L (ref 6–19)
WBC # BLD AUTO: 11.8 K/UL (ref 4.8–10.8)

## 2022-06-04 PROCEDURE — 85025 COMPLETE CBC W/AUTO DIFF WBC: CPT

## 2022-06-04 PROCEDURE — 700102 HCHG RX REV CODE 250 W/ 637 OVERRIDE(OP): Performed by: EMERGENCY MEDICINE

## 2022-06-04 PROCEDURE — 84484 ASSAY OF TROPONIN QUANT: CPT

## 2022-06-04 PROCEDURE — 71045 X-RAY EXAM CHEST 1 VIEW: CPT

## 2022-06-04 PROCEDURE — 99285 EMERGENCY DEPT VISIT HI MDM: CPT

## 2022-06-04 PROCEDURE — 93005 ELECTROCARDIOGRAM TRACING: CPT | Performed by: EMERGENCY MEDICINE

## 2022-06-04 PROCEDURE — A9270 NON-COVERED ITEM OR SERVICE: HCPCS | Performed by: EMERGENCY MEDICINE

## 2022-06-04 PROCEDURE — 36415 COLL VENOUS BLD VENIPUNCTURE: CPT

## 2022-06-04 PROCEDURE — 80053 COMPREHEN METABOLIC PANEL: CPT

## 2022-06-04 PROCEDURE — 93005 ELECTROCARDIOGRAM TRACING: CPT

## 2022-06-04 RX ADMIN — LIDOCAINE HYDROCHLORIDE 30 ML: 20 SOLUTION OROPHARYNGEAL at 12:04

## 2022-06-04 ASSESSMENT — HEART SCORE
ECG: NORMAL
TROPONIN: LESS THAN OR EQUAL TO NORMAL LIMIT
HISTORY: SLIGHTLY SUSPICIOUS
HEART SCORE: 3
RISK FACTORS: 1-2 RISK FACTORS
AGE: 65+

## 2022-06-04 ASSESSMENT — FIBROSIS 4 INDEX: FIB4 SCORE: 0.87

## 2022-06-04 NOTE — DISCHARGE INSTRUCTIONS
Call your doctor Monday to arrange for follow-up appointment.  Please return if worse or for any concerns.

## 2022-06-04 NOTE — ED PROVIDER NOTES
ED Provider Note    CHIEF COMPLAINT  Chief Complaint   Patient presents with   • Chest Pain     Chest pain and pressure which begins in esophagus and travels to chest. Hx of GERD. Hx of Afib. Takes diltiazem. Compliant with medication. Patient denies feeling SOB.        HPI  Augusta Rodriguez is a 71 y.o. female who presents anterior chest pressure onset 1 hour prior to arrival to [facility], has now completely resolved.  No pleurisy.  No syncope.  She denies diaphoresis.  She felt short of breath during the pressure and this is now resolved.  Patient states she has problematic GERD, frequently gets pain from her epigastric region radiating upward.  The pressure-like sensation feels like different.  No fever, no cough, no trauma.  The patient is well-appearing upon my arrival to bedside.  Cardiac risk factors include age, hypertension.  No tobacco or drug use.    REVIEW OF SYSTEMS    Constitutional: No dizziness  Respiratory: Shortness of breath  Cardiac: Chest pressure, no syncope, history of atrial fibrillation  Gastrointestinal: History of GERD  Musculoskeletal: No acute neck or back pain  Neurologic: No headache  Skin: No leg swelling     All other systems are negative.       PAST MEDICAL HISTORY  Past Medical History:   Diagnosis Date   • Apnea, sleep    • Arrhythmia     afib   • Arthritis     osteo   • Asthma    • Atrial fibrillation (HCC)    • Back pain    • Bronchitis    • Chickenpox    • Constipation    • Cough    • Daytime sleepiness    • Dental disorder     upper dentures   • Earache    • Frequent urination    • Gasping for breath    • GERD (gastroesophageal reflux disease) 2/27/2010   • Maced[place] measles    • Heartburn    • Hypertension    • Impaired fasting glucose 2/27/2010   • Incontinence of urine    • Indigestion    • Influenza    • Mumps    • Nasal drainage    • Nausea    • Osteopenia 2/24/2010   • Osteoporosis    • Other specified disorder of intestines     constipation r/t meds   • Restless leg  syndrome    • Scarlet fever    • Shortness of breath    • Sleep apnea    • Snoring    • Tremor, essential 2/24/2010   • Urinary bladder disorder    • Wears glasses    • Wheezing    • Whooping cough        FAMILY HISTORY  Family History   Problem Relation Age of Onset   • GI Disease Father         Crohn's   • Heart Disease Father         First MI age 30   • Hypertension Father    • Stroke Father    • Hypertension Other    • Cancer Brother         ESOPHAGEAL CANCER       SOCIAL HISTORY  Social History     Socioeconomic History   • Marital status:    Tobacco Use   • Smoking status: Never Smoker   • Smokeless tobacco: Never Used   Vaping Use   • Vaping Use: Never used   Substance and Sexual Activity   • Alcohol use: Yes     Alcohol/week: 0.0 oz     Comment: Stopped drinking 45 days ago 7/12/17 Going to    • Drug use: No   • Sexual activity: Never       SURGICAL HISTORY  Past Surgical History:   Procedure Laterality Date   • LUMBAR LAMINECTOMY DISKECTOMY Bilateral 2/4/2017    Procedure: LUMBAR LAMINECTOMY DISKECTOMY POSTERIOR L4-S1 ;  Surgeon: Emil Hitchcock M.D.;  Location: [facility];  Service:    • CARPAL TUNNEL ENDOSCOPIC  11/17/2012    Performed by Heriberto Quiñones M.D. at SURGERY [facility]   • TRIGGER FINGER RELEASE  11/17/2012    Performed by Heriberto Quiñones M.D. at SURGERY [facility]   • HIP ARTHROSCOPY  2/23/2009    Performed by SHERLYN CALVO at SURGERY [facility]   • ACETABULAR OSTEOTOMY  2/23/2009    Performed by SHERLYN CALVO at [facility]   • MASS EXCISION ORTHO  2/23/2009    Performed by SHERLYN CALVO at [facility]   • HIP ARTHROSCOPY  2005    done at [facility]   • HAJA BY LAPAROSCOPY  1997   • HYSTERECTOMY, TOTAL ABDOMINAL  1979    oopherectomy lacie   • BLADDER SUSPENSION      bladder sling   • CARPAL TUNNEL RELEASE     • HIP REPLACEMENT, TOTAL     • HYSTERECTOMY LAPAROSCOPY     • LAMINOTOMY     • PRIMARY C SECTION  1970/1975  "      CURRENT MEDICATIONS  Home Medications     Reviewed by Sera Villa R.N. (Registered Nurse) on 06/04/22 at 1021  Med List Status: Partial   Medication Last Dose Status   albuterol 108 (90 Base) MCG/ACT Aero Soln inhalation aerosol  Active   amitriptyline (ELAVIL) 25 MG Tab  Active   aspirin 81 MG EC tablet 6/4/2022 Active   atorvastatin (LIPITOR) 10 MG Tab  Active   clobetasol (TEMOVATE) 0.05 % Ointment  Active   DILTIAZem CD (CARDIZEM CD) 240 MG CAPSULE SR 24 HR 6/4/2022 Active   DULoxetine (CYMBALTA) 60 MG Cap DR Particles delayed-release capsule  Active   fluticasone (FLOVENT HFA) 110 MCG/ACT Aerosol  Active   ondansetron (ZOFRAN) 4 MG Tab tablet  Active   pantoprazole (PROTONIX) 40 MG Tablet Delayed Response  Active   SALINE NA  Active   telmisartan (MICARDIS) 40 MG Tab  Active   zonisamide (ZONEGRAN) 100 MG Cap  Active                ALLERGIES  Allergies   Allergen Reactions   • Clarithromycin      Swelling/Itching/Nausea   • Ees [Erythromycin]      Swelling/Itching/Nausea   • Levaquin      Muscle soreness    • Pcn [Penicillins]      Swelling/Itching/Nausea    • Shellfish Allergy      Swelling/Itching/Nausea   • Sumatriptan Swelling     Tongue swell   • Trazodone Swelling       PHYSICAL EXAM  VITAL SIGNS: BP (!) 148/99   Pulse 92   Temp 36.2 °C (97.1 °F) (Temporal)   Resp 16   Ht 1.549 m (5' 1\")   Wt 75.7 kg (166 lb 14.2 oz)   SpO2 97%   BMI 31.53 kg/m²   Constitutional:  Non-toxic appearance.   HENT: No facial swelling  Eyes: Anicteric, no conjunctivitis.     Cardiovascular: Normal heart rate, Normal rhythm, no heart murmur  Pulmonary:  No wheezing, No rales.  Good bilateral air movement  Gastrointestinal: Soft, No tenderness, No distention  Skin: Warm, Dry, No cyanosis.  No asymmetric edema  Neurologic: Speech is clear, follows commands, facial expression is symmetrical.  Strength is normal  Psychiatric: Affect normal,  Mood normal.  Patient is calm and cooperative  Musculoskeletal: No chest " wall tenderness    EKG/Labs  Results for orders placed or performed during the hospital encounter of 06/04/22   CBC with Differential   Result Value Ref Range    WBC 11.8 (H) 4.8 - 10.8 K/uL    RBC 5.34 4.20 - 5.40 M/uL    Hemoglobin 14.2 12.0 - 16.0 g/dL    Hematocrit 43.4 37.0 - 47.0 %    MCV 81.3 (L) 81.4 - 97.8 fL    MCH 26.6 (L) 27.0 - 33.0 pg    MCHC 32.7 (L) 33.6 - 35.0 g/dL    RDW 42.4 35.9 - 50.0 fL    Platelet Count 356 164 - 446 K/uL    MPV 9.0 9.0 - 12.9 fL    Neutrophils-Polys 59.50 44.00 - 72.00 %    Lymphocytes 16.40 (L) 22.00 - 41.00 %    Monocytes 9.00 0.00 - 13.40 %    Eosinophils 13.20 (H) 0.00 - 6.90 %    Basophils 1.40 0.00 - 1.80 %    Immature Granulocytes 0.50 0.00 - 0.90 %    Nucleated RBC 0.00 /100 WBC    Neutrophils (Absolute) 7.03 2.00 - 7.15 K/uL    Lymphs (Absolute) 1.94 1.00 - 4.80 K/uL    Monos (Absolute) 1.06 (H) 0.00 - 0.85 K/uL    Eos (Absolute) 1.56 (H) 0.00 - 0.51 K/uL    Baso (Absolute) 0.16 (H) 0.00 - 0.12 K/uL    Immature Granulocytes (abs) 0.06 0.00 - 0.11 K/uL    NRBC (Absolute) 0.00 K/uL   Complete Metabolic Panel (CMP)   Result Value Ref Range    Sodium 136 135 - 145 mmol/L    Potassium 4.5 3.6 - 5.5 mmol/L    Chloride 101 96 - 112 mmol/L    Co2 24 20 - 33 mmol/L    Anion Gap 11.0 7.0 - 16.0    Glucose 99 65 - 99 mg/dL    Bun 12 8 - 22 mg/dL    Creatinine 1.03 0.50 - 1.40 mg/dL    Calcium 9.4 8.5 - 10.5 mg/dL    AST(SGOT) 17 12 - 45 U/L    ALT(SGPT) 12 2 - 50 U/L    Alkaline Phosphatase 107 (H) 30 - 99 U/L    Total Bilirubin 0.6 0.1 - 1.5 mg/dL    Albumin 4.2 3.2 - 4.9 g/dL    Total Protein 7.2 6.0 - 8.2 g/dL    Globulin 3.0 1.9 - 3.5 g/dL    A-G Ratio 1.4 g/dL   Troponin   Result Value Ref Range    Troponin T 7 6 - 19 ng/L   ESTIMATED GFR   Result Value Ref Range    GFR (CKD-EPI) 58 (A) >60 mL/min/1.73 m 2   TROPONIN   Result Value Ref Range    Troponin T 6 6 - 19 ng/L   EKG   Result Value Ref Range    Report       [facility] Emergency Dept.    Test  Date:  2022  Pt Name:    CONNIE NORIEGA                    Department: ER  MRN:        [id number]                      Room:  Gender:     Female                       Technician: [id number]  :        1950                   Requested By:ER TRIAGE PROTOCOL  Order #:    [id number]                    Reading MD: RAJENDRA AGRAWAL MD    Measurements  Intervals                                Axis  Rate:       76                           P:          39  VA:         143                          QRS:        -36  QRSD:       95                           T:          53  QT:         390  QTc:        439    Interpretive Statements  Sinus rhythm  Left axis deviation  Low voltage, extremity leads  Compared to ECG 2021 09:20:02  EKG is unchanged from previous  No ischemia, no arrhythmia  Electronically Signed On 2022 11:36:33 PDT by RAJENDRA AGRAWAL MD           RADIOLOGY/PROCEDURES  DX-CHEST-PORTABLE (1 VIEW)   Final Result      No acute cardiac or pulmonary abnormalities are identified.            COURSE & MEDICAL DECISION MAKING  Pertinent Labs & Imaging studies reviewed. (See chart for details)  Patient has negative EKG for ischemia, negative serial troponins.  With her history of GERD, sudden onset of chest pressure, I suspect esophageal spasm.  The patient's heart score is 3, she is therefore low risk, does not require hospitalization for this episode of chest pressure.  Chest x-ray negative for pneumonia or tumor.  Patient is well-appearing at this time and symptom-free.  She did receive GI cocktail, remains feeling well without chest pain.  Patient advised to continue her usual medications, follow-up with her doctor for recheck as soon as possible.  Patient is advised to return to [facility] if worse or for any concerns.    FINAL IMPRESSION     1. Chest pain, unspecified type                     Electronically signed by: Rajendra Agrawal M.D., 2022 11:36 AM

## 2022-06-04 NOTE — ED NOTES
Discharge instructions reviewed with pt. Pt verbalized understanding. Pt ambulated out of er with steady gait and all belongings. With follow up instructions.

## 2022-06-04 NOTE — ED TRIAGE NOTES
"Chief Complaint   Patient presents with   • Chest Pain     Chest pain and pressure which begins in esophagus and travels to chest. Hx of GERD. Hx of Afib. Takes diltiazem. Compliant with medication. Patient denies feeling SOB.      Pt amb to triage with steady gait for above complaint.  EKG completed, protocol labs ordered.       Pt is alert and oriented, speaking in full sentences, follows commands and responds appropriately to questions. NAD. Resp are even and unlabored.     Pt placed in lobby. Pt educated on triage process. Pt encouraged to alert staff for any changes.    This RN masked and in appropriate PPE during encounter. Patient also in mask.     BP (!) 148/99   Pulse 92   Temp 36.2 °C (97.1 °F) (Temporal)   Resp 16   Ht 1.549 m (5' 1\")   Wt 75.7 kg (166 lb 14.2 oz)   SpO2 97%       "

## 2022-06-06 ENCOUNTER — OFFICE VISIT (OUTPATIENT)
Dept: MEDICAL GROUP | Facility: PHYSICIAN GROUP | Age: 72
End: 2022-06-06
Payer: MEDICARE

## 2022-06-06 VITALS
TEMPERATURE: 97.5 F | SYSTOLIC BLOOD PRESSURE: 132 MMHG | BODY MASS INDEX: 31.72 KG/M2 | RESPIRATION RATE: 17 BRPM | DIASTOLIC BLOOD PRESSURE: 78 MMHG | HEART RATE: 91 BPM | OXYGEN SATURATION: 98 % | WEIGHT: 168 LBS | HEIGHT: 61 IN

## 2022-06-06 DIAGNOSIS — R21 RASH: ICD-10-CM

## 2022-06-06 DIAGNOSIS — N18.31 STAGE 3A CHRONIC KIDNEY DISEASE: ICD-10-CM

## 2022-06-06 DIAGNOSIS — I10 PRIMARY HYPERTENSION: Chronic | ICD-10-CM

## 2022-06-06 DIAGNOSIS — R52 PAIN: ICD-10-CM

## 2022-06-06 DIAGNOSIS — B02.9 HERPES ZOSTER WITHOUT COMPLICATION: ICD-10-CM

## 2022-06-06 PROCEDURE — 99214 OFFICE O/P EST MOD 30 MIN: CPT | Performed by: INTERNAL MEDICINE

## 2022-06-06 RX ORDER — VALACYCLOVIR HYDROCHLORIDE 1 G/1
1000 TABLET, FILM COATED ORAL 3 TIMES DAILY
Qty: 21 TABLET | Refills: 0 | Status: SHIPPED | OUTPATIENT
Start: 2022-06-06 | End: 2022-06-23

## 2022-06-06 RX ORDER — HYDROCODONE BITARTRATE AND ACETAMINOPHEN 5; 325 MG/1; MG/1
1 TABLET ORAL EVERY 8 HOURS PRN
Qty: 30 TABLET | Refills: 0 | Status: SHIPPED | OUTPATIENT
Start: 2022-06-06 | End: 2022-06-16

## 2022-06-06 ASSESSMENT — FIBROSIS 4 INDEX: FIB4 SCORE: 0.96

## 2022-06-06 NOTE — PROGRESS NOTES
PRIMARY CARE CLINIC VISIT  Chief Complaint   Patient presents with   • Follow-Up     Rash    History of Present Illness     Rash  Presents today pt c.o painful rash noted R lat aspect of abd and back  Sx started 2 days ago.  Denies fever or chills      HTN (hypertension)  Chronic cond  Pt currently  On diltiazem, micardis, . bp has been well controlled  No side effects noted.    CKD (chronic kidney disease) stage 3, GFR 30-59 ml/min ([facility])  Recent lab test showed GFR 58.  Advised the patient to avoid NSAIDs.      Current Outpatient Medications on File Prior to Visit   Medication Sig Dispense Refill   • DILTIAZem CD (CARDIZEM CD) 240 MG CAPSULE SR 24 HR TAKE 1 CAPSULE BY MOUTH EVERY DAY 90 Capsule 0   • DULoxetine (CYMBALTA) 60 MG Cap DR Particles delayed-release capsule TAKE 1 CAPSULE BY MOUTH EVERY DAY 90 Capsule 0   • telmisartan (MICARDIS) 40 MG Tab TAKE 1 TABLET BY MOUTH EVERY  Tablet 0   • clobetasol (TEMOVATE) 0.05 % Ointment AAA thin layer, daily for 3 weeks, then use 2-3 times a week 60 g 1   • albuterol 108 (90 Base) MCG/ACT Aero Soln inhalation aerosol Inhale 2 Puffs every 6 hours as needed for Shortness of Breath. 8.5 g 0   • atorvastatin (LIPITOR) 10 MG Tab Take 1 Tablet by mouth every day. 100 Tablet 1   • zonisamide (ZONEGRAN) 100 MG Cap Take 100 mg by mouth every day.     • ondansetron (ZOFRAN) 4 MG Tab tablet TAKE 1 TABLET BY MOUTH ONCE DAILY AS NEEDED FOR NAUSEA     • amitriptyline (ELAVIL) 25 MG Tab TAKE 1 TABLET BY MOUTH ONCE DAILY AT BEDTIME 90 Tab 1   • fluticasone (FLOVENT HFA) 110 MCG/ACT Aerosol Inhale 2 Puffs by mouth 2 times a day. 1 Each 5   • SALINE NA Spray  in nose.     • aspirin 81 MG EC tablet Take 81 mg by mouth every day.     • pantoprazole (PROTONIX) 40 MG Tablet Delayed Response Take 40 mg by mouth every day.       No current facility-administered medications on file prior to visit.        Allergies: Clarithromycin, Ees [erythromycin], Levaquin, Pcn [penicillins], Shellfish  "allergy, Sumatriptan, and Trazodone    ROS  As per HPI above. All other systems reviewed and negative.      Past Medical, Social, and Family history reviewed and updated in EPIC     Objective     /78 (BP Location: Left arm, Patient Position: Sitting, BP Cuff Size: Adult)   Pulse 91   Temp 36.4 °C (97.5 °F) (Temporal)   Resp 17   Ht 1.549 m (5' 1\")   Wt 76.2 kg (168 lb)   SpO2 98%    Body mass index is 31.74 kg/m².    General: alert and oriented  Cardiovascular: regular rate and rhythm  Pulmonary: lungs : no wheezing   Gastrointestinal: BS present. No obvious mass noted  Skin showed groups of erythematous blisters noted in the right lateral aspect of her abdomen and in the thoracic region        Assessment and Plan     1. Rash  2. Herpes zoster without complication  Informed the patient that she has shingles.  Given her GFR of 58 I recommend patient to start Valtrex 1 g 3 times a day for 7 days.  Potential side effect of medication discussed with the patient.  Patient may take hydrocodone as needed for severe pain.  Potential side effects of this medication also discussed with the patient.  Advised the patient not to drive or operate any machinery and avoid alcoholic beverages.    - HYDROcodone-acetaminophen (NORCO) 5-325 MG Tab per tablet; Take 1 Tablet by mouth every 8 hours as needed (severe pain) for up to 10 days.  Dispense: 30 Tablet; Refill: 0  - Controlled Substance Treatment Agreement  Recommend follow-up in a couple weeks.  Warned the patient regarding the possibility of postherpetic neuralgia.  Patient signed consent form today.    3. Pain  - Controlled Substance Treatment Agreement    4. Primary hypertension  Stable continue current management.  5. Stage 3a chronic kidney disease (HCC)  Continue to monitor.  Advised the patient to avoid NSAIDs.      Other orders  - valacyclovir (VALTREX) 1 GM Tab; Take 1 Tablet by mouth 3 times a day.  Dispense: 21 Tablet; Refill: 0                     Please " note that this dictation was created using voice recognition software. I have made every reasonable attempt to correct obvious errors but there may be errors of grammar and content that I may have overlooked prior to finalization of this note.      Rudy Parsons MD  Internal Medicine  [facility]

## 2022-06-06 NOTE — ASSESSMENT & PLAN NOTE
Chronic cond  Pt currently  On diltiazem, micardis, . bp has been well controlled  No side effects noted.

## 2022-06-06 NOTE — ASSESSMENT & PLAN NOTE
Presents today pt c.o painful rash noted R lat aspect of abd and back  Sx started 2 days ago.  Denies fever or chills

## 2022-06-14 DIAGNOSIS — I10 HYPERTENSION, UNSPECIFIED TYPE: ICD-10-CM

## 2022-06-14 RX ORDER — TELMISARTAN 40 MG/1
40 TABLET ORAL DAILY
Qty: 100 TABLET | Refills: 0 | Status: SHIPPED | OUTPATIENT
Start: 2022-06-14 | End: 2022-08-13

## 2022-06-20 RX ORDER — DULOXETIN HYDROCHLORIDE 60 MG/1
60 CAPSULE, DELAYED RELEASE ORAL DAILY
Qty: 90 CAPSULE | Refills: 0 | Status: SHIPPED | OUTPATIENT
Start: 2022-06-20 | End: 2022-08-13

## 2022-06-23 ENCOUNTER — OFFICE VISIT (OUTPATIENT)
Dept: MEDICAL GROUP | Facility: PHYSICIAN GROUP | Age: 72
End: 2022-06-23
Payer: MEDICARE

## 2022-06-23 VITALS
SYSTOLIC BLOOD PRESSURE: 138 MMHG | WEIGHT: 166 LBS | DIASTOLIC BLOOD PRESSURE: 88 MMHG | HEART RATE: 94 BPM | OXYGEN SATURATION: 96 % | HEIGHT: 61 IN | BODY MASS INDEX: 31.34 KG/M2 | TEMPERATURE: 98.1 F | RESPIRATION RATE: 18 BRPM

## 2022-06-23 DIAGNOSIS — B02.9 HERPES ZOSTER WITHOUT COMPLICATION: ICD-10-CM

## 2022-06-23 DIAGNOSIS — N18.31 STAGE 3A CHRONIC KIDNEY DISEASE: ICD-10-CM

## 2022-06-23 DIAGNOSIS — I10 PRIMARY HYPERTENSION: Chronic | ICD-10-CM

## 2022-06-23 DIAGNOSIS — I48.0 PAROXYSMAL ATRIAL FIBRILLATION (HCC): ICD-10-CM

## 2022-06-23 DIAGNOSIS — J44.9 CHRONIC OBSTRUCTIVE PULMONARY DISEASE, UNSPECIFIED COPD TYPE (HCC): Chronic | ICD-10-CM

## 2022-06-23 DIAGNOSIS — R91.8 PULMONARY NODULES: ICD-10-CM

## 2022-06-23 PROCEDURE — 99214 OFFICE O/P EST MOD 30 MIN: CPT | Performed by: INTERNAL MEDICINE

## 2022-06-23 ASSESSMENT — FIBROSIS 4 INDEX: FIB4 SCORE: 0.98

## 2022-06-23 NOTE — ASSESSMENT & PLAN NOTE
Patient has completed the Valtrex recently.  Patient denies any side effects.  She has noted significant improvement.  Patient stated that no further pain or discomfort noted.  She denies fever or chills.  She denies tingling or numbness sensation.

## 2022-06-23 NOTE — ASSESSMENT & PLAN NOTE
Chronic condition.  Patient followed by cardiologist.  CHADS2 score of 1 patient on aspirin   she is currently taking diltiazem

## 2022-06-23 NOTE — ASSESSMENT & PLAN NOTE
Chronic condition.  Followed by pulmonologist.  Patient uses albuterol as needed.  She denies shortness of breath wheezing or significant cough.

## 2022-06-23 NOTE — PROGRESS NOTES
PRIMARY CARE CLINIC VISIT  Chief Complaint   Patient presents with   • Follow-Up     Follow-up shingles    History of Present Illness     Shingles  Patient has completed the Valtrex recently.  Patient denies any side effects.  She has noted significant improvement.  Patient stated that no further pain or discomfort noted.  She denies fever or chills.  She denies tingling or numbness sensation.    Paroxysmal atrial fibrillation (HCC)  Chronic condition.  Patient followed by cardiologist.  CHADS2 score of 1 patient on aspirin   she is currently taking diltiazem    CKD (chronic kidney disease) stage 3, GFR 30-59 ml/min (HCC)  Previous GFR in the 50s.  Advised the patient to avoid NSAIDs.  Recommend to continue to monitor    Pulmonary nodules  Chest CT scan completed May 2022 showed stable bilateral right greater than left pulmonary nodules measuring up to 6 mm in size.    Recommended for the patient to repeat and we have order CT scan to be done for May 2023.  Also advised the patient to follow-up with pulmonologist.    HTN (hypertension)  Chronic condition.  Patient is taking Micardis and diltiazem.  Blood pressure has been well controlled.  No significant side effects reported.    COPD (chronic obstructive pulmonary disease) (HCC)  Chronic condition.  Followed by pulmonologist.  Patient uses albuterol as needed.  She denies shortness of breath wheezing or significant cough.      Current Outpatient Medications on File Prior to Visit   Medication Sig Dispense Refill   • DULoxetine (CYMBALTA) 60 MG Cap DR Particles delayed-release capsule TAKE 1 CAPSULE BY MOUTH EVERY DAY 90 Capsule 0   • telmisartan (MICARDIS) 40 MG Tab TAKE 1 TABLET BY MOUTH EVERY  Tablet 0   • DILTIAZem CD (CARDIZEM CD) 240 MG CAPSULE SR 24 HR TAKE 1 CAPSULE BY MOUTH EVERY DAY 90 Capsule 0   • clobetasol (TEMOVATE) 0.05 % Ointment AAA thin layer, daily for 3 weeks, then use 2-3 times a week 60 g 1   • albuterol 108 (90 Base) MCG/ACT Aero Soln  "inhalation aerosol Inhale 2 Puffs every 6 hours as needed for Shortness of Breath. 8.5 g 0   • atorvastatin (LIPITOR) 10 MG Tab Take 1 Tablet by mouth every day. 100 Tablet 1   • zonisamide (ZONEGRAN) 100 MG Cap Take 100 mg by mouth every day.     • ondansetron (ZOFRAN) 4 MG Tab tablet TAKE 1 TABLET BY MOUTH ONCE DAILY AS NEEDED FOR NAUSEA     • amitriptyline (ELAVIL) 25 MG Tab TAKE 1 TABLET BY MOUTH ONCE DAILY AT BEDTIME 90 Tab 1   • fluticasone (FLOVENT HFA) 110 MCG/ACT Aerosol Inhale 2 Puffs by mouth 2 times a day. 1 Each 5   • SALINE NA Spray  in nose.     • aspirin 81 MG EC tablet Take 81 mg by mouth every day.     • pantoprazole (PROTONIX) 40 MG Tablet Delayed Response Take 40 mg by mouth every day.       No current facility-administered medications on file prior to visit.        Allergies: Clarithromycin, Ees [erythromycin], Levaquin, Pcn [penicillins], Shellfish allergy, Sumatriptan, and Trazodone    ROS  As per HPI above. All other systems reviewed and negative.      Past Medical, Social, and Family history reviewed and updated in EPIC     Objective     /88 (BP Location: Left arm, Patient Position: Sitting, BP Cuff Size: Adult)   Pulse 94   Temp 36.7 °C (98.1 °F) (Temporal)   Resp 18   Ht 1.549 m (5' 1\")   Wt 75.3 kg (166 lb)   SpO2 96%    Body mass index is 31.37 kg/m².    General: alert and oriented  Cardiovascular: regular rate and rhythm  Pulmonary: lungs : no wheezing   Gastrointestinal: BS present. No obvious mass noted  Skin no further blisters noted there is no redness discharge or fluctuance or any signs of soft tissue infection.        Assessment and Plan     1. Herpes zoster without complication  Clinically resolved.  Patient denies further pain or discomfort.  Recommend patient to consider getting a shingles vaccine in 1 year  2. Paroxysmal atrial fibrillation (HCC)  Chronic stable condition.  Continue current management.  Patient is asymptomatic.  Recommend to continue follow-up " with cardiologist as directed.  3. Stage 3a chronic kidney disease (HCC)  Advised the patient to avoid NSAIDs.  Continue to monitor.  4. Pulmonary nodules  - CT-CHEST (THORAX) W/O; Future  Chest CT scan ordered for May 2023.  Also advised the patient to continue follow-up with pulmonologist  5. Primary hypertension  Stable continue current management.  6. Chronic obstructive pulmonary disease, unspecified COPD type (HCC)  Stable continue with albuterol as needed.  Patient declined maintenance medication                         Please note that this dictation was created using voice recognition software. I have made every reasonable attempt to correct obvious errors but there may be errors of grammar and content that I may have overlooked prior to finalization of this note.      Rudy Parsons MD  Internal Medicine  [facility]

## 2022-06-23 NOTE — ASSESSMENT & PLAN NOTE
Chronic condition.  Patient is taking Micardis and diltiazem.  Blood pressure has been well controlled.  No significant side effects reported.

## 2022-06-23 NOTE — ASSESSMENT & PLAN NOTE
Chest CT scan completed May 2022 showed stable bilateral right greater than left pulmonary nodules measuring up to 6 mm in size.    Recommended for the patient to repeat and we have order CT scan to be done for May 2023.  Also advised the patient to follow-up with pulmonologist.

## 2022-06-27 ENCOUNTER — OFFICE VISIT (OUTPATIENT)
Dept: CARDIOLOGY | Facility: MEDICAL CENTER | Age: 72
End: 2022-06-27
Attending: INTERNAL MEDICINE
Payer: MEDICARE

## 2022-06-27 VITALS
WEIGHT: 162.8 LBS | SYSTOLIC BLOOD PRESSURE: 120 MMHG | BODY MASS INDEX: 30.73 KG/M2 | OXYGEN SATURATION: 96 % | DIASTOLIC BLOOD PRESSURE: 78 MMHG | HEART RATE: 92 BPM | HEIGHT: 61 IN | RESPIRATION RATE: 16 BRPM

## 2022-06-27 DIAGNOSIS — E78.5 HYPERLIPIDEMIA, UNSPECIFIED HYPERLIPIDEMIA TYPE: ICD-10-CM

## 2022-06-27 DIAGNOSIS — I48.0 PAROXYSMAL ATRIAL FIBRILLATION (HCC): ICD-10-CM

## 2022-06-27 DIAGNOSIS — I10 PRIMARY HYPERTENSION: Chronic | ICD-10-CM

## 2022-06-27 DIAGNOSIS — G47.33 OSA (OBSTRUCTIVE SLEEP APNEA): Chronic | ICD-10-CM

## 2022-06-27 PROCEDURE — 99213 OFFICE O/P EST LOW 20 MIN: CPT | Performed by: NURSE PRACTITIONER

## 2022-06-27 ASSESSMENT — ENCOUNTER SYMPTOMS
FOCAL WEAKNESS: 0
HEADACHES: 0
PALPITATIONS: 0
WEAKNESS: 0
FALLS: 0
BLURRED VISION: 0
DOUBLE VISION: 0
WHEEZING: 0
ORTHOPNEA: 0
COUGH: 0
NERVOUS/ANXIOUS: 0
LOSS OF CONSCIOUSNESS: 0
DIZZINESS: 0
SHORTNESS OF BREATH: 0

## 2022-06-27 ASSESSMENT — FIBROSIS 4 INDEX: FIB4 SCORE: 0.98

## 2022-06-27 NOTE — PROGRESS NOTES
Chief Complaint   Patient presents with   • Atrial Fibrillation     F/V Dx:Paroxysmal atrial fibrillation (HCC)   • Hypertension       Subjective     Augusta Rodriguez is a 72 y.o. female who presents today for follow up     She is followed by Dr. Ruff in our clinic, history of  chronic sinusitis, obstructive sleep apnea on CPAP, hypertension, obesity, atrial fibrillation, RLS and asthma.     ED with chest pain 6/4/2022. Trop and ekg was negative. She then followed up with primary care provider and diagnosed her with shingles.     Patient had jaw pain radiating to her chest lasting 30 seconds in which she went to ER on 6/4/2022. She got concerned since it happen previously a year ago.     Past Medical History:   Diagnosis Date   • Apnea, sleep    • Arrhythmia     afib   • Arthritis     osteo   • Asthma    • Atrial fibrillation (HCC)    • Back pain    • Bronchitis    • Chickenpox    • Constipation    • Cough    • Daytime sleepiness    • Dental disorder     upper dentures   • Earache    • Frequent urination    • Gasping for breath    • GERD (gastroesophageal reflux disease) 2/27/2010   • Polish measles    • Heartburn    • Hypertension    • Impaired fasting glucose 2/27/2010   • Incontinence of urine    • Indigestion    • Influenza    • Mumps    • Nasal drainage    • Nausea    • Osteopenia 2/24/2010   • Osteoporosis    • Other specified disorder of intestines     constipation r/t meds   • Restless leg syndrome    • Scarlet fever    • Shortness of breath    • Sleep apnea    • Snoring    • Tremor, essential 2/24/2010   • Urinary bladder disorder    • Wears glasses    • Wheezing    • Whooping cough      Past Surgical History:   Procedure Laterality Date   • LUMBAR LAMINECTOMY DISKECTOMY Bilateral 2/4/2017    Procedure: LUMBAR LAMINECTOMY DISKECTOMY POSTERIOR L4-S1 ;  Surgeon: Emil Hitchcock M.D.;  Location: SURGERY [facility];  Service:    • CARPAL TUNNEL ENDOSCOPIC  11/17/2012    Performed by Heriberto Quiñones M.D.  at SURGERY [facility] ORS   • TRIGGER FINGER RELEASE  11/17/2012    Performed by Heriberto Quiñones M.D. at SURGERY [facility] ORS   • HIP ARTHROSCOPY  2/23/2009    Performed by SHERLYN CALVO at SURGERY [facility] ORS   • ACETABULAR OSTEOTOMY  2/23/2009    Performed by SHERLYN CALVO at SURGERY [facility] ORS   • MASS EXCISION ORTHO  2/23/2009    Performed by SHERLYN CALVO at SURGERY [facility] ORS   • HIP ARTHROSCOPY  2005    done at [facility]   • HAJA BY LAPAROSCOPY  1997   • HYSTERECTOMY, TOTAL ABDOMINAL  1979    oopherectomy lacie   • BLADDER SUSPENSION      bladder sling   • CARPAL TUNNEL RELEASE     • HIP REPLACEMENT, TOTAL     • HYSTERECTOMY LAPAROSCOPY     • LAMINOTOMY     • PRIMARY C SECTION  1970/1975     Family History   Problem Relation Age of Onset   • GI Disease Father         Crohn's   • Heart Disease Father         First MI age 30   • Hypertension Father    • Stroke Father    • Hypertension Other    • Cancer Brother         ESOPHAGEAL CANCER     Social History     Socioeconomic History   • Marital status:      Spouse name: Not on file   • Number of children: Not on file   • Years of education: Not on file   • Highest education level: Not on file   Occupational History   • Not on file   Tobacco Use   • Smoking status: Never Smoker   • Smokeless tobacco: Never Used   Vaping Use   • Vaping Use: Never used   Substance and Sexual Activity   • Alcohol use: Yes     Alcohol/week: 0.0 oz     Comment: Stopped drinking 45 days ago 7/12/17 Going to    • Drug use: No   • Sexual activity: Never   Other Topics Concern   • Not on file   Social History Narrative   • Not on file     Social Determinants of Health     Financial Resource Strain: Not on file   Food Insecurity: Not on file   Transportation Needs: Not on file   Physical Activity: Not on file   Stress: Not on file   Social Connections: Not on file   Intimate Partner Violence: Not on file   Housing Stability: Not on file     Allergies    Allergen Reactions   • Clarithromycin      Swelling/Itching/Nausea   • Ees [Erythromycin]      Swelling/Itching/Nausea   • Levaquin      Muscle soreness    • Pcn [Penicillins]      Swelling/Itching/Nausea    • Shellfish Allergy      Swelling/Itching/Nausea   • Sumatriptan Swelling     Tongue swell   • Trazodone Swelling     Outpatient Encounter Medications as of 6/27/2022   Medication Sig Dispense Refill   • DULoxetine (CYMBALTA) 60 MG Cap DR Particles delayed-release capsule TAKE 1 CAPSULE BY MOUTH EVERY DAY 90 Capsule 0   • telmisartan (MICARDIS) 40 MG Tab TAKE 1 TABLET BY MOUTH EVERY  Tablet 0   • DILTIAZem CD (CARDIZEM CD) 240 MG CAPSULE SR 24 HR TAKE 1 CAPSULE BY MOUTH EVERY DAY 90 Capsule 0   • clobetasol (TEMOVATE) 0.05 % Ointment AAA thin layer, daily for 3 weeks, then use 2-3 times a week 60 g 1   • albuterol 108 (90 Base) MCG/ACT Aero Soln inhalation aerosol Inhale 2 Puffs every 6 hours as needed for Shortness of Breath. 8.5 g 0   • atorvastatin (LIPITOR) 10 MG Tab Take 1 Tablet by mouth every day. 100 Tablet 1   • zonisamide (ZONEGRAN) 100 MG Cap Take 100 mg by mouth every day.     • ondansetron (ZOFRAN) 4 MG Tab tablet TAKE 1 TABLET BY MOUTH ONCE DAILY AS NEEDED FOR NAUSEA     • amitriptyline (ELAVIL) 25 MG Tab TAKE 1 TABLET BY MOUTH ONCE DAILY AT BEDTIME 90 Tab 1   • fluticasone (FLOVENT HFA) 110 MCG/ACT Aerosol Inhale 2 Puffs by mouth 2 times a day. 1 Each 5   • SALINE NA Spray  in nose.     • aspirin 81 MG EC tablet Take 81 mg by mouth every day.     • pantoprazole (PROTONIX) 40 MG Tablet Delayed Response Take 40 mg by mouth every day.       No facility-administered encounter medications on file as of 6/27/2022.     Review of Systems   Constitutional: Negative for malaise/fatigue.   Eyes: Negative for blurred vision and double vision.   Respiratory: Negative for cough, shortness of breath and wheezing.    Cardiovascular: Negative for chest pain, palpitations, orthopnea and leg swelling.  "  Musculoskeletal: Negative for falls.   Neurological: Negative for dizziness, focal weakness, loss of consciousness, weakness and headaches.   Psychiatric/Behavioral: The patient is not nervous/anxious.    All other systems reviewed and are negative.             Objective     /78 (BP Location: Left arm, Patient Position: Sitting, BP Cuff Size: Adult)   Pulse 92   Resp 16   Ht 1.549 m (5' 1\")   Wt 73.8 kg (162 lb 12.8 oz)   SpO2 96%   BMI 30.76 kg/m²     Physical Exam  Constitutional:       General: She is not in acute distress.     Appearance: She is well-developed. She is not diaphoretic.   HENT:      Head: Normocephalic and atraumatic.   Eyes:      Pupils: Pupils are equal, round, and reactive to light.   Neck:      Vascular: No JVD.   Cardiovascular:      Rate and Rhythm: Normal rate and regular rhythm.      Heart sounds: Normal heart sounds.   Pulmonary:      Effort: Pulmonary effort is normal.      Breath sounds: Normal breath sounds.   Abdominal:      General: Bowel sounds are normal. There is no distension.      Palpations: Abdomen is soft.   Skin:     General: Skin is warm and dry.   Neurological:      Mental Status: She is alert and oriented to person, place, and time.   Psychiatric:         Behavior: Behavior normal.         Thought Content: Thought content normal.         Judgment: Judgment normal.            Nuclear Perfusion Imaging (6/28/2018):   No evidence of significant jeopardized viable myocardium or prior myocardial    infarction.    Normal left ventricular size, ejection fraction, and wall motion.    ECG INTERPRETATION    Negative stress ECG for ischemia.        Radiology test Review:  CT chest without contrast: IMPRESSION:     1.  Bilateral pulmonary nodules which measure up to 7 mm in size.  2.  No evidence of mediastinal or hilar jackie enlargement.  3.  Mild emphysematous change of the lungs with scattered mild bullous change.       ECHO  10/22/2020  No prior study is available " for comparison.   Small left ventricular size, normal wall thickness and hyperdynamic   systolic function with near obliteration of the left ventricle.  Left ventricular ejection fraction is estimated to be greater than 75%.  Normal right ventricular size and function.  Grade 1 diastolic dysfunction.  No hemodynamically significant valvular heart disease.  Ascending aorta diameter 3.8 cm.    CT chest   5/25/2022  Lungs: There is emphysematous change of the lungs.  There is a stable 5 mm pulmonary nodule within the right lower lobe posteriorly on image 81. There is an adjacent calcified granuloma.  There is a subpleural nodule within the right lower lobe measuring 6 mm in size on image 82, stable.  There is a stable subpleural nodule on image 79 within the right middle lobe measuring 6 mm in size, stable.  There is an oblong partially calcified nodule within the right middle lobe medially which measures 5 mm in size, unchanged. There are other small 4 mm less pulmonary nodules within the right middle lobe inferiorly, unchanged.  There is a stable tiny subpleural nodule within the left lingula measuring 2 mm in size in image 68. There is a stable 3 mm nodule within the left lingula inferiorly in an area of apical scarring on image 90.     Mediastinum/Veronica: No significant adenopathy. There is a moderate-sized hiatal hernia.     Pleura: No pleural effusion.     Cardiac: Heart normal in size without pericardial effusion.     Vascular: Unremarkable.     Soft tissues: Unremarkable.     Bones: Fatty liver. Prior cholecystectomy. There is a large left upper pole simple appearing renal cyst which is not completely imaged and measures 10 cm in size or greater. No follow-up recommended.           IMPRESSION:     1.  Stable bilateral, right greater than left pulmonary nodules measuring up to 6 mm in size.     2.  Emphysematous change of the lungs.     3.  Hiatal hernia.      Assessment & Plan     1. Hyperlipidemia, unspecified  hyperlipidemia type     2. Primary hypertension     3. BRIANA (obstructive sleep apnea)     4. Paroxysmal atrial fibrillation (HCC)         Medical Decision Making: Today's Assessment/Status/Plan:        1. Paroxysmal atrial fibrillation:  - one occurs   - recent ekg showed no Afib or the monitor in the ED   - if reoccurs she will need OAC     2. Hypertension:  - stable     3. BRIANA:  - on cpap     Follow up in 6 months, sooner as needed.

## 2022-07-05 ENCOUNTER — PATIENT OUTREACH (OUTPATIENT)
Dept: HEALTH INFORMATION MANAGEMENT | Facility: OTHER | Age: 72
End: 2022-07-05

## 2022-07-05 ENCOUNTER — OFFICE VISIT (OUTPATIENT)
Dept: MEDICAL GROUP | Facility: PHYSICIAN GROUP | Age: 72
End: 2022-07-05
Payer: MEDICARE

## 2022-07-05 VITALS
SYSTOLIC BLOOD PRESSURE: 130 MMHG | TEMPERATURE: 97.7 F | BODY MASS INDEX: 31.34 KG/M2 | DIASTOLIC BLOOD PRESSURE: 78 MMHG | RESPIRATION RATE: 16 BRPM | HEART RATE: 87 BPM | OXYGEN SATURATION: 95 % | WEIGHT: 166 LBS | HEIGHT: 61 IN

## 2022-07-05 DIAGNOSIS — N18.31 STAGE 3A CHRONIC KIDNEY DISEASE: ICD-10-CM

## 2022-07-05 DIAGNOSIS — B02.29 POSTHERPETIC NEURALGIA: ICD-10-CM

## 2022-07-05 DIAGNOSIS — J44.9 CHRONIC OBSTRUCTIVE PULMONARY DISEASE, UNSPECIFIED COPD TYPE (HCC): Chronic | ICD-10-CM

## 2022-07-05 DIAGNOSIS — I10 PRIMARY HYPERTENSION: Chronic | ICD-10-CM

## 2022-07-05 DIAGNOSIS — I10 PRIMARY HYPERTENSION: ICD-10-CM

## 2022-07-05 DIAGNOSIS — J44.9 CHRONIC OBSTRUCTIVE PULMONARY DISEASE, UNSPECIFIED COPD TYPE (HCC): ICD-10-CM

## 2022-07-05 DIAGNOSIS — F39 MOOD DISORDER (HCC): Chronic | ICD-10-CM

## 2022-07-05 DIAGNOSIS — R09.81 SINUS CONGESTION: ICD-10-CM

## 2022-07-05 DIAGNOSIS — Z12.11 COLON CANCER SCREENING: ICD-10-CM

## 2022-07-05 DIAGNOSIS — I48.0 PAROXYSMAL ATRIAL FIBRILLATION (HCC): ICD-10-CM

## 2022-07-05 PROCEDURE — 99490 CHRNC CARE MGMT STAFF 1ST 20: CPT | Performed by: INTERNAL MEDICINE

## 2022-07-05 PROCEDURE — 99214 OFFICE O/P EST MOD 30 MIN: CPT | Performed by: INTERNAL MEDICINE

## 2022-07-05 PROCEDURE — 99439 CHRNC CARE MGMT STAF EA ADDL: CPT | Performed by: INTERNAL MEDICINE

## 2022-07-05 RX ORDER — DOXYCYCLINE 100 MG/1
100 CAPSULE ORAL 2 TIMES DAILY
Qty: 14 CAPSULE | Refills: 0 | Status: SHIPPED | OUTPATIENT
Start: 2022-07-05 | End: 2022-08-17

## 2022-07-05 SDOH — ECONOMIC STABILITY: FOOD INSECURITY: WITHIN THE PAST 12 MONTHS, THE FOOD YOU BOUGHT JUST DIDN'T LAST AND YOU DIDN'T HAVE MONEY TO GET MORE.: SOMETIMES TRUE

## 2022-07-05 SDOH — ECONOMIC STABILITY: TRANSPORTATION INSECURITY
IN THE PAST 12 MONTHS, HAS THE LACK OF TRANSPORTATION KEPT YOU FROM MEDICAL APPOINTMENTS OR FROM GETTING MEDICATIONS?: NO

## 2022-07-05 SDOH — ECONOMIC STABILITY: INCOME INSECURITY: IN THE LAST 12 MONTHS, WAS THERE A TIME WHEN YOU WERE NOT ABLE TO PAY THE MORTGAGE OR RENT ON TIME?: NO

## 2022-07-05 SDOH — ECONOMIC STABILITY: FOOD INSECURITY: WITHIN THE PAST 12 MONTHS, YOU WORRIED THAT YOUR FOOD WOULD RUN OUT BEFORE YOU GOT MONEY TO BUY MORE.: SOMETIMES TRUE

## 2022-07-05 SDOH — ECONOMIC STABILITY: HOUSING INSECURITY
IN THE LAST 12 MONTHS, WAS THERE A TIME WHEN YOU DID NOT HAVE A STEADY PLACE TO SLEEP OR SLEPT IN A SHELTER (INCLUDING NOW)?: NO

## 2022-07-05 SDOH — ECONOMIC STABILITY: TRANSPORTATION INSECURITY
IN THE PAST 12 MONTHS, HAS LACK OF TRANSPORTATION KEPT YOU FROM MEETINGS, WORK, OR FROM GETTING THINGS NEEDED FOR DAILY LIVING?: NO

## 2022-07-05 SDOH — ECONOMIC STABILITY: HOUSING INSECURITY: IN THE LAST 12 MONTHS, HOW MANY PLACES HAVE YOU LIVED?: 1

## 2022-07-05 ASSESSMENT — SOCIAL DETERMINANTS OF HEALTH (SDOH): HOW HARD IS IT FOR YOU TO PAY FOR THE VERY BASICS LIKE FOOD, HOUSING, MEDICAL CARE, AND HEATING?: HARD

## 2022-07-05 ASSESSMENT — LIFESTYLE VARIABLES
AUDIT-C TOTAL SCORE: 1
HOW OFTEN DO YOU HAVE SIX OR MORE DRINKS ON ONE OCCASION: NEVER
HOW OFTEN DO YOU HAVE A DRINK CONTAINING ALCOHOL: MONTHLY OR LESS
HOW MANY STANDARD DRINKS CONTAINING ALCOHOL DO YOU HAVE ON A TYPICAL DAY: 1 OR 2
SKIP TO QUESTIONS 9-10: 1

## 2022-07-05 ASSESSMENT — PATIENT HEALTH QUESTIONNAIRE - PHQ9: CLINICAL INTERPRETATION OF PHQ2 SCORE: 0

## 2022-07-05 ASSESSMENT — FIBROSIS 4 INDEX: FIB4 SCORE: 0.98

## 2022-07-05 NOTE — ASSESSMENT & PLAN NOTE
This is a chronic condition.  The patient is currently using Flovent 2 puffs twice daily and albuterol as needed.

## 2022-07-05 NOTE — PROGRESS NOTES
PRIMARY CARE CLINIC VISIT  Chief Complaint   Patient presents with   • Follow-Up   Sinus congestion/drainage  Follow-up hypertension  Follow-up shingles      History of Present Illness     Mood disorder ([facility])  This is a chronic condition.  The patient presently taking  Cymbalta  No significant side effects reported.  Patient denies SI.    COPD (chronic obstructive pulmonary disease) ([facility])  This is a chronic condition.  The patient is currently using Flovent 2 puffs twice daily and albuterol as needed.    Paroxysmal atrial fibrillation ([facility])  Patient followed by cardiology service.  Her chads score of 1.    The patient is currently taking aspirin 81 mg daily and diltiazem.  Patient denies chest pain shortness of breath palpitation or near syncope.    CKD (chronic kidney disease) stage 3, GFR 30-59 ml/min ([facility])  Chronic condition.  Previous GFR in the 50s.  Advised the patient to avoid NSAIDs.    HTN (hypertension)  Chronic condition.  The patient is taking diltiazem daily.  She also take Micardis.  Blood pressure has been well controlled.  No significant side effects reported.    Sinus congestion  New condition    Pt reported symptoms started x 3 days    Current symptoms: Sinus pressure congestion sinus drainage green color    Fever/chills: Denies    SOB /wheezing: Denies    Denies recent COVID exposure.          Postherpetic neuralgia  Patient was recently treated for shingle infection recently.  The patient stated that the rash has resolved however the patient still noted intermittent burning discomfort in the mid back region.  Patient denies fever or chills.      Current Outpatient Medications on File Prior to Visit   Medication Sig Dispense Refill   • DULoxetine (CYMBALTA) 60 MG Cap DR Particles delayed-release capsule TAKE 1 CAPSULE BY MOUTH EVERY DAY 90 Capsule 0   • telmisartan (MICARDIS) 40 MG Tab TAKE 1 TABLET BY MOUTH EVERY  Tablet 0   • DILTIAZem CD (CARDIZEM CD) 240 MG CAPSULE SR 24 HR TAKE 1  "CAPSULE BY MOUTH EVERY DAY 90 Capsule 0   • albuterol 108 (90 Base) MCG/ACT Aero Soln inhalation aerosol Inhale 2 Puffs every 6 hours as needed for Shortness of Breath. 8.5 g 0   • atorvastatin (LIPITOR) 10 MG Tab Take 1 Tablet by mouth every day. 100 Tablet 1   • zonisamide (ZONEGRAN) 100 MG Cap Take 100 mg by mouth every day.     • ondansetron (ZOFRAN) 4 MG Tab tablet TAKE 1 TABLET BY MOUTH ONCE DAILY AS NEEDED FOR NAUSEA     • amitriptyline (ELAVIL) 25 MG Tab TAKE 1 TABLET BY MOUTH ONCE DAILY AT BEDTIME 90 Tab 1   • fluticasone (FLOVENT HFA) 110 MCG/ACT Aerosol Inhale 2 Puffs by mouth 2 times a day. 1 Each 5   • SALINE NA Spray  in nose.     • pantoprazole (PROTONIX) 40 MG Tablet Delayed Response Take 40 mg by mouth every day.     • clobetasol (TEMOVATE) 0.05 % Ointment AAA thin layer, daily for 3 weeks, then use 2-3 times a week 60 g 1   • aspirin 81 MG EC tablet Take 81 mg by mouth every day.       No current facility-administered medications on file prior to visit.        Allergies: Clarithromycin, Ees [erythromycin], Levaquin, Pcn [penicillins], Shellfish allergy, Sumatriptan, and Trazodone    ROS  As per HPI above. All other systems reviewed and negative.      Past Medical, Social, and Family history reviewed and updated in EPIC     Objective     /78 (BP Location: Left arm, Patient Position: Sitting, BP Cuff Size: Adult)   Pulse 87   Temp 36.5 °C (97.7 °F) (Temporal)   Resp 16   Ht 1.549 m (5' 1\")   Wt 75.3 kg (166 lb)   SpO2 95%    Body mass index is 31.37 kg/m².    General: alert and oriented  Cardiovascular: regular rate and rhythm  Pulmonary: lungs : no wheezing   Gastrointestinal: BS present. No obvious mass noted  Sinuses with moderate mucosal formation and purulent drainage noted bilaterally  Oropharynx no exudates  \Skin: Rash has healed.  No blister formation or discharge noted no fluctuance.  Skin is not warm to the touch        Assessment and Plan     1. Mood disorder (HCC)  This is a " chronic condition.  Continue with duloxetine.  2. Chronic obstructive pulmonary disease, unspecified COPD type (HCC)  Chronic condition.  Continue with Flovent twice daily.  Albuterol as needed.  3. Paroxysmal atrial fibrillation (HCC)  This is a chronic condition.  The patient will continue with current management and follow-up with cardiology service as directed.  4. Stage 3a chronic kidney disease (HCC)  This is a chronic and stable condition.  Recommend to continue to monitor.  Advised the patient to avoid NSAIDs.  5. Primary hypertension  BP stable.  Continue current management.  6. Sinus congestion  Examination today consistent with acute sinusitis.  Prescribed doxycycline 100 mg p.o. twice daily for 7 days.  Recommend nasal irrigation.  Follow-up if not better.  7. Postherpetic neuralgia  Discussed with the patient regarding postherpetic neuralgia.  Today I offered to treat her with gabapentin.  Potential side effects of the medication discussed with the patient.  The patient declined treatment.                      Healthcare Maintenance     Health Maintenance Due   Topic Date Due   • Annual Wellness Visit  05/12/2022   • COVID-19 Vaccine (4 - Booster for Moderna series) 05/20/2022   • COLORECTAL CANCER SCREENING  07/26/2022     Patient stated that she has an appointment for colonoscopy August 2022  Commend patient to go to local pharmacy to get COVID booster vaccine.           Please note that this dictation was created using voice recognition software. I have made every reasonable attempt to correct obvious errors but there may be errors of grammar and content that I may have overlooked prior to finalization of this note.      Rudy Parsons MD  Internal Medicine  [facility]

## 2022-07-05 NOTE — ASSESSMENT & PLAN NOTE
New condition    Pt reported symptoms started x 3 days    Current symptoms: Sinus pressure congestion sinus drainage green color    Fever/chills: Denies    SOB /wheezing: Denies    Denies recent COVID exposure.

## 2022-07-05 NOTE — ASSESSMENT & PLAN NOTE
Chronic condition.  The patient is taking diltiazem daily.  She also take Micardis.  Blood pressure has been well controlled.  No significant side effects reported.

## 2022-07-05 NOTE — ASSESSMENT & PLAN NOTE
Patient followed by cardiology service.  Her chads score of 1.    The patient is currently taking aspirin 81 mg daily and diltiazem.  Patient denies chest pain shortness of breath palpitation or near syncope.

## 2022-07-05 NOTE — ASSESSMENT & PLAN NOTE
Patient was recently treated for shingle infection recently.  The patient stated that the rash has resolved however the patient still noted intermittent burning discomfort in the mid back region.  Patient denies fever or chills.

## 2022-07-05 NOTE — ASSESSMENT & PLAN NOTE
This is a chronic condition.  The patient presently taking  Cymbalta  No significant side effects reported.  Patient denies SI.

## 2022-07-06 ENCOUNTER — HOSPITAL ENCOUNTER (OUTPATIENT)
Dept: RADIOLOGY | Facility: MEDICAL CENTER | Age: 72
End: 2022-07-06
Attending: NURSE PRACTITIONER
Payer: MEDICARE

## 2022-07-06 DIAGNOSIS — R51.9 NONINTRACTABLE HEADACHE, UNSPECIFIED CHRONICITY PATTERN, UNSPECIFIED HEADACHE TYPE: ICD-10-CM

## 2022-07-06 DIAGNOSIS — I48.20 CHRONIC ATRIAL FIBRILLATION (HCC): ICD-10-CM

## 2022-07-06 DIAGNOSIS — G44.89 OTHER HEADACHE SYNDROME: ICD-10-CM

## 2022-07-06 DIAGNOSIS — G31.84 MILD COGNITIVE IMPAIRMENT, SO STATED: ICD-10-CM

## 2022-07-06 PROCEDURE — 70551 MRI BRAIN STEM W/O DYE: CPT

## 2022-07-06 NOTE — PROGRESS NOTES
INITIAL CARE MANAGEMENT CARE PLAN/ASSESEMENT     Abraham is a 72 year old female in office today with her  to see her PCP Dr. Rudy Parsons. I saw her following her appointment and introduced the CCM program. Abraham Verbalized her full name,  and her intent to enroll in CCM service. She was given the personal Care management brochure with this CCM managers contact information for questions. Intake done. Abraham lives in a manufactured home with her  on 1/3 of an acre of property, they do have a few steps as well as a ramp into their house. They have dogs that she walks daily but is only able to walk short distances before she feels out of breath. She would like to increase her activity level and be able to walk further without feeling SOB. Her and her  did express some concerns regarding financial needs monthly however are not concerned for housing or transportation at this time. They do use the food pantries around [facility] and I encouraged them to check out [employer] pant[facility] on [address] for fresher more options since they expressed concern for not many choices of food at the pantries. Abraham follows a regular diet and eats whatever she likes throughout the day without regards to portion sizes or calories. Abraham expressed the want to loose weight and we discussed portion size and proper choices for food to decrease calories. Their daughter lives with them and is a diabetic so I encouraged them to include her when meal planning for health meals and diabetic appropriate food choices. Medication reviewed with abraham and she said she is able to get all her medications and does take them as prescribed. She is not concerned about functional ability at this time, she does not use any DME equipment but did mention she has a rail on her bed. Made a plan for the following weeks to decrease ice cream amount and try increasing her activity by 4-5 steps at a time each direction she walks when walking the dogs and  increase as she feels comfortable and able to do so. Will follow up with her in 2 weeks and see how she is doing as well as [facility] this time for resources as needed.     Medication Self-Management Goals:     Reviewed medications listed below with patient.      Current Outpatient Medications:   •  doxycycline (MONODOX) 100 MG capsule, Take 1 Capsule by mouth 2 times a day., Disp: 14 Capsule, Rfl: 0  •  DULoxetine (CYMBALTA) 60 MG Cap DR Particles delayed-release capsule, TAKE 1 CAPSULE BY MOUTH EVERY DAY, Disp: 90 Capsule, Rfl: 0  •  telmisartan (MICARDIS) 40 MG Tab, TAKE 1 TABLET BY MOUTH EVERY DAY, Disp: 100 Tablet, Rfl: 0  •  DILTIAZem CD (CARDIZEM CD) 240 MG CAPSULE SR 24 HR, TAKE 1 CAPSULE BY MOUTH EVERY DAY, Disp: 90 Capsule, Rfl: 0  •  clobetasol (TEMOVATE) 0.05 % Ointment, AAA thin layer, daily for 3 weeks, then use 2-3 times a week, Disp: 60 g, Rfl: 1  •  albuterol 108 (90 Base) MCG/ACT Aero Soln inhalation aerosol, Inhale 2 Puffs every 6 hours as needed for Shortness of Breath., Disp: 8.5 g, Rfl: 0  •  atorvastatin (LIPITOR) 10 MG Tab, Take 1 Tablet by mouth every day., Disp: 100 Tablet, Rfl: 1  •  zonisamide (ZONEGRAN) 100 MG Cap, Take 100 mg by mouth every day., Disp: , Rfl:   •  ondansetron (ZOFRAN) 4 MG Tab tablet, TAKE 1 TABLET BY MOUTH ONCE DAILY AS NEEDED FOR NAUSEA, Disp: , Rfl:   •  amitriptyline (ELAVIL) 25 MG Tab, TAKE 1 TABLET BY MOUTH ONCE DAILY AT BEDTIME, Disp: 90 Tab, Rfl: 1  •  fluticasone (FLOVENT HFA) 110 MCG/ACT Aerosol, Inhale 2 Puffs by mouth 2 times a day., Disp: 1 Each, Rfl: 5  •  SALINE NA, Spray  in nose., Disp: , Rfl:   •  aspirin 81 MG EC tablet, Take 81 mg by mouth every day., Disp: , Rfl:   •  pantoprazole (PROTONIX) 40 MG Tablet Delayed Response, Take 40 mg by mouth every day., Disp: , Rfl:          Goal: Medication adherence       Physical/Functional/Environmental Status:   patient has no functional concerns at this time.        One or more falls  in the last year: No  Advised to use a cane or walker to get around safely: No  Feels unsteady when walking: Yes (Sometimes)  Steadies self on furniture while walking at home: No  Worried about falling: No  Needs to push with hands when rising from a chair: No  Has trouble stepping up onto a curb / using stairs: No  Often has to rush to the toilet: Yes  Has lost some feeling in feet: No  Takes medicine that makes him/her feel lightheaded or more tired than usual: No  Takes medicine to sleep or improve mood: Yes  Often feels sad or depressed: No  STEADI Risk for Falling Score: 3       Goal: N/A     Financial Status:     Per Augusta and her  Gen, they do struggle. They are able to make their bills and are not worried about housing however, after bills are paid they do not have much left to get necessities.  They  Have a hard time getting needed antibiotics at this time, They do use the food pantries, I encouraged healthy choices. I will have [facility] reach out for resources as needed and patient is aware.       Goal: provide resources as needed to help with financial needs     Transportation Status:    Patient and her  drive and they deny needs for transportation support at this time    Goal: N/A    Mental/Behavioral/Psychosocial Status:    Depression Screen (PHQ-2/PHQ-9) 1/11/2021 1/19/2022 7/5/2022   PHQ-2 Total Score - - -   PHQ-2 Total Score 0 0 0   PHQ-9 Total Score - - -   PHQ-9 Total Score - - -       Interpretation of PHQ-9 Total Score   Score Severity   1-4 No Depression   5-9 Mild Depression   10-14 Moderate Depression   15-19 Moderately Severe Depression   20-27 Severe Depression       Goal:  N/A      Chronic Care Management Care Plan      Nutrition- Diet management    Goal:  Decrease portions, decrease weight  Barriers: likes ice cream, eats with no concern to calories or portion size, habits, knowledge  Interventions: decrease portions, change eating habits to reflect goals, decrease ice  cream amounts, education    Start Date: 7/5/2022  End Date:      Activity  Goal: Increase activity levels and stamina without SOB  Barriers: SOB while walking, hesitant to increase activity, knowledge,   Interventions: slowly increase steps and distance each week. Education    Start Date: 7/5/2022  End Date:           Discussion: program introduction, goal making, motivation steps,     Goals: Created     Next Scheduled patient outreach: 7/19/2022 @ 300 pm

## 2022-07-11 RX ORDER — ATORVASTATIN CALCIUM 10 MG/1
10 TABLET, FILM COATED ORAL DAILY
Qty: 100 TABLET | Refills: 1 | Status: SHIPPED | OUTPATIENT
Start: 2022-07-11 | End: 2022-12-21 | Stop reason: SDUPTHER

## 2022-07-20 ENCOUNTER — PATIENT OUTREACH (OUTPATIENT)
Dept: HEALTH INFORMATION MANAGEMENT | Facility: OTHER | Age: 72
End: 2022-07-20
Payer: MEDICARE

## 2022-07-20 DIAGNOSIS — J44.9 CHRONIC OBSTRUCTIVE PULMONARY DISEASE, UNSPECIFIED COPD TYPE (HCC): ICD-10-CM

## 2022-07-25 NOTE — PROGRESS NOTES
7/25/22   CHW contacted patient Augusta to offer community resources and check on health goal progress. Augusta shares she has been walking her 5 dogs daily, but for shorter amounts of time due to the heat. CHW suggested a walk early in the morning or at dusk to avoid the peak heat of the day - Augusta states she wakes up late and goes to bed at 8pm. Augusta shares she has had quite a bit of ice cream this week because of the heat. CHW suggested checking out the sugar free ice cream selection at the grocery store. Augusta states she will buy sugar free next time she goes shopping. CHW inquired about barriers to food and finances and offered community resources such as local food pantry list and SAFE electricity assistance. Augusta declined resources at this time.     CCM Community Health Worker Intake    • Social determinates of health intake:Completed.   • Identified barriers to: None.  • Contact information provided to Augusta Rodriguez.  • Has PCP appointment scheduled for: 10/06/2022  • Case Management General Assessment in Epic: Completed.   • Review of care plan goals: Yes. Discussed progress with exercise and limiting ice cream.     Action Plan:  CHW will continue to collaborate with CCM RN Leonor LANG on how we can continue to support Augusta with her health goals.

## 2022-08-12 DIAGNOSIS — I10 HYPERTENSION, UNSPECIFIED TYPE: ICD-10-CM

## 2022-08-13 RX ORDER — DILTIAZEM HYDROCHLORIDE 240 MG/1
240 CAPSULE, COATED, EXTENDED RELEASE ORAL DAILY
Qty: 90 CAPSULE | Refills: 0 | Status: SHIPPED | OUTPATIENT
Start: 2022-08-13 | End: 2022-11-21

## 2022-08-13 RX ORDER — TELMISARTAN 40 MG/1
40 TABLET ORAL DAILY
Qty: 100 TABLET | Refills: 0 | Status: SHIPPED | OUTPATIENT
Start: 2022-08-13 | End: 2022-12-21 | Stop reason: SDUPTHER

## 2022-08-13 RX ORDER — DULOXETIN HYDROCHLORIDE 60 MG/1
60 CAPSULE, DELAYED RELEASE ORAL DAILY
Qty: 90 CAPSULE | Refills: 0 | Status: SHIPPED | OUTPATIENT
Start: 2022-08-13 | End: 2022-12-21

## 2022-08-15 ENCOUNTER — PATIENT OUTREACH (OUTPATIENT)
Dept: HEALTH INFORMATION MANAGEMENT | Facility: OTHER | Age: 72
End: 2022-08-15
Payer: MEDICARE

## 2022-08-15 DIAGNOSIS — N18.31 STAGE 3A CHRONIC KIDNEY DISEASE: ICD-10-CM

## 2022-08-15 DIAGNOSIS — J44.9 CHRONIC OBSTRUCTIVE PULMONARY DISEASE, UNSPECIFIED COPD TYPE (HCC): ICD-10-CM

## 2022-08-15 DIAGNOSIS — I10 PRIMARY HYPERTENSION: ICD-10-CM

## 2022-08-15 PROCEDURE — 99999 PR NO CHARGE: CPT | Performed by: INTERNAL MEDICINE

## 2022-08-15 NOTE — PROGRESS NOTES
Assessment    Spoke with abraham garcia for TCM follow up. She is still eating ice cream every other day to every 2 days. She said she is trying to work on it. Her and her  and granddaughter went for a pretty long walk, it tires her out but she is doing better overall and she will continue to try to walk more and cut back the ice cream. She is enjoying her visit with her granddaughter and got to visit her new great-grand baby yesterday after she was born. She is using the food pantries for food and they are doing ok at this time. She does not need any further resource at the moment.     Education    Diet and exercise    Care Plan    progressing    Progress:    progressing    Next outreach:9/14/2022 @m

## 2022-08-17 ENCOUNTER — OFFICE VISIT (OUTPATIENT)
Dept: MEDICAL GROUP | Facility: PHYSICIAN GROUP | Age: 72
End: 2022-08-17
Payer: MEDICARE

## 2022-08-17 VITALS
SYSTOLIC BLOOD PRESSURE: 118 MMHG | DIASTOLIC BLOOD PRESSURE: 68 MMHG | RESPIRATION RATE: 16 BRPM | TEMPERATURE: 97.6 F | WEIGHT: 168 LBS | HEIGHT: 61 IN | OXYGEN SATURATION: 97 % | BODY MASS INDEX: 31.72 KG/M2 | HEART RATE: 82 BPM

## 2022-08-17 DIAGNOSIS — N18.31 STAGE 3A CHRONIC KIDNEY DISEASE: ICD-10-CM

## 2022-08-17 DIAGNOSIS — J44.9 CHRONIC OBSTRUCTIVE PULMONARY DISEASE, UNSPECIFIED COPD TYPE (HCC): Chronic | ICD-10-CM

## 2022-08-17 DIAGNOSIS — R91.8 PULMONARY NODULES: Chronic | ICD-10-CM

## 2022-08-17 DIAGNOSIS — I10 PRIMARY HYPERTENSION: Chronic | ICD-10-CM

## 2022-08-17 DIAGNOSIS — I48.0 PAROXYSMAL ATRIAL FIBRILLATION (HCC): ICD-10-CM

## 2022-08-17 DIAGNOSIS — J45.21 MILD INTERMITTENT ASTHMA WITH ACUTE EXACERBATION: ICD-10-CM

## 2022-08-17 DIAGNOSIS — F39 MOOD DISORDER (HCC): Chronic | ICD-10-CM

## 2022-08-17 PROCEDURE — 99214 OFFICE O/P EST MOD 30 MIN: CPT | Performed by: INTERNAL MEDICINE

## 2022-08-17 RX ORDER — FLUTICASONE PROPIONATE 110 UG/1
2 AEROSOL, METERED RESPIRATORY (INHALATION) 2 TIMES DAILY
Qty: 1 EACH | Refills: 5 | Status: SHIPPED | OUTPATIENT
Start: 2022-08-17 | End: 2022-11-28

## 2022-08-17 RX ORDER — ALBUTEROL SULFATE 90 UG/1
2 AEROSOL, METERED RESPIRATORY (INHALATION) EVERY 6 HOURS PRN
Qty: 8.5 G | Refills: 0 | Status: SHIPPED | OUTPATIENT
Start: 2022-08-17 | End: 2022-09-15

## 2022-08-17 ASSESSMENT — FIBROSIS 4 INDEX: FIB4 SCORE: 0.98

## 2022-08-17 NOTE — PROGRESS NOTES
PRIMARY CARE CLINIC VISIT  Chief Complaint   Patient presents with    Follow-Up   Follow-up hypertension      History of Present Illness     COPD (chronic obstructive pulmonary disease) ([facility])  Noted with recent CT scan May 2022.  Patient reported that she was exposed to secondhand smoke for many years previously.  Patient currently using albuterol as needed for history of asthma.  The patient reported intermittent cough and wheezing.  Suggested for the patient to try Trelegy 1 puff daily.    Mood disorder (HCC)  This is a chronic and stable condition.  The patient takes duloxetine.  Patient denies SI.    Pulmonary nodules  Chronic condition noted with chest CT scan May 2022.  Recommend patient to follow-up with pulmonology service.  The patient is scheduled to have a repeat chest CT scan May 2023.    CKD (chronic kidney disease) stage 3, GFR 30-59 ml/min ([facility])  Baseline GFR in the 50s.  Advised the patient to avoid NSAIDs.  Continue to monitor.    Paroxysmal atrial fibrillation (HCC)  Chronic condition.  Chest score of 1.  The patient presently taking aspirin and diltiazem.  She denies chest pain shortness of breath palpitation or near syncope.    HTN (hypertension)  Chronic condition.  The patient is taking diltiazem.  Her blood pressure has been well controlled.  No significant side effects reported.    Current Outpatient Medications on File Prior to Visit   Medication Sig Dispense Refill    telmisartan (MICARDIS) 40 MG Tab TAKE 1 TABLET BY MOUTH EVERY  Tablet 0    DILTIAZem CD (CARDIZEM CD) 240 MG CAPSULE SR 24 HR TAKE 1 CAPSULE BY MOUTH EVERY DAY 90 Capsule 0    DULoxetine (CYMBALTA) 60 MG Cap DR Particles delayed-release capsule TAKE 1 CAPSULE BY MOUTH EVERY DAY 90 Capsule 0    atorvastatin (LIPITOR) 10 MG Tab TAKE 1 TABLET BY MOUTH EVERY  Tablet 1    zonisamide (ZONEGRAN) 100 MG Cap Take 100 mg by mouth every day.      ondansetron (ZOFRAN) 4 MG Tab tablet TAKE 1 TABLET BY MOUTH ONCE DAILY AS  "NEEDED FOR NAUSEA      amitriptyline (ELAVIL) 25 MG Tab TAKE 1 TABLET BY MOUTH ONCE DAILY AT BEDTIME 90 Tab 1    SALINE NA Spray  in nose.      aspirin 81 MG EC tablet Take 81 mg by mouth every day.      pantoprazole (PROTONIX) 40 MG Tablet Delayed Response Take 40 mg by mouth every day.      clobetasol (TEMOVATE) 0.05 % Ointment AAA thin layer, daily for 3 weeks, then use 2-3 times a week 60 g 1     No current facility-administered medications on file prior to visit.        Allergies: Clarithromycin, Ees [erythromycin], Levaquin, Pcn [penicillins], Shellfish allergy, Sumatriptan, and Trazodone    ROS  As per HPI above. All other systems reviewed and negative.      Past Medical, Social, and Family history reviewed and updated in EPIC     Objective     /68 (BP Location: Left arm, Patient Position: Sitting, BP Cuff Size: Adult)   Pulse 82   Temp 36.4 °C (97.6 °F) (Temporal)   Resp 16   Ht 1.549 m (5' 1\")   Wt 76.2 kg (168 lb)   SpO2 97%    Body mass index is 31.74 kg/m².    General: alert in no apparent distress.  Cardiovascular: regular rate and rhythm  Pulmonary: lungs : no wheezing   Gastrointestinal: BS present. No obvious mass noted          Assessment and Plan     1. Mild intermittent asthma with acute exacerbation  - albuterol 108 (90 Base) MCG/ACT Aero Soln inhalation aerosol; Inhale 2 Puffs every 6 hours as needed for Shortness of Breath.  Dispense: 8.5 g; Refill: 0    2. Chronic obstructive pulmonary disease, unspecified COPD type (HCC)  Start the patient on Trelegy 1 actuation daily.  The patient may use albuterol as needed.  3. Mood disorder (HCC)  Chronic stable condition continue with duloxetine  4. Pulmonary nodules  Chronic condition.  Patient advised to follow-up with pulmonology service  5. Stage 3a chronic kidney disease (HCC)  Recent blood test done June 2022 showed GFR 58.  Stable.  Continue to monitor.  Advised the patient to avoid NSAIDs.  6. Paroxysmal atrial fibrillation " (HCC)  Continue with aspirin and diltiazem.  Stable condition  7. Primary hypertension  Stable condition.  Continue with diltiazem.  Other orders  - fluticasone (FLOVENT HFA) 110 MCG/ACT Aerosol; Inhale 2 Puffs 2 times a day.  Dispense: 1 Each; Refill: 5  - Fluticasone-Umeclidin-Vilant (TRELEGY ELLIPTA) 100-62.5-25 MCG/INH AEROSOL POWDER, BREATH ACTIVATED inhalation; Inhale 1 Inhalation every day.  Dispense: 1 Each; Refill: 6      Follow-up 4 months                Healthcare Maintenance     Health Maintenance Due   Topic Date Due    IMM HEP B VACCINE (1 of 3 - 3-dose series) Never done    Annual Wellness Visit  05/12/2022    COVID-19 Vaccine (4 - Booster for Moderna series) 05/20/2022    COLORECTAL CANCER SCREENING  07/26/2022               Please note that this dictation was created using voice recognition software. I have made every reasonable attempt to correct obvious errors, but I expect that there are errors of grammar and possibly content that I did not discover before finalizing the note.    Rudy Parsons MD  Internal Medicine  [facility] care [facility]

## 2022-08-17 NOTE — ASSESSMENT & PLAN NOTE
Noted with recent CT scan May 2022.  Patient reported that she was exposed to secondhand smoke for many years previously.  Patient currently using albuterol as needed for history of asthma.  The patient reported intermittent cough and wheezing.  Suggested for the patient to try Trelegy 1 puff daily.

## 2022-08-17 NOTE — ASSESSMENT & PLAN NOTE
Chronic condition noted with chest CT scan May 2022.  Recommend patient to follow-up with pulmonology service.  The patient is scheduled to have a repeat chest CT scan May 2023.

## 2022-08-17 NOTE — PROGRESS NOTES
Annual Health Assessment Questions:    1.  Are you currently engaging in any exercise or physical activity? Yes    2.  How would you describe your mood or emotional well-being today? good    3.  Have you had any falls in the last year? No    4.  Have you noticed any problems with your balance or had difficulty walking? No    5.  In the last six months have you experienced any leakage of urine? Yes    6. DPA/Advanced Directive: Patient has Advanced Directive, but it is not on file. Instructed to bring in a copy to scan into their chart.

## 2022-08-17 NOTE — ASSESSMENT & PLAN NOTE
Chronic condition.  The patient is taking diltiazem.  Her blood pressure has been well controlled.  No significant side effects reported.

## 2022-08-17 NOTE — ASSESSMENT & PLAN NOTE
Chronic condition.  Chest score of 1.  The patient presently taking aspirin and diltiazem.  She denies chest pain shortness of breath palpitation or near syncope.

## 2022-09-13 ENCOUNTER — PATIENT OUTREACH (OUTPATIENT)
Dept: HEALTH INFORMATION MANAGEMENT | Facility: OTHER | Age: 72
End: 2022-09-13

## 2022-09-13 ENCOUNTER — OFFICE VISIT (OUTPATIENT)
Dept: MEDICAL GROUP | Facility: PHYSICIAN GROUP | Age: 72
End: 2022-09-13
Payer: MEDICARE

## 2022-09-13 VITALS
RESPIRATION RATE: 18 BRPM | WEIGHT: 169 LBS | BODY MASS INDEX: 31.91 KG/M2 | DIASTOLIC BLOOD PRESSURE: 84 MMHG | HEIGHT: 61 IN | TEMPERATURE: 96.6 F | OXYGEN SATURATION: 96 % | HEART RATE: 92 BPM | SYSTOLIC BLOOD PRESSURE: 138 MMHG

## 2022-09-13 DIAGNOSIS — R73.03 PREDIABETES: ICD-10-CM

## 2022-09-13 DIAGNOSIS — I48.0 PAROXYSMAL ATRIAL FIBRILLATION (HCC): ICD-10-CM

## 2022-09-13 DIAGNOSIS — E78.5 DYSLIPIDEMIA: ICD-10-CM

## 2022-09-13 DIAGNOSIS — F39 MOOD DISORDER (HCC): Chronic | ICD-10-CM

## 2022-09-13 DIAGNOSIS — N18.31 STAGE 3A CHRONIC KIDNEY DISEASE: ICD-10-CM

## 2022-09-13 DIAGNOSIS — J44.9 CHRONIC OBSTRUCTIVE PULMONARY DISEASE, UNSPECIFIED COPD TYPE (HCC): ICD-10-CM

## 2022-09-13 DIAGNOSIS — E66.01 SEVERE OBESITY (HCC): ICD-10-CM

## 2022-09-13 DIAGNOSIS — I10 PRIMARY HYPERTENSION: ICD-10-CM

## 2022-09-13 PROCEDURE — 99214 OFFICE O/P EST MOD 30 MIN: CPT | Performed by: INTERNAL MEDICINE

## 2022-09-13 PROCEDURE — 99490 CHRNC CARE MGMT STAFF 1ST 20: CPT | Performed by: INTERNAL MEDICINE

## 2022-09-13 RX ORDER — PANTOPRAZOLE SODIUM 40 MG/1
40 TABLET, DELAYED RELEASE ORAL DAILY
Qty: 30 TABLET | Refills: 6 | Status: SHIPPED | OUTPATIENT
Start: 2022-09-13 | End: 2022-10-13

## 2022-09-13 RX ORDER — RIZATRIPTAN BENZOATE 5 MG/1
5 TABLET ORAL
COMMUNITY
Start: 2022-07-28 | End: 2023-04-20 | Stop reason: SDUPTHER

## 2022-09-13 ASSESSMENT — FIBROSIS 4 INDEX: FIB4 SCORE: 0.98

## 2022-09-13 NOTE — PROGRESS NOTES
PRIMARY CARE CLINIC VISIT  Chief Complaint   Patient presents with    Follow-Up     Follow-up hypertension and cholesterol  Prescription refills    History of Present Illness     COPD (chronic obstructive pulmonary disease) ([facility])  This is a chronic condition.  The patient is using Flovent 2 puffs twice daily and albuterol as needed.  She denies shortness of breath or wheezing.    Mood disorder ([facility])  This is a chronic condition.  The patient is being treated with duloxetine.  The patient denies SI.    CKD (chronic kidney disease) stage 3, GFR 30-59 ml/min ([facility])  Previous GFR was noted to be in the 50s.  Lab tests ordered for follow-up.    Paroxysmal atrial fibrillation ([facility])  Chronic condition.    CHADS2 score 1.  The patient presently taking aspirin and diltiazem.  Patient denies shortness of breath chest pain palpitation or near syncope.    Dyslipidemia  Chronic condition.  The patient is being treated with atorvastatin.  Patient is due for lab test.    HTN (hypertension)  This is a chronic condition.  The patient is currently taking diltiazem and Micardis.  Blood pressure has been well controlled.  No significant side effects reported.    Severe obesity ([facility])  Body mass index is 31.93 kg/m².   Brief discussion with the patient regarding diet, exercise, and lifestyle modification to help achieve and maintain healthy weight              Current Outpatient Medications on File Prior to Visit   Medication Sig Dispense Refill    rizatriptan (MAXALT) 5 MG tablet TAKE 1 TABLET BY MOUTH AS DIRECTED AT START OF HA AND OK TO REPEAT IN 2 HOURS IF NEEDED      fluticasone (FLOVENT HFA) 110 MCG/ACT Aerosol Inhale 2 Puffs 2 times a day. 1 Each 5    albuterol 108 (90 Base) MCG/ACT Aero Soln inhalation aerosol Inhale 2 Puffs every 6 hours as needed for Shortness of Breath. 8.5 g 0    Fluticasone-Umeclidin-Vilant (TRELEGY ELLIPTA) 100-62.5-25 MCG/INH AEROSOL POWDER, BREATH ACTIVATED inhalation Inhale 1 Inhalation every day. 1 Each  "6    telmisartan (MICARDIS) 40 MG Tab TAKE 1 TABLET BY MOUTH EVERY  Tablet 0    DILTIAZem CD (CARDIZEM CD) 240 MG CAPSULE SR 24 HR TAKE 1 CAPSULE BY MOUTH EVERY DAY 90 Capsule 0    DULoxetine (CYMBALTA) 60 MG Cap DR Particles delayed-release capsule TAKE 1 CAPSULE BY MOUTH EVERY DAY 90 Capsule 0    atorvastatin (LIPITOR) 10 MG Tab TAKE 1 TABLET BY MOUTH EVERY  Tablet 1    clobetasol (TEMOVATE) 0.05 % Ointment AAA thin layer, daily for 3 weeks, then use 2-3 times a week 60 g 1    zonisamide (ZONEGRAN) 100 MG Cap Take 100 mg by mouth every day.      ondansetron (ZOFRAN) 4 MG Tab tablet TAKE 1 TABLET BY MOUTH ONCE DAILY AS NEEDED FOR NAUSEA      amitriptyline (ELAVIL) 25 MG Tab TAKE 1 TABLET BY MOUTH ONCE DAILY AT BEDTIME 90 Tab 1    SALINE NA Spray  in nose.      aspirin 81 MG EC tablet Take 81 mg by mouth every day.       No current facility-administered medications on file prior to visit.        Allergies: Clarithromycin, Ees [erythromycin], Levaquin, Pcn [penicillins], Shellfish allergy, Sumatriptan, and Trazodone    ROS  As per HPI above. All other systems reviewed and negative.      Past Medical, Social, and Family history reviewed and updated in EPIC     Objective     /84 (BP Location: Left arm, Patient Position: Sitting, BP Cuff Size: Adult)   Pulse 92   Temp 35.9 °C (96.6 °F) (Temporal)   Resp 18   Ht 1.549 m (5' 1\")   Wt 76.7 kg (169 lb)   SpO2 96%    Body mass index is 31.93 kg/m².    General: alert in no apparent distress.  Cardiovascular: regular rate and rhythm  Pulmonary: lungs : no wheezing   Gastrointestinal: BS present. No obvious mass noted          Assessment and Plan     1. Primary hypertension  Chronic stable condition.  Continue current management Micardis and diltiazem.  2. Dyslipidemia  - Lipid Profile; Future  Continue with atorvastatin.  Continue with low-fat low-cholesterol diet.  3. Prediabetes  - Basic Metabolic Panel; Future  - HEMOGLOBIN A1C; Future  Diet and " exercise advised.  4. Chronic obstructive pulmonary disease, unspecified COPD type (HCC)  Chronic stable condition.  Continue with Flovent twice daily and uses albuterol as needed.  Patient currently asymptomatic.  5. Mood disorder (HCC)  Chronic stable condition.  Continue with duloxetine.  6. Stage 3a chronic kidney disease (HCC)  Advised the patient to avoid NSAIDs.  Lab tests ordered for follow-up.  7. Paroxysmal atrial fibrillation (HCC)  This is a chronic condition.  Continue aspirin and diltiazem.  Patient currently asymptomatic.  8. Severe obesity (HCC)  Diet and exercise advised.  Offered to refer the patient to the medical weight loss program.  The patient declined      Other orders  - rizatriptan (MAXALT) 5 MG tablet; TAKE 1 TABLET BY MOUTH AS DIRECTED AT START OF HA AND OK TO REPEAT IN 2 HOURS IF NEEDED  - pantoprazole (PROTONIX) 40 MG Tablet Delayed Response; Take 1 Tablet by mouth every day.  Dispense: 30 Tablet; Refill: 6    Follow-up 4 months                  Healthcare Maintenance     Health Maintenance Due   Topic Date Due    IMM HEP B VACCINE (1 of 3 - 3-dose series) Never done    Annual Wellness Visit  05/12/2022    COVID-19 Vaccine (4 - Booster for Moderna series) 05/20/2022    IMM INFLUENZA (1) 09/01/2022               Please note that this dictation was created using voice recognition software. I have made every reasonable attempt to correct obvious errors, but I expect that there are errors of grammar and possibly content that I did not discover before finalizing the note.    Rudy Parsons MD  Internal Medicine  [facility] care [facility]

## 2022-09-13 NOTE — ASSESSMENT & PLAN NOTE
This is a chronic condition.  The patient is using Flovent 2 puffs twice daily and albuterol as needed.  She denies shortness of breath or wheezing.

## 2022-09-13 NOTE — ASSESSMENT & PLAN NOTE
This is a chronic condition.  The patient is being treated with duloxetine.  The patient denies SI.

## 2022-09-13 NOTE — ASSESSMENT & PLAN NOTE
Chronic condition.    CHADS2 score 1.  The patient presently taking aspirin and diltiazem.  Patient denies shortness of breath chest pain palpitation or near syncope.

## 2022-09-13 NOTE — PROGRESS NOTES
Assessment    Augusta is in office today for follow-up with her PCP. I spoke with her prior to her appointment for CCM follow-up. She said she is doing good, is trying to cut down on her ice cream at night but she loves having it so she is still working on cutting it down. She is doing her stepper and trying to go for longer walks when it is not smoky or to hot outside. She said her breathing is doing ok while walking and she will just stop and rest if she needs to before continuing. She is watching her new 1 month old granddaughter as needed so her daughter can do therapy and she enjoys doing this. For next months follow-up she wants to be called after oct 15 in the morning sometimes at her number [phone]    Education    Diet, exercise, safety,medications    Care Plan    Progressing    Progress:    Progressing    Next outreach: 10/17/2022 @ 11

## 2022-09-13 NOTE — ASSESSMENT & PLAN NOTE
This is a chronic condition.  The patient is currently taking diltiazem and Micardis.  Blood pressure has been well controlled.  No significant side effects reported.

## 2022-09-13 NOTE — ASSESSMENT & PLAN NOTE
Body mass index is 31.93 kg/m².   Brief discussion with the patient regarding diet, exercise, and lifestyle modification to help achieve and maintain healthy weight

## 2022-09-14 ENCOUNTER — HOSPITAL ENCOUNTER (OUTPATIENT)
Dept: LAB | Facility: MEDICAL CENTER | Age: 72
End: 2022-09-14
Attending: INTERNAL MEDICINE
Payer: MEDICARE

## 2022-09-14 DIAGNOSIS — R73.03 PREDIABETES: ICD-10-CM

## 2022-09-14 DIAGNOSIS — E78.5 DYSLIPIDEMIA: ICD-10-CM

## 2022-09-14 LAB
ANION GAP SERPL CALC-SCNC: 12 MMOL/L (ref 7–16)
BUN SERPL-MCNC: 12 MG/DL (ref 8–22)
CALCIUM SERPL-MCNC: 9 MG/DL (ref 8.5–10.5)
CHLORIDE SERPL-SCNC: 104 MMOL/L (ref 96–112)
CHOLEST SERPL-MCNC: 126 MG/DL (ref 100–199)
CO2 SERPL-SCNC: 24 MMOL/L (ref 20–33)
CREAT SERPL-MCNC: 1 MG/DL (ref 0.5–1.4)
EST. AVERAGE GLUCOSE BLD GHB EST-MCNC: 120 MG/DL
FASTING STATUS PATIENT QL REPORTED: NORMAL
GFR SERPLBLD CREATININE-BSD FMLA CKD-EPI: 60 ML/MIN/1.73 M 2
GLUCOSE SERPL-MCNC: 102 MG/DL (ref 65–99)
HBA1C MFR BLD: 5.8 % (ref 4–5.6)
HDLC SERPL-MCNC: 53 MG/DL
LDLC SERPL CALC-MCNC: 54 MG/DL
POTASSIUM SERPL-SCNC: 4.1 MMOL/L (ref 3.6–5.5)
SODIUM SERPL-SCNC: 140 MMOL/L (ref 135–145)
TRIGL SERPL-MCNC: 95 MG/DL (ref 0–149)

## 2022-09-14 PROCEDURE — 80061 LIPID PANEL: CPT

## 2022-09-14 PROCEDURE — 36415 COLL VENOUS BLD VENIPUNCTURE: CPT

## 2022-09-14 PROCEDURE — 83036 HEMOGLOBIN GLYCOSYLATED A1C: CPT

## 2022-09-14 PROCEDURE — 80048 BASIC METABOLIC PNL TOTAL CA: CPT

## 2022-09-15 DIAGNOSIS — J45.21 MILD INTERMITTENT ASTHMA WITH ACUTE EXACERBATION: ICD-10-CM

## 2022-09-15 RX ORDER — ALBUTEROL SULFATE 90 UG/1
AEROSOL, METERED RESPIRATORY (INHALATION)
Qty: 8.5 EACH | Refills: 5 | Status: SHIPPED | OUTPATIENT
Start: 2022-09-15 | End: 2023-12-06 | Stop reason: SDUPTHER

## 2022-10-08 ENCOUNTER — HOSPITAL ENCOUNTER (EMERGENCY)
Facility: MEDICAL CENTER | Age: 72
End: 2022-10-08
Attending: EMERGENCY MEDICINE
Payer: MEDICARE

## 2022-10-08 VITALS
TEMPERATURE: 98.1 F | HEIGHT: 61 IN | OXYGEN SATURATION: 94 % | RESPIRATION RATE: 16 BRPM | SYSTOLIC BLOOD PRESSURE: 169 MMHG | BODY MASS INDEX: 31.43 KG/M2 | WEIGHT: 166.45 LBS | HEART RATE: 100 BPM | DIASTOLIC BLOOD PRESSURE: 76 MMHG

## 2022-10-08 DIAGNOSIS — J02.9 PHARYNGITIS, UNSPECIFIED ETIOLOGY: ICD-10-CM

## 2022-10-08 PROCEDURE — 99284 EMERGENCY DEPT VISIT MOD MDM: CPT

## 2022-10-08 RX ORDER — CEFDINIR 300 MG/1
300 CAPSULE ORAL 2 TIMES DAILY
Qty: 20 CAPSULE | Refills: 0 | Status: ON HOLD | OUTPATIENT
Start: 2022-10-08 | End: 2022-10-20

## 2022-10-08 ASSESSMENT — FIBROSIS 4 INDEX: FIB4 SCORE: 0.98

## 2022-10-08 NOTE — ED TRIAGE NOTES
"Chief Complaint   Patient presents with    Sore Throat     Since yesterday, she has tried cough drops, spray and gargling with salt water but report no relief.        Patient to triage ambulatory with a steady gait, AAOx4, Appropriate precautions in place.     Explained wait time and triage process. Placed back in lobby. Told to notify ED tech or RN of any changes, verbalized understanding.    BP (!) 148/98   Pulse 98   Temp 36.4 °C (97.5 °F) (Temporal)   Resp 18   Ht 1.549 m (5' 1\")   Wt 75.5 kg (166 lb 7.2 oz)   SpO2 96%   BMI 31.45 kg/m²     "

## 2022-10-08 NOTE — ED PROVIDER NOTES
ED Provider Note    Scribed for Anmol Hunter M.D. by Harshal Mckay. 10/8/2022  10:34 AM    Primary care provider: Rudy Parsons M.D.  Means of arrival: Walk-in  History obtained from: Patient  History limited by: None    CHIEF COMPLAINT  Chief Complaint   Patient presents with    Sore Throat     Since yesterday, she has tried cough drops, spray and gargling with salt water but report no relief.        HPI  Augusta Rodriguez is a 72 y.o. female who presents to the Emergency Department for acute sore throat. Patient states her symptoms started yesterday, and she has tried multiple remedies without relief. She denies any cough or congestion. Patient has pain with swallowing, but no dysphagia.     REVIEW OF SYSTEMS  Pertinent positives include sore throat.   Pertinent negatives include no cough, congestion, or dysphagia.     All other systems reviewed and negative. See HPI for further details.       PAST MEDICAL HISTORY   has a past medical history of Apnea, sleep, Arrhythmia, Arthritis, Asthma, Atrial fibrillation (HCC), Back pain, Bronchitis, Chickenpox, Constipation, Cough, Daytime sleepiness, Dental disorder, Earache, Frequent urination, Gasping for breath, GERD (gastroesophageal reflux disease) (2/27/2010), Sammarinese measles, Heartburn, Hypertension, Impaired fasting glucose (2/27/2010), Incontinence of urine, Indigestion, Influenza, Mumps, Nasal drainage, Nausea, Osteopenia (2/24/2010), Osteoporosis, Other specified disorder of intestines, Restless leg syndrome, Scarlet fever, Shortness of breath, Sleep apnea, Snoring, Tremor, essential (2/24/2010), Urinary bladder disorder, Wears glasses, Wheezing, and Whooping cough.    SURGICAL HISTORY   has a past surgical history that includes bladder suspension; stephon by laparoscopy (1997); primary c section (1970/1975); hysterectomy, total abdominal (1979); hip arthroscopy (2/23/2009); acetabular osteotomy (2/23/2009); mass excision ortho (2/23/2009); carpal tunnel  endoscopic (11/17/2012); trigger finger release (11/17/2012); lumbar laminectomy diskectomy (Bilateral, 2/4/2017); hip arthroscopy (2005); laminotomy; carpal tunnel release; hip replacement, total; and hysterectomy laparoscopy.    SOCIAL HISTORY  Social History     Tobacco Use    Smoking status: Never    Smokeless tobacco: Never   Vaping Use    Vaping Use: Never used   Substance Use Topics    Alcohol use: Yes     Alcohol/week: 0.0 oz     Comment: Stopped drinking 45 days ago 7/12/17 Going to     Drug use: No      Social History     Substance and Sexual Activity   Drug Use No       FAMILY HISTORY  Family History   Problem Relation Age of Onset    GI Disease Father         Crohn's    Heart Disease Father         First MI age 30    Hypertension Father     Stroke Father     Hypertension Other     Cancer Brother         ESOPHAGEAL CANCER       CURRENT MEDICATIONS  Home Medications       Reviewed by Nancy Esqueda R.N. (Registered Nurse) on 10/08/22 at 0928  Med List Status: Partial     Medication Last Dose Status   albuterol 108 (90 Base) MCG/ACT Aero Soln inhalation aerosol  Active   amitriptyline (ELAVIL) 25 MG Tab  Active   aspirin 81 MG EC tablet  Active   atorvastatin (LIPITOR) 10 MG Tab  Active   clobetasol (TEMOVATE) 0.05 % Ointment  Active   DILTIAZem CD (CARDIZEM CD) 240 MG CAPSULE SR 24 HR  Active   DULoxetine (CYMBALTA) 60 MG Cap DR Particles delayed-release capsule  Active   fluticasone (FLOVENT HFA) 110 MCG/ACT Aerosol  Active   Fluticasone-Umeclidin-Vilant (TRELEGY ELLIPTA) 100-62.5-25 MCG/INH AEROSOL POWDER, BREATH ACTIVATED inhalation  Active   ondansetron (ZOFRAN) 4 MG Tab tablet  Active   pantoprazole (PROTONIX) 40 MG Tablet Delayed Response  Active   rizatriptan (MAXALT) 5 MG tablet  Active   SALINE NA  Active   telmisartan (MICARDIS) 40 MG Tab  Active   zonisamide (ZONEGRAN) 100 MG Cap  Active                    ALLERGIES  Allergies   Allergen Reactions    Clarithromycin       "Swelling/Itching/Nausea    Ees [Erythromycin]      Swelling/Itching/Nausea    Levaquin      Muscle soreness     Pcn [Penicillins]      Swelling/Itching/Nausea     Shellfish Allergy      Swelling/Itching/Nausea    Sumatriptan Swelling     Tongue swell    Trazodone Swelling       PHYSICAL EXAM  VITAL SIGNS: BP (!) 148/98   Pulse 98   Temp 36.4 °C (97.5 °F) (Temporal)   Resp 18   Ht 1.549 m (5' 1\")   Wt 75.5 kg (166 lb 7.2 oz)   SpO2 96%   BMI 31.45 kg/m²   Nursing note and vitals reviewed.  Constitutional: Well-developed and well-nourished. No distress.   HENT: Erythema to the posterior oropharynx. No peritonsillar abscess, uvula is midline. Head is normocephalic and atraumatic. Oropharynx is clear and moist without exudate.   Eyes: Pupils are equal, round, and reactive to light. Conjunctiva are normal.   Cardiovascular: Normal rate and regular rhythm. No murmur heard. Normal radial pulses.  Pulmonary/Chest: Breath sounds normal. No wheezes or rales.   Abdominal: Soft and non-tender. No distention    Musculoskeletal: Extremities exhibit normal range of motion without edema or tenderness.   Neurological: Awake, alert and oriented to person, place, and time. No focal deficits noted.  Skin: Skin is warm and dry. No rash.   Psychiatric: Normal mood and affect. Appropriate for clinical situation.      COURSE & MEDICAL DECISION MAKING  Nursing notes, VS, PMSFHx reviewed in chart.     10:34 AM - Patient seen and examined at bedside. Plan to start her on Omnicef. The differential diagnoses include but are not limited to: Viral syndrome, bacterial pharyngitis. Otherwise her exam is very reassuring and she is cleared for discharge.       Patient declines viral testing including COVID and flu.    Given the patient's age and comorbidities antibiotics will be prescribed.  Given a prescription for Omnicef given her allergy profile.    The patient will return for new or worsening symptoms and is stable at the time of " discharge.    The patient is referred to a primary physician for blood pressure management, diabetic screening, and for all other preventative health concerns.    DISPOSITION:  Patient will be discharged home in stable condition.    FOLLOW UP:  Rudy Parsons M.D.  [address]  [facility] [id number] [phone]    Schedule an appointment as soon as possible for a visit       [facility], Emergency Dept  [address] [address] [phone]    If symptoms worsen    OUTPATIENT MEDICATIONS:  New Prescriptions    CEFDINIR (OMNICEF) 300 MG CAP    Take 1 Capsule by mouth 2 times a day for 10 days.       FINAL IMPRESSION  1. Pharyngitis, unspecified etiology          Harshal VILLEDA (Scribe), am scribing for, and in the presence of, Anmol Hunter M.D..    Electronically signed by: Harshal Mckay (Scribe), 10/8/2022    Anmol VILLEDA M.D. personally performed the services described in this documentation, as scribed by Harshal Mckay in my presence, and it is both accurate and complete.    The note accurately reflects work and decisions made by me.  Anmol Hunter M.D.  10/8/2022  11:11 AM

## 2022-10-08 NOTE — ED NOTES
Pt ambulated with a normal, steady gait to the room from the lobby.   Agree with triage note. Pt states she has no other complaints at this time. Chart up for ERP.

## 2022-10-08 NOTE — ED NOTES
Pt was discharged to home. Discharge instructions provided by ERP. Pt ambulated out of the ER; A and O x 4.

## 2022-10-13 RX ORDER — PANTOPRAZOLE SODIUM 40 MG/1
40 TABLET, DELAYED RELEASE ORAL DAILY
Qty: 30 TABLET | Refills: 6 | Status: SHIPPED | OUTPATIENT
Start: 2022-10-13 | End: 2022-12-21

## 2022-10-14 ENCOUNTER — PATIENT MESSAGE (OUTPATIENT)
Dept: HEALTH INFORMATION MANAGEMENT | Facility: OTHER | Age: 72
End: 2022-10-14

## 2022-10-17 ENCOUNTER — PATIENT OUTREACH (OUTPATIENT)
Dept: HEALTH INFORMATION MANAGEMENT | Facility: OTHER | Age: 72
End: 2022-10-17
Payer: MEDICARE

## 2022-10-17 DIAGNOSIS — N18.31 STAGE 3A CHRONIC KIDNEY DISEASE: ICD-10-CM

## 2022-10-17 DIAGNOSIS — J44.9 CHRONIC OBSTRUCTIVE PULMONARY DISEASE, UNSPECIFIED COPD TYPE (HCC): ICD-10-CM

## 2022-10-17 PROCEDURE — 99999 PR NO CHARGE: CPT | Performed by: INTERNAL MEDICINE

## 2022-10-17 NOTE — PROGRESS NOTES
Attempt # 2 to contact Augusta as I was told to call back after 3 pm. LVM with contact information for call back.

## 2022-10-18 ENCOUNTER — ANESTHESIA (OUTPATIENT)
Dept: SURGERY | Facility: MEDICAL CENTER | Age: 72
DRG: 854 | End: 2022-10-18
Payer: MEDICARE

## 2022-10-18 ENCOUNTER — HOSPITAL ENCOUNTER (INPATIENT)
Facility: MEDICAL CENTER | Age: 72
LOS: 2 days | DRG: 854 | End: 2022-10-20
Attending: EMERGENCY MEDICINE | Admitting: STUDENT IN AN ORGANIZED HEALTH CARE EDUCATION/TRAINING PROGRAM
Payer: MEDICARE

## 2022-10-18 ENCOUNTER — APPOINTMENT (OUTPATIENT)
Dept: RADIOLOGY | Facility: MEDICAL CENTER | Age: 72
DRG: 854 | End: 2022-10-18
Attending: EMERGENCY MEDICINE
Payer: MEDICARE

## 2022-10-18 ENCOUNTER — ANESTHESIA EVENT (OUTPATIENT)
Dept: SURGERY | Facility: MEDICAL CENTER | Age: 72
DRG: 854 | End: 2022-10-18
Payer: MEDICARE

## 2022-10-18 DIAGNOSIS — L02.11 NECK ABSCESS: ICD-10-CM

## 2022-10-18 PROBLEM — A41.9 SEPSIS (HCC): Status: ACTIVE | Noted: 2022-10-18

## 2022-10-18 PROBLEM — J45.909 ASTHMA: Status: ACTIVE | Noted: 2022-10-18

## 2022-10-18 PROBLEM — I10 HYPERTENSION: Status: ACTIVE | Noted: 2022-10-18

## 2022-10-18 PROBLEM — I95.9 HYPOTENSION: Status: ACTIVE | Noted: 2022-10-18

## 2022-10-18 LAB
ANION GAP SERPL CALC-SCNC: 12 MMOL/L (ref 7–16)
BASOPHILS # BLD AUTO: 1 % (ref 0–1.8)
BASOPHILS # BLD: 0.13 K/UL (ref 0–0.12)
BUN SERPL-MCNC: 19 MG/DL (ref 8–22)
CALCIUM SERPL-MCNC: 9.1 MG/DL (ref 8.5–10.5)
CHLORIDE SERPL-SCNC: 103 MMOL/L (ref 96–112)
CO2 SERPL-SCNC: 22 MMOL/L (ref 20–33)
CREAT SERPL-MCNC: 0.88 MG/DL (ref 0.5–1.4)
EOSINOPHIL # BLD AUTO: 0.4 K/UL (ref 0–0.51)
EOSINOPHIL NFR BLD: 3.2 % (ref 0–6.9)
ERYTHROCYTE [DISTWIDTH] IN BLOOD BY AUTOMATED COUNT: 43.2 FL (ref 35.9–50)
FLUAV RNA SPEC QL NAA+PROBE: NEGATIVE
FLUBV RNA SPEC QL NAA+PROBE: NEGATIVE
GFR SERPLBLD CREATININE-BSD FMLA CKD-EPI: 70 ML/MIN/1.73 M 2
GLUCOSE SERPL-MCNC: 117 MG/DL (ref 65–99)
GRAM STN SPEC: NORMAL
HCT VFR BLD AUTO: 40.6 % (ref 37–47)
HGB BLD-MCNC: 13.5 G/DL (ref 12–16)
IMM GRANULOCYTES # BLD AUTO: 0.07 K/UL (ref 0–0.11)
IMM GRANULOCYTES NFR BLD AUTO: 0.6 % (ref 0–0.9)
LACTATE SERPL-SCNC: 1.4 MMOL/L (ref 0.5–2)
LYMPHOCYTES # BLD AUTO: 1.7 K/UL (ref 1–4.8)
LYMPHOCYTES NFR BLD: 13.6 % (ref 22–41)
MCH RBC QN AUTO: 26.6 PG (ref 27–33)
MCHC RBC AUTO-ENTMCNC: 33.3 G/DL (ref 33.6–35)
MCV RBC AUTO: 79.9 FL (ref 81.4–97.8)
MONOCYTES # BLD AUTO: 0.8 K/UL (ref 0–0.85)
MONOCYTES NFR BLD AUTO: 6.4 % (ref 0–13.4)
NEUTROPHILS # BLD AUTO: 9.43 K/UL (ref 2–7.15)
NEUTROPHILS NFR BLD: 75.2 % (ref 44–72)
NRBC # BLD AUTO: 0 K/UL
NRBC BLD-RTO: 0 /100 WBC
PLATELET # BLD AUTO: 400 K/UL (ref 164–446)
PMV BLD AUTO: 9 FL (ref 9–12.9)
POTASSIUM SERPL-SCNC: 4.1 MMOL/L (ref 3.6–5.5)
PROCALCITONIN SERPL-MCNC: <0.05 NG/ML
RBC # BLD AUTO: 5.08 M/UL (ref 4.2–5.4)
RSV RNA SPEC QL NAA+PROBE: NEGATIVE
S PYO DNA SPEC NAA+PROBE: NOT DETECTED
SARS-COV-2 RNA RESP QL NAA+PROBE: NOTDETECTED
SIGNIFICANT IND 70042: NORMAL
SITE SITE: NORMAL
SODIUM SERPL-SCNC: 137 MMOL/L (ref 135–145)
SOURCE SOURCE: NORMAL
SPECIMEN SOURCE: NORMAL
WBC # BLD AUTO: 12.5 K/UL (ref 4.8–10.8)

## 2022-10-18 PROCEDURE — 80048 BASIC METABOLIC PNL TOTAL CA: CPT

## 2022-10-18 PROCEDURE — 99291 CRITICAL CARE FIRST HOUR: CPT

## 2022-10-18 PROCEDURE — 700101 HCHG RX REV CODE 250: Performed by: ANESTHESIOLOGY

## 2022-10-18 PROCEDURE — 700101 HCHG RX REV CODE 250: Performed by: STUDENT IN AN ORGANIZED HEALTH CARE EDUCATION/TRAINING PROGRAM

## 2022-10-18 PROCEDURE — 700111 HCHG RX REV CODE 636 W/ 250 OVERRIDE (IP): Performed by: ANESTHESIOLOGY

## 2022-10-18 PROCEDURE — 87040 BLOOD CULTURE FOR BACTERIA: CPT | Mod: 91

## 2022-10-18 PROCEDURE — 700111 HCHG RX REV CODE 636 W/ 250 OVERRIDE (IP): Performed by: STUDENT IN AN ORGANIZED HEALTH CARE EDUCATION/TRAINING PROGRAM

## 2022-10-18 PROCEDURE — 700117 HCHG RX CONTRAST REV CODE 255: Performed by: EMERGENCY MEDICINE

## 2022-10-18 PROCEDURE — 700105 HCHG RX REV CODE 258: Performed by: ANESTHESIOLOGY

## 2022-10-18 PROCEDURE — 160035 HCHG PACU - 1ST 60 MINS PHASE I: Performed by: OTOLARYNGOLOGY

## 2022-10-18 PROCEDURE — 160048 HCHG OR STATISTICAL LEVEL 1-5: Performed by: OTOLARYNGOLOGY

## 2022-10-18 PROCEDURE — A9270 NON-COVERED ITEM OR SERVICE: HCPCS | Performed by: STUDENT IN AN ORGANIZED HEALTH CARE EDUCATION/TRAINING PROGRAM

## 2022-10-18 PROCEDURE — 84145 PROCALCITONIN (PCT): CPT

## 2022-10-18 PROCEDURE — 70491 CT SOFT TISSUE NECK W/DYE: CPT

## 2022-10-18 PROCEDURE — 770022 HCHG ROOM/CARE - ICU (200)

## 2022-10-18 PROCEDURE — 83605 ASSAY OF LACTIC ACID: CPT

## 2022-10-18 PROCEDURE — 00170 ANES INTRAORAL PX NOS: CPT | Performed by: ANESTHESIOLOGY

## 2022-10-18 PROCEDURE — 85025 COMPLETE CBC W/AUTO DIFF WBC: CPT

## 2022-10-18 PROCEDURE — 700105 HCHG RX REV CODE 258: Performed by: EMERGENCY MEDICINE

## 2022-10-18 PROCEDURE — 160036 HCHG PACU - EA ADDL 30 MINS PHASE I: Performed by: OTOLARYNGOLOGY

## 2022-10-18 PROCEDURE — 99223 1ST HOSP IP/OBS HIGH 75: CPT | Mod: AI | Performed by: STUDENT IN AN ORGANIZED HEALTH CARE EDUCATION/TRAINING PROGRAM

## 2022-10-18 PROCEDURE — 0241U HCHG SARS-COV-2 COVID-19 NFCT DS RESP RNA 4 TRGT MIC: CPT

## 2022-10-18 PROCEDURE — 87070 CULTURE OTHR SPECIMN AEROBIC: CPT

## 2022-10-18 PROCEDURE — 87651 STREP A DNA AMP PROBE: CPT

## 2022-10-18 PROCEDURE — 87076 CULTURE ANAEROBE IDENT EACH: CPT

## 2022-10-18 PROCEDURE — 93005 ELECTROCARDIOGRAM TRACING: CPT | Performed by: ANESTHESIOLOGY

## 2022-10-18 PROCEDURE — 160038 HCHG SURGERY MINUTES - EA ADDL 1 MIN LEVEL 2: Performed by: OTOLARYNGOLOGY

## 2022-10-18 PROCEDURE — 99291 CRITICAL CARE FIRST HOUR: CPT | Performed by: INTERNAL MEDICINE

## 2022-10-18 PROCEDURE — 700105 HCHG RX REV CODE 258: Performed by: STUDENT IN AN ORGANIZED HEALTH CARE EDUCATION/TRAINING PROGRAM

## 2022-10-18 PROCEDURE — 700101 HCHG RX REV CODE 250: Performed by: EMERGENCY MEDICINE

## 2022-10-18 PROCEDURE — 700102 HCHG RX REV CODE 250 W/ 637 OVERRIDE(OP): Performed by: STUDENT IN AN ORGANIZED HEALTH CARE EDUCATION/TRAINING PROGRAM

## 2022-10-18 PROCEDURE — 0C9M8ZZ DRAINAGE OF PHARYNX, VIA NATURAL OR ARTIFICIAL OPENING ENDOSCOPIC: ICD-10-PCS | Performed by: OTOLARYNGOLOGY

## 2022-10-18 PROCEDURE — 700111 HCHG RX REV CODE 636 W/ 250 OVERRIDE (IP)

## 2022-10-18 PROCEDURE — 36415 COLL VENOUS BLD VENIPUNCTURE: CPT

## 2022-10-18 PROCEDURE — 160002 HCHG RECOVERY MINUTES (STAT): Performed by: OTOLARYNGOLOGY

## 2022-10-18 PROCEDURE — 87205 SMEAR GRAM STAIN: CPT

## 2022-10-18 PROCEDURE — 160027 HCHG SURGERY MINUTES - 1ST 30 MINS LEVEL 2: Performed by: OTOLARYNGOLOGY

## 2022-10-18 PROCEDURE — 96365 THER/PROPH/DIAG IV INF INIT: CPT

## 2022-10-18 PROCEDURE — 87077 CULTURE AEROBIC IDENTIFY: CPT

## 2022-10-18 PROCEDURE — 96375 TX/PRO/DX INJ NEW DRUG ADDON: CPT

## 2022-10-18 PROCEDURE — 99100 ANES PT EXTEME AGE<1 YR&>70: CPT | Performed by: ANESTHESIOLOGY

## 2022-10-18 PROCEDURE — 700111 HCHG RX REV CODE 636 W/ 250 OVERRIDE (IP): Performed by: EMERGENCY MEDICINE

## 2022-10-18 PROCEDURE — 160009 HCHG ANES TIME/MIN: Performed by: OTOLARYNGOLOGY

## 2022-10-18 PROCEDURE — C9803 HOPD COVID-19 SPEC COLLECT: HCPCS

## 2022-10-18 PROCEDURE — 87075 CULTR BACTERIA EXCEPT BLOOD: CPT

## 2022-10-18 PROCEDURE — C9803 HOPD COVID-19 SPEC COLLECT: HCPCS | Performed by: EMERGENCY MEDICINE

## 2022-10-18 RX ORDER — ONDANSETRON 2 MG/ML
INJECTION INTRAMUSCULAR; INTRAVENOUS PRN
Status: DISCONTINUED | OUTPATIENT
Start: 2022-10-18 | End: 2022-10-18 | Stop reason: SURG

## 2022-10-18 RX ORDER — ONDANSETRON 2 MG/ML
4 INJECTION INTRAMUSCULAR; INTRAVENOUS
Status: DISCONTINUED | OUTPATIENT
Start: 2022-10-18 | End: 2022-10-18 | Stop reason: HOSPADM

## 2022-10-18 RX ORDER — LABETALOL HYDROCHLORIDE 5 MG/ML
10 INJECTION, SOLUTION INTRAVENOUS EVERY 4 HOURS PRN
Status: DISCONTINUED | OUTPATIENT
Start: 2022-10-18 | End: 2022-10-20 | Stop reason: HOSPADM

## 2022-10-18 RX ORDER — PHENYLEPHRINE HYDROCHLORIDE 10 MG/ML
0.2 INJECTION, SOLUTION INTRAMUSCULAR; INTRAVENOUS; SUBCUTANEOUS
Status: DISCONTINUED | OUTPATIENT
Start: 2022-10-18 | End: 2022-10-18 | Stop reason: HOSPADM

## 2022-10-18 RX ORDER — MULTIVIT WITH MINERALS/LUTEIN
4000 TABLET ORAL DAILY
COMMUNITY
End: 2023-08-28

## 2022-10-18 RX ORDER — AMOXICILLIN 250 MG
2 CAPSULE ORAL 2 TIMES DAILY
Status: DISCONTINUED | OUTPATIENT
Start: 2022-10-18 | End: 2022-10-20 | Stop reason: HOSPADM

## 2022-10-18 RX ORDER — OXYCODONE HCL 5 MG/5 ML
5 SOLUTION, ORAL ORAL
Status: DISCONTINUED | OUTPATIENT
Start: 2022-10-18 | End: 2022-10-18 | Stop reason: HOSPADM

## 2022-10-18 RX ORDER — CEFTRIAXONE 2 G/1
2 INJECTION, POWDER, FOR SOLUTION INTRAMUSCULAR; INTRAVENOUS ONCE
Status: COMPLETED | OUTPATIENT
Start: 2022-10-18 | End: 2022-10-18

## 2022-10-18 RX ORDER — AMITRIPTYLINE HYDROCHLORIDE 25 MG/1
50 TABLET, FILM COATED ORAL NIGHTLY
COMMUNITY
End: 2023-08-21

## 2022-10-18 RX ORDER — LIDOCAINE HYDROCHLORIDE 20 MG/ML
INJECTION, SOLUTION EPIDURAL; INFILTRATION; INTRACAUDAL; PERINEURAL PRN
Status: DISCONTINUED | OUTPATIENT
Start: 2022-10-18 | End: 2022-10-18 | Stop reason: SURG

## 2022-10-18 RX ORDER — BISACODYL 10 MG
10 SUPPOSITORY, RECTAL RECTAL
Status: DISCONTINUED | OUTPATIENT
Start: 2022-10-18 | End: 2022-10-20 | Stop reason: HOSPADM

## 2022-10-18 RX ORDER — SODIUM CHLORIDE 9 MG/ML
INJECTION, SOLUTION INTRAVENOUS CONTINUOUS
Status: DISCONTINUED | OUTPATIENT
Start: 2022-10-18 | End: 2022-10-19

## 2022-10-18 RX ORDER — CLINDAMYCIN PHOSPHATE 600 MG/50ML
600 INJECTION, SOLUTION INTRAVENOUS EVERY 8 HOURS
Status: DISCONTINUED | OUTPATIENT
Start: 2022-10-18 | End: 2022-10-20 | Stop reason: HOSPADM

## 2022-10-18 RX ORDER — POLYETHYLENE GLYCOL 3350 17 G/17G
1 POWDER, FOR SOLUTION ORAL
Status: DISCONTINUED | OUTPATIENT
Start: 2022-10-18 | End: 2022-10-20 | Stop reason: HOSPADM

## 2022-10-18 RX ORDER — SODIUM CHLORIDE 9 MG/ML
1000 INJECTION, SOLUTION INTRAVENOUS ONCE
Status: COMPLETED | OUTPATIENT
Start: 2022-10-18 | End: 2022-10-18

## 2022-10-18 RX ORDER — OMEPRAZOLE 20 MG/1
20 CAPSULE, DELAYED RELEASE ORAL DAILY
Status: DISCONTINUED | OUTPATIENT
Start: 2022-10-18 | End: 2022-10-20 | Stop reason: HOSPADM

## 2022-10-18 RX ORDER — HYDROCODONE BITARTRATE AND ACETAMINOPHEN 5; 325 MG/1; MG/1
1 TABLET ORAL EVERY 4 HOURS PRN
Status: DISCONTINUED | OUTPATIENT
Start: 2022-10-18 | End: 2022-10-20 | Stop reason: HOSPADM

## 2022-10-18 RX ORDER — SODIUM CHLORIDE 9 MG/ML
1000 INJECTION, SOLUTION INTRAVENOUS ONCE
Status: DISCONTINUED | OUTPATIENT
Start: 2022-10-18 | End: 2022-10-19

## 2022-10-18 RX ORDER — DEXAMETHASONE SODIUM PHOSPHATE 4 MG/ML
INJECTION, SOLUTION INTRA-ARTICULAR; INTRALESIONAL; INTRAMUSCULAR; INTRAVENOUS; SOFT TISSUE PRN
Status: DISCONTINUED | OUTPATIENT
Start: 2022-10-18 | End: 2022-10-18 | Stop reason: SURG

## 2022-10-18 RX ORDER — DILTIAZEM HYDROCHLORIDE 5 MG/ML
INJECTION INTRAVENOUS
Status: COMPLETED
Start: 2022-10-18 | End: 2022-10-18

## 2022-10-18 RX ORDER — HEPARIN SODIUM 5000 [USP'U]/ML
5000 INJECTION, SOLUTION INTRAVENOUS; SUBCUTANEOUS EVERY 8 HOURS
Status: DISCONTINUED | OUTPATIENT
Start: 2022-10-18 | End: 2022-10-20 | Stop reason: HOSPADM

## 2022-10-18 RX ORDER — DULOXETIN HYDROCHLORIDE 30 MG/1
60 CAPSULE, DELAYED RELEASE ORAL DAILY
Status: DISCONTINUED | OUTPATIENT
Start: 2022-10-18 | End: 2022-10-20 | Stop reason: HOSPADM

## 2022-10-18 RX ORDER — FLUTICASONE PROPIONATE 44 UG/1
2 AEROSOL, METERED RESPIRATORY (INHALATION) DAILY
Status: DISCONTINUED | OUTPATIENT
Start: 2022-10-18 | End: 2022-10-20 | Stop reason: HOSPADM

## 2022-10-18 RX ORDER — SODIUM CHLORIDE, SODIUM LACTATE, POTASSIUM CHLORIDE, CALCIUM CHLORIDE 600; 310; 30; 20 MG/100ML; MG/100ML; MG/100ML; MG/100ML
INJECTION, SOLUTION INTRAVENOUS CONTINUOUS
Status: DISCONTINUED | OUTPATIENT
Start: 2022-10-18 | End: 2022-10-18 | Stop reason: HOSPADM

## 2022-10-18 RX ORDER — CLINDAMYCIN PHOSPHATE 600 MG/50ML
600 INJECTION, SOLUTION INTRAVENOUS ONCE
Status: COMPLETED | OUTPATIENT
Start: 2022-10-18 | End: 2022-10-18

## 2022-10-18 RX ORDER — OXYCODONE HCL 5 MG/5 ML
10 SOLUTION, ORAL ORAL
Status: DISCONTINUED | OUTPATIENT
Start: 2022-10-18 | End: 2022-10-18 | Stop reason: HOSPADM

## 2022-10-18 RX ORDER — DILTIAZEM HYDROCHLORIDE 240 MG/1
240 CAPSULE, COATED, EXTENDED RELEASE ORAL DAILY
Status: DISCONTINUED | OUTPATIENT
Start: 2022-10-18 | End: 2022-10-20 | Stop reason: HOSPADM

## 2022-10-18 RX ORDER — SODIUM CHLORIDE, SODIUM LACTATE, POTASSIUM CHLORIDE, CALCIUM CHLORIDE 600; 310; 30; 20 MG/100ML; MG/100ML; MG/100ML; MG/100ML
INJECTION, SOLUTION INTRAVENOUS
Status: DISCONTINUED | OUTPATIENT
Start: 2022-10-18 | End: 2022-10-18 | Stop reason: SURG

## 2022-10-18 RX ORDER — FLUTICASONE PROPIONATE 50 MCG
1 SPRAY, SUSPENSION (ML) NASAL DAILY
Status: ON HOLD | COMMUNITY
End: 2022-10-20

## 2022-10-18 RX ORDER — DILTIAZEM HYDROCHLORIDE 5 MG/ML
10 INJECTION INTRAVENOUS ONCE
Status: COMPLETED | OUTPATIENT
Start: 2022-10-18 | End: 2022-10-18

## 2022-10-18 RX ORDER — PHENYLEPHRINE HYDROCHLORIDE 10 MG/ML
INJECTION, SOLUTION INTRAMUSCULAR; INTRAVENOUS; SUBCUTANEOUS
Status: COMPLETED
Start: 2022-10-18 | End: 2022-10-18

## 2022-10-18 RX ORDER — PHENYLEPHRINE HCL IN 0.9% NACL 0.5 MG/5ML
100 SYRINGE (ML) INTRAVENOUS
Status: ACTIVE | OUTPATIENT
Start: 2022-10-18 | End: 2022-10-18

## 2022-10-18 RX ORDER — ALBUTEROL SULFATE 90 UG/1
2 AEROSOL, METERED RESPIRATORY (INHALATION) EVERY 6 HOURS PRN
Status: DISCONTINUED | OUTPATIENT
Start: 2022-10-18 | End: 2022-10-20 | Stop reason: HOSPADM

## 2022-10-18 RX ORDER — MIDAZOLAM HYDROCHLORIDE 1 MG/ML
INJECTION INTRAMUSCULAR; INTRAVENOUS PRN
Status: DISCONTINUED | OUTPATIENT
Start: 2022-10-18 | End: 2022-10-18 | Stop reason: SURG

## 2022-10-18 RX ORDER — TELMISARTAN 40 MG/1
40 TABLET ORAL DAILY
Status: DISCONTINUED | OUTPATIENT
Start: 2022-10-18 | End: 2022-10-20 | Stop reason: HOSPADM

## 2022-10-18 RX ORDER — ATORVASTATIN CALCIUM 10 MG/1
10 TABLET, FILM COATED ORAL DAILY
Status: DISCONTINUED | OUTPATIENT
Start: 2022-10-18 | End: 2022-10-20 | Stop reason: HOSPADM

## 2022-10-18 RX ORDER — LABETALOL HYDROCHLORIDE 5 MG/ML
5 INJECTION, SOLUTION INTRAVENOUS
Status: DISCONTINUED | OUTPATIENT
Start: 2022-10-18 | End: 2022-10-18 | Stop reason: HOSPADM

## 2022-10-18 RX ORDER — KETAMINE HYDROCHLORIDE 50 MG/ML
INJECTION, SOLUTION INTRAMUSCULAR; INTRAVENOUS PRN
Status: DISCONTINUED | OUTPATIENT
Start: 2022-10-18 | End: 2022-10-18 | Stop reason: SURG

## 2022-10-18 RX ORDER — HYDRALAZINE HYDROCHLORIDE 20 MG/ML
5 INJECTION INTRAMUSCULAR; INTRAVENOUS
Status: DISCONTINUED | OUTPATIENT
Start: 2022-10-18 | End: 2022-10-18 | Stop reason: HOSPADM

## 2022-10-18 RX ORDER — SODIUM CHLORIDE, SODIUM LACTATE, POTASSIUM CHLORIDE, AND CALCIUM CHLORIDE .6; .31; .03; .02 G/100ML; G/100ML; G/100ML; G/100ML
500 INJECTION, SOLUTION INTRAVENOUS ONCE
Status: DISCONTINUED | OUTPATIENT
Start: 2022-10-18 | End: 2022-10-18 | Stop reason: HOSPADM

## 2022-10-18 RX ORDER — ROCURONIUM BROMIDE 10 MG/ML
INJECTION, SOLUTION INTRAVENOUS PRN
Status: DISCONTINUED | OUTPATIENT
Start: 2022-10-18 | End: 2022-10-18 | Stop reason: SURG

## 2022-10-18 RX ORDER — NOREPINEPHRINE BITARTRATE 1 MG/ML
INJECTION, SOLUTION INTRAVENOUS
Status: DISPENSED
Start: 2022-10-18 | End: 2022-10-19

## 2022-10-18 RX ADMIN — PHENYLEPHRINE HYDROCHLORIDE 0.2 MG: 10 INJECTION INTRAVENOUS at 18:08

## 2022-10-18 RX ADMIN — FENTANYL CITRATE 50 MCG: 50 INJECTION, SOLUTION INTRAMUSCULAR; INTRAVENOUS at 16:15

## 2022-10-18 RX ADMIN — DILTIAZEM HYDROCHLORIDE 10 MG: 5 INJECTION INTRAVENOUS at 18:40

## 2022-10-18 RX ADMIN — SUGAMMADEX 200 MG: 100 INJECTION, SOLUTION INTRAVENOUS at 16:34

## 2022-10-18 RX ADMIN — PHENYLEPHRINE HYDROCHLORIDE 0.2 MG: 10 INJECTION INTRAVENOUS at 17:55

## 2022-10-18 RX ADMIN — DEXAMETHASONE SODIUM PHOSPHATE 12 MG: 4 INJECTION, SOLUTION INTRA-ARTICULAR; INTRALESIONAL; INTRAMUSCULAR; INTRAVENOUS; SOFT TISSUE at 16:19

## 2022-10-18 RX ADMIN — PHENYLEPHRINE HYDROCHLORIDE 0.2 MG: 10 INJECTION INTRAVENOUS at 17:20

## 2022-10-18 RX ADMIN — SODIUM CHLORIDE 1000 ML: 9 INJECTION, SOLUTION INTRAVENOUS at 11:56

## 2022-10-18 RX ADMIN — PROPOFOL 100 MG: 10 INJECTION, EMULSION INTRAVENOUS at 16:15

## 2022-10-18 RX ADMIN — LIDOCAINE HYDROCHLORIDE 60 MG: 20 INJECTION, SOLUTION EPIDURAL; INFILTRATION; INTRACAUDAL at 16:15

## 2022-10-18 RX ADMIN — CLINDAMYCIN IN 5 PERCENT DEXTROSE 600 MG: 12 INJECTION, SOLUTION INTRAVENOUS at 20:50

## 2022-10-18 RX ADMIN — HEPARIN SODIUM 5000 UNITS: 5000 INJECTION, SOLUTION INTRAVENOUS; SUBCUTANEOUS at 21:28

## 2022-10-18 RX ADMIN — PHENYLEPHRINE HYDROCHLORIDE 0.2 MG: 10 INJECTION INTRAVENOUS at 17:23

## 2022-10-18 RX ADMIN — KETAMINE HYDROCHLORIDE 50 MG: 50 INJECTION INTRAMUSCULAR; INTRAVENOUS at 16:15

## 2022-10-18 RX ADMIN — ONDANSETRON 4 MG: 2 INJECTION INTRAMUSCULAR; INTRAVENOUS at 16:31

## 2022-10-18 RX ADMIN — SODIUM CHLORIDE, POTASSIUM CHLORIDE, SODIUM LACTATE AND CALCIUM CHLORIDE 500 ML: 600; 310; 30; 20 INJECTION, SOLUTION INTRAVENOUS at 17:45

## 2022-10-18 RX ADMIN — PHENYLEPHRINE HYDROCHLORIDE 0.2 MG: 10 INJECTION INTRAVENOUS at 17:47

## 2022-10-18 RX ADMIN — IOHEXOL 80 ML: 350 INJECTION, SOLUTION INTRAVENOUS at 10:10

## 2022-10-18 RX ADMIN — CLINDAMYCIN PHOSPHATE 600 MG: 600 INJECTION, SOLUTION INTRAVENOUS at 14:38

## 2022-10-18 RX ADMIN — SODIUM CHLORIDE, POTASSIUM CHLORIDE, SODIUM LACTATE AND CALCIUM CHLORIDE: 600; 310; 30; 20 INJECTION, SOLUTION INTRAVENOUS at 16:10

## 2022-10-18 RX ADMIN — CEFTRIAXONE SODIUM 2 G: 2 INJECTION, POWDER, FOR SOLUTION INTRAMUSCULAR; INTRAVENOUS at 12:58

## 2022-10-18 RX ADMIN — ROCURONIUM BROMIDE 50 MG: 10 INJECTION, SOLUTION INTRAVENOUS at 16:15

## 2022-10-18 RX ADMIN — MIDAZOLAM HYDROCHLORIDE 2 MG: 1 INJECTION, SOLUTION INTRAMUSCULAR; INTRAVENOUS at 16:10

## 2022-10-18 RX ADMIN — SODIUM CHLORIDE: 9 INJECTION, SOLUTION INTRAVENOUS at 12:48

## 2022-10-18 ASSESSMENT — LIFESTYLE VARIABLES
HAVE YOU EVER FELT YOU SHOULD CUT DOWN ON YOUR DRINKING: NO
ON A TYPICAL DAY WHEN YOU DRINK ALCOHOL HOW MANY DRINKS DO YOU HAVE: 0
HAVE PEOPLE ANNOYED YOU BY CRITICIZING YOUR DRINKING: NO
CONSUMPTION TOTAL: NEGATIVE
ALCOHOL_USE: YES
DOES PATIENT WANT TO STOP DRINKING: NO
TOTAL SCORE: 0
TOTAL SCORE: 0
EVER FELT BAD OR GUILTY ABOUT YOUR DRINKING: NO
TOTAL SCORE: 0
HOW MANY TIMES IN THE PAST YEAR HAVE YOU HAD 5 OR MORE DRINKS IN A DAY: 0
AVERAGE NUMBER OF DAYS PER WEEK YOU HAVE A DRINK CONTAINING ALCOHOL: 1
EVER HAD A DRINK FIRST THING IN THE MORNING TO STEADY YOUR NERVES TO GET RID OF A HANGOVER: NO

## 2022-10-18 ASSESSMENT — PAIN DESCRIPTION - PAIN TYPE
TYPE: ACUTE PAIN
TYPE: SURGICAL PAIN
TYPE: ACUTE PAIN
TYPE: SURGICAL PAIN
TYPE: ACUTE PAIN
TYPE: ACUTE PAIN
TYPE: SURGICAL PAIN
TYPE: ACUTE PAIN

## 2022-10-18 ASSESSMENT — COGNITIVE AND FUNCTIONAL STATUS - GENERAL
SUGGESTED CMS G CODE MODIFIER MOBILITY: CH
DAILY ACTIVITIY SCORE: 24
MOBILITY SCORE: 24
SUGGESTED CMS G CODE MODIFIER DAILY ACTIVITY: CH

## 2022-10-18 ASSESSMENT — ENCOUNTER SYMPTOMS
HEADACHES: 0
SORE THROAT: 1
NAUSEA: 0
DIZZINESS: 0
FEVER: 0
COUGH: 1
SHORTNESS OF BREATH: 0
DEPRESSION: 0
WEAKNESS: 0
FLANK PAIN: 0
SINUS PAIN: 0
BLURRED VISION: 0
BACK PAIN: 0
CHILLS: 0
NECK PAIN: 0
BRUISES/BLEEDS EASILY: 0
COUGH: 0
ABDOMINAL PAIN: 0
MYALGIAS: 0
EYE DISCHARGE: 0
NERVOUS/ANXIOUS: 0
VOMITING: 0
EYE PAIN: 0

## 2022-10-18 ASSESSMENT — FIBROSIS 4 INDEX
FIB4 SCORE: 0.87
FIB4 SCORE: 0.98

## 2022-10-18 ASSESSMENT — PATIENT HEALTH QUESTIONNAIRE - PHQ9
1. LITTLE INTEREST OR PLEASURE IN DOING THINGS: NOT AT ALL
2. FEELING DOWN, DEPRESSED, IRRITABLE, OR HOPELESS: NOT AT ALL
SUM OF ALL RESPONSES TO PHQ9 QUESTIONS 1 AND 2: 0

## 2022-10-18 NOTE — ED NOTES
Per ENT - scheduled for surgery at 1700.  Patient has admit orders, awaiting a bed assignment.  Pt will be moved to Y64.  Pt ambulatory to the restroom and back, steady gait, no distress noted at this time.

## 2022-10-18 NOTE — PROCEDURES
Preoperative Diagnosis:  Base of tongue abscess    Postoperative Diagnosis: Same    Surgeron:  REID Suazo MD    Brief:  I&D of tongue GETA 7.0 ETT, approximately 2ml pus sent culture.      EBL: 5ml    Drains: None

## 2022-10-18 NOTE — H&P
"Hospital Medicine History & Physical Note    Date of Service  10/18/2022    Primary Care Physician  Rudy Parsons M.D.    Consultants  ENT    Specialist Names: Dr Suazo     Code Status  Full Code    Chief Complaint  Chief Complaint   Patient presents with    Sore Throat     \"I can't swallow\".  States this happened about a week ago, did not seek medical attention at the time.      Cough     Started a week ago.       History of Presenting Illness  Augusta Rodriguez is a 72 y.o. female with past medical history of asthma, hypertension, sleep apnea, GERD who presented 10/18/2022 with sore throat associated with difficulty swallowing for last 2 weeks.  She was evaluated in the ED recently and was prescribed cefdinir without any improvement.  She present today with worsening dysphagia and cough.  She denies fever, nausea, vomiting, chest pain, shortness of breath.    Subsequent CT neck noted with 1.1 cm abscess between epiglottitis and the base of the tongue.  ENT Dr. Suazo  was consulted and is scheduled for the OR later this afternoon.Labs consistent with neutrophil leukocytosis.    Patient received Rocephin and clindamycin in the ED.      Patient will be admitted for sepsis due to abscess around epiglottitis requiring IV antibiotics and I&D.          I discussed the plan of care with patient.    Review of Systems  Review of Systems   Constitutional:  Negative for fever.   HENT:  Negative for hearing loss.    Eyes:  Negative for blurred vision.   Respiratory:  Positive for cough. Negative for shortness of breath.    Cardiovascular:  Negative for chest pain.   Gastrointestinal:  Negative for nausea and vomiting.   Genitourinary:  Negative for dysuria.   Musculoskeletal:  Negative for myalgias.   Skin:  Negative for rash.   Neurological:  Negative for dizziness.   Endo/Heme/Allergies:  Does not bruise/bleed easily.     Past Medical History   has a past medical history of Apnea, sleep, Arrhythmia, Arthritis, Asthma, Atrial " fibrillation (HCC), Back pain, Bronchitis, Chickenpox, Constipation, Cough, Daytime sleepiness, Dental disorder, Earache, Frequent urination, Gasping for breath, GERD (gastroesophageal reflux disease) (2/27/2010), Chinese measles, Heartburn, Hypertension, Impaired fasting glucose (2/27/2010), Incontinence of urine, Indigestion, Influenza, Mumps, Nasal drainage, Nausea, Osteopenia (2/24/2010), Osteoporosis, Other specified disorder of intestines, Restless leg syndrome, Scarlet fever, Shortness of breath, Sleep apnea, Snoring, Tremor, essential (2/24/2010), Urinary bladder disorder, Wears glasses, Wheezing, and Whooping cough.    Surgical History   has a past surgical history that includes bladder suspension; stephon by laparoscopy (1997); primary c section (1970/1975); hysterectomy, total abdominal (1979); hip arthroscopy (2/23/2009); acetabular osteotomy (2/23/2009); mass excision ortho (2/23/2009); carpal tunnel endoscopic (11/17/2012); trigger finger release (11/17/2012); lumbar laminectomy diskectomy (Bilateral, 2/4/2017); hip arthroscopy (2005); laminotomy; carpal tunnel release; hip replacement, total; and hysterectomy laparoscopy.     Family History  family history includes Cancer in her brother; GI Disease in her father; Heart Disease in her father; Hypertension in her father and another family member; Stroke in her father.   Family history reviewed with patient. There is no family history that is pertinent to the chief complaint.     Social History   reports that she has never smoked. She has never used smokeless tobacco. She reports current alcohol use. She reports that she does not use drugs.    Allergies  Allergies   Allergen Reactions    Clarithromycin      Swelling/Itching/Nausea    Ees [Erythromycin]      Swelling/Itching/Nausea    Levaquin      Muscle soreness     Pcn [Penicillins]      Swelling/Itching/Nausea     Shellfish Allergy      Swelling/Itching/Nausea    Sumatriptan Swelling     Tongue swell     Trazodone Swelling       Medications  Prior to Admission Medications   Prescriptions Last Dose Informant Patient Reported? Taking?   DILTIAZem CD (CARDIZEM CD) 240 MG CAPSULE SR 24 HR   No No   Sig: TAKE 1 CAPSULE BY MOUTH EVERY DAY   DULoxetine (CYMBALTA) 60 MG Cap DR Particles delayed-release capsule   No No   Sig: TAKE 1 CAPSULE BY MOUTH EVERY DAY   Fluticasone-Umeclidin-Vilant (TRELEGY ELLIPTA) 100-62.5-25 MCG/INH AEROSOL POWDER, BREATH ACTIVATED inhalation   No No   Sig: Inhale 1 Inhalation every day.   SALINE NA   Yes No   Sig: Spray  in nose.   albuterol 108 (90 Base) MCG/ACT Aero Soln inhalation aerosol   No No   Sig: INHALE 2 PUFFS BY MOUTH EVERY 6 HOURS AS NEEDED FOR SHORTNESS OF BREATH   amitriptyline (ELAVIL) 25 MG Tab   No No   Sig: TAKE 1 TABLET BY MOUTH ONCE DAILY AT BEDTIME   aspirin 81 MG EC tablet  Patient Yes No   Sig: Take 81 mg by mouth every day.   atorvastatin (LIPITOR) 10 MG Tab   No No   Sig: TAKE 1 TABLET BY MOUTH EVERY DAY   cefdinir (OMNICEF) 300 MG Cap   No No   Sig: Take 1 Capsule by mouth 2 times a day for 10 days.   clobetasol (TEMOVATE) 0.05 % Ointment   No No   Sig: AAA thin layer, daily for 3 weeks, then use 2-3 times a week   fluticasone (FLOVENT HFA) 110 MCG/ACT Aerosol   No No   Sig: Inhale 2 Puffs 2 times a day.   ondansetron (ZOFRAN) 4 MG Tab tablet   Yes No   Sig: TAKE 1 TABLET BY MOUTH ONCE DAILY AS NEEDED FOR NAUSEA   pantoprazole (PROTONIX) 40 MG Tablet Delayed Response   No No   Sig: TAKE 1 TABLET BY MOUTH EVERY DAY   rizatriptan (MAXALT) 5 MG tablet   Yes No   Sig: TAKE 1 TABLET BY MOUTH AS DIRECTED AT START OF HA AND OK TO REPEAT IN 2 HOURS IF NEEDED   telmisartan (MICARDIS) 40 MG Tab   No No   Sig: TAKE 1 TABLET BY MOUTH EVERY DAY   zonisamide (ZONEGRAN) 100 MG Cap   Yes No   Sig: Take 100 mg by mouth every day.      Facility-Administered Medications: None       Physical Exam  Temp:  [36 °C (96.8 °F)-36.1 °C (97 °F)] 36.1 °C (97 °F)  Pulse:  [89-92] 92  Resp:  [16-18]  18  BP: (125-159)/() 125/89  SpO2:  [93 %] 93 %  Blood Pressure : 125/89   Temperature: 36.1 °C (97 °F)   Pulse: 92   Respiration: 18   Pulse Oximetry: 93 %       Physical Exam  Constitutional:       General: She is not in acute distress.  HENT:      Head: Normocephalic and atraumatic.      Right Ear: Tympanic membrane normal.      Left Ear: Tympanic membrane normal.      Nose: Nose normal.      Mouth/Throat:      Mouth: Mucous membranes are moist.   Eyes:      Extraocular Movements: Extraocular movements intact.      Pupils: Pupils are equal, round, and reactive to light.   Cardiovascular:      Rate and Rhythm: Regular rhythm. Tachycardia present.      Pulses: Normal pulses.   Pulmonary:      Effort: Pulmonary effort is normal. No respiratory distress.   Abdominal:      General: Abdomen is flat. There is no distension.   Musculoskeletal:      Cervical back: Normal range of motion.      Right lower leg: No edema.      Left lower leg: No edema.   Skin:     General: Skin is warm.   Neurological:      General: No focal deficit present.      Mental Status: She is alert and oriented to person, place, and time. Mental status is at baseline.   Psychiatric:         Mood and Affect: Mood normal.       Laboratory:  Recent Labs     10/18/22  0844   WBC 12.5*   RBC 5.08   HEMOGLOBIN 13.5   HEMATOCRIT 40.6   MCV 79.9*   MCH 26.6*   MCHC 33.3*   RDW 43.2   PLATELETCT 400   MPV 9.0     Recent Labs     10/18/22  0844   SODIUM 137   POTASSIUM 4.1   CHLORIDE 103   CO2 22   GLUCOSE 117*   BUN 19   CREATININE 0.88   CALCIUM 9.1     Recent Labs     10/18/22  0844   GLUCOSE 117*         No results for input(s): NTPROBNP in the last 72 hours.      No results for input(s): TROPONINT in the last 72 hours.    Imaging:  CT-SOFT TISSUE NECK WITH   Final Result      1.  A 1.1 cm abscess between the epiglottis and the base of tongue.   2.  Mild reactive cervical lymphadenopathy.          no X-Ray or EKG requiring  interpretation    Assessment/Plan:  Justification for Admission Status  I anticipate this patient will require at least two midnights for appropriate medical management, necessitating inpatient admission because sepsis due to epiglottis abscess requiring I&D and IV antibiotics        * Neck abscess- (present on admission)  Assessment & Plan  Subsequent CT neck noted with 1.1 cm abscess between epiglottitis and the base of the tongue.      ENT Dr. Suazo  was consulted and is scheduled for the OR later this afternoon.    Continue on IV fluid, IV antibiotics    Asthma  Assessment & Plan  Not in acute exacerbation  Continue DuoNeb as needed    Hypertension  Assessment & Plan  Current blood pressure acceptable  Continue home medication  On IV labetalol as needed  Monitor BP closely      Sepsis (HCC)  Assessment & Plan  This is Sepsis Present on admission  SIRS criteria identified on my evaluation include: Tachycardia, with heart rate greater than 90 BPM and Leukocytosis, with WBC greater than 12,000  Source is Neck abscess  Sepsis protocol initiated  Fluid resuscitation ordered per protocol  Crystalloid Fluid Administration: Fluid resuscitation ordered per standard protocol - 30 mL/kg per current or ideal body weight  IV antibiotics as appropriate for source of sepsis  Reassessment: I have reassessed the patient's hemodynamic status            VTE prophylaxis: SCDs/TEDs and heparin ppx

## 2022-10-18 NOTE — ASSESSMENT & PLAN NOTE
CT neck noted with 1.1 cm abscess between epiglottitis and the base of the tongue.    ENT Dr. Suazo took the patient for incision and drainage 10/18  Continue on IV fluid, IV antibiotics  Following up on cultures  Switch to oral antibiotics once able

## 2022-10-18 NOTE — ANESTHESIA PROCEDURE NOTES
Airway    Date/Time: 10/18/2022 4:16 PM  Performed by: Francis Brice M.D.  Authorized by: Francis Brice M.D.     Location:  OR  Urgency:  Elective  Difficult Airway: No    Indications for Airway Management:  Anesthesia      Spontaneous Ventilation: absent    Sedation Level:  Deep  Preoxygenated: Yes    Patient Position:  Sniffing  Mask Difficulty Assessment:  2 - vent by mask + OA or adjuvant +/- NMBA  Final Airway Type:  Endotracheal airway  Final Endotracheal Airway:  ETT  Cuffed: Yes    Technique Used for Successful ETT Placement:  Direct laryngoscopy    Insertion Site:  Oral  Blade Type:  Aiken  Laryngoscope Blade/Videolaryngoscope Blade Size:  2  ETT Size (mm):  7.0  Measured from:  Teeth  ETT to Teeth (cm):  21  Placement Verified by: auscultation and capnometry    Cormack-Lehane Classification:  Grade I - full view of glottis  Number of Attempts at Approach:  1

## 2022-10-18 NOTE — ANESTHESIA PREPROCEDURE EVALUATION
Case: [id number] Date/Time: 10/18/22 1715    Procedure: INCISION AND DRAINAGE ABSCESS TONGUE (Throat)    Anesthesia type: General    Location: [facility] OR  / SURGERY [facility]    Surgeons: Paz Suazo M.D.          Relevant Problems   ANESTHESIA   (positive) BRIANA (obstructive sleep apnea)      PULMONARY   (positive) Asthma   (positive) Asthma    (positive) COPD (chronic obstructive pulmonary disease) (HCC)   (positive) Moderate persistent asthma      NEURO   (positive) Migraine      CARDIAC   (positive) HTN (hypertension)   (positive) Hypertension   (positive) Migraine   (positive) Paroxysmal atrial fibrillation (HCC)      GI   (positive) GERD (gastroesophageal reflux disease)         (positive) CKD (chronic kidney disease) stage 3, GFR 30-59 ml/min (HCC)      Other   (positive) Neck abscess     Anes H&P:  PAST MEDICAL HISTORY:   72 y.o. female who presents for Procedure(s) (LRB):  INCISION AND DRAINAGE ABSCESS TONGUE (N/A).  She has current and past medical problems significant for:    Past Medical History:   Diagnosis Date   • Apnea, sleep    • Arrhythmia     afib   • Arthritis     osteo   • Asthma    • Atrial fibrillation (HCC)    • Back pain    • Bronchitis    • Chickenpox    • Constipation    • Cough    • Daytime sleepiness    • Dental disorder     upper dentures   • Earache    • Frequent urination    • Gasping for breath    • GERD (gastroesophageal reflux disease) 2/27/2010   • Georgian measles    • Heartburn    • Hypertension    • Impaired fasting glucose 2/27/2010   • Incontinence of urine    • Indigestion    • Influenza    • Mumps    • Nasal drainage    • Nausea    • Osteopenia 2/24/2010   • Osteoporosis    • Other specified disorder of intestines     constipation r/t meds   • Restless leg syndrome    • Scarlet fever    • Shortness of breath    • Sleep apnea    • Snoring    • Tremor, essential 2/24/2010   • Urinary bladder disorder    • Wears glasses    • Wheezing    • Whooping cough         SMOKING/ALCOHOL/RECREATIONAL DRUG USE:  Social History     Tobacco Use   • Smoking status: Never   • Smokeless tobacco: Never   Vaping Use   • Vaping Use: Never used   Substance Use Topics   • Alcohol use: Yes     Alcohol/week: 0.0 oz     Comment: Stopped drinking 45 days ago 7/12/17 Going to    • Drug use: No     Social History     Substance and Sexual Activity   Drug Use No       PAST SURGICAL HISTORY:  Past Surgical History:   Procedure Laterality Date   • LUMBAR LAMINECTOMY DISKECTOMY Bilateral 2/4/2017    Procedure: LUMBAR LAMINECTOMY DISKECTOMY POSTERIOR L4-S1 ;  Surgeon: Emil Hitchcock M.D.;  Location: [facility];  Service:    • CARPAL TUNNEL ENDOSCOPIC  11/17/2012    Performed by Heriberto Quiñones M.D. at SURGERY [facility]   • TRIGGER FINGER RELEASE  11/17/2012    Performed by Heriberto Quiñones M.D. at SURGERY [facility]   • HIP ARTHROSCOPY  2/23/2009    Performed by SHERLYN CALVO at SURGERY [facility]   • ACETABULAR OSTEOTOMY  2/23/2009    Performed by SHERLYN CALVO at SURGERY [facility]   • MASS EXCISION ORTHO  2/23/2009    Performed by SHERLYN CALVO at SURGERY [facility]   • HIP ARTHROSCOPY  2005    done at [facility]   • HAJA BY LAPAROSCOPY  1997   • HYSTERECTOMY, TOTAL ABDOMINAL  1979    oopherectomy lacie   • BLADDER SUSPENSION      bladder sling   • CARPAL TUNNEL RELEASE     • HIP REPLACEMENT, TOTAL     • HYSTERECTOMY LAPAROSCOPY     • LAMINOTOMY     • PRIMARY C SECTION  1970/1975       ALLERGIES:   Allergies   Allergen Reactions   • Sumatriptan Swelling     Tongue swell   • Clarithromycin Itching, Nausea and Swelling     Swelling/Itching/Nausea   • Ees [Erythromycin] Itching, Nausea and Swelling     Swelling/Itching/Nausea   • Levaquin Myalgia     Muscle soreness    • Pcn [Penicillins] Itching, Nausea and Swelling     Swelling/Itching/Nausea    • Shellfish Allergy Itching, Nausea and Swelling     Swelling/Itching/Nausea   • Trazodone  Swelling       MEDICATIONS:  No current facility-administered medications on file prior to encounter.     Current Outpatient Medications on File Prior to Encounter   Medication Sig Dispense Refill   • amitriptyline (ELAVIL) 25 MG Tab Take 50 mg by mouth every evening.     • Ascorbic Acid (VITAMIN C) 1000 MG Tab Take 4,000 mg by mouth every day.     • fluticasone (FLONASE) 50 MCG/ACT nasal spray Administer 1 Spray into affected nostril(S) every day.     • pantoprazole (PROTONIX) 40 MG Tablet Delayed Response TAKE 1 TABLET BY MOUTH EVERY DAY 30 Tablet 6   • cefdinir (OMNICEF) 300 MG Cap Take 1 Capsule by mouth 2 times a day for 10 days. 20 Capsule 0   • albuterol 108 (90 Base) MCG/ACT Aero Soln inhalation aerosol INHALE 2 PUFFS BY MOUTH EVERY 6 HOURS AS NEEDED FOR SHORTNESS OF BREATH (Patient taking differently: Inhale 2 Puffs every 6 hours as needed for Shortness of Breath.) 8.5 Each 5   • rizatriptan (MAXALT) 5 MG tablet Take 5 mg by mouth one time as needed for Migraine.     • fluticasone (FLOVENT HFA) 110 MCG/ACT Aerosol Inhale 2 Puffs 2 times a day. 1 Each 5   • Fluticasone-Umeclidin-Vilant (TRELEGY ELLIPTA) 100-62.5-25 MCG/INH AEROSOL POWDER, BREATH ACTIVATED inhalation Inhale 1 Inhalation every day. 1 Each 6   • telmisartan (MICARDIS) 40 MG Tab TAKE 1 TABLET BY MOUTH EVERY  Tablet 0   • DILTIAZem CD (CARDIZEM CD) 240 MG CAPSULE SR 24 HR TAKE 1 CAPSULE BY MOUTH EVERY DAY 90 Capsule 0   • DULoxetine (CYMBALTA) 60 MG Cap DR Particles delayed-release capsule TAKE 1 CAPSULE BY MOUTH EVERY DAY 90 Capsule 0   • atorvastatin (LIPITOR) 10 MG Tab TAKE 1 TABLET BY MOUTH EVERY  Tablet 1   • zonisamide (ZONEGRAN) 100 MG Cap Take 100 mg by mouth every day.     • SALINE NA Administer 1 Spray into affected nostril(S) every day.     • aspirin 81 MG EC tablet Take 81 mg by mouth every day.         LABS:  Lab Results   Component Value Date/Time    HEMOGLOBIN 13.5 10/18/2022 0844    HEMATOCRIT 40.6 10/18/2022 0844     WBC 12.5 (H) 10/18/2022 0844     Lab Results   Component Value Date/Time    SODIUM 137 10/18/2022 0844    POTASSIUM 4.1 10/18/2022 0844    CHLORIDE 103 10/18/2022 0844    CO2 22 10/18/2022 0844    GLUCOSE 117 (H) 10/18/2022 0844    BUN 19 10/18/2022 0844    CALCIUM 9.1 10/18/2022 0844         PREVIOUS ANESTHETICS: See EMR  __________________________________________      Physical Exam    Airway   Mallampati: I  TM distance: >3 FB  Neck ROM: full       Cardiovascular - normal exam  Rhythm: regular  Rate: normal  (-) murmur     Dental - normal exam  (+) upper dentures, lower dentures           Pulmonary - normal exam  Breath sounds clear to auscultation     Abdominal    Neurological - normal exam                 Anesthesia Plan    ASA 3   ASA physical status 3 criteria: COPD    Plan - general       Airway plan will be ETT          Induction: intravenous and rapid sequence    Postoperative Plan: Postoperative administration of opioids is intended.    Pertinent diagnostic labs and testing reviewed    Informed Consent:    Anesthetic plan and risks discussed with patient and spouse.    Use of blood products discussed with: patient and spouse whom consented to blood products.

## 2022-10-18 NOTE — ASSESSMENT & PLAN NOTE
Status post IV fluids postoperative  Reinitiated on diltiazem 240 mg daily with heart rate and blood pressure parameters.  Micardis 40 mg daily blood pressure parameter  Monitor vitals

## 2022-10-18 NOTE — PROGRESS NOTES
Surgical Progress Note    Author: Paz Suazo M.D. Date & Time created: 10/18/2022   4:04 PM     Interval Events:  Patient transferred to OR as discussed with the ERP    ROS  Hemodynamics:  Temp (24hrs), Av.5 °C (97.7 °F), Min:36 °C (96.8 °F), Max:37 °C (98.6 °F)  Temperature: 37 °C (98.6 °F)  Pulse  Av.9  Min: 86  Max: 110   Blood Pressure : (!) 183/100, NIBP: (!) 161/99     Respiratory:    Respiration: 16, Pulse Oximetry: 95 %           Neuro:  GCS       Fluids:  No intake or output data in the 24 hours ending 10/18/22 1604  Weight: 75.6 kg (166 lb 10.7 oz)  Current Diet Order   Procedures    Diet NPO Restrict to: Strict     Physical Exam  Labs:  Recent Results (from the past 24 hour(s))   CoV-2, FLU A/B, and RSV by PCR (2-4 Hours CEPHEID) : Collect NP swab in VTM    Collection Time: 10/18/22  4:45 AM    Specimen: Nasopharyngeal; Respirate   Result Value Ref Range    Influenza virus A RNA Negative Negative    Influenza virus B, PCR Negative Negative    RSV, PCR Negative Negative    SARS-CoV-2 by PCR NotDetected     SARS-CoV-2 Source NP Swab    Group A Strep by PCR    Collection Time: 10/18/22  8:43 AM    Specimen: Throat; Blood   Result Value Ref Range    Group A Strep by PCR Not Detected Not Detected   CBC WITH DIFFERENTIAL    Collection Time: 10/18/22  8:44 AM   Result Value Ref Range    WBC 12.5 (H) 4.8 - 10.8 K/uL    RBC 5.08 4.20 - 5.40 M/uL    Hemoglobin 13.5 12.0 - 16.0 g/dL    Hematocrit 40.6 37.0 - 47.0 %    MCV 79.9 (L) 81.4 - 97.8 fL    MCH 26.6 (L) 27.0 - 33.0 pg    MCHC 33.3 (L) 33.6 - 35.0 g/dL    RDW 43.2 35.9 - 50.0 fL    Platelet Count 400 164 - 446 K/uL    MPV 9.0 9.0 - 12.9 fL    Neutrophils-Polys 75.20 (H) 44.00 - 72.00 %    Lymphocytes 13.60 (L) 22.00 - 41.00 %    Monocytes 6.40 0.00 - 13.40 %    Eosinophils 3.20 0.00 - 6.90 %    Basophils 1.00 0.00 - 1.80 %    Immature Granulocytes 0.60 0.00 - 0.90 %    Nucleated RBC 0.00 /100 WBC    Neutrophils (Absolute) 9.43 (H) 2.00 - 7.15  K/uL    Lymphs (Absolute) 1.70 1.00 - 4.80 K/uL    Monos (Absolute) 0.80 0.00 - 0.85 K/uL    Eos (Absolute) 0.40 0.00 - 0.51 K/uL    Baso (Absolute) 0.13 (H) 0.00 - 0.12 K/uL    Immature Granulocytes (abs) 0.07 0.00 - 0.11 K/uL    NRBC (Absolute) 0.00 K/uL   BASIC METABOLIC PANEL    Collection Time: 10/18/22  8:44 AM   Result Value Ref Range    Sodium 137 135 - 145 mmol/L    Potassium 4.1 3.6 - 5.5 mmol/L    Chloride 103 96 - 112 mmol/L    Co2 22 20 - 33 mmol/L    Glucose 117 (H) 65 - 99 mg/dL    Bun 19 8 - 22 mg/dL    Creatinine 0.88 0.50 - 1.40 mg/dL    Calcium 9.1 8.5 - 10.5 mg/dL    Anion Gap 12.0 7.0 - 16.0   LACTIC ACID    Collection Time: 10/18/22  8:44 AM   Result Value Ref Range    Lactic Acid 1.4 0.5 - 2.0 mmol/L   ESTIMATED GFR    Collection Time: 10/18/22  8:44 AM   Result Value Ref Range    GFR (CKD-EPI) 70 >60 mL/min/1.73 m 2   PROCALCITONIN    Collection Time: 10/18/22 12:10 PM   Result Value Ref Range    Procalcitonin <0.05 <0.25 ng/mL     Medical Decision Making, by Problem:  Active Hospital Problems    Diagnosis     Sepsis (HCC) [A41.9]     Neck abscess [L02.11]     Hypertension [I10]     Asthma [J45.909]      Plan:  To OR for I&D of base of tongue abscess.  Discussed risks, benefits, and alternatives to surgery  And she has consented.    (See consult dictation)    Quality Measures:  Quality-Core Measures    Discussed patient condition with RN

## 2022-10-18 NOTE — ED NOTES
Patient to be admitted, surgery at some point today.  Sig other verbalizing concerns to ERP regarding financial hardship, transportation issues, responsibilities required of them at home.  States unsure if patient will be admitted.   contacted to assist patient and sig other in the above matters.

## 2022-10-18 NOTE — ED NOTES
PT vitals rechecked; no obvious signs of distress at this time. PT updated on current wait times and thanked for continued patience. Family member with PT.

## 2022-10-18 NOTE — ED PROVIDER NOTES
"ED Provider Note    Scribed for Shanti Adorno M.D. by Rod Chow. 10/18/2022  8:25 AM    Primary care provider: Rudy Parsons M.D.  Means of arrival: Walk in  History obtained from: Patient  History limited by: None  CHIEF COMPLAINT  Chief Complaint   Patient presents with    Sore Throat     \"I can't swallow\".  States this happened about a week ago, did not seek medical attention at the time.      Cough     Started a week ago.       HPI  Augusta Rodriguez is a 72 y.o. female who presents to the Emergency Department for evaluation of a moderate sore throat onset 1.5-2 weeks ago. The patient states that she was evaluated in the ED approximately 1.5-2 weeks ago for similar symptoms and was ultimately determined to have pharyngitis. She reports being placed on a course of Cefdinir during her last visit for which she has been compliant and is scheduled to finish in 1 days. She was ultimately prompted to visit the ED because her sore throat has persisted and describes it as if she \"can't swallow.\" Associated symptoms include dysphagia, slightly hoarse voice and difficulty breathing due to a feeling of swelling in her throat.  The patient denies any chest pain, bilateral lower extremity swelling, fever, nausea, vomiting, ear pain, or headaches. She has been medicating with Cefdinir with mild alleviation. No exacerbating factors noted. The patient denies any prior episodes with similar symptoms. She reports an extensive medical history including but not limited to COPD, emphysema, asthma, migraines, chronic cough, hypertension, and A-fib. She is compliant with daily Aspirin 81 mg. The patient denies any history of thyroid issues. The patient is vaccinated and boosted against COVID-19.     REVIEW OF SYSTEMS  Pertinent positives include sore throat, dysphagia, dysphonia, and shortness of breath.   Pertinent negatives include no chest pain, bilateral lower extremity swelling, fever, nausea, vomiting, ear pain, or headaches. "   See HPI for further details. All other systems are negative.    PAST MEDICAL HISTORY  Past Medical History:   Diagnosis Date    Apnea, sleep     Arrhythmia     afib    Arthritis     osteo    Asthma     Atrial fibrillation (HCC)     Back pain     Bronchitis     Chickenpox     Constipation     Cough     Daytime sleepiness     Dental disorder     upper dentures    Earache     Frequent urination     Gasping for breath     GERD (gastroesophageal reflux disease) 2/27/2010    [place] measles     Heartburn     Hypertension     Impaired fasting glucose 2/27/2010    Incontinence of urine     Indigestion     Influenza     Mumps     Nasal drainage     Nausea     Osteopenia 2/24/2010    Osteoporosis     Other specified disorder of intestines     constipation r/t meds    Restless leg syndrome     Scarlet fever     Shortness of breath     Sleep apnea     Snoring     Tremor, essential 2/24/2010    Urinary bladder disorder     Wears glasses     Wheezing     Whooping cough        FAMILY HISTORY  Family History   Problem Relation Age of Onset    GI Disease Father         Crohn's    Heart Disease Father         First MI age 30    Hypertension Father     Stroke Father     Hypertension Other     Cancer Brother         ESOPHAGEAL CANCER       SOCIAL HISTORY  Social History     Tobacco Use    Smoking status: Never    Smokeless tobacco: Never   Vaping Use    Vaping Use: Never used   Substance Use Topics    Alcohol use: Yes     Alcohol/week: 0.0 oz     Comment: Stopped drinking 45 days ago 7/12/17 Going to     Drug use: No      Social History     Substance and Sexual Activity   Drug Use No       SURGICAL HISTORY  Past Surgical History:   Procedure Laterality Date    LUMBAR LAMINECTOMY DISKECTOMY Bilateral 2/4/2017    Procedure: LUMBAR LAMINECTOMY DISKECTOMY POSTERIOR L4-S1 ;  Surgeon: Emil Hitchcock M.D.;  Location: SURGERY [facility];  Service:     CARPAL TUNNEL ENDOSCOPIC  11/17/2012    Performed by Heriberto Quiñones M.D. at  SURGERY [facility] ORS    TRIGGER FINGER RELEASE  11/17/2012    Performed by Heriberto Quiñones M.D. at SURGERY [facility] ORS    HIP ARTHROSCOPY  2/23/2009    Performed by SHERLYN CALVO at SURGERY [facility] ORS    ACETABULAR OSTEOTOMY  2/23/2009    Performed by SHERLYN CALVO at SURGERY [facility] ORS    MASS EXCISION ORTHO  2/23/2009    Performed by SHERLYN CALVO at SURGERY [facility] ORS    HIP ARTHROSCOPY  2005    done at [facility]    HAJA BY LAPAROSCOPY  1997    HYSTERECTOMY, TOTAL ABDOMINAL  1979    oopherectomy lacie    BLADDER SUSPENSION      bladder sling    CARPAL TUNNEL RELEASE      HIP REPLACEMENT, TOTAL      HYSTERECTOMY LAPAROSCOPY      LAMINOTOMY      PRIMARY C SECTION  1970/1975       CURRENT MEDICATIONS  Home Medications       Reviewed by Cathy Carr R.N. (Registered Nurse) on 10/18/22 at 0442  Med List Status: Partial     Medication Last Dose Status   albuterol 108 (90 Base) MCG/ACT Aero Soln inhalation aerosol  Active   amitriptyline (ELAVIL) 25 MG Tab  Active   aspirin 81 MG EC tablet  Active   atorvastatin (LIPITOR) 10 MG Tab  Active   cefdinir (OMNICEF) 300 MG Cap  Active   clobetasol (TEMOVATE) 0.05 % Ointment  Active   DILTIAZem CD (CARDIZEM CD) 240 MG CAPSULE SR 24 HR  Active   DULoxetine (CYMBALTA) 60 MG Cap DR Particles delayed-release capsule  Active   fluticasone (FLOVENT HFA) 110 MCG/ACT Aerosol  Active   Fluticasone-Umeclidin-Vilant (TRELEGY ELLIPTA) 100-62.5-25 MCG/INH AEROSOL POWDER, BREATH ACTIVATED inhalation  Active   ondansetron (ZOFRAN) 4 MG Tab tablet  Active   pantoprazole (PROTONIX) 40 MG Tablet Delayed Response  Active   rizatriptan (MAXALT) 5 MG tablet  Active   SALINE NA  Active   telmisartan (MICARDIS) 40 MG Tab  Active   zonisamide (ZONEGRAN) 100 MG Cap  Active                    ALLERGIES  Allergies   Allergen Reactions    Clarithromycin      Swelling/Itching/Nausea    Ees [Erythromycin]      Swelling/Itching/Nausea    Levaquin      Muscle soreness   "   Pcn [Penicillins]      Swelling/Itching/Nausea     Shellfish Allergy      Swelling/Itching/Nausea    Sumatriptan Swelling     Tongue swell    Trazodone Swelling       PHYSICAL EXAM  VITAL SIGNS: /89   Pulse 92   Temp 36.1 °C (97 °F) (Temporal)   Resp 18   Ht 1.549 m (5' 1\")   Wt 75.6 kg (166 lb 10.7 oz)   SpO2 93%   BMI 31.49 kg/m²      Constitutional: Well developed, well nourished; No acute distress; Elevated BMI  HENT: Normocephalic, atraumatic; Bilateral external ears normal; Oropharynx with moist mucous membranes; No significant swelling, erythema or exudates in the posterior oropharynx. Voice is raspy. No drooling.   Eyes: PERRL, EOMI, Conjunctiva normal. No discharge.   Neck:  Supple, nontender midline; No stridor; No nuchal rigidity.   Lymphatic: No cervical lymphadenopathy noted.   Cardiovascular: Regular rate and rhythm without murmurs, rubs, or gallop.   Thorax & Lungs: No respiratory distress, Few scattered expiratory wheezes throughout. No rales or rhonchi. Nontender chest wall. No crepitus or subcutaneous air  Abdomen: Soft, nontender, bowel sounds normal. No obvious masses; No pulsatile masses; no rebound, guarding, or peritoneal signs.   Skin: Good color; warm and dry without rash or petechia.  Back: Nontender, No CVA tenderness.   Extremities: Distal radial, dorsalis pedis, posterior tibial pulses are equal bilaterally; No edema; Nontender calves or saphenous, No cyanosis, No clubbing.   Musculoskeletal: Good range of motion in all major joints. No tenderness to palpation or major deformities noted.   Neurologic: Alert & oriented x 4, clear speech.     LABS/RADIOLOGY/PROCEDURES  Results for orders placed or performed during the hospital encounter of 10/18/22   CoV-2, FLU A/B, and RSV by PCR (2-4 Hours CEPHEID) : Collect NP swab in VTM    Specimen: Nasopharyngeal; Respirate   Result Value Ref Range    Influenza virus A RNA Negative Negative    Influenza virus B, PCR Negative Negative "    RSV, PCR Negative Negative    SARS-CoV-2 by PCR NotDetected     SARS-CoV-2 Source NP Swab    CBC WITH DIFFERENTIAL   Result Value Ref Range    WBC 12.5 (H) 4.8 - 10.8 K/uL    RBC 5.08 4.20 - 5.40 M/uL    Hemoglobin 13.5 12.0 - 16.0 g/dL    Hematocrit 40.6 37.0 - 47.0 %    MCV 79.9 (L) 81.4 - 97.8 fL    MCH 26.6 (L) 27.0 - 33.0 pg    MCHC 33.3 (L) 33.6 - 35.0 g/dL    RDW 43.2 35.9 - 50.0 fL    Platelet Count 400 164 - 446 K/uL    MPV 9.0 9.0 - 12.9 fL    Neutrophils-Polys 75.20 (H) 44.00 - 72.00 %    Lymphocytes 13.60 (L) 22.00 - 41.00 %    Monocytes 6.40 0.00 - 13.40 %    Eosinophils 3.20 0.00 - 6.90 %    Basophils 1.00 0.00 - 1.80 %    Immature Granulocytes 0.60 0.00 - 0.90 %    Nucleated RBC 0.00 /100 WBC    Neutrophils (Absolute) 9.43 (H) 2.00 - 7.15 K/uL    Lymphs (Absolute) 1.70 1.00 - 4.80 K/uL    Monos (Absolute) 0.80 0.00 - 0.85 K/uL    Eos (Absolute) 0.40 0.00 - 0.51 K/uL    Baso (Absolute) 0.13 (H) 0.00 - 0.12 K/uL    Immature Granulocytes (abs) 0.07 0.00 - 0.11 K/uL    NRBC (Absolute) 0.00 K/uL   BASIC METABOLIC PANEL   Result Value Ref Range    Sodium 137 135 - 145 mmol/L    Potassium 4.1 3.6 - 5.5 mmol/L    Chloride 103 96 - 112 mmol/L    Co2 22 20 - 33 mmol/L    Glucose 117 (H) 65 - 99 mg/dL    Bun 19 8 - 22 mg/dL    Creatinine 0.88 0.50 - 1.40 mg/dL    Calcium 9.1 8.5 - 10.5 mg/dL    Anion Gap 12.0 7.0 - 16.0   LACTIC ACID   Result Value Ref Range    Lactic Acid 1.4 0.5 - 2.0 mmol/L   ESTIMATED GFR   Result Value Ref Range    GFR (CKD-EPI) 70 >60 mL/min/1.73 m 2       CT-SOFT TISSUE NECK WITH   Final Result      1.  A 1.1 cm abscess between the epiglottis and the base of tongue.   2.  Mild reactive cervical lymphadenopathy.          COURSE & MEDICAL DECISION MAKING  Pertinent Labs & Imaging studies reviewed. (See chart for details)    8:25 AM - Patient seen and examined at bedside. Discussed plan of care, including labs and imaging. Patient agrees to the plan of care.  Ordered for labs and imaging  to evaluate her symptoms.     10:44 AM - Patient was reevaluated at bedside. Explained lab and radiology results with the patient and informed them that there is a 1.1 cm abscess between the epiglottis and the base of the tongue. Explained my plan to consult with ENT and likely proceed with a plan for admission.      10:46 AM - I discussed the patient's case and the above findings with Dr. Suazo (ENT) who reviewed the images and will take the patient to the OR later this afternoon. They relayed to keep the patient NPO, medicate with IV Unasyn, and to admit to medicine.    11:02 AM - Paged Hospitalist    11:03 AM - I spoke with Pharmacy and they recommended medicating with Clindamycin and Rocephin considering her allergy to Penicillin. Ordered Rocephin 2 g and Clindamycin 600 mg IVPB to treat the patient.     11:08 AM I discussed the patient's case and the above findings with Dr. Lozano (Hospitalist) who will assess the patient for hospitalization.      Patient presents to the ER complaining of persistent sore throat and a feeling of swelling and fullness in her throat which has been going on for the last couple weeks.  She was seen here on October 8 and was prescribed Omnicef.  She has 2 pills left.  She says she has not felt much better on the Omnicef.  She continues to have pain with swallowing and feels like she is having a hard time swallowing due to the swelling.  Occasionally she feels like she has trouble breathing.  She has history of COPD.  She does started a trilogy inhaler a few weeks ago and was concerned that perhaps that could be contributing to her sore throat and sensation of swelling in her throat.  I was concerned about a deep space neck abscess since patient's posterior oropharynx did not appear to be abnormal in any way.  CT scan reveals a 1.1 cm abscess between the base of the tongue and epiglottis.  I contacted Dr. Suazo, ENT physician on-call.  He is reviewed the images and will take the  patient to the operating room later this afternoon.  He would like the patient admitted to the hospitalist service.  She is to be n.p.o.  She is to be given IV antibiotics.  I consulted with pharmacy given patient's CT findings and her allergy profile.  Pharmacist recommended Rocephin and clindamycin.  Abx have been ordered.  I spoke with the hospitalist on-call and Dr. Lozano will kindly evaluate the patient for hospitalization.    DISPOSITION:  Patient will be hospitalized by Dr. Lozano in guarded condition.      FINAL IMPRESSION  1. Neck abscess          Rod VILLEDA (Scribe), am scribing for, and in the presence of, Shanti Adorno M.D..    Electronically signed by: Rod Chow (Natalyibtravon), 10/18/2022    Shanti VILLEDA M.D. personally performed the services described in this documentation, as scribed by Rod Chow in my presence, and it is both accurate and complete.    This dictation has been created using voice recognition software. The accuracy of the dictation is limited by the abilities of the software. I expect there may be some errors of grammar and possibly content. I made every attempt to manually correct the errors within my dictation. However, errors related to voice recognition software may still exist and should be interpreted within the appropriate context.    The note accurately reflects work and decisions made by me.  Shanti Adorno M.D.  10/18/2022  1:33 PM

## 2022-10-18 NOTE — CONSULTS
DATE OF SERVICE:  10/18/2022     SPECIALTY:  Otolaryngology.     REASON FOR CONSULTATION:  Base of tongue abscess.     REVIEW OF MEDICAL RECORD: The patient is a 72-year-old white female who   presented to the ED with a sore throat and difficulty swallowing.  This has   been increasing over the last week.  She had initially seen outside urgent   care and was placed on oral antibiotic, which I believe is Cefzil from what   she states.  It had become increasingly worse with sore throat pain, so she   had some back to the ED for further evaluation.     The patient had not have any problems breathing.  She can swallow her saliva,   it just hurts and she ___ speak.     ALLERGIES:  ALLERGIC TO CLARITHROMYCIN, ERYTHROMYCIN, LEVAQUIN, PENICILLIN,   SHELLFISH ALLERGIES, SUMATRIPTAN AND TRAZODONE.     HOME MEDICATIONS:  Include Cardizem 240 mg, Cymbalta 60 mg, Trelegy, albuterol   2 puffs q.6 hours p.r.n. shortness of breath, Elavil 25 mg at bedtime,   aspirin 81 mg daily, Lipitor 10 mg daily, Omnicef 300 mg 1 capsule 3 times a   day, Temovate 0.05% ointment apply 2-3 times a week, Flovent HFA 2 times   daily, Zofran 4 mg as needed for nausea, Protonix 40 mg a day, Maxalt 5 mg a   day, Micardis 40 mg every day and Zonegran 100 mg caps per day.     PAST MEDICAL HISTORY:  Includes,  1.  Atrial fibrillation.  2.  Hypertension.  3.  COPD.  4.  GERD.  5.  Anxiety/depression.  6.  Headaches.     PAST SURGICAL HISTORY:  She has had two C-sections, bilateral total hips,   laparoscopic cholecystectomy and lumbar surgery of spine.     PHYSICAL EXAMINATION:  GENERAL:  White female, appearing stated age, currently sitting upright, able   to converse with me swallowing her saliva, having no issues with air.  VITAL SIGNS:  Temperature 96.8, pulse 92, respirations 18, blood pressure   125/89.  HEENT:  Normocephalic, atraumatic cranium.  Ears are clear.  Anterior   rhinoscopy is clear.  Oral cavity reveals edentulous.  Mucous membranes are    moist.  She is able to swallow, open her mouth wide.  Eyes:  Extraocular   movements are intact.  No scleral icterus is noted.  Pupils equal, reactive to   light and accommodation.  NECK:  Anterior neck, trachea is midline.  No evidence of any significant   lymphadenopathy.  EXTREMITIES:  No clubbing, cyanosis or edema.     LABORATORY DATA:  Shows white count 12.5, hemoglobin 13.5, platelets 400,000.    Recent labs indicate a sodium 137, potassium 4.1, glucose 117.     IMAGING:  CT imaging revealed a 1.1 cm base of tongue abscess near the   vallecula midline.  Remainder airway was open and no evidence of any   significant deviation or airway impingement.  Mild cervical lymphadenopathy.     IMPRESSION:  Base of tongue/vallecular abscess.     PLAN:  I discussed with the patient at this point that even if she were on IV   antibiotics because it is already formed an abscess, we would need to drain   this. I would like to keep her n.p.o., have her taken to the OR and placed   underneath general anesthesia.  We discussed that as she has had multiple   surgeries.  I then drained the abscess, culture the contents, irrigate it out   and then hopefully be able to extubate and return to the floor and at that   point, anticipate she will need IV antibiotics for about 24 hours and then   discharge home as long as all goes well.  She understands we discussed a   little bit about the risks and other alternative treatment. At this point, I   do not recommend watch and see approach as the abscess is already set up.    This could potentially be an airway emergency if it would be left alone.  She   understands that.        ______________________________  MD PB MCMILLAN/TATIANA/JULIA    DD:  10/18/2022 11:43  DT:  10/18/2022 15:00    Job#:  [id number]

## 2022-10-18 NOTE — ED NOTES
Med rec completed per pt  Allergies reviewed     Pt stated a 10 day course of Cefdinir on 10/8/2022  Pt states that she has 2 doses of this antibiotic left

## 2022-10-18 NOTE — ED TRIAGE NOTES
"Chief Complaint   Patient presents with    Sore Throat     \"I can't swallow\".  States this happened about a week ago, did not seek medical attention at the time.      Cough     Started a week ago.       Pt ambulated to triage. Pt A&Ox4, came in for above complaint.  Pt able to talk and breath without observed difficulty.  No noted drooling at this time     Pt to lobby . Pt educated on alerting staff in changes to condition. Pt verbalized understanding. COVID swab performed in triage.    BP (!) 159/100   Pulse 89   Temp 36 °C (96.8 °F) (Temporal)   Resp 16   Ht 1.549 m (5' 1\")   Wt 75.6 kg (166 lb 10.7 oz)   SpO2 93%   BMI 31.49 kg/m²     "

## 2022-10-18 NOTE — ASSESSMENT & PLAN NOTE
Status post fluid resuscitation  Continues on antibiotics  Monitoring cultures  Improved leukocytosis  10/19 WBC 10.7  Cultures no growth to date  Etiology concerning for pharyngitis and abscess

## 2022-10-19 PROBLEM — E66.9 OBESITY (BMI 30.0-34.9): Status: ACTIVE | Noted: 2022-10-19

## 2022-10-19 PROBLEM — E66.811 OBESITY (BMI 30.0-34.9): Status: ACTIVE | Noted: 2022-10-19

## 2022-10-19 LAB
ANION GAP SERPL CALC-SCNC: 12 MMOL/L (ref 7–16)
ANION GAP SERPL CALC-SCNC: 14 MMOL/L (ref 7–16)
BUN SERPL-MCNC: 13 MG/DL (ref 8–22)
BUN SERPL-MCNC: 16 MG/DL (ref 8–22)
CALCIUM SERPL-MCNC: 8.2 MG/DL (ref 8.5–10.5)
CALCIUM SERPL-MCNC: 8.3 MG/DL (ref 8.5–10.5)
CHLORIDE SERPL-SCNC: 107 MMOL/L (ref 96–112)
CHLORIDE SERPL-SCNC: 108 MMOL/L (ref 96–112)
CO2 SERPL-SCNC: 18 MMOL/L (ref 20–33)
CO2 SERPL-SCNC: 20 MMOL/L (ref 20–33)
CREAT SERPL-MCNC: 0.81 MG/DL (ref 0.5–1.4)
CREAT SERPL-MCNC: 0.86 MG/DL (ref 0.5–1.4)
EKG IMPRESSION: NORMAL
ERYTHROCYTE [DISTWIDTH] IN BLOOD BY AUTOMATED COUNT: 44.4 FL (ref 35.9–50)
ERYTHROCYTE [DISTWIDTH] IN BLOOD BY AUTOMATED COUNT: 46.7 FL (ref 35.9–50)
GFR SERPLBLD CREATININE-BSD FMLA CKD-EPI: 72 ML/MIN/1.73 M 2
GFR SERPLBLD CREATININE-BSD FMLA CKD-EPI: 77 ML/MIN/1.73 M 2
GLUCOSE SERPL-MCNC: 143 MG/DL (ref 65–99)
GLUCOSE SERPL-MCNC: 163 MG/DL (ref 65–99)
HCT VFR BLD AUTO: 32.2 % (ref 37–47)
HCT VFR BLD AUTO: 34.9 % (ref 37–47)
HGB BLD-MCNC: 10.2 G/DL (ref 12–16)
HGB BLD-MCNC: 11.3 G/DL (ref 12–16)
MCH RBC QN AUTO: 25.9 PG (ref 27–33)
MCH RBC QN AUTO: 26.2 PG (ref 27–33)
MCHC RBC AUTO-ENTMCNC: 31.7 G/DL (ref 33.6–35)
MCHC RBC AUTO-ENTMCNC: 32.4 G/DL (ref 33.6–35)
MCV RBC AUTO: 80.8 FL (ref 81.4–97.8)
MCV RBC AUTO: 81.7 FL (ref 81.4–97.8)
PLATELET # BLD AUTO: 312 K/UL (ref 164–446)
PLATELET # BLD AUTO: 324 K/UL (ref 164–446)
PMV BLD AUTO: 9.2 FL (ref 9–12.9)
PMV BLD AUTO: 9.6 FL (ref 9–12.9)
POTASSIUM SERPL-SCNC: 3.7 MMOL/L (ref 3.6–5.5)
POTASSIUM SERPL-SCNC: 4 MMOL/L (ref 3.6–5.5)
RBC # BLD AUTO: 3.94 M/UL (ref 4.2–5.4)
RBC # BLD AUTO: 4.32 M/UL (ref 4.2–5.4)
SODIUM SERPL-SCNC: 139 MMOL/L (ref 135–145)
SODIUM SERPL-SCNC: 140 MMOL/L (ref 135–145)
TROPONIN T SERPL-MCNC: 180 NG/L (ref 6–19)
TROPONIN T SERPL-MCNC: 181 NG/L (ref 6–19)
WBC # BLD AUTO: 10.7 K/UL (ref 4.8–10.8)
WBC # BLD AUTO: 14.8 K/UL (ref 4.8–10.8)

## 2022-10-19 PROCEDURE — 700101 HCHG RX REV CODE 250: Performed by: STUDENT IN AN ORGANIZED HEALTH CARE EDUCATION/TRAINING PROGRAM

## 2022-10-19 PROCEDURE — 93010 ELECTROCARDIOGRAM REPORT: CPT | Performed by: INTERNAL MEDICINE

## 2022-10-19 PROCEDURE — 85027 COMPLETE CBC AUTOMATED: CPT | Mod: 91

## 2022-10-19 PROCEDURE — 770006 HCHG ROOM/CARE - MED/SURG/GYN SEMI*

## 2022-10-19 PROCEDURE — A9270 NON-COVERED ITEM OR SERVICE: HCPCS | Performed by: STUDENT IN AN ORGANIZED HEALTH CARE EDUCATION/TRAINING PROGRAM

## 2022-10-19 PROCEDURE — 36415 COLL VENOUS BLD VENIPUNCTURE: CPT

## 2022-10-19 PROCEDURE — 700102 HCHG RX REV CODE 250 W/ 637 OVERRIDE(OP): Performed by: STUDENT IN AN ORGANIZED HEALTH CARE EDUCATION/TRAINING PROGRAM

## 2022-10-19 PROCEDURE — 700102 HCHG RX REV CODE 250 W/ 637 OVERRIDE(OP): Performed by: OTOLARYNGOLOGY

## 2022-10-19 PROCEDURE — A9270 NON-COVERED ITEM OR SERVICE: HCPCS | Performed by: OTOLARYNGOLOGY

## 2022-10-19 PROCEDURE — 84484 ASSAY OF TROPONIN QUANT: CPT | Mod: 91

## 2022-10-19 PROCEDURE — 99233 SBSQ HOSP IP/OBS HIGH 50: CPT | Performed by: HOSPITALIST

## 2022-10-19 PROCEDURE — 700111 HCHG RX REV CODE 636 W/ 250 OVERRIDE (IP): Performed by: STUDENT IN AN ORGANIZED HEALTH CARE EDUCATION/TRAINING PROGRAM

## 2022-10-19 PROCEDURE — 80048 BASIC METABOLIC PNL TOTAL CA: CPT | Mod: 91

## 2022-10-19 RX ADMIN — CLINDAMYCIN IN 5 PERCENT DEXTROSE 600 MG: 12 INJECTION, SOLUTION INTRAVENOUS at 17:27

## 2022-10-19 RX ADMIN — HEPARIN SODIUM 5000 UNITS: 5000 INJECTION, SOLUTION INTRAVENOUS; SUBCUTANEOUS at 22:04

## 2022-10-19 RX ADMIN — ATORVASTATIN CALCIUM 10 MG: 10 TABLET, FILM COATED ORAL at 05:25

## 2022-10-19 RX ADMIN — HYDROCODONE BITARTRATE AND ACETAMINOPHEN 1 TABLET: 5; 325 TABLET ORAL at 07:21

## 2022-10-19 RX ADMIN — CEFTRIAXONE SODIUM 1 G: 10 INJECTION, POWDER, FOR SOLUTION INTRAVENOUS at 05:24

## 2022-10-19 RX ADMIN — HEPARIN SODIUM 5000 UNITS: 5000 INJECTION, SOLUTION INTRAVENOUS; SUBCUTANEOUS at 15:10

## 2022-10-19 RX ADMIN — CLINDAMYCIN IN 5 PERCENT DEXTROSE 600 MG: 12 INJECTION, SOLUTION INTRAVENOUS at 05:24

## 2022-10-19 RX ADMIN — DILTIAZEM HYDROCHLORIDE 240 MG: 240 CAPSULE, COATED, EXTENDED RELEASE ORAL at 05:24

## 2022-10-19 RX ADMIN — DULOXETINE HYDROCHLORIDE 60 MG: 30 CAPSULE, DELAYED RELEASE ORAL at 05:25

## 2022-10-19 RX ADMIN — FLUTICASONE PROPIONATE 88 MCG: 44 AEROSOL, METERED RESPIRATORY (INHALATION) at 06:13

## 2022-10-19 RX ADMIN — UMECLIDINIUM BROMIDE AND VILANTEROL TRIFENATATE 1 PUFF: 62.5; 25 POWDER RESPIRATORY (INHALATION) at 06:14

## 2022-10-19 RX ADMIN — TELMISARTAN 40 MG: 40 TABLET ORAL at 05:25

## 2022-10-19 RX ADMIN — SENNOSIDES AND DOCUSATE SODIUM 2 TABLET: 50; 8.6 TABLET ORAL at 05:27

## 2022-10-19 RX ADMIN — HEPARIN SODIUM 5000 UNITS: 5000 INJECTION, SOLUTION INTRAVENOUS; SUBCUTANEOUS at 05:25

## 2022-10-19 RX ADMIN — ASPIRIN 81 MG: 81 TABLET, COATED ORAL at 05:25

## 2022-10-19 RX ADMIN — OMEPRAZOLE 20 MG: 20 CAPSULE, DELAYED RELEASE ORAL at 05:27

## 2022-10-19 RX ADMIN — CLINDAMYCIN IN 5 PERCENT DEXTROSE 600 MG: 12 INJECTION, SOLUTION INTRAVENOUS at 22:04

## 2022-10-19 ASSESSMENT — PATIENT HEALTH QUESTIONNAIRE - PHQ9
2. FEELING DOWN, DEPRESSED, IRRITABLE, OR HOPELESS: NOT AT ALL
1. LITTLE INTEREST OR PLEASURE IN DOING THINGS: NOT AT ALL
SUM OF ALL RESPONSES TO PHQ9 QUESTIONS 1 AND 2: 0

## 2022-10-19 ASSESSMENT — ENCOUNTER SYMPTOMS
COUGH: 0
EYE DISCHARGE: 0
BACK PAIN: 0
VOMITING: 0
FEVER: 0
SORE THROAT: 0
NERVOUS/ANXIOUS: 0
HEADACHES: 1
NAUSEA: 0
SHORTNESS OF BREATH: 0
STRIDOR: 0
PALPITATIONS: 0
DIARRHEA: 0
ABDOMINAL PAIN: 0
CHILLS: 0
SPEECH CHANGE: 0
DIZZINESS: 0

## 2022-10-19 ASSESSMENT — PAIN DESCRIPTION - PAIN TYPE: TYPE: ACUTE PAIN

## 2022-10-19 NOTE — ASSESSMENT & PLAN NOTE
Rhythm appears to be MAT  S/p diltiazem and valsalva maneuver with conversion to SR  Will restart home diltiazem

## 2022-10-19 NOTE — DISCHARGE PLANNING
Case Management Discharge Planning    Admission Date: 10/18/2022  GMLOS: 5.1  ALOS: 1    6-Clicks ADL Score: 24  6-Clicks Mobility Score: 24      Anticipated Discharge Dispo: Discharge Disposition: Discharged to home/self care (01)    DME Needed: No    Action(s) Taken: DC Assessment Complete (See below) CM met with patient to discuss discharge, patient  at bedside, they have per patient been  for 30 years. Patient has a pulmonologist that she goes to and she has sleep apnea uses her CPAP at night. Patient has no walker or cane independent. Patient lives in [place].     Escalations Completed: None    Medically Clear: No    Next Steps: follow for discharge needs.     Barriers to Discharge: None    Is the patient up for discharge tomorrow: No

## 2022-10-19 NOTE — ASSESSMENT & PLAN NOTE
Abscess at base of tongue s/p I&D  Cultures sent  Cont good oral care  Cont ceftriaxone/clindamycin

## 2022-10-19 NOTE — CONSULTS
Critical Care Consultation    Date of consult: 10/18/2022    Referring Physician  Philip Lozano M.D.    Reason for Consultation  Critical care management of tachycardia and hypotension    History of Presenting Illness  Ms. Rodriguez is a 72 year old lady with the past medical history significant for paroxysmal atrial fibrillation, asthma, hypertension, GERD, and sleep apnea who presented to the ER today with complaints of worsening sore throat with difficulty swallowing for over 2 weeks.  The patient came to the ER because she has been unable to eat or swallow.  The patient was seen in the ER about 1.5 weeks ago for her sore throat and started on Cefdinir which has been taking.  The patient denies any shortness of breath, palpitation, fever, chills, nausea, vomiting, or abdominal pain. She did have a cough.  In the ER, a CT neck was obtained and noted a 1.1 cm abscess between the base of the tongue and epiglottis.  The patient was taken to the OR by ENT and underwent an uncomplicated I&D procedure.  While the patient was recovering in the PACU, the patient's heart rate converted into a sinus tachycardiac with SBP in the 60s.  The patient was awake and conversant without any complaints.  The anesthesiologist requested critical care assistance and the patient was admitted to the ICU for ongoing care.    Code Status  Full Code    Review of Systems  Review of Systems   Constitutional:  Negative for chills, fever and malaise/fatigue.   HENT:  Positive for sore throat. Negative for congestion and sinus pain.    Eyes:  Negative for pain and discharge.   Respiratory:  Negative for cough and shortness of breath.    Cardiovascular:  Negative for chest pain and leg swelling.   Gastrointestinal:  Negative for abdominal pain, nausea and vomiting.   Genitourinary:  Negative for flank pain and hematuria.   Musculoskeletal:  Negative for back pain and neck pain.   Skin:  Negative for rash.   Neurological:  Negative for dizziness,  weakness and headaches.   Endo/Heme/Allergies:  Does not bruise/bleed easily.   Psychiatric/Behavioral:  Negative for depression. The patient is not nervous/anxious.      Past Medical History   has a past medical history of Apnea, sleep, Arrhythmia, Arthritis, Asthma, Atrial fibrillation (HCC), Back pain, Bronchitis, Chickenpox, Constipation, Cough, Daytime sleepiness, Dental disorder, Earache, Frequent urination, Gasping for breath, GERD (gastroesophageal reflux disease) (2/27/2010), Sinhala measles, Heartburn, Hypertension, Impaired fasting glucose (2/27/2010), Incontinence of urine, Indigestion, Influenza, Mumps, Nasal drainage, Nausea, Osteopenia (2/24/2010), Osteoporosis, Other specified disorder of intestines, Restless leg syndrome, Scarlet fever, Shortness of breath, Sleep apnea, Snoring, Tremor, essential (2/24/2010), Urinary bladder disorder, Wears glasses, Wheezing, and Whooping cough.    Surgical History   has a past surgical history that includes bladder suspension; stephon by laparoscopy (1997); primary c section (1970/1975); hysterectomy, total abdominal (1979); hip arthroscopy (2/23/2009); acetabular osteotomy (2/23/2009); mass excision ortho (2/23/2009); carpal tunnel endoscopic (11/17/2012); trigger finger release (11/17/2012); lumbar laminectomy diskectomy (Bilateral, 2/4/2017); hip arthroscopy (2005); laminotomy; carpal tunnel release; hip replacement, total; and hysterectomy laparoscopy.    Family History  family history includes Cancer in her brother; GI Disease in her father; Heart Disease in her father; Hypertension in her father and another family member; Stroke in her father.    Social History   reports that she has never smoked. She has never used smokeless tobacco. She reports current alcohol use. She reports that she does not use drugs.    Medications  Home Medications       Reviewed by Chloé Batista (Pharmacy Tech) on 10/18/22 at 1156  Med List Status: Complete     Medication Last Dose  Status   albuterol 108 (90 Base) MCG/ACT Aero Soln inhalation aerosol FEW DAYS AGO Active   amitriptyline (ELAVIL) 25 MG Tab 10/15/2022 Active   Ascorbic Acid (VITAMIN C) 1000 MG Tab 10/17/2022 Active   aspirin 81 MG EC tablet 10/17/2022 Active   atorvastatin (LIPITOR) 10 MG Tab 10/17/2022 Active   cefdinir (OMNICEF) 300 MG Cap 10/17/2022 Active   DILTIAZem CD (CARDIZEM CD) 240 MG CAPSULE SR 24 HR 10/17/2022 Active   DULoxetine (CYMBALTA) 60 MG Cap DR Particles delayed-release capsule ABOUT 1 WEEK AGO Active   fluticasone (FLONASE) 50 MCG/ACT nasal spray ABOUT 1 WEEK AGO Active   fluticasone (FLOVENT HFA) 110 MCG/ACT Aerosol 10/16/2022 Active   Fluticasone-Umeclidin-Vilant (TRELEGY ELLIPTA) 100-62.5-25 MCG/INH AEROSOL POWDER, BREATH ACTIVATED inhalation 10/16/2022 Active   pantoprazole (PROTONIX) 40 MG Tablet Delayed Response 10/16/2022 Active   rizatriptan (MAXALT) 5 MG tablet FEW WEEKS AGO Active   SALINE NA 10/17/2022 Active   telmisartan (MICARDIS) 40 MG Tab 10/17/2022 Active   zonisamide (ZONEGRAN) 100 MG Cap FEW DAYS AGO Active                  Current Facility-Administered Medications   Medication Dose Route Frequency Provider Last Rate Last Admin    albuterol inhaler 2 Puff  2 Puff Inhalation Q6HRS PRN Philip Lozano M.D.        aspirin EC (ECOTRIN) tablet 81 mg  81 mg Oral DAILY Philip Lozano M.D.        atorvastatin (LIPITOR) tablet 10 mg  10 mg Oral DAILY Philip Lozano M.D.        dilTIAZem CD (Cardizem CD) capsule 240 mg  240 mg Oral DAILY Philip Lozano M.D.        DULoxetine (CYMBALTA) capsule 60 mg  60 mg Oral DAILY Philip Lozano M.D.        omeprazole (PRILOSEC) capsule 20 mg  20 mg Oral DAILY Philip Lozano M.D.        telmisartan (MICARDIS) tablet 40 mg  40 mg Oral DAILY Philip Lozano M.D.        senna-docusate (PERICOLACE or SENOKOT S) 8.6-50 MG per tablet 2 Tablet  2 Tablet Oral BID Philip Lozano M.D.        And    polyethylene glycol/lytes (MIRALAX) PACKET 1 Packet  1 Packet Oral QDAY PRN Philip Lozano M.D.         And    magnesium hydroxide (MILK OF MAGNESIA) suspension 30 mL  30 mL Oral QDAY PRN Philip Lozano M.D.        And    bisacodyl (DULCOLAX) suppository 10 mg  10 mg Rectal QDAY PRN Philip Lozano M.D.        NS infusion   Intravenous Continuous Philip Lozano M.D. 83 mL/hr at 10/18/22 1248 New Bag at 10/18/22 1248    heparin injection 5,000 Units  5,000 Units Subcutaneous Q8HRS Philip Lozano M.D.        labetalol (NORMODYNE/TRANDATE) injection 10 mg  10 mg Intravenous Q4HRS PRN Philip Lozano M.D.        [START ON 10/19/2022] cefTRIAXone (Rocephin) syringe 1 g  1 g Intravenous Q24HRS Philip Lozano M.D.        clindamycin (Cleocin) IVPB premix 600 mg  600 mg Intravenous Q8HRS Philip Lozano M.D.        umeclidinium-vilanterol (ANORO ELLIPTA) inhaler 1 Puff  1 Puff Inhalation DAILY Philip Lozano M.D.        And    fluticasone (FLOVENT HFA) 44 MCG/ACT inhaler 88 mcg  2 Puff Inhalation DAILY Philip Lozano M.D.        HYDROcodone-acetaminophen (NORCO) 5-325 MG per tablet 1 Tablet  1 Tablet Oral Q4HRS PRN Paz Suazo M.D.        PHENYLEPHRINE HCL 10 MG/ML INJ SOLN (WRAPPER)             NOREPINEPHRINE BITARTRATE 1 MG/ML IV SOLN             NS (BOLUS) infusion 1,000 mL  1,000 mL Intravenous Once Perla Robbins M.D.         Facility-Administered Medications Ordered in Other Encounters   Medication Dose Route Frequency Provider Last Rate Last Admin    Lactated Ringers   Intravenous Intra-Op Continuous Francis Brice M.D.   New Bag at 10/18/22 1610    midazolam (Versed) injection   Intravenous PRN Francis Brice M.D.   2 mg at 10/18/22 1610    fentaNYL (SUBLIMAZE) injection   Intravenous PRN Francis Brice M.D.   50 mcg at 10/18/22 1615    ketamine (KETALAR) 50 mg/ml injection   Intravenous PRN Francis Brice M.D.   50 mg at 10/18/22 1615    lidocaine PF (XYLOCAINE-MPF) 2 % injection PF   Intravenous PRN Francis Brice M.D.   60 mg at 10/18/22 1615    propofol (DIPRIVAN) injection   Intravenous PRN  Francis Brice M.D.   100 mg at 10/18/22 1615    rocuronium (ZEMURON) injection   Intravenous PRN Francis Brice M.D.   50 mg at 10/18/22 1615    dexamethasone (DECADRON) injection   Intravenous PRN Francis Brice M.D.   12 mg at 10/18/22 1619    ondansetron (ZOFRAN) syringe/vial injection   Intravenous PRN Francis Brice M.D.   4 mg at 10/18/22 1631    sugammadex (BRIDION) injection   Intravenous PRN Francis Brice M.D.   200 mg at 10/18/22 1634       Allergies  Allergies   Allergen Reactions    Sumatriptan Swelling     Tongue swell    Clarithromycin Itching, Nausea and Swelling     Swelling/Itching/Nausea    Ees [Erythromycin] Itching, Nausea and Swelling     Swelling/Itching/Nausea    Levaquin Myalgia     Muscle soreness     Pcn [Penicillins] Itching, Nausea and Swelling     Swelling/Itching/Nausea     Shellfish Allergy Itching, Nausea and Swelling     Swelling/Itching/Nausea    Trazodone Swelling       Vital Signs last 24 hours  Temp:  [36 °C (96.8 °F)-37 °C (98.6 °F)] 37 °C (98.6 °F)  Pulse:  [] 86  Resp:  [16-18] 16  BP: (125-183)/() 183/100  SpO2:  [93 %-96 %] 95 %    Physical Exam  Physical Exam  Vitals and nursing note reviewed.   Constitutional:       General: She is not in acute distress.     Appearance: She is ill-appearing. She is not toxic-appearing.   HENT:      Head: Normocephalic and atraumatic.      Right Ear: External ear normal.      Left Ear: External ear normal.      Nose: Nose normal. No rhinorrhea.      Mouth/Throat:      Mouth: Mucous membranes are dry.      Pharynx: Oropharynx is clear. No oropharyngeal exudate.   Eyes:      General: No scleral icterus.     Extraocular Movements: Extraocular movements intact.      Conjunctiva/sclera: Conjunctivae normal.      Pupils: Pupils are equal, round, and reactive to light.   Cardiovascular:      Rate and Rhythm: Regular rhythm. Tachycardia present.      Pulses:           Radial pulses are 1+ on the right side and  1+ on the left side.        Dorsalis pedis pulses are 1+ on the right side and 1+ on the left side.      Heart sounds: Normal heart sounds. Heart sounds not distant. No murmur heard.  Pulmonary:      Breath sounds: Normal breath sounds. No wheezing.   Chest:      Chest wall: No tenderness.   Abdominal:      General: Bowel sounds are normal. There is no distension.      Palpations: Abdomen is soft.      Tenderness: There is no abdominal tenderness. There is no guarding or rebound.   Musculoskeletal:         General: Normal range of motion.      Cervical back: Normal range of motion and neck supple.      Right lower leg: No edema.      Left lower leg: No edema.   Lymphadenopathy:      Cervical: No cervical adenopathy.   Skin:     General: Skin is warm and dry.      Capillary Refill: Capillary refill takes less than 2 seconds.      Findings: No rash.   Neurological:      Mental Status: She is alert and oriented to person, place, and time.      Cranial Nerves: No cranial nerve deficit.      Sensory: No sensory deficit.      Motor: No weakness.   Psychiatric:         Mood and Affect: Mood normal.         Behavior: Behavior normal.         Thought Content: Thought content normal.       Fluids    Intake/Output Summary (Last 24 hours) at 10/18/2022 1823  Last data filed at 10/18/2022 1620  Gross per 24 hour   Intake 250 ml   Output --   Net 250 ml       Laboratory  Recent Results (from the past 48 hour(s))   CoV-2, FLU A/B, and RSV by PCR (2-4 Hours CEPHEID) : Collect NP swab in [place]    Collection Time: 10/18/22  4:45 AM    Specimen: Nasopharyngeal; Respirate   Result Value Ref Range    Influenza virus A RNA Negative Negative    Influenza virus B, PCR Negative Negative    RSV, PCR Negative Negative    SARS-CoV-2 by PCR NotDetected     SARS-CoV-2 Source NP Swab    Group A Strep by PCR    Collection Time: 10/18/22  8:43 AM    Specimen: Throat; Blood   Result Value Ref Range    Group A Strep by PCR Not Detected Not Detected    CBC WITH DIFFERENTIAL    Collection Time: 10/18/22  8:44 AM   Result Value Ref Range    WBC 12.5 (H) 4.8 - 10.8 K/uL    RBC 5.08 4.20 - 5.40 M/uL    Hemoglobin 13.5 12.0 - 16.0 g/dL    Hematocrit 40.6 37.0 - 47.0 %    MCV 79.9 (L) 81.4 - 97.8 fL    MCH 26.6 (L) 27.0 - 33.0 pg    MCHC 33.3 (L) 33.6 - 35.0 g/dL    RDW 43.2 35.9 - 50.0 fL    Platelet Count 400 164 - 446 K/uL    MPV 9.0 9.0 - 12.9 fL    Neutrophils-Polys 75.20 (H) 44.00 - 72.00 %    Lymphocytes 13.60 (L) 22.00 - 41.00 %    Monocytes 6.40 0.00 - 13.40 %    Eosinophils 3.20 0.00 - 6.90 %    Basophils 1.00 0.00 - 1.80 %    Immature Granulocytes 0.60 0.00 - 0.90 %    Nucleated RBC 0.00 /100 WBC    Neutrophils (Absolute) 9.43 (H) 2.00 - 7.15 K/uL    Lymphs (Absolute) 1.70 1.00 - 4.80 K/uL    Monos (Absolute) 0.80 0.00 - 0.85 K/uL    Eos (Absolute) 0.40 0.00 - 0.51 K/uL    Baso (Absolute) 0.13 (H) 0.00 - 0.12 K/uL    Immature Granulocytes (abs) 0.07 0.00 - 0.11 K/uL    NRBC (Absolute) 0.00 K/uL   BASIC METABOLIC PANEL    Collection Time: 10/18/22  8:44 AM   Result Value Ref Range    Sodium 137 135 - 145 mmol/L    Potassium 4.1 3.6 - 5.5 mmol/L    Chloride 103 96 - 112 mmol/L    Co2 22 20 - 33 mmol/L    Glucose 117 (H) 65 - 99 mg/dL    Bun 19 8 - 22 mg/dL    Creatinine 0.88 0.50 - 1.40 mg/dL    Calcium 9.1 8.5 - 10.5 mg/dL    Anion Gap 12.0 7.0 - 16.0   LACTIC ACID    Collection Time: 10/18/22  8:44 AM   Result Value Ref Range    Lactic Acid 1.4 0.5 - 2.0 mmol/L   ESTIMATED GFR    Collection Time: 10/18/22  8:44 AM   Result Value Ref Range    GFR (CKD-EPI) 70 >60 mL/min/1.73 m 2   PROCALCITONIN    Collection Time: 10/18/22 12:10 PM   Result Value Ref Range    Procalcitonin <0.05 <0.25 ng/mL   CULTURE WOUND W/ GRAM STAIN    Collection Time: 10/18/22  4:22 PM    Specimen: Wound   Result Value Ref Range    Significant Indicator NEG     Source WND     Site base of tongue abscess     Culture Result -     Gram Stain Result -    Anaerobic Culture    Collection  Time: 10/18/22  4:22 PM    Specimen: Wound   Result Value Ref Range    Significant Indicator NEG     Source WND     Site base of tongue abscess     Culture Result -    EKG    Collection Time: 10/18/22  5:30 PM   Result Value Ref Range    Report       [facility]    Test Date:  2022-10-18  Pt Name:    CONNIE NORIEGA                    Department: [facility]  MRN:        [id number]                      Room:       [facility]  Gender:     Female                       Technician: AIDAN  :        [id number]                   Requested By:EBONI QUINONES  Order #:    [id number]                    Reading MD:    Measurements  Intervals                                Axis  Rate:       130                          P:  ME:                                      QRS:        142  QRSD:       87                           T:          206  QT:         308  QTc:        453    Interpretive Statements  Junctional tachycardia  Left posterior fascicular block  Low voltage, extremity leads  Repol abnrm, prob ischemia, anterolateral lds  Compared to ECG 2022 09:46:00  Junctional tachycardia now present  Left posterior fascicular block now present  Early repolarization now present  Possible ischemia now present  Sinus rhythm no longer present  Left-axis de viation no longer present         Imaging  CT neck:  1.  A 1.1 cm abscess between the epiglottis and the base of tongue.  2.  Mild reactive cervical lymphadenopathy.    Assessment/Plan  * Neck abscess- (present on admission)  Assessment & Plan  Abscess at base of tongue s/p I&D  Cultures sent  Cont good oral care  Cont ceftriaxone/clindamycin    Hypotension- (present on admission)  Assessment & Plan  ?due to hypovolemia vs sepsis  S/p 2 liters of crystalloids  Phenylephrine as needed for SBP goal >90    Asthma  Assessment & Plan  No acute exacerbation  Cont Flovent and Anoro    Hypertension  Assessment & Plan  Hold BP meds due to hypotension tonight    Sepsis (HCC)  Assessment &  Plan  This is Sepsis Present on admission  SIRS criteria identified on my evaluation include: Tachycardia, with heart rate greater than 90 BPM, Tachypnea, with respirations greater than 20 per minute and Leukocytosis, with WBC greater than 12,000  Source is tongue abscess  Sepsis protocol initiated  Fluid resuscitation ordered per protocol  Crystalloid Fluid Administration: Fluid resuscitation ordered per standard protocol - 30 mL/kg per current or ideal body weight  IV antibiotics as appropriate for source of sepsis  Reassessment: I have reassessed the patient's hemodynamic status    S/p abscess I&D, cultures sent  Follow all cultures  Cont clindamycin and ceftriaxone            Paroxysmal atrial fibrillation (HCC)- (present on admission)  Assessment & Plan  Rhythm appears to be MAT  S/p diltiazem and valsalva maneuver with conversion to SR  Will restart home diltiazem      Discussed patient condition and risk of morbidity and/or mortality with Hospitalist, RN, RT, Pharmacy, [employer] [facility] resident, Charge nurse / hot rounds, Patient, and ENT.      The patient remains critically ill requiring active titration of phenylephrine infusion for hypotension along with diltiazem for MAT. I have assessed and reassessed the blood pressure, hemodynamics, cardiovascular status. This patient remains at high risk for worsening cardiopulmonary dysfunction and death without the above critical care interventions.    Critical care time = 60 minutes in directly providing and coordinating critical care and extensive data review.  No time overlap and excludes procedures.

## 2022-10-19 NOTE — OR NURSING
Patient arrived to PACU at 1657  Surgical site at base of tongue, unable to view  Patient tachycardic and hypotensive, Dr. Brice notified EKG ordered  Dr. Brice came to bedside and remained there for the remainder of PACU stay. He medicated patient with phenylephrine  Patient received 1L of LR in PACU  Patient's BP did not respond to interventions.  Patient remained alert and able to answer questions in PACU  Dr. Suazo was notified, he consulted Dr. Robbins on CIC for admission  Patient transferred to room T617, bedside report given to Jyotsna

## 2022-10-19 NOTE — Clinical Note
4 Eyes Skin Assessment Completed by Luis E Jennings, MIKE and ***, RN.    Head WDL  Ears WDL  Nose WDL  Mouth WDL  Neck WDL  Breast/Chest WDL  Shoulder Blades WDL  Spine WDL  (R) Arm/Elbow/Hand WDL  (L) Arm/Elbow/Hand WDL  Abdomen WDL  Groin WDL  Scrotum/Coccyx/Buttocks Scab  (R) Leg WDL  (L) Leg WDL  (R) Heel/Foot/Toe WDL  (L) Heel/Foot/Toe WDL          Devices In Places EKG,TELE BOX, NC,       Interventions In Place {Interventions In Place:00401}    Possible Skin Injury {Possible Skin Injury:02230}    Pictures Uploaded Into Epic {Pictures Uploaded Into Epic:15560}  Wound Consult Placed {Wound Consult Placed:05415}  RN Wound Prevention Protocol Ordered {YES/NO:20266}

## 2022-10-19 NOTE — OP REPORT
DATE OF SERVICE:  10/18/2022     PREOPERATIVE DIAGNOSIS:  Base of tongue abscess.     POSTOPERATIVE DIAGNOSIS:  Base of tongue abscess.     PROCEDURES:  Direct laryngoscopy, incision and drainage of base of tongue   abscess.     SURGEON:  Paz Suazo MD     ANESTHESIOLOGIST:  Francis Brice MD     ANESTHESIA:  General.     NARRATIVE:  After meeting the patient in preoperative holding area, she had   been transferred up from the ED in satisfactory condition.  Airway was stable.    The patient was taken to the operating room and placed under satisfactory   general anesthesia.  With the 7 endotracheal tube, she was intubated.  Once   that was then completed, I then brought in the GlideScope. I examined the oral   cavity and in the vallecular area penetrated a few lesions which I thought to   be the pus, but after that reviewed with the CT scan intraoperatively went   back to the midline, probed deeper and had a large amount of pus that came   out.  Once that was then completed, I then opened that up widely with a   Schnidt and then expressed it out, irrigated it with about 120 mL of saline   and suctioned it.  I then put a baby Yankauer into the abscess pocket itself.    Once that was then completed, I suctioned it out, placed two 4x8 into the   vallecula, pushed into the base of the tongue and held pressure for about 3   minutes and were then removed that.  Minimal bleeding was seen.   Anesthesiologist reviewed it as well.  The patient was suctioned out.  She was   returned to anesthesia, awakened, extubated, and transferred to recovery in   satisfactory condition.     ESTIMATED BLOOD LOSS:  Total of 5 mL.     DRAINS:  None.        ______________________________  MD PB MCMILLAN/LAYA/AFZ    DD:  10/18/2022 16:50  DT:  10/18/2022 18:30    Job#:  [id number]

## 2022-10-19 NOTE — ASSESSMENT & PLAN NOTE
This is Sepsis Present on admission  SIRS criteria identified on my evaluation include: Tachycardia, with heart rate greater than 90 BPM, Tachypnea, with respirations greater than 20 per minute and Leukocytosis, with WBC greater than 12,000  Source is tongue abscess  Sepsis protocol initiated  Fluid resuscitation ordered per protocol  Crystalloid Fluid Administration: Fluid resuscitation ordered per standard protocol - 30 mL/kg per current or ideal body weight  IV antibiotics as appropriate for source of sepsis  Reassessment: I have reassessed the patient's hemodynamic status    S/p abscess I&D, cultures sent  Follow all cultures  Cont clindamycin and ceftriaxone

## 2022-10-19 NOTE — ASSESSMENT & PLAN NOTE
?due to hypovolemia vs sepsis  S/p 2 liters of crystalloids  Phenylephrine as needed for SBP goal >90

## 2022-10-19 NOTE — RESPIRATORY CARE
COPD EDUCATION by COPD CLINICAL EDUCATOR  10/19/2022 at 7:30 AM by Kassandra Santamaria RRT     Patient reviewed by COPD education team. Patient does not have a history or diagnosis of COPD and is a non-smoker.  Therefore, patient does not qualify for the COPD program.

## 2022-10-19 NOTE — PROGRESS NOTES
Augusta arrived to CICU room 617 at about 1830. Alert and oriented, HR 130s, SBP 60s. Second liter of NS infusing upon arrival. Reported that 1L of NS and one stick of Samy was given in PACU prior to transfer for hypotension. Dr Robbins presented to bedside; 10mg of diltiazem IV was given at 1840 and patient was instructed to complete valsalva maneuver. Tachycardia resolved following intervention with SA rate 80-90s noted on monitor. Hypotension has resolved at this time. Gerardo, present at bedside. Report given to NOC RN.

## 2022-10-19 NOTE — ASSESSMENT & PLAN NOTE
Continue with diltiazem with heart rate and blood pressure parameters  Monitor on telemetry for now and monitor vitals

## 2022-10-19 NOTE — ANESTHESIA POSTPROCEDURE EVALUATION
Patient: Augusta Rodriguez    Procedure Summary     Date: 10/18/22 Room / Location: [facility] 07 / SURGERY [facility]    Anesthesia Start: 1610 Anesthesia Stop: 1654    Procedure: INCISION AND DRAINAGE ABSCESS TONGUE (Throat) Diagnosis: (tongue abscess)    Surgeons: Paz Suazo M.D. Responsible Provider: Francis Brice M.D.    Anesthesia Type: general ASA Status: 3          Final Anesthesia Type: general  Last vitals  BP   Blood Pressure : (!) 78/50, NIBP: (!) 161/99    Temp   36.4 °C (97.6 °F)    Pulse   (!) 141   Resp   18    SpO2   94 %      Anesthesia Post Evaluation    Patient location during evaluation: ICU  Patient participation: complete - patient participated  Level of consciousness: sleepy but conscious    Airway patency: patent  Anesthetic complications: no  Cardiovascular status: tachycardic and hypotensive  Respiratory status: acceptable  Hydration status: balanced  Comments: Patient with what appears to be junctional tachycardia and hypotension in PACU with deep inverted T waves in anterolateral leads. Minimal response to phenylephrine boluses, intensivist consulted, agreed to admit patient to ICU for further workup and treatment. Patient transported directly from PACU to ICU with PACU team on monitor.     PONV: none          No notable events documented.     Nurse Pain Score: 0 (NPRS)

## 2022-10-19 NOTE — PROGRESS NOTES
Surgical Progress Note    Author: Paz Suazo M.D. Date & Time created: 10/19/2022   11:27 AM     Interval Events:  Patient stating that she can swallow easier and is having minimal pain.  Tolerating PO diet of liquids etc without difficulty.      Hemodynamics:  Temp (24hrs), Av.7 °C (98.1 °F), Min:36.4 °C (97.6 °F), Max:37 °C (98.6 °F)  Temperature: 36.6 °C (97.9 °F)  Pulse  Av.4  Min: 37  Max: 141   Blood Pressure : 119/55, NIBP: (!) 161/99     Respiratory:    Respiration: 16, Pulse Oximetry: 96 %     Work Of Breathing / Effort: Within Normal Limits  RUL Breath Sounds: Clear, RML Breath Sounds: Clear, RLL Breath Sounds: Clear;Diminished, KHANH Breath Sounds: Clear, LLL Breath Sounds: Clear;Diminished  Neuro:  GCS       Fluids:    Intake/Output Summary (Last 24 hours) at 10/19/2022 1127  Last data filed at 10/19/2022 1000  Gross per 24 hour   Intake 3020 ml   Output 800 ml   Net 2220 ml     Weight: 75.9 kg (167 lb 5.3 oz)  Current Diet Order   Procedures    Diet Order Diet: Level 6 - Soft and Bite Sized; Liquid level: Level 0 - Thin     Physical Exam  Labs:  Recent Results (from the past 24 hour(s))   BLOOD CULTURE    Collection Time: 10/18/22 11:58 AM    Specimen: Peripheral; Blood   Result Value Ref Range    Significant Indicator NEG     Source BLD     Site PERIPHERAL     Culture Result       No Growth  Note: Blood cultures are incubated for 5 days and  are monitored continuously.Positive blood cultures  are called to the RN and reported as soon as  they are identified.     PROCALCITONIN    Collection Time: 10/18/22 12:10 PM   Result Value Ref Range    Procalcitonin <0.05 <0.25 ng/mL   BLOOD CULTURE    Collection Time: 10/18/22 12:10 PM    Specimen: Peripheral; Blood   Result Value Ref Range    Significant Indicator NEG     Source BLD     Site PERIPHERAL     Culture Result       No Growth  Note: Blood cultures are incubated for 5 days and  are monitored continuously.Positive blood cultures  are  called to the RN and reported as soon as  they are identified.     CULTURE WOUND W/ GRAM STAIN    Collection Time: 10/18/22  4:22 PM    Specimen: Wound   Result Value Ref Range    Significant Indicator NEG     Source WND     Site base of tongue abscess     Culture Result -     Gram Stain Result       Many WBCs.  Moderate mixed bacteria, no predominant organism seen.     Anaerobic Culture    Collection Time: 10/18/22  4:22 PM    Specimen: Wound   Result Value Ref Range    Significant Indicator NEG     Source WND     Site base of tongue abscess     Culture Result -    GRAM STAIN    Collection Time: 10/18/22  4:22 PM    Specimen: Wound   Result Value Ref Range    Significant Indicator .     Source WND     Site base of tongue abscess     Gram Stain Result       Many WBCs.  Moderate mixed bacteria, no predominant organism seen.     EKG    Collection Time: 10/18/22  5:30 PM   Result Value Ref Range    Report       [facility]    Test Date:  2022-10-18  Pt Name:    CONNIE NORIEGA                    Department: RS  MRN:        [id number]                      Room:       [facility]  Gender:     Female                       Technician: AIDAN  :        1950                   Requested By:EBONI QUINONES  Order #:    [id number]                    Reading MD:    Measurements  Intervals                                Axis  Rate:       130                          P:  RI:                                      QRS:        142  QRSD:       87                           T:          206  QT:         308  QTc:        453    Interpretive Statements  Junctional tachycardia  Left posterior fascicular block  Low voltage, extremity leads  Repol abnrm, prob ischemia, anterolateral lds  Compared to ECG 2022 09:46:00  Junctional tachycardia now present  Left posterior fascicular block now present  Early repolarization now present  Possible ischemia now present  Sinus rhythm no longer present  Left-axis de viation no longer  present     CBC WITHOUT DIFFERENTIAL    Collection Time: 10/19/22  4:30 AM   Result Value Ref Range    WBC 10.7 4.8 - 10.8 K/uL    RBC 4.32 4.20 - 5.40 M/uL    Hemoglobin 11.3 (L) 12.0 - 16.0 g/dL    Hematocrit 34.9 (L) 37.0 - 47.0 %    MCV 80.8 (L) 81.4 - 97.8 fL    MCH 26.2 (L) 27.0 - 33.0 pg    MCHC 32.4 (L) 33.6 - 35.0 g/dL    RDW 44.4 35.9 - 50.0 fL    Platelet Count 324 164 - 446 K/uL    MPV 9.2 9.0 - 12.9 fL   Basic Metabolic Panel    Collection Time: 10/19/22  4:30 AM   Result Value Ref Range    Sodium 140 135 - 145 mmol/L    Potassium 4.0 3.6 - 5.5 mmol/L    Chloride 108 96 - 112 mmol/L    Co2 20 20 - 33 mmol/L    Glucose 143 (H) 65 - 99 mg/dL    Bun 13 8 - 22 mg/dL    Creatinine 0.81 0.50 - 1.40 mg/dL    Calcium 8.2 (L) 8.5 - 10.5 mg/dL    Anion Gap 12.0 7.0 - 16.0   TROPONIN    Collection Time: 10/19/22  4:30 AM   Result Value Ref Range    Troponin T 181 (H) 6 - 19 ng/L   ESTIMATED GFR    Collection Time: 10/19/22  4:30 AM   Result Value Ref Range    GFR (CKD-EPI) 77 >60 mL/min/1.73 m 2     Medical Decision Making, by Problem:  Active Hospital Problems    Diagnosis     Paroxysmal atrial fibrillation (HCC) [I48.0]      Priority: High     chads 1: pt on ASA and diltiazem  fb CARDIO.      Obesity (BMI 30.0-34.9) [E66.9]     Sepsis (HCC) [A41.9]     Neck abscess [L02.11]     Hypertension [I10]     Asthma [J45.909]     Hypotension [I95.9]     COPD (chronic obstructive pulmonary disease) (HCC) [J44.9]      As per chest CT       Plan:  Advance diet as desired  Recommend oral abx for 2 weeks and then follow up

## 2022-10-19 NOTE — ASSESSMENT & PLAN NOTE
Likely postoperative hypotension question anesthetics and volume status possible hypovolemia.  Proved with fluids.  Monitor vitals, I's and O's  Stopping IV fluids is taking in adequate orals

## 2022-10-19 NOTE — ASSESSMENT & PLAN NOTE
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

## 2022-10-19 NOTE — PROGRESS NOTES
"Hospital Medicine Daily Progress Note    Date of Service  10/19/2022    Chief Complaint  Sore Throat  x 1 week    Hospital Course  Augusta Rodriguez is a 72 y.o. female with a hx of paroxysmal atrial fibrillation, HTN, BRIANA, GERD, asthma.  She was admitted 10/18/2022 with worsening throat pain x 2 weeks to the point of being unable to eat nor swallow. She was initially started on Cefdinir on a prior ER visit. Now back on this admit a CT neck showed a 1.1cm abscess between the base of the tongue and epiglottis.  The ENT surgeon took the patient to the OR for uncomplicated I&D procedure. After surgery the patient was transferred to ICU for tachycardia and hypotension.  After fluid replacement, there was improved vitals.    Interval Problem Update  10/19/22: Awake and alert.  Speech clear and not muffled.   at bedside.  BP and HR improved.  She tolerated part of her breakfast and is drinking fluids.  IV fluids stopped.  Monitor today and plan for possible discharge tomorrow if remains stable.    I have discussed this patient's plan of care and discharge plan at IDT rounds today with Case Management, Nursing, Nursing leadership, and other members of the IDT team.    Consultants/Specialty  critical care and ENT    Code Status  Full Code    Disposition  Patient is not medically cleared for discharge.   Anticipate discharge to to home with close outpatient follow-up.  I have placed the appropriate orders for post-discharge needs.    Review of Systems  Review of Systems   Constitutional:  Negative for chills, fever and malaise/fatigue.   HENT:  Negative for sore throat (\"much improved\").    Eyes:  Negative for discharge.   Respiratory:  Negative for cough, shortness of breath and stridor.    Cardiovascular:  Negative for chest pain, palpitations and leg swelling.   Gastrointestinal:  Negative for abdominal pain, diarrhea, nausea and vomiting.   Genitourinary:  Negative for dysuria and hematuria.   Musculoskeletal:  " "Negative for back pain and joint pain.   Skin:  Negative for rash.   Neurological:  Positive for headaches (\"chronic migraines but not bad\"). Negative for dizziness and speech change.   Psychiatric/Behavioral:  The patient is not nervous/anxious.       Physical Exam  Temp:  [36.4 °C (97.6 °F)-37 °C (98.6 °F)] 36.6 °C (97.9 °F)  Pulse:  [] 92  Resp:  [] 16  BP: ()/() 122/66  SpO2:  [72 %-99 %] 96 %    Physical Exam  Vitals reviewed.   Constitutional:       Appearance: Normal appearance. She is obese. She is not diaphoretic.   HENT:      Head: Normocephalic and atraumatic.      Nose: Nose normal.      Mouth/Throat:      Mouth: Mucous membranes are moist.      Pharynx: No oropharyngeal exudate.      Comments: Left tonsilar region with bruising from likely I&D w/o active discharge noted. The frenulum has a small hematoma.  Eyes:      General: No scleral icterus.        Right eye: No discharge.         Left eye: No discharge.      Extraocular Movements: Extraocular movements intact.      Conjunctiva/sclera: Conjunctivae normal.   Cardiovascular:      Rate and Rhythm: Normal rate and regular rhythm.      Pulses:           Radial pulses are 2+ on the right side and 2+ on the left side.        Dorsalis pedis pulses are 2+ on the right side and 2+ on the left side.      Heart sounds: No murmur heard.  Pulmonary:      Effort: Pulmonary effort is normal. No respiratory distress.      Breath sounds: Normal breath sounds. No wheezing or rales.   Abdominal:      General: Bowel sounds are normal. There is no distension.      Palpations: Abdomen is soft.   Musculoskeletal:         General: No swelling or tenderness.      Cervical back: Neck supple. No tenderness. No muscular tenderness.      Right lower leg: No edema.      Left lower leg: No edema.   Lymphadenopathy:      Cervical: No cervical adenopathy.   Skin:     Coloration: Skin is not jaundiced or pale.   Neurological:      General: No focal deficit " present.      Mental Status: She is alert and oriented to person, place, and time. Mental status is at baseline.      Cranial Nerves: No cranial nerve deficit.   Psychiatric:         Mood and Affect: Mood normal.         Behavior: Behavior normal.       Fluids    Intake/Output Summary (Last 24 hours) at 10/19/2022 0953  Last data filed at 10/19/2022 0700  Gross per 24 hour   Intake 2660 ml   Output 800 ml   Net 1860 ml       Laboratory  Recent Labs     10/18/22  0844 10/19/22  0430   WBC 12.5* 10.7   RBC 5.08 4.32   HEMOGLOBIN 13.5 11.3*   HEMATOCRIT 40.6 34.9*   MCV 79.9* 80.8*   MCH 26.6* 26.2*   MCHC 33.3* 32.4*   RDW 43.2 44.4   PLATELETCT 400 324   MPV 9.0 9.2     Recent Labs     10/18/22  0844 10/19/22  0430   SODIUM 137 140   POTASSIUM 4.1 4.0   CHLORIDE 103 108   CO2 22 20   GLUCOSE 117* 143*   BUN 19 13   CREATININE 0.88 0.81   CALCIUM 9.1 8.2*                   Imaging  CT-SOFT TISSUE NECK WITH   Final Result      1.  A 1.1 cm abscess between the epiglottis and the base of tongue.   2.  Mild reactive cervical lymphadenopathy.           Assessment/Plan  * Neck abscess- (present on admission)  Assessment & Plan  CT neck noted with 1.1 cm abscess between epiglottitis and the base of the tongue.    ENT Dr. Suazo took the patient for incision and drainage 10/18  Continue on IV fluid, IV antibiotics  Following up on cultures  Switch to oral antibiotics once able    Obesity (BMI 30.0-34.9)  Assessment & Plan  Body mass index is 31.62 kg/m².  Recommend continued lifestyle modifications    Hypotension- (present on admission)  Assessment & Plan  Likely postoperative hypotension question anesthetics and volume status possible hypovolemia.  Proved with fluids.  Monitor vitals, I's and O's  Stopping IV fluids is taking in adequate orals    Asthma  Assessment & Plan  Not in acute exacerbation  Continue DuoNeb as needed  Check O2 needs    Hypertension  Assessment & Plan  Status post IV fluids postoperative  Reinitiated  on diltiazem 240 mg daily with heart rate and blood pressure parameters.  Micardis 40 mg daily blood pressure parameter  Monitor vitals    Sepsis ([facility])  Assessment & Plan  Status post fluid resuscitation  Continues on antibiotics  Monitoring cultures  Improved leukocytosis  10/19 WBC 10.7  Cultures no growth to date  Etiology concerning for pharyngitis and abscess    COPD (chronic obstructive pulmonary disease) ([facility])- (present on admission)  Assessment & Plan  Patient states she has outpatient pulmonologist  Recommend follow-up with outpatient pulmonary function testing  She was never smoker but had strong secondhand smoke secondary to [employer] work and family of smokers  Checking home oxygen needs    Paroxysmal atrial fibrillation ([facility])- (present on admission)  Assessment & Plan  Continue with diltiazem with heart rate and blood pressure parameters  Monitor on telemetry for now and monitor vitals       VTE prophylaxis: heparin ppx    I have performed a physical exam and reviewed and updated ROS and Plan today (10/19/2022). In review of yesterday's note (10/18/2022), there are no changes except as documented above.

## 2022-10-20 ENCOUNTER — PHARMACY VISIT (OUTPATIENT)
Dept: PHARMACY | Facility: MEDICAL CENTER | Age: 72
End: 2022-10-20
Payer: COMMERCIAL

## 2022-10-20 VITALS
WEIGHT: 167.33 LBS | HEART RATE: 82 BPM | DIASTOLIC BLOOD PRESSURE: 73 MMHG | HEIGHT: 61 IN | RESPIRATION RATE: 17 BRPM | OXYGEN SATURATION: 90 % | BODY MASS INDEX: 31.59 KG/M2 | SYSTOLIC BLOOD PRESSURE: 123 MMHG | TEMPERATURE: 97.5 F

## 2022-10-20 LAB
ERYTHROCYTE [DISTWIDTH] IN BLOOD BY AUTOMATED COUNT: 46.7 FL (ref 35.9–50)
HCT VFR BLD AUTO: 33.4 % (ref 37–47)
HGB BLD-MCNC: 10.6 G/DL (ref 12–16)
MCH RBC QN AUTO: 25.9 PG (ref 27–33)
MCHC RBC AUTO-ENTMCNC: 31.7 G/DL (ref 33.6–35)
MCV RBC AUTO: 81.7 FL (ref 81.4–97.8)
PLATELET # BLD AUTO: 318 K/UL (ref 164–446)
PMV BLD AUTO: 10 FL (ref 9–12.9)
RBC # BLD AUTO: 4.09 M/UL (ref 4.2–5.4)
WBC # BLD AUTO: 15 K/UL (ref 4.8–10.8)

## 2022-10-20 PROCEDURE — 36415 COLL VENOUS BLD VENIPUNCTURE: CPT

## 2022-10-20 PROCEDURE — 700101 HCHG RX REV CODE 250: Performed by: STUDENT IN AN ORGANIZED HEALTH CARE EDUCATION/TRAINING PROGRAM

## 2022-10-20 PROCEDURE — A9270 NON-COVERED ITEM OR SERVICE: HCPCS | Performed by: OTOLARYNGOLOGY

## 2022-10-20 PROCEDURE — A9270 NON-COVERED ITEM OR SERVICE: HCPCS | Performed by: STUDENT IN AN ORGANIZED HEALTH CARE EDUCATION/TRAINING PROGRAM

## 2022-10-20 PROCEDURE — 700102 HCHG RX REV CODE 250 W/ 637 OVERRIDE(OP): Performed by: OTOLARYNGOLOGY

## 2022-10-20 PROCEDURE — 700111 HCHG RX REV CODE 636 W/ 250 OVERRIDE (IP): Performed by: STUDENT IN AN ORGANIZED HEALTH CARE EDUCATION/TRAINING PROGRAM

## 2022-10-20 PROCEDURE — 85027 COMPLETE CBC AUTOMATED: CPT

## 2022-10-20 PROCEDURE — 99239 HOSP IP/OBS DSCHRG MGMT >30: CPT | Performed by: GENERAL PRACTICE

## 2022-10-20 PROCEDURE — RXMED WILLOW AMBULATORY MEDICATION CHARGE: Performed by: GENERAL PRACTICE

## 2022-10-20 PROCEDURE — 700102 HCHG RX REV CODE 250 W/ 637 OVERRIDE(OP): Performed by: STUDENT IN AN ORGANIZED HEALTH CARE EDUCATION/TRAINING PROGRAM

## 2022-10-20 RX ORDER — CLINDAMYCIN HYDROCHLORIDE 300 MG/1
300 CAPSULE ORAL 4 TIMES DAILY
Qty: 44 CAPSULE | Refills: 0 | Status: SHIPPED | OUTPATIENT
Start: 2022-10-20 | End: 2022-10-31

## 2022-10-20 RX ORDER — CEFDINIR 300 MG/1
300 CAPSULE ORAL 2 TIMES DAILY
Qty: 22 CAPSULE | Refills: 0 | Status: SHIPPED | OUTPATIENT
Start: 2022-10-20 | End: 2022-10-31

## 2022-10-20 RX ORDER — HYDROCODONE BITARTRATE AND ACETAMINOPHEN 5; 325 MG/1; MG/1
1 TABLET ORAL EVERY 8 HOURS PRN
Qty: 9 TABLET | Refills: 0 | Status: SHIPPED | OUTPATIENT
Start: 2022-10-20 | End: 2022-10-23

## 2022-10-20 RX ADMIN — TELMISARTAN 40 MG: 40 TABLET ORAL at 06:13

## 2022-10-20 RX ADMIN — HEPARIN SODIUM 5000 UNITS: 5000 INJECTION, SOLUTION INTRAVENOUS; SUBCUTANEOUS at 06:07

## 2022-10-20 RX ADMIN — FLUTICASONE PROPIONATE 88 MCG: 44 AEROSOL, METERED RESPIRATORY (INHALATION) at 06:12

## 2022-10-20 RX ADMIN — CLINDAMYCIN IN 5 PERCENT DEXTROSE 600 MG: 12 INJECTION, SOLUTION INTRAVENOUS at 06:07

## 2022-10-20 RX ADMIN — ASPIRIN 81 MG: 81 TABLET, COATED ORAL at 06:06

## 2022-10-20 RX ADMIN — SENNOSIDES AND DOCUSATE SODIUM 2 TABLET: 50; 8.6 TABLET ORAL at 06:06

## 2022-10-20 RX ADMIN — DULOXETINE HYDROCHLORIDE 60 MG: 30 CAPSULE, DELAYED RELEASE ORAL at 06:06

## 2022-10-20 RX ADMIN — DILTIAZEM HYDROCHLORIDE 240 MG: 240 CAPSULE, COATED, EXTENDED RELEASE ORAL at 06:13

## 2022-10-20 RX ADMIN — OMEPRAZOLE 20 MG: 20 CAPSULE, DELAYED RELEASE ORAL at 06:05

## 2022-10-20 RX ADMIN — UMECLIDINIUM BROMIDE AND VILANTEROL TRIFENATATE 1 PUFF: 62.5; 25 POWDER RESPIRATORY (INHALATION) at 06:12

## 2022-10-20 RX ADMIN — CEFTRIAXONE SODIUM 1 G: 10 INJECTION, POWDER, FOR SOLUTION INTRAVENOUS at 06:07

## 2022-10-20 RX ADMIN — ATORVASTATIN CALCIUM 10 MG: 10 TABLET, FILM COATED ORAL at 06:06

## 2022-10-20 RX ADMIN — HYDROCODONE BITARTRATE AND ACETAMINOPHEN 1 TABLET: 5; 325 TABLET ORAL at 09:37

## 2022-10-20 ASSESSMENT — PAIN DESCRIPTION - PAIN TYPE
TYPE: ACUTE PAIN
TYPE: ACUTE PAIN

## 2022-10-20 ASSESSMENT — PATIENT HEALTH QUESTIONNAIRE - PHQ9
2. FEELING DOWN, DEPRESSED, IRRITABLE, OR HOPELESS: NOT AT ALL
SUM OF ALL RESPONSES TO PHQ9 QUESTIONS 1 AND 2: 0
1. LITTLE INTEREST OR PLEASURE IN DOING THINGS: NOT AT ALL

## 2022-10-20 NOTE — CARE PLAN
The patient is Stable - Low risk of patient condition declining or worsening    Shift Goals  Clinical Goals: IV abx and stable BP/HR  Patient Goals: rest and comfort  Family Goals: N/A    Progress made toward(s) clinical / shift goals:  VS stable and no complaints of pain.    Problem: Knowledge Deficit - Standard  Goal: Patient and family/care givers will demonstrate understanding of plan of care, disease process/condition, diagnostic tests and medications  Outcome: Progressing     Problem: Pain - Standard  Goal: Alleviation of pain or a reduction in pain to the patient’s comfort goal  Outcome: Progressing       Patient is not progressing towards the following goals:

## 2022-10-20 NOTE — HOSPITAL COURSE
This is a 72-year-old female with past medical history of hypertension, atrial fibrillation, BRIANA, asthma, GERD, and obesity who was admitted on 10/18/2022 with sore throat and difficulty swallowing for 2 weeks, significant for with sepsis secondary to a 1.1 cm neck abscess.    Patient was evaluated by ENT and underwent an uncomplicated I&D procedure.  Patient was admitted to the ICU postop due to hypotension and tachycardia.  Patient is saturating well on room air, tolerating advance modified diet.  Patient to continue with oral antibiotics for 14 days total.  Discussed the case with pharmacy, she is to discharge with clindamycin and cefdinir to complete her antibiotic course.  She is to follow-up with ENT outpatient.

## 2022-10-20 NOTE — CARE PLAN
Problem: Knowledge Deficit - Standard  Goal: Patient and family/care givers will demonstrate understanding of plan of care, disease process/condition, diagnostic tests and medications  Outcome: Progressing     Problem: Pain - Standard  Goal: Alleviation of pain or a reduction in pain to the patient’s comfort goal  Outcome: Progressing       The patient is Stable - Low risk of patient condition declining or worsening    Shift Goals  Clinical Goals: Antibiotics, Monitor VS, rest and comfort  Patient Goals: Rest and comfort  Family Goals: No family at bedside at this time    Progress made toward(s) clinical / shift goals: Plan of care discussed and patient verbalizes understanding. Pain under control with medication as ordered.     Patient is not progressing towards the following goals:

## 2022-10-20 NOTE — PROGRESS NOTES
Walking oxygen saturation done, patient walked around the [place] and oxygen sats were 91-96% on RA. Patient complained of her normal COPD SOB but no complaints with walking in hallway.

## 2022-10-20 NOTE — PROGRESS NOTES
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 Returned to room and patient is asking about being able to be discharged today. Will continue to monitor and talk with MD.

## 2022-10-20 NOTE — DISCHARGE PLANNING
[facility] TCN chart review completed. Collaborated briefly  with Primary  and Dr Harrison. The most current review of medical record, knowledge of pt's PLOF and social support, LACE+ score of 79, 6 clicks scores of ( not entered) mobility were considered.  Per Dr Harrison, mbr will not need outpatient IV antibiotic after discharge.     Pt seen at bedside with Spouse Gen also present at bedside. Mbr is AOX4, very pleasant. Mbr endorses functioning  at baseline PLOF which is independent with ADLs and Mobility . Mbr also denies additional need for assistance with food , housing. Mrb requested resources for transportation. This TCN provided mbr with [facility] Customer Service tel# [phone] to avail medical uber services.  Introduced TCN program. Provided education regarding post acute levels of care. Discussed [facility] plan benefits (Meds to Beds, medical uber and [facility] transitional care). Pt verbalizes understanding. Choice proactively obtained for HH, Infusion and given to LOWELL Kincaid. Anticipate DC to home today. Thank You    Completed today:  Choice obtained: HH, IV Infusion   [facility] referral ( sent) 10/20/2022

## 2022-10-20 NOTE — PROGRESS NOTES
Patient discharged with all personal belongings, meds to bed, pharmacy at bedside to go over medications with patient and . Discharge instructions completed and patient and  verbalize understanding.  will call patient at home with appointment time. All personal belongings and medications with patient.

## 2022-10-20 NOTE — PROGRESS NOTES
Rec'd report from day shift RN. Assumed pt care. Assessment completed and pt is AA&Ox4. No complaints of pain or discomfort at this time. All needs met. Bed locked, bed in lowest position, call light and personal belongings within reach and treaded socks in place for safety.

## 2022-10-20 NOTE — DISCHARGE SUMMARY
"Discharge Summary    CHIEF COMPLAINT ON ADMISSION  Chief Complaint   Patient presents with    Sore Throat     \"I can't swallow\".  States this happened about a week ago, did not seek medical attention at the time.      Cough     Started a week ago.       Reason for Admission  Sore Throat     Admission Date  10/18/2022    CODE STATUS  Full Code    HPI & HOSPITAL COURSE  This is a 72-year-old female with past medical history of hypertension, atrial fibrillation, BRIANA, asthma, GERD, and obesity who was admitted on 10/18/2022 with sore throat and difficulty swallowing for 2 weeks, significant for with sepsis secondary to a 1.1 cm neck abscess.    Patient was evaluated by ENT and underwent an uncomplicated I&D procedure.  Patient was admitted to the ICU postop due to hypotension and tachycardia.  Patient is saturating well on room air, tolerating advance modified diet.  Patient to continue with oral antibiotics for 14 days total.  Discussed the case with pharmacy, she is to discharge with clindamycin and cefdinir to complete her antibiotic course.  She is to follow-up with ENT outpatient.    Therefore, she is discharged in good and stable condition to home with close outpatient follow-up.    The patient met 2-midnight criteria for an inpatient stay at the time of discharge.    Discharge Date  10/20/2022    FOLLOW UP ITEMS POST DISCHARGE  Primary care physician  ENT    DISCHARGE DIAGNOSES  Principal Problem:    Neck abscess POA: Yes  Active Problems:    Paroxysmal atrial fibrillation (HCC) POA: Yes      Overview: chads 1: pt on ASA and diltiazem      fb CARDIO.    COPD (chronic obstructive pulmonary disease) (HCC) (Chronic) POA: Yes      Overview: As per chest CT    Sepsis (HCC) POA: Unknown    Hypertension POA: Unknown    Asthma POA: Unknown    Hypotension POA: Yes    Obesity (BMI 30.0-34.9) POA: Unknown  Resolved Problems:    * No resolved hospital problems. *      FOLLOW UP  Future Appointments   Date Time Provider " [facility]   11/28/2022  1:00 PM VIKAS Goldberg [facility] None   12/15/2022 10:30 AM Isaiah Ruff M.D. [facility] None   12/21/2022  9:40 AM PATRICIA Roberson [place]   1/17/2023 10:40 AM Rudy Parsons M.D. [facility]     Paz Suazo M.D.  [address]  [place] [address]  [phone]    Schedule an appointment as soon as possible for a visit in 1 week(s)      Paz Suazo M.D.  [address] [facility]  [address]  [place] [id number] [phone]          Rudy Parsons M.D.  [address]  [facility] [id number] [phone]    Follow up      ALBINA Garcia  [address] [place] [id number] [phone]          MEDICATIONS ON DISCHARGE     Medication List        START taking these medications        Instructions   clindamycin 300 MG Caps  Commonly known as: CLEOCIN   Take 1 Capsule by mouth 4 times a day for 11 days.  Dose: 300 mg     HYDROcodone-acetaminophen 5-325 MG Tabs per tablet  Commonly known as: NORCO   Take 1 Tablet by mouth every 8 hours as needed (severe pain) for up to 3 days.  Dose: 1 Tablet            CONTINUE taking these medications        Instructions   albuterol 108 (90 Base) MCG/ACT Aers inhalation aerosol   INHALE 2 PUFFS BY MOUTH EVERY 6 HOURS AS NEEDED FOR SHORTNESS OF BREATH     amitriptyline 25 MG Tabs  Commonly known as: ELAVIL   Take 50 mg by mouth every evening.  Dose: 50 mg     aspirin 81 MG EC tablet   Take 81 mg by mouth every day.  Dose: 81 mg     atorvastatin 10 MG Tabs  Commonly known as: LIPITOR   TAKE 1 TABLET BY MOUTH EVERY DAY  Dose: 10 mg     cefdinir 300 MG Caps  Commonly known as: OMNICEF   Take 1 Capsule by mouth 2 times a day for 11 days.  Dose: 300 mg     dilTIAZem  MG Cp24  Commonly known as: Cardizem CD   TAKE 1 CAPSULE BY MOUTH EVERY DAY  Dose: 240 mg     DULoxetine 60 MG Cpep delayed-release capsule  Commonly known as: CYMBALTA   TAKE 1 CAPSULE BY MOUTH EVERY DAY  Dose: 60 mg     fluticasone 110 MCG/ACT  Aero  Commonly known as: Flovent HFA   Inhale 2 Puffs 2 times a day.  Dose: 2 Puff     pantoprazole 40 MG Tbec  Commonly known as: PROTONIX   TAKE 1 TABLET BY MOUTH EVERY DAY  Dose: 40 mg     rizatriptan 5 MG tablet  Commonly known as: MAXALT   Take 5 mg by mouth one time as needed for Migraine.  Dose: 5 mg     SALINE NA   Administer 1 Spray into affected nostril(S) every day.  Dose: 1 Spray     telmisartan 40 MG Tabs  Commonly known as: MICARDIS   TAKE 1 TABLET BY MOUTH EVERY DAY  Dose: 40 mg     Trelegy Ellipta 100-62.5-25 MCG/ACT Aepb inhalation  Generic drug: fluticasone-umeclidin-vilant   Inhale 1 Inhalation every day.  Dose: 1 Inhalation     Vitamin C 1000 MG Tabs   Take 4,000 mg by mouth every day.  Dose: 4,000 mg     zonisamide 100 MG Caps  Commonly known as: ZONEGRAN   Take 100 mg by mouth every day.  Dose: 100 mg            STOP taking these medications      fluticasone 50 MCG/ACT nasal spray  Commonly known as: FLONASE              Allergies  Allergies   Allergen Reactions    Sumatriptan Swelling     Tongue swell    Clarithromycin Itching, Nausea and Swelling     Swelling/Itching/Nausea    Ees [Erythromycin] Itching, Nausea and Swelling     Swelling/Itching/Nausea    Levaquin Myalgia     Muscle soreness     Pcn [Penicillins] Itching, Nausea and Swelling     Swelling/Itching/Nausea     Shellfish Allergy Itching, Nausea and Swelling     Swelling/Itching/Nausea    Trazodone Swelling       DIET  Orders Placed This Encounter   Procedures    Diet Order Diet: Level 6 - Soft and Bite Sized; Liquid level: Level 0 - Thin     Standing Status:   Standing     Number of Occurrences:   1     Order Specific Question:   Diet:     Answer:   Level 6 - Soft and Bite Sized [23]     Order Specific Question:   Liquid level     Answer:   Level 0 - Thin    Discontinue Diet Tray     Standing Status:   Standing     Number of Occurrences:   1       ACTIVITY  As tolerated.  Weight bearing as tolerated    CONSULTATIONS  Critical  care  ENT    PROCEDURES  I&D    LABORATORY  Lab Results   Component Value Date    SODIUM 139 10/19/2022    POTASSIUM 3.7 10/19/2022    CHLORIDE 107 10/19/2022    CO2 18 (L) 10/19/2022    GLUCOSE 163 (H) 10/19/2022    BUN 16 10/19/2022    CREATININE 0.86 10/19/2022    CREATININE 0.89 04/27/2009        Lab Results   Component Value Date    WBC 15.0 (H) 10/20/2022    HEMOGLOBIN 10.6 (L) 10/20/2022    HEMATOCRIT 33.4 (L) 10/20/2022    PLATELETCT 318 10/20/2022      CT-SOFT TISSUE NECK WITH   Final Result      1.  A 1.1 cm abscess between the epiglottis and the base of tongue.   2.  Mild reactive cervical lymphadenopathy.         Total time of the discharge process exceeds 45 minutes.

## 2022-10-21 LAB
BACTERIA WND AEROBE CULT: NORMAL
GRAM STN SPEC: NORMAL
SIGNIFICANT IND 70042: NORMAL
SITE SITE: NORMAL
SOURCE SOURCE: NORMAL

## 2022-10-21 NOTE — DISCHARGE PLANNING
Meds-to-Beds: Discharge prescription orders listed below delivered to patient's bedside. RN Lucretia notified. Patient counseled. Patient elected to have co-payment billed to patient account.      Current Outpatient Medications   Medication Sig Dispense Refill    HYDROcodone-acetaminophen (NORCO) 5-325 MG Tab per tablet Take 1 Tablet by mouth every 8 hours as needed (severe pain) for up to 3 days. 9 Tablet 0    clindamycin (CLEOCIN) 300 MG Cap Take 1 Capsule by mouth 4 times a day for 11 days. 44 Capsule 0    cefdinir (OMNICEF) 300 MG Cap Take 1 Capsule by mouth 2 times a day for 11 days. 22 Capsule 0      Susi Guidry, PharmD

## 2022-10-22 LAB
BACTERIA SPEC ANAEROBE CULT: ABNORMAL
BACTERIA SPEC ANAEROBE CULT: ABNORMAL
SIGNIFICANT IND 70042: ABNORMAL
SITE SITE: ABNORMAL
SOURCE SOURCE: ABNORMAL

## 2022-10-24 NOTE — PROGRESS NOTES
Attempt to call Augusta for CCM monthly follow-up. She does have an appointment on 10/31 will catch her while she is in office to follow-up.

## 2022-10-31 ENCOUNTER — HOSPITAL ENCOUNTER (OUTPATIENT)
Facility: MEDICAL CENTER | Age: 72
End: 2022-10-31
Attending: INTERNAL MEDICINE
Payer: MEDICARE

## 2022-10-31 ENCOUNTER — OFFICE VISIT (OUTPATIENT)
Dept: MEDICAL GROUP | Facility: PHYSICIAN GROUP | Age: 72
End: 2022-10-31
Payer: MEDICARE

## 2022-10-31 VITALS
HEART RATE: 90 BPM | OXYGEN SATURATION: 98 % | BODY MASS INDEX: 31.72 KG/M2 | HEIGHT: 61 IN | TEMPERATURE: 97.6 F | WEIGHT: 168 LBS | RESPIRATION RATE: 16 BRPM | DIASTOLIC BLOOD PRESSURE: 72 MMHG | SYSTOLIC BLOOD PRESSURE: 120 MMHG

## 2022-10-31 DIAGNOSIS — E78.5 DYSLIPIDEMIA: Chronic | ICD-10-CM

## 2022-10-31 DIAGNOSIS — I48.0 PAROXYSMAL ATRIAL FIBRILLATION (HCC): ICD-10-CM

## 2022-10-31 DIAGNOSIS — I10 PRIMARY HYPERTENSION: Chronic | ICD-10-CM

## 2022-10-31 DIAGNOSIS — N18.31 STAGE 3A CHRONIC KIDNEY DISEASE: ICD-10-CM

## 2022-10-31 DIAGNOSIS — D64.9 ANEMIA, UNSPECIFIED TYPE: ICD-10-CM

## 2022-10-31 DIAGNOSIS — L02.11 NECK ABSCESS: ICD-10-CM

## 2022-10-31 DIAGNOSIS — F39 MOOD DISORDER (HCC): Chronic | ICD-10-CM

## 2022-10-31 DIAGNOSIS — E66.01 SEVERE OBESITY (HCC): Chronic | ICD-10-CM

## 2022-10-31 DIAGNOSIS — R73.9 HYPERGLYCEMIA: ICD-10-CM

## 2022-10-31 DIAGNOSIS — J44.9 CHRONIC OBSTRUCTIVE PULMONARY DISEASE, UNSPECIFIED COPD TYPE (HCC): Chronic | ICD-10-CM

## 2022-10-31 PROCEDURE — 99215 OFFICE O/P EST HI 40 MIN: CPT | Performed by: INTERNAL MEDICINE

## 2022-10-31 PROCEDURE — 82274 ASSAY TEST FOR BLOOD FECAL: CPT

## 2022-10-31 RX ORDER — FLUTICASONE FUROATE, UMECLIDINIUM BROMIDE AND VILANTEROL TRIFENATATE 100; 62.5; 25 UG/1; UG/1; UG/1
1 POWDER RESPIRATORY (INHALATION) DAILY
Qty: 1 EACH | Refills: 11 | Status: SHIPPED | OUTPATIENT
Start: 2022-10-31 | End: 2022-11-07

## 2022-10-31 ASSESSMENT — FIBROSIS 4 INDEX: FIB4 SCORE: 1.1

## 2022-10-31 NOTE — PROGRESS NOTES
Augusta Rodriguez is a 72 y.o. female who presents for Hospital Follow-up.        HPI:   Recently hospitalized for     Neck abscess  The patient was recently admitted to the hospital from October 18, 2022 to October 20, 2022 due to neck abscess.  Patient was evaluated by ENT and underwent uncomplicated I&D procedure.  Patient was admitted to the ICU postop due to hypotension and tachycardia.  Patient was treated with antibiotic and was subsequently discharged from the hospital.    COPD (chronic obstructive pulmonary disease) (HCC)  This is a chronic condition.  Patient followed by pulmonologist.  She uses albuterol as needed.  Patient also has prescription for Flovent and Trelegy.    Mood disorder (HCC)  Chronic condition.  The patient is currently taking duloxetine and amitriptyline.  Symptoms are well controlled.  No significant side effects reported.    Severe obesity ([facility])  Body mass index is 31.74 kg/m².   Brief discussion with the patient regarding diet, exercise, and lifestyle modification to help achieve and maintain healthy weight              CKD (chronic kidney disease) stage 3, GFR 30-59 ml/min ([facility])  This is a chronic condition.  Previous GFR in the 50s.    Paroxysmal atrial fibrillation (HCC)  This is a chronic condition.  The patient is followed by cardiology service.  Patient is presently taking diltiazem.  And aspirin.  CHADS2 score 1    Dyslipidemia  This is a chronic condition.  Patient is on atorvastatin.  No significant side effects reported.    HTN (hypertension)  Chronic condition.  The patient is presently taking diltiazem, and Micardis.  Blood pressure today 1/20/1972.  No significant side effects reported by the patient    Anemia  This is a new condition noted with recent lab test review.  The patient denies any history of bleeding.  I have ordered additional lab test to check for iron study vitamin B12 folate ferritin and fit test.    Hyperglycemia  Noted with recent blood test.  Recommend  fasting glucose and A1c to be done for follow-up.     Current medicines (including reconciliation performed today)  Current Outpatient Medications   Medication Sig Dispense Refill    clindamycin (CLEOCIN) 300 MG Cap Take 1 Capsule by mouth 4 times a day for 11 days. 44 Capsule 0    cefdinir (OMNICEF) 300 MG Cap Take 1 Capsule by mouth 2 times a day for 11 days. 22 Capsule 0    amitriptyline (ELAVIL) 25 MG Tab Take 50 mg by mouth every evening.      Ascorbic Acid (VITAMIN C) 1000 MG Tab Take 4,000 mg by mouth every day.      pantoprazole (PROTONIX) 40 MG Tablet Delayed Response TAKE 1 TABLET BY MOUTH EVERY DAY 30 Tablet 6    albuterol 108 (90 Base) MCG/ACT Aero Soln inhalation aerosol INHALE 2 PUFFS BY MOUTH EVERY 6 HOURS AS NEEDED FOR SHORTNESS OF BREATH (Patient taking differently: Inhale 2 Puffs every 6 hours as needed for Shortness of Breath.) 8.5 Each 5    rizatriptan (MAXALT) 5 MG tablet Take 5 mg by mouth one time as needed for Migraine.      fluticasone (FLOVENT HFA) 110 MCG/ACT Aerosol Inhale 2 Puffs 2 times a day. 1 Each 5    Fluticasone-Umeclidin-Vilant (TRELEGY ELLIPTA) 100-62.5-25 MCG/INH AEROSOL POWDER, BREATH ACTIVATED inhalation Inhale 1 Inhalation every day. 1 Each 6    telmisartan (MICARDIS) 40 MG Tab TAKE 1 TABLET BY MOUTH EVERY  Tablet 0    DILTIAZem CD (CARDIZEM CD) 240 MG CAPSULE SR 24 HR TAKE 1 CAPSULE BY MOUTH EVERY DAY 90 Capsule 0    DULoxetine (CYMBALTA) 60 MG Cap DR Particles delayed-release capsule TAKE 1 CAPSULE BY MOUTH EVERY DAY 90 Capsule 0    atorvastatin (LIPITOR) 10 MG Tab TAKE 1 TABLET BY MOUTH EVERY  Tablet 1    zonisamide (ZONEGRAN) 100 MG Cap Take 100 mg by mouth every day.      SALINE NA Administer 1 Spray into affected nostril(S) every day.      aspirin 81 MG EC tablet Take 81 mg by mouth every day.       No current facility-administered medications for this visit.       Allergies:   Sumatriptan, Clarithromycin, Ees [erythromycin], Levaquin, Pcn [penicillins],  "Shellfish allergy, and Trazodone    Social History     Tobacco Use    Smoking status: Never    Smokeless tobacco: Never   Vaping Use    Vaping Use: Never used   Substance Use Topics    Alcohol use: Yes     Alcohol/week: 0.0 oz     Comment: Stopped drinking 45 days ago 7/12/17 Going to     Drug use: No       ROS:  As noted in HPI otherwise negative    Objective:     /72 (BP Location: Left arm, Patient Position: Sitting, BP Cuff Size: Adult)   Pulse 90   Temp 36.4 °C (97.6 °F) (Temporal)   Resp 16   Ht 1.549 m (5' 1\")   Wt 76.2 kg (168 lb)   SpO2 98%    Body mass index is 31.74 kg/m².    General: alert and oriented  Cardiovascular: regular rate and rhythm  Pulmonary: lungs : no wheezing   Gastrointestinal: BS present. No obvious mass noted  Oropharynx no exudates or any acute abnormality noted         Latest Reference Range & Units 10/19/22 10:52 10/19/22 17:58 10/20/22 00:25   WBC 4.8 - 10.8 K/uL  14.8 (H) 15.0 (H)   RBC 4.20 - 5.40 M/uL  3.94 (L) 4.09 (L)   Hemoglobin 12.0 - 16.0 g/dL  10.2 (L) 10.6 (L)   Hematocrit 37.0 - 47.0 %  32.2 (L) 33.4 (L)   MCV 81.4 - 97.8 fL  81.7 81.7   MCH 27.0 - 33.0 pg  25.9 (L) 25.9 (L)   MCHC 33.6 - 35.0 g/dL  31.7 (L) 31.7 (L)   RDW 35.9 - 50.0 fL  46.7 46.7   Platelet Count 164 - 446 K/uL  312 318   MPV 9.0 - 12.9 fL  9.6 10.0   Sodium 135 - 145 mmol/L 139     Potassium 3.6 - 5.5 mmol/L 3.7     Chloride 96 - 112 mmol/L 107     Co2 20 - 33 mmol/L 18 (L)     Anion Gap 7.0 - 16.0  14.0     Glucose 65 - 99 mg/dL 163 (H)     Bun 8 - 22 mg/dL 16     Creatinine 0.50 - 1.40 mg/dL 0.86     GFR (CKD-EPI) >60 mL/min/1.73 m 2 72     Calcium 8.5 - 10.5 mg/dL 8.3 (L)     (H): Data is abnormally high  (L): Data is abnormally low  Assessment and Plan:     1. Neck abscess  Status post surgery/I&D  Patient is doing well postoperatively.  Patient is still taking antibiotics  Patient has pending appointment to follow-up with ENT specialist.  Stressed important to keep this " appointment.    2. Chronic obstructive pulmonary disease, unspecified COPD type (HCC)  Chronic stable condition.  Continue with Trelegy.  Refill sent to the pharmacy.  Patient may use albuterol for rescue treatment.  3. Mood disorder (HCC)  This is a chronic and stable condition.  Continue with duloxetine.  The patient denies SI.  4. Severe obesity (HCC)  reCommend diet and exercise.  The patient will try to lose some weight.  5. Stage 3a chronic kidney disease (HCC)  Chronic condition.  Advised the patient to avoid NSAIDs.  Continue to monitor.  Lab tests requested  6. Paroxysmal atrial fibrillation (HCC)  This is a chronic and stable condition.  Continue with aspirin and diltiazem.  Advised the patient to follow-up with cardiology service.    7. Dyslipidemia  This is a chronic condition.  Lipid panel requested.  Recommend patient to continue to take atorvastatin.  8. Primary hypertension  Chronic stable condition.  BP today 1/20/1972.  Continue with current management diltiazem and Micardis.    9. Anemia, unspecified type  This is a new condition.  I suspect could be due to recent surgery and frequent blood draw.  Repeat lab tests ordered to include CBC, vitamin B12 folate ferritin and fit test.    10. Hyperglycemia  Repeat fasting glucose and A1c.      - Chart and discharge summary were reviewed.   - Hospitalization and results reviewed with patient.   - Medications reviewed including instructions regarding high risk medications, dosing and side effects.    Please note that this dictation was created using voice recognition software. I have made every reasonable attempt to correct obvious errors, but I expect that there are errors of grammar and possibly content that I did not discover before finalizing the note.    Face-to-face transitional care management services with MODERATE (today's visit is within 14 days post discharge & LACE+ score of 28-58) medical decision complexity were provided.     LACE+ Historical  Score Over Time (0-28: Low, 29-58: Medium, 59+: High): 79    Total time: 49 min -  That includes time for chart review before the visit, the actual patient visit, and time spent on documentation in EMR after the visit.  Chart review/prep, review of other providers' records, imaging/lab review, face-to-face time for history/examination, ordering, prescribing,  review of results/meds/ treatment plan with patient, and care coordination.

## 2022-10-31 NOTE — PROGRESS NOTES
Annual Health Assessment Questions:    1.  Are you currently engaging in any exercise or physical activity? Yes    2.  How would you describe your mood or emotional well-being today? good    3.  Have you had any falls in the last year? No    4.  Have you noticed any problems with your balance or had difficulty walking? No    5.  In the last six months have you experienced any leakage of urine? Yes    6. DPA/Advanced Directive: Patient has Durable Power of  on file.

## 2022-10-31 NOTE — ASSESSMENT & PLAN NOTE
This is a chronic condition.  Patient followed by pulmonologist.  She uses albuterol as needed.  Patient also has prescription for Flovent and Trelegy.

## 2022-10-31 NOTE — ASSESSMENT & PLAN NOTE
The patient was recently admitted to the hospital from October 18, 2022 to October 20, 2022 due to neck abscess.  Patient was evaluated by ENT and underwent uncomplicated I&D procedure.  Patient was admitted to the ICU postop due to hypotension and tachycardia.  Patient was treated with antibiotic and was subsequently discharged from the hospital.

## 2022-10-31 NOTE — ASSESSMENT & PLAN NOTE
Chronic condition.  The patient is presently taking diltiazem, and Micardis.  Blood pressure today 1/20/1972.  No significant side effects reported by the patient

## 2022-10-31 NOTE — PROGRESS NOTES
Assessment    Augusta was in office today for follow-up appointment with PCP Dr. Parsons. I spoke with Augusta following her appointment for CCM follow up. She is doing ok, she said she has decreased her ice cream intake to just a small scoop instead of a bowl and she is eating smaller meals. She says she remains the same weight, and hasn't lost any yet. She is still walking a little but as she can but does get SOB with activity. She is watching her great grand baby and helping them around the house. Augusta is having some financial difficulty as the social security check is not that much and sometimes does not cover the bills. They are doing what they can but usually have to wait to pay some of the bills until they can afford them. We discussed financial resources and using the food pantries and Augusta said her  does go to the food pantries in [facility] once a week to get food for them. Will have Catarina send Augusta financial resources.     Education    Resources, diet, medication review    Care Plan    Progressing    Progress:    Progressing    Next outreach: 11/29/2022    Quarterly outreach: Augusta is still requiring resources and support and would benefit from continuing on CCM services

## 2022-10-31 NOTE — ASSESSMENT & PLAN NOTE
This is a chronic condition.  The patient is followed by cardiology service.  Patient is presently taking diltiazem.  And aspirin.  CHADS2 score 1

## 2022-10-31 NOTE — ASSESSMENT & PLAN NOTE
Body mass index is 31.74 kg/m².   Brief discussion with the patient regarding diet, exercise, and lifestyle modification to help achieve and maintain healthy weight

## 2022-10-31 NOTE — ASSESSMENT & PLAN NOTE
Chronic condition.  The patient is currently taking duloxetine and amitriptyline.  Symptoms are well controlled.  No significant side effects reported.

## 2022-10-31 NOTE — ASSESSMENT & PLAN NOTE
This is a new condition noted with recent lab test review.  The patient denies any history of bleeding.  I have ordered additional lab test to check for iron study vitamin B12 folate ferritin and fit test.

## 2022-11-01 ENCOUNTER — PATIENT OUTREACH (OUTPATIENT)
Dept: HEALTH INFORMATION MANAGEMENT | Facility: OTHER | Age: 72
End: 2022-11-01
Payer: MEDICARE

## 2022-11-01 DIAGNOSIS — I10 PRIMARY HYPERTENSION: ICD-10-CM

## 2022-11-04 NOTE — DOCUMENTATION QUERY
[facility]                                                                       Query Response Note      PATIENT:               CONNIE NORIEGA  ACCT #:                  [id number]  MRN:                     [id number]  :                      1950  ADMIT DATE:       10/18/2022 7:09 AM  DISCH DATE:        10/20/2022 12:40 PM  RESPONDING  PROVIDER #:        [id number]           QUERY TEXT:    71 y/o female presents with moderate sore throat after onset 1.5-2 weeks ago...was evaluated in the ED 1-2 weeks ago...diagnosed with pharyngitis...placed on Cefdinir...is scheduled to finish in 1 day.  Admit vitals - HR 92.  Admit labs - WBC 12.5, lactic acid 1.4, procalcitonin < 0.05.  10/18 blood cultures - negative; anaerobic culture positive for Prevotella melaninogenica.  H&P diagnoses include sepsis and neck abscess. Per ED note and H&P patient was not in acute distress and had no respiratory distress.  10/18 critical care consult documents ill-appearing...not toxic-appearing; Dx Hypotension - ?due to hypovolemia vs sepsis.  Treatments - IV Ceftriaxone x3, Clindamycin x6; IV LR bolus, NS infusions.  Please clarify the status of sepsis after study.    NOTE:  If an appropriate response is not listed below, please respond with a new note.    Reference Note: SIRS criteria includes - tachycardia (HR >90 beats/min), tachypnea (RR >20 breaths/min), fever or hypothermia (temperature >38 or <36 °C), and leukocytosis or leukopenia (WBC >12,000/mm3, <4,000/mm3).           The patient's Clinical Indicators include:  Admit vitals - HR 92,   Admit labs - WBC 12.5, lactic acid 1.4, procalcitonin < 0.05  10/18 blood cultures - negative  10/18 anaerobic culture + Prevotella melaninogenica    ED Note - moderate sore throat after onset 1.5-2 weeks ago and evaluated in the ED, pharyngitis, placed on Cefdinir,  scheduled to finish in 1 day, sore throat persists, associated  dysphagia, slightly hoarse voice and difficulty breathing due to a feeling of swelling in her throat; no acute distress; neck abscess  H&P - not in acute distress; no respiratory distress; Dx Neck abscess, sepsis  10/18 CC consult - ill-appearing...not toxic-appearing; Dx Hypotension - ?due to hypovolemia vs sepsis    Treatment - IV Ceftriaxone x3, Clindamycin x6; IV LR bolus, NS infusions    Risk factors - Sepsis, hypotension, neck abscess, on outpatient antibiotics on arrival, COPD, HTN    Thank you,  Kellie CLARKN  Clinical   Connect via [employer]  Options provided:   -- Patient with SIRS and neck abscess, Sepsis ruled out   -- Patient with SIRS and neck abscess, Sepsis confirmed   -- Other explanation, (please specify the other explanation)   -- Unable to Determine      Query created by: Kellie Oliveros on 11/3/2022 5:26 PM    RESPONSE TEXT:    Patient with SIRS and neck abscess, Sepsis confirmed          Electronically signed by:  ABILIO HARDY MD 11/4/2022 4:50 PM

## 2022-11-07 ENCOUNTER — HOSPITAL ENCOUNTER (OUTPATIENT)
Dept: LAB | Facility: MEDICAL CENTER | Age: 72
End: 2022-11-07
Attending: INTERNAL MEDICINE
Payer: MEDICARE

## 2022-11-07 DIAGNOSIS — R73.9 HYPERGLYCEMIA: ICD-10-CM

## 2022-11-07 DIAGNOSIS — D64.9 ANEMIA, UNSPECIFIED TYPE: ICD-10-CM

## 2022-11-07 DIAGNOSIS — E78.5 DYSLIPIDEMIA: Chronic | ICD-10-CM

## 2022-11-07 DIAGNOSIS — N28.9 RENAL INSUFFICIENCY: ICD-10-CM

## 2022-11-07 LAB
ANION GAP SERPL CALC-SCNC: 11 MMOL/L (ref 7–16)
BUN SERPL-MCNC: 18 MG/DL (ref 8–22)
CALCIUM SERPL-MCNC: 9.2 MG/DL (ref 8.5–10.5)
CHLORIDE SERPL-SCNC: 104 MMOL/L (ref 96–112)
CHOLEST SERPL-MCNC: 143 MG/DL (ref 100–199)
CO2 SERPL-SCNC: 24 MMOL/L (ref 20–33)
CREAT SERPL-MCNC: 1.01 MG/DL (ref 0.5–1.4)
ERYTHROCYTE [DISTWIDTH] IN BLOOD BY AUTOMATED COUNT: 43.7 FL (ref 35.9–50)
EST. AVERAGE GLUCOSE BLD GHB EST-MCNC: 126 MG/DL
FASTING STATUS PATIENT QL REPORTED: NORMAL
FERRITIN SERPL-MCNC: 30.1 NG/ML (ref 10–291)
FOLATE SERPL-MCNC: 17.3 NG/ML
GFR SERPLBLD CREATININE-BSD FMLA CKD-EPI: 59 ML/MIN/1.73 M 2
GLUCOSE SERPL-MCNC: 97 MG/DL (ref 65–99)
HBA1C MFR BLD: 6 % (ref 4–5.6)
HCT VFR BLD AUTO: 41.2 % (ref 37–47)
HDLC SERPL-MCNC: 49 MG/DL
HGB BLD-MCNC: 12.9 G/DL (ref 12–16)
IRON SERPL-MCNC: 42 UG/DL (ref 40–170)
LDLC SERPL CALC-MCNC: 78 MG/DL
MCH RBC QN AUTO: 25.2 PG (ref 27–33)
MCHC RBC AUTO-ENTMCNC: 31.3 G/DL (ref 33.6–35)
MCV RBC AUTO: 80.6 FL (ref 81.4–97.8)
PLATELET # BLD AUTO: 396 K/UL (ref 164–446)
PMV BLD AUTO: 9.6 FL (ref 9–12.9)
POTASSIUM SERPL-SCNC: 4.5 MMOL/L (ref 3.6–5.5)
RBC # BLD AUTO: 5.11 M/UL (ref 4.2–5.4)
SODIUM SERPL-SCNC: 139 MMOL/L (ref 135–145)
TRIGL SERPL-MCNC: 80 MG/DL (ref 0–149)
VIT B12 SERPL-MCNC: 480 PG/ML (ref 211–911)
WBC # BLD AUTO: 7.1 K/UL (ref 4.8–10.8)

## 2022-11-07 PROCEDURE — 80048 BASIC METABOLIC PNL TOTAL CA: CPT

## 2022-11-07 PROCEDURE — 82728 ASSAY OF FERRITIN: CPT

## 2022-11-07 PROCEDURE — 80061 LIPID PANEL: CPT

## 2022-11-07 PROCEDURE — 82746 ASSAY OF FOLIC ACID SERUM: CPT

## 2022-11-07 PROCEDURE — 82607 VITAMIN B-12: CPT

## 2022-11-07 PROCEDURE — 83036 HEMOGLOBIN GLYCOSYLATED A1C: CPT

## 2022-11-07 PROCEDURE — 85027 COMPLETE CBC AUTOMATED: CPT

## 2022-11-07 PROCEDURE — 36415 COLL VENOUS BLD VENIPUNCTURE: CPT

## 2022-11-07 PROCEDURE — 83540 ASSAY OF IRON: CPT

## 2022-11-07 RX ORDER — FLUTICASONE FUROATE, UMECLIDINIUM BROMIDE AND VILANTEROL TRIFENATATE 100; 62.5; 25 UG/1; UG/1; UG/1
1 POWDER RESPIRATORY (INHALATION) DAILY
Qty: 1 EACH | Refills: 11 | Status: SHIPPED | OUTPATIENT
Start: 2022-11-07 | End: 2024-01-03 | Stop reason: SDUPTHER

## 2022-11-09 DIAGNOSIS — D64.9 ANEMIA, UNSPECIFIED TYPE: ICD-10-CM

## 2022-11-11 LAB — IMM ASSAY OCC BLD FITOB: NEGATIVE

## 2022-11-21 RX ORDER — DILTIAZEM HYDROCHLORIDE 240 MG/1
240 CAPSULE, COATED, EXTENDED RELEASE ORAL DAILY
Qty: 90 CAPSULE | Refills: 0 | Status: SHIPPED | OUTPATIENT
Start: 2022-11-21 | End: 2022-12-21

## 2022-11-21 NOTE — TELEPHONE ENCOUNTER
Received request via: Pharmacy    Was the patient seen in the last year in this department? Yes    Does the patient have an active prescription (recently filled or refills available) for medication(s) requested? No    Does the patient have USP Plus and need 100 day supply (blood pressure, diabetes and cholesterol meds only)? Medication is not for cholesterol, blood pressure or diabetes

## 2022-11-28 ENCOUNTER — OFFICE VISIT (OUTPATIENT)
Dept: SLEEP MEDICINE | Facility: MEDICAL CENTER | Age: 72
End: 2022-11-28
Payer: MEDICARE

## 2022-11-28 VITALS
BODY MASS INDEX: 30.78 KG/M2 | HEART RATE: 114 BPM | RESPIRATION RATE: 16 BRPM | SYSTOLIC BLOOD PRESSURE: 110 MMHG | WEIGHT: 163 LBS | HEIGHT: 61 IN | DIASTOLIC BLOOD PRESSURE: 68 MMHG | OXYGEN SATURATION: 94 %

## 2022-11-28 DIAGNOSIS — R91.8 PULMONARY NODULES: Chronic | ICD-10-CM

## 2022-11-28 DIAGNOSIS — Z23 NEED FOR VACCINATION: ICD-10-CM

## 2022-11-28 DIAGNOSIS — G47.33 OSA (OBSTRUCTIVE SLEEP APNEA): ICD-10-CM

## 2022-11-28 DIAGNOSIS — J44.9 CHRONIC OBSTRUCTIVE PULMONARY DISEASE, UNSPECIFIED COPD TYPE (HCC): Chronic | ICD-10-CM

## 2022-11-28 PROCEDURE — 90677 PCV20 VACCINE IM: CPT | Performed by: NURSE PRACTITIONER

## 2022-11-28 PROCEDURE — 99214 OFFICE O/P EST MOD 30 MIN: CPT | Mod: 25 | Performed by: NURSE PRACTITIONER

## 2022-11-28 PROCEDURE — 94761 N-INVAS EAR/PLS OXIMETRY MLT: CPT | Performed by: NURSE PRACTITIONER

## 2022-11-28 PROCEDURE — G0009 ADMIN PNEUMOCOCCAL VACCINE: HCPCS | Performed by: NURSE PRACTITIONER

## 2022-11-28 ASSESSMENT — FIBROSIS 4 INDEX: FIB4 SCORE: 0.88

## 2022-11-28 NOTE — PROCEDURES
Multi-Ox Readings  Multi Ox #1 Room air   O2 sat % at rest 95   O2 sat % on exertion 90   O2 sat average on exertion     Multi Ox #2     O2 sat % at rest     O2 sat % on exertion     O2 sat average on exertion       Oxygen Use     Oxygen Frequency     Duration of need     Is the patient mobile within the home?     CPAP Use? auto CPAP 8 to 12 cm   BIPAP Use?     Servo Titration

## 2022-11-28 NOTE — PROGRESS NOTES
Chief Complaint   Patient presents with    Follow-Up     last seen 5/18/2022        HPI:  Augusta Rodriguez is a 72 y.o. year old female here today for follow-up on asthma/COPD overlap, pulmonary nodules and BRIANA.  Last OV 5/18/22     She presents with her  today.    MMRC ndGndrndanddndend:nd nd2nd Exacerbations this year: 0    Since last office visit patient was seen in ER June 2022 for chest pain that resolved during visit.  Suspicion was GERD patient given GI cocktail.  Patient states this did not feel like a GERD episode.  She was admitted October 2022 to the hospital for sore throat with cough.  She was admitted 10/18/2022 with sore throat and difficulty swallowing for 2 weeks and was found to have sepsis secondary to a 1.1 cm neck abscess.  ENT performed I&D procedure and placed on oral antibiotics.  She was discharged home on antibiotics and completed course.    Her main complaint is feeling very tired at all times.  She currently helps care for her grandchildren getting up early in the morning and watching him during the day.  She did restart CPAP and feels it is helping.  She has been unable to afford getting new supplies.  She was seen by primary care and stepped up to Trelegy 100 mg once a day and feels her breathing is improved.  She continues use Monalisa twice daily.  She is no longer using Flovent.  CT chest was updated May 2022 indicating stable bilateral multiple nodules with largest measuring 6 mm in size.  Emphysematous changes noted.  Moderate hiatal hernia.  Reviewed with patient.  Recommend follow-up exam in 1 year.  She notes GERD overall controlled with pantoprazole 40 mg twice daily but still notes episodes of GERD that can be triggered by eating.  She restarted CPAP and currently going to bed between 7 and 8 PM.  She will wake 4-5 times at night to urinate and then gets up in the morning around 7:30 AM.  Sometimes up as early as 5:30 AM to watch her grandchildren.  She notes ongoing sinus congestion/mucus  and will use Sudafed sinus up to 2 times per week with relief.  She does have a dry cough currently.    She is currently using auto CPAP 8 to 12 cm; ResMed device obtained 2020.  Compliance report 10/29/2022 through 11/27/2022 indicates 83% compliance, average nightly use 1 hour 42 minutes, longest night average of 5 hours, minimal mask leak with an overall AHI of 0.8/h.  Reviewed with patient.  She feels overall she is tolerating mask and pressure but we will not put the device back on when she gets up to urinate or check on the dogs at night.  I reviewed the importance of using therapy for the duration of the night which could also reduce her bathroom visits.  She is motivated to continue with therapy.    PULM & SLEEP HX:  Never smoker.  PFT 7/20/20 notes FVC 2.91L or 116%, FEV1 2.37L or 121%, FEV1/FVC ratio 82, RV 97%, % and DLCO 97% without significant bronchodilator response. Reviewed with patient.  PFT 4/20/2022 indicated normal airflows with FVC 2.97 L 120%, FEV1 2.31 L 119%, FEV1/FVC ratio 78, TLC 5.3 L 118%, no hyperinflation with a DLCO of 102% predicted.  No significant bronchodilator response.  Overall normal spirometry.  Currently using Flovent 2 puffs BID with BERKLEY prn.  CXR 3/15/21 notes hypoinflation w/o evidence of acute cardiopulmoary disease.  Updated CT scan pending, patient notes cost issues; CT chest 8/26/20 noted multiple nodules with larges measuring 7mm with 6mos f/u CT recommended due 2/2021. Reviewed with patient.     PSG on 1/16/2020 showed an AHI of 30/carol saturation 82%. On CPAP at 16 cm H2O her AHI normalized to 1.9 with normal saturations. She was noted to have an elevated PLMS arousal index of 22.2.     ROS: As per HPI and otherwise negative if not stated.    Past Medical History:   Diagnosis Date    Apnea, sleep     Arrhythmia     afib    Arthritis     osteo    Asthma     Atrial fibrillation (HCC)     Back pain     Bronchitis     Chickenpox     Constipation     Cough      Daytime sleepiness     Dental disorder     upper dentures    Earache     Frequent urination     Gasping for breath     GERD (gastroesophageal reflux disease) 2/27/2010    Icelandic measles     Heartburn     Hypertension     Impaired fasting glucose 2/27/2010    Incontinence of urine     Indigestion     Influenza     Mumps     Nasal drainage     Nausea     Osteopenia 2/24/2010    Osteoporosis     Other specified disorder of intestines     constipation r/t meds    Restless leg syndrome     Scarlet fever     Shortness of breath     Sleep apnea     Snoring     Tremor, essential 2/24/2010    Urinary bladder disorder     Wears glasses     Wheezing     Whooping cough        Past Surgical History:   Procedure Laterality Date    INCISION AND DRAINAGE GENERAL N/A 10/18/2022    Procedure: INCISION AND DRAINAGE ABSCESS TONGUE;  Surgeon: Paz Suazo M.D.;  Location: SURGERY [facility];  Service: Ent    LUMBAR LAMINECTOMY DISKECTOMY Bilateral 2/4/2017    Procedure: LUMBAR LAMINECTOMY DISKECTOMY POSTERIOR L4-S1 ;  Surgeon: Emil Hitchcock M.D.;  Location: SURGERY [facility];  Service:     CARPAL TUNNEL ENDOSCOPIC  11/17/2012    Performed by Heriberto Quiñones M.D. at SURGERY [facility] ORS    TRIGGER FINGER RELEASE  11/17/2012    Performed by Heriberto Quiñones M.D. at SURGERY [facility] ORS    HIP ARTHROSCOPY  2/23/2009    Performed by SHERLYN CALVO at SURGERY [facility] ORS    ACETABULAR OSTEOTOMY  2/23/2009    Performed by SHERLYN CALVO at SURGERY [facility] ORS    MASS EXCISION ORTHO  2/23/2009    Performed by SHERLYN CALVO at SURGERY [facility] ORS    HIP ARTHROSCOPY  2005    done at [facility]    HAJA BY LAPAROSCOPY  1997    HYSTERECTOMY, TOTAL ABDOMINAL  1979    oopherectomy lacie    BLADDER SUSPENSION      bladder sling    CARPAL TUNNEL RELEASE      HIP REPLACEMENT, TOTAL      HYSTERECTOMY LAPAROSCOPY      LAMINOTOMY      PRIMARY C SECTION  1970/1975       Family History   Problem Relation Age of  Onset    GI Disease Father         Crohn's    Heart Disease Father         First MI age 30    Hypertension Father     Stroke Father     Hypertension Other     Cancer Brother         ESOPHAGEAL CANCER       Social History     Socioeconomic History    Marital status:      Spouse name: Not on file    Number of children: Not on file    Years of education: Not on file    Highest education level: Not on file   Occupational History    Not on file   Tobacco Use    Smoking status: Never    Smokeless tobacco: Never   Vaping Use    Vaping Use: Never used   Substance and Sexual Activity    Alcohol use: Yes     Alcohol/week: 0.0 oz     Comment: Stopped drinking 45 days ago 7/12/17 Going to     Drug use: No    Sexual activity: Never   Other Topics Concern    Not on file   Social History Narrative    Not on file     Social Determinants of Health     Financial Resource Strain: High Risk    Difficulty of Paying Living Expenses: Hard   Food Insecurity: Food Insecurity Present    Worried About Running Out of Food in the Last Year: Sometimes true    Ran Out of Food in the Last Year: Sometimes true   Transportation Needs: No Transportation Needs    Lack of Transportation (Medical): No    Lack of Transportation (Non-Medical): No   Physical Activity: Not on file   Stress: Not on file   Social Connections: Not on file   Intimate Partner Violence: Not on file   Housing Stability: Low Risk     Unable to Pay for Housing in the Last Year: No    Number of Places Lived in the Last Year: 1    Unstable Housing in the Last Year: No       Allergies as of 11/28/2022 - Reviewed 11/28/2022   Allergen Reaction Noted    Sumatriptan Swelling 02/02/2015    Clarithromycin Itching, Nausea, and Swelling 08/13/2007    Ees [erythromycin] Itching, Nausea, and Swelling 08/13/2007    Levaquin Myalgia 11/10/2017    Pcn [penicillins] Itching, Nausea, and Swelling 08/13/2007    Shellfish allergy Itching, Nausea, and Swelling 01/20/2017    Trazodone Swelling  "02/16/2009        Vitals:  /68 (BP Location: Left arm, Patient Position: Sitting, BP Cuff Size: Adult)   Pulse (!) 114   Resp 16   Ht 1.549 m (5' 1\")   Wt 73.9 kg (163 lb)   SpO2 94%     Current medications as of today   Current Outpatient Medications   Medication Sig Dispense Refill    dilTIAZem CD (CARDIZEM CD) 240 MG CAPSULE SR 24 HR TAKE 1 CAPSULE BY MOUTH EVERY DAY 90 Capsule 0    fluticasone-umeclidin-vilant (TRELEGY ELLIPTA) 100-62.5-25 MCG/ACT AEROSOL POWDER, BREATH ACTIVATED inhalation Inhale 1 Inhalation every day. 1 Each 11    amitriptyline (ELAVIL) 25 MG Tab Take 50 mg by mouth every evening.      Ascorbic Acid (VITAMIN C) 1000 MG Tab Take 4,000 mg by mouth every day.      pantoprazole (PROTONIX) 40 MG Tablet Delayed Response TAKE 1 TABLET BY MOUTH EVERY DAY 30 Tablet 6    albuterol 108 (90 Base) MCG/ACT Aero Soln inhalation aerosol INHALE 2 PUFFS BY MOUTH EVERY 6 HOURS AS NEEDED FOR SHORTNESS OF BREATH (Patient taking differently: Inhale 2 Puffs every 6 hours as needed for Shortness of Breath.) 8.5 Each 5    rizatriptan (MAXALT) 5 MG tablet Take 5 mg by mouth one time as needed for Migraine.      telmisartan (MICARDIS) 40 MG Tab TAKE 1 TABLET BY MOUTH EVERY  Tablet 0    DULoxetine (CYMBALTA) 60 MG Cap DR Particles delayed-release capsule TAKE 1 CAPSULE BY MOUTH EVERY DAY 90 Capsule 0    atorvastatin (LIPITOR) 10 MG Tab TAKE 1 TABLET BY MOUTH EVERY  Tablet 1    zonisamide (ZONEGRAN) 100 MG Cap Take 100 mg by mouth every day.      SALINE NA Administer 1 Spray into affected nostril(S) every day.      aspirin 81 MG EC tablet Take 81 mg by mouth every day.       No current facility-administered medications for this visit.         Physical Exam:   Gen:           Alert and oriented, No apparent distress. Mood and affect appropriate, normal interaction with examiner.  Eyes:          PERRL, EOM intact, sclere white, conjunctive moist.  Ears:          Not examined.   Hearing:     Grossly " intact.  Nose:          Normal, no lesions or deformities.  Dentition:    Mask.  Oropharynx:   Mask.  Mallampati Classification: mask  Neck:        Supple, trachea midline, no masses.  Respiratory Effort: No intercostal retractions or use of accessory muscles.   Lung Auscultation:      Clear to auscultation bilaterally; no rales, rhonchi or wheezing.  CV:            Regular rate and rhythm. No murmurs, rubs or gallops.  Abd:           Not examined.   Lymphadenopathy: Not examined.  Gait and Station: Normal.  Digits and Nails: No clubbing, cyanosis, petechiae, or nodes.   Cranial Nerves: II-XII grossly intact.  Skin:        No rashes, lesions or ulcers noted.               Ext:           No cyanosis or edema.      Assessment:  1. Chronic obstructive pulmonary disease, unspecified COPD type (HCC)  Multiple Oximetry    Multiple Oximetry      2. Pulmonary nodules        3. Need for vaccination  Pneumococcal Conjugate Vaccine 20-Valent (19 yrs+)      4. BRIANA (obstructive sleep apnea)  DME Other          Immunizations:    Flu:declines  Pneumovax 23:not due  Prevnar 13:not due  PCV 20: given today  COVID-19: 1/20/22, booster recommended    Plan:  Patient has asthma/COPD overlap with emphysema noted on CT but her PFT notes normal spirometry.  She may continue Trelegy 100 mg once daily since she is finding benefit but symptoms were previously well controlled using Flovent and BERKLEY only.  Continue Berkley as needed.  Recommend follow-up CT scan chest without contrast due May 2022 for lung nodule follow-up.  Prevnar 20 given today in office  Patient has history of severe sleep apnea and did restart CPAP therapy but not using for the duration of night.  She has ongoing symptoms of fatigue and daytime sleepiness.  Recommend continuing CPAP at night for 6 to 9 hours for optimal benefit.  We will adjust pressures for comfort.  DME other; adjust to auto CPAP 10 to 14 cm  Follow-up in 3 months for compliance check for short visit,  sooner if needed.  Recommend updating PFT with CT scan results May 2022.  We will schedule pulmonary visit after that timeframe.    Please note that this dictation was created using voice recognition software. I have made every reasonable attempt to correct obvious errors, but it is possible there are errors of grammar and possibly content that I did not discover before finalizing the note.

## 2022-11-28 NOTE — PATIENT INSTRUCTIONS
Start using CPAP every night; use more than 4hrs every night or the duration of sleep    Call [phone], ask for medical assistant with pulmonary group to leave message - medical assistant Mariangel Antunez 20 vaccination given today

## 2022-11-30 ENCOUNTER — PATIENT OUTREACH (OUTPATIENT)
Dept: HEALTH INFORMATION MANAGEMENT | Facility: OTHER | Age: 72
End: 2022-11-30
Payer: MEDICARE

## 2022-11-30 DIAGNOSIS — I10 PRIMARY HYPERTENSION: ICD-10-CM

## 2022-11-30 DIAGNOSIS — J44.9 CHRONIC OBSTRUCTIVE PULMONARY DISEASE, UNSPECIFIED COPD TYPE (HCC): ICD-10-CM

## 2022-11-30 DIAGNOSIS — N18.31 STAGE 3A CHRONIC KIDNEY DISEASE: ICD-10-CM

## 2022-11-30 PROCEDURE — 99490 CHRNC CARE MGMT STAFF 1ST 20: CPT | Performed by: INTERNAL MEDICINE

## 2022-11-30 NOTE — PROGRESS NOTES
Assessment    Spoke to Augusta today for CCM follow up. She has been sick over the last week, No fever, cough, body aches and generalized not feeling well. Attempted to get her scheduled to be seen, however she said she did not feel like she needs to be seen a this time and will call to make an appointment later if needed. Encouraged her to call CCM nurse for assistance in scheduling so I can help her get scheduled in office to be seen sooner if needed. She does have an appointment on 12/21/2022 she said she will be at but will schedule sooner if needed.     Education    Stay hydrated, scheduling appointments,     Care Plan    Progressing    Progress:    Progressing    Next outreach: 12/21/2022 in office

## 2022-12-08 ENCOUNTER — PATIENT OUTREACH (OUTPATIENT)
Dept: HEALTH INFORMATION MANAGEMENT | Facility: OTHER | Age: 72
End: 2022-12-08

## 2022-12-08 ENCOUNTER — OFFICE VISIT (OUTPATIENT)
Dept: MEDICAL GROUP | Facility: PHYSICIAN GROUP | Age: 72
End: 2022-12-08
Payer: MEDICARE

## 2022-12-08 VITALS
DIASTOLIC BLOOD PRESSURE: 62 MMHG | RESPIRATION RATE: 18 BRPM | BODY MASS INDEX: 30.8 KG/M2 | TEMPERATURE: 98.4 F | OXYGEN SATURATION: 95 % | SYSTOLIC BLOOD PRESSURE: 106 MMHG | HEIGHT: 61 IN | HEART RATE: 94 BPM

## 2022-12-08 DIAGNOSIS — F39 MOOD DISORDER (HCC): Chronic | ICD-10-CM

## 2022-12-08 DIAGNOSIS — N18.31 STAGE 3A CHRONIC KIDNEY DISEASE: ICD-10-CM

## 2022-12-08 DIAGNOSIS — J44.9 CHRONIC OBSTRUCTIVE PULMONARY DISEASE, UNSPECIFIED COPD TYPE (HCC): Chronic | ICD-10-CM

## 2022-12-08 DIAGNOSIS — I48.0 PAROXYSMAL ATRIAL FIBRILLATION (HCC): ICD-10-CM

## 2022-12-08 DIAGNOSIS — I10 PRIMARY HYPERTENSION: ICD-10-CM

## 2022-12-08 DIAGNOSIS — J44.9 CHRONIC OBSTRUCTIVE PULMONARY DISEASE, UNSPECIFIED COPD TYPE (HCC): ICD-10-CM

## 2022-12-08 DIAGNOSIS — J02.9 SORE THROAT: ICD-10-CM

## 2022-12-08 LAB
EXTERNAL QUALITY CONTROL: NORMAL
FLUAV+FLUBV AG SPEC QL IA: POSITIVE
INT CON NEG: NEGATIVE
INT CON POS: POSITIVE
S PYO AG THROAT QL: NEGATIVE
SARS-COV+SARS-COV-2 AG RESP QL IA.RAPID: NEGATIVE

## 2022-12-08 PROCEDURE — 87804 INFLUENZA ASSAY W/OPTIC: CPT | Performed by: INTERNAL MEDICINE

## 2022-12-08 PROCEDURE — 87880 STREP A ASSAY W/OPTIC: CPT | Performed by: INTERNAL MEDICINE

## 2022-12-08 PROCEDURE — 99214 OFFICE O/P EST MOD 30 MIN: CPT | Mod: CS | Performed by: INTERNAL MEDICINE

## 2022-12-08 PROCEDURE — 99999 PR NO CHARGE: CPT | Performed by: INTERNAL MEDICINE

## 2022-12-08 PROCEDURE — 87426 SARSCOV CORONAVIRUS AG IA: CPT | Performed by: INTERNAL MEDICINE

## 2022-12-08 RX ORDER — BENZONATATE 100 MG/1
100 CAPSULE ORAL 3 TIMES DAILY PRN
Qty: 60 CAPSULE | Refills: 0 | Status: SHIPPED | OUTPATIENT
Start: 2022-12-08 | End: 2023-09-25 | Stop reason: SDUPTHER

## 2022-12-08 RX ORDER — OSELTAMIVIR PHOSPHATE 75 MG/1
75 CAPSULE ORAL 2 TIMES DAILY
Qty: 10 CAPSULE | Refills: 0 | Status: SHIPPED | OUTPATIENT
Start: 2022-12-08 | End: 2022-12-21

## 2022-12-09 NOTE — ASSESSMENT & PLAN NOTE
Chronic condition.  Patient followed by cardiology  CHADS2 score of 1  Patient presently taking aspirin and diltiazem.  She denies chest pain shortness of breath palpitation

## 2022-12-09 NOTE — PROGRESS NOTES
Assessment    Augusta is in office today 12/8/2022 to see PCP Dr. Parsons as she has been sick for the last couple weeks. She has a bad cough, chest pain with coughing, weakness, fatigue, no fevers. Encouraged her to minimize contact with her great grand baby who she watches as much as possible and she said she can't really do that as she has not watch her M-F so her granddaughter can work. She does try to rest as much as possible and says she is staying hydrated. She said she is taking Mucinex OTC to help and it does a little. Encouraged her to use hot tea with honey and warm water to help with her throat and cough and she said she does this with minimal help. Also encouraged OTC cough drops/ throat lozenges as needed. She is FLU positive in clinic. She does have an inhaler with spacer and she says she doesn't really use her spacer, and her inhaler is not really working because she can't take a deep breath. Encouraged her to use the spacer and instructed her how to use the spacer properly with her inhalers so that she can get the medication more accurately since she is unable to time or deep breath at this time due to her illness. She said she would try this. Augusta also said that her ice cream intake has decreased and she has lost weight. She said she weights 158 today. Will continue to progress towards her goals when she is feeling better.     Education    Diet, OTC meds, hydration, rest    Care Plan    Progressing    Progress:    Progressing    Next outreach:1/9/2022

## 2022-12-09 NOTE — PROGRESS NOTES
PRIMARY CARE CLINIC VISIT    Chief complaint:    Cough congestion  Follow-up COPD  Follow-up AColin flores    History of Present Illness     Sore throat  New condition    Pt reported symptoms started x 2 weeks.    Current symptoms: Sore throat , congestion and intermittent cough.         patient has been taking over-the-counter medication without improvementon    SOB /wheezing:  []Yes  [x]No    Chest pain/discomfort  []Yes  [x]No    Dizziness   []Yes  [x]No        Fever / chills   []Yes  [x]No          COPD (chronic obstructive pulmonary disease) ([facility])  This is a chronic condition.  The patient presently on Trelegy.  She uses albuterol as needed.  Patient denies shortness of breath or wheezing.    Mood disorder ([facility])  Chronic condition.  The patient presently taking Cymbalta.  Patient denies SI    CKD (chronic kidney disease) stage 3, GFR 30-59 ml/min ([facility])  Chronic condition.  Previous GFR in the 50s.  Stable with    Paroxysmal atrial fibrillation ([facility])  Chronic condition.  Patient followed by cardiology  CHADS2 score of 1  Patient presently taking aspirin and diltiazem.  She denies chest pain shortness of breath palpitation    Current Outpatient Medications on File Prior to Visit   Medication Sig Dispense Refill    dilTIAZem CD (CARDIZEM CD) 240 MG CAPSULE SR 24 HR TAKE 1 CAPSULE BY MOUTH EVERY DAY 90 Capsule 0    fluticasone-umeclidin-vilant (TRELEGY ELLIPTA) 100-62.5-25 MCG/ACT AEROSOL POWDER, BREATH ACTIVATED inhalation Inhale 1 Inhalation every day. 1 Each 11    amitriptyline (ELAVIL) 25 MG Tab Take 50 mg by mouth every evening.      pantoprazole (PROTONIX) 40 MG Tablet Delayed Response TAKE 1 TABLET BY MOUTH EVERY DAY 30 Tablet 6    albuterol 108 (90 Base) MCG/ACT Aero Soln inhalation aerosol INHALE 2 PUFFS BY MOUTH EVERY 6 HOURS AS NEEDED FOR SHORTNESS OF BREATH (Patient taking differently: Inhale 2 Puffs every 6 hours as needed for Shortness of Breath.) 8.5 Each 5    rizatriptan (MAXALT) 5 MG tablet Take 5 mg by  Patient: Robyn Minaya    Procedure Summary     Date Anesthesia Start Anesthesia Stop Room / Location    08/01/17 0908 [id number]  [facility] OR 03 / [facility] OR       Procedure Diagnosis Surgeon Provider    Repair of nasal vestibular stenosis and septoplasty; cartilage graft from left ear (N/A Nose) Nasal septal deviation; Nasal valve stenosis  (Nasal septal deviation [J34.2]; Nasal valve stenosis [J34.89]) MD Garret Nuno CRNA          Anesthesia Type: general  Last vitals  BP   131/76 (08/01/17 1421)    Temp        Pulse   91 (08/01/17 1432)   Resp   14 (08/01/17 1421)    SpO2   94 % (08/01/17 1432)      Post Anesthesia Care and Evaluation      Comments: Patient d/c from PACU prior to anes eval based on Sherman score.  Please see RN notes for details of d/c criteria.         "mouth one time as needed for Migraine.      telmisartan (MICARDIS) 40 MG Tab TAKE 1 TABLET BY MOUTH EVERY  Tablet 0    DULoxetine (CYMBALTA) 60 MG Cap DR Particles delayed-release capsule TAKE 1 CAPSULE BY MOUTH EVERY DAY 90 Capsule 0    atorvastatin (LIPITOR) 10 MG Tab TAKE 1 TABLET BY MOUTH EVERY  Tablet 1    Ascorbic Acid (VITAMIN C) 1000 MG Tab Take 4,000 mg by mouth every day.      zonisamide (ZONEGRAN) 100 MG Cap Take 100 mg by mouth every day.      SALINE NA Administer 1 Spray into affected nostril(S) every day.      aspirin 81 MG EC tablet Take 81 mg by mouth every day.       No current facility-administered medications on file prior to visit.        Allergies: Sumatriptan, Clarithromycin, Ees [erythromycin], Levaquin, Pcn [penicillins], Shellfish allergy, Trazodone, Clarithromycin, Erythromycin, and Penicillin g    ROS  As per HPI above. All other systems reviewed and negative.      Past Medical, Social, and Family history reviewed and updated in EPIC     Objective     /62 (BP Location: Left arm, Patient Position: Sitting, BP Cuff Size: Adult)   Pulse 94   Temp 36.9 °C (98.4 °F) (Temporal)   Resp 18   Ht 1.549 m (5' 1\")   SpO2 95%    Body mass index is 30.8 kg/m².    General: alert in no apparent distress.  Cardiovascular: regular rate and rhythm  Pulmonary: lungs : no wheezing   Gastrointestinal: BS present. No obvious mass noted  Nose with mild mucus inflammation no purulent drainage  Oropharynx with slight erythema no exudate        Assessment and Plan     1. Sore throat/congestion/cough  Rule out possible COVID infection versus influenza versus strep versus others    - POCT SARS-COV Antigen JOVANNY Manual Result  - POCT Influenza A/B  - POCT Rapid Strep A      Symptomatic and supportive care recommended:   Advised pt to stay well hydrated and plenty of rest   For sore throat: rec warm salt water gargles soft foods. Throat coats /chloraseptic sprays prn.  Saline nasal spray and " Flonase as a decongestant.   Infection control measures at home.  Hand washing, covering sneeze/cough, disinfect surfaces.   May try otc mucinex as needed [expectorant]  Tessalon 100 mg p.o. 3 times daily plan for cough.  Tensional side effect of medication discussed with the patient.    - Return to clinic if not better. Go to urgent care or ER is symptoms worsen /change such as temperature >101, worsening shortness of breath, wheezing, worsening cough, chest pain, dizziness, lightheadedness, lethargy, confusion, headaches, change in vision, motor weakness, abdominal pain, the inability to keep fluids/ foods down, etc... or any change in the pt's condition.          2. Chronic obstructive pulmonary disease, unspecified COPD type (HCC)  Chronic stable condition.  Advised the patient to continue using Trelegy.  Patient may use albuterol as needed for rescue treatment      3. Mood disorder (HCC)  Chronic condition.  Advised the patient to continue with Cymbalta    4. Stage 3a chronic kidney disease (HCC)  Chronic stable condition.  Advised the patient to avoid NSAIDs.  Continue to monitor      5. Paroxysmal atrial fibrillation (HCC)  Chronic stable condition.  Advised to continue with diltiazem and aspirin daily.  Continue follow-up with cardiology service as directed        Other orders  - benzonatate (TESSALON) 100 MG Cap; Take 1 Capsule by mouth 3 times a day as needed for Cough.  Dispense: 60 Capsule; Refill: 0                Please note that this dictation was created using voice recognition software. I have made every reasonable attempt to correct obvious errors, but I expect that there are errors of grammar and possibly content that I did not discover before finalizing the note.    Rudy Parsons MD  Internal Medicine  [facility]

## 2022-12-09 NOTE — ASSESSMENT & PLAN NOTE
New condition    Pt reported symptoms started x 2 weeks.    Current symptoms: Sore throat , congestion and intermittent cough.         patient has been taking over-the-counter medication without improvementon    SOB /wheezing:  []Yes  [x]No    Chest pain/discomfort  []Yes  [x]No    Dizziness   []Yes  [x]No        Fever / chills   []Yes  [x]No

## 2022-12-09 NOTE — ASSESSMENT & PLAN NOTE
This is a chronic condition.  The patient presently on Trelegy.  She uses albuterol as needed.  Patient denies shortness of breath or wheezing.

## 2022-12-15 ENCOUNTER — OFFICE VISIT (OUTPATIENT)
Dept: CARDIOLOGY | Facility: MEDICAL CENTER | Age: 72
End: 2022-12-15
Payer: MEDICARE

## 2022-12-15 VITALS
SYSTOLIC BLOOD PRESSURE: 112 MMHG | WEIGHT: 161.9 LBS | HEART RATE: 99 BPM | BODY MASS INDEX: 30.57 KG/M2 | HEIGHT: 61 IN | OXYGEN SATURATION: 97 % | DIASTOLIC BLOOD PRESSURE: 70 MMHG | RESPIRATION RATE: 16 BRPM

## 2022-12-15 DIAGNOSIS — N18.31 STAGE 3A CHRONIC KIDNEY DISEASE: ICD-10-CM

## 2022-12-15 DIAGNOSIS — I47.10 SVT (SUPRAVENTRICULAR TACHYCARDIA) (HCC): ICD-10-CM

## 2022-12-15 DIAGNOSIS — E78.5 DYSLIPIDEMIA: Chronic | ICD-10-CM

## 2022-12-15 DIAGNOSIS — G47.33 OSA (OBSTRUCTIVE SLEEP APNEA): Chronic | ICD-10-CM

## 2022-12-15 DIAGNOSIS — I10 ESSENTIAL HYPERTENSION: ICD-10-CM

## 2022-12-15 LAB — EKG IMPRESSION: NORMAL

## 2022-12-15 PROCEDURE — 93000 ELECTROCARDIOGRAM COMPLETE: CPT | Performed by: INTERNAL MEDICINE

## 2022-12-15 PROCEDURE — 99214 OFFICE O/P EST MOD 30 MIN: CPT | Performed by: INTERNAL MEDICINE

## 2022-12-15 ASSESSMENT — FIBROSIS 4 INDEX: FIB4 SCORE: 0.88

## 2022-12-15 NOTE — PROGRESS NOTES
Cardiology Follow-up Consultation Note    Date of note:    12/15/2022    Primary Care Provider: Rudy Parsons M.D.    Name:             Augusta Rodriguez   YOB: 1950  MRN:               [id number]    Chief Complaint   Patient presents with    Hypertension     F/V DX: Essential hypertension     Hyperlipidemia     F/V DX: Hyperlipidemia, unspecified hyperlipidemia type        HISTORY OF PRESENT ILLNESS  Ms. Augusta Rodriguez is a 72 y.o. female who returns to see us for follow-up of post hospital discharge    Pertinent history:  SVT: Remembers an isolated episode of palpitations 10 years ago when she was diagnosed with ?A. fib, was started on diltiazem and aspirin 81 mg daily.  BRIANA on CPAP  Dyslipidemia  Restless leg syndrome  Asthma    Last clinic visit: 10/2/2020    Interim History:  Was admitted to the hospital in October 2022 with tongue abscess.  Underwent surgery and went into SVT postop.  EKG was concerning for STEMI but had no associated symptoms.  Received her usual dose of diltiazem with conversion to sinus rhythm.    Since discharge, has been doing well.  No episodes of palpitations or irregular heartbeat.      Past Medical History:   Diagnosis Date    Apnea, sleep     Arrhythmia     afib    Arthritis     osteo    Asthma     Atrial fibrillation (HCC)     Back pain     Bronchitis     Chickenpox     Constipation     Cough     Daytime sleepiness     Dental disorder     upper dentures    Earache     Frequent urination     Gasping for breath     GERD (gastroesophageal reflux disease) 2/27/2010    [place] measles     Heartburn     Hypertension     Impaired fasting glucose 2/27/2010    Incontinence of urine     Indigestion     Influenza     Mumps     Nasal drainage     Nausea     Osteopenia 2/24/2010    Osteoporosis     Other specified disorder of intestines     constipation r/t meds    Restless leg syndrome     Scarlet fever     Shortness of breath     Sleep apnea     Snoring     Tremor, essential  2/24/2010    Urinary bladder disorder     Wears glasses     Wheezing     Whooping cough          Past Surgical History:   Procedure Laterality Date    INCISION AND DRAINAGE GENERAL N/A 10/18/2022    Procedure: INCISION AND DRAINAGE ABSCESS TONGUE;  Surgeon: Paz Suazo M.D.;  Location: SURGERY [facility];  Service: Ent    LUMBAR LAMINECTOMY DISKECTOMY Bilateral 2/4/2017    Procedure: LUMBAR LAMINECTOMY DISKECTOMY POSTERIOR L4-S1 ;  Surgeon: Emil Hitchcock M.D.;  Location: SURGERY [facility];  Service:     CARPAL TUNNEL ENDOSCOPIC  11/17/2012    Performed by Heriberto Quiñones M.D. at SURGERY [facility]    TRIGGER FINGER RELEASE  11/17/2012    Performed by Heriberto Quiñones M.D. at SURGERY [facility]    HIP ARTHROSCOPY  2/23/2009    Performed by SHERLYN CALVO at SURGERY [facility]    ACETABULAR OSTEOTOMY  2/23/2009    Performed by SHERLYN CALVO at SURGERY [facility]    MASS EXCISION ORTHO  2/23/2009    Performed by SHERLYN CALVO at SURGERY [facility]    HIP ARTHROSCOPY  2005    done at [facility]    HAJA BY LAPAROSCOPY  1997    HYSTERECTOMY, TOTAL ABDOMINAL  1979    oopherectomy lacie    BLADDER SUSPENSION      bladder sling    CARPAL TUNNEL RELEASE      HIP REPLACEMENT, TOTAL      HYSTERECTOMY LAPAROSCOPY      LAMINOTOMY      PRIMARY C SECTION  1970/1975         Current Outpatient Medications   Medication Sig Dispense Refill    benzonatate (TESSALON) 100 MG Cap Take 1 Capsule by mouth 3 times a day as needed for Cough. 60 Capsule 0    oseltamivir (TAMIFLU) 75 MG Cap Take 1 Capsule by mouth 2 times a day. 10 Capsule 0    dilTIAZem CD (CARDIZEM CD) 240 MG CAPSULE SR 24 HR TAKE 1 CAPSULE BY MOUTH EVERY DAY 90 Capsule 0    fluticasone-umeclidin-vilant (TRELEGY ELLIPTA) 100-62.5-25 MCG/ACT AEROSOL POWDER, BREATH ACTIVATED inhalation Inhale 1 Inhalation every day. 1 Each 11    amitriptyline (ELAVIL) 25 MG Tab Take 50 mg by mouth every evening.      Ascorbic Acid (VITAMIN  C) 1000 MG Tab Take 4,000 mg by mouth every day.      pantoprazole (PROTONIX) 40 MG Tablet Delayed Response TAKE 1 TABLET BY MOUTH EVERY DAY 30 Tablet 6    albuterol 108 (90 Base) MCG/ACT Aero Soln inhalation aerosol INHALE 2 PUFFS BY MOUTH EVERY 6 HOURS AS NEEDED FOR SHORTNESS OF BREATH (Patient taking differently: Inhale 2 Puffs every 6 hours as needed for Shortness of Breath.) 8.5 Each 5    rizatriptan (MAXALT) 5 MG tablet Take 5 mg by mouth one time as needed for Migraine.      telmisartan (MICARDIS) 40 MG Tab TAKE 1 TABLET BY MOUTH EVERY  Tablet 0    DULoxetine (CYMBALTA) 60 MG Cap DR Particles delayed-release capsule TAKE 1 CAPSULE BY MOUTH EVERY DAY 90 Capsule 0    atorvastatin (LIPITOR) 10 MG Tab TAKE 1 TABLET BY MOUTH EVERY  Tablet 1    zonisamide (ZONEGRAN) 100 MG Cap Take 100 mg by mouth every day.      SALINE NA Administer 1 Spray into affected nostril(S) every day.      aspirin 81 MG EC tablet Take 81 mg by mouth every day.       No current facility-administered medications for this visit.         Allergies   Allergen Reactions    Sumatriptan Swelling     Tongue swell    Clarithromycin Itching, Nausea and Swelling     Swelling/Itching/Nausea    Ees [Erythromycin] Itching, Nausea and Swelling     Swelling/Itching/Nausea    Levaquin Myalgia and Unspecified     Muscle soreness     Pcn [Penicillins] Itching, Nausea and Swelling     Swelling/Itching/Nausea     Shellfish Allergy Itching, Nausea and Swelling     Swelling/Itching/Nausea    Trazodone Swelling and Unspecified    Clarithromycin Unspecified    Erythromycin Unspecified    Penicillin G Unspecified         Family History   Problem Relation Age of Onset    GI Disease Father         Crohn's    Heart Disease Father         First MI age 30    Hypertension Father     Stroke Father     Hypertension Other     Cancer Brother         ESOPHAGEAL CANCER         Social History     Socioeconomic History    Marital status:      Spouse name: Not  "on file    Number of children: Not on file    Years of education: Not on file    Highest education level: Not on file   Occupational History    Not on file   Tobacco Use    Smoking status: Never    Smokeless tobacco: Never   Vaping Use    Vaping Use: Never used   Substance and Sexual Activity    Alcohol use: Yes     Alcohol/week: 0.0 oz     Comment: Stopped drinking 45 days ago 7/12/17 Going to     Drug use: No    Sexual activity: Never   Other Topics Concern    Not on file   Social History Narrative    Not on file     Social Determinants of Health     Financial Resource Strain: High Risk    Difficulty of Paying Living Expenses: Hard   Food Insecurity: Food Insecurity Present    Worried About Running Out of Food in the Last Year: Sometimes true    Ran Out of Food in the Last Year: Sometimes true   Transportation Needs: No Transportation Needs    Lack of Transportation (Medical): No    Lack of Transportation (Non-Medical): No   Physical Activity: Not on file   Stress: Not on file   Social Connections: Not on file   Intimate Partner Violence: Not on file   Housing Stability: Low Risk     Unable to Pay for Housing in the Last Year: No    Number of Places Lived in the Last Year: 1    Unstable Housing in the Last Year: No         Physical Exam:  Ambulatory Vitals  /70 (BP Location: Left arm, Patient Position: Sitting, BP Cuff Size: Adult)   Pulse 99   Resp 16   Ht 1.549 m (5' 1\")   Wt 73.4 kg (161 lb 14.4 oz)   SpO2 97%    Oxygen Therapy:  Pulse Oximetry: 97 %  BP Readings from Last 4 Encounters:   12/15/22 112/70   12/08/22 106/62   11/28/22 110/68   10/31/22 120/72       Weight/BMI: Body mass index is 30.59 kg/m².  Wt Readings from Last 4 Encounters:   12/15/22 73.4 kg (161 lb 14.4 oz)   11/28/22 73.9 kg (163 lb)   10/31/22 76.2 kg (168 lb)   10/18/22 75.9 kg (167 lb 5.3 oz)       GEN: Well developed, well nourished and in no acute distress.  HEART: no significant JVD, regular rate and rhythm, normal S1 " and S2, no murmurs, no third heart sounds, normal cardiac palpation  LUNG: clear to auscultation bilaterally, no wheezing, no crackles, normal respiratory effort on room air  ABDOMEN: soft, non-tender, non-distended, normal bowel sounds throughout  EXTREMITIES: no peripheral edema noted  VASCULAR: no significantly elevated jugular venous pressure, no carotid bruits noted, radial pulses 2+ and equal      Lab Data Review:  Lab Results   Component Value Date/Time    CHOLSTRLTOT 143 11/07/2022 09:15 AM    LDL 78 11/07/2022 09:15 AM    HDL 49 11/07/2022 09:15 AM    TRIGLYCERIDE 80 11/07/2022 09:15 AM       Lab Results   Component Value Date/Time    SODIUM 139 11/07/2022 09:15 AM    POTASSIUM 4.5 11/07/2022 09:15 AM    CHLORIDE 104 11/07/2022 09:15 AM    CO2 24 11/07/2022 09:15 AM    GLUCOSE 97 11/07/2022 09:15 AM    BUN 18 11/07/2022 09:15 AM    CREATININE 1.01 11/07/2022 09:15 AM    CREATININE 0.89 04/27/2009 12:00 AM     Lab Results   Component Value Date/Time    ALKPHOSPHAT 107 (H) 06/04/2022 10:29 AM    ASTSGOT 17 06/04/2022 10:29 AM    ALTSGPT 12 06/04/2022 10:29 AM    TBILIRUBIN 0.6 06/04/2022 10:29 AM      Lab Results   Component Value Date/Time    WBC 7.1 11/07/2022 09:15 AM     Lab Results   Component Value Date/Time    HBA1C 6.0 (H) 11/07/2022 09:15 AM    HBA1C 5.8 (H) 09/14/2022 09:25 AM       Cardiac Imaging and Procedures Review:    EKG dated 12/15/2022: My personal interpretation is sinus rhythm    EKG dated 10/19/2022: My personal interpretation is SVT with diffuse ST depression    Echo dated 10/22/2020:   No prior study is available for comparison.   Small left ventricular size, normal wall thickness and hyperdynamic   systolic function with near obliteration of the left ventricle.  Left ventricular ejection fraction is estimated to be greater than 75%.  Normal right ventricular size and function.  Grade 1 diastolic dysfunction.  No hemodynamically significant valvular heart disease.  Ascending aorta  diameter 3.8 cm.    Nuclear Perfusion Imaging (6/28/2018):   No evidence of significant jeopardized viable myocardium or prior myocardial    infarction.    Normal left ventricular size, ejection fraction, and wall motion.    ECG INTERPRETATION    Negative stress ECG for ischemia.      Radiology test Review:  CT chest without contrast: IMPRESSION:  1.  Bilateral pulmonary nodules which measure up to 7 mm in size.  2.  No evidence of mediastinal or hilar jackie enlargement.  3.  Mild emphysematous change of the lungs with scattered mild bullous change.       Assessment & Plan     1. SVT (supraventricular tachycardia) ([facility])  EKG      2. Dyslipidemia        3. BRIANA (obstructive sleep apnea)        4. Stage 3a chronic kidney disease (HCC)        5. Essential hypertension            Overall, doing well from a cardiovascular perspective.  Had SVT, likely AVNRT based on ECG while she was in the ICU recovering from surgery with conversion to sinus rhythm with diltiazem.  No evidence of atrial fibrillation on the monitor or EKG.  Has remote history of A. fib with 1 prior episode and no recurrence since.  We discussed that during most recent hospitalization there was no evidence of atrial fibrillation, hence, unsure if she needs to be on anticoagulation at this time.  We discussed cardioembolic stroke risk if there is evidence of A. fib.  She will let us know if she has episodes of palpitations or irregular heartbeat.    Continue diltiazem 240 mg daily.    Lipid panel reviewed with LDL within goal.  Continue Lipitor 10 mg daily.    All of patient's excellent questions were answered to the best of my knowledge and to her satisfaction.  It was a pleasure seeing Ms. Augusta Rodriguez in my clinic today. Return in about 1 year (around 12/15/2023). Patient is aware to call the cardiology clinic with any questions or concerns.      Isaiah Ruff MD  [facility]  [place] [facility], [facility]  [address] [place] [address]  [place] [address]  Phone: [phone]  Fax: [phone]    Please note that this dictation was created using voice recognition software. I have made every reasonable attempt to correct obvious errors, but it is possible there are errors of grammar and possibly content that I did not discover before finalizing the note.

## 2022-12-21 ENCOUNTER — OFFICE VISIT (OUTPATIENT)
Dept: MEDICAL GROUP | Facility: PHYSICIAN GROUP | Age: 72
End: 2022-12-21
Payer: MEDICARE

## 2022-12-21 ENCOUNTER — PATIENT OUTREACH (OUTPATIENT)
Dept: HEALTH INFORMATION MANAGEMENT | Facility: OTHER | Age: 72
End: 2022-12-21

## 2022-12-21 VITALS
BODY MASS INDEX: 30.21 KG/M2 | HEIGHT: 61 IN | SYSTOLIC BLOOD PRESSURE: 112 MMHG | RESPIRATION RATE: 16 BRPM | DIASTOLIC BLOOD PRESSURE: 64 MMHG | HEART RATE: 83 BPM | WEIGHT: 160 LBS | TEMPERATURE: 97.7 F | OXYGEN SATURATION: 99 %

## 2022-12-21 DIAGNOSIS — N18.31 STAGE 3A CHRONIC KIDNEY DISEASE: ICD-10-CM

## 2022-12-21 DIAGNOSIS — G47.33 OSA (OBSTRUCTIVE SLEEP APNEA): Chronic | ICD-10-CM

## 2022-12-21 DIAGNOSIS — E78.5 DYSLIPIDEMIA: Chronic | ICD-10-CM

## 2022-12-21 DIAGNOSIS — I10 PRIMARY HYPERTENSION: ICD-10-CM

## 2022-12-21 DIAGNOSIS — G43.909 MIGRAINE WITHOUT STATUS MIGRAINOSUS, NOT INTRACTABLE, UNSPECIFIED MIGRAINE TYPE: Chronic | ICD-10-CM

## 2022-12-21 DIAGNOSIS — E66.01 SEVERE OBESITY (HCC): Chronic | ICD-10-CM

## 2022-12-21 DIAGNOSIS — K21.9 GASTROESOPHAGEAL REFLUX DISEASE WITHOUT ESOPHAGITIS: Chronic | ICD-10-CM

## 2022-12-21 DIAGNOSIS — J44.9 CHRONIC OBSTRUCTIVE PULMONARY DISEASE, UNSPECIFIED COPD TYPE (HCC): Chronic | ICD-10-CM

## 2022-12-21 DIAGNOSIS — R73.03 PREDIABETES: Chronic | ICD-10-CM

## 2022-12-21 DIAGNOSIS — J44.9 CHRONIC OBSTRUCTIVE PULMONARY DISEASE, UNSPECIFIED COPD TYPE (HCC): ICD-10-CM

## 2022-12-21 DIAGNOSIS — J45.40 MODERATE PERSISTENT ASTHMA, UNSPECIFIED WHETHER COMPLICATED: Chronic | ICD-10-CM

## 2022-12-21 DIAGNOSIS — I10 HYPERTENSION, UNSPECIFIED TYPE: ICD-10-CM

## 2022-12-21 DIAGNOSIS — I48.0 PAROXYSMAL ATRIAL FIBRILLATION (HCC): ICD-10-CM

## 2022-12-21 DIAGNOSIS — F39 MOOD DISORDER (HCC): Chronic | ICD-10-CM

## 2022-12-21 PROCEDURE — 99215 OFFICE O/P EST HI 40 MIN: CPT | Performed by: INTERNAL MEDICINE

## 2022-12-21 RX ORDER — DILTIAZEM HYDROCHLORIDE 240 MG/1
240 CAPSULE, COATED, EXTENDED RELEASE ORAL DAILY
Qty: 90 CAPSULE | Refills: 3 | Status: SHIPPED | OUTPATIENT
Start: 2022-12-21 | End: 2024-02-26

## 2022-12-21 RX ORDER — PANTOPRAZOLE SODIUM 40 MG/1
40 TABLET, DELAYED RELEASE ORAL 2 TIMES DAILY
Qty: 180 TABLET | Refills: 3 | Status: SHIPPED | OUTPATIENT
Start: 2022-12-21 | End: 2023-08-30

## 2022-12-21 RX ORDER — DULOXETIN HYDROCHLORIDE 60 MG/1
60 CAPSULE, DELAYED RELEASE ORAL DAILY
Qty: 90 CAPSULE | Refills: 3 | Status: SHIPPED | OUTPATIENT
Start: 2022-12-21 | End: 2023-04-20 | Stop reason: SDUPTHER

## 2022-12-21 RX ORDER — ATORVASTATIN CALCIUM 10 MG/1
10 TABLET, FILM COATED ORAL DAILY
Qty: 100 TABLET | Refills: 3 | Status: SHIPPED | OUTPATIENT
Start: 2022-12-21 | End: 2024-03-05

## 2022-12-21 RX ORDER — TELMISARTAN 40 MG/1
40 TABLET ORAL DAILY
Qty: 100 TABLET | Refills: 3 | Status: SHIPPED | OUTPATIENT
Start: 2022-12-21 | End: 2024-03-05

## 2022-12-21 ASSESSMENT — FIBROSIS 4 INDEX: FIB4 SCORE: 0.88

## 2022-12-21 NOTE — ASSESSMENT & PLAN NOTE
This is a chronic condition.  The patient followed by cardiology service.  CHADS2 score of 1.  Patient currently taking aspirin 81 mg daily and diltiazem.  Patient denies chest pain shortness of breath or palpitation.

## 2022-12-21 NOTE — PROGRESS NOTES
PRIMARY CARE CLINIC VISIT    Chief complaint:    Follow-up COPD CKD 3  Follow-up atrial fibrillation  Follow-up hypertension hyperlipidemia  Lab test result  Prescription refills      History of Present Illness     COPD (chronic obstructive pulmonary disease) ([facility])  This is a chronic condition.  The patient presently using Trelegy.  Patient denies shortness of breath or wheezing.  Patient has prescription for albuterol to use as needed for rescue treatment.  Patient denies fever or chills or significant cough.    Mood disorder ([facility])  Chronic condition.  The patient is taking duloxetine.  She is requesting prescription refill.  Stable patient denies SI.    Severe obesity ([facility])  Body mass index is 30.23 kg/m².   Brief discussion with the patient regarding diet, exercise, and lifestyle modification to help achieve and maintain healthy weight              CKD (chronic kidney disease) stage 3, GFR 30-59 ml/min ([facility])  Chronic condition.  Previous GFR in the 50s.  Stable advised the patient to avoid NSAIDs.  Recommend to continue to monitor.  Lab tests ordered.    Paroxysmal atrial fibrillation ([facility])  This is a chronic condition.  The patient followed by cardiology service.  CHADS2 score of 1.  Patient currently taking aspirin 81 mg daily and diltiazem.  Patient denies chest pain shortness of breath or palpitation.    Dyslipidemia  Chronic condition.  The patient is being treated with atorvastatin.  No significant side effects reported.  Recent lab test result discussed with the patient.    GERD (gastroesophageal reflux disease)  Chronic condition.    Patient was seen previously by GI specialist.  Patient stated that GI recommend for the patient to take Protonix 40 mg twice daily.  She is requesting prescription refills.    HTN (hypertension)  Chronic condition.  The patient is taking diltiazem.  She also take Micardis.  Blood pressure has been well controlled at home.  No significant side effects reported.    Moderate  persistent asthma  This is a chronic condition.  The patient uses albuterol as needed.  She has prescription also for Trelegy.  Patient denies fever chills shortness of breath or wheezing.    BRIANA (obstructive sleep apnea)  Chronic condition.  The patient is using CPAP machine.  Stable    Prediabetes  This is a chronic condition.  The patient currently on diet therapy.  Lab test result discussed with the patient.  Recommend patient to continue with diet and exercise.    Migraine  This is a chronic condition.  Patient followed by private neurologist.  Patient currently taking amitriptyline and Maxalt.  As needed.  Currently the patient denies headache change in vision motor weakness or paresthesia.    Current Outpatient Medications on File Prior to Visit   Medication Sig Dispense Refill    fluticasone-umeclidin-vilant (TRELEGY ELLIPTA) 100-62.5-25 MCG/ACT AEROSOL POWDER, BREATH ACTIVATED inhalation Inhale 1 Inhalation every day. 1 Each 11    amitriptyline (ELAVIL) 25 MG Tab Take 50 mg by mouth every evening.      albuterol 108 (90 Base) MCG/ACT Aero Soln inhalation aerosol INHALE 2 PUFFS BY MOUTH EVERY 6 HOURS AS NEEDED FOR SHORTNESS OF BREATH 8.5 Each 5    rizatriptan (MAXALT) 5 MG tablet Take 5 mg by mouth one time as needed for Migraine.      zonisamide (ZONEGRAN) 100 MG Cap Take 100 mg by mouth every day.      benzonatate (TESSALON) 100 MG Cap Take 1 Capsule by mouth 3 times a day as needed for Cough. 60 Capsule 0    Ascorbic Acid (VITAMIN C) 1000 MG Tab Take 4,000 mg by mouth every day.      SALINE NA Administer 1 Spray into affected nostril(S) every day.      aspirin 81 MG EC tablet Take 81 mg by mouth every day.       No current facility-administered medications on file prior to visit.        Allergies: Sumatriptan, Clarithromycin, Ees [erythromycin], Levaquin, Pcn [penicillins], Shellfish allergy, Trazodone, Clarithromycin, Erythromycin, and Penicillin g    ROS  As per HPI above. All other systems reviewed and  "negative.      Past Medical, Social, and Family history reviewed and updated in EPIC     Objective     /64 (BP Location: Left arm, Patient Position: Sitting, BP Cuff Size: Adult)   Pulse 83   Temp 36.5 °C (97.7 °F) (Temporal)   Resp 16   Ht 1.549 m (5' 1\")   Wt 72.6 kg (160 lb)   SpO2 99%    Body mass index is 30.23 kg/m².    General: alert in no apparent distress.  Cardiovascular: regular rate and rhythm  Pulmonary: lungs : no wheezing   Gastrointestinal: BS present. No obvious mass noted        Lab Results   Component Value Date/Time    HBA1C 6.0 (H) 11/07/2022 09:15 AM    HBA1C 5.8 (H) 09/14/2022 09:25 AM    HBA1C 5.8 (H) 02/28/2022 07:37 AM       Lab Results   Component Value Date/Time    WBC 7.1 11/07/2022 09:15 AM    HEMOGLOBIN 12.9 11/07/2022 09:15 AM    HEMATOCRIT 41.2 11/07/2022 09:15 AM    MCV 80.6 (L) 11/07/2022 09:15 AM    PLATELETCT 396 11/07/2022 09:15 AM         Lab Results   Component Value Date/Time    SODIUM 139 11/07/2022 09:15 AM    POTASSIUM 4.5 11/07/2022 09:15 AM    GLUCOSE 97 11/07/2022 09:15 AM    BUN 18 11/07/2022 09:15 AM    CREATININE 1.01 11/07/2022 09:15 AM    CREATININE 0.89 04/27/2009 12:00 AM       Lab Results   Component Value Date/Time    CHOLSTRLTOT 143 11/07/2022 09:15 AM    LDL 78 11/07/2022 09:15 AM    HDL 49 11/07/2022 09:15 AM    TRIGLYCERIDE 80 11/07/2022 09:15 AM             Assessment and Plan     1. Hypertension, unspecified type  - telmisartan (MICARDIS) 40 MG Tab; Take 1 Tablet by mouth every day.  Dispense: 100 Tablet; Refill: 3  This is a chronic and stable condition.  BP today one 4/64.  Recommend for the patient to continue with diltiazem, and Micardis.  Sodium restriction advised.    2. Paroxysmal atrial fibrillation (HCC)  Chronic stable condition.  Patient advised to continue to take diltiazem.  Continue follow-up with cardiology service.  Continue take aspirin 81 mg daily.  As above CHADS2 score 1       3. Stage 3a chronic kidney disease " (HCC)  Recent lab test result discussed with the patient.  Advised the patient to avoid NSAIDs.  Continue to monitor      4. Dyslipidemia  - ALANINE AMINO-TRANS; Future  - Lipid Profile; Future  Chronic stable condition.  Continue atorvastatin 10 mg daily.  Lab test result discussed with the patient.  Recommend low-fat low-cholesterol diet      5. Gastroesophageal reflux disease without esophagitis  Chronic stable condition.  Continue with Protonix 40 mg twice daily.      6. Prediabetes  - HEMOGLOBIN A1C; Future  - Basic Metabolic Panel; Future  Chronic condition.  Recommend diet and exercise.  Patient will try to lose weight    7. Chronic obstructive pulmonary disease, unspecified COPD type (HCC)  Chronic stable condition.  Patient will continue with Trelegy daily and uses albuterol as needed.  Presently the patient asymptomatic.      8. Mood disorder (HCC)  Chronic condition.  Continue with duloxetine and amitriptyline.      9. Severe obesity (HCC)  Chronic condition.  Recommend diet and exercise.  Encouraged weight loss.      10. Moderate persistent asthma, unspecified whether complicated  Chronic condition.  Continue with Trelegy and albuterol as needed      11. BRIANA (obstructive sleep apnea)  Chronic condition.  Recommend to continue with CPAP      12. Migraine without status migrainosus, not intractable, unspecified migraine type  Chronic condition.  Currently asymptomatic.  Continue with Maxalt and Zonegran.  Continue follow-up with neurology        Rx refills done today    Other orders  - atorvastatin (LIPITOR) 10 MG Tab; Take 1 Tablet by mouth every day.  Dispense: 100 Tablet; Refill: 3  - dilTIAZem CD (CARDIZEM CD) 240 MG CAPSULE SR 24 HR; Take 1 Capsule by mouth every day.  Dispense: 90 Capsule; Refill: 3  - DULoxetine (CYMBALTA) 60 MG Cap DR Particles delayed-release capsule; Take 1 Capsule by mouth every day.  Dispense: 90 Capsule; Refill: 3  - pantoprazole (PROTONIX) 40 MG Tablet Delayed Response; Take  1 Tablet by mouth 2 times a day.  Dispense: 180 Tablet; Refill: 3        Recommend follow-up in 4 months  Lab test prior      Total time:  42  min -  That includes time for chart review before the visit, the actual patient visit, and time spent on documentation in EMR after the visit.  Chart review/prep, review of other providers' records, imaging/lab review, face-to-face time for history/examination, ordering, prescribing,  review of results/meds/ treatment plan with patient, and care coordination.       Please note that this dictation was created using voice recognition software. I have made every reasonable attempt to correct obvious errors, but I expect that there are errors of grammar and possibly content that I did not discover before finalizing the note.    Rudy Parsons MD  Internal Medicine  [place] [facility]

## 2022-12-21 NOTE — ASSESSMENT & PLAN NOTE
This is a chronic condition.  The patient currently on diet therapy.  Lab test result discussed with the patient.  Recommend patient to continue with diet and exercise.

## 2022-12-21 NOTE — ASSESSMENT & PLAN NOTE
Chronic condition.  The patient is taking diltiazem.  She also take Micardis.  Blood pressure has been well controlled at home.  No significant side effects reported.

## 2022-12-21 NOTE — ASSESSMENT & PLAN NOTE
Chronic condition.  The patient is being treated with atorvastatin.  No significant side effects reported.  Recent lab test result discussed with the patient.

## 2022-12-21 NOTE — ASSESSMENT & PLAN NOTE
Chronic condition.  The patient is taking duloxetine.  She is requesting prescription refill.  Stable patient denies SI.

## 2022-12-21 NOTE — PROGRESS NOTES
Augusta is in office today for follow up. She is feeling much better and has pretty much recovered from the flu. Her spirits are up and She is working on her ice cream intake and has decreased it. We discussed hand hygiene, safety, and staying healthy. Will follow up with her as planned.

## 2022-12-21 NOTE — ASSESSMENT & PLAN NOTE
Chronic condition.  Previous GFR in the 50s.  Stable advised the patient to avoid NSAIDs.  Recommend to continue to monitor.  Lab tests ordered.

## 2022-12-21 NOTE — ASSESSMENT & PLAN NOTE
Body mass index is 30.23 kg/m².   Brief discussion with the patient regarding diet, exercise, and lifestyle modification to help achieve and maintain healthy weight

## 2022-12-21 NOTE — ASSESSMENT & PLAN NOTE
This is a chronic condition.  The patient uses albuterol as needed.  She has prescription also for Trelegy.  Patient denies fever chills shortness of breath or wheezing.

## 2022-12-21 NOTE — ASSESSMENT & PLAN NOTE
This is a chronic condition.  The patient presently using Trelegy.  Patient denies shortness of breath or wheezing.  Patient has prescription for albuterol to use as needed for rescue treatment.  Patient denies fever or chills or significant cough.

## 2022-12-21 NOTE — ASSESSMENT & PLAN NOTE
Chronic condition.    Patient was seen previously by GI specialist.  Patient stated that GI recommend for the patient to take Protonix 40 mg twice daily.  She is requesting prescription refills.

## 2023-01-09 ENCOUNTER — PATIENT OUTREACH (OUTPATIENT)
Dept: HEALTH INFORMATION MANAGEMENT | Facility: OTHER | Age: 73
End: 2023-01-09
Payer: MEDICARE

## 2023-01-09 DIAGNOSIS — J44.9 CHRONIC OBSTRUCTIVE PULMONARY DISEASE, UNSPECIFIED COPD TYPE (HCC): ICD-10-CM

## 2023-01-09 DIAGNOSIS — N18.31 STAGE 3A CHRONIC KIDNEY DISEASE: ICD-10-CM

## 2023-01-09 DIAGNOSIS — I10 PRIMARY HYPERTENSION: ICD-10-CM

## 2023-01-09 PROCEDURE — 99490 CHRNC CARE MGMT STAFF 1ST 20: CPT | Performed by: INTERNAL MEDICINE

## 2023-01-10 NOTE — PROGRESS NOTES
Assessment    Spoke with Augusta for CCM monthly follow up. She said she is doing ok right now. Not much has changed. 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 She said currently she did not need resources for anything.     Education    Diet, exercise    Care Plan    Progressing    Progress:    Progressing    Next outreach: 2/9/2023    Quarterly outreach: Augusta is doing well on CCM services. Will continue to follow at this time until she is ready to Discharge, possibly next month.

## 2023-01-19 ENCOUNTER — TELEPHONE (OUTPATIENT)
Dept: HEALTH INFORMATION MANAGEMENT | Facility: OTHER | Age: 73
End: 2023-01-19
Payer: MEDICARE

## 2023-01-23 ENCOUNTER — HOSPITAL ENCOUNTER (OUTPATIENT)
Dept: RADIOLOGY | Facility: MEDICAL CENTER | Age: 73
End: 2023-01-23
Attending: INTERNAL MEDICINE
Payer: MEDICARE

## 2023-01-23 DIAGNOSIS — Z12.31 VISIT FOR SCREENING MAMMOGRAM: ICD-10-CM

## 2023-01-23 PROCEDURE — 77063 BREAST TOMOSYNTHESIS BI: CPT

## 2023-02-07 ENCOUNTER — PATIENT OUTREACH (OUTPATIENT)
Dept: HEALTH INFORMATION MANAGEMENT | Facility: OTHER | Age: 73
End: 2023-02-07
Payer: MEDICARE

## 2023-02-07 DIAGNOSIS — N18.31 STAGE 3A CHRONIC KIDNEY DISEASE: ICD-10-CM

## 2023-02-07 DIAGNOSIS — J44.9 CHRONIC OBSTRUCTIVE PULMONARY DISEASE, UNSPECIFIED COPD TYPE (HCC): ICD-10-CM

## 2023-02-07 DIAGNOSIS — I10 PRIMARY HYPERTENSION: ICD-10-CM

## 2023-02-07 PROCEDURE — 99999 PR NO CHARGE: CPT | Performed by: INTERNAL MEDICINE

## 2023-02-08 NOTE — PROGRESS NOTES
Assessment    Spoke to Augusta today for CCM follow up. She said she is doing ok. Her breathing is good and her diet is remaining the same. 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 She said it may be better to call her early between 830-9 for follow up next month. She said she is doing well and there are no needs at this time. Will follow up with her until her appointment in April and then discuss discharge off CCM program.      Education    Appointments, resources. Care gaps    Care Plan    Progressing    Progress:    Progressing    Next outreach:3/7/ 2023

## 2023-02-28 ENCOUNTER — OFFICE VISIT (OUTPATIENT)
Dept: SLEEP MEDICINE | Facility: MEDICAL CENTER | Age: 73
End: 2023-02-28
Attending: NURSE PRACTITIONER
Payer: MEDICARE

## 2023-02-28 VITALS
SYSTOLIC BLOOD PRESSURE: 108 MMHG | RESPIRATION RATE: 16 BRPM | WEIGHT: 160 LBS | HEART RATE: 90 BPM | DIASTOLIC BLOOD PRESSURE: 78 MMHG | HEIGHT: 61 IN | BODY MASS INDEX: 30.21 KG/M2 | OXYGEN SATURATION: 97 %

## 2023-02-28 DIAGNOSIS — D14.0: ICD-10-CM

## 2023-02-28 DIAGNOSIS — J32.9 RECURRENT SINUS INFECTIONS: ICD-10-CM

## 2023-02-28 DIAGNOSIS — I10 PRIMARY HYPERTENSION: Chronic | ICD-10-CM

## 2023-02-28 DIAGNOSIS — J34.89 OBSTRUCTION OF PARANASAL SINUS: ICD-10-CM

## 2023-02-28 DIAGNOSIS — G47.33 OSA (OBSTRUCTIVE SLEEP APNEA): Chronic | ICD-10-CM

## 2023-02-28 DIAGNOSIS — J44.89 ASTHMA-COPD OVERLAP SYNDROME (HCC): ICD-10-CM

## 2023-02-28 DIAGNOSIS — Z78.9 NONSMOKER: ICD-10-CM

## 2023-02-28 DIAGNOSIS — J34.89 OTHER SPECIFIED DISORDERS OF NOSE AND NASAL SINUSES: ICD-10-CM

## 2023-02-28 DIAGNOSIS — J45.40 MODERATE PERSISTENT ASTHMA, UNSPECIFIED WHETHER COMPLICATED: ICD-10-CM

## 2023-02-28 PROCEDURE — 99214 OFFICE O/P EST MOD 30 MIN: CPT | Performed by: NURSE PRACTITIONER

## 2023-02-28 PROCEDURE — 99212 OFFICE O/P EST SF 10 MIN: CPT | Performed by: NURSE PRACTITIONER

## 2023-02-28 ASSESSMENT — FIBROSIS 4 INDEX: FIB4 SCORE: 0.88

## 2023-02-28 NOTE — PATIENT INSTRUCTIONS
Call [phone] to schedule CT of chest due 5/2023 and also CT of sinuses now.  Referral to Dr. Suazo to look at sinuses; call to schedule appointment:  [facility]  [address] [place] [address]  Phone: [phone]  Fax: [phone]    Restart CPAP to improve fatigue  Call DME : [facility] Phone. [phone] Fax. [phone]  To order supplies

## 2023-02-28 NOTE — PROGRESS NOTES
Chief Complaint   Patient presents with    Follow-Up     Asthma COPD overlap/ Apnea // last seen 11/28/2022        HPI:  Augusta Rodriguez is a 72 y.o. year old female here today for follow-up on moderate asthma and BRIANA.  Last OV 11/28/22     MMRC Grade: 0-1, with chores around the house  Exacerbations this year: 0    She remains on Trelegy 100mcg prescribed by PCP but using only 1x per week. Previously treated with Flovent and BERKLEY prn. Prior PFT's noted no obstruction. She notes her breathing to be stable without exacerbations since last OV. She has ongoing recurrent sinus congestion and possible infections she notes being treated intermittently by PCP. Right side is obstructed most of the time and uses mucinex and sudafed prn to relieve this. She has not had formal ENT evaluation for sinus issues but neck abscess only treated Fall 2022. She notes periodic cough and MCADAMS when vacuuming or doing household chores only.     Pending repeat CT chest for lung nodule monitoring 5/2023.    She is currently using auto CPAP 10 to 12 cm; ResMed device obtained 2020. She is currently not using CPAP. She used device 5 days in the last month <3hrs but overall AHI 0.1. She feels the CPAP makes her nose more congested causing sinus infections. She notes cleaning her device and supplies properly. She is exhausted all the time and can fall asleep easily.     PULM & SLEEP HX:  Never smoker.  PFT 7/20/20 notes FVC 2.91L or 116%, FEV1 2.37L or 121%, FEV1/FVC ratio 82, RV 97%, % and DLCO 97% without significant bronchodilator response. Reviewed with patient.  PFT 4/20/2022 indicated normal airflows with FVC 2.97 L 120%, FEV1 2.31 L 119%, FEV1/FVC ratio 78, TLC 5.3 L 118%, no hyperinflation with a DLCO of 102% predicted.  No significant bronchodilator response.  Overall normal spirometry.    CXR 3/15/21 notes hypoinflation w/o evidence of acute cardiopulmoary disease.  CT chest 8/26/20 noted multiple nodules with larges measuring  7mm with 6mos f/u CT recommended due 2/2021. Reviewed with patient.  CT chest was updated May 2022 indicating stable bilateral multiple nodules with largest measuring 6 mm in size.  Emphysematous changes noted.  Moderate hiatal hernia.    GERD treated with pantoprazole; hx of hiatal hernia.     PSG on 1/16/2020 showed an AHI of 30/carol saturation 82%. On CPAP at 16 cm H2O her AHI normalized to 1.9 with normal saturations. She was noted to have an elevated PLMS arousal index of 22.2.     ROS: As per HPI and otherwise negative if not stated.    Past Medical History:   Diagnosis Date    Apnea, sleep     Arrhythmia     afib    Arthritis     osteo    Asthma     Atrial fibrillation (HCC)     Back pain     Bronchitis     Chickenpox     Constipation     Cough     Daytime sleepiness     Dental disorder     upper dentures    Earache     Frequent urination     Gasping for breath     GERD (gastroesophageal reflux disease) 2/27/2010    Czech measles     Heartburn     Hypertension     Impaired fasting glucose 2/27/2010    Incontinence of urine     Indigestion     Influenza     Mumps     Nasal drainage     Nausea     Osteopenia 2/24/2010    Osteoporosis     Other specified disorder of intestines     constipation r/t meds    Restless leg syndrome     Scarlet fever     Shortness of breath     Sleep apnea     Snoring     Tremor, essential 2/24/2010    Urinary bladder disorder     Wears glasses     Wheezing     Whooping cough        Past Surgical History:   Procedure Laterality Date    INCISION AND DRAINAGE GENERAL N/A 10/18/2022    Procedure: INCISION AND DRAINAGE ABSCESS TONGUE;  Surgeon: Paz Suazo M.D.;  [place];  Service: Ent    LUMBAR LAMINECTOMY DISKECTOMY Bilateral 2/4/2017    Procedure: LUMBAR LAMINECTOMY DISKECTOMY POSTERIOR L4-S1 ;  Surgeon: Emil Hitchcock M.D.;  Location: [facility];  Service:     CARPAL TUNNEL ENDOSCOPIC  11/17/2012    Performed by Heriberto Quiñones M.D. at  SURGERY [facility] ORS    TRIGGER FINGER RELEASE  11/17/2012    Performed by Heriberto Quiñones M.D. at SURGERY [facility] ORS    HIP ARTHROSCOPY  2/23/2009    Performed by SHERLYN CALVO at SURGERY [facility] ORS    ACETABULAR OSTEOTOMY  2/23/2009    Performed by SHERLYN CALVO at SURGERY [facility] ORS    MASS EXCISION ORTHO  2/23/2009    Performed by SHERLYN CALVO at SURGERY [facility] ORS    HIP ARTHROSCOPY  2005    done at [facility]    HAJA BY LAPAROSCOPY  1997    HYSTERECTOMY, TOTAL ABDOMINAL  1979    oopherectomy lacie    BLADDER SUSPENSION      bladder sling    CARPAL TUNNEL RELEASE      HIP REPLACEMENT, TOTAL      HYSTERECTOMY LAPAROSCOPY      LAMINOTOMY      PRIMARY C SECTION  1970/1975       Family History   Problem Relation Age of Onset    GI Disease Father         Crohn's    Heart Disease Father         First MI age 30    Hypertension Father     Stroke Father     Hypertension Other     Cancer Brother         ESOPHAGEAL CANCER       Social History     Socioeconomic History    Marital status:      Spouse name: Not on file    Number of children: Not on file    Years of education: Not on file    Highest education level: Not on file   Occupational History    Not on file   Tobacco Use    Smoking status: Never    Smokeless tobacco: Never   Vaping Use    Vaping Use: Never used   Substance and Sexual Activity    Alcohol use: Yes     Alcohol/week: 0.0 oz     Comment: Stopped drinking 45 days ago 7/12/17 Going to     Drug use: No    Sexual activity: Never   Other Topics Concern    Not on file   Social History Narrative    Not on file     Social Determinants of Health     Financial Resource Strain: High Risk    Difficulty of Paying Living Expenses: Hard   Food Insecurity: Food Insecurity Present    Worried About Running Out of Food in the Last Year: Sometimes true    Ran Out of Food in the Last Year: Sometimes true   Transportation Needs: No Transportation Needs    Lack of Transportation  "(Medical): No    Lack of Transportation (Non-Medical): No   Physical Activity: Not on file   Stress: Not on file   Social Connections: Not on file   Intimate Partner Violence: Not on file   Housing Stability: Low Risk     Unable to Pay for Housing in the Last Year: No    Number of Places Lived in the Last Year: 1    Unstable Housing in the Last Year: No       Allergies as of 02/28/2023 - Reviewed 02/28/2023   Allergen Reaction Noted    Sumatriptan Swelling 02/02/2015    Clarithromycin Itching, Nausea, and Swelling 08/13/2007    Ees [erythromycin] Itching, Nausea, and Swelling 08/13/2007    Levaquin Myalgia and Unspecified 11/10/2017    Pcn [penicillins] Itching, Nausea, and Swelling 08/13/2007    Shellfish allergy Itching, Nausea, and Swelling 01/20/2017    Trazodone Swelling and Unspecified 02/16/2009    Clarithromycin Unspecified 11/15/2012    Erythromycin Unspecified 11/02/2022    Penicillin g Unspecified 11/02/2022        Vitals:  /78 (BP Location: Left arm, Patient Position: Sitting, BP Cuff Size: Adult)   Pulse 90   Resp 16   Ht 1.549 m (5' 1\")   Wt 72.6 kg (160 lb)   SpO2 97%     Current medications as of today   Current Outpatient Medications   Medication Sig Dispense Refill    atorvastatin (LIPITOR) 10 MG Tab Take 1 Tablet by mouth every day. 100 Tablet 3    dilTIAZem CD (CARDIZEM CD) 240 MG CAPSULE SR 24 HR Take 1 Capsule by mouth every day. 90 Capsule 3    DULoxetine (CYMBALTA) 60 MG Cap DR Particles delayed-release capsule Take 1 Capsule by mouth every day. 90 Capsule 3    pantoprazole (PROTONIX) 40 MG Tablet Delayed Response Take 1 Tablet by mouth 2 times a day. 180 Tablet 3    telmisartan (MICARDIS) 40 MG Tab Take 1 Tablet by mouth every day. 100 Tablet 3    benzonatate (TESSALON) 100 MG Cap Take 1 Capsule by mouth 3 times a day as needed for Cough. 60 Capsule 0    fluticasone-umeclidin-vilant (TRELEGY ELLIPTA) 100-62.5-25 MCG/ACT AEROSOL POWDER, BREATH ACTIVATED inhalation Inhale 1 " Inhalation every day. 1 Each 11    amitriptyline (ELAVIL) 25 MG Tab Take 50 mg by mouth every evening.      Ascorbic Acid (VITAMIN C) 1000 MG Tab Take 4,000 mg by mouth every day.      albuterol 108 (90 Base) MCG/ACT Aero Soln inhalation aerosol INHALE 2 PUFFS BY MOUTH EVERY 6 HOURS AS NEEDED FOR SHORTNESS OF BREATH 8.5 Each 5    rizatriptan (MAXALT) 5 MG tablet Take 5 mg by mouth one time as needed for Migraine.      zonisamide (ZONEGRAN) 100 MG Cap Take 100 mg by mouth every day.      SALINE NA Administer 1 Spray into affected nostril(S) every day.      aspirin 81 MG EC tablet Take 81 mg by mouth every day.       No current facility-administered medications for this visit.         Physical Exam:   Gen:           Alert and oriented, No apparent distress. Mood and affect appropriate, normal interaction with examiner.  Eyes:          PERRL, EOM intact, sclere white, conjunctive moist.  Ears:          Not examined.   Hearing:     Grossly intact.  Nose:          Normal, no lesions or deformities.  Dentition:    Mask.  Oropharynx:   Mask.  Mallampati Classification: mask  Neck:        Supple, trachea midline, no masses.  Respiratory Effort: No intercostal retractions or use of accessory muscles.   Lung Auscultation:      Clear to auscultation bilaterally; no rales, rhonchi or wheezing.  CV:            Regular rate and rhythm. No murmurs, rubs or gallops.  Abd:           Not examined. .  Lymphadenopathy: Not examined.  Gait and Station: Normal.  Digits and Nails: No clubbing, cyanosis, petechiae, or nodes.   Cranial Nerves: II-XII grossly intact.  Skin:        No rashes, lesions or ulcers noted.               Ext:           No cyanosis or edema.      Assessment:  1. Asthma-COPD overlap syndrome (HCC)        2. BRIANA (obstructive sleep apnea)        3. Primary hypertension        4. BMI 30.0-30.9,adult  HEIGHT AND WEIGHT      5. Nonsmoker            Immunizations:    Flu:recommend  Pneumovax 23:not due  Prevnar 13:not  due  PCV 20: 11/2022  COVID-19: 1/20/22    Plan:  Patient has a history of asthma versus reactive airway disease.  There are prior emphysematous changes noticed on CT imaging but PFTs revealed no obstruction; nonsmoker.  She uses BERKLEY at least 1 time daily and using Trelegy 1 time per week.  Recommend using BERKLEY as needed and to stop Trelegy.  She also has Flovent HFA on hand which she previously used.  If patient notes a flareup of her symptoms she will start using Flovent twice daily for duration of 5 to 7 days then resume Berkley as needed.  If her breathing worsens she will contact me via [employer] to give further recommendations.   CT chest w/o contrast for lung nodule follow up due 5/2023   Spirometry at next office visit  BRIANA is not controlled at this time due to intolerance from chronic sinus issues.  Recommend referral to ENT with CT of sinuses for further evaluation.  CT sinus now   DME mask/supplies and patient will attempt to restart CPAP   Follow-up primary care further health concerns including management hypertension and weight loss efforts  Follow-up in 6 months with spirometry and compliance report, sooner if needed.    Please note that this dictation was created using voice recognition software. I have made every reasonable attempt to correct obvious errors, but it is possible there are errors of grammar and possibly content that I did not discover before finalizing the note.
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
## 2023-03-07 ENCOUNTER — PATIENT OUTREACH (OUTPATIENT)
Dept: HEALTH INFORMATION MANAGEMENT | Facility: OTHER | Age: 73
End: 2023-03-07
Payer: MEDICARE

## 2023-03-07 DIAGNOSIS — I10 PRIMARY HYPERTENSION: ICD-10-CM

## 2023-03-07 DIAGNOSIS — J44.9 CHRONIC OBSTRUCTIVE PULMONARY DISEASE, UNSPECIFIED COPD TYPE (HCC): ICD-10-CM

## 2023-03-07 DIAGNOSIS — N18.31 STAGE 3A CHRONIC KIDNEY DISEASE: ICD-10-CM

## 2023-03-07 PROCEDURE — 99490 CHRNC CARE MGMT STAFF 1ST 20: CPT | Performed by: INTERNAL MEDICINE

## 2023-03-07 NOTE — PROGRESS NOTES
Assessment    Spoke with Augusta this morning for [facility] monthly follow up. She said she is doing well at this time. She has been having acid reflux bad and is out of her Protonix. She said she has been trying to get it but pharmacy has not been filling it and she has been unable to  the Protonix. Informed her I would call the [facility] and find out what the problem is as she does have refills available. She is appreciative of this.     Called [facility] [place]- on hold 20 minutes. They said they do see the prescription for Protonix and she was able to get it authorized from the insurance so she is not sure what he problem was. She said they will have it ready but this afternoon for her to .    Called Augusta back and informed her that [facility] said they will have it ready by this afternoon. Reviewed medications and how she is taking her Protonix to ensure she is taking it properly. Augusta said she takes 1 tablet twice a day as ordered. Augusta denied any other needs at this time. Reviewed appointments with her and let her know I will plan to see her at her PCP appointment on 4/19.                                 Education    Safety, diet, medications,     Care Plan    Progressing    Progress:    Progressing    Next outreach: 4/19/2023

## 2023-03-14 ENCOUNTER — OFFICE VISIT (OUTPATIENT)
Dept: DERMATOLOGY | Facility: IMAGING CENTER | Age: 73
End: 2023-03-14
Payer: MEDICARE

## 2023-03-14 DIAGNOSIS — N90.4 LICHEN SCLEROSUS OF VULVA: ICD-10-CM

## 2023-03-14 PROCEDURE — 99213 OFFICE O/P EST LOW 20 MIN: CPT | Performed by: NURSE PRACTITIONER

## 2023-03-14 RX ORDER — CLOBETASOL PROPIONATE 0.5 MG/G
OINTMENT TOPICAL
Qty: 60 G | Refills: 1 | Status: SHIPPED | OUTPATIENT
Start: 2023-03-14 | End: 2023-11-27

## 2023-03-14 NOTE — PROGRESS NOTES
DERMATOLOGY NOTE  FOLLOW UP VISIT       Chief complaint: Follow-Up     Fv on lichen sclerosus clobetasol on propionate which helps control symptoms,   has itchiness as she has been out of topical steroid, needs refill     From previous note:  HPI rash vaginal area out side , Hx of incontinence uses pads  . Very irritated   Uses dove bar soap and baby wipes to vulva  Onset:  4 months   Aggravating factors: pads   Alleviating factors: no   New creams/topicals: OTC cortisone , A&D ointment , baby diaper rash cream , triple antiseptic   New medications (up to last 6 months): no  New travel: no  Other exposures: no  Treatments: no       Allergies   Allergen Reactions    Sumatriptan Swelling     Tongue swell    Clarithromycin Itching, Nausea and Swelling     Swelling/Itching/Nausea    Ees [Erythromycin] Itching, Nausea and Swelling     Swelling/Itching/Nausea    Levaquin Myalgia and Unspecified     Muscle soreness     Pcn [Penicillins] Itching, Nausea and Swelling     Swelling/Itching/Nausea     Shellfish Allergy Itching, Nausea and Swelling     Swelling/Itching/Nausea    Trazodone Swelling and Unspecified    Clarithromycin Unspecified    Erythromycin Unspecified    Penicillin G Unspecified        MEDICATIONS:  Medications relevant to specialty reviewed.     REVIEW OF SYSTEMS:   Positive for skin (see HPI)  Negative for fevers and chills       EXAM:  There were no vitals taken for this visit.  Constitutional: Well-developed, well-nourished, and in no distress.     A focused skin exam was performed including the affected areas of the vulva. Notable findings on exam today listed below and/or in assessment/plan.     Atrophic skin of the labia majora and minora    IMPRESSION / PLAN:    1. Lichen sclerosus of vulva, chronic and suboptimally controlled at this time  Well controlled on Rx below, needs refills, advised to ue 1-3 times per week as needed  Follow up as needed and annually for refills  - clobetasol (TEMOVATE) 0.05 %  Ointment; AAA thin layer, daily for 3 weeks, then use 2-3 times a week  Dispense: 60 g; Refill: 1        Discussed risks, benefits, alternative treatments as well as common side effects associated with prescribed treatment  Patient verbalized understanding and agrees with plan regarding the above       I have performed a physical exam and reviewed and updated ROS and Plan today (3/14/2023). In review of dermatology visit (4/7/2022), there are no changes except as documented above.       Please note that this dictation was created using voice recognition software. I have made every reasonable attempt to correct obvious errors, but I expect that there are errors of grammar and possibly content that I did not discover before finalizing the note.      Return to clinic in: Return for PRN and annually. and for any new or changing skin lesions.

## 2023-03-20 ENCOUNTER — HOSPITAL ENCOUNTER (OUTPATIENT)
Dept: RADIOLOGY | Facility: MEDICAL CENTER | Age: 73
End: 2023-03-20
Attending: NURSE PRACTITIONER
Payer: MEDICARE

## 2023-03-20 DIAGNOSIS — J34.89 OTHER SPECIFIED DISORDERS OF NOSE AND NASAL SINUSES: ICD-10-CM

## 2023-03-20 DIAGNOSIS — D14.0: ICD-10-CM

## 2023-03-20 PROCEDURE — 70486 CT MAXILLOFACIAL W/O DYE: CPT

## 2023-04-03 ENCOUNTER — HOSPITAL ENCOUNTER (OUTPATIENT)
Dept: LAB | Facility: MEDICAL CENTER | Age: 73
End: 2023-04-03
Attending: INTERNAL MEDICINE
Payer: MEDICARE

## 2023-04-03 DIAGNOSIS — E78.5 DYSLIPIDEMIA: Chronic | ICD-10-CM

## 2023-04-03 DIAGNOSIS — R73.03 PREDIABETES: Chronic | ICD-10-CM

## 2023-04-03 LAB
ALT SERPL-CCNC: 12 U/L (ref 2–50)
ANION GAP SERPL CALC-SCNC: 11 MMOL/L (ref 7–16)
BUN SERPL-MCNC: 21 MG/DL (ref 8–22)
CALCIUM SERPL-MCNC: 9.2 MG/DL (ref 8.5–10.5)
CHLORIDE SERPL-SCNC: 102 MMOL/L (ref 96–112)
CHOLEST SERPL-MCNC: 132 MG/DL (ref 100–199)
CO2 SERPL-SCNC: 24 MMOL/L (ref 20–33)
CREAT SERPL-MCNC: 1.03 MG/DL (ref 0.5–1.4)
EST. AVERAGE GLUCOSE BLD GHB EST-MCNC: 120 MG/DL
FASTING STATUS PATIENT QL REPORTED: NORMAL
GFR SERPLBLD CREATININE-BSD FMLA CKD-EPI: 58 ML/MIN/1.73 M 2
GLUCOSE SERPL-MCNC: 111 MG/DL (ref 65–99)
HBA1C MFR BLD: 5.8 % (ref 4–5.6)
HDLC SERPL-MCNC: 53 MG/DL
LDLC SERPL CALC-MCNC: 64 MG/DL
POTASSIUM SERPL-SCNC: 4.5 MMOL/L (ref 3.6–5.5)
SODIUM SERPL-SCNC: 137 MMOL/L (ref 135–145)
TRIGL SERPL-MCNC: 77 MG/DL (ref 0–149)

## 2023-04-03 PROCEDURE — 80061 LIPID PANEL: CPT

## 2023-04-03 PROCEDURE — 36415 COLL VENOUS BLD VENIPUNCTURE: CPT

## 2023-04-03 PROCEDURE — 83036 HEMOGLOBIN GLYCOSYLATED A1C: CPT

## 2023-04-03 PROCEDURE — 80048 BASIC METABOLIC PNL TOTAL CA: CPT

## 2023-04-03 PROCEDURE — 84460 ALANINE AMINO (ALT) (SGPT): CPT

## 2023-04-20 ENCOUNTER — PATIENT OUTREACH (OUTPATIENT)
Dept: HEALTH INFORMATION MANAGEMENT | Facility: OTHER | Age: 73
End: 2023-04-20

## 2023-04-20 ENCOUNTER — OFFICE VISIT (OUTPATIENT)
Dept: MEDICAL GROUP | Facility: PHYSICIAN GROUP | Age: 73
End: 2023-04-20
Payer: MEDICARE

## 2023-04-20 VITALS
HEART RATE: 95 BPM | WEIGHT: 162.38 LBS | TEMPERATURE: 98.2 F | DIASTOLIC BLOOD PRESSURE: 82 MMHG | RESPIRATION RATE: 20 BRPM | BODY MASS INDEX: 31.88 KG/M2 | HEIGHT: 60 IN | SYSTOLIC BLOOD PRESSURE: 134 MMHG | OXYGEN SATURATION: 97 %

## 2023-04-20 DIAGNOSIS — J44.89 ASTHMA-COPD OVERLAP SYNDROME (HCC): ICD-10-CM

## 2023-04-20 DIAGNOSIS — E78.5 DYSLIPIDEMIA: Chronic | ICD-10-CM

## 2023-04-20 DIAGNOSIS — I10 PRIMARY HYPERTENSION: ICD-10-CM

## 2023-04-20 DIAGNOSIS — I10 PRIMARY HYPERTENSION: Chronic | ICD-10-CM

## 2023-04-20 DIAGNOSIS — I48.0 PAROXYSMAL ATRIAL FIBRILLATION (HCC): ICD-10-CM

## 2023-04-20 DIAGNOSIS — R73.03 PREDIABETES: Chronic | ICD-10-CM

## 2023-04-20 DIAGNOSIS — K21.9 GASTROESOPHAGEAL REFLUX DISEASE WITHOUT ESOPHAGITIS: Chronic | ICD-10-CM

## 2023-04-20 DIAGNOSIS — Z23 NEED FOR VACCINATION: ICD-10-CM

## 2023-04-20 DIAGNOSIS — N18.31 STAGE 3A CHRONIC KIDNEY DISEASE: ICD-10-CM

## 2023-04-20 DIAGNOSIS — E66.01 SEVERE OBESITY (HCC): Chronic | ICD-10-CM

## 2023-04-20 DIAGNOSIS — J44.9 CHRONIC OBSTRUCTIVE PULMONARY DISEASE, UNSPECIFIED COPD TYPE (HCC): ICD-10-CM

## 2023-04-20 DIAGNOSIS — G43.909 MIGRAINE WITHOUT STATUS MIGRAINOSUS, NOT INTRACTABLE, UNSPECIFIED MIGRAINE TYPE: Chronic | ICD-10-CM

## 2023-04-20 DIAGNOSIS — F39 MOOD DISORDER (HCC): Chronic | ICD-10-CM

## 2023-04-20 PROBLEM — R21 RASH: Status: ACTIVE | Noted: 2023-04-20

## 2023-04-20 PROBLEM — M79.89 RIGHT LEG SWELLING: Status: ACTIVE | Noted: 2023-04-20

## 2023-04-20 PROCEDURE — 90715 TDAP VACCINE 7 YRS/> IM: CPT | Performed by: INTERNAL MEDICINE

## 2023-04-20 PROCEDURE — 99215 OFFICE O/P EST HI 40 MIN: CPT | Mod: 25 | Performed by: INTERNAL MEDICINE

## 2023-04-20 PROCEDURE — 90471 IMMUNIZATION ADMIN: CPT | Performed by: INTERNAL MEDICINE

## 2023-04-20 PROCEDURE — 99999 PR NO CHARGE: CPT | Performed by: INTERNAL MEDICINE

## 2023-04-20 RX ORDER — DULOXETIN HYDROCHLORIDE 60 MG/1
60 CAPSULE, DELAYED RELEASE ORAL DAILY
Qty: 90 CAPSULE | Refills: 3 | Status: SHIPPED | OUTPATIENT
Start: 2023-04-20

## 2023-04-20 RX ORDER — RIZATRIPTAN BENZOATE 5 MG/1
5 TABLET ORAL
Qty: 10 TABLET | Refills: 5 | Status: SHIPPED | OUTPATIENT
Start: 2023-04-20

## 2023-04-20 ASSESSMENT — PATIENT HEALTH QUESTIONNAIRE - PHQ9: CLINICAL INTERPRETATION OF PHQ2 SCORE: 0

## 2023-04-20 ASSESSMENT — FIBROSIS 4 INDEX: FIB4 SCORE: 0.88

## 2023-04-20 NOTE — PROGRESS NOTES
PRIMARY CARE CLINIC VISIT    Chief complaint:  Follow-up mood disorder  Prescription refills  Obesity  Atrial fibrillation  CKD 3 follow-up  Asthma/COPD follow-up  Follow-up hypertension and hyperlipidemia  Acid reflux  Migraine  Immunization update Tdap      History of Present Illness       Mood disorder (HCC)  Chronic ongoing condition.  The patient is taking Cymbalta 60 mg daily.  Patient denies SI.  Patient tolerating medication well no significant side effects reported.    Severe obesity (HCC)  Chronic condition.    Body mass index is 31.71 kg/m².     Counseling on health consequences related to obesity.  Discussed with the patient regarding diet, exercise, and lifestyle modification to help achieve and maintain healthy weight          Paroxysmal atrial fibrillation (HCC)  Chronic condition.  Patient is taking diltiazem 240 Mg daily and aspirin 81 mg daily.  Patient denies chest pain shortness of breath palpitation or near syncope.    CKD (chronic kidney disease) stage 3, GFR 30-59 ml/min (HCC)  Chronic condition.  Recent GFR stable at 58.  Lab test result discussed with the patient.    Prediabetes  Chronic condition.  A1c stable at 5.8.  I had a long discussion with the patient however the patient not interested in taking any medication for this condition.    Asthma-COPD overlap syndrome (HCC)  Chronic ongoing condition.  Patient is taking Trelegy 1 inhalation daily.  He denies shortness of breath or wheezing.    HTN (hypertension)  Chronic ongoing condition.  The patient is taking diltiazem 240 Mg daily.  Patient tolerating medication well.  Her blood pressure has been well controlled at home.    Dyslipidemia  Chronic ongoing condition.  Patient currently on diet therapy    GERD (gastroesophageal reflux disease)  This is a chronic stable condition.  The patient takes pantoprazole 40 mg twice daily.  Patient denies nausea vomiting dysphagia or unexplained weight loss.    Migraine  Chronic condition.  The  patient takes Elavil 25 mg 2 tablets at bedtime.  Patient denies significant migraine flareup.  She has a prescription for Maxalt to take as needed.  Currently the patient asymptomatic.    Current Outpatient Medications on File Prior to Visit   Medication Sig Dispense Refill    atorvastatin (LIPITOR) 10 MG Tab Take 1 Tablet by mouth every day. 100 Tablet 3    dilTIAZem CD (CARDIZEM CD) 240 MG CAPSULE SR 24 HR Take 1 Capsule by mouth every day. 90 Capsule 3    pantoprazole (PROTONIX) 40 MG Tablet Delayed Response Take 1 Tablet by mouth 2 times a day. 180 Tablet 3    telmisartan (MICARDIS) 40 MG Tab Take 1 Tablet by mouth every day. 100 Tablet 3    fluticasone-umeclidin-vilant (TRELEGY ELLIPTA) 100-62.5-25 MCG/ACT AEROSOL POWDER, BREATH ACTIVATED inhalation Inhale 1 Inhalation every day. 1 Each 11    amitriptyline (ELAVIL) 25 MG Tab Take 50 mg by mouth every evening.      albuterol 108 (90 Base) MCG/ACT Aero Soln inhalation aerosol INHALE 2 PUFFS BY MOUTH EVERY 6 HOURS AS NEEDED FOR SHORTNESS OF BREATH 8.5 Each 5    zonisamide (ZONEGRAN) 100 MG Cap Take 100 mg by mouth every day.      SALINE NA Administer 1 Spray into affected nostril(S) every day.      aspirin 81 MG EC tablet Take 81 mg by mouth every day.      clobetasol (TEMOVATE) 0.05 % Ointment AAA thin layer, daily for 3 weeks, then use 2-3 times a week 60 g 1    benzonatate (TESSALON) 100 MG Cap Take 1 Capsule by mouth 3 times a day as needed for Cough. 60 Capsule 0    Ascorbic Acid (VITAMIN C) 1000 MG Tab Take 4,000 mg by mouth every day.       No current facility-administered medications on file prior to visit.        Allergies: Sumatriptan, Clarithromycin, Ees [erythromycin], Levaquin, Pcn [penicillins], Shellfish allergy, Trazodone, Clarithromycin, Erythromycin, and Penicillin g    Current Outpatient Medications Ordered in Epic   Medication Sig Dispense Refill    DULoxetine (CYMBALTA) 60 MG Cap DR Particles delayed-release capsule Take 1 Capsule by mouth  every day. 90 Capsule 3    rizatriptan (MAXALT) 5 MG tablet Take 1 Tablet by mouth one time as needed for Migraine. 10 Tablet 5    atorvastatin (LIPITOR) 10 MG Tab Take 1 Tablet by mouth every day. 100 Tablet 3    dilTIAZem CD (CARDIZEM CD) 240 MG CAPSULE SR 24 HR Take 1 Capsule by mouth every day. 90 Capsule 3    pantoprazole (PROTONIX) 40 MG Tablet Delayed Response Take 1 Tablet by mouth 2 times a day. 180 Tablet 3    telmisartan (MICARDIS) 40 MG Tab Take 1 Tablet by mouth every day. 100 Tablet 3    fluticasone-umeclidin-vilant (TRELEGY ELLIPTA) 100-62.5-25 MCG/ACT AEROSOL POWDER, BREATH ACTIVATED inhalation Inhale 1 Inhalation every day. 1 Each 11    amitriptyline (ELAVIL) 25 MG Tab Take 50 mg by mouth every evening.      albuterol 108 (90 Base) MCG/ACT Aero Soln inhalation aerosol INHALE 2 PUFFS BY MOUTH EVERY 6 HOURS AS NEEDED FOR SHORTNESS OF BREATH 8.5 Each 5    zonisamide (ZONEGRAN) 100 MG Cap Take 100 mg by mouth every day.      SALINE NA Administer 1 Spray into affected nostril(S) every day.      aspirin 81 MG EC tablet Take 81 mg by mouth every day.      clobetasol (TEMOVATE) 0.05 % Ointment AAA thin layer, daily for 3 weeks, then use 2-3 times a week 60 g 1    benzonatate (TESSALON) 100 MG Cap Take 1 Capsule by mouth 3 times a day as needed for Cough. 60 Capsule 0    Ascorbic Acid (VITAMIN C) 1000 MG Tab Take 4,000 mg by mouth every day.       No current [facility]-ordered facility-administered medications on file.       Past Medical History:   Diagnosis Date    Apnea, sleep     Arrhythmia     afib    Arthritis     osteo    Asthma     Atrial fibrillation (HCC)     Back pain     Bronchitis     Chickenpox     Constipation     Cough     Daytime sleepiness     Dental disorder     upper dentures    Earache     Frequent urination     Gasping for breath     GERD (gastroesophageal reflux disease) 2/27/2010    Persian measles     Heartburn     Hypertension     Impaired fasting glucose 2/27/2010    Incontinence of urine      Indigestion     Influenza     Mumps     Nasal drainage     Nausea     Osteopenia 2/24/2010    Osteoporosis     Other specified disorder of intestines     constipation r/t meds    Restless leg syndrome     Scarlet fever     Shortness of breath     Sleep apnea     Snoring     Tremor, essential 2/24/2010    Urinary bladder disorder     Wears glasses     Wheezing     Whooping cough        Past Surgical History:   Procedure Laterality Date    INCISION AND DRAINAGE GENERAL N/A 10/18/2022    Procedure: INCISION AND DRAINAGE ABSCESS TONGUE;  Surgeon: Paz Suazo M.D.;  Location: SURGERY [facility];  Service: Ent    LUMBAR LAMINECTOMY DISKECTOMY Bilateral 2/4/2017    Procedure: LUMBAR LAMINECTOMY DISKECTOMY POSTERIOR L4-S1 ;  Surgeon: Emil Hitchcock M.D.;  Location: SURGERY [facility];  Service:     CARPAL TUNNEL ENDOSCOPIC  11/17/2012    Performed by Heriberto Quiñones M.D. at SURGERY [facility]    TRIGGER FINGER RELEASE  11/17/2012    Performed by Heriberto Quiñones M.D. at SURGERY [facility] ORS    HIP ARTHROSCOPY  2/23/2009    Performed by SHERLYN CALVO at SURGERY [facility] ORS    ACETABULAR OSTEOTOMY  2/23/2009    Performed by SHERLYN CALVO at SURGERY [facility] ORS    MASS EXCISION ORTHO  2/23/2009    Performed by SHERLYN CALVO at SURGERY [facility] ORS    HIP ARTHROSCOPY  2005    done at [facility]    HAJA BY LAPAROSCOPY  1997    HYSTERECTOMY, TOTAL ABDOMINAL  1979    oopherectomy lacie    BLADDER SUSPENSION      bladder sling    CARPAL TUNNEL RELEASE      HIP REPLACEMENT, TOTAL      HYSTERECTOMY LAPAROSCOPY      LAMINOTOMY      PRIMARY C SECTION  1970/1975       Family History   Problem Relation Age of Onset    GI Disease Father         Crohn's    Heart Disease Father         First MI age 30    Hypertension Father     Stroke Father     Hypertension Other     Cancer Brother         ESOPHAGEAL CANCER       Social History     Tobacco Use   Smoking Status Never   Smokeless Tobacco  Never       Social History     Substance and Sexual Activity   Alcohol Use Yes    Alcohol/week: 0.0 oz    Comment: Stopped drinking 45 days ago 7/12/17 Going to AA       Review of systems.  As per HPI above. All other systems reviewed and negative.      Past Medical, Social, and Family history reviewed and updated in EPIC     Objective     /82 (BP Location: Left arm, Patient Position: Sitting, BP Cuff Size: Adult)   Pulse 95   Temp 36.8 °C (98.2 °F) (Temporal)   Resp 20   Ht 1.524 m (5')   Wt 73.7 kg (162 lb 6 oz)   SpO2 97%    Body mass index is 31.71 kg/m².    General: alert in no apparent distress.  Cardiovascular: regular rate and rhythm  Pulmonary: lungs : no wheezing   Gastrointestinal: BS present. No obvious mass noted        Lab Results   Component Value Date/Time    HBA1C 5.8 (H) 04/03/2023 01:01 PM    HBA1C 6.0 (H) 11/07/2022 09:15 AM    HBA1C 5.8 (H) 09/14/2022 09:25 AM       Lab Results   Component Value Date/Time    WBC 7.1 11/07/2022 09:15 AM    HEMOGLOBIN 12.9 11/07/2022 09:15 AM    HEMATOCRIT 41.2 11/07/2022 09:15 AM    MCV 80.6 (L) 11/07/2022 09:15 AM    PLATELETCT 396 11/07/2022 09:15 AM         Lab Results   Component Value Date/Time    SODIUM 137 04/03/2023 01:01 PM    POTASSIUM 4.5 04/03/2023 01:01 PM    GLUCOSE 111 (H) 04/03/2023 01:01 PM    BUN 21 04/03/2023 01:01 PM    CREATININE 1.03 04/03/2023 01:01 PM    CREATININE 0.89 04/27/2009 12:00 AM       Lab Results   Component Value Date/Time    CHOLSTRLTOT 132 04/03/2023 01:01 PM    LDL 64 04/03/2023 01:01 PM    HDL 53 04/03/2023 01:01 PM    TRIGLYCERIDE 77 04/03/2023 01:01 PM       Lab Results   Component Value Date/Time    ALTSGPT 12 04/03/2023 01:01 PM             Assessment and Plan     1. Mood disorder (HCC)  Chronic stable condition.  Continue with duloxetine 60 mg daily.    2. Severe obesity (HCC)  Chronic condition.  Uncontrolled.  Recommend healthy diet and exercise.  Encouraged patient to lose weight.    3. Paroxysmal atrial  fibrillation (HCC)  Chronic stable condition.  Continue aspirin 81 mg daily and diltiazem 240 Mg daily.    4. Stage 3a chronic kidney disease (HCC)  Chronic condition.  Advised the patient to avoid NSAID.  Continue to monitor.    5. Prediabetes  Chronic condition.  Advised the patient diet and exercise and to continue to monitor.    6. Asthma-COPD overlap syndrome (HCC)  Chronic stable condition.  Continue with Trelegy 1 inhalation daily.  Patient may use albuterol as needed.    7. Need for vaccination  - Tdap Vaccine =>8YO IM    8. Primary hypertension  Chronic stable condition.  Continue with diltiazem 240 Mg daily.  Continue to monitor    9. Dyslipidemia  This is a chronic condition.  Recent lipid panel result discussed with the patient.  Continue atorvastatin 10 mg daily.    10. Gastroesophageal reflux disease without esophagitis  Chronic stable condition continue with pantoprazole.    11. Migraine without status migrainosus, not intractable, unspecified migraine type  Chronic stable condition.  Continue with Elavil 50 mg daily.  Patient may use Maxalt as needed for migraine flareup.          Attestation: I spent:   43  min -  That includes time for chart review before the visit, the actual patient visit, and time spent on documentation in EMR after the visit.  Chart review/prep, review of other providers' records, imaging/lab review, face-to-face time for history/examination, ordering, prescribing,  review of results/meds/ treatment plan with patient, and care coordination.            Please note that this dictation was created using voice recognition software. I have made every reasonable attempt to correct obvious errors, but I expect that there are errors of grammar and possibly content that I did not discover before finalizing the note.    Rudy Parsons MD  Internal Medicine  [facility]

## 2023-04-20 NOTE — ASSESSMENT & PLAN NOTE
Chronic ongoing condition.  The patient is taking diltiazem 240 Mg daily.  Patient tolerating medication well.  Her blood pressure has been well controlled at home.

## 2023-04-20 NOTE — ASSESSMENT & PLAN NOTE
This is a new condition affecting the left axillary region patient was seen in the ER 3 days ago due to irritated and redness.  Patient denies any recent change in his deodorant.  No pain noted.    In the ER the patient was thought to have possible yeast infection   Patient was given prescription for Keflex and nystatin cream which he has started since the last couple of days.  Patient is here today for follow-up

## 2023-04-20 NOTE — ASSESSMENT & PLAN NOTE
Chronic condition.  Patient is taking diltiazem 240 Mg daily and aspirin 81 mg daily.  Patient denies chest pain shortness of breath palpitation or near syncope.

## 2023-04-20 NOTE — ASSESSMENT & PLAN NOTE
Chronic ongoing condition.  Patient is taking Trelegy 1 inhalation daily.  He denies shortness of breath or wheezing.

## 2023-04-20 NOTE — ASSESSMENT & PLAN NOTE
This is a chronic stable condition.  The patient takes pantoprazole 40 mg twice daily.  Patient denies nausea vomiting dysphagia or unexplained weight loss.

## 2023-04-20 NOTE — ASSESSMENT & PLAN NOTE
Chronic condition.    Body mass index is 31.71 kg/m².     Counseling on health consequences related to obesity.  Discussed with the patient regarding diet, exercise, and lifestyle modification to help achieve and maintain healthy weight

## 2023-04-20 NOTE — ASSESSMENT & PLAN NOTE
This is a chronic and recurrent condition.  The patient reports swelling of the right leg noted since the last couple of weeks.  Patient denies trauma or injury.  He denies fever or chills.  He denies chest pain or shortness of breath.

## 2023-04-20 NOTE — ASSESSMENT & PLAN NOTE
Chronic ongoing condition.  The patient is taking Cymbalta 60 mg daily.  Patient denies SI.  Patient tolerating medication well no significant side effects reported.

## 2023-04-20 NOTE — ASSESSMENT & PLAN NOTE
Chronic condition.  The patient takes Elavil 25 mg 2 tablets at bedtime.  Patient denies significant migraine flareup.  She has a prescription for Maxalt to take as needed.  Currently the patient asymptomatic.

## 2023-04-20 NOTE — ASSESSMENT & PLAN NOTE
Chronic condition.  A1c stable at 5.8.  I had a long discussion with the patient however the patient not interested in taking any medication for this condition.

## 2023-04-21 ENCOUNTER — PATIENT OUTREACH (OUTPATIENT)
Dept: HEALTH INFORMATION MANAGEMENT | Facility: OTHER | Age: 73
End: 2023-04-21
Payer: MEDICARE

## 2023-04-21 NOTE — PROGRESS NOTES
Assessment    Augusta is in office today for PCP follow up. Spoke with Augusta following her appointment for CCM follow up. She said she is doing well her A1C is at 5.8, she is only eating sugar free ice cream now, and having only sugar free soda when she drinks them. She said she makes healthy lunches and snacks to take to her grand daughters house when she is babysitting her great grand daughter. She does not need resources at this time. Discussed discharge off CCM program and having CHW follow X1 month. As well as her calling if needs do arise or her situation changes. She agreed to do this. Will Discharge Augusta off CCM at this time.     Education    Resources, diet, exercise    Care Plan    Closed    Progress:    Progressing    Next outreach: Discharged from [facility]    Quarterly Outreach: Augusta is doing well. Her A1C is down, she feels good. She has not needed resources for the past few months. She has changed to all sugar free ice cream and soda when she drinks them. She has agreed to follow up X1 month with CHW and be discharged off CCM program. Augusta knows to call if things change and she needs resources.

## 2023-04-21 NOTE — PROGRESS NOTES
4/21/23  Pt d/c yesterday from CCM program. Pt to be followed by this CHW in general care management. CHW will call pt in one month's time to follow up with her.

## 2023-05-11 ENCOUNTER — OFFICE VISIT (OUTPATIENT)
Dept: MEDICAL GROUP | Facility: PHYSICIAN GROUP | Age: 73
End: 2023-05-11
Payer: MEDICARE

## 2023-05-11 VITALS
WEIGHT: 161.38 LBS | HEART RATE: 96 BPM | SYSTOLIC BLOOD PRESSURE: 110 MMHG | RESPIRATION RATE: 18 BRPM | BODY MASS INDEX: 30.47 KG/M2 | DIASTOLIC BLOOD PRESSURE: 80 MMHG | HEIGHT: 61 IN | OXYGEN SATURATION: 96 % | TEMPERATURE: 98 F

## 2023-05-11 DIAGNOSIS — E66.01 SEVERE OBESITY (HCC): Chronic | ICD-10-CM

## 2023-05-11 DIAGNOSIS — R20.0 NUMBNESS AND TINGLING OF RIGHT ARM: ICD-10-CM

## 2023-05-11 DIAGNOSIS — R20.2 NUMBNESS AND TINGLING OF RIGHT ARM: ICD-10-CM

## 2023-05-11 DIAGNOSIS — I48.0 PAROXYSMAL ATRIAL FIBRILLATION (HCC): ICD-10-CM

## 2023-05-11 DIAGNOSIS — R73.03 PREDIABETES: Chronic | ICD-10-CM

## 2023-05-11 DIAGNOSIS — E78.5 DYSLIPIDEMIA: Chronic | ICD-10-CM

## 2023-05-11 DIAGNOSIS — J44.89 ASTHMA-COPD OVERLAP SYNDROME (HCC): ICD-10-CM

## 2023-05-11 DIAGNOSIS — N18.31 STAGE 3A CHRONIC KIDNEY DISEASE: ICD-10-CM

## 2023-05-11 DIAGNOSIS — I10 PRIMARY HYPERTENSION: Chronic | ICD-10-CM

## 2023-05-11 PROBLEM — Z02.9 ADMINISTRATIVE ENCOUNTER: Status: ACTIVE | Noted: 2023-05-11

## 2023-05-11 PROCEDURE — 99214 OFFICE O/P EST MOD 30 MIN: CPT | Performed by: INTERNAL MEDICINE

## 2023-05-11 RX ORDER — DOXYCYCLINE HYCLATE 100 MG/1
CAPSULE ORAL
COMMUNITY
Start: 2023-04-24 | End: 2023-08-21

## 2023-05-11 ASSESSMENT — FIBROSIS 4 INDEX: FIB4 SCORE: 0.88

## 2023-05-11 NOTE — PROGRESS NOTES
PRIMARY CARE CLINIC VISIT    Chief complaint:    Numbness tingling sensation right arm  Obesity  Atrial fibrillation CKD 3  Hyperlipidemia    History of Present Illness     Numbness and tingling of right arm  This is a new condition noticed since the last couple of months.  The patient denies recent trauma or injury.  Patient reported that she has been taking care of a baby who is approximately 20 pounds in weight and she has been doing a lot of lifting.  Patient denies fever or chills.  She denies motor weakness.    Severe obesity (HCC)  Chronic condition.    Body mass index is 30.49 kg/m².     Counseling on health consequences related to obesity.  Discussed with the patient regarding diet, exercise, and lifestyle modification to help achieve and maintain healthy weight          Paroxysmal atrial fibrillation (HCC)  Chronic ongoing condition for the patient followed by cardiology service.  She is currently taking aspirin 81 mg daily and diltiazem 240 Mg daily.  The patient denies chest pain shortness of breath palpitation or near syncope.    CKD (chronic kidney disease) stage 3, GFR 30-59 ml/min (HCC)  Chronic ongoing condition.  Her previous GFR in the 50s.  Advised the patient to avoid NSAIDs.    Asthma-COPD overlap syndrome (HCC)  Chronic ongoing condition.  The patient is currently using Trelegy 1 inhalation daily.  Patient also has a prescription for albuterol to be used as needed.    HTN (hypertension)  Chronic ongoing condition.  The patient is taking diltiazem 240 Mg daily.  Blood pressure has been well controlled.  She also take Micardis 40 Mg daily.  No significant side effects reported.    Dyslipidemia  Chronic ongoing condition.  The patient takes atorvastatin 10 mg daily.  Patient tolerating medication well.  No significant side effects reported.    Prediabetes  Chronic condition.  Recent A1c 5.8%.  Result discussed with the patient.  Patient not interested in taking any medication at this  time    Current Outpatient Medications on File Prior to Visit   Medication Sig Dispense Refill    DULoxetine (CYMBALTA) 60 MG Cap DR Particles delayed-release capsule Take 1 Capsule by mouth every day. 90 Capsule 3    rizatriptan (MAXALT) 5 MG tablet Take 1 Tablet by mouth one time as needed for Migraine. 10 Tablet 5    atorvastatin (LIPITOR) 10 MG Tab Take 1 Tablet by mouth every day. 100 Tablet 3    dilTIAZem CD (CARDIZEM CD) 240 MG CAPSULE SR 24 HR Take 1 Capsule by mouth every day. 90 Capsule 3    pantoprazole (PROTONIX) 40 MG Tablet Delayed Response Take 1 Tablet by mouth 2 times a day. 180 Tablet 3    telmisartan (MICARDIS) 40 MG Tab Take 1 Tablet by mouth every day. 100 Tablet 3    fluticasone-umeclidin-vilant (TRELEGY ELLIPTA) 100-62.5-25 MCG/ACT AEROSOL POWDER, BREATH ACTIVATED inhalation Inhale 1 Inhalation every day. 1 Each 11    amitriptyline (ELAVIL) 25 MG Tab Take 50 mg by mouth every evening.      albuterol 108 (90 Base) MCG/ACT Aero Soln inhalation aerosol INHALE 2 PUFFS BY MOUTH EVERY 6 HOURS AS NEEDED FOR SHORTNESS OF BREATH 8.5 Each 5    aspirin 81 MG EC tablet Take 81 mg by mouth every day.      doxycycline (VIBRAMYCIN) 100 MG Cap TAKE 1 CAPSULE BY MOUTH EVERY 12 HOURS FOR 14 DAYS      clobetasol (TEMOVATE) 0.05 % Ointment AAA thin layer, daily for 3 weeks, then use 2-3 times a week 60 g 1    benzonatate (TESSALON) 100 MG Cap Take 1 Capsule by mouth 3 times a day as needed for Cough. 60 Capsule 0    Ascorbic Acid (VITAMIN C) 1000 MG Tab Take 4,000 mg by mouth every day.      zonisamide (ZONEGRAN) 100 MG Cap Take 100 mg by mouth every day.      SALINE NA Administer 1 Spray into affected nostril(S) every day.       No current facility-administered medications on file prior to visit.        Allergies: Sumatriptan, Clarithromycin, Ees [erythromycin], Levaquin, Pcn [penicillins], Shellfish allergy, Trazodone, Clarithromycin, Erythromycin, and Penicillin g    Current Outpatient Medications Ordered in  Epic   Medication Sig Dispense Refill    DULoxetine (CYMBALTA) 60 MG Cap DR Particles delayed-release capsule Take 1 Capsule by mouth every day. 90 Capsule 3    rizatriptan (MAXALT) 5 MG tablet Take 1 Tablet by mouth one time as needed for Migraine. 10 Tablet 5    atorvastatin (LIPITOR) 10 MG Tab Take 1 Tablet by mouth every day. 100 Tablet 3    dilTIAZem CD (CARDIZEM CD) 240 MG CAPSULE SR 24 HR Take 1 Capsule by mouth every day. 90 Capsule 3    pantoprazole (PROTONIX) 40 MG Tablet Delayed Response Take 1 Tablet by mouth 2 times a day. 180 Tablet 3    telmisartan (MICARDIS) 40 MG Tab Take 1 Tablet by mouth every day. 100 Tablet 3    fluticasone-umeclidin-vilant (TRELEGY ELLIPTA) 100-62.5-25 MCG/ACT AEROSOL POWDER, BREATH ACTIVATED inhalation Inhale 1 Inhalation every day. 1 Each 11    amitriptyline (ELAVIL) 25 MG Tab Take 50 mg by mouth every evening.      albuterol 108 (90 Base) MCG/ACT Aero Soln inhalation aerosol INHALE 2 PUFFS BY MOUTH EVERY 6 HOURS AS NEEDED FOR SHORTNESS OF BREATH 8.5 Each 5    aspirin 81 MG EC tablet Take 81 mg by mouth every day.      doxycycline (VIBRAMYCIN) 100 MG Cap TAKE 1 CAPSULE BY MOUTH EVERY 12 HOURS FOR 14 DAYS      clobetasol (TEMOVATE) 0.05 % Ointment AAA thin layer, daily for 3 weeks, then use 2-3 times a week 60 g 1    benzonatate (TESSALON) 100 MG Cap Take 1 Capsule by mouth 3 times a day as needed for Cough. 60 Capsule 0    Ascorbic Acid (VITAMIN C) 1000 MG Tab Take 4,000 mg by mouth every day.      zonisamide (ZONEGRAN) 100 MG Cap Take 100 mg by mouth every day.      SALINE NA Administer 1 Spray into affected nostril(S) every day.       No current [facility]-ordered facility-administered medications on file.       Past Medical History:   Diagnosis Date    Apnea, sleep     Arrhythmia     afib    Arthritis     osteo    Asthma     Atrial fibrillation (HCC)     Back pain     Bronchitis     Chickenpox     Constipation     Cough     Daytime sleepiness     Dental disorder     upper  dentures    Earache     Frequent urination     Gasping for breath     GERD (gastroesophageal reflux disease) 2/27/2010    [place] measles     Heartburn     Hypertension     Impaired fasting glucose 2/27/2010    Incontinence of urine     Indigestion     Influenza     Mumps     Nasal drainage     Nausea     Osteopenia 2/24/2010    Osteoporosis     Other specified disorder of intestines     constipation r/t meds    Restless leg syndrome     Scarlet fever     Shortness of breath     Sleep apnea     Snoring     Tremor, essential 2/24/2010    Urinary bladder disorder     Wears glasses     Wheezing     Whooping cough        Past Surgical History:   Procedure Laterality Date    INCISION AND DRAINAGE GENERAL N/A 10/18/2022    Procedure: INCISION AND DRAINAGE ABSCESS TONGUE;  Surgeon: Paz Suazo M.D.;  Location: SURGERY [facility];  Service: Ent    LUMBAR LAMINECTOMY DISKECTOMY Bilateral 2/4/2017    Procedure: LUMBAR LAMINECTOMY DISKECTOMY POSTERIOR L4-S1 ;  Surgeon: Emil Hitchcock M.D.;  Location: SURGERY [facility];  Service:     CARPAL TUNNEL ENDOSCOPIC  11/17/2012    Performed by Heriberto Quiñones M.D. at SURGERY [facility] ORS    TRIGGER FINGER RELEASE  11/17/2012    Performed by Heriberto Quiñones M.D. at SURGERY [facility] ORS    HIP ARTHROSCOPY  2/23/2009    Performed by SHERLYN CALVO at SURGERY [facility] ER ORS    ACETABULAR OSTEOTOMY  2/23/2009    Performed by SHERLYN CALVO at SURGERY [facility] ER ORS    MASS EXCISION ORTHO  2/23/2009    Performed by SHERLYN CALVO at SURGERY [facility] ER ORS    HIP ARTHROSCOPY  2005    done at [facility]    HAJA BY LAPAROSCOPY  1997    HYSTERECTOMY, TOTAL ABDOMINAL  1979    oopherectomy lacie    BLADDER SUSPENSION      bladder sling    CARPAL TUNNEL RELEASE      HIP REPLACEMENT, TOTAL      HYSTERECTOMY LAPAROSCOPY      LAMINOTOMY      PRIMARY C SECTION  1970/1975       Family History   Problem Relation Age of Onset    GI Disease Father         Crohn's     "Heart Disease Father         First MI age 30    Hypertension Father     Stroke Father     Hypertension Other     Cancer Brother         ESOPHAGEAL CANCER       Social History     Tobacco Use   Smoking Status Never   Smokeless Tobacco Never   Vaping Use    Vaping Use: Never used       Social History     Substance and Sexual Activity   Alcohol Use Yes    Alcohol/week: 0.0 oz    Comment: Stopped drinking 45 days ago 7/12/17 Going to AA       Review of systems.  As per HPI above. All other systems reviewed and negative.      Past Medical, Social, and Family history reviewed and updated in EPIC     Objective     /80 (BP Location: Left arm, Patient Position: Sitting, BP Cuff Size: Adult)   Pulse 96   Temp 36.7 °C (98 °F) (Temporal)   Resp 18   Ht 1.549 m (5' 1\")   Wt 73.2 kg (161 lb 6 oz)   SpO2 96%    Body mass index is 30.49 kg/m².    General: alert in no apparent distress.  Cardiovascular: regular rate and rhythm  Pulmonary: lungs : no wheezing   Gastrointestinal: BS present. No obvious mass noted  Right arm no swelling redness or deformity noted.  Handgrip 5/5 questionable Tinel's sign      Lab Results   Component Value Date/Time    HBA1C 5.8 (H) 04/03/2023 01:01 PM    HBA1C 6.0 (H) 11/07/2022 09:15 AM    HBA1C 5.8 (H) 09/14/2022 09:25 AM       Lab Results   Component Value Date/Time    WBC 7.1 11/07/2022 09:15 AM    HEMOGLOBIN 12.9 11/07/2022 09:15 AM    HEMATOCRIT 41.2 11/07/2022 09:15 AM    MCV 80.6 (L) 11/07/2022 09:15 AM    PLATELETCT 396 11/07/2022 09:15 AM         Lab Results   Component Value Date/Time    SODIUM 137 04/03/2023 01:01 PM    POTASSIUM 4.5 04/03/2023 01:01 PM    GLUCOSE 111 (H) 04/03/2023 01:01 PM    BUN 21 04/03/2023 01:01 PM    CREATININE 1.03 04/03/2023 01:01 PM    CREATININE 0.89 04/27/2009 12:00 AM       Lab Results   Component Value Date/Time    CHOLSTRLTOT 132 04/03/2023 01:01 PM    TRIGLYCERIDE 77 04/03/2023 01:01 PM    HDL 53 04/03/2023 01:01 PM    LDL 64 04/03/2023 01:01 PM "       Lab Results   Component Value Date/Time    ALTSGPT 12 04/03/2023 01:01 PM             Assessment and Plan     1. Numbness and tingling of right arm  This is a new condition.  Rule out carpal tunnel syndrome versus others.  - Referral to Neurodiagnostics (EEG,EP,EMG/NCS/DBS)  Recommend follow-up after nerve conduction test completed.    2. Severe obesity (HCC)  Chronic condition.  Uncontrolled.  Advised the patient on diet and exercise.  Encouraged the patient to lose weight.    3. Paroxysmal atrial fibrillation (HCC)  Chronic condition.  CHADS2 score of 1  Advised the patient to take aspirin 81 mg daily and continue with diltiazem 240 Mg daily.  Continue follow-up with cardiology service.    4. Stage 3a chronic kidney disease (HCC)  Chronic condition.  Advised the patient to avoid NSAID.  Continue to monitor    5. Asthma-COPD overlap syndrome (HCC)  Chronic stable condition.  Continue with Trelegy 1 inhalation daily and use albuterol as needed.      6. Primary hypertension  Chronic stable condition.  Continue with diltiazem 240 Mg daily and Micardis 40 Mg daily.    7. Dyslipidemia  Chronic stable condition continue with lovastatin 10 mg daily.  Lab test result discussed with the patient.    8. Prediabetes    Chronic condition.  Continue with low sweet low-carb diet.  Patient not interested in taking  medication at this time.                    Healthcare Maintenance       Health Maintenance Due   Topic Date Due    Annual Wellness Visit  05/12/2022    Annual Pulmonary Function Test / Spirometry  04/20/2023               Please note that this dictation was created using voice recognition software. I have made every reasonable attempt to correct obvious errors, but I expect that there are errors of grammar and possibly content that I did not discover before finalizing the note.    Rudy Parsons MD  Internal Medicine  [facility] care [facility]

## 2023-05-11 NOTE — ASSESSMENT & PLAN NOTE
Chronic ongoing condition for the patient followed by cardiology service.  She is currently taking aspirin 81 mg daily and diltiazem 240 Mg daily.  The patient denies chest pain shortness of breath palpitation or near syncope.

## 2023-05-11 NOTE — ASSESSMENT & PLAN NOTE
Chronic ongoing condition.  The patient is taking diltiazem 240 Mg daily.  Blood pressure has been well controlled.  She also take Micardis 40 Mg daily.  No significant side effects reported.

## 2023-05-11 NOTE — ASSESSMENT & PLAN NOTE
Chronic ongoing condition.  The patient is currently using Trelegy 1 inhalation daily.  Patient also has a prescription for albuterol to be used as needed.

## 2023-05-11 NOTE — ASSESSMENT & PLAN NOTE
Chronic ongoing condition.  The patient takes atorvastatin 10 mg daily.  Patient tolerating medication well.  No significant side effects reported.

## 2023-05-11 NOTE — ASSESSMENT & PLAN NOTE
This is a new condition noticed since the last couple of months.  The patient denies recent trauma or injury.  Patient reported that she has been taking care of a baby who is approximately 20 pounds in weight and she has been doing a lot of lifting.  Patient denies fever or chills.  She denies motor weakness.

## 2023-05-11 NOTE — ASSESSMENT & PLAN NOTE
Chronic condition.    Body mass index is 30.49 kg/m².     Counseling on health consequences related to obesity.  Discussed with the patient regarding diet, exercise, and lifestyle modification to help achieve and maintain healthy weight

## 2023-05-11 NOTE — ASSESSMENT & PLAN NOTE
Chronic condition.  Recent A1c 5.8%.  Result discussed with the patient.  Patient not interested in taking any medication at this time

## 2023-05-23 ENCOUNTER — PATIENT OUTREACH (OUTPATIENT)
Dept: HEALTH INFORMATION MANAGEMENT | Facility: OTHER | Age: 73
End: 2023-05-23
Payer: MEDICARE

## 2023-05-23 NOTE — PROGRESS NOTES
5/23/23  Call placed to [place]. Pt graduated from [facility] last month. CHW calling to check in to see if pt needs any resources or would like a CHW to follow her monthly. [facility] did not answer her phone and chw LVM. Pt has [facility] contact information if she needs anything moving forward.

## 2023-06-01 ENCOUNTER — HOSPITAL ENCOUNTER (OUTPATIENT)
Dept: RADIOLOGY | Facility: MEDICAL CENTER | Age: 73
End: 2023-06-01
Attending: INTERNAL MEDICINE
Payer: MEDICARE

## 2023-06-01 DIAGNOSIS — R91.8 PULMONARY NODULES: Chronic | ICD-10-CM

## 2023-06-01 DIAGNOSIS — R91.8 PULMONARY NODULES: ICD-10-CM

## 2023-06-01 PROCEDURE — 71250 CT THORAX DX C-: CPT

## 2023-07-28 ENCOUNTER — HOSPITAL ENCOUNTER (EMERGENCY)
Facility: MEDICAL CENTER | Age: 73
End: 2023-07-28
Attending: EMERGENCY MEDICINE
Payer: MEDICARE

## 2023-07-28 ENCOUNTER — APPOINTMENT (OUTPATIENT)
Dept: RADIOLOGY | Facility: MEDICAL CENTER | Age: 73
End: 2023-07-28
Attending: EMERGENCY MEDICINE
Payer: MEDICARE

## 2023-07-28 VITALS
HEIGHT: 61 IN | SYSTOLIC BLOOD PRESSURE: 130 MMHG | HEART RATE: 78 BPM | WEIGHT: 166.45 LBS | OXYGEN SATURATION: 96 % | RESPIRATION RATE: 18 BRPM | BODY MASS INDEX: 31.43 KG/M2 | DIASTOLIC BLOOD PRESSURE: 75 MMHG | TEMPERATURE: 98 F

## 2023-07-28 DIAGNOSIS — R07.9 CHEST PAIN, UNSPECIFIED TYPE: ICD-10-CM

## 2023-07-28 LAB
ALBUMIN SERPL BCP-MCNC: 4 G/DL (ref 3.2–4.9)
ALBUMIN/GLOB SERPL: 1.3 G/DL
ALP SERPL-CCNC: 96 U/L (ref 30–99)
ALT SERPL-CCNC: 7 U/L (ref 2–50)
ANION GAP SERPL CALC-SCNC: 10 MMOL/L (ref 7–16)
AST SERPL-CCNC: 11 U/L (ref 12–45)
BASOPHILS # BLD AUTO: 1.2 % (ref 0–1.8)
BASOPHILS # BLD: 0.11 K/UL (ref 0–0.12)
BILIRUB SERPL-MCNC: 0.4 MG/DL (ref 0.1–1.5)
BUN SERPL-MCNC: 14 MG/DL (ref 8–22)
CALCIUM ALBUM COR SERPL-MCNC: 8.9 MG/DL (ref 8.5–10.5)
CALCIUM SERPL-MCNC: 8.9 MG/DL (ref 8.5–10.5)
CHLORIDE SERPL-SCNC: 104 MMOL/L (ref 96–112)
CO2 SERPL-SCNC: 23 MMOL/L (ref 20–33)
CREAT SERPL-MCNC: 1.18 MG/DL (ref 0.5–1.4)
EKG IMPRESSION: NORMAL
EOSINOPHIL # BLD AUTO: 0.43 K/UL (ref 0–0.51)
EOSINOPHIL NFR BLD: 4.6 % (ref 0–6.9)
ERYTHROCYTE [DISTWIDTH] IN BLOOD BY AUTOMATED COUNT: 44.6 FL (ref 35.9–50)
GFR SERPLBLD CREATININE-BSD FMLA CKD-EPI: 49 ML/MIN/1.73 M 2
GLOBULIN SER CALC-MCNC: 3.1 G/DL (ref 1.9–3.5)
GLUCOSE SERPL-MCNC: 128 MG/DL (ref 65–99)
HCT VFR BLD AUTO: 40.1 % (ref 37–47)
HGB BLD-MCNC: 12.4 G/DL (ref 12–16)
IMM GRANULOCYTES # BLD AUTO: 0.03 K/UL (ref 0–0.11)
IMM GRANULOCYTES NFR BLD AUTO: 0.3 % (ref 0–0.9)
LYMPHOCYTES # BLD AUTO: 1.73 K/UL (ref 1–4.8)
LYMPHOCYTES NFR BLD: 18.3 % (ref 22–41)
MCH RBC QN AUTO: 24.4 PG (ref 27–33)
MCHC RBC AUTO-ENTMCNC: 30.9 G/DL (ref 32.2–35.5)
MCV RBC AUTO: 78.9 FL (ref 81.4–97.8)
MONOCYTES # BLD AUTO: 0.7 K/UL (ref 0–0.85)
MONOCYTES NFR BLD AUTO: 7.4 % (ref 0–13.4)
NEUTROPHILS # BLD AUTO: 6.43 K/UL (ref 1.82–7.42)
NEUTROPHILS NFR BLD: 68.2 % (ref 44–72)
NRBC # BLD AUTO: 0 K/UL
NRBC BLD-RTO: 0 /100 WBC (ref 0–0.2)
PLATELET # BLD AUTO: 336 K/UL (ref 164–446)
PMV BLD AUTO: 9.3 FL (ref 9–12.9)
POTASSIUM SERPL-SCNC: 4.2 MMOL/L (ref 3.6–5.5)
PROT SERPL-MCNC: 7.1 G/DL (ref 6–8.2)
RBC # BLD AUTO: 5.08 M/UL (ref 4.2–5.4)
SODIUM SERPL-SCNC: 137 MMOL/L (ref 135–145)
TROPONIN T SERPL-MCNC: 8 NG/L (ref 6–19)
WBC # BLD AUTO: 9.4 K/UL (ref 4.8–10.8)

## 2023-07-28 PROCEDURE — 93005 ELECTROCARDIOGRAM TRACING: CPT | Performed by: EMERGENCY MEDICINE

## 2023-07-28 PROCEDURE — 84484 ASSAY OF TROPONIN QUANT: CPT

## 2023-07-28 PROCEDURE — 36415 COLL VENOUS BLD VENIPUNCTURE: CPT

## 2023-07-28 PROCEDURE — 99285 EMERGENCY DEPT VISIT HI MDM: CPT

## 2023-07-28 PROCEDURE — 85025 COMPLETE CBC W/AUTO DIFF WBC: CPT

## 2023-07-28 PROCEDURE — 80053 COMPREHEN METABOLIC PANEL: CPT

## 2023-07-28 PROCEDURE — 93005 ELECTROCARDIOGRAM TRACING: CPT

## 2023-07-28 ASSESSMENT — FIBROSIS 4 INDEX: FIB4 SCORE: 0.89

## 2023-07-28 NOTE — ED NOTES
"Pt discharged to home w/ steady gait. Pt A&Ox4 on RA. Pt in possession of belongings. Pt provided discharge education and information pertaining to medications and follow up appointments. Pt received copy of discharge instructions and verbalized understanding. /75   Pulse 78   Temp 36.7 °C (98 °F) (Temporal)   Resp 18   Ht 1.549 m (5' 1\")   Wt 75.5 kg (166 lb 7.2 oz)   SpO2 96%   BMI 31.45 kg/m²    "

## 2023-07-28 NOTE — ED TRIAGE NOTES
Chief Complaint   Patient presents with    Chest Pain     On/off yesterday     Pt ambulated to triage c/o on/off chest pain since yesterday.   Pt to ekg room

## 2023-07-28 NOTE — ED PROVIDER NOTES
"  ER Provider Note    Scribed for Karlo Ryan M.d. by Tamara Stanley. 7/28/2023  2:01 PM    Primary Care Provider: Rudy Parsons M.D.    CHIEF COMPLAINT  Chief Complaint   Patient presents with    Chest Pain     On/off yesterday     LIMITATION TO HISTORY   Select: : None    HPI/ROS  OUTSIDE HISTORIAN(S):  Significant other present at bedside    EXTERNAL RECORDS REVIEWED  Outpatient Notes reviewed office visit from May 24 where patient did have a pansinusitis    Augusta Rodriguez is a 73 y.o. female with a history of A-fib who presents to the ED for intermittent chest pain onset this morning. Patient reports \"sharp stabs\" to the left lower area. She adds that it occurred last night but this morning had increased pain prompting her to the ED today for further evaluation. She denies any pain with taking a deep breath, leg swelling or abdominal pain. Patient reports no pain currently in the ED. She is a patient of Dr. Ruff. She adds that she is normally active and walks her dogs daily.    PAST MEDICAL HISTORY  Past Medical History:   Diagnosis Date    Apnea, sleep     Arrhythmia     afib    Arthritis     osteo    Asthma     Atrial fibrillation (HCC)     Back pain     Bronchitis     Chickenpox     Constipation     Cough     Daytime sleepiness     Dental disorder     upper dentures    Earache     Frequent urination     Gasping for breath     GERD (gastroesophageal reflux disease) 2/27/2010    Romanian measles     Heartburn     Hypertension     Impaired fasting glucose 2/27/2010    Incontinence of urine     Indigestion     Influenza     Mumps     Nasal drainage     Nausea     Osteopenia 2/24/2010    Osteoporosis     Other specified disorder of intestines     constipation r/t meds    Restless leg syndrome     Scarlet fever     Shortness of breath     Sleep apnea     Snoring     Tremor, essential 2/24/2010    Urinary bladder disorder     Wears glasses     Wheezing     Whooping cough      SURGICAL HISTORY  Past Surgical History: "   Procedure Laterality Date    INCISION AND DRAINAGE GENERAL N/A 10/18/2022    Procedure: INCISION AND DRAINAGE ABSCESS TONGUE;  Surgeon: Paz Suazo M.D.;  Location: SURGERY [facility];  Service: Ent    LUMBAR LAMINECTOMY DISKECTOMY Bilateral 2/4/2017    Procedure: LUMBAR LAMINECTOMY DISKECTOMY POSTERIOR L4-S1 ;  Surgeon: Emil Hitchcock M.D.;  Location: SURGERY [facility];  Service:     CARPAL TUNNEL ENDOSCOPIC  11/17/2012    Performed by Heriberto Quiñones M.D. at SURGERY [facility]    TRIGGER FINGER RELEASE  11/17/2012    Performed by Heriberto Quiñones M.D. at SURGERY [facility]    HIP ARTHROSCOPY  2/23/2009    Performed by SHERLYN CALVO at SURGERY [facility]    ACETABULAR OSTEOTOMY  2/23/2009    Performed by SHERLYN CALVO at SURGERY [facility]    MASS EXCISION ORTHO  2/23/2009    Performed by SHERLYN CALVO at SURGERY [facility]    HIP ARTHROSCOPY  2005    done at [facility]    HAJA BY LAPAROSCOPY  1997    HYSTERECTOMY, TOTAL ABDOMINAL  1979    oopherectomy lacie    BLADDER SUSPENSION      bladder sling    CARPAL TUNNEL RELEASE      HIP REPLACEMENT, TOTAL      HYSTERECTOMY LAPAROSCOPY      LAMINOTOMY      PRIMARY C SECTION  1970/1975     FAMILY HISTORY  Family History   Problem Relation Age of Onset    GI Disease Father         Crohn's    Heart Disease Father         First MI age 30    Hypertension Father     Stroke Father     Hypertension Other     Cancer Brother         ESOPHAGEAL CANCER     SOCIAL HISTORY   reports that she has never smoked. She has never used smokeless tobacco. She reports current alcohol use. She reports that she does not use drugs.    CURRENT MEDICATIONS  Previous Medications    ALBUTEROL 108 (90 BASE) MCG/ACT AERO SOLN INHALATION AEROSOL    INHALE 2 PUFFS BY MOUTH EVERY 6 HOURS AS NEEDED FOR SHORTNESS OF BREATH    AMITRIPTYLINE (ELAVIL) 25 MG TAB    Take 50 mg by mouth every evening.    ASCORBIC ACID (VITAMIN C) 1000 MG TAB    Take 4,000 mg  "by mouth every day.    ASPIRIN 81 MG EC TABLET    Take 81 mg by mouth every day.    ATORVASTATIN (LIPITOR) 10 MG TAB    Take 1 Tablet by mouth every day.    BENZONATATE (TESSALON) 100 MG CAP    Take 1 Capsule by mouth 3 times a day as needed for Cough.    CLOBETASOL (TEMOVATE) 0.05 % OINTMENT    AAA thin layer, daily for 3 weeks, then use 2-3 times a week    DILTIAZEM CD (CARDIZEM CD) 240 MG CAPSULE SR 24 HR    Take 1 Capsule by mouth every day.    DOXYCYCLINE (VIBRAMYCIN) 100 MG CAP    TAKE 1 CAPSULE BY MOUTH EVERY 12 HOURS FOR 14 DAYS    DULOXETINE (CYMBALTA) 60 MG CAP DR PARTICLES DELAYED-RELEASE CAPSULE    Take 1 Capsule by mouth every day.    FLUTICASONE-UMECLIDIN-VILANT (TRELEGY ELLIPTA) 100-62.5-25 MCG/ACT AEROSOL POWDER, BREATH ACTIVATED INHALATION    Inhale 1 Inhalation every day.    PANTOPRAZOLE (PROTONIX) 40 MG TABLET DELAYED RESPONSE    Take 1 Tablet by mouth 2 times a day.    RIZATRIPTAN (MAXALT) 5 MG TABLET    Take 1 Tablet by mouth one time as needed for Migraine.    SALINE NA    Administer 1 Spray into affected nostril(S) every day.    TELMISARTAN (MICARDIS) 40 MG TAB    Take 1 Tablet by mouth every day.    ZONISAMIDE (ZONEGRAN) 100 MG CAP    Take 100 mg by mouth every day.     ALLERGIES  Sumatriptan, Clarithromycin, Ees [erythromycin], Levaquin, Pcn [penicillins], Shellfish allergy, Trazodone, Clarithromycin, Erythromycin, and Penicillin g    PHYSICAL EXAM  /79   Pulse 99   Temp 36.7 °C (98.1 °F)   Resp 18   Ht 1.549 m (5' 1\")   Wt 75.5 kg (166 lb 7.2 oz)   SpO2 95%   BMI 31.45 kg/m²     Constitutional: Well developed, Well nourished, No acute distress, Non-toxic appearance.   HENT: Normocephalic, Atraumatic, Bilateral external ears normal, Oropharynx is clear mucous membranes are moist. No oral exudates or nasal discharge.   Eyes: Pupils are equal round and reactive, EOMI, Conjunctiva normal, No discharge.   Neck: Normal range of motion, No tenderness, Supple, No stridor. No " meningismus.  Lymphatic: No lymphadenopathy noted.   Cardiovascular: Irregular rate and rhythm without murmur rub or gallop.  Thorax & Lungs: Clear breath sounds bilaterally without wheezes, rhonchi or rales. There is no chest wall tenderness.   Abdomen: Soft non-tender non-distended. There is no rebound or guarding. No organomegaly is appreciated. Bowel sounds are normal.  Skin: Normal without rash.   Back: No CVA or spinal tenderness.   Extremities: Intact distal pulses, No edema, No tenderness, No cyanosis, No clubbing. Capillary refill is less than 2 seconds.  Musculoskeletal: Good range of motion in all major joints. No tenderness to palpation or major deformities noted.   Neurologic: Alert & oriented x 3, Normal motor function, Normal sensory function, No focal deficits noted. Reflexes are normal.  Psychiatric: Affect normal, Judgment normal, Mood normal. There is no suicidal ideation or patient reported hallucinations.     DIAGNOSTIC STUDIES & PROCEDURES    Labs:   Labs Reviewed   CBC WITH DIFFERENTIAL - Abnormal; Notable for the following components:       Result Value    MCV 78.9 (*)     MCH 24.4 (*)     MCHC 30.9 (*)     Lymphocytes 18.30 (*)     All other components within normal limits    Narrative:     Biotin intake of greater than 5 mg per day may interfere with  troponin levels, causing false low values.   COMP METABOLIC PANEL - Abnormal; Notable for the following components:    Glucose 128 (*)     AST(SGOT) 11 (*)     All other components within normal limits    Narrative:     Biotin intake of greater than 5 mg per day may interfere with  troponin levels, causing false low values.   ESTIMATED GFR - Abnormal; Notable for the following components:    GFR (CKD-EPI) 49 (*)     All other components within normal limits    Narrative:     Biotin intake of greater than 5 mg per day may interfere with  troponin levels, causing false low values.   TROPONIN    Narrative:     Biotin intake of greater than 5 mg  per day may interfere with  troponin levels, causing false low values.   TROPONIN   All labs reviewed by me.    EKG:   Results for orders placed or performed during the hospital encounter of 23   EKG   Result Value Ref Range    Report       [facility] Emergency Dept.    Test Date:  2023  Pt Name:    CONNIE NORIEGA                    Department: ER  MRN:        [id number]                      Room:  Gender:     Female                       Technician: [id number]  :        [id number]                   Requested By:ER TRIAGE PROTOCOL  Order #:    [id number]                    Reading MD: MARQUITA PRITCHETT MD    Measurements  Intervals                                Axis  Rate:       81                           P:          6  OH:         145                          QRS:        -40  QRSD:       93                           T:          48  QT:         375  QTc:        436    Interpretive Statements  Sinus rhythm  Abnormal R-wave progression, late transition  Inferior infarct, old  Compared to ECG 12/15/2022 11:00:07  Myocardial infarct finding now present  Electronically Signed On 2023 14:16:25 PDT by MARQUITA PRITCHETT MD       I have independently interpreted this EKG.       COURSE & MEDICAL DECISION MAKING    ED Observation Status? No; Patient does not meet criteria for ED Observation.     INITIAL ASSESSMENT AND PLAN  Care Narrative:       2:01 PM - Patient seen and evaluated at bedside.  Patient is asymptomatic currently.  She regularly has these recurrent funny twitchy feelings in her chest.  She is followed by cardiology and does have a history of atrial fibrillation but currently is not in rapid ventricular response.  She says that she feels well and every time she comes in and gets checked it normalizes and she does not have any discomfort     Connie Noriega is a 73 y.o. female with a history of A-fib, who presents with intermittent chest pain onset this morning. Ordered DX-Chest, EKG and lab  work to evaluate.     EKG shows no ischemic changes or dysrhythmia    Laboratory evaluation reveals no leukocytosis, shift, anemia, electrolyte derangements or acidosis and troponin is normal.  Chest x-ray at this point is canceled as it has low utility      I discussed plan for discharge and follow up as outlined below. The patient is stable for discharge at this time and will return for any new or worsening symptoms. Patient verbalizes understanding and support with my plan for discharge.                     DISPOSITION AND DISCUSSIONS  I have discussed management of the patient with the following physicians and JULISA's: None    Discussion of management with other [facility] or appropriate source(s): None     Escalation of care considered, and ultimately not performed: the patient was evaluated by myself, after discussion I have recommended the patient to be discharged.    Barriers to care at this time, including but not limited to:  None noted .       The patient will return for new or worsening symptoms and is stable at the time of discharge.    The patient is referred to a primary physician for blood pressure management, diabetic screening, and for all other preventative health concerns.    DISPOSITION:  Patient will be discharged home in stable condition.    FOLLOW UP:  Rudy Parsons M.D.  [address] [place] [id number]  [phone]    Schedule an appointment as soon as possible for a visit       FINAL IMPRESSION   1. Chest pain, unspecified type      I, Tamara Stanley (Long), am scribing for, and in the presence of, Karlo Ryan M.D..    Electronically signed by: Tamara Stanley (Long), 7/28/2023    IKarlo M.D. personally performed the services described in this documentation, as scribed by Tamara Stanley in my presence, and it is both accurate and complete.    The note accurately reflects work and decisions made by me.  Karlo Ryan M.D.  7/28/2023  2:21 PM

## 2023-07-28 NOTE — DISCHARGE INSTRUCTIONS
Please follow-up with your cardiologist and I think he would benefit from a wearable to understand whether you are occasionally going into a untoward heart rhythm beyond atrial fibrillation

## 2023-07-28 NOTE — ED NOTES
Patient to assigned room. Patient dressed in gown and placed on monitor with call light in reach. Chart up for ERP to see.

## 2023-08-09 PROBLEM — I12.9 HYPERTENSIVE KIDNEY DISEASE WITH STAGE 3A CHRONIC KIDNEY DISEASE: Status: ACTIVE | Noted: 2020-10-19

## 2023-08-09 PROBLEM — D68.69 SECONDARY HYPERCOAGULABLE STATE (HCC): Status: ACTIVE | Noted: 2023-08-09

## 2023-08-09 PROBLEM — I77.819 DILATATION OF AORTA (HCC): Status: ACTIVE | Noted: 2023-08-09

## 2023-08-09 PROBLEM — N18.31 HYPERTENSIVE KIDNEY DISEASE WITH STAGE 3A CHRONIC KIDNEY DISEASE: Status: ACTIVE | Noted: 2020-10-19

## 2023-08-09 PROBLEM — G31.9 CEREBRAL ATROPHY, MILD (HCC): Status: ACTIVE | Noted: 2023-08-09

## 2023-08-09 PROBLEM — F32.5 MAJOR DEPRESSION IN REMISSION (HCC): Status: ACTIVE | Noted: 2019-09-12

## 2023-08-10 ENCOUNTER — HOSPITAL ENCOUNTER (EMERGENCY)
Facility: MEDICAL CENTER | Age: 73
End: 2023-08-10
Attending: EMERGENCY MEDICINE
Payer: MEDICARE

## 2023-08-10 VITALS
BODY MASS INDEX: 30.8 KG/M2 | HEART RATE: 92 BPM | TEMPERATURE: 98.2 F | DIASTOLIC BLOOD PRESSURE: 93 MMHG | RESPIRATION RATE: 18 BRPM | HEIGHT: 61 IN | WEIGHT: 163.14 LBS | OXYGEN SATURATION: 95 % | SYSTOLIC BLOOD PRESSURE: 154 MMHG

## 2023-08-10 DIAGNOSIS — N30.00 ACUTE CYSTITIS WITHOUT HEMATURIA: ICD-10-CM

## 2023-08-10 DIAGNOSIS — R11.2 NAUSEA AND VOMITING, UNSPECIFIED VOMITING TYPE: ICD-10-CM

## 2023-08-10 LAB
ALBUMIN SERPL BCP-MCNC: 4.4 G/DL (ref 3.2–4.9)
ALBUMIN/GLOB SERPL: 1.4 G/DL
ALP SERPL-CCNC: 111 U/L (ref 30–99)
ALT SERPL-CCNC: 8 U/L (ref 2–50)
ANION GAP SERPL CALC-SCNC: 12 MMOL/L (ref 7–16)
APPEARANCE UR: CLEAR
AST SERPL-CCNC: 13 U/L (ref 12–45)
BACTERIA #/AREA URNS HPF: ABNORMAL /HPF
BASOPHILS # BLD AUTO: 0.2 % (ref 0–1.8)
BASOPHILS # BLD: 0.03 K/UL (ref 0–0.12)
BILIRUB SERPL-MCNC: 0.7 MG/DL (ref 0.1–1.5)
BILIRUB UR QL STRIP.AUTO: NEGATIVE
BUN SERPL-MCNC: 16 MG/DL (ref 8–22)
CALCIUM ALBUM COR SERPL-MCNC: 8.8 MG/DL (ref 8.5–10.5)
CALCIUM SERPL-MCNC: 9.1 MG/DL (ref 8.5–10.5)
CHLORIDE SERPL-SCNC: 104 MMOL/L (ref 96–112)
CO2 SERPL-SCNC: 21 MMOL/L (ref 20–33)
COLOR UR: YELLOW
CREAT SERPL-MCNC: 1.05 MG/DL (ref 0.5–1.4)
EOSINOPHIL # BLD AUTO: 0.05 K/UL (ref 0–0.51)
EOSINOPHIL NFR BLD: 0.4 % (ref 0–6.9)
EPI CELLS #/AREA URNS HPF: NEGATIVE /HPF
ERYTHROCYTE [DISTWIDTH] IN BLOOD BY AUTOMATED COUNT: 44.7 FL (ref 35.9–50)
GFR SERPLBLD CREATININE-BSD FMLA CKD-EPI: 56 ML/MIN/1.73 M 2
GLOBULIN SER CALC-MCNC: 3.2 G/DL (ref 1.9–3.5)
GLUCOSE SERPL-MCNC: 133 MG/DL (ref 65–99)
GLUCOSE UR STRIP.AUTO-MCNC: NEGATIVE MG/DL
HCT VFR BLD AUTO: 42.9 % (ref 37–47)
HGB BLD-MCNC: 13.3 G/DL (ref 12–16)
HYALINE CASTS #/AREA URNS LPF: ABNORMAL /LPF
IMM GRANULOCYTES # BLD AUTO: 0.06 K/UL (ref 0–0.11)
IMM GRANULOCYTES NFR BLD AUTO: 0.5 % (ref 0–0.9)
KETONES UR STRIP.AUTO-MCNC: NEGATIVE MG/DL
LEUKOCYTE ESTERASE UR QL STRIP.AUTO: ABNORMAL
LIPASE SERPL-CCNC: 42 U/L (ref 11–82)
LYMPHOCYTES # BLD AUTO: 0.59 K/UL (ref 1–4.8)
LYMPHOCYTES NFR BLD: 4.8 % (ref 22–41)
MCH RBC QN AUTO: 24.3 PG (ref 27–33)
MCHC RBC AUTO-ENTMCNC: 31 G/DL (ref 32.2–35.5)
MCV RBC AUTO: 78.3 FL (ref 81.4–97.8)
MICRO URNS: ABNORMAL
MONOCYTES # BLD AUTO: 0.34 K/UL (ref 0–0.85)
MONOCYTES NFR BLD AUTO: 2.7 % (ref 0–13.4)
NEUTROPHILS # BLD AUTO: 11.32 K/UL (ref 1.82–7.42)
NEUTROPHILS NFR BLD: 91.4 % (ref 44–72)
NITRITE UR QL STRIP.AUTO: POSITIVE
NRBC # BLD AUTO: 0 K/UL
NRBC BLD-RTO: 0 /100 WBC (ref 0–0.2)
PH UR STRIP.AUTO: 7 [PH] (ref 5–8)
PLATELET # BLD AUTO: 375 K/UL (ref 164–446)
PMV BLD AUTO: 9.4 FL (ref 9–12.9)
POTASSIUM SERPL-SCNC: 4.4 MMOL/L (ref 3.6–5.5)
PROT SERPL-MCNC: 7.6 G/DL (ref 6–8.2)
PROT UR QL STRIP: NEGATIVE MG/DL
RBC # BLD AUTO: 5.48 M/UL (ref 4.2–5.4)
RBC # URNS HPF: ABNORMAL /HPF
RBC UR QL AUTO: NEGATIVE
SODIUM SERPL-SCNC: 137 MMOL/L (ref 135–145)
SP GR UR STRIP.AUTO: 1.02
UROBILINOGEN UR STRIP.AUTO-MCNC: 1 MG/DL
WBC # BLD AUTO: 12.4 K/UL (ref 4.8–10.8)
WBC #/AREA URNS HPF: ABNORMAL /HPF

## 2023-08-10 PROCEDURE — 700105 HCHG RX REV CODE 258: Performed by: EMERGENCY MEDICINE

## 2023-08-10 PROCEDURE — 99285 EMERGENCY DEPT VISIT HI MDM: CPT

## 2023-08-10 PROCEDURE — 36415 COLL VENOUS BLD VENIPUNCTURE: CPT

## 2023-08-10 PROCEDURE — 87077 CULTURE AEROBIC IDENTIFY: CPT

## 2023-08-10 PROCEDURE — 700111 HCHG RX REV CODE 636 W/ 250 OVERRIDE (IP): Mod: JZ | Performed by: EMERGENCY MEDICINE

## 2023-08-10 PROCEDURE — 87186 SC STD MICRODIL/AGAR DIL: CPT

## 2023-08-10 PROCEDURE — 80053 COMPREHEN METABOLIC PANEL: CPT

## 2023-08-10 PROCEDURE — 83690 ASSAY OF LIPASE: CPT

## 2023-08-10 PROCEDURE — A9270 NON-COVERED ITEM OR SERVICE: HCPCS | Performed by: EMERGENCY MEDICINE

## 2023-08-10 PROCEDURE — 81001 URINALYSIS AUTO W/SCOPE: CPT

## 2023-08-10 PROCEDURE — 700102 HCHG RX REV CODE 250 W/ 637 OVERRIDE(OP): Performed by: EMERGENCY MEDICINE

## 2023-08-10 PROCEDURE — 85025 COMPLETE CBC W/AUTO DIFF WBC: CPT

## 2023-08-10 PROCEDURE — 96374 THER/PROPH/DIAG INJ IV PUSH: CPT

## 2023-08-10 PROCEDURE — 87086 URINE CULTURE/COLONY COUNT: CPT

## 2023-08-10 RX ORDER — SODIUM CHLORIDE 9 MG/ML
1000 INJECTION, SOLUTION INTRAVENOUS ONCE
Status: COMPLETED | OUTPATIENT
Start: 2023-08-10 | End: 2023-08-10

## 2023-08-10 RX ORDER — ONDANSETRON 2 MG/ML
4 INJECTION INTRAMUSCULAR; INTRAVENOUS ONCE
Status: COMPLETED | OUTPATIENT
Start: 2023-08-10 | End: 2023-08-10

## 2023-08-10 RX ORDER — NITROFURANTOIN 25; 75 MG/1; MG/1
100 CAPSULE ORAL ONCE
Status: DISCONTINUED | OUTPATIENT
Start: 2023-08-10 | End: 2023-08-10

## 2023-08-10 RX ORDER — CEFDINIR 300 MG/1
300 CAPSULE ORAL ONCE
Status: DISCONTINUED | OUTPATIENT
Start: 2023-08-10 | End: 2023-08-10

## 2023-08-10 RX ORDER — CEFDINIR 300 MG/1
300 CAPSULE ORAL ONCE
Status: COMPLETED | OUTPATIENT
Start: 2023-08-10 | End: 2023-08-10

## 2023-08-10 RX ORDER — CEFDINIR 300 MG/1
300 CAPSULE ORAL 2 TIMES DAILY
Qty: 8 CAPSULE | Refills: 0 | Status: ACTIVE | OUTPATIENT
Start: 2023-08-10 | End: 2023-08-14

## 2023-08-10 RX ORDER — ONDANSETRON 4 MG/1
4 TABLET, ORALLY DISINTEGRATING ORAL EVERY 6 HOURS PRN
Qty: 10 TABLET | Refills: 0 | Status: SHIPPED | OUTPATIENT
Start: 2023-08-10

## 2023-08-10 RX ADMIN — CEFDINIR 300 MG: 300 CAPSULE ORAL at 17:02

## 2023-08-10 RX ADMIN — SODIUM CHLORIDE 1000 ML: 9 INJECTION, SOLUTION INTRAVENOUS at 14:50

## 2023-08-10 RX ADMIN — ONDANSETRON 4 MG: 2 INJECTION INTRAMUSCULAR; INTRAVENOUS at 14:49

## 2023-08-10 ASSESSMENT — FIBROSIS 4 INDEX: FIB4 SCORE: 0.89

## 2023-08-10 NOTE — ED NOTES
Waiting on results, .Pt. Resting, no changes, 3 p's addressed, call light at bedside, poc updated

## 2023-08-10 NOTE — ED NOTES
Pt resting, waiting on ua sample, .Pt. Resting, no changes, 3 p's addressed, call light at bedside, poc updated

## 2023-08-10 NOTE — ED NOTES
Pt ambulatory to PUR 81 with slow and steady gait accompanied by .   Agree with triage note. Chart up for ERP review.

## 2023-08-10 NOTE — ED TRIAGE NOTES
Chief Complaint   Patient presents with    N/V    Abdominal Pain     Lower abdomen     Pt ambulated to triage with above complaints since this morning. She was unable to eat or take any of her medications. Denies dysuria , having normal bm

## 2023-08-10 NOTE — ED PROVIDER NOTES
ED Provider Note    CHIEF COMPLAINT  Nausea and vomiting    LIMITATION TO HISTORY   Select: None.    HPI    Augusta Rodriguez is a 73 y.o. female with a sudden onset of vomiting.  Started this morning at 3 AM.  Described as bilious.  It was fairly intermittent.  It was not alleviated with forward attempts at taking Zofran which she vomited up the pills.  There is no exacerbating factors.  Associate with some diffuse abdominal discomfort now.  And some slight cramping.  She is not passing gas.  She has not had a bowel movement.    She has no history of small bowel obstruction although she does have a history of bladder sling surgery gallbladder disease.  Cholecystectomy    No other sick contacts at home    No associated symptoms such as diarrhea, dysuria, dizziness weakness fever    Patient states that she went to bed last night feeling just nauseous.  She woke at 3:00 morning vomiting.  She had no abdominal pain at that time but presented vomiting she is just felt worse.  Her partner is at the bedside also ate the same food she did he is doing fine      OUTSIDE HISTORIAN(S):  Select: Don her partner is at her bedside but he did not give much of a history.  He is feeling fine.    EXTERNAL RECORDS REVIEWED  Select: Other     Yesterday PCP visit  Visit Diagnoses      BMI 31.0-31.9,adult Z68.31    Major depression in remission ([facility]) F32.5    Migraine without status migrainosus, not intractable, unspecified migraine type G43.909    Dyslipidemia E78.5    Paroxysmal atrial fibrillation (HCC) I48.0    Secondary hypercoagulable state ([facility]) D68.69    Primary hypertension I10    Cerebral atrophy, mild (HCC) G31.9    Asthma-COPD overlap syndrome (HCC) J44.9    Hypertensive kidney disease with stage 3a chronic kidney disease (HCC) I12.9, N18.31    Dilatation of aorta ([facility]) I77.819    Tremor R25.1    7.28.2023 ER visit  INITIAL ASSESSMENT AND PLAN  Care Narrative:          Augusta Rodriguez is a 73 y.o. female with a history of  Miriam, who presents with intermittent chest pain onset this morning. Ordered DX-Chest, EKG and lab work to evaluate.      EKG shows no ischemic changes or dysrhythmia     Laboratory evaluation reveals no leukocytosis, shift, anemia, electrolyte derangements or acidosis and troponin is normal.  Chest x-ray at this point is canceled as it has low utility        I discussed plan for discharge and follow up as outlined below. The patient is stable for discharge at this time and will return for any new or worsening symptoms. Patient verbalizes understanding and support with my plan for discharge.      REVIEW OF SYSTEMS  See above.    PAST MEDICAL HISTORY  Past Medical History:   Diagnosis Date    Apnea, sleep     Arrhythmia     afib    Arthritis     osteo    Asthma     Atrial fibrillation (HCC)     Back pain     Bronchitis     Chickenpox     Constipation     Cough     Daytime sleepiness     Dental disorder     upper dentures    Earache     Frequent urination     Gasping for breath     GERD (gastroesophageal reflux disease) 2/27/2010    Uzbek measles     Heartburn     Hypertension     Impaired fasting glucose 2/27/2010    Incontinence of urine     Indigestion     Influenza     Mumps     Nasal drainage     Nausea     Osteopenia 2/24/2010    Osteoporosis     Other specified disorder of intestines     constipation r/t meds    Restless leg syndrome     Scarlet fever     Shortness of breath     Sleep apnea     Snoring     Tremor, essential 2/24/2010    Urinary bladder disorder     Wears glasses     Wheezing     Whooping cough        FAMILY HISTORY  Family History   Problem Relation Age of Onset    GI Disease Father         Crohn's    Heart Disease Father         First MI age 30    Hypertension Father     Stroke Father     Hypertension Other     Cancer Brother         ESOPHAGEAL CANCER       SOCIAL HISTORY  Social History     Tobacco Use    Smoking status: Never    Smokeless tobacco: Never   Vaping Use    Vaping Use: Never  used   Substance Use Topics    Alcohol use: Yes     Alcohol/week: 0.0 oz     Comment: Stopped drinking 45 days ago 7/12/17 Going to     Drug use: No     Social History     Substance and Sexual Activity   Drug Use No       SURGICAL HISTORY  Past Surgical History:   Procedure Laterality Date    INCISION AND DRAINAGE GENERAL N/A 10/18/2022    Procedure: INCISION AND DRAINAGE ABSCESS TONGUE;  Surgeon: Paz Suzao M.D.;  Location: SURGERY [facility];  Service: Ent    LUMBAR LAMINECTOMY DISKECTOMY Bilateral 2/4/2017    Procedure: LUMBAR LAMINECTOMY DISKECTOMY POSTERIOR L4-S1 ;  Surgeon: Emil Hitchcock M.D.;  Location: SURGERY [facility];  Service:     CARPAL TUNNEL ENDOSCOPIC  11/17/2012    Performed by Heriberto Quiñones M.D. at SURGERY [facility] ORS    TRIGGER FINGER RELEASE  11/17/2012    Performed by Heriberto Quiñones M.D. at SURGERY [facility]    HIP ARTHROSCOPY  2/23/2009    Performed by SHERLYN CALVO at SURGERY [facility] ORS    ACETABULAR OSTEOTOMY  2/23/2009    Performed by SHERLYN CALVO at SURGERY [facility] ORS    MASS EXCISION ORTHO  2/23/2009    Performed by SHERLYN CALVO at SURGERY [facility]    HIP ARTHROSCOPY  2005    done at [facility]    HAJA BY LAPAROSCOPY  1997    HYSTERECTOMY, TOTAL ABDOMINAL  1979    oopherectomy lacie    BLADDER SUSPENSION      bladder sling    CARPAL TUNNEL RELEASE      HIP REPLACEMENT, TOTAL      HYSTERECTOMY LAPAROSCOPY      LAMINOTOMY      PRIMARY C SECTION  1970/1975       CURRENT MEDICATIONS  No current facility-administered medications for this encounter.    Current Outpatient Medications:     doxycycline (VIBRAMYCIN) 100 MG Cap, TAKE 1 CAPSULE BY MOUTH EVERY 12 HOURS FOR 14 DAYS, Disp: , Rfl:     DULoxetine (CYMBALTA) 60 MG Cap DR Particles delayed-release capsule, Take 1 Capsule by mouth every day., Disp: 90 Capsule, Rfl: 3    rizatriptan (MAXALT) 5 MG tablet, Take 1 Tablet by mouth one time as needed for Migraine., Disp: 10 Tablet,  Rfl: 5    clobetasol (TEMOVATE) 0.05 % Ointment, AAA thin layer, daily for 3 weeks, then use 2-3 times a week, Disp: 60 g, Rfl: 1    atorvastatin (LIPITOR) 10 MG Tab, Take 1 Tablet by mouth every day., Disp: 100 Tablet, Rfl: 3    dilTIAZem CD (CARDIZEM CD) 240 MG CAPSULE SR 24 HR, Take 1 Capsule by mouth every day., Disp: 90 Capsule, Rfl: 3    pantoprazole (PROTONIX) 40 MG Tablet Delayed Response, Take 1 Tablet by mouth 2 times a day., Disp: 180 Tablet, Rfl: 3    telmisartan (MICARDIS) 40 MG Tab, Take 1 Tablet by mouth every day., Disp: 100 Tablet, Rfl: 3    benzonatate (TESSALON) 100 MG Cap, Take 1 Capsule by mouth 3 times a day as needed for Cough., Disp: 60 Capsule, Rfl: 0    fluticasone-umeclidin-vilant (TRELEGY ELLIPTA) 100-62.5-25 MCG/ACT AEROSOL POWDER, BREATH ACTIVATED inhalation, Inhale 1 Inhalation every day., Disp: 1 Each, Rfl: 11    amitriptyline (ELAVIL) 25 MG Tab, Take 50 mg by mouth every evening., Disp: , Rfl:     Ascorbic Acid (VITAMIN C) 1000 MG Tab, Take 4,000 mg by mouth every day., Disp: , Rfl:     albuterol 108 (90 Base) MCG/ACT Aero Soln inhalation aerosol, INHALE 2 PUFFS BY MOUTH EVERY 6 HOURS AS NEEDED FOR SHORTNESS OF BREATH, Disp: 8.5 Each, Rfl: 5    zonisamide (ZONEGRAN) 100 MG Cap, Take 100 mg by mouth every day., Disp: , Rfl:     SALINE NA, Administer 1 Spray into affected nostril(S) every day., Disp: , Rfl:     aspirin 81 MG EC tablet, Take 81 mg by mouth every day., Disp: , Rfl:     ALLERGIES  Allergies   Allergen Reactions    Sumatriptan Swelling     Tongue swell    Clarithromycin Itching, Nausea and Swelling     Swelling/Itching/Nausea    Ees [Erythromycin] Itching, Nausea and Swelling     Swelling/Itching/Nausea    Levaquin Myalgia and Unspecified     Muscle soreness     Pcn [Penicillins] Itching, Nausea and Swelling     Swelling/Itching/Nausea     Shellfish Allergy Itching, Nausea and Swelling     Swelling/Itching/Nausea    Trazodone Swelling and Unspecified    Clarithromycin  "Unspecified    Erythromycin Unspecified    Penicillin G Unspecified       PHYSICAL EXAM  VITAL SIGNS: /74   Pulse (!) 106   Temp 36 °C (96.8 °F) (Temporal)   Resp 18   Ht 1.549 m (5' 1\")   Wt 74 kg (163 lb 2.3 oz)   SpO2 96%   BMI 30.83 kg/m²   Reviewed and noted.  Constitutional: Well developed, Well nourished, well appearing.  HENT: Normocephalic, atraumatic, bilateral external ears normal, No intraoral erythema, edema, exudate  Eyes: PERRLA, conjunctiva pink, no scleral icterus.   Cardiovascular: Regular rate and rhythm. No murmurs, rubs or gallops.  No dependent edema or calf tenderness  Respiratory: Lungs clear to auscultation bilaterally. No wheezes, rales, or rhonchi.  Abdominal:  Abdomen soft, slightly distended.  Normal bowel sounds no high-pitched bowel sounds.  Skin: No erythema, no rash. No wounds or bruising.  Genitourinary: No costovertebral angle tenderness.   Musculoskeletal: no deformities.   Neurologic: Alert, no facial droop noted. All extra ocular muscles intact. Moves all extremities with out weakness noted  Psychiatric: Affect normal, Judgment normal, Mood normal.         MEDICAL DECISION MAKING:  PROBLEMS EVALUATED THIS VISIT:  Vomiting.  The patient at this point could have vomiting from symptoms such as food poisoning viral etiology less likely bowel obstruction still on the differential.  At this point less likely appendicitis, doubt urinary tract infection vascular ischemia as the patient simply only has vomiting.  Abdominal discomfort is occurred after the vomiting.  At this point patient's belly exam does not really resolve a surgical abdomen.         PLAN:  IV established by ED physician  Physical 4 mg Zofran IV  Try with ice chips  Consider doing a CT scan if not improved      RISK:  Abdominal pain can be a sign of infectious surgical metabolic hematologic among other emergencies.  Patient looks well nontoxic.  She has some symptoms that are slightly concerning but overall " looks well and is improving with treatment.    RESULTS      LABS Ordered and Reviewed by Me:  Results for orders placed or performed during the hospital encounter of 08/10/23   CBC with Differential   Result Value Ref Range    WBC 12.4 (H) 4.8 - 10.8 K/uL    RBC 5.48 (H) 4.20 - 5.40 M/uL    Hemoglobin 13.3 12.0 - 16.0 g/dL    Hematocrit 42.9 37.0 - 47.0 %    MCV 78.3 (L) 81.4 - 97.8 fL    MCH 24.3 (L) 27.0 - 33.0 pg    MCHC 31.0 (L) 32.2 - 35.5 g/dL    RDW 44.7 35.9 - 50.0 fL    Platelet Count 375 164 - 446 K/uL    MPV 9.4 9.0 - 12.9 fL    Neutrophils-Polys 91.40 (H) 44.00 - 72.00 %    Lymphocytes 4.80 (L) 22.00 - 41.00 %    Monocytes 2.70 0.00 - 13.40 %    Eosinophils 0.40 0.00 - 6.90 %    Basophils 0.20 0.00 - 1.80 %    Immature Granulocytes 0.50 0.00 - 0.90 %    Nucleated RBC 0.00 0.00 - 0.20 /100 WBC    Neutrophils (Absolute) 11.32 (H) 1.82 - 7.42 K/uL    Lymphs (Absolute) 0.59 (L) 1.00 - 4.80 K/uL    Monos (Absolute) 0.34 0.00 - 0.85 K/uL    Eos (Absolute) 0.05 0.00 - 0.51 K/uL    Baso (Absolute) 0.03 0.00 - 0.12 K/uL    Immature Granulocytes (abs) 0.06 0.00 - 0.11 K/uL    NRBC (Absolute) 0.00 K/uL   Complete Metabolic Panel   Result Value Ref Range    Sodium 137 135 - 145 mmol/L    Potassium 4.4 3.6 - 5.5 mmol/L    Chloride 104 96 - 112 mmol/L    Co2 21 20 - 33 mmol/L    Anion Gap 12.0 7.0 - 16.0    Glucose 133 (H) 65 - 99 mg/dL    Bun 16 8 - 22 mg/dL    Creatinine 1.05 0.50 - 1.40 mg/dL    Calcium 9.1 8.5 - 10.5 mg/dL    Correct Calcium 8.8 8.5 - 10.5 mg/dL    AST(SGOT) 13 12 - 45 U/L    ALT(SGPT) 8 2 - 50 U/L    Alkaline Phosphatase 111 (H) 30 - 99 U/L    Total Bilirubin 0.7 0.1 - 1.5 mg/dL    Albumin 4.4 3.2 - 4.9 g/dL    Total Protein 7.6 6.0 - 8.2 g/dL    Globulin 3.2 1.9 - 3.5 g/dL    A-G Ratio 1.4 g/dL   Lipase   Result Value Ref Range    Lipase 42 11 - 82 U/L   Urinalysis    Specimen: Urine   Result Value Ref Range    Color Yellow     Character Clear     Specific Gravity 1.017 <1.035    Ph 7.0 5.0 -  8.0    Glucose Negative Negative mg/dL    Ketones Negative Negative mg/dL    Protein Negative Negative mg/dL    Bilirubin Negative Negative    Urobilinogen, Urine 1.0 Negative    Nitrite Positive (A) Negative    Leukocyte Esterase Small (A) Negative    Occult Blood Negative Negative    Micro Urine Req Microscopic    ESTIMATED GFR   Result Value Ref Range    GFR (CKD-EPI) 56 (A) >60 mL/min/1.73 m 2   URINE MICROSCOPIC (W/UA)   Result Value Ref Range    WBC 10-20 (A) /hpf    RBC 0-2 /hpf    Bacteria Many (A) None /hpf    Epithelial Cells Negative /hpf    Hyaline Cast 0-2 /lpf           ED COURSE:    ED Observation Status? Yes;   Patient placed in observation status at 2:02 PM 08/10/23     Observation plan is as follows:   IV fluids  Zofran  Repeat evaluation  Urinalysis  Lab work.    INTERVENTIONS BY ME:  Medications   NS (Bolus) infusion 1,000 mL (0 mL Intravenous Stopped 8/10/23 1526)   ondansetron (Zofran) syringe/vial injection 4 mg (4 mg Intravenous Given 8/10/23 1449)   cefdinir (Omnicef) capsule 300 mg (300 mg Oral Given 8/10/23 1702)       Response on recheck:  Patient at this point is drinking fluids feeling better walking around..      I have discussed management of the patient with the following physicians and sources:   None at this time as patient be discharged home.    Patient discharged from ED observation at 5:30 PM 08/10/23  .        Diagnostic tests and prescription drugs considered including, but not limited to: Select: She will go home and antibiotics..    Escalation of care considered, and ultimately not performed: None at none at this time as she is has clinically improved..     Barriers to care at this time, including but not limited to: Select: None at this time..       FINAL DISPO PLAN   Discharge Medication List as of 8/10/2023  5:07 PM        START taking these medications    Details   ondansetron (ZOFRAN ODT) 4 MG TABLET DISPERSIBLE Take 1 Tablet by mouth every 6 hours as needed for  Nausea/Vomiting., Disp-10 Tablet, R-0, Normal      cefdinir (OMNICEF) 300 MG Cap Take 1 Capsule by mouth 2 times a day for 4 days., Disp-8 Capsule, R-0, Normal               Followup:  Turn to the ER if you are not improved    Had a long discussion with her about her signs and symptoms she looks clinically well she does not have any signs of bowel obstruction no abdominal distention no high-pitched bowel sounds she is significantly improved and this came on quite quickly and patient is improved quite quickly.  In addition it is hard for me to think is a urinary tract infection and signs of early urosepsis that she has vomiting which is unusual finding and she looks well.  I think at this point we can give her oral antibiotics watch her closely return tomorrow if not improved the patient is agreeable to this.    CONDITION: Improved.     FINAL IMPRESSION  1. Acute cystitis without hematuria    2. Nausea and vomiting, unspecified vomiting type       ED Observation Care

## 2023-08-10 NOTE — LETTER
8/18/2023               Augusta Rodriguez  [address] [place] [address]        Dear Augusta (MR#[id number])    This letter is sent in regards to your recent visit to the [facility] Emergency Department on 8/10/2023. During the visit, tests were performed to assist the physician in your medical diagnosis. A review of your tests requires that we notify you of the following:    Your urine culture was POSITIVE for a bacteria called Escherichia coli. The antibiotic prescribed for you (cefdinir) should be active to treat this bacteria. It is important that you continue taking your antibiotic until it is finished.     Please feel free to contact me at the number below if you have any questions or concerns. Thank you for your cooperation in the matter.    Sincerely,  ED Culture Follow-Up Staff  Hever Goodwin, PharmD    [facility], Emergency Department  [address] [place] [id number] [phone] (ED Culture Line)

## 2023-08-19 NOTE — ED NOTES
ED Positive Culture Follow-up/Notification Note:    Date: 8/18/23     Patient seen in the ED on 8/10/2023 for evaluation of sudden onset of vomiting. Started at 3 am this morning. Patient reports vomit as bilious and episodes were intermittent. Patient tried zofran but vomited the medication. Patient has some abdominal discomfort and some cramping. Patient denies gas and bowel movement since episodes. Patient has history of cholecystectomy.   Physical examination  Abdominal:  Abdomen soft, slightly distended.  Normal bowel sounds no high-pitched bowel sounds  1. Acute cystitis without hematuria    2. Nausea and vomiting, unspecified vomiting type       Discharge Medication List as of 8/10/2023  5:07 PM        START taking these medications    Details   ondansetron (ZOFRAN ODT) 4 MG TABLET DISPERSIBLE Take 1 Tablet by mouth every 6 hours as needed for Nausea/Vomiting., Disp-10 Tablet, R-0, Normal      cefdinir (OMNICEF) 300 MG Cap Take 1 Capsule by mouth 2 times a day for 4 days., Disp-8 Capsule, R-0, Normal             Allergies: Sumatriptan, Clarithromycin, Ees [erythromycin], Levaquin, Pcn [penicillins], Shellfish allergy, Trazodone, Clarithromycin, Erythromycin, and Penicillin g     Vitals:    08/10/23 1452 08/10/23 1506 08/10/23 1621 08/10/23 1702   BP:  (!) 151/85 (!) 154/98 (!) 154/93   Pulse: 89 88 82 92   Resp:    18   Temp:    36.8 °C (98.2 °F)   TempSrc:    Tympanic   SpO2: 92% 89% 95% 95%   Weight:       Height:           Final cultures:   Results       Procedure Component Value Units Date/Time    URINE CULTURE(NEW) [[id number]]  (Abnormal)  (Susceptibility) Collected: 08/10/23 1528    Order Status: Completed Specimen: Urine, Clean Catch Updated: 08/13/23 1048     Significant Indicator POS     Source UR     Site URINE, CLEAN CATCH     Culture Result -      Escherichia coli  >100,000 cfu/mL      Narrative:      Indication for culture:->Evaluation for sepsis without a  clear source of infection  Indication  for culture:->Evaluation for sepsis without a    Susceptibility       Escherichia coli (1)       Antibiotic Interpretation Microscan   Method Status    Amikacin  [*]  Sensitive <=16 mcg/mL LAURA Final    Ampicillin Resistant >16 mcg/mL LAURA Final    Amoxicillin/CA  [*]  Sensitive <=8/4 mcg/mL LAURA Final    Aztreonam  [*]  Sensitive <=4 mcg/mL LAURA Final    Ceftolozane+Tazobactam  [*]  Sensitive <=2 mcg/mL LAURA Final    Ceftriaxone Sensitive <=1 mcg/mL LAURA Final    Ceftazidime  [*]  Sensitive <=1 mcg/mL LAURA Final    Cefazolin Sensitive <=2 mcg/mL LAURA Final     Breakpoints when Cefazolin is used for therapy of infections  other than uncomplicated UTIs due to Enterobacterales are as  follows:  LAURA and Interpretation:  <=2 S  4 I  >=8 R         Ciprofloxacin Sensitive <=0.25 mcg/mL LAURA Final    Cefepime Sensitive <=2 mcg/mL LAURA Final    Cefuroxime Sensitive <=4 mcg/mL LAURA Final    Ceftazidime+Avibactam  [*]  Sensitive <=4 mcg/mL LAURA Final    Ampicillin/sulbactam Intermediate 16/8 mcg/mL LAURA Final    Ertapenem  [*]  Sensitive <=0.5 mcg/mL LAURA Final    Tobramycin Sensitive <=2 mcg/mL LAURA Final    Nitrofurantoin Sensitive <=32 mcg/mL LAURA Final    Gentamicin Sensitive <=2 mcg/mL LAURA Final    Imipenem  [*]  Sensitive <=1 mcg/mL LAURA Final    Levofloxacin Sensitive <=0.5 mcg/mL LAURA Final    Meropenem  [*]  Sensitive <=1 mcg/mL LAURA Final    Meropenem/Vaborbactam  [*]  Sensitive <=2 mcg/mL LAURA Final    Minocycline Sensitive <=4 mcg/mL LAURA Final    Pip/Tazobactam Sensitive <=8 mcg/mL LAURA Final    Trimeth/Sulfa Sensitive <=0.5/9.5 mcg/mL LAURA Final    Tetracycline  [*]  Sensitive <=4 mcg/mL LAURA Final    Tigecycline Sensitive <=2 mcg/mL LAURA Final               [*]  Suppressed Antibiotic                           Plan:   Urine culture positive for Escherichia coli susceptible to cefdinir. Appropriate antibiotic therapy prescribed. No changes required based upon culture result.  Sent letter to patient to notify of positive culture result  and encourage compliance with prescribed antibiotics.     Hever Goodwin, PharmD

## 2023-08-21 ENCOUNTER — OFFICE VISIT (OUTPATIENT)
Dept: MEDICAL GROUP | Facility: PHYSICIAN GROUP | Age: 73
End: 2023-08-21
Payer: MEDICARE

## 2023-08-21 VITALS
WEIGHT: 168 LBS | BODY MASS INDEX: 31.72 KG/M2 | RESPIRATION RATE: 16 BRPM | HEART RATE: 99 BPM | DIASTOLIC BLOOD PRESSURE: 74 MMHG | HEIGHT: 61 IN | SYSTOLIC BLOOD PRESSURE: 132 MMHG | TEMPERATURE: 97.4 F | OXYGEN SATURATION: 96 %

## 2023-08-21 DIAGNOSIS — E66.01 SEVERE OBESITY (HCC): Chronic | ICD-10-CM

## 2023-08-21 DIAGNOSIS — I48.0 PAROXYSMAL ATRIAL FIBRILLATION (HCC): ICD-10-CM

## 2023-08-21 DIAGNOSIS — N18.31 STAGE 3A CHRONIC KIDNEY DISEASE: ICD-10-CM

## 2023-08-21 DIAGNOSIS — I12.9 HYPERTENSIVE KIDNEY DISEASE WITH STAGE 3A CHRONIC KIDNEY DISEASE: ICD-10-CM

## 2023-08-21 DIAGNOSIS — M79.601 RIGHT ARM PAIN: ICD-10-CM

## 2023-08-21 DIAGNOSIS — N18.31 HYPERTENSIVE KIDNEY DISEASE WITH STAGE 3A CHRONIC KIDNEY DISEASE: ICD-10-CM

## 2023-08-21 DIAGNOSIS — F32.5 MAJOR DEPRESSION IN REMISSION (HCC): ICD-10-CM

## 2023-08-21 DIAGNOSIS — J44.89 ASTHMA-COPD OVERLAP SYNDROME (HCC): ICD-10-CM

## 2023-08-21 PROBLEM — N18.30 CKD (CHRONIC KIDNEY DISEASE) STAGE 3, GFR 30-59 ML/MIN: Status: ACTIVE | Noted: 2023-08-21

## 2023-08-21 PROCEDURE — 3075F SYST BP GE 130 - 139MM HG: CPT | Performed by: INTERNAL MEDICINE

## 2023-08-21 PROCEDURE — 99214 OFFICE O/P EST MOD 30 MIN: CPT | Performed by: INTERNAL MEDICINE

## 2023-08-21 PROCEDURE — 3078F DIAST BP <80 MM HG: CPT | Performed by: INTERNAL MEDICINE

## 2023-08-21 RX ORDER — AZELASTINE HYDROCHLORIDE 137 UG/1
SPRAY, METERED NASAL
COMMUNITY
Start: 2023-08-21 | End: 2023-08-21

## 2023-08-21 RX ORDER — ASPIRIN 81 MG/1
TABLET ORAL
COMMUNITY
End: 2023-08-21

## 2023-08-21 RX ORDER — CLINDAMYCIN HYDROCHLORIDE 150 MG/1
150 CAPSULE ORAL 3 TIMES DAILY
Qty: 15 CAPSULE | Refills: 0 | Status: SHIPPED | OUTPATIENT
Start: 2023-08-21 | End: 2023-08-30

## 2023-08-21 RX ORDER — FLUTICASONE PROPIONATE 50 MCG
SPRAY, SUSPENSION (ML) NASAL
COMMUNITY
Start: 2023-05-24 | End: 2023-08-21

## 2023-08-21 RX ORDER — FLUTICASONE PROPIONATE 50 MCG
SPRAY, SUSPENSION (ML) NASAL
COMMUNITY
Start: 2023-08-19 | End: 2023-08-21

## 2023-08-21 RX ORDER — METHYLPREDNISOLONE 4 MG/1
TABLET ORAL
Qty: 21 TABLET | Refills: 0 | Status: SHIPPED | OUTPATIENT
Start: 2023-08-21 | End: 2023-12-06

## 2023-08-21 RX ORDER — AZELASTINE 1 MG/ML
SPRAY, METERED NASAL
COMMUNITY
Start: 2023-05-24 | End: 2023-08-21

## 2023-08-21 ASSESSMENT — PATIENT HEALTH QUESTIONNAIRE - PHQ9
SUM OF ALL RESPONSES TO PHQ9 QUESTIONS 1 AND 2: 0
1. LITTLE INTEREST OR PLEASURE IN DOING THINGS: NOT AT ALL
9. THOUGHTS THAT YOU WOULD BE BETTER OFF DEAD, OR OF HURTING YOURSELF: NOT AT ALL
SUM OF ALL RESPONSES TO PHQ QUESTIONS 1-9: 0
5. POOR APPETITE OR OVEREATING: NOT AT ALL
8. MOVING OR SPEAKING SO SLOWLY THAT OTHER PEOPLE COULD HAVE NOTICED. OR THE OPPOSITE, BEING SO FIGETY OR RESTLESS THAT YOU HAVE BEEN MOVING AROUND A LOT MORE THAN USUAL: NOT AT ALL
6. FEELING BAD ABOUT YOURSELF - OR THAT YOU ARE A FAILURE OR HAVE LET YOURSELF OR YOUR FAMILY DOWN: NOT AL ALL
2. FEELING DOWN, DEPRESSED, IRRITABLE, OR HOPELESS: NOT AT ALL
4. FEELING TIRED OR HAVING LITTLE ENERGY: NOT AT ALL
3. TROUBLE FALLING OR STAYING ASLEEP OR SLEEPING TOO MUCH: NOT AT ALL
7. TROUBLE CONCENTRATING ON THINGS, SUCH AS READING THE NEWSPAPER OR WATCHING TELEVISION: NOT AT ALL

## 2023-08-21 ASSESSMENT — FIBROSIS 4 INDEX: FIB4 SCORE: 0.88

## 2023-08-21 NOTE — ASSESSMENT & PLAN NOTE
Chronic ongoing condition.  The patient uses albuterol as needed.  Patient denies shortness of breath wheezing or significant cough.

## 2023-08-21 NOTE — ASSESSMENT & PLAN NOTE
Chronic ongoing condition.  The patient is taking diltiazem 240 Mg daily.  Blood pressure has been well controlled no significant side effects reported.

## 2023-08-21 NOTE — PROGRESS NOTES
PRIMARY CARE CLINIC VISIT    Chief complaint:    Right arm pain.  Had the shingle vaccine yesterday  Follow-up atrial fibrillation  Obesity  Depression  Hypertension  Asthma  CKD 3      History of Present Illness     Right arm pain  New condition  Patient reported that she received shingle vaccine at [facility] pharmacy yesterday  Today the patient noted redness swelling and pain right deltoid region where the shot was given.  Patient denies fever or chills.  Denies shortness of breath or wheezing or chest pain.    Paroxysmal atrial fibrillation (HCC)  Chronic ongoing condition.  Patient followed by cardiology service.  Patient is taking diltiazem 240 Mg daily.  She denies chest pain shortness of breath palpitation or near syncope.    Severe obesity ([facility])  Chronic condition.    Counseling on health consequences related to obesity.  Discussed with the patient regarding diet, exercise, and lifestyle modification to help achieve and maintain healthy weight          Major depression in remission ([facility])  This is a chronic ongoing condition.  The patient takes Cymbalta 60 mg daily.  Patient tolerating medication well.  She denies SI.    Hypertensive kidney disease with stage 3a chronic kidney disease ([facility])  Chronic ongoing condition.  The patient is taking diltiazem 240 Mg daily.  Blood pressure has been well controlled no significant side effects reported.    Asthma-COPD overlap syndrome ([facility])  Chronic ongoing condition.  The patient uses albuterol as needed.  Patient denies shortness of breath wheezing or significant cough.    CKD (chronic kidney disease) stage 3, GFR 30-59 ml/min ([facility])  Chronic condition.  Previous GFR 40 to 50s.  Advised the patient to avoid NSAIDs.  Lab test reviewed with the patient.  Continue to monitor.    Current Outpatient Medications on File Prior to Visit   Medication Sig Dispense Refill    ondansetron (ZOFRAN ODT) 4 MG TABLET DISPERSIBLE Take 1 Tablet by mouth every 6 hours as needed for  Nausea/Vomiting. 10 Tablet 0    DULoxetine (CYMBALTA) 60 MG Cap DR Particles delayed-release capsule Take 1 Capsule by mouth every day. 90 Capsule 3    rizatriptan (MAXALT) 5 MG tablet Take 1 Tablet by mouth one time as needed for Migraine. 10 Tablet 5    clobetasol (TEMOVATE) 0.05 % Ointment AAA thin layer, daily for 3 weeks, then use 2-3 times a week 60 g 1    atorvastatin (LIPITOR) 10 MG Tab Take 1 Tablet by mouth every day. 100 Tablet 3    dilTIAZem CD (CARDIZEM CD) 240 MG CAPSULE SR 24 HR Take 1 Capsule by mouth every day. 90 Capsule 3    pantoprazole (PROTONIX) 40 MG Tablet Delayed Response Take 1 Tablet by mouth 2 times a day. 180 Tablet 3    telmisartan (MICARDIS) 40 MG Tab Take 1 Tablet by mouth every day. 100 Tablet 3    benzonatate (TESSALON) 100 MG Cap Take 1 Capsule by mouth 3 times a day as needed for Cough. 60 Capsule 0    fluticasone-umeclidin-vilant (TRELEGY ELLIPTA) 100-62.5-25 MCG/ACT AEROSOL POWDER, BREATH ACTIVATED inhalation Inhale 1 Inhalation every day. 1 Each 11    albuterol 108 (90 Base) MCG/ACT Aero Soln inhalation aerosol INHALE 2 PUFFS BY MOUTH EVERY 6 HOURS AS NEEDED FOR SHORTNESS OF BREATH 8.5 Each 5    zonisamide (ZONEGRAN) 100 MG Cap Take 100 mg by mouth every day.      SALINE NA Administer 1 Spray into affected nostril(S) every day.      Ascorbic Acid (VITAMIN C) 1000 MG Tab Take 4,000 mg by mouth every day. (Patient not taking: Reported on 8/21/2023)       No current facility-administered medications on file prior to visit.        Allergies: Sumatriptan, Clarithromycin, Ees [erythromycin], Levaquin, Pcn [penicillins], Shellfish allergy, Trazodone, Clarithromycin, Erythromycin, and Penicillin g    Current Outpatient Medications Ordered in Epic   Medication Sig Dispense Refill    clindamycin (CLEOCIN) 150 MG Cap Take 1 Capsule by mouth 3 times a day. 15 Capsule 0    methylPREDNISolone (MEDROL DOSEPAK) 4 MG Tablet Therapy Pack As directed on the packaging label. 21 Tablet 0     ondansetron (ZOFRAN ODT) 4 MG TABLET DISPERSIBLE Take 1 Tablet by mouth every 6 hours as needed for Nausea/Vomiting. 10 Tablet 0    DULoxetine (CYMBALTA) 60 MG Cap DR Particles delayed-release capsule Take 1 Capsule by mouth every day. 90 Capsule 3    rizatriptan (MAXALT) 5 MG tablet Take 1 Tablet by mouth one time as needed for Migraine. 10 Tablet 5    clobetasol (TEMOVATE) 0.05 % Ointment AAA thin layer, daily for 3 weeks, then use 2-3 times a week 60 g 1    atorvastatin (LIPITOR) 10 MG Tab Take 1 Tablet by mouth every day. 100 Tablet 3    dilTIAZem CD (CARDIZEM CD) 240 MG CAPSULE SR 24 HR Take 1 Capsule by mouth every day. 90 Capsule 3    pantoprazole (PROTONIX) 40 MG Tablet Delayed Response Take 1 Tablet by mouth 2 times a day. 180 Tablet 3    telmisartan (MICARDIS) 40 MG Tab Take 1 Tablet by mouth every day. 100 Tablet 3    benzonatate (TESSALON) 100 MG Cap Take 1 Capsule by mouth 3 times a day as needed for Cough. 60 Capsule 0    fluticasone-umeclidin-vilant (TRELEGY ELLIPTA) 100-62.5-25 MCG/ACT AEROSOL POWDER, BREATH ACTIVATED inhalation Inhale 1 Inhalation every day. 1 Each 11    albuterol 108 (90 Base) MCG/ACT Aero Soln inhalation aerosol INHALE 2 PUFFS BY MOUTH EVERY 6 HOURS AS NEEDED FOR SHORTNESS OF BREATH 8.5 Each 5    zonisamide (ZONEGRAN) 100 MG Cap Take 100 mg by mouth every day.      SALINE NA Administer 1 Spray into affected nostril(S) every day.      Ascorbic Acid (VITAMIN C) 1000 MG Tab Take 4,000 mg by mouth every day. (Patient not taking: Reported on 8/21/2023)       No current [facility]-ordered facility-administered medications on file.       Past Medical History:   Diagnosis Date    Apnea, sleep     Arrhythmia     afib    Arthritis     osteo    Asthma     Atrial fibrillation (HCC)     Back pain     Bronchitis     Chickenpox     Constipation     Cough     Daytime sleepiness     Dental disorder     upper dentures    Earache     Frequent urination     Gasping for breath     GERD (gastroesophageal  reflux disease) 2/27/2010    [place] measles     Heartburn     Hypertension     Impaired fasting glucose 2/27/2010    Incontinence of urine     Indigestion     Influenza     Mumps     Nasal drainage     Nausea     Osteopenia 2/24/2010    Osteoporosis     Other specified disorder of intestines     constipation r/t meds    Restless leg syndrome     Scarlet fever     Shortness of breath     Sleep apnea     Snoring     Tremor, essential 2/24/2010    Urinary bladder disorder     Wears glasses     Wheezing     Whooping cough        Past Surgical History:   Procedure Laterality Date    INCISION AND DRAINAGE GENERAL N/A 10/18/2022    Procedure: INCISION AND DRAINAGE ABSCESS TONGUE;  Surgeon: Paz Suazo M.D.;  Location: SURGERY [facility];  Service: Ent    LUMBAR LAMINECTOMY DISKECTOMY Bilateral 2/4/2017    Procedure: LUMBAR LAMINECTOMY DISKECTOMY POSTERIOR L4-S1 ;  Surgeon: Emil Hitchcock M.D.;  Location: SURGERY [facility] [place];  Service:     CARPAL TUNNEL ENDOSCOPIC  11/17/2012    Performed by Heriberto Quiñones M.D. at SURGERY [facility] [place]    TRIGGER FINGER RELEASE  11/17/2012    Performed by Heriberto Quiñones M.D. at SURGERY [facility] [place]    HIP ARTHROSCOPY  2/23/2009    Performed by SHERLYN CALVO at SURGERY [facility] ORS    ACETABULAR OSTEOTOMY  2/23/2009    Performed by SHERLYN CALVO at SURGERY [facility] ORS    MASS EXCISION ORTHO  2/23/2009    Performed by SHERLYN CALVO at SURGERY [facility] ORS    HIP ARTHROSCOPY  2005    done at [facility]    HAJA BY LAPAROSCOPY  1997    HYSTERECTOMY, TOTAL ABDOMINAL  1979    oopherectomy lacie    BLADDER SUSPENSION      bladder sling    CARPAL TUNNEL RELEASE      HIP REPLACEMENT, TOTAL      HYSTERECTOMY LAPAROSCOPY      LAMINOTOMY      PRIMARY C SECTION  1970/1975       Family History   Problem Relation Age of Onset    GI Disease Father         Crohn's    Heart Disease Father         First MI age 30    Hypertension Father     Stroke Father      "Hypertension Other     Cancer Brother         ESOPHAGEAL CANCER       Social History     Tobacco Use   Smoking Status Never   Smokeless Tobacco Never       Social History     Substance and Sexual Activity   Alcohol Use Yes    Alcohol/week: 0.0 oz    Comment: Stopped drinking 45 days ago 7/12/17 Going to AA       Review of systems.  As per HPI above. All other systems reviewed and negative.      Past Medical, Social, and Family history reviewed and updated in EPIC     Objective     /74   Pulse 99   Temp 36.3 °C (97.4 °F) (Temporal)   Resp 16   Ht 1.549 m (5' 1\")   Wt 76.2 kg (168 lb)   SpO2 96%    Body mass index is 31.74 kg/m².    General: alert in no apparent distress.  Cardiovascular: regular rate and rhythm  Pulmonary: lungs : no wheezing   Gastrointestinal: BS present. No obvious mass noted  Right arm there is a localized erythema measuring approximately 5 x 4 cm in size slightly warm to the touch and slightly tender to the touch.  No fluctuance.  No discharge.      Lab Results   Component Value Date/Time    HBA1C 5.8 (H) 04/03/2023 01:01 PM    HBA1C 6.0 (H) 11/07/2022 09:15 AM    HBA1C 5.8 (H) 09/14/2022 09:25 AM         Lab Results   Component Value Date/Time    SODIUM 137 08/10/2023 01:34 PM    POTASSIUM 4.4 08/10/2023 01:34 PM    GLUCOSE 133 (H) 08/10/2023 01:34 PM    BUN 16 08/10/2023 01:34 PM    CREATININE 1.05 08/10/2023 01:34 PM    CREATININE 0.89 04/27/2009 12:00 AM       Lab Results   Component Value Date/Time    CHOLSTRLTOT 132 04/03/2023 01:01 PM    TRIGLYCERIDE 77 04/03/2023 01:01 PM    HDL 53 04/03/2023 01:01 PM    LDL 64 04/03/2023 01:01 PM       Lab Results   Component Value Date/Time    ALTSGPT 8 08/10/2023 01:34 PM             Assessment and Plan     1. Right arm pain  This is a new condition.  Exam today consistent with mild cellulitis  Patient was started on clindamycin as she is allergic to penicillin and cephalosporin  Cool compress recommended.  Medrol Dosepak also given.  " Recommend follow-up approximately 7 days  Patient to go to ER if symptoms worsen or any change in her condition.    2. Paroxysmal atrial fibrillation (HCC)  Chronic stable condition.  Continue diltiazem 240 Mg daily.  Patient currently asymptomatic she denies chest pain shortness of breath palpitation or near syncope.    3. Severe obesity (HCC)  Chronic condition.  Uncontrolled.  Recommend diet exercise.  Encouraged patient to lose weight.    4. Major depression in remission (HCC)  Chronic stable condition.  Continue Cymbalta 60 Mg daily.    5. Hypertensive kidney disease with stage 3a chronic kidney disease (HCC)  Chronic stable condition.  BP today 132/74.  Continue diltiazem 240 Mg daily.    6. Asthma-COPD overlap syndrome (HCC)  Chronic stable condition.  Continue to use albuterol as needed.  Patient continue to use Trelegy 1 inhalation daily.    7. Stage 3a chronic kidney disease (HCC)  Chronic stable condition.  Patient to avoid NSAID.  Continue to monitor.        Other orders  - clindamycin (CLEOCIN) 150 MG Cap; Take 1 Capsule by mouth 3 times a day.  Dispense: 15 Capsule; Refill: 0  - methylPREDNISolone (MEDROL DOSEPAK) 4 MG Tablet Therapy Pack; As directed on the packaging label.  Dispense: 21 Tablet; Refill: 0                        Please note that this dictation was created using voice recognition software. I have made every reasonable attempt to correct obvious errors, but I expect that there are errors of grammar and possibly content that I did not discover before finalizing the note.    Rudy Parsons MD  Internal Medicine  [facility]

## 2023-08-21 NOTE — ASSESSMENT & PLAN NOTE
Chronic condition.  Previous GFR 40 to 50s.  Advised the patient to avoid NSAIDs.  Lab test reviewed with the patient.  Continue to monitor.

## 2023-08-21 NOTE — ASSESSMENT & PLAN NOTE
This is a chronic ongoing condition.  The patient takes Cymbalta 60 mg daily.  Patient tolerating medication well.  She denies SI.

## 2023-08-21 NOTE — ASSESSMENT & PLAN NOTE
Chronic ongoing condition.  Patient followed by cardiology service.  Patient is taking diltiazem 240 Mg daily.  She denies chest pain shortness of breath palpitation or near syncope.

## 2023-08-21 NOTE — ASSESSMENT & PLAN NOTE
New condition  Patient reported that she received shingle vaccine at [facility] pharmacy yesterday  Today the patient noted redness swelling and pain right deltoid region where the shot was given.  Patient denies fever or chills.  Denies shortness of breath or wheezing or chest pain.

## 2023-08-22 ENCOUNTER — NON-PROVIDER VISIT (OUTPATIENT)
Dept: NEUROLOGY | Facility: MEDICAL CENTER | Age: 73
End: 2023-08-22
Attending: PSYCHIATRY & NEUROLOGY
Payer: MEDICARE

## 2023-08-22 DIAGNOSIS — R20.2 NUMBNESS AND TINGLING OF RIGHT ARM: ICD-10-CM

## 2023-08-22 DIAGNOSIS — R20.0 NUMBNESS AND TINGLING OF RIGHT ARM: ICD-10-CM

## 2023-08-22 DIAGNOSIS — G56.21 ULNAR NEUROPATHY OF RIGHT UPPER EXTREMITY: ICD-10-CM

## 2023-08-22 PROCEDURE — 95886 MUSC TEST DONE W/N TEST COMP: CPT | Mod: 26 | Performed by: SPECIALIST

## 2023-08-22 PROCEDURE — 95908 NRV CNDJ TST 3-4 STUDIES: CPT | Mod: 26 | Performed by: SPECIALIST

## 2023-08-22 PROCEDURE — 95908 NRV CNDJ TST 3-4 STUDIES: CPT | Performed by: SPECIALIST

## 2023-08-22 PROCEDURE — 95886 MUSC TEST DONE W/N TEST COMP: CPT | Performed by: SPECIALIST

## 2023-08-22 NOTE — PROCEDURES
NERVE CONDUCTION STUDIES AND ELECTROMYOGRAPHY REPORT        08/22/23      Referring provider: Rudy Parsons M.D.      SUMMARY OF PATIENT'S CLINICAL HISTORY,PHYSICAL EXAM, AND RATIONALE FOR TESTING:    Ms. Augusta Rodriguez 73 y.o. presenting with extremity numbness.    Past Medical History is significant for :   Past Medical History:   Diagnosis Date    Apnea, sleep     Arrhythmia     afib    Arthritis     osteo    Asthma     Atrial fibrillation (HCC)     Back pain     Bronchitis     Chickenpox     Constipation     Cough     Daytime sleepiness     Dental disorder     upper dentures    Earache     Frequent urination     Gasping for breath     GERD (gastroesophageal reflux disease) 2/27/2010    [place] measles     Heartburn     Hypertension     Impaired fasting glucose 2/27/2010    Incontinence of urine     Indigestion     Influenza     Mumps     Nasal drainage     Nausea     Osteopenia 2/24/2010    Osteoporosis     Other specified disorder of intestines     constipation r/t meds    Restless leg syndrome     Scarlet fever     Shortness of breath     Sleep apnea     Snoring     Tremor, essential 2/24/2010    Urinary bladder disorder     Wears glasses     Wheezing     Whooping cough        The electrodiagnostic studies were performed to evaluate for possible peripheral neuropathy versus radiculopathy.      ELECTRODIAGNOSTIC EXAMINATION:  Nerve conduction studies (NCS) and electromyography (EMG) are utilized to evaluate direct or indirect damage to the peripheral nervous system. NCS are performed to measure the nerve(s) response(s) to electrostimulation across a given nerve segment. EMG evaluates the passive and active electrical activity of the muscle(s) in question.  Muscles are innervated by specific peripheral nerves and roots. Often times, several nerves the muscle to be examined in order to determine the presence or absence of the disease process. Furthermore, nerves and muscles may need to be tested in a [id number]  comparison, as well as in additional extremities, as this may be crucial in characterizing the extent of the disease process, which may be diffuse or isolated and of varying degree of severity. The extent of the neurodiagnostic exam is justified as it may help arrive to a proper diagnosis, which ultimately may contribute to better management of the patient. Therefore, the nerves to muscles examined during the study were medically necessary.    Unless otherwise noted, temperature of the extremity(s) study was monitored before and during the examination and remained between 32 and 36 degrees C for the upper extremities, and between 30 and 36 degrees C for the lower extremities.      NERVE CONDUCTION STUDIES:  Sensory nerves:   -Right median sensory nerve was examined.The response was within normal limits.  -Right ulnar sensory nerve was examined. The response was within normal limits.    Motor nerves:   -Right median motor nerve was examined. Recording electrodes placed at the Abductor Pollicis Brevis muscles. The response was within normal limits.  -Right ulnar motor nerve was examined. Recording electrodes placed at the Abductor Digiti Minimi muscles. The response was abnormal.  Onset latency was within normal limits, amplitude was decreased to 6.2 mV and conduction velocity slowed from 56 to 53 m/s across the elbow.      ELECTROMYOGRAPHY:  The study was performed the concentric needle electrode. Fibrillation and fasciculation activity is graded by convention from none (0) to continuous (4+).  Needle electrode examination was performed in the following muscles: Right deltoid, biceps, triceps, first dorsal interosseous, abductor pollicis brevis.  The muscles tested demonstrated normal insertional activity, normal motor unit morphology and recruitment. There were no elements suggestive of active denervation.      Nerve Conduction Studies     Stim Site NR Onset (ms) Norm Onset (ms) O-P Amp (µV) Norm O-P Amp Site1 Site2  Delta-P (ms) Dist (cm) Hunter (m/s) Norm Hunter (m/s)   Right Median Anti Sensory (2nd Digit)   Wrist    2.5 <3.8 24.8 >10 Wrist 2nd Digit 3.2 14.0 *44 >50   Right Ulnar Anti Sensory (5th Digit)   Wrist    2.0 <3.2 10.7 >5 Wrist 5th Digit 2.9 14.0 *48 >50        Stim Site NR Onset (ms) Norm Onset (ms) O-P Amp (mV) Norm O-P Amp Site1 Site2 Delta-0 (ms) Dist (cm) Hunter (m/s) Norm Hunter (m/s)   Right Median Motor (Abd Poll Brev)   Wrist    4.0 <4 5.0 >5 Elbow Wrist 4.8 25.0 52 >50   Elbow    8.8  4.0          Right Ulnar Motor (Abd Dig Min)   Wrist    2.7 <3.1 *6.2 >7 B Elbow Wrist 3.2 18.0 56 >50   B Elbow    5.9  6.2  A Elbow B Elbow 1.9 10.0 53    A Elbow    7.8  6.1                        Electromyography     Side Muscle Nerve Root Ins Act Fibs Psw Amp Dur Poly Recrt Int Pat Comment   Right Deltoid Axillary C5-6 Nml Nml Nml Nml Nml 0 Nml Nml    Right Biceps Musculocut C5-6 Nml Nml Nml Nml Nml 0 Nml Nml    Right Triceps Radial C6-7-8 Nml Nml Nml Nml Nml 0 Nml Nml    Right 1stDorInt Ulnar C8-T1 Nml Nml Nml Nml Nml 0 Nml Nml    Right Abd Poll Brev Median C8-T1 Nml Nml Nml Nml Nml 0 Nml Nml            DIAGNOSTIC INTERPRETATION:   Extensive electrodiagnostic studies were performed to the extremity.  The results are as follows:    1.  Right ulnar neuropathy at the elbow which is mild electrophysiologically and not associated with motor unit changes in ulnar nerve supplied hand muscles.    2.  Normal right median nerve motor and sensory conduction studies.    3.  No evidence of radiculopathy in selected muscles studied right upper extremity.        SAMIA Godinez M.D. (No note.)

## 2023-08-24 ENCOUNTER — NON-PROVIDER VISIT (OUTPATIENT)
Dept: SLEEP MEDICINE | Facility: MEDICAL CENTER | Age: 73
End: 2023-08-24
Attending: NURSE PRACTITIONER
Payer: MEDICARE

## 2023-08-24 DIAGNOSIS — J45.40 MODERATE PERSISTENT ASTHMA, UNSPECIFIED WHETHER COMPLICATED: ICD-10-CM

## 2023-08-24 PROCEDURE — 94060 EVALUATION OF WHEEZING: CPT | Mod: 26 | Performed by: INTERNAL MEDICINE

## 2023-08-24 PROCEDURE — 94060 EVALUATION OF WHEEZING: CPT | Performed by: NURSE PRACTITIONER

## 2023-08-24 ASSESSMENT — PULMONARY FUNCTION TESTS
FEV1_PREDICTED: 1.94
FEV1_PERCENT_CHANGE: 127
FVC: 2.89
FEV1/FVC_PERCENT_PREDICTED: 103
FVC_PERCENT_PREDICTED: 115
FEV1: 2.29
FEV1/FVC_PERCENT_PREDICTED: 67
FEV1/FVC_PREDICTED: 77.91
FVC_PERCENT_PREDICTED: 3
FEV1_PERCENT_CHANGE: 119
FEV1/FVC_PERCENT_CHANGE: 107
FEV1/FVC: 79
FEV1: 3
FVC_PREDICTED: 2.49
FEV1_PREDICTED: 118
FEV1_PERCENT_PREDICTED: 2

## 2023-08-24 NOTE — PROCEDURES
Good patient effort & cooperation. The results of this test meet the ATS standards for acceptability and repeatability. Test was performed on the hipixFS/D spirometry system. Predicted values were GLI-2012. A bronchodilator of Ventolin HFA - 2 puffs with a spacer was administered to the patient.    Spirometry  Date of study 8/24/2023  Interpreting physician: Kamini Dubois  Reason for study: Asthma    Results:  Spirometry:  FVC is 2.89 L which is 115% predicted without a significant change postbronchodilator  FEV1 is 2.29 L which is 118% predicted without a significant change postbronchodilator  FEV1/FVC is 83    Interpretation:  1.  Spirometry is normal  2.  There is no significant change postbronchodilator.  There is a suggestion of bronchial reactivity in the mid flow values which can be seen in reactive airways disease, correlate clinically  3.  The flow-volume loop is consistent with the spirometry data  4.  In comparison to the prior study from 4/20/2022 there is no significant change in lung function.    I, Dr. Kamini Dubois have interpreted and dictated the results of this study.    Kamini Dubois MD RD  Pulmonary and Critical Care    Available on [facility]

## 2023-08-28 ENCOUNTER — OFFICE VISIT (OUTPATIENT)
Dept: SLEEP MEDICINE | Facility: MEDICAL CENTER | Age: 73
End: 2023-08-28
Attending: NURSE PRACTITIONER
Payer: MEDICARE

## 2023-08-28 VITALS
HEIGHT: 61 IN | DIASTOLIC BLOOD PRESSURE: 72 MMHG | RESPIRATION RATE: 16 BRPM | WEIGHT: 167 LBS | SYSTOLIC BLOOD PRESSURE: 128 MMHG | OXYGEN SATURATION: 96 % | HEART RATE: 101 BPM | BODY MASS INDEX: 31.53 KG/M2

## 2023-08-28 DIAGNOSIS — J44.89 ASTHMA-COPD OVERLAP SYNDROME (HCC): ICD-10-CM

## 2023-08-28 DIAGNOSIS — Z78.9 NONSMOKER: ICD-10-CM

## 2023-08-28 DIAGNOSIS — K21.9 GASTROESOPHAGEAL REFLUX DISEASE WITHOUT ESOPHAGITIS: Chronic | ICD-10-CM

## 2023-08-28 DIAGNOSIS — G47.33 OSA (OBSTRUCTIVE SLEEP APNEA): Chronic | ICD-10-CM

## 2023-08-28 DIAGNOSIS — K44.9 HIATAL HERNIA: ICD-10-CM

## 2023-08-28 DIAGNOSIS — R91.8 PULMONARY NODULES: Chronic | ICD-10-CM

## 2023-08-28 PROCEDURE — 99213 OFFICE O/P EST LOW 20 MIN: CPT | Performed by: NURSE PRACTITIONER

## 2023-08-28 PROCEDURE — 3074F SYST BP LT 130 MM HG: CPT | Performed by: NURSE PRACTITIONER

## 2023-08-28 PROCEDURE — 3078F DIAST BP <80 MM HG: CPT | Performed by: NURSE PRACTITIONER

## 2023-08-28 PROCEDURE — 99214 OFFICE O/P EST MOD 30 MIN: CPT | Performed by: NURSE PRACTITIONER

## 2023-08-28 ASSESSMENT — FIBROSIS 4 INDEX: FIB4 SCORE: 0.88

## 2023-08-28 NOTE — PROGRESS NOTES
Chief Complaint   Patient presents with    Follow-Up     Asthma-COPD overlap syndrome/BRIANA. Last seen 02/28/23       HPI:  Augusta Rodriguez is a 73 y.o. year old female here today for follow-up on moderate asthma and BRIANA.  Last VO 2/28/23     MMRC Grade: 0-1, cleaning house/bending over  Exacerbations this year: 0    CT chest 6/1/23 stable bilateral pulmonary nodules with largest measuring 6 mm right middle lobe/right lower lobe.  Tubular present mucous plug in right middle lobe again noted.  Smaller noncalcified nodules bilaterally but stable.  No new nodules.  Mild emphysema.  No adenopathy.  No pleural effusion.  Small hiatal hernia mild fluid distended esophagus could be related to reflux. NO further imaging required per guidelines. Reviewed with patient.  She remains on pantoprazole 40 mg twice daily.    CT sinus 3/20/2023 noted paranasal sinuses mostly clear.  Very minimal mucosal thickening in the right maxillary sinus.  No air-fluid level.  Results sent via MyChart to patient.  Patient was referred to ENT due to ongoing recurrent sinus infections and blockage.  She was placed on fluticasone and azelestine and notes improvement in her sinuses with improved breathing.  She has had periodic epistaxis and CPAP use did trigger epistaxis.  She has been using CPAP intermittently.    Spirometry 8/24/2023 notes normal findings with FVC 2.89 L 115%, FEV1 2.29 L or 118%, FEV1/FVC ratio 79, FEF 25 to 75% 118% with a positive bronchodilator response in small airways mainly.  Reviewed with patient.  She has been off the Trelegy inhaler since seeing ENT in the last few months and only using BERKLEY as needed. She uses BERKLEY once daily prior to cleaning house from dog and cat hair.    Currently using Trelegy 100 mg 1 puff once daily, and BERKLEY as needed.    Restart auto CPAP 10 to 12 cm, ResMed 2020.  Patient has used device 8 days in the last 180 days.  She continues to have tolerance issues and now increased epistaxis with sinus  spray use.  She feels overall she is breathing well and understands that untreated sleep apnea could also be triggering her history of migraines.  She is suffering from migraine today.    PULM & SLEEP HX:  Never smoker.  PFT 7/20/20 notes FVC 2.91L or 116%, FEV1 2.37L or 121%, FEV1/FVC ratio 82, RV 97%, % and DLCO 97% without significant bronchodilator response. Reviewed with patient.  PFT 4/20/2022 indicated normal airflows with FVC 2.97 L 120%, FEV1 2.31 L 119%, FEV1/FVC ratio 78, TLC 5.3 L 118%, no hyperinflation with a DLCO of 102% predicted.  No significant bronchodilator response.  Overall normal spirometry.     CXR 3/15/21 notes hypoinflation w/o evidence of acute cardiopulmoary disease.  CT chest 8/26/20 noted multiple nodules with larges measuring 7mm with 6mos f/u CT recommended due 2/2021. Reviewed with patient.  CT chest was updated May 2022 indicating stable bilateral multiple nodules with largest measuring 6 mm in size.  Emphysematous changes noted.  Moderate hiatal hernia.     GERD treated with pantoprazole; hx of hiatal hernia.     PSG on 1/16/2020 showed an AHI of 30/carol saturation 82%. On CPAP at 16 cm H2O her AHI normalized to 1.9 with normal saturations. She was noted to have an elevated PLMS arousal index of 22.2.     ROS: As per HPI and otherwise negative if not stated.    Past Medical History:   Diagnosis Date    Apnea, sleep     Arrhythmia     afib    Arthritis     osteo    Asthma     Atrial fibrillation (HCC)     Back pain     Bronchitis     Chickenpox     Constipation     Cough     Daytime sleepiness     Dental disorder     upper dentures    Earache     Frequent urination     Gasping for breath     GERD (gastroesophageal reflux disease) 2/27/2010    Nepali measles     Heartburn     Hypertension     Impaired fasting glucose 2/27/2010    Incontinence of urine     Indigestion     Influenza     Mumps     Nasal drainage     Nausea     Osteopenia 2/24/2010    Osteoporosis     Other  specified disorder of intestines     constipation r/t meds    Restless leg syndrome     Scarlet fever     Shortness of breath     Sleep apnea     Snoring     Tremor, essential 2/24/2010    Urinary bladder disorder     Wears glasses     Wheezing     Whooping cough        Past Surgical History:   Procedure Laterality Date    INCISION AND DRAINAGE GENERAL N/A 10/18/2022    Procedure: INCISION AND DRAINAGE ABSCESS TONGUE;  Surgeon: Paz Suazo M.D.;  Location: SURGERY [facility];  Service: Ent    LUMBAR LAMINECTOMY DISKECTOMY Bilateral 2/4/2017    Procedure: LUMBAR LAMINECTOMY DISKECTOMY POSTERIOR L4-S1 ;  Surgeon: Emil Hitchcock M.D.;  Location: SURGERY [facility] [place];  Service:     CARPAL TUNNEL ENDOSCOPIC  11/17/2012    Performed by Heriberto Quiñones M.D. at SURGERY [facility] ORS    TRIGGER FINGER RELEASE  11/17/2012    Performed by Heriberto Quiñones M.D. at SURGERY [facility] [place]    HIP ARTHROSCOPY  2/23/2009    Performed by SHERLYN CALVO at SURGERY [facility] ORS    ACETABULAR OSTEOTOMY  2/23/2009    Performed by SHERLYN CALVO at SURGERY [facility] ORS    MASS EXCISION ORTHO  2/23/2009    Performed by SHERLYN CALVO at SURGERY [facility] ORS    HIP ARTHROSCOPY  2005    done at [facility]    HAJA BY LAPAROSCOPY  1997    HYSTERECTOMY, TOTAL ABDOMINAL  1979    oopherectomy lacie    BLADDER SUSPENSION      bladder sling    CARPAL TUNNEL RELEASE      HIP REPLACEMENT, TOTAL      HYSTERECTOMY LAPAROSCOPY      LAMINOTOMY      PRIMARY C SECTION  1970/1975       Family History   Problem Relation Age of Onset    GI Disease Father         Crohn's    Heart Disease Father         First MI age 30    Hypertension Father     Stroke Father     Hypertension Other     Cancer Brother         ESOPHAGEAL CANCER       Social History     Socioeconomic History    Marital status:      Spouse name: Not on file    Number of children: Not on file    Years of education: Not on file    Highest education level:  Not on file   Occupational History    Not on file   Tobacco Use    Smoking status: Never    Smokeless tobacco: Never   Vaping Use    Vaping Use: Never used   Substance and Sexual Activity    Alcohol use: Yes     Alcohol/week: 0.0 oz     Comment: Stopped drinking 45 days ago 7/12/17 Going to     Drug use: No    Sexual activity: Not Currently   Other Topics Concern    Not on file   Social History Narrative    Not on file     Social Determinants of Health     Financial Resource Strain: High Risk (7/5/2022)    Overall Financial Resource Strain (CARDIA)     Difficulty of Paying Living Expenses: Hard   Food Insecurity: Food Insecurity Present (7/5/2022)    Hunger Vital Sign     Worried About Running Out of Food in the Last Year: Sometimes true     Ran Out of Food in the Last Year: Sometimes true   Transportation Needs: No Transportation Needs (7/5/2022)    PRAPARE - Transportation     Lack of Transportation (Medical): No     Lack of Transportation (Non-Medical): No   Physical Activity: Not on file   Stress: Not on file   Social Connections: Not on file   Intimate Partner Violence: Not on file   Housing Stability: Low Risk  (7/5/2022)    Housing Stability Vital Sign     Unable to Pay for Housing in the Last Year: No     Number of Places Lived in the Last Year: 1     Unstable Housing in the Last Year: No       Allergies as of 08/28/2023 - Reviewed 08/28/2023   Allergen Reaction Noted    Sumatriptan Swelling 02/02/2015    Clarithromycin Itching, Nausea, and Swelling 08/13/2007    Ees [erythromycin] Itching, Nausea, and Swelling 08/13/2007    Levaquin Myalgia and Unspecified 11/10/2017    Pcn [penicillins] Itching, Nausea, and Swelling 08/13/2007    Shellfish allergy Itching, Nausea, and Swelling 01/20/2017    Trazodone Swelling and Unspecified 02/16/2009    Clarithromycin Unspecified 11/15/2012    Erythromycin Unspecified 11/02/2022    Penicillin g Unspecified 11/02/2022        Vitals:  /72 (BP Location: Right arm,  "Patient Position: Sitting, BP Cuff Size: Adult)   Pulse (!) 101   Resp 16   Ht 1.549 m (5' 1\")   Wt 75.8 kg (167 lb)   SpO2 96%     Current medications as of today   Current Outpatient Medications   Medication Sig Dispense Refill    clindamycin (CLEOCIN) 150 MG Cap Take 1 Capsule by mouth 3 times a day. 15 Capsule 0    methylPREDNISolone (MEDROL DOSEPAK) 4 MG Tablet Therapy Pack As directed on the packaging label. 21 Tablet 0    ondansetron (ZOFRAN ODT) 4 MG TABLET DISPERSIBLE Take 1 Tablet by mouth every 6 hours as needed for Nausea/Vomiting. 10 Tablet 0    DULoxetine (CYMBALTA) 60 MG Cap DR Particles delayed-release capsule Take 1 Capsule by mouth every day. 90 Capsule 3    rizatriptan (MAXALT) 5 MG tablet Take 1 Tablet by mouth one time as needed for Migraine. 10 Tablet 5    clobetasol (TEMOVATE) 0.05 % Ointment AAA thin layer, daily for 3 weeks, then use 2-3 times a week 60 g 1    atorvastatin (LIPITOR) 10 MG Tab Take 1 Tablet by mouth every day. 100 Tablet 3    dilTIAZem CD (CARDIZEM CD) 240 MG CAPSULE SR 24 HR Take 1 Capsule by mouth every day. 90 Capsule 3    pantoprazole (PROTONIX) 40 MG Tablet Delayed Response Take 1 Tablet by mouth 2 times a day. 180 Tablet 3    telmisartan (MICARDIS) 40 MG Tab Take 1 Tablet by mouth every day. 100 Tablet 3    benzonatate (TESSALON) 100 MG Cap Take 1 Capsule by mouth 3 times a day as needed for Cough. 60 Capsule 0    fluticasone-umeclidin-vilant (TRELEGY ELLIPTA) 100-62.5-25 MCG/ACT AEROSOL POWDER, BREATH ACTIVATED inhalation Inhale 1 Inhalation every day. 1 Each 11    albuterol 108 (90 Base) MCG/ACT Aero Soln inhalation aerosol INHALE 2 PUFFS BY MOUTH EVERY 6 HOURS AS NEEDED FOR SHORTNESS OF BREATH 8.5 Each 5    zonisamide (ZONEGRAN) 100 MG Cap Take 100 mg by mouth every day.      SALINE NA Administer 1 Spray into affected nostril(S) every day.       No current facility-administered medications for this visit.         Physical Exam:   Gen:           Alert and " oriented, No apparent distress. Mood and affect appropriate, normal interaction with examiner.  Eyes:          PERRL, EOM intact, sclere white, conjunctive moist.  Ears:          Not examined.   Hearing:     Grossly intact.  Nose:          Normal, no lesions or deformities.  Dentition:    Not examined.   Oropharynx:   Not examined.   Mallampati Classification: Not examined.   Neck:        Supple, trachea midline, no masses.  Respiratory Effort: No intercostal retractions or use of accessory muscles.   Lung Auscultation:      Clear to auscultation bilaterally; no rales, rhonchi or wheezing.  CV:            Regular rate and rhythm. No murmurs, rubs or gallops.  Abd:           Not examined.  Lymphadenopathy: Not examined.   Gait and Station: Normal.  Digits and Nails: No clubbing, cyanosis, petechiae, or nodes.   Cranial Nerves: II-XII grossly intact.  Skin:        No rashes, lesions or ulcers noted.               Ext:           No cyanosis or edema.      Assessment:  1. Asthma-COPD overlap syndrome (HCC)        2. BRIANA (obstructive sleep apnea)        3. Pulmonary nodules        4. Gastroesophageal reflux disease without esophagitis        5. Hiatal hernia        6. BMI 30.0-30.9,adult  HEIGHT AND WEIGHT      7. Nonsmoker                 Immunizations:    Flu:recommend in the fall  Pneumovax 23:not due  Prevnar 13:not due  PCV 20: 11/28/22  COVID-19: 1/20/22    Plan:  Asthma/COPD is well controlled this time.  She is currently off bronchodilators per ENT due to current use of sinus spray.  I will request records from ENT to review.  Spirometry today is normal.  Continue Monalisa as needed.   Addison at next OV  BRIANA is not controlled when she continues to have intolerance issues.  She uses a Sally loop mask.  Reviewed proper use of sleep apnea therapy.  CT imaging is stable.  No further follow-up of lung nodules required at this time.  History of GERD improved on PPI twice daily but she continues to note having vomiting  episodes 1 time per month due to excess reflux.  Hiatal hernia contributing to GERD.  Follow-up with GI for further recommendations.  Encourage weight loss or healthy eating and regular exercise.  Follow-up with primary care for other health concerns.  Follow-up in 7 months with updated spirometry to review asthma/COPD overlap and sleep apnea therapy, sooner if needed.    Please note that this dictation was created using voice recognition software. I have made every reasonable attempt to correct obvious errors, but it is possible there are errors of grammar and possibly content that I did not discover before finalizing the note.

## 2023-08-30 ENCOUNTER — OFFICE VISIT (OUTPATIENT)
Dept: MEDICAL GROUP | Facility: PHYSICIAN GROUP | Age: 73
End: 2023-08-30
Payer: MEDICARE

## 2023-08-30 VITALS
WEIGHT: 167.8 LBS | OXYGEN SATURATION: 98 % | HEART RATE: 90 BPM | SYSTOLIC BLOOD PRESSURE: 114 MMHG | TEMPERATURE: 97 F | HEIGHT: 61 IN | RESPIRATION RATE: 16 BRPM | DIASTOLIC BLOOD PRESSURE: 64 MMHG | BODY MASS INDEX: 31.68 KG/M2

## 2023-08-30 DIAGNOSIS — N18.31 STAGE 3A CHRONIC KIDNEY DISEASE: ICD-10-CM

## 2023-08-30 DIAGNOSIS — N18.31 HYPERTENSIVE KIDNEY DISEASE WITH STAGE 3A CHRONIC KIDNEY DISEASE: ICD-10-CM

## 2023-08-30 DIAGNOSIS — E66.01 SEVERE OBESITY (HCC): Chronic | ICD-10-CM

## 2023-08-30 DIAGNOSIS — I48.0 PAROXYSMAL ATRIAL FIBRILLATION (HCC): ICD-10-CM

## 2023-08-30 DIAGNOSIS — F32.5 MAJOR DEPRESSION IN REMISSION (HCC): ICD-10-CM

## 2023-08-30 DIAGNOSIS — G43.909 MIGRAINE WITHOUT STATUS MIGRAINOSUS, NOT INTRACTABLE, UNSPECIFIED MIGRAINE TYPE: Chronic | ICD-10-CM

## 2023-08-30 DIAGNOSIS — K21.9 GASTROESOPHAGEAL REFLUX DISEASE WITHOUT ESOPHAGITIS: Chronic | ICD-10-CM

## 2023-08-30 DIAGNOSIS — E78.5 DYSLIPIDEMIA: Chronic | ICD-10-CM

## 2023-08-30 DIAGNOSIS — I12.9 HYPERTENSIVE KIDNEY DISEASE WITH STAGE 3A CHRONIC KIDNEY DISEASE: ICD-10-CM

## 2023-08-30 DIAGNOSIS — R91.8 PULMONARY NODULES: ICD-10-CM

## 2023-08-30 DIAGNOSIS — J44.89 ASTHMA-COPD OVERLAP SYNDROME (HCC): Chronic | ICD-10-CM

## 2023-08-30 DIAGNOSIS — E55.9 VITAMIN D DEFICIENCY: ICD-10-CM

## 2023-08-30 DIAGNOSIS — R73.03 PREDIABETES: Chronic | ICD-10-CM

## 2023-08-30 PROBLEM — I77.819 DILATATION OF AORTA (HCC): Chronic | Status: ACTIVE | Noted: 2023-08-09

## 2023-08-30 PROBLEM — G31.9 CEREBRAL ATROPHY, MILD (HCC): Chronic | Status: ACTIVE | Noted: 2023-08-09

## 2023-08-30 PROCEDURE — 99215 OFFICE O/P EST HI 40 MIN: CPT | Performed by: INTERNAL MEDICINE

## 2023-08-30 PROCEDURE — 3078F DIAST BP <80 MM HG: CPT | Performed by: INTERNAL MEDICINE

## 2023-08-30 PROCEDURE — 3074F SYST BP LT 130 MM HG: CPT | Performed by: INTERNAL MEDICINE

## 2023-08-30 RX ORDER — PANTOPRAZOLE SODIUM 40 MG/1
40 TABLET, DELAYED RELEASE ORAL 2 TIMES DAILY
Qty: 180 TABLET | Refills: 3 | Status: SHIPPED | OUTPATIENT
Start: 2023-08-30

## 2023-08-30 ASSESSMENT — FIBROSIS 4 INDEX: FIB4 SCORE: 0.88

## 2023-08-30 NOTE — ASSESSMENT & PLAN NOTE
Chronic condition.  The patient is being treated with diltiazem 240 Mg daily.  Micardis 40 Mg daily.  Blood pressure has been well controlled.  No significant side effects reported.

## 2023-08-30 NOTE — ASSESSMENT & PLAN NOTE
This is a chronic condition.  The patient is taking atorvastatin 10 mg daily.  Patient tolerant medication well.  Lab test ordered for follow-up.

## 2023-08-30 NOTE — ASSESSMENT & PLAN NOTE
Chronic ongoing condition.  The patient takes Cymbalta 60 mg daily.  Patient tolerating medication well.  She denies SI.

## 2023-08-30 NOTE — ASSESSMENT & PLAN NOTE
Chronic ongoing condition.  Patient is taking diltiazem 240 Mg daily.  Patient denies chest pain shortness of breath palpitation or near syncope.  CHADS2 score of 1>>  she take aspirin 81 mg daily.

## 2023-08-30 NOTE — ASSESSMENT & PLAN NOTE
This is a chronic condition.  Patient followed by neurology service.  Patient currently taking Maxalt 5 mg as needed.  Zonegran 100 mg daily.  Patient tolerated medication well.  The patient has pending appointment to follow-up with neurologist.

## 2023-08-30 NOTE — ASSESSMENT & PLAN NOTE
Chronic condition associated with hiatal hernia.  The patient takes pantoprazole 40 mg twice daily.  Patient Toller medication well.  Patient denies nausea vomiting dysphagia or unexplained weight loss.

## 2023-08-30 NOTE — ASSESSMENT & PLAN NOTE
Chronic condition.  Patient was seen by pulmonology recently.  CT imaging is stable.  No further follow-up lung nodule required as per pulmonologist.

## 2023-08-30 NOTE — PROGRESS NOTES
PRIMARY CARE CLINIC VISIT    Chief complaint:    Follow-up COPD  Pulmonary nodule  CKD 3  Obesity  Paroxysmal atrial fibrillation  Depression  Follow-up hypertension  Hyperlipidemia  Prediabetes  Migraine  Vitamin D deficiency  Acid reflux    History of Present Illness     Asthma-COPD overlap syndrome (HCC)  Chronic condition.  Patient followed by pulmonology service.  Patient is using Trelegy 1 elation daily.  She also has prescription for albuterol to be used as needed.  Currently the patient denies shortness of breath wheezing or significant cough.    Severe obesity (HCC)  Chronic condition.    Counseling on health consequences related to obesity.  Discussed with the patient regarding diet, exercise, and lifestyle modification to help achieve and maintain healthy weight          Paroxysmal atrial fibrillation (HCC)  Chronic ongoing condition.  Patient is taking diltiazem 240 Mg daily.  Patient denies chest pain shortness of breath palpitation or near syncope.  CHADS2 score of 1>>  she take aspirin 81 mg daily.    Major depression in remission (HCC)  Chronic ongoing condition.  The patient takes Cymbalta 60 mg daily.  Patient tolerating medication well.  She denies SI.    Hypertensive kidney disease with stage 3a chronic kidney disease (HCC)  Chronic condition.  The patient is being treated with diltiazem 240 Mg daily.  Micardis 40 Mg daily.  Blood pressure has been well controlled.  No significant side effects reported.    Pulmonary nodules  Chronic condition.  Patient was seen by pulmonology recently.  CT imaging is stable.  No further follow-up lung nodule required as per pulmonologist.    CKD (chronic kidney disease) stage 3, GFR 30-59 ml/min (HCC)  Chronic ongoing condition.  Previous GFR 40 to 50s.  Advised the patient to avoid NSAID.  Continue to monitor.    Dyslipidemia  This is a chronic condition.  The patient is taking atorvastatin 10 mg daily.  Patient tolerant medication well.  Lab test ordered for  follow-up.    GERD (gastroesophageal reflux disease)  Chronic condition associated with hiatal hernia.  The patient takes pantoprazole 40 mg twice daily.  Patient Toller medication well.  Patient denies nausea vomiting dysphagia or unexplained weight loss.    Migraine  This is a chronic condition.  Patient followed by neurology service.  Patient currently taking Maxalt 5 mg as needed.  Zonegran 100 mg daily.  Patient tolerated medication well.  The patient has pending appointment to follow-up with neurologist.    Prediabetes  This is a chronic condition.  The patient currently on diet therapy.  Blood test requested for follow-up.  Patient will continue with low sweet low-carb diet.  Encouraged patient to lose weight.    Vitamin D deficiency  This is a chronic condition.  Blood test ordered for follow-up.    Current Outpatient Medications on File Prior to Visit   Medication Sig Dispense Refill    DULoxetine (CYMBALTA) 60 MG Cap DR Particles delayed-release capsule Take 1 Capsule by mouth every day. 90 Capsule 3    rizatriptan (MAXALT) 5 MG tablet Take 1 Tablet by mouth one time as needed for Migraine. 10 Tablet 5    atorvastatin (LIPITOR) 10 MG Tab Take 1 Tablet by mouth every day. 100 Tablet 3    dilTIAZem CD (CARDIZEM CD) 240 MG CAPSULE SR 24 HR Take 1 Capsule by mouth every day. 90 Capsule 3    telmisartan (MICARDIS) 40 MG Tab Take 1 Tablet by mouth every day. 100 Tablet 3    fluticasone-umeclidin-vilant (TRELEGY ELLIPTA) 100-62.5-25 MCG/ACT AEROSOL POWDER, BREATH ACTIVATED inhalation Inhale 1 Inhalation every day. 1 Each 11    albuterol 108 (90 Base) MCG/ACT Aero Soln inhalation aerosol INHALE 2 PUFFS BY MOUTH EVERY 6 HOURS AS NEEDED FOR SHORTNESS OF BREATH 8.5 Each 5    zonisamide (ZONEGRAN) 100 MG Cap Take 100 mg by mouth every day.      methylPREDNISolone (MEDROL DOSEPAK) 4 MG Tablet Therapy Pack As directed on the packaging label. 21 Tablet 0    ondansetron (ZOFRAN ODT) 4 MG TABLET DISPERSIBLE Take 1 Tablet  by mouth every 6 hours as needed for Nausea/Vomiting. 10 Tablet 0    clobetasol (TEMOVATE) 0.05 % Ointment AAA thin layer, daily for 3 weeks, then use 2-3 times a week 60 g 1    benzonatate (TESSALON) 100 MG Cap Take 1 Capsule by mouth 3 times a day as needed for Cough. 60 Capsule 0    SALINE NA Administer 1 Spray into affected nostril(S) every day.       No current facility-administered medications on file prior to visit.        Allergies: Sumatriptan, Clarithromycin, Ees [erythromycin], Levaquin, Pcn [penicillins], Shellfish allergy, Trazodone, Clarithromycin, Erythromycin, and Penicillin g    Current Outpatient Medications Ordered in Epic   Medication Sig Dispense Refill    pantoprazole (PROTONIX) 40 MG Tablet Delayed Response Take 1 Tablet by mouth 2 times a day. 180 Tablet 3    DULoxetine (CYMBALTA) 60 MG Cap DR Particles delayed-release capsule Take 1 Capsule by mouth every day. 90 Capsule 3    rizatriptan (MAXALT) 5 MG tablet Take 1 Tablet by mouth one time as needed for Migraine. 10 Tablet 5    atorvastatin (LIPITOR) 10 MG Tab Take 1 Tablet by mouth every day. 100 Tablet 3    dilTIAZem CD (CARDIZEM CD) 240 MG CAPSULE SR 24 HR Take 1 Capsule by mouth every day. 90 Capsule 3    telmisartan (MICARDIS) 40 MG Tab Take 1 Tablet by mouth every day. 100 Tablet 3    fluticasone-umeclidin-vilant (TRELEGY ELLIPTA) 100-62.5-25 MCG/ACT AEROSOL POWDER, BREATH ACTIVATED inhalation Inhale 1 Inhalation every day. 1 Each 11    albuterol 108 (90 Base) MCG/ACT Aero Soln inhalation aerosol INHALE 2 PUFFS BY MOUTH EVERY 6 HOURS AS NEEDED FOR SHORTNESS OF BREATH 8.5 Each 5    zonisamide (ZONEGRAN) 100 MG Cap Take 100 mg by mouth every day.      methylPREDNISolone (MEDROL DOSEPAK) 4 MG Tablet Therapy Pack As directed on the packaging label. 21 Tablet 0    ondansetron (ZOFRAN ODT) 4 MG TABLET DISPERSIBLE Take 1 Tablet by mouth every 6 hours as needed for Nausea/Vomiting. 10 Tablet 0    clobetasol (TEMOVATE) 0.05 % Ointment AAA thin  layer, daily for 3 weeks, then use 2-3 times a week 60 g 1    benzonatate (TESSALON) 100 MG Cap Take 1 Capsule by mouth 3 times a day as needed for Cough. 60 Capsule 0    SALINE NA Administer 1 Spray into affected nostril(S) every day.       No current [facility]-ordered facility-administered medications on file.       Past Medical History:   Diagnosis Date    Apnea, sleep     Arrhythmia     afib    Arthritis     osteo    Asthma     Atrial fibrillation (HCC)     Back pain     Bronchitis     Chickenpox     Constipation     Cough     Daytime sleepiness     Dental disorder     upper dentures    Earache     Frequent urination     Gasping for breath     GERD (gastroesophageal reflux disease) 2/27/2010    Tamazight measles     Heartburn     Hypertension     Impaired fasting glucose 2/27/2010    Incontinence of urine     Indigestion     Influenza     Mumps     Nasal drainage     Nausea     Osteopenia 2/24/2010    Osteoporosis     Other specified disorder of intestines     constipation r/t meds    Restless leg syndrome     Scarlet fever     Shortness of breath     Sleep apnea     Snoring     Tremor, essential 2/24/2010    Urinary bladder disorder     Wears glasses     Wheezing     Whooping cough        Past Surgical History:   Procedure Laterality Date    INCISION AND DRAINAGE GENERAL N/A 10/18/2022    Procedure: INCISION AND DRAINAGE ABSCESS TONGUE;  Surgeon: Paz Suazo M.D.;  Location: [facility];  Service: Ent    LUMBAR LAMINECTOMY DISKECTOMY Bilateral 2/4/2017    Procedure: LUMBAR LAMINECTOMY DISKECTOMY POSTERIOR L4-S1 ;  Surgeon: Emil Hitchcock M.D.;  Location: [facility];  Service:     CARPAL TUNNEL ENDOSCOPIC  11/17/2012    Performed by Heriberto Quiñones M.D. at [facility]    TRIGGER FINGER RELEASE  11/17/2012    Performed by Heriberto Quiñones M.D. at [facility] ORS    HIP ARTHROSCOPY  2/23/2009    Performed by SHERLYN CALVO at [facility]    ACETABULAR  "OSTEOTOMY  2/23/2009    Performed by SHERLYN CALVO at SURGERY [facility] ORS    MASS EXCISION ORTHO  2/23/2009    Performed by SHERLYN CALVO at SURGERY [facility] ORS    HIP ARTHROSCOPY  2005    done at [facility]    HAJA BY LAPAROSCOPY  1997    HYSTERECTOMY, TOTAL ABDOMINAL  1979    oopherectomy lacie    BLADDER SUSPENSION      bladder sling    CARPAL TUNNEL RELEASE      HIP REPLACEMENT, TOTAL      HYSTERECTOMY LAPAROSCOPY      LAMINOTOMY      PRIMARY C SECTION  1970/1975       Family History   Problem Relation Age of Onset    GI Disease Father         Crohn's    Heart Disease Father         First MI age 30    Hypertension Father     Stroke Father     Hypertension Other     Cancer Brother         ESOPHAGEAL CANCER       Social History     Tobacco Use   Smoking Status Never   Smokeless Tobacco Never       Social History     Substance and Sexual Activity   Alcohol Use Yes    Alcohol/week: 0.0 oz    Comment: Stopped drinking 45 days ago 7/12/17 Going to AA       Review of systems.  As per HPI above. All other systems reviewed and negative.      Past Medical, Social, and Family history reviewed and updated in EPIC     Objective     /64   Pulse 90   Temp 36.1 °C (97 °F) (Temporal)   Resp 16   Ht 1.549 m (5' 1\")   Wt 76.1 kg (167 lb 12.8 oz)   SpO2 98%    Body mass index is 31.71 kg/m².    General: alert in no apparent distress.  Cardiovascular: regular rate and rhythm  Pulmonary: lungs : no wheezing   Gastrointestinal: BS present. No obvious mass noted  Cranial nerves II to XII grossly intact  Gait no ataxia noted      Lab Results   Component Value Date/Time    HBA1C 5.8 (H) 04/03/2023 01:01 PM    HBA1C 6.0 (H) 11/07/2022 09:15 AM    HBA1C 5.8 (H) 09/14/2022 09:25 AM       Lab Results   Component Value Date/Time    WBC 12.4 (H) 08/10/2023 01:34 PM    HEMOGLOBIN 13.3 08/10/2023 01:34 PM    HEMATOCRIT 42.9 08/10/2023 01:34 PM    MCV 78.3 (L) 08/10/2023 01:34 PM    PLATELETCT 375 08/10/2023 01:34 PM     "     Lab Results   Component Value Date/Time    SODIUM 137 08/10/2023 01:34 PM    POTASSIUM 4.4 08/10/2023 01:34 PM    GLUCOSE 133 (H) 08/10/2023 01:34 PM    BUN 16 08/10/2023 01:34 PM    CREATININE 1.05 08/10/2023 01:34 PM    CREATININE 0.89 04/27/2009 12:00 AM       Lab Results   Component Value Date/Time    CHOLSTRLTOT 132 04/03/2023 01:01 PM    TRIGLYCERIDE 77 04/03/2023 01:01 PM    HDL 53 04/03/2023 01:01 PM    LDL 64 04/03/2023 01:01 PM       Lab Results   Component Value Date/Time    ALTSGPT 8 08/10/2023 01:34 PM             Assessment and Plan     1. Pulmonary nodules  Chronic condition.  As above patient no longer need follow-up imaging.  Patient asymptomatic.    2. Asthma-COPD overlap syndrome (HCC)  Chronic stable condition.  Continue Trelegy 1 inhalation daily.  Patient may use albuterol as needed for rescue treatment.  Continue follow-up with pulmonology service.    3. Stage 3a chronic kidney disease (HCC)  This is a chronic and stable condition.  Advised the patient to avoid NSAID.  Continue to monitor.    4. Severe obesity (HCC)  Chronic condition.  Unstable.  Recommend diet and exercise.  Encouraged patient to lose weight.    5. Paroxysmal atrial fibrillation (HCC)  Chronic condition.  Continue aspirin 81 mg daily and diltiazem 240 Mg daily.    6. Major depression in remission (HCC)  This is a chronic condition.  Continue with duloxetine 60 mg daily.    7. Hypertensive kidney disease with stage 3a chronic kidney disease (HCC)  - TSH; Future  - MICROALBUMIN CREAT RATIO URINE; Future  Chronic stable condition.  Continue with diltiazem 240 Mg daily and Micardis 40 Mg daily.  BP today is 114/64.    8. Dyslipidemia  - Lipid Profile; Future  Chronic condition.  Stable.  Lipid panel result discussed with the patient.  Continue atorvastatin 10 mg daily.    9. Prediabetes  - HEMOGLOBIN A1C; Future  - Basic Metabolic Panel; Future  Chronic condition.  Stable.  Continue with diet and exercise.  Repeat lab test  next visit.    10. Migraine without status migrainosus, not intractable, unspecified migraine type  Chronic condition.  Stable.  Continue with Maxalt 5 mg daily and Zonegran 100 Mg daily.  Continue follow-up with neurology service.    11. Vitamin D deficiency  - VITAMIN D,25 HYDROXY (DEFICIENCY); Future  Chronic condition.  Stable.  Continue vitamin D 2000 unit daily.    12. Gastroesophageal reflux disease without esophagitis  Chronic stable condition.  Continue with pantoprazole 40 mg twice daily.    Other orders  - pantoprazole (PROTONIX) 40 MG Tablet Delayed Response; Take 1 Tablet by mouth 2 times a day.  Dispense: 180 Tablet; Refill: 3      Attestation: I spent: 44   min -  That includes time for chart review before the visit, the actual patient visit, and time spent on documentation in EMR after the visit.  Chart review/prep, review of other providers' records, imaging/lab review, face-to-face time for history/examination, pt's counseling/education, ordering, prescribing,  review of results/meds/ treatment plan with patient, and care coordination.    The patient is of extensive complexity. This pt is at high risk for complications and morbidity.                     Please note that this dictation was created using voice recognition software. I have made every reasonable attempt to correct obvious errors, but I expect that there are errors of grammar and possibly content that I did not discover before finalizing the note.    Rudy Parsons MD  Internal Medicine  [facility]

## 2023-08-30 NOTE — ASSESSMENT & PLAN NOTE
Chronic ongoing condition.  Previous GFR 40 to 50s.  Advised the patient to avoid NSAID.  Continue to monitor.

## 2023-08-30 NOTE — ASSESSMENT & PLAN NOTE
Chronic condition.  Patient followed by pulmonology service.  Patient is using Trelegy 1 elation daily.  She also has prescription for albuterol to be used as needed.  Currently the patient denies shortness of breath wheezing or significant cough.

## 2023-08-30 NOTE — ASSESSMENT & PLAN NOTE
This is a chronic condition.  The patient currently on diet therapy.  Blood test requested for follow-up.  Patient will continue with low sweet low-carb diet.  Encouraged patient to lose weight.

## 2023-09-25 RX ORDER — BENZONATATE 100 MG/1
100 CAPSULE ORAL 3 TIMES DAILY PRN
Qty: 60 CAPSULE | Refills: 0 | Status: SHIPPED | OUTPATIENT
Start: 2023-09-25

## 2023-09-25 RX ORDER — AMITRIPTYLINE HYDROCHLORIDE 25 MG/1
50 TABLET, FILM COATED ORAL NIGHTLY
Qty: 30 TABLET | Refills: 2 | Status: SHIPPED | OUTPATIENT
Start: 2023-09-25 | End: 2023-10-03

## 2023-10-03 ENCOUNTER — HOSPITAL ENCOUNTER (OUTPATIENT)
Facility: MEDICAL CENTER | Age: 73
End: 2023-10-03
Attending: INTERNAL MEDICINE
Payer: MEDICARE

## 2023-10-03 ENCOUNTER — OFFICE VISIT (OUTPATIENT)
Dept: MEDICAL GROUP | Facility: PHYSICIAN GROUP | Age: 73
End: 2023-10-03
Payer: MEDICARE

## 2023-10-03 VITALS
WEIGHT: 164.25 LBS | SYSTOLIC BLOOD PRESSURE: 124 MMHG | OXYGEN SATURATION: 96 % | HEART RATE: 96 BPM | TEMPERATURE: 97.5 F | RESPIRATION RATE: 18 BRPM | DIASTOLIC BLOOD PRESSURE: 80 MMHG | BODY MASS INDEX: 31.01 KG/M2 | HEIGHT: 61 IN

## 2023-10-03 DIAGNOSIS — F32.5 MAJOR DEPRESSION IN REMISSION (HCC): ICD-10-CM

## 2023-10-03 DIAGNOSIS — R30.0 DYSURIA: ICD-10-CM

## 2023-10-03 DIAGNOSIS — G43.909 MIGRAINE WITHOUT STATUS MIGRAINOSUS, NOT INTRACTABLE, UNSPECIFIED MIGRAINE TYPE: Chronic | ICD-10-CM

## 2023-10-03 DIAGNOSIS — N18.31 HYPERTENSIVE KIDNEY DISEASE WITH STAGE 3A CHRONIC KIDNEY DISEASE: ICD-10-CM

## 2023-10-03 DIAGNOSIS — N18.31 STAGE 3A CHRONIC KIDNEY DISEASE: ICD-10-CM

## 2023-10-03 DIAGNOSIS — J44.89 ASTHMA-COPD OVERLAP SYNDROME (HCC): Chronic | ICD-10-CM

## 2023-10-03 DIAGNOSIS — K21.9 GASTROESOPHAGEAL REFLUX DISEASE WITHOUT ESOPHAGITIS: Chronic | ICD-10-CM

## 2023-10-03 DIAGNOSIS — E66.01 SEVERE OBESITY (HCC): Chronic | ICD-10-CM

## 2023-10-03 DIAGNOSIS — I48.0 PAROXYSMAL ATRIAL FIBRILLATION (HCC): ICD-10-CM

## 2023-10-03 DIAGNOSIS — E78.5 DYSLIPIDEMIA: Chronic | ICD-10-CM

## 2023-10-03 DIAGNOSIS — I12.9 HYPERTENSIVE KIDNEY DISEASE WITH STAGE 3A CHRONIC KIDNEY DISEASE: ICD-10-CM

## 2023-10-03 DIAGNOSIS — R73.03 PREDIABETES: Chronic | ICD-10-CM

## 2023-10-03 PROCEDURE — 87086 URINE CULTURE/COLONY COUNT: CPT

## 2023-10-03 PROCEDURE — 99215 OFFICE O/P EST HI 40 MIN: CPT | Performed by: INTERNAL MEDICINE

## 2023-10-03 PROCEDURE — 3079F DIAST BP 80-89 MM HG: CPT | Performed by: INTERNAL MEDICINE

## 2023-10-03 PROCEDURE — 3074F SYST BP LT 130 MM HG: CPT | Performed by: INTERNAL MEDICINE

## 2023-10-03 RX ORDER — SULFAMETHOXAZOLE AND TRIMETHOPRIM 800; 160 MG/1; MG/1
1 TABLET ORAL 2 TIMES DAILY
Qty: 10 TABLET | Refills: 0 | Status: SHIPPED | OUTPATIENT
Start: 2023-10-03 | End: 2023-11-02

## 2023-10-03 RX ORDER — SULFAMETHOXAZOLE AND TRIMETHOPRIM 800; 160 MG/1; MG/1
1 TABLET ORAL 2 TIMES DAILY
Qty: 14 TABLET | Refills: 0 | Status: SHIPPED | OUTPATIENT
Start: 2023-10-03 | End: 2023-10-03

## 2023-10-03 RX ORDER — AMITRIPTYLINE HYDROCHLORIDE 50 MG/1
50 TABLET, FILM COATED ORAL NIGHTLY
Qty: 30 TABLET | Refills: 6 | Status: SHIPPED | OUTPATIENT
Start: 2023-10-03 | End: 2023-10-26

## 2023-10-03 ASSESSMENT — FIBROSIS 4 INDEX: FIB4 SCORE: 0.88

## 2023-10-03 NOTE — ASSESSMENT & PLAN NOTE
Chronic condition.  Previous GFR 40 to 50s.  Advised the patient to avoid NSAID.  Continue to monitor.

## 2023-10-03 NOTE — ASSESSMENT & PLAN NOTE
This is a chronic condition.  The patient take pantoprazole 40 Mg daily.  The patient denies nausea vomiting dysphagia or unexplained weight loss.

## 2023-10-03 NOTE — ASSESSMENT & PLAN NOTE
New condition.  Symptoms reported since last couple days.  The patient reported slight discomfort / burning with urination  She denies fever or chills.  No vaginal discharge or gross hematuria.  She denies change in bowel movement.

## 2023-10-03 NOTE — ASSESSMENT & PLAN NOTE
Chronic condition.  CHADS2 score 1.  The patient presently taking aspirin and diltiazem.  Patient followed by cardiology service.  Patient denies chest pain shortness of breath palpitation or near syncope.

## 2023-10-03 NOTE — ASSESSMENT & PLAN NOTE
Chronic condition.  The patient currently taking Cymbalta 60 Mg daily.  Patient denies SI.  Patient tolerating medication well.

## 2023-10-03 NOTE — ASSESSMENT & PLAN NOTE
Chronic condition.  Patient followed by neurology service.  Patient takes amitriptyline 50 mg daily and rizatriptan as needed.  Currently she is asymptomatic.  The patient request refill for amitriptyline.

## 2023-10-03 NOTE — PROGRESS NOTES
PRIMARY CARE CLINIC VISIT        Chief Complaint   Patient presents with    UTI      Discomfort with urination  Asthma COPD  Obesity  Atrial fibrillation  Depression  Follow-up hypertension  CKD 3  Hyperlipidemia  Acid reflux  Migraine  Prediabetes      History of Present Illness     Dysuria  New condition.  Symptoms reported since last couple days.  The patient reported slight discomfort / burning with urination  She denies fever or chills.  No vaginal discharge or gross hematuria.  She denies change in bowel movement.    Asthma-COPD overlap syndrome (HCC)  Chronic ongoing condition.  The patient being treated with Trelegy 1 puff daily.  Patient denies    Severe obesity (HCC)  Chronic condition.    Counseling on health consequences related to obesity.  Discussed with the patient regarding diet, exercise, and lifestyle modification to help achieve and maintain healthy weight          Paroxysmal atrial fibrillation (HCC)  Chronic condition.  CHADS2 score 1.  The patient presently taking aspirin and diltiazem.  Patient followed by cardiology service.  Patient denies chest pain shortness of breath palpitation or near syncope.    Major depression in remission (HCC)  Chronic condition.  The patient currently taking Cymbalta 60 Mg daily.  Patient denies SI.  Patient tolerating medication well.    Hypertensive kidney disease with stage 3a chronic kidney disease (HCC)  Chronic condition.  Patient is currently taking diltiazem 240 Mg daily.  Micardis 40 Mg daily.  Blood pressure has been well controlled.    CKD (chronic kidney disease) stage 3, GFR 30-59 ml/min (HCC)  Chronic condition.  Previous GFR 40 to 50s.  Advised the patient to avoid NSAID.  Continue to monitor.    Dyslipidemia  Chronic condition.  Currently the patient being treated with atorvastatin 10 mg daily.    GERD (gastroesophageal reflux disease)  This is a chronic condition.  The patient take pantoprazole 40 Mg daily.  The patient denies nausea vomiting  dysphagia or unexplained weight loss.    Migraine  Chronic condition.  Patient followed by neurology service.  Patient takes amitriptyline 50 mg daily and rizatriptan as needed.  Currently she is asymptomatic.  The patient request refill for amitriptyline.    Prediabetes  This is a chronic condition but the patient currently on diet therapy.    Current Outpatient Medications on File Prior to Visit   Medication Sig Dispense Refill    pantoprazole (PROTONIX) 40 MG Tablet Delayed Response Take 1 Tablet by mouth 2 times a day. 180 Tablet 3    DULoxetine (CYMBALTA) 60 MG Cap DR Particles delayed-release capsule Take 1 Capsule by mouth every day. 90 Capsule 3    rizatriptan (MAXALT) 5 MG tablet Take 1 Tablet by mouth one time as needed for Migraine. 10 Tablet 5    atorvastatin (LIPITOR) 10 MG Tab Take 1 Tablet by mouth every day. 100 Tablet 3    dilTIAZem CD (CARDIZEM CD) 240 MG CAPSULE SR 24 HR Take 1 Capsule by mouth every day. 90 Capsule 3    telmisartan (MICARDIS) 40 MG Tab Take 1 Tablet by mouth every day. 100 Tablet 3    fluticasone-umeclidin-vilant (TRELEGY ELLIPTA) 100-62.5-25 MCG/ACT AEROSOL POWDER, BREATH ACTIVATED inhalation Inhale 1 Inhalation every day. 1 Each 11    albuterol 108 (90 Base) MCG/ACT Aero Soln inhalation aerosol INHALE 2 PUFFS BY MOUTH EVERY 6 HOURS AS NEEDED FOR SHORTNESS OF BREATH 8.5 Each 5    zonisamide (ZONEGRAN) 100 MG Cap Take 100 mg by mouth every day.      benzonatate (TESSALON) 100 MG Cap Take 1 Capsule by mouth 3 times a day as needed for Cough. 60 Capsule 0    methylPREDNISolone (MEDROL DOSEPAK) 4 MG Tablet Therapy Pack As directed on the packaging label. 21 Tablet 0    ondansetron (ZOFRAN ODT) 4 MG TABLET DISPERSIBLE Take 1 Tablet by mouth every 6 hours as needed for Nausea/Vomiting. 10 Tablet 0    clobetasol (TEMOVATE) 0.05 % Ointment AAA thin layer, daily for 3 weeks, then use 2-3 times a week 60 g 1    SALINE NA Administer 1 Spray into affected nostril(S) every day.       No  current facility-administered medications on file prior to visit.        Allergies: Sumatriptan, Clarithromycin, Ees [erythromycin], Levaquin, Pcn [penicillins], Shellfish allergy, Trazodone, Clarithromycin, Erythromycin, and Penicillin g    Current Outpatient Medications Ordered in Epic   Medication Sig Dispense Refill    amitriptyline (ELAVIL) 50 MG Tab Take 1 Tablet by mouth every evening. 30 Tablet 6    sulfamethoxazole-trimethoprim (BACTRIM DS) 800-160 MG tablet Take 1 Tablet by mouth 2 times a day. 10 Tablet 0    pantoprazole (PROTONIX) 40 MG Tablet Delayed Response Take 1 Tablet by mouth 2 times a day. 180 Tablet 3    DULoxetine (CYMBALTA) 60 MG Cap DR Particles delayed-release capsule Take 1 Capsule by mouth every day. 90 Capsule 3    rizatriptan (MAXALT) 5 MG tablet Take 1 Tablet by mouth one time as needed for Migraine. 10 Tablet 5    atorvastatin (LIPITOR) 10 MG Tab Take 1 Tablet by mouth every day. 100 Tablet 3    dilTIAZem CD (CARDIZEM CD) 240 MG CAPSULE SR 24 HR Take 1 Capsule by mouth every day. 90 Capsule 3    telmisartan (MICARDIS) 40 MG Tab Take 1 Tablet by mouth every day. 100 Tablet 3    fluticasone-umeclidin-vilant (TRELEGY ELLIPTA) 100-62.5-25 MCG/ACT AEROSOL POWDER, BREATH ACTIVATED inhalation Inhale 1 Inhalation every day. 1 Each 11    albuterol 108 (90 Base) MCG/ACT Aero Soln inhalation aerosol INHALE 2 PUFFS BY MOUTH EVERY 6 HOURS AS NEEDED FOR SHORTNESS OF BREATH 8.5 Each 5    zonisamide (ZONEGRAN) 100 MG Cap Take 100 mg by mouth every day.      benzonatate (TESSALON) 100 MG Cap Take 1 Capsule by mouth 3 times a day as needed for Cough. 60 Capsule 0    methylPREDNISolone (MEDROL DOSEPAK) 4 MG Tablet Therapy Pack As directed on the packaging label. 21 Tablet 0    ondansetron (ZOFRAN ODT) 4 MG TABLET DISPERSIBLE Take 1 Tablet by mouth every 6 hours as needed for Nausea/Vomiting. 10 Tablet 0    clobetasol (TEMOVATE) 0.05 % Ointment AAA thin layer, daily for 3 weeks, then use 2-3 times a week  60 g 1    SALINE NA Administer 1 Spray into affected nostril(S) every day.       No current [facility]-ordered facility-administered medications on file.       Past Medical History:   Diagnosis Date    Apnea, sleep     Arrhythmia     afib    Arthritis     osteo    Asthma     Atrial fibrillation (HCC)     Back pain     Bronchitis     Chickenpox     Constipation     Cough     Daytime sleepiness     Dental disorder     upper dentures    Earache     Frequent urination     Gasping for breath     GERD (gastroesophageal reflux disease) 2/27/2010    Honduran measles     Heartburn     Hypertension     Impaired fasting glucose 2/27/2010    Incontinence of urine     Indigestion     Influenza     Mumps     Nasal drainage     Nausea     Osteopenia 2/24/2010    Osteoporosis     Other specified disorder of intestines     constipation r/t meds    Restless leg syndrome     Scarlet fever     Shortness of breath     Sleep apnea     Snoring     Tremor, essential 2/24/2010    Urinary bladder disorder     Wears glasses     Wheezing     Whooping cough        Past Surgical History:   Procedure Laterality Date    INCISION AND DRAINAGE GENERAL N/A 10/18/2022    Procedure: INCISION AND DRAINAGE ABSCESS TONGUE;  Surgeon: Paz Suazo M.D.;  Location: SURGERY [facility];  Service: Ent    LUMBAR LAMINECTOMY DISKECTOMY Bilateral 2/4/2017    Procedure: LUMBAR LAMINECTOMY DISKECTOMY POSTERIOR L4-S1 ;  Surgeon: Emil Hitchcock M.D.;  Location: [facility];  Service:     CARPAL TUNNEL ENDOSCOPIC  11/17/2012    Performed by Heriberto Quiñones M.D. at SURGERY [facility]    TRIGGER FINGER RELEASE  11/17/2012    Performed by Heriberto Quiñones M.D. at [facility]    HIP ARTHROSCOPY  2/23/2009    Performed by SHERLYN CALVO at SURGERY [facility]    ACETABULAR OSTEOTOMY  2/23/2009    Performed by SHERLYN CALVO at SURGERY [facility]    MASS EXCISION ORTHO  2/23/2009    Performed by SHERLYN CALVO at SURGERY  [facility]    HIP ARTHROSCOPY  2005    done at [facility]    HAJA BY LAPAROSCOPY  1997    HYSTERECTOMY, TOTAL ABDOMINAL  1979    oopherectomy lacie    BLADDER SUSPENSION      bladder sling    CARPAL TUNNEL RELEASE      HIP REPLACEMENT, TOTAL      HYSTERECTOMY LAPAROSCOPY      LAMINOTOMY      PRIMARY C SECTION  1970/1975       Family History   Problem Relation Age of Onset    GI Disease Father         Crohn's    Heart Disease Father         First MI age 30    Hypertension Father     Stroke Father     Hypertension Other     Cancer Brother         ESOPHAGEAL CANCER       Social History     Tobacco Use   Smoking Status Never   Smokeless Tobacco Never       Social History     Substance and Sexual Activity   Alcohol Use Yes    Alcohol/week: 0.0 oz    Comment: Stopped drinking 45 days ago 7/12/17 Going to AA       Review of systems.  As per HPI above. All other systems reviewed and negative.      Past Medical, Social, and Family history reviewed and updated in EPIC     Objective     /80   Pulse 96   Temp 36.4 °C (97.5 °F) (Temporal)   Resp 18   Ht 1.549 m (5' 1\")   Wt 74.5 kg (164 lb 4 oz)   SpO2 96%    Body mass index is 31.03 kg/m².    General: alert in no apparent distress.  Cardiovascular: regular rate and rhythm  Pulmonary: lungs : no wheezing   Gastrointestinal: BS present. No obvious mass noted  No CVA tenderness   abdomen no rebound guarding or rigidity noted.    Urine dipstick positive for leukocytes esterase.  Negative glucose negative blood negative protein    Lab Results   Component Value Date/Time    HBA1C 5.8 (H) 04/03/2023 01:01 PM    HBA1C 6.0 (H) 11/07/2022 09:15 AM    HBA1C 5.8 (H) 09/14/2022 09:25 AM       Lab Results   Component Value Date/Time    WBC 12.4 (H) 08/10/2023 01:34 PM    HEMOGLOBIN 13.3 08/10/2023 01:34 PM    HEMATOCRIT 42.9 08/10/2023 01:34 PM    MCV 78.3 (L) 08/10/2023 01:34 PM    PLATELETCT 375 08/10/2023 01:34 PM         Lab Results   Component Value Date/Time    SODIUM 137 " 08/10/2023 01:34 PM    POTASSIUM 4.4 08/10/2023 01:34 PM    GLUCOSE 133 (H) 08/10/2023 01:34 PM    BUN 16 08/10/2023 01:34 PM    CREATININE 1.05 08/10/2023 01:34 PM    CREATININE 0.89 04/27/2009 12:00 AM       Lab Results   Component Value Date/Time    CHOLSTRLTOT 132 04/03/2023 01:01 PM    TRIGLYCERIDE 77 04/03/2023 01:01 PM    HDL 53 04/03/2023 01:01 PM    LDL 64 04/03/2023 01:01 PM       Lab Results   Component Value Date/Time    ALTSGPT 8 08/10/2023 01:34 PM             Assessment and Plan     1. Dysuria  - POCT Urinalysis  - URINE CULTURE(NEW); Future  This is a new condition.  Patient with abnormal urine test today.  Consistent with acute cystitis.  Recommend to treat with Bactrim double strength twice daily for at least 3 days  Potential side effect of medication discussed with the patient.  Urine culture also requested.      2. Asthma-COPD overlap syndrome  Chronic stable condition.  Continue with Trelegy 1 puff twice daily.    3. Severe obesity (HCC)  Chronic condition.  Uncontrolled.  Recommend diet and lifestyle modification.  Encouraged patient to lose weight.    4. Paroxysmal atrial fibrillation (HCC)  Chronic stable condition.  Continue with diltiazem 240 Mg daily and aspirin 81 mg daily.  Patient asymptomatic.  Continue to follow-up with cardiology service    5. Major depression in remission (HCC)  Chronic stable condition.  Continue with Cymbalta 60 mg daily    6. Hypertensive kidney disease with stage 3a chronic kidney disease (HCC)  Chronic stable condition.  Continue with Micardis 40 Mg daily and diltiazem 240 Mg daily    7. Stage 3a chronic kidney disease (HCC)  Chronic condition.  Patient to avoid NSAID.  Continue to monitor    8. Dyslipidemia  Chronic stable condition.  Continue atorvastatin 10 mg daily.  Lab test result discussed with the patient    9. Gastroesophageal reflux disease without esophagitis  Chronic stable condition.  Continue Protonix 40 Mg daily    10. Migraine without status  migrainosus, not intractable, unspecified migraine type  Chronic condition.  Continue amitriptyline 50 mg daily.  Patient may take zonegram as prescribed by neurologist.      11. Prediabetes  Chronic condition.  Recommend patient to continue with diet and exercise and encouraged patient to lose weight.  Continue to monitor        Other orders  - amitriptyline (ELAVIL) 50 MG Tab; Take 1 Tablet by mouth every evening.  Dispense: 30 Tablet; Refill: 6  - sulfamethoxazole-trimethoprim (BACTRIM DS) 800-160 MG tablet; Take 1 Tablet by mouth 2 times a day.  Dispense: 10 Tablet; Refill: 0      Attestation: I spent:   46   min -  That includes time for chart review before the visit, the actual patient visit, and time spent on documentation in EMR after the visit.  Chart review/prep, review of other providers' records, imaging/lab review, face-to-face time for history/examination, pt's counseling/education, ordering, prescribing,  review of results/meds/ treatment plan with patient, and care coordination.    The patient is of extensive complexity. This pt is at high risk for complications and morbidity.           Please note that this dictation was created using voice recognition software. I have made every reasonable attempt to correct obvious errors, but I expect that there are errors of grammar and possibly content that I did not discover before finalizing the note.    Rudy Parsons MD  Internal Medicine  [facility]

## 2023-10-03 NOTE — ASSESSMENT & PLAN NOTE
Chronic condition.  Patient is currently taking diltiazem 240 Mg daily.  Micardis 40 Mg daily.  Blood pressure has been well controlled.

## 2023-10-05 LAB
BACTERIA UR CULT: NORMAL
SIGNIFICANT IND 70042: NORMAL
SITE SITE: NORMAL
SOURCE SOURCE: NORMAL

## 2023-10-10 ENCOUNTER — HOSPITAL ENCOUNTER (OUTPATIENT)
Dept: RADIOLOGY | Facility: MEDICAL CENTER | Age: 73
End: 2023-10-10
Attending: INTERNAL MEDICINE
Payer: MEDICARE

## 2023-10-10 ENCOUNTER — OFFICE VISIT (OUTPATIENT)
Dept: MEDICAL GROUP | Facility: PHYSICIAN GROUP | Age: 73
End: 2023-10-10
Payer: MEDICARE

## 2023-10-10 VITALS
TEMPERATURE: 97.4 F | BODY MASS INDEX: 30.99 KG/M2 | HEIGHT: 61 IN | RESPIRATION RATE: 16 BRPM | WEIGHT: 164.13 LBS | SYSTOLIC BLOOD PRESSURE: 110 MMHG | OXYGEN SATURATION: 96 % | HEART RATE: 100 BPM | DIASTOLIC BLOOD PRESSURE: 60 MMHG

## 2023-10-10 DIAGNOSIS — J44.89 ASTHMA-COPD OVERLAP SYNDROME (HCC): Chronic | ICD-10-CM

## 2023-10-10 DIAGNOSIS — R05.1 ACUTE COUGH: ICD-10-CM

## 2023-10-10 DIAGNOSIS — N18.31 HYPERTENSIVE KIDNEY DISEASE WITH STAGE 3A CHRONIC KIDNEY DISEASE: ICD-10-CM

## 2023-10-10 DIAGNOSIS — K21.9 GASTROESOPHAGEAL REFLUX DISEASE WITHOUT ESOPHAGITIS: Chronic | ICD-10-CM

## 2023-10-10 DIAGNOSIS — E66.01 SEVERE OBESITY (HCC): Chronic | ICD-10-CM

## 2023-10-10 DIAGNOSIS — I48.0 PAROXYSMAL ATRIAL FIBRILLATION (HCC): ICD-10-CM

## 2023-10-10 DIAGNOSIS — I12.9 HYPERTENSIVE KIDNEY DISEASE WITH STAGE 3A CHRONIC KIDNEY DISEASE: ICD-10-CM

## 2023-10-10 DIAGNOSIS — E78.5 DYSLIPIDEMIA: Chronic | ICD-10-CM

## 2023-10-10 DIAGNOSIS — N18.31 STAGE 3A CHRONIC KIDNEY DISEASE: ICD-10-CM

## 2023-10-10 PROBLEM — R05.9 COUGH: Status: ACTIVE | Noted: 2023-10-10

## 2023-10-10 LAB
FLUAV RNA SPEC QL NAA+PROBE: NEGATIVE
FLUBV RNA SPEC QL NAA+PROBE: NEGATIVE
RSV RNA SPEC QL NAA+PROBE: NEGATIVE
S PYO DNA SPEC NAA+PROBE: NOT DETECTED
SARS-COV-2 RNA RESP QL NAA+PROBE: NEGATIVE

## 2023-10-10 PROCEDURE — 3074F SYST BP LT 130 MM HG: CPT | Performed by: INTERNAL MEDICINE

## 2023-10-10 PROCEDURE — 99214 OFFICE O/P EST MOD 30 MIN: CPT | Performed by: INTERNAL MEDICINE

## 2023-10-10 PROCEDURE — 3078F DIAST BP <80 MM HG: CPT | Performed by: INTERNAL MEDICINE

## 2023-10-10 PROCEDURE — 0241U POCT CEPHEID COV-2, FLU A/B, RSV - PCR: CPT | Performed by: INTERNAL MEDICINE

## 2023-10-10 PROCEDURE — 87651 STREP A DNA AMP PROBE: CPT | Performed by: INTERNAL MEDICINE

## 2023-10-10 PROCEDURE — 71046 X-RAY EXAM CHEST 2 VIEWS: CPT

## 2023-10-10 RX ORDER — BENZONATATE 100 MG/1
100 CAPSULE ORAL 3 TIMES DAILY PRN
Qty: 60 CAPSULE | Refills: 0 | Status: SHIPPED | OUTPATIENT
Start: 2023-10-10 | End: 2023-12-06

## 2023-10-10 ASSESSMENT — FIBROSIS 4 INDEX: FIB4 SCORE: 0.88

## 2023-10-10 NOTE — ASSESSMENT & PLAN NOTE
This is a chronic condition.  Patient is taking atorvastatin 10 Mg daily.  Patient tolerating medication well.

## 2023-10-10 NOTE — ASSESSMENT & PLAN NOTE
Chronic condition.  The patient followed by pulmonology service.  The patient is currently being treated with Trelegy 1 puff daily.  Patient denies shortness of breath or wheezing.

## 2023-10-10 NOTE — ASSESSMENT & PLAN NOTE
This is a new condition.  The patient reported cough noted since last 2 days.  The patient denies fever or chills.  Patient denies shortness of breath or wheezing.

## 2023-10-10 NOTE — ASSESSMENT & PLAN NOTE
This is a chronic condition.  The patient is taking diltiazem 240 Mg daily.  Patient tolerating medication well.  Blood pressure has been well controlled.

## 2023-10-10 NOTE — ASSESSMENT & PLAN NOTE
Chronic ongoing condition.  Patient followed by cardiology specialist.  Patient currently being treated with diltiazem and aspirin.  No anticoagulation therapy.  Patient denies chest pain shortness of breath or palpitation.

## 2023-10-10 NOTE — ASSESSMENT & PLAN NOTE
Chronic condition.  The patient being treated with pantoprazole 40 Mg daily.  The patient denies nausea vomiting dysphagia or unexplained weight loss.

## 2023-10-10 NOTE — PROGRESS NOTES
PRIMARY CARE CLINIC VISIT        Chief Complaint   Patient presents with    Coronavirus Screening      Cough  Asthma, COPD  Obesity  Follow-up hypertension  CKD 3  Hyperlipidemia  Acid reflux        History of Present Illness     Asthma-COPD overlap syndrome (HCC)  Chronic condition.  The patient followed by pulmonology service.  The patient is currently being treated with Trelegy 1 puff daily.  Patient denies shortness of breath or wheezing.    Severe obesity (HCC)  Chronic condition.    Counseling on health consequences related to obesity.  Discussed with the patient regarding diet, exercise, and lifestyle modification to help achieve and maintain healthy weight          Paroxysmal atrial fibrillation (HCC)  Chronic ongoing condition.  Patient followed by cardiology specialist.  Patient currently being treated with diltiazem and aspirin.  No anticoagulation therapy.  Patient denies chest pain shortness of breath or palpitation.    Hypertensive kidney disease with stage 3a chronic kidney disease (HCC)  This is a chronic condition.  The patient is taking diltiazem 240 Mg daily.  Patient tolerating medication well.  Blood pressure has been well controlled.    CKD (chronic kidney disease) stage 3, GFR 30-59 ml/min (HCC)  Chronic condition.  Previous GFR 40 to 50s.  Advised the patient to avoid NSAID.  Continue to monitor.    Dyslipidemia  This is a chronic condition.  Patient is taking atorvastatin 10 Mg daily.  Patient tolerating medication well.    GERD (gastroesophageal reflux disease)  Chronic condition.  The patient being treated with pantoprazole 40 Mg daily.  The patient denies nausea vomiting dysphagia or unexplained weight loss.    Cough  This is a new condition.  The patient reported cough noted since last 2 days.  The patient denies fever or chills.  Patient denies shortness of breath or wheezing.    Current Outpatient Medications on File Prior to Visit   Medication Sig Dispense Refill    amitriptyline  (ELAVIL) 50 MG Tab Take 1 Tablet by mouth every evening. 30 Tablet 6    sulfamethoxazole-trimethoprim (BACTRIM DS) 800-160 MG tablet Take 1 Tablet by mouth 2 times a day. 10 Tablet 0    benzonatate (TESSALON) 100 MG Cap Take 1 Capsule by mouth 3 times a day as needed for Cough. 60 Capsule 0    pantoprazole (PROTONIX) 40 MG Tablet Delayed Response Take 1 Tablet by mouth 2 times a day. 180 Tablet 3    methylPREDNISolone (MEDROL DOSEPAK) 4 MG Tablet Therapy Pack As directed on the packaging label. 21 Tablet 0    ondansetron (ZOFRAN ODT) 4 MG TABLET DISPERSIBLE Take 1 Tablet by mouth every 6 hours as needed for Nausea/Vomiting. 10 Tablet 0    DULoxetine (CYMBALTA) 60 MG Cap DR Particles delayed-release capsule Take 1 Capsule by mouth every day. 90 Capsule 3    rizatriptan (MAXALT) 5 MG tablet Take 1 Tablet by mouth one time as needed for Migraine. 10 Tablet 5    clobetasol (TEMOVATE) 0.05 % Ointment AAA thin layer, daily for 3 weeks, then use 2-3 times a week 60 g 1    atorvastatin (LIPITOR) 10 MG Tab Take 1 Tablet by mouth every day. 100 Tablet 3    dilTIAZem CD (CARDIZEM CD) 240 MG CAPSULE SR 24 HR Take 1 Capsule by mouth every day. 90 Capsule 3    telmisartan (MICARDIS) 40 MG Tab Take 1 Tablet by mouth every day. 100 Tablet 3    fluticasone-umeclidin-vilant (TRELEGY ELLIPTA) 100-62.5-25 MCG/ACT AEROSOL POWDER, BREATH ACTIVATED inhalation Inhale 1 Inhalation every day. 1 Each 11    albuterol 108 (90 Base) MCG/ACT Aero Soln inhalation aerosol INHALE 2 PUFFS BY MOUTH EVERY 6 HOURS AS NEEDED FOR SHORTNESS OF BREATH 8.5 Each 5    zonisamide (ZONEGRAN) 100 MG Cap Take 100 mg by mouth every day.      SALINE NA Administer 1 Spray into affected nostril(S) every day.       No current facility-administered medications on file prior to visit.        Allergies: Sumatriptan, Clarithromycin, Ees [erythromycin], Levaquin, Pcn [penicillins], Shellfish allergy, Trazodone, Clarithromycin, Erythromycin, and Penicillin g    Current  Outpatient Medications Ordered in Epic   Medication Sig Dispense Refill    benzonatate (TESSALON) 100 MG Cap Take 1 Capsule by mouth 3 times a day as needed for Cough. 60 Capsule 0    amitriptyline (ELAVIL) 50 MG Tab Take 1 Tablet by mouth every evening. 30 Tablet 6    sulfamethoxazole-trimethoprim (BACTRIM DS) 800-160 MG tablet Take 1 Tablet by mouth 2 times a day. 10 Tablet 0    benzonatate (TESSALON) 100 MG Cap Take 1 Capsule by mouth 3 times a day as needed for Cough. 60 Capsule 0    pantoprazole (PROTONIX) 40 MG Tablet Delayed Response Take 1 Tablet by mouth 2 times a day. 180 Tablet 3    methylPREDNISolone (MEDROL DOSEPAK) 4 MG Tablet Therapy Pack As directed on the packaging label. 21 Tablet 0    ondansetron (ZOFRAN ODT) 4 MG TABLET DISPERSIBLE Take 1 Tablet by mouth every 6 hours as needed for Nausea/Vomiting. 10 Tablet 0    DULoxetine (CYMBALTA) 60 MG Cap DR Particles delayed-release capsule Take 1 Capsule by mouth every day. 90 Capsule 3    rizatriptan (MAXALT) 5 MG tablet Take 1 Tablet by mouth one time as needed for Migraine. 10 Tablet 5    clobetasol (TEMOVATE) 0.05 % Ointment AAA thin layer, daily for 3 weeks, then use 2-3 times a week 60 g 1    atorvastatin (LIPITOR) 10 MG Tab Take 1 Tablet by mouth every day. 100 Tablet 3    dilTIAZem CD (CARDIZEM CD) 240 MG CAPSULE SR 24 HR Take 1 Capsule by mouth every day. 90 Capsule 3    telmisartan (MICARDIS) 40 MG Tab Take 1 Tablet by mouth every day. 100 Tablet 3    fluticasone-umeclidin-vilant (TRELEGY ELLIPTA) 100-62.5-25 MCG/ACT AEROSOL POWDER, BREATH ACTIVATED inhalation Inhale 1 Inhalation every day. 1 Each 11    albuterol 108 (90 Base) MCG/ACT Aero Soln inhalation aerosol INHALE 2 PUFFS BY MOUTH EVERY 6 HOURS AS NEEDED FOR SHORTNESS OF BREATH 8.5 Each 5    zonisamide (ZONEGRAN) 100 MG Cap Take 100 mg by mouth every day.      SALINE NA Administer 1 Spray into affected nostril(S) every day.       No current [facility]-ordered facility-administered medications on  file.       Past Medical History:   Diagnosis Date    Apnea, sleep     Arrhythmia     afib    Arthritis     osteo    Asthma     Atrial fibrillation (HCC)     Back pain     Bronchitis     Chickenpox     Constipation     Cough     Daytime sleepiness     Dental disorder     upper dentures    Earache     Frequent urination     Gasping for breath     GERD (gastroesophageal reflux disease) 2/27/2010    Welsh measles     Heartburn     Hypertension     Impaired fasting glucose 2/27/2010    Incontinence of urine     Indigestion     Influenza     Mumps     Nasal drainage     Nausea     Osteopenia 2/24/2010    Osteoporosis     Other specified disorder of intestines     constipation r/t meds    Restless leg syndrome     Scarlet fever     Shortness of breath     Sleep apnea     Snoring     Tremor, essential 2/24/2010    Urinary bladder disorder     Wears glasses     Wheezing     Whooping cough        Past Surgical History:   Procedure Laterality Date    INCISION AND DRAINAGE GENERAL N/A 10/18/2022    Procedure: INCISION AND DRAINAGE ABSCESS TONGUE;  Surgeon: Paz Suazo M.D.;  Location: [facility];  Service: Ent    LUMBAR LAMINECTOMY DISKECTOMY Bilateral 2/4/2017    Procedure: LUMBAR LAMINECTOMY DISKECTOMY POSTERIOR L4-S1 ;  Surgeon: Emil Hitchcock M.D.;  Location: [facility];  Service:     CARPAL TUNNEL ENDOSCOPIC  11/17/2012    Performed by Heriberto Quiñones M.D. at SURGERY [facility]S    TRIGGER FINGER RELEASE  11/17/2012    Performed by Heriberto Quiñones M.D. at SURGERY [facility]    HIP ARTHROSCOPY  2/23/2009    Performed by SHERLYN CALVO at [facility]    ACETABULAR OSTEOTOMY  2/23/2009    Performed by SHERLYN CALVO at SURGERY [facility] ORS    MASS EXCISION ORTHO  2/23/2009    Performed by SHERLYN CALVO at SURGERY [facility] ORS    HIP ARTHROSCOPY  2005    done at [facility]    HAJA BY LAPAROSCOPY  1997    HYSTERECTOMY, TOTAL ABDOMINAL  1979     "oopherectomy lacie    BLADDER SUSPENSION      bladder sling    CARPAL TUNNEL RELEASE      HIP REPLACEMENT, TOTAL      HYSTERECTOMY LAPAROSCOPY      LAMINOTOMY      PRIMARY C SECTION  1970/1975       Family History   Problem Relation Age of Onset    GI Disease Father         Crohn's    Heart Disease Father         First MI age 30    Hypertension Father     Stroke Father     Hypertension Other     Cancer Brother         ESOPHAGEAL CANCER       Social History     Tobacco Use   Smoking Status Never   Smokeless Tobacco Never       Social History     Substance and Sexual Activity   Alcohol Use Yes    Alcohol/week: 0.0 oz    Comment: Stopped drinking 45 days ago 7/12/17 Going to [facility]       Review of systems.  As per HPI above. All other systems reviewed and negative.      Past Medical, Social, and Family history reviewed and updated in EPIC     Objective     /60   Pulse 100   Temp 36.3 °C (97.4 °F) (Temporal)   Resp 16   Ht 1.549 m (5' 1\")   Wt 74.4 kg (164 lb 2 oz)   SpO2 96%    Body mass index is 31.01 kg/m².    General: alert in no apparent distress.  Cardiovascular: regular rate and rhythm  Pulmonary: lungs : no wheezing   Gastrointestinal: BS present. No obvious mass noted  Nose with mild mucosal inflammation no purulent drainage  Oropharynx no exudate          Lab Results   Component Value Date/Time    SODIUM 137 08/10/2023 01:34 PM    POTASSIUM 4.4 08/10/2023 01:34 PM    GLUCOSE 133 (H) 08/10/2023 01:34 PM    BUN 16 08/10/2023 01:34 PM    CREATININE 1.05 08/10/2023 01:34 PM    CREATININE 0.89 04/27/2009 12:00 AM       Lab Results   Component Value Date/Time    CHOLSTRLTOT 132 04/03/2023 01:01 PM    TRIGLYCERIDE 77 04/03/2023 01:01 PM    HDL 53 04/03/2023 01:01 PM    LDL 64 04/03/2023 01:01 PM       Lab Results   Component Value Date/Time    ALTSGPT 8 08/10/2023 01:34 PM             Assessment and Plan     1. Acute cough  This is a new condition.  Suspect viral URI/bronchitis  Rule out COVID versus bacterial " infection  - POCT GROUP A STREP, PCR  - POCT CoV-2, Flu A/B, RSV by PCR  - DX-CHEST-2 VIEWS; Future    Symptomatic and supportive care recommended:   Advised pt to stay well hydrated and plenty of rest   For sore throat: rec warm salt water gargles soft foods. Throat coats /chloraseptic sprays prn.  Saline nasal spray and Flonase as a decongestant.   Infection control measures:  Frequent Hand washings with soap and water, covering sneeze/cough, clean/disinfect surfaces. Avoid close contact with sick people.  May try otc mucinex as needed [expectorant]    - Return to clinic if not better. Go to urgent care or ER is symptoms worsen /change such as temperature >101, worsening shortness of breath, wheezing, worsening cough, chest pain, dizziness, lightheadedness, lethargy, confusion, headaches, change in vision, motor weakness, abdominal pain, the inability to keep fluids/ foods down, etc... or any change in the pt's condition.      Other orders  - benzonatate (TESSALON) 100 MG Cap; Take 1 Capsule by mouth 3 times a day as needed for Cough.  Dispense: 60 Capsule; Refill: 0        2. Asthma-COPD overlap syndrome  Chronic stable condition.  Advised the patient to use Trelegy 1 puff daily.  Albuterol as needed.    3. Severe obesity (HCC)  Chronic condition.  Uncontrolled.  Recommend diet exercise.  Courage patient to lose weight    4. Paroxysmal atrial fibrillation (HCC)  Chronic condition.  Stable.  CHADS2 score of 1  Continue aspirin 81 daily and diltiazem 240 Mg daily.  Continue to follow-up with cardiology service    5. Hypertensive kidney disease with stage 3a chronic kidney disease (HCC)  Chronic stable condition.  Continue diltiazem 240 Mg daily.  Continue follow-up with cardiology service    6. Stage 3a chronic kidney disease (HCC)  Chronic condition.  Stable.  Advised the patient to avoid NSAID.  Continue to monitor    7. Dyslipidemia  Chronic condition.  Continue atorvastatin 10 mg daily    8. Gastroesophageal  reflux disease without esophagitis  Chronic stable condition.  Continue pantoprazole 40 Mg daily                    Please note that this dictation was created using voice recognition software. I have made every reasonable attempt to correct obvious errors, but I expect that there are errors of grammar and possibly content that I did not discover before finalizing the note.    Rudy Parsons MD  Internal Medicine  [facility]

## 2023-10-26 RX ORDER — AMITRIPTYLINE HYDROCHLORIDE 50 MG/1
50 TABLET, FILM COATED ORAL EVERY EVENING
Qty: 90 TABLET | Refills: 3 | Status: SHIPPED | OUTPATIENT
Start: 2023-10-26

## 2023-11-02 ENCOUNTER — OFFICE VISIT (OUTPATIENT)
Dept: CARDIOLOGY | Facility: MEDICAL CENTER | Age: 73
End: 2023-11-02
Payer: MEDICARE

## 2023-11-02 VITALS
RESPIRATION RATE: 16 BRPM | BODY MASS INDEX: 31.53 KG/M2 | DIASTOLIC BLOOD PRESSURE: 86 MMHG | HEIGHT: 61 IN | SYSTOLIC BLOOD PRESSURE: 120 MMHG | OXYGEN SATURATION: 96 % | HEART RATE: 93 BPM | WEIGHT: 167 LBS

## 2023-11-02 DIAGNOSIS — E78.5 DYSLIPIDEMIA: Chronic | ICD-10-CM

## 2023-11-02 DIAGNOSIS — G47.33 OSA (OBSTRUCTIVE SLEEP APNEA): Chronic | ICD-10-CM

## 2023-11-02 DIAGNOSIS — I47.10 PAROXYSMAL SVT (SUPRAVENTRICULAR TACHYCARDIA) (HCC): ICD-10-CM

## 2023-11-02 DIAGNOSIS — I48.0 PAROXYSMAL ATRIAL FIBRILLATION (HCC): ICD-10-CM

## 2023-11-02 PROBLEM — Z02.9 ADMINISTRATIVE ENCOUNTER: Status: RESOLVED | Noted: 2023-05-11 | Resolved: 2023-11-02

## 2023-11-02 PROBLEM — N18.30 CKD (CHRONIC KIDNEY DISEASE) STAGE 3, GFR 30-59 ML/MIN: Status: RESOLVED | Noted: 2023-08-21 | Resolved: 2023-11-02

## 2023-11-02 PROBLEM — R05.9 COUGH: Status: RESOLVED | Noted: 2023-10-10 | Resolved: 2023-11-02

## 2023-11-02 PROBLEM — R30.0 DYSURIA: Status: RESOLVED | Noted: 2023-10-03 | Resolved: 2023-11-02

## 2023-11-02 PROCEDURE — 3074F SYST BP LT 130 MM HG: CPT

## 2023-11-02 PROCEDURE — 3079F DIAST BP 80-89 MM HG: CPT

## 2023-11-02 PROCEDURE — 99212 OFFICE O/P EST SF 10 MIN: CPT

## 2023-11-02 PROCEDURE — 99214 OFFICE O/P EST MOD 30 MIN: CPT

## 2023-11-02 RX ORDER — ASPIRIN 81 MG/1
81 TABLET, CHEWABLE ORAL DAILY
COMMUNITY

## 2023-11-02 ASSESSMENT — ENCOUNTER SYMPTOMS
NERVOUS/ANXIOUS: 0
GASTROINTESTINAL NEGATIVE: 1
NEUROLOGICAL NEGATIVE: 1
DEPRESSION: 0
ORTHOPNEA: 0
PND: 0
BACK PAIN: 1
PALPITATIONS: 0
CONSTITUTIONAL NEGATIVE: 1
SHORTNESS OF BREATH: 0
EYES NEGATIVE: 1

## 2023-11-02 ASSESSMENT — FIBROSIS 4 INDEX: FIB4 SCORE: 0.88

## 2023-11-02 NOTE — PROGRESS NOTES
Chief Complaint   Patient presents with    Supraventricular Tachycardia (SVT)    Hyperlipidemia    Hypertension       Subjective     Augusta Rodriguez is a 73 y.o. female who presents today for annual follow up. They have a history of SVT, questionable history of atrial fibrillation, CKD stage IIIa, dyslipidemia, BRIANA.    They are accompanied today by her  Don    Last seen by Dr. Ruff on 12/15/2022, at that visit patient was discontinued on her anticoagulation due to no evidence of A-fib recurrence and history of only one episode.  She was continued on her diltiazem and was requested to complete a lipid panel.     Today 11/02/2023 she is doing well from cardiac standpoint. No symptoms of chest pain, palpitations, shortness of breath, exercise intolerance, or lower extremity edema.          Past Medical History:   Diagnosis Date    Apnea, sleep     Arrhythmia     afib    Arthritis     osteo    Asthma     Atrial fibrillation (HCC)     Back pain     Bronchitis     Chickenpox     Constipation     Cough     Daytime sleepiness     Dental disorder     upper dentures    Earache     Frequent urination     Gasping for breath     GERD (gastroesophageal reflux disease) 2/27/2010    Sammarinese measles     Heartburn     Hypertension     Impaired fasting glucose 2/27/2010    Incontinence of urine     Indigestion     Influenza     Mumps     Nasal drainage     Nausea     Osteopenia 2/24/2010    Osteoporosis     Other specified disorder of intestines     constipation r/t meds    Restless leg syndrome     Scarlet fever     Shortness of breath     Sleep apnea     Snoring     Tremor, essential 2/24/2010    Urinary bladder disorder     Wears glasses     Wheezing     Whooping cough      Past Surgical History:   Procedure Laterality Date    INCISION AND DRAINAGE GENERAL N/A 10/18/2022    Procedure: INCISION AND DRAINAGE ABSCESS TONGUE;  Surgeon: Paz Suazo M.D.;  Location: SURGERY [facility];  Service: Ent    LUMBAR LAMINECTOMY  DISKECTOMY Bilateral 2/4/2017    Procedure: LUMBAR LAMINECTOMY DISKECTOMY POSTERIOR L4-S1 ;  Surgeon: Emil Hitchcock M.D.;  Location: SURGERY [facility];  Service:     CARPAL TUNNEL ENDOSCOPIC  11/17/2012    Performed by Heriberto Quiñones M.D. at SURGERY [facility]    TRIGGER FINGER RELEASE  11/17/2012    Performed by Heriberto Quiñones M.D. at SURGERY [facility]    HIP ARTHROSCOPY  2/23/2009    Performed by SHELRYN CALVO at SURGERY [facility]    ACETABULAR OSTEOTOMY  2/23/2009    Performed by SHERLYN CALVO at SURGERY [facility]    MASS EXCISION ORTHO  2/23/2009    Performed by SHERLYN CALVO at SURGERY [facility]    HIP ARTHROSCOPY  2005    done at [facility]    HAJA BY LAPAROSCOPY  1997    HYSTERECTOMY, TOTAL ABDOMINAL  1979    oopherectomy lacie    BLADDER SUSPENSION      bladder sling    CARPAL TUNNEL RELEASE      HIP REPLACEMENT, TOTAL      HYSTERECTOMY LAPAROSCOPY      LAMINOTOMY      PRIMARY C SECTION  1970/1975     Family History   Problem Relation Age of Onset    GI Disease Father         Crohn's    Heart Disease Father         First MI age 30    Hypertension Father     Stroke Father     Hypertension Other     Cancer Brother         ESOPHAGEAL CANCER     Social History     Socioeconomic History    Marital status:      Spouse name: Not on file    Number of children: Not on file    Years of education: Not on file    Highest education level: Not on file   Occupational History    Not on file   Tobacco Use    Smoking status: Never    Smokeless tobacco: Never   Vaping Use    Vaping Use: Never used   Substance and Sexual Activity    Alcohol use: Yes     Alcohol/week: 0.0 oz     Comment: Stopped drinking 45 days ago 7/12/17 Going to     Drug use: No    Sexual activity: Not Currently   Other Topics Concern    Not on file   Social History Narrative    Not on file     Social Determinants of Health     Financial Resource Strain: High Risk (7/5/2022)    Overall Financial  Resource Strain (CARDIA)     Difficulty of Paying Living Expenses: Hard   Food Insecurity: Food Insecurity Present (7/5/2022)    Hunger Vital Sign     Worried About Running Out of Food in the Last Year: Sometimes true     Ran Out of Food in the Last Year: Sometimes true   Transportation Needs: No Transportation Needs (7/5/2022)    PRAPARE - Transportation     Lack of Transportation (Medical): No     Lack of Transportation (Non-Medical): No   Physical Activity: Not on file   Stress: Not on file   Social Connections: Not on file   Intimate Partner Violence: Not on file   Housing Stability: Low Risk  (7/5/2022)    Housing Stability Vital Sign     Unable to Pay for Housing in the Last Year: No     Number of Places Lived in the Last Year: 1     Unstable Housing in the Last Year: No     Allergies   Allergen Reactions    Sumatriptan Swelling     Tongue swell    Clarithromycin Itching, Nausea and Swelling     Swelling/Itching/Nausea    Ees [Erythromycin] Itching, Nausea and Swelling     Swelling/Itching/Nausea    Levaquin Myalgia and Unspecified     Muscle soreness     Pcn [Penicillins] Itching, Nausea and Swelling     Swelling/Itching/Nausea     Shellfish Allergy Itching, Nausea and Swelling     Swelling/Itching/Nausea    Trazodone Swelling and Unspecified    Clarithromycin Unspecified    Erythromycin Unspecified    Penicillin G Unspecified     Outpatient Encounter Medications as of 11/2/2023   Medication Sig Dispense Refill    amitriptyline (ELAVIL) 50 MG Tab TAKE 1 TABLET BY MOUTH EVERY DAY IN THE EVENING 90 Tablet 3    benzonatate (TESSALON) 100 MG Cap Take 1 Capsule by mouth 3 times a day as needed for Cough. 60 Capsule 0    sulfamethoxazole-trimethoprim (BACTRIM DS) 800-160 MG tablet Take 1 Tablet by mouth 2 times a day. 10 Tablet 0    benzonatate (TESSALON) 100 MG Cap Take 1 Capsule by mouth 3 times a day as needed for Cough. 60 Capsule 0    pantoprazole (PROTONIX) 40 MG Tablet Delayed Response Take 1 Tablet by mouth  2 times a day. 180 Tablet 3    methylPREDNISolone (MEDROL DOSEPAK) 4 MG Tablet Therapy Pack As directed on the packaging label. 21 Tablet 0    ondansetron (ZOFRAN ODT) 4 MG TABLET DISPERSIBLE Take 1 Tablet by mouth every 6 hours as needed for Nausea/Vomiting. 10 Tablet 0    DULoxetine (CYMBALTA) 60 MG Cap DR Particles delayed-release capsule Take 1 Capsule by mouth every day. 90 Capsule 3    rizatriptan (MAXALT) 5 MG tablet Take 1 Tablet by mouth one time as needed for Migraine. 10 Tablet 5    clobetasol (TEMOVATE) 0.05 % Ointment AAA thin layer, daily for 3 weeks, then use 2-3 times a week 60 g 1    atorvastatin (LIPITOR) 10 MG Tab Take 1 Tablet by mouth every day. 100 Tablet 3    dilTIAZem CD (CARDIZEM CD) 240 MG CAPSULE SR 24 HR Take 1 Capsule by mouth every day. 90 Capsule 3    telmisartan (MICARDIS) 40 MG Tab Take 1 Tablet by mouth every day. 100 Tablet 3    fluticasone-umeclidin-vilant (TRELEGY ELLIPTA) 100-62.5-25 MCG/ACT AEROSOL POWDER, BREATH ACTIVATED inhalation Inhale 1 Inhalation every day. 1 Each 11    albuterol 108 (90 Base) MCG/ACT Aero Soln inhalation aerosol INHALE 2 PUFFS BY MOUTH EVERY 6 HOURS AS NEEDED FOR SHORTNESS OF BREATH 8.5 Each 5    zonisamide (ZONEGRAN) 100 MG Cap Take 100 mg by mouth every day.      SALINE NA Administer 1 Spray into affected nostril(S) every day.       No facility-administered encounter medications on file as of 11/2/2023.     Review of Systems   Constitutional: Negative.    HENT: Negative.     Eyes: Negative.    Respiratory:  Negative for shortness of breath.    Cardiovascular:  Negative for chest pain, palpitations, orthopnea, leg swelling and PND.   Gastrointestinal: Negative.    Genitourinary: Negative.    Musculoskeletal:  Positive for back pain and joint pain.   Skin: Negative.    Neurological: Negative.    Endo/Heme/Allergies: Negative.    Psychiatric/Behavioral:  Negative for depression. The patient is not nervous/anxious.               Objective     /86  "(BP Location: Left arm, Patient Position: Sitting, BP Cuff Size: Adult)   Pulse 93   Resp 16   Ht 1.549 m (5' 1\")   Wt 75.8 kg (167 lb)   SpO2 96%   BMI 31.55 kg/m²     Physical Exam  Constitutional:       Appearance: Normal appearance. She is obese.   HENT:      Head: Normocephalic and atraumatic.   Neck:      Vascular: No JVD.   Cardiovascular:      Rate and Rhythm: Normal rate and regular rhythm.      Pulses: Normal pulses.      Heart sounds: Normal heart sounds. No murmur heard.     No friction rub.   Pulmonary:      Effort: Pulmonary effort is normal. No respiratory distress.      Breath sounds: Normal breath sounds.   Abdominal:      General: There is no distension.      Palpations: Abdomen is soft.   Musculoskeletal:      Right lower leg: No edema.      Left lower leg: No edema.   Skin:     General: Skin is warm and dry.   Neurological:      General: No focal deficit present.      Mental Status: She is alert and oriented to person, place, and time.   Psychiatric:         Mood and Affect: Mood normal.         Behavior: Behavior normal.            Lab Results   Component Value Date/Time    CHOLSTRLTOT 132 04/03/2023 01:01 PM    LDL 64 04/03/2023 01:01 PM    HDL 53 04/03/2023 01:01 PM    TRIGLYCERIDE 77 04/03/2023 01:01 PM       Lab Results   Component Value Date/Time    SODIUM 137 08/10/2023 01:34 PM    POTASSIUM 4.4 08/10/2023 01:34 PM    CHLORIDE 104 08/10/2023 01:34 PM    CO2 21 08/10/2023 01:34 PM    GLUCOSE 133 (H) 08/10/2023 01:34 PM    BUN 16 08/10/2023 01:34 PM    CREATININE 1.05 08/10/2023 01:34 PM    CREATININE 0.89 04/27/2009 12:00 AM     Lab Results   Component Value Date/Time    ALKPHOSPHAT 111 (H) 08/10/2023 01:34 PM    ASTSGOT 13 08/10/2023 01:34 PM    ALTSGPT 8 08/10/2023 01:34 PM    TBILIRUBIN 0.7 08/10/2023 01:34 PM      Echocardiogram 10/22/2020  CONCLUSIONS  No prior study is available for comparison.   Small left ventricular size, normal wall thickness and hyperdynamic   systolic " function with near obliteration of the left ventricle.  Left ventricular ejection fraction is estimated to be greater than 75%.  Normal right ventricular size and function.  Grade 1 diastolic dysfunction.  No hemodynamically significant valvular heart disease.  Ascending aorta diameter 3.8 cm.    Assessment & Plan     1. Paroxysmal SVT (supraventricular tachycardia)        2. Dyslipidemia        3. BRIANA (obstructive sleep apnea)            Medical Decision Making: Today's Assessment/Status/Plan:        SVT  - no episodes recently  -Denies palpitations  -Continue diltiazem 240 mg daily    Hyperlipidemia  -Most recent LDL 64  -Continue atorvastatin 10 mg daily  -Goal of less than 100  -Check lipid panel annually    BRIANA   - she is supposed to use a CPAP but uses it infrequently     Follow up with PREM [place] in 1 year    This note was dictated using Dragon speech recognition software.

## 2023-11-24 DIAGNOSIS — N90.4 LICHEN SCLEROSUS OF VULVA: ICD-10-CM

## 2023-11-27 RX ORDER — CLOBETASOL PROPIONATE 0.5 MG/G
OINTMENT TOPICAL
Qty: 60 G | Refills: 1 | Status: SHIPPED | OUTPATIENT
Start: 2023-11-27

## 2023-12-06 ENCOUNTER — OFFICE VISIT (OUTPATIENT)
Dept: MEDICAL GROUP | Facility: PHYSICIAN GROUP | Age: 73
End: 2023-12-06
Payer: MEDICARE

## 2023-12-06 ENCOUNTER — HOSPITAL ENCOUNTER (OUTPATIENT)
Dept: RADIOLOGY | Facility: MEDICAL CENTER | Age: 73
End: 2023-12-06
Attending: INTERNAL MEDICINE
Payer: MEDICARE

## 2023-12-06 VITALS
TEMPERATURE: 96.7 F | HEIGHT: 61 IN | WEIGHT: 170 LBS | DIASTOLIC BLOOD PRESSURE: 68 MMHG | BODY MASS INDEX: 32.1 KG/M2 | HEART RATE: 98 BPM | SYSTOLIC BLOOD PRESSURE: 116 MMHG | OXYGEN SATURATION: 95 %

## 2023-12-06 DIAGNOSIS — I12.9 HYPERTENSIVE KIDNEY DISEASE WITH STAGE 3A CHRONIC KIDNEY DISEASE: ICD-10-CM

## 2023-12-06 DIAGNOSIS — E66.01 SEVERE OBESITY (HCC): Chronic | ICD-10-CM

## 2023-12-06 DIAGNOSIS — R05.1 ACUTE COUGH: ICD-10-CM

## 2023-12-06 DIAGNOSIS — F32.5 MAJOR DEPRESSION IN REMISSION (HCC): ICD-10-CM

## 2023-12-06 DIAGNOSIS — I48.0 PAROXYSMAL ATRIAL FIBRILLATION (HCC): ICD-10-CM

## 2023-12-06 DIAGNOSIS — J44.89 ASTHMA-COPD OVERLAP SYNDROME (HCC): Chronic | ICD-10-CM

## 2023-12-06 DIAGNOSIS — E78.5 DYSLIPIDEMIA: Chronic | ICD-10-CM

## 2023-12-06 DIAGNOSIS — J45.21 MILD INTERMITTENT ASTHMA WITH ACUTE EXACERBATION: ICD-10-CM

## 2023-12-06 DIAGNOSIS — N18.31 HYPERTENSIVE KIDNEY DISEASE WITH STAGE 3A CHRONIC KIDNEY DISEASE: ICD-10-CM

## 2023-12-06 PROCEDURE — 99214 OFFICE O/P EST MOD 30 MIN: CPT | Performed by: INTERNAL MEDICINE

## 2023-12-06 PROCEDURE — 71046 X-RAY EXAM CHEST 2 VIEWS: CPT

## 2023-12-06 PROCEDURE — 3074F SYST BP LT 130 MM HG: CPT | Performed by: INTERNAL MEDICINE

## 2023-12-06 PROCEDURE — 87651 STREP A DNA AMP PROBE: CPT | Performed by: INTERNAL MEDICINE

## 2023-12-06 PROCEDURE — 0241U POCT CEPHEID COV-2, FLU A/B, RSV - PCR: CPT | Performed by: INTERNAL MEDICINE

## 2023-12-06 PROCEDURE — 3078F DIAST BP <80 MM HG: CPT | Performed by: INTERNAL MEDICINE

## 2023-12-06 RX ORDER — ALBUTEROL SULFATE 90 UG/1
2 AEROSOL, METERED RESPIRATORY (INHALATION) EVERY 6 HOURS PRN
Qty: 8.5 EACH | Refills: 5 | Status: SHIPPED | OUTPATIENT
Start: 2023-12-06

## 2023-12-06 RX ORDER — BENZONATATE 100 MG/1
100 CAPSULE ORAL 3 TIMES DAILY PRN
Qty: 60 CAPSULE | Refills: 0 | Status: SHIPPED | OUTPATIENT
Start: 2023-12-06 | End: 2024-01-03

## 2023-12-06 RX ORDER — FLUTICASONE PROPIONATE 110 UG/1
1 AEROSOL, METERED RESPIRATORY (INHALATION) 2 TIMES DAILY
Qty: 2 EACH | Refills: 6 | Status: SHIPPED | OUTPATIENT
Start: 2023-12-06

## 2023-12-06 ASSESSMENT — FIBROSIS 4 INDEX: FIB4 SCORE: 0.88

## 2023-12-06 NOTE — PROGRESS NOTES
PRIMARY CARE CLINIC VISIT        Chief Complaint   Patient presents with    Follow-Up     Cough for two weeks.   Asthma-COPD  Obesity  Hyperlipidemia  Hypertension  Depression  Obesity  Paroxysmal atrial fibrillation          History of Present Illness     Acute cough  This is a new condition.  The patient reported recurrent cough noticed since last 2 weeks.  Patient denies fever or chills.  She denies shortness of breath or wheezing.  Patient has tried over-the-counter cough medicine with only minimal improvement.    Asthma-COPD overlap syndrome  Chronic ongoing condition.  Patient followed by pulmonologist.  Patient stated that she is using Trelegy 1 puff daily and Flovent.  She is requesting refill for Flovent.  Patient also uses albuterol as needed.    Severe obesity (HCC)  Chronic condition.  Discussed with the patient regarding diet, exercise, and lifestyle modification to help achieve and maintain healthy weight         Paroxysmal atrial fibrillation (HCC)  Chronic condition.  Patient followed by cardiologist.  CHADS2 score of 1 patient presently taking aspirin and diltiazem.  Patient denies chest pain shortness of breath palpitation or near syncope.    Major depression in remission (HCC)  Chronic condition.  The patient is currently taking duloxetine 60 Mg daily.  Patient tolerated medication well.  She denies SI.    Hypertensive kidney disease with stage 3a chronic kidney disease (HCC)  Chronic condition.  The patient is taking diltiazem 240 Mg daily.  Blood pressure has been well-controlled.  No significant side effects reported.    Dyslipidemia  Chronic condition.  The patient is currently on diet therapy.    Current Outpatient Medications on File Prior to Visit   Medication Sig Dispense Refill    clobetasol (TEMOVATE) 0.05 % Ointment APPLY A THIN LAYER ONCE DAILY FOR 3 WEEKS, THEN USE 2 TO 3 TIMES A WEEK 60 g 1    aspirin (ASA) 81 MG Chew Tab chewable tablet Chew 81 mg every day.      amitriptyline  (ELAVIL) 50 MG Tab TAKE 1 TABLET BY MOUTH EVERY DAY IN THE EVENING 90 Tablet 3    benzonatate (TESSALON) 100 MG Cap Take 1 Capsule by mouth 3 times a day as needed for Cough. 60 Capsule 0    pantoprazole (PROTONIX) 40 MG Tablet Delayed Response Take 1 Tablet by mouth 2 times a day. 180 Tablet 3    DULoxetine (CYMBALTA) 60 MG Cap DR Particles delayed-release capsule Take 1 Capsule by mouth every day. 90 Capsule 3    rizatriptan (MAXALT) 5 MG tablet Take 1 Tablet by mouth one time as needed for Migraine. 10 Tablet 5    atorvastatin (LIPITOR) 10 MG Tab Take 1 Tablet by mouth every day. 100 Tablet 3    dilTIAZem CD (CARDIZEM CD) 240 MG CAPSULE SR 24 HR Take 1 Capsule by mouth every day. 90 Capsule 3    telmisartan (MICARDIS) 40 MG Tab Take 1 Tablet by mouth every day. 100 Tablet 3    fluticasone-umeclidin-vilant (TRELEGY ELLIPTA) 100-62.5-25 MCG/ACT AEROSOL POWDER, BREATH ACTIVATED inhalation Inhale 1 Inhalation every day. 1 Each 11    zonisamide (ZONEGRAN) 100 MG Cap Take 100 mg by mouth every day.      SALINE NA Administer 1 Spray into affected nostril(S) every day.      ondansetron (ZOFRAN ODT) 4 MG TABLET DISPERSIBLE Take 1 Tablet by mouth every 6 hours as needed for Nausea/Vomiting. 10 Tablet 0     No current facility-administered medications on file prior to visit.        Allergies: Sumatriptan, Clarithromycin, Ees [erythromycin], Levaquin, Pcn [penicillins], Shellfish allergy, Trazodone, Clarithromycin, Erythromycin, and Penicillin g    Current Outpatient Medications Ordered in Epic   Medication Sig Dispense Refill    benzonatate (TESSALON) 100 MG Cap Take 1 Capsule by mouth 3 times a day as needed for Cough. 60 Capsule 0    albuterol 108 (90 Base) MCG/ACT Aero Soln inhalation aerosol Inhale 2 Puffs every 6 hours as needed for Shortness of Breath. 8.5 Each 5    fluticasone (FLOVENT HFA) 110 MCG/ACT Aerosol Inhale 1 Puff 2 times a day. 2 Each 6    clobetasol (TEMOVATE) 0.05 % Ointment APPLY A THIN LAYER ONCE DAILY  FOR 3 WEEKS, THEN USE 2 TO 3 TIMES A WEEK 60 g 1    aspirin (ASA) 81 MG Chew Tab chewable tablet Chew 81 mg every day.      amitriptyline (ELAVIL) 50 MG Tab TAKE 1 TABLET BY MOUTH EVERY DAY IN THE EVENING 90 Tablet 3    benzonatate (TESSALON) 100 MG Cap Take 1 Capsule by mouth 3 times a day as needed for Cough. 60 Capsule 0    pantoprazole (PROTONIX) 40 MG Tablet Delayed Response Take 1 Tablet by mouth 2 times a day. 180 Tablet 3    DULoxetine (CYMBALTA) 60 MG Cap DR Particles delayed-release capsule Take 1 Capsule by mouth every day. 90 Capsule 3    rizatriptan (MAXALT) 5 MG tablet Take 1 Tablet by mouth one time as needed for Migraine. 10 Tablet 5    atorvastatin (LIPITOR) 10 MG Tab Take 1 Tablet by mouth every day. 100 Tablet 3    dilTIAZem CD (CARDIZEM CD) 240 MG CAPSULE SR 24 HR Take 1 Capsule by mouth every day. 90 Capsule 3    telmisartan (MICARDIS) 40 MG Tab Take 1 Tablet by mouth every day. 100 Tablet 3    fluticasone-umeclidin-vilant (TRELEGY ELLIPTA) 100-62.5-25 MCG/ACT AEROSOL POWDER, BREATH ACTIVATED inhalation Inhale 1 Inhalation every day. 1 Each 11    zonisamide (ZONEGRAN) 100 MG Cap Take 100 mg by mouth every day.      SALINE NA Administer 1 Spray into affected nostril(S) every day.      ondansetron (ZOFRAN ODT) 4 MG TABLET DISPERSIBLE Take 1 Tablet by mouth every 6 hours as needed for Nausea/Vomiting. 10 Tablet 0     No current Epic-ordered facility-administered medications on file.       Past Medical History:   Diagnosis Date    Apnea, sleep     Arrhythmia     afib    Arthritis     osteo    Asthma     Atrial fibrillation (HCC)     Back pain     Bronchitis     Chickenpox     Constipation     Cough     Daytime sleepiness     Dental disorder     upper dentures    Earache     Frequent urination     Gasping for breath     GERD (gastroesophageal reflux disease) 2/27/2010    Romans[place] measles     Heartburn     Hypertension     Impaired fasting glucose 2/27/2010    Incontinence of urine     Indigestion      Influenza     Mumps     Nasal drainage     Nausea     Osteopenia 2/24/2010    Osteoporosis     Other specified disorder of intestines     constipation r/t meds    Restless leg syndrome     Scarlet fever     Shortness of breath     Sleep apnea     Snoring     Tremor, essential 2/24/2010    Urinary bladder disorder     Wears glasses     Wheezing     Whooping cough        Past Surgical History:   Procedure Laterality Date    INCISION AND DRAINAGE GENERAL N/A 10/18/2022    Procedure: INCISION AND DRAINAGE ABSCESS TONGUE;  Surgeon: Paz Suazo M.D.;  Location: SURGERY [facility];  Service: Ent    LUMBAR LAMINECTOMY DISKECTOMY Bilateral 2/4/2017    Procedure: LUMBAR LAMINECTOMY DISKECTOMY POSTERIOR L4-S1 ;  Surgeon: Emil Hitchcock M.D.;  Location: SURGERY [facility];  Service:     CARPAL TUNNEL ENDOSCOPIC  11/17/2012    Performed by Heriberot Quiñones M.D. at SURGERY [facility] ORS    TRIGGER FINGER RELEASE  11/17/2012    Performed by Heriberto Quiñones M.D. at SURGERY [facility] ORS    HIP ARTHROSCOPY  2/23/2009    Performed by SHERLYN CALVO at SURGERY [facility] ORS    ACETABULAR OSTEOTOMY  2/23/2009    Performed by SHERLYN CALVO at SURGERY [facility] ORS    MASS EXCISION ORTHO  2/23/2009    Performed by SHERLYN CALVO at SURGERY [facility] ORS    HIP ARTHROSCOPY  2005    done at [facility]    HAJA BY LAPAROSCOPY  1997    HYSTERECTOMY, TOTAL ABDOMINAL  1979    oopherectomy lacie    BLADDER SUSPENSION      bladder sling    CARPAL TUNNEL RELEASE      HIP REPLACEMENT, TOTAL      HYSTERECTOMY LAPAROSCOPY      LAMINOTOMY      PRIMARY C SECTION  1970/1975       Family History   Problem Relation Age of Onset    GI Disease Father         Crohn's    Heart Disease Father         First MI age 30    Hypertension Father     Stroke Father     Hypertension Other     Cancer Brother         ESOPHAGEAL CANCER       Social History     Tobacco Use   Smoking Status Never   Smokeless Tobacco Never       Social  "History     Substance and Sexual Activity   Alcohol Use Yes    Alcohol/week: 0.0 oz    Comment: Stopped drinking 45 days ago 7/12/17 Going to AA       Review of systems.  As per HPI above. All other systems reviewed and negative.      Past Medical, Social, and Family history reviewed and updated in EPIC     Objective     /68 (BP Location: Right arm, Patient Position: Sitting, BP Cuff Size: Adult)   Pulse 98   Temp 35.9 °C (96.7 °F) (Temporal)   Ht 1.549 m (5' 1\")   Wt 77.1 kg (170 lb)   SpO2 95%    Body mass index is 32.12 kg/m².    General: alert in no apparent distress.  Cardiovascular: regular rate and rhythm  Pulmonary: lungs : no wheezing   Gastrointestinal: BS present.   Nose with mild mucosal inflammation.  No purulent drainage  Oropharynx no exudates          Lab Results   Component Value Date/Time    CHOLSTRLTOT 132 04/03/2023 01:01 PM    TRIGLYCERIDE 77 04/03/2023 01:01 PM    HDL 53 04/03/2023 01:01 PM    LDL 64 04/03/2023 01:01 PM       Lab Results   Component Value Date/Time    ALTSGPT 8 08/10/2023 01:34 PM             Assessment and Plan     1. Acute cough  This is a new condition.  Suspect viral URI/bronchitis.  Rule out COVID versus bacterial infection    - DX-CHEST-2 VIEWS; Future  - POCT CEPHEID COV-2, FLU A/B, RSV - PCR  - POCT CEPHEID GROUP A STREP - PCR    Symptomatic and supportive care recommended:   Advised pt to stay well hydrated and plenty of rest   For sore throat: rec warm salt water gargles soft foods. Throat coats /chloraseptic sprays prn.  Saline nasal spray and Flonase as a decongestant.   Infection control measures:  Frequent Hand washings with soap and water, covering sneeze/cough, clean/disinfect surfaces. Avoid close contact with sick people.  May try otc mucinex as needed [expectorant]  - benzonatate (TESSALON) 100 MG Cap; Take 1 Capsule by mouth 3 times a day as needed for Cough.  Dispense: 60 Capsule; Refill: 0      - Return to clinic if not better. Go to urgent care " or ER is symptoms worsen /change such as temperature >101, worsening shortness of breath, wheezing, worsening cough, chest pain, dizziness, lightheadedness, lethargy, confusion, headaches, change in vision, motor weakness, abdominal pain, the inability to keep fluids/ foods down, etc... or any change in the pt's condition.     \   2.Asthma-COPD overlap syndrome  Chronic stable condition.  Continue Flovent twice daily.  Rinse mouth after use.  Patient may use albuterol as needed.      3. Severe obesity (HCC)  Chronic condition.  Uncontrolled.  Recommend diet and lifestyle modification.  Courage patient to lose weight and    4. Paroxysmal atrial fibrillation (HCC)  Chronic stable condition.  Continue with aspirin 81 Mg daily and diltiazem 240 Mg daily.  Continue follow-up with cardiology service.  No new symptoms reported.    6. Major depression in remission (HCC)  Chronic stable.  Continue Cymbalta 60 mg daily.    7. Hypertensive kidney disease with stage 3a chronic kidney disease (HCC)  Chronic stable.  Continue diltiazem 240 Mg daily.    8. Dyslipidemia  Chronic stable.  Recommend diet and exercise.  Continue to monitor.                    Please note that this dictation was created using voice recognition software. I have made every reasonable attempt to correct obvious errors, but I expect that there are errors of grammar and possibly content that I did not discover before finalizing the note.    Rudy Parsons MD  Internal Medicine  [facility]

## 2023-12-06 NOTE — ASSESSMENT & PLAN NOTE
Chronic condition.  The patient is taking diltiazem 240 Mg daily.  Blood pressure has been well-controlled.  No significant side effects reported.

## 2023-12-06 NOTE — ASSESSMENT & PLAN NOTE
Chronic ongoing condition.  Patient followed by pulmonologist.  Patient stated that she is using Trelegy 1 puff daily and Flovent.  She is requesting refill for Flovent.  Patient also uses albuterol as needed.

## 2023-12-06 NOTE — ASSESSMENT & PLAN NOTE
This is a new condition.  The patient reported recurrent cough noticed since last 2 weeks.  Patient denies fever or chills.  She denies shortness of breath or wheezing.  Patient has tried over-the-counter cough medicine with only minimal improvement.

## 2023-12-06 NOTE — ASSESSMENT & PLAN NOTE
Chronic condition.  Patient followed by cardiologist.  CHADS2 score of 1 patient presently taking aspirin and diltiazem.  Patient denies chest pain shortness of breath palpitation or near syncope.

## 2023-12-06 NOTE — ASSESSMENT & PLAN NOTE
Chronic condition.  The patient is currently taking duloxetine 60 Mg daily.  Patient tolerated medication well.  She denies SI.

## 2024-01-03 ENCOUNTER — OFFICE VISIT (OUTPATIENT)
Dept: MEDICAL GROUP | Facility: PHYSICIAN GROUP | Age: 74
End: 2024-01-03
Payer: MEDICARE

## 2024-01-03 VITALS
WEIGHT: 165 LBS | OXYGEN SATURATION: 95 % | DIASTOLIC BLOOD PRESSURE: 70 MMHG | TEMPERATURE: 96.5 F | RESPIRATION RATE: 18 BRPM | SYSTOLIC BLOOD PRESSURE: 102 MMHG | BODY MASS INDEX: 31.15 KG/M2 | HEART RATE: 91 BPM | HEIGHT: 61 IN

## 2024-01-03 DIAGNOSIS — R05.1 ACUTE COUGH: ICD-10-CM

## 2024-01-03 DIAGNOSIS — J44.89 ASTHMA-COPD OVERLAP SYNDROME (HCC): Chronic | ICD-10-CM

## 2024-01-03 LAB
FLUAV RNA SPEC QL NAA+PROBE: NEGATIVE
FLUBV RNA SPEC QL NAA+PROBE: NEGATIVE
RSV RNA SPEC QL NAA+PROBE: NEGATIVE
SARS-COV-2 RNA RESP QL NAA+PROBE: NEGATIVE

## 2024-01-03 PROCEDURE — 99214 OFFICE O/P EST MOD 30 MIN: CPT | Performed by: PHYSICIAN ASSISTANT

## 2024-01-03 PROCEDURE — 3078F DIAST BP <80 MM HG: CPT | Performed by: PHYSICIAN ASSISTANT

## 2024-01-03 PROCEDURE — 0241U POCT CEPHEID COV-2, FLU A/B, RSV - PCR: CPT | Performed by: PHYSICIAN ASSISTANT

## 2024-01-03 PROCEDURE — 3074F SYST BP LT 130 MM HG: CPT | Performed by: PHYSICIAN ASSISTANT

## 2024-01-03 RX ORDER — FLUTICASONE PROPIONATE 50 MCG
SPRAY, SUSPENSION (ML) NASAL
COMMUNITY
Start: 2023-11-16

## 2024-01-03 RX ORDER — FLUTICASONE FUROATE, UMECLIDINIUM BROMIDE AND VILANTEROL TRIFENATATE 100; 62.5; 25 UG/1; UG/1; UG/1
1 POWDER RESPIRATORY (INHALATION) DAILY
Qty: 2 EACH | Refills: 0 | Status: SHIPPED | OUTPATIENT
Start: 2024-01-03 | End: 2024-03-26 | Stop reason: SDUPTHER

## 2024-01-03 RX ORDER — AZELASTINE HYDROCHLORIDE 137 UG/1
SPRAY, METERED NASAL
COMMUNITY
Start: 2023-11-21

## 2024-01-03 RX ORDER — DEXTROMETHORPHAN HBR. AND GUAIFENESIN 10; 100 MG/5ML; MG/5ML
10 SOLUTION ORAL EVERY 4 HOURS PRN
Refills: 0 | Status: CANCELLED | OUTPATIENT
Start: 2024-01-03

## 2024-01-03 RX ORDER — DEXTROMETHORPHAN HYDROBROMIDE AND PROMETHAZINE HYDROCHLORIDE 15; 6.25 MG/5ML; MG/5ML
1.25 SYRUP ORAL EVERY 4 HOURS PRN
Qty: 120 ML | Refills: 0 | Status: SHIPPED | OUTPATIENT
Start: 2024-01-03 | End: 2024-03-26

## 2024-01-03 ASSESSMENT — FIBROSIS 4 INDEX: FIB4 SCORE: 0.88

## 2024-01-03 ASSESSMENT — PATIENT HEALTH QUESTIONNAIRE - PHQ9: CLINICAL INTERPRETATION OF PHQ2 SCORE: 0

## 2024-01-03 NOTE — PROGRESS NOTES
Chief Complaint   Patient presents with    Cough     Has had cough for x 2 weeks and has not gotten better . Was covid swabbed on 12/06/23.         HPI: Patient is a 73 y.o. female complaining of 2 weeks of illness including:  Cough .  Patient was evaluated for this about a month ago. Took trellegy yesterday  Mucus is: thin.  Similarly ill exposures: yes.  Treatments tried: Over-the-counter cough medication with minimal improvement  She  reports that she has never smoked. She has never used smokeless tobacco..     ROS:  No fever, nausea, changes in bowel movements or skin rash.      I reviewed the patient's medications, allergies and medical history:  Current Outpatient Medications   Medication Sig Dispense Refill    Azelastine (ASTELIN) 137 MCG/SPRAY Solution SPRAYS 2 SPRAYS IN EACH NOSTRIL NASALLY TWICE A DAY 90 DAY(S)      fluticasone (FLONASE) 50 MCG/ACT nasal spray SPRAY 1 SPRAY INTO EACH NOSTRIL ONCE A DAY      fluticasone-umeclidinium-vilanterol (TRELEGY ELLIPTA) 100-62.5-25 mcg/act inhaler Inhale 1 Puff every day. 2 Each 0    promethazine-dextromethorphan (PROMETHAZINE-DM) 6.25-15 MG/5ML syrup Take 1.3 mL by mouth every four hours as needed for Cough. 120 mL 0    benzonatate (TESSALON) 100 MG Cap Take 1 Capsule by mouth 3 times a day as needed for Cough. 60 Capsule 0    albuterol 108 (90 Base) MCG/ACT Aero Soln inhalation aerosol Inhale 2 Puffs every 6 hours as needed for Shortness of Breath. 8.5 Each 5    fluticasone (FLOVENT HFA) 110 MCG/ACT Aerosol Inhale 1 Puff 2 times a day. 2 Each 6    clobetasol (TEMOVATE) 0.05 % Ointment APPLY A THIN LAYER ONCE DAILY FOR 3 WEEKS, THEN USE 2 TO 3 TIMES A WEEK 60 g 1    aspirin (ASA) 81 MG Chew Tab chewable tablet Chew 81 mg every day.      amitriptyline (ELAVIL) 50 MG Tab TAKE 1 TABLET BY MOUTH EVERY DAY IN THE EVENING 90 Tablet 3    benzonatate (TESSALON) 100 MG Cap Take 1 Capsule by mouth 3 times a day as needed for Cough. 60 Capsule 0    pantoprazole (PROTONIX) 40  MG Tablet Delayed Response Take 1 Tablet by mouth 2 times a day. 180 Tablet 3    ondansetron (ZOFRAN ODT) 4 MG TABLET DISPERSIBLE Take 1 Tablet by mouth every 6 hours as needed for Nausea/Vomiting. 10 Tablet 0    DULoxetine (CYMBALTA) 60 MG Cap DR Particles delayed-release capsule Take 1 Capsule by mouth every day. 90 Capsule 3    rizatriptan (MAXALT) 5 MG tablet Take 1 Tablet by mouth one time as needed for Migraine. 10 Tablet 5    atorvastatin (LIPITOR) 10 MG Tab Take 1 Tablet by mouth every day. 100 Tablet 3    dilTIAZem CD (CARDIZEM CD) 240 MG CAPSULE SR 24 HR Take 1 Capsule by mouth every day. 90 Capsule 3    telmisartan (MICARDIS) 40 MG Tab Take 1 Tablet by mouth every day. 100 Tablet 3    zonisamide (ZONEGRAN) 100 MG Cap Take 100 mg by mouth every day.      SALINE NA Administer 1 Spray into affected nostril(S) every day.       No current facility-administered medications for this visit.     Sumatriptan, Clarithromycin, Ees [erythromycin], Levaquin, Pcn [penicillins], Shellfish allergy, Trazodone, Clarithromycin, Erythromycin, and Penicillin g  Past Medical History:   Diagnosis Date    Apnea, sleep     Arrhythmia     afib    Arthritis     osteo    Asthma     Atrial fibrillation (HCC)     Back pain     Bronchitis     Chickenpox     Constipation     Cough     Daytime sleepiness     Dental disorder     upper dentures    Earache     Frequent urination     Gasping for breath     GERD (gastroesophageal reflux disease) 2/27/2010    [place] measles     Heartburn     Hypertension     Impaired fasting glucose 2/27/2010    Incontinence of urine     Indigestion     Influenza     Mumps     Nasal drainage     Nausea     Osteopenia 2/24/2010    Osteoporosis     Other specified disorder of intestines     constipation r/t meds    Restless leg syndrome     Scarlet fever     Shortness of breath     Sleep apnea     Snoring     Tremor, essential 2/24/2010    Urinary bladder disorder     Wears glasses     Wheezing     Whooping cough  "        EXAM:  /70   Pulse 91   Temp 35.8 °C (96.5 °F) (Temporal)   Resp 18   Ht 1.549 m (5' 1\")   Wt 74.8 kg (165 lb)   SpO2 95%   General: Alert, no conversational dyspnea or audible wheeze, non-toxic appearance.  Eyes: PERRL, conjunctiva slightly injected, no eye discharge.  Ears: Normal pinnae,TM's normal bilaterally.  Nares: Patent with thin mucus.  Sinuses: non tender over maxillary / frontal sinuses.  Throat: Erythematous injection without exudate.   Neck: Supple, with no adenopathy.  Lungs: Expiratory wheeze throughout, no crackles or rhonchi.   Heart: Regular rate without murmur.  Skin: Warm and dry without rash.     ASSESSMENT:   1. Asthma-COPD overlap syndrome    2. Acute cough    Chronic, not well-controlled.  Managed by pulmonology.  Previously on Trelegy which she discontinued.  She did use Trelegy once yesterday and states that it did improve her cough. Given the wheezing she has on exam, though atypical plan to restart for the next 2-3 weeks and then reassess.  Has upcoming appointment for spirometry in the next 2 months as well as follow-up with pulmonology. continue to use albuterol as needed as well.  Declines breathing treatment in the clinic today.  Overall well-appearing and vitals are within normal limits.  Follow-up for new or worsening concerns otherwise with PCP as already scheduled  - fluticasone-umeclidinium-vilanterol (TRELEGY ELLIPTA) 100-62.5-25 mcg/act inhaler; Inhale 1 Puff every day.  Dispense: 2 Each; Refill: 0  - promethazine-dextromethorphan (PROMETHAZINE-DM) 6.25-15 MG/5ML syrup; Take 1.3 mL by mouth every four hours as needed for Cough.  Dispense: 120 mL; Refill: 0    Acute cough  PLAN:  1. Educated patient that majority of upper respiratory infections are viral and do not need antibiotics.  Prescription for promethazine-dextromethorphan sent in  2. OTC anti-pyretics and decongestants as needed.  3. Follow-up in office or urgent care for worsening symptoms, " difficulty breathing, lack of expected recovery, or should new symptoms or problems arise.

## 2024-01-17 ENCOUNTER — OFFICE VISIT (OUTPATIENT)
Dept: URGENT CARE | Facility: PHYSICIAN GROUP | Age: 74
End: 2024-01-17
Payer: MEDICARE

## 2024-01-17 ENCOUNTER — HOSPITAL ENCOUNTER (OUTPATIENT)
Dept: RADIOLOGY | Facility: MEDICAL CENTER | Age: 74
End: 2024-01-17
Payer: MEDICARE

## 2024-01-17 VITALS
BODY MASS INDEX: 31.15 KG/M2 | WEIGHT: 165 LBS | OXYGEN SATURATION: 97 % | TEMPERATURE: 97.9 F | RESPIRATION RATE: 16 BRPM | HEIGHT: 61 IN | HEART RATE: 89 BPM | SYSTOLIC BLOOD PRESSURE: 118 MMHG | DIASTOLIC BLOOD PRESSURE: 86 MMHG

## 2024-01-17 DIAGNOSIS — S29.8XXA BLUNT TRAUMA OF RIB, INITIAL ENCOUNTER: ICD-10-CM

## 2024-01-17 PROCEDURE — 71101 X-RAY EXAM UNILAT RIBS/CHEST: CPT | Mod: LT

## 2024-01-17 PROCEDURE — 3079F DIAST BP 80-89 MM HG: CPT

## 2024-01-17 PROCEDURE — 99213 OFFICE O/P EST LOW 20 MIN: CPT

## 2024-01-17 PROCEDURE — 3074F SYST BP LT 130 MM HG: CPT

## 2024-01-17 ASSESSMENT — ENCOUNTER SYMPTOMS
HEMOPTYSIS: 0
COUGH: 0

## 2024-01-17 ASSESSMENT — FIBROSIS 4 INDEX: FIB4 SCORE: 0.88

## 2024-01-17 NOTE — PROGRESS NOTES
CHIEF COMPLAINT  Chief Complaint   Patient presents with    Rib Injury     Left side and left shoulder pain. Dogs pulled her into a storage shed this morning.      Subjective:   Augusta Rodriguez is a 73 y.o. female who presents for complaints of left-sided rib pain.  Patient reports that her dogs pulled her into this region this morning.  She reports that pain is progressively gotten worse and she feels that it is difficult to take a deep breath.  She denies any chest pain or shortness of breath.  She denies any cough.       Review of Systems   Respiratory:  Negative for cough and hemoptysis.        PAST MEDICAL HISTORY  Patient Active Problem List    Diagnosis Date Noted    Acute cough 12/06/2023    Paroxysmal SVT (supraventricular tachycardia) 11/02/2023    Hiatal hernia 08/28/2023    Ulnar neuropathy of right upper extremity 08/22/2023    Right arm pain 08/21/2023    Cerebral atrophy, mild (HCC) 08/09/2023    Dilatation of aorta ([facility]) 08/09/2023    Numbness and tingling of right arm 05/11/2023    Rash 04/20/2023    Right leg swelling 04/20/2023    Renal insufficiency 11/07/2022    Severe obesity (HCC) 09/13/2022    Sinus congestion 07/05/2022    Postherpetic neuralgia 07/05/2022    Non-intractable vomiting with nausea 05/25/2022    Asthma-COPD overlap syndrome 05/25/2022    Palpitations 04/27/2022    Pulmonary nodules 04/15/2021    Prediabetes 04/08/2021    Tremor 03/18/2021    Hypertensive kidney disease with stage 3a chronic kidney disease (HCC) 10/19/2020    Migraine 10/15/2020    Major depression in remission ([facility]) 09/12/2019    BRIANA (obstructive sleep apnea) 09/03/2019    Paroxysmal atrial fibrillation (HCC) 12/07/2018    Spinal stenosis, lumbar 02/04/2017    Trigger finger, acquired 11/17/2012    GERD (gastroesophageal reflux disease) 02/27/2010    Vitamin D deficiency 02/27/2010    Dyslipidemia 02/24/2010    Osteopenia 02/24/2010       SURGICAL HISTORY   has a past surgical history that includes bladder  suspension; stephon by laparoscopy (1997); primary c section (1970/1975); hysterectomy, total abdominal (1979); hip arthroscopy (2/23/2009); acetabular osteotomy (2/23/2009); mass excision ortho (2/23/2009); carpal tunnel endoscopic (11/17/2012); trigger finger release (11/17/2012); lumbar laminectomy diskectomy (Bilateral, 2/4/2017); hip arthroscopy (2005); laminotomy; carpal tunnel release; hip replacement, total; hysterectomy laparoscopy; and incision and drainage general (N/A, 10/18/2022).    ALLERGIES  Allergies   Allergen Reactions    Sumatriptan Swelling     Tongue swell    Clarithromycin Itching, Nausea and Swelling     Swelling/Itching/Nausea    Ees [Erythromycin] Itching, Nausea and Swelling     Swelling/Itching/Nausea    Levaquin Myalgia and Unspecified     Muscle soreness     Pcn [Penicillins] Itching, Nausea and Swelling     Swelling/Itching/Nausea     Shellfish Allergy Itching, Nausea and Swelling     Swelling/Itching/Nausea    Trazodone Swelling and Unspecified    Clarithromycin Unspecified    Erythromycin Unspecified    Penicillin G Unspecified       CURRENT MEDICATIONS  Home Medications       Reviewed by Ga Resendez Ass't (Medical Assistant) on 01/17/24 at 1518  Med List Status: <None>     Medication Last Dose Status   albuterol 108 (90 Base) MCG/ACT Aero Soln inhalation aerosol  Active   amitriptyline (ELAVIL) 50 MG Tab  Active   aspirin (ASA) 81 MG Chew Tab chewable tablet  Active   atorvastatin (LIPITOR) 10 MG Tab  Active   Azelastine (ASTELIN) 137 MCG/SPRAY Solution  Active   benzonatate (TESSALON) 100 MG Cap Not Taking Active   clobetasol (TEMOVATE) 0.05 % Ointment  Active   dilTIAZem CD (CARDIZEM CD) 240 MG CAPSULE SR 24 HR  Active   DULoxetine (CYMBALTA) 60 MG Cap DR Particles delayed-release capsule  Active   fluticasone (FLONASE) 50 MCG/ACT nasal spray  Active   fluticasone (FLOVENT HFA) 110 MCG/ACT Aerosol  Active   fluticasone-umeclidinium-vilanterol (TRELEGY ELLIPTA) 100-62.5-25  "mcg/act inhaler  Active   ondansetron (ZOFRAN ODT) 4 MG TABLET DISPERSIBLE  Active   pantoprazole (PROTONIX) 40 MG Tablet Delayed Response  Active   promethazine-dextromethorphan (PROMETHAZINE-DM) 6.25-15 MG/5ML syrup Not Taking Active   rizatriptan (MAXALT) 5 MG tablet  Active   SALINE NA  Active   telmisartan (MICARDIS) 40 MG Tab  Active   zonisamide (ZONEGRAN) 100 MG Cap  Active                    SOCIAL HISTORY  Social History     Tobacco Use    Smoking status: Never    Smokeless tobacco: Never   Vaping Use    Vaping Use: Never used   Substance and Sexual Activity    Alcohol use: Not Currently     Comment: Stopped drinking 45 days ago 7/12/17 Going to AA    Drug use: No    Sexual activity: Not Currently       FAMILY HISTORY  Family History   Problem Relation Age of Onset    GI Disease Father         Crohn's    Heart Disease Father         First MI age 30    Hypertension Father     Stroke Father     Hypertension Other     Cancer Brother         ESOPHAGEAL CANCER         Medications, Allergies, and current problem list reviewed today in Epic.     Objective:     /86 (BP Location: Right arm, Patient Position: Sitting, BP Cuff Size: Adult long)   Pulse 89   Temp 36.6 °C (97.9 °F) (Temporal)   Resp 16   Ht 1.549 m (5' 1\")   Wt 74.8 kg (165 lb)   SpO2 97%     Physical Exam  Vitals reviewed.   Constitutional:       General: She is not in acute distress.     Appearance: Normal appearance. She is not ill-appearing or toxic-appearing.   HENT:      Head: Normocephalic.      Right Ear: Tympanic membrane normal.      Left Ear: Tympanic membrane normal.      Nose: Nose normal.      Mouth/Throat:      Mouth: Mucous membranes are moist.      Pharynx: Oropharynx is clear.   Cardiovascular:      Rate and Rhythm: Normal rate and regular rhythm.      Pulses: Normal pulses.      Heart sounds: Normal heart sounds.   Pulmonary:      Effort: Pulmonary effort is normal. No respiratory distress.      Breath sounds: Normal " breath sounds. No stridor. No wheezing, rhonchi or rales.   Chest:      Chest wall: Tenderness present. No mass, lacerations, deformity, swelling, crepitus or edema.   Musculoskeletal:      Cervical back: Normal range of motion. No rigidity or tenderness.      Thoracic back: Bony tenderness present. No swelling, edema, deformity, signs of trauma, lacerations, spasms or tenderness. Normal range of motion. No scoliosis.   Lymphadenopathy:      Cervical: No cervical adenopathy.   Skin:     General: Skin is warm.      Capillary Refill: Capillary refill takes less than 2 seconds.   Neurological:      General: No focal deficit present.      Mental Status: She is alert.   Psychiatric:         Mood and Affect: Mood normal.       RADIOLOGY RESULTS   [id number] (WITH 1-VIEW CXR) LEFT    Result Date: 1/17/2024 1/17/2024 4:01 PM HISTORY/REASON FOR EXAM:  Pain Following Trauma. Left rib pain, injury TECHNIQUE/EXAM DESCRIPTION AND NUMBER OF VIEWS:  5 images of the left ribs and chest. COMPARISON: Two-view chest 12/6/2023 FINDINGS: LUNGS: The lungs are clear. HEART and MEDIASTINUM: Heart and mediastinum: normal in size. Pleura: There are no pleural effusion or pneumothoraces. Osseous structures: No left rib fracture are identified. There is multilevel spinal degenerative change and there is levoconvex scoliosis of the thoracolumbar junction. There are right upper quadrant surgical clip that are likely from prior cholecystectomy.     1.  No left rib fracture identified 2.  Negative for pneumothorax 3.  Scoliosis and spinal degenerative change          Assessment/Plan:     Diagnosis and associated orders:     1. Blunt trauma of rib, initial encounter  [id number] (WITH 1-VIEW CXR) LEFT         Comments/MDM:     Upon physical exam patient is in apparent distress.  She is clear to auscultation bilaterally.  No crackles, rhonchi or wheezes appreciated.  No diminished breath sounds.  Normal respiratory effort.  Equal and  even movement with inspiration expiration.  Pain with palpation to left sixth and seventh rib.  No bruising or deformity noticed with inspection.  Patient denies any abdominal pain with palpation.  Vital signs are stable in clinic.  X-ray reassuring for any signs of fracture or pneumothorax.  Discussed findings with patient.  Instructed patient to use heat for alleviation of discomfort.  Educated on the use of OTC medications including Tylenol/Motrin for discomfort.  Instructed patient on use of Voltaren gel to site.  Red flag signs or symptoms discussed at length.  Instructed to return to ER or urgent care if symptoms worsen or fail to improve.         Differential diagnosis, natural history, supportive care, and indications for immediate follow-up discussed.    Advised the patient to follow-up with the primary care physician for recheck, reevaluation, and consideration of further management.    Please note that this dictation was created using voice recognition software. I have made a reasonable attempt to correct obvious errors, but I expect that there are errors of grammar and possibly content that I did not discover before finalizing the note.    This note was electronically signed by VIKAS Pelayo

## 2024-01-25 ENCOUNTER — HOSPITAL ENCOUNTER (EMERGENCY)
Facility: MEDICAL CENTER | Age: 74
End: 2024-01-25
Attending: STUDENT IN AN ORGANIZED HEALTH CARE EDUCATION/TRAINING PROGRAM
Payer: MEDICARE

## 2024-01-25 ENCOUNTER — APPOINTMENT (OUTPATIENT)
Dept: RADIOLOGY | Facility: MEDICAL CENTER | Age: 74
End: 2024-01-25
Attending: STUDENT IN AN ORGANIZED HEALTH CARE EDUCATION/TRAINING PROGRAM
Payer: MEDICARE

## 2024-01-25 VITALS
SYSTOLIC BLOOD PRESSURE: 173 MMHG | HEART RATE: 87 BPM | TEMPERATURE: 97 F | RESPIRATION RATE: 18 BRPM | WEIGHT: 165 LBS | OXYGEN SATURATION: 94 % | BODY MASS INDEX: 31.18 KG/M2 | DIASTOLIC BLOOD PRESSURE: 96 MMHG

## 2024-01-25 DIAGNOSIS — S20.212A CONTUSION OF LEFT CHEST WALL, INITIAL ENCOUNTER: ICD-10-CM

## 2024-01-25 LAB — EKG IMPRESSION: NORMAL

## 2024-01-25 PROCEDURE — 71046 X-RAY EXAM CHEST 2 VIEWS: CPT

## 2024-01-25 PROCEDURE — 700101 HCHG RX REV CODE 250: Performed by: STUDENT IN AN ORGANIZED HEALTH CARE EDUCATION/TRAINING PROGRAM

## 2024-01-25 PROCEDURE — 700102 HCHG RX REV CODE 250 W/ 637 OVERRIDE(OP): Performed by: STUDENT IN AN ORGANIZED HEALTH CARE EDUCATION/TRAINING PROGRAM

## 2024-01-25 PROCEDURE — A9270 NON-COVERED ITEM OR SERVICE: HCPCS | Performed by: STUDENT IN AN ORGANIZED HEALTH CARE EDUCATION/TRAINING PROGRAM

## 2024-01-25 PROCEDURE — 93005 ELECTROCARDIOGRAM TRACING: CPT | Performed by: STUDENT IN AN ORGANIZED HEALTH CARE EDUCATION/TRAINING PROGRAM

## 2024-01-25 PROCEDURE — 99284 EMERGENCY DEPT VISIT MOD MDM: CPT

## 2024-01-25 RX ORDER — CYCLOBENZAPRINE HCL 10 MG
5 TABLET ORAL ONCE
Status: COMPLETED | OUTPATIENT
Start: 2024-01-25 | End: 2024-01-25

## 2024-01-25 RX ORDER — LIDOCAINE 4 G/G
1 PATCH TOPICAL EVERY 24 HOURS
Status: DISCONTINUED | OUTPATIENT
Start: 2024-01-25 | End: 2024-01-26 | Stop reason: HOSPADM

## 2024-01-25 RX ORDER — OXYCODONE HYDROCHLORIDE 5 MG/1
5 TABLET ORAL ONCE
Status: COMPLETED | OUTPATIENT
Start: 2024-01-25 | End: 2024-01-25

## 2024-01-25 RX ORDER — LIDOCAINE 50 MG/G
1 PATCH TOPICAL EVERY 24 HOURS
Qty: 10 PATCH | Refills: 0 | Status: SHIPPED | OUTPATIENT
Start: 2024-01-25 | End: 2024-03-26

## 2024-01-25 RX ORDER — ACETAMINOPHEN 500 MG
1000 TABLET ORAL ONCE
Status: COMPLETED | OUTPATIENT
Start: 2024-01-25 | End: 2024-01-25

## 2024-01-25 RX ORDER — OXYCODONE HYDROCHLORIDE 5 MG/1
5 TABLET ORAL EVERY 12 HOURS PRN
Qty: 7 TABLET | Refills: 0 | Status: SHIPPED | OUTPATIENT
Start: 2024-01-25 | End: 2024-01-31

## 2024-01-25 RX ORDER — ACETAMINOPHEN 500 MG
500-1000 TABLET ORAL EVERY 6 HOURS PRN
Qty: 30 TABLET | Refills: 0 | Status: SHIPPED | OUTPATIENT
Start: 2024-01-25 | End: 2024-03-26

## 2024-01-25 RX ADMIN — ACETAMINOPHEN 1000 MG: 500 TABLET ORAL at 22:08

## 2024-01-25 RX ADMIN — CYCLOBENZAPRINE 5 MG: 10 TABLET, FILM COATED ORAL at 20:39

## 2024-01-25 RX ADMIN — OXYCODONE HYDROCHLORIDE 5 MG: 5 TABLET ORAL at 20:16

## 2024-01-25 RX ADMIN — LIDOCAINE 1 PATCH: 4 PATCH TOPICAL at 22:06

## 2024-01-25 ASSESSMENT — PAIN DESCRIPTION - PAIN TYPE
TYPE: ACUTE PAIN
TYPE: ACUTE PAIN

## 2024-01-25 ASSESSMENT — FIBROSIS 4 INDEX: FIB4 SCORE: 0.88

## 2024-01-25 ASSESSMENT — PAIN DESCRIPTION - DESCRIPTORS: DESCRIPTORS: GNAWING;NAGGING

## 2024-01-26 NOTE — ED PROVIDER NOTES
"  ER Provider Note    Scribed for Molly Sneed M.D. by Arturo Santiago. 1/25/2024   7:31 PM    Primary Care Provider: Rudy Parsons M.D.    CHIEF COMPLAINT  Chief Complaint   Patient presents with    Rib Pain     Patient complains of left sided rib pain after having fallen into shed on Sunday. Patient reports going to urgent care and being told it was just a contusion. Patient complains of continued pain that increases with breathing.      EXTERNAL RECORDS REVIEWED  Outpatient Notes urgent care note, 1/17/2024    HPI/ROS  LIMITATION TO HISTORY   Select: : None  OUTSIDE HISTORIAN(S):  None    Augusta Rodriguez is a 73 y.o. female who presents to the ED with a history of asthma and COPD  for evaluation of left rib pain onset one week ago. Patient states she hit her left side on a shed while walking dogs. Patient reports going to urgent care, she states she was told she had a contusion but still has continued pain. She had a chest xray done at this time which did not show a fracture or pneumothorax. She describes feeling a \"clicking\" noise, and her pain was an 8/10. She notes having associated coughing, secondary to her history of COPD, denies coughing any sputum up. The patient denies fever, chest pain, shortness of breath, chills, nausea, vomiting, and shortness of breath. She notes her pain is exacerbated when breathing. Her pain is alleviated with ice. She states she has tried using lidocaine with no alleviation.     PAST MEDICAL HISTORY  Past Medical History:   Diagnosis Date    Apnea, sleep     Arrhythmia     afib    Arthritis     osteo    Asthma     Atrial fibrillation (HCC)     Back pain     Bronchitis     Chickenpox     Constipation     Cough     Daytime sleepiness     Dental disorder     upper dentures    Earache     Frequent urination     Gasping for breath     GERD (gastroesophageal reflux disease) 2/27/2010    Macedonian measles     Heartburn     Hypertension     Impaired fasting glucose 2/27/2010    " Incontinence of urine     Indigestion     Influenza     Mumps     Nasal drainage     Nausea     Osteopenia 2/24/2010    Osteoporosis     Other specified disorder of intestines     constipation r/t meds    Restless leg syndrome     Scarlet fever     Shortness of breath     Sleep apnea     Snoring     Tremor, essential 2/24/2010    Urinary bladder disorder     Wears glasses     Wheezing     Whooping cough      SURGICAL HISTORY  Past Surgical History:   Procedure Laterality Date    INCISION AND DRAINAGE GENERAL N/A 10/18/2022    Procedure: INCISION AND DRAINAGE ABSCESS TONGUE;  Surgeon: Paz Suazo M.D.;  Location: SURGERY [facility];  Service: Ent    LUMBAR LAMINECTOMY DISKECTOMY Bilateral 2/4/2017    Procedure: LUMBAR LAMINECTOMY DISKECTOMY POSTERIOR L4-S1 ;  Surgeon: Emil Hitchcock M.D.;  Location: SURGERY [facility];  Service:     CARPAL TUNNEL ENDOSCOPIC  11/17/2012    Performed by Heriberto Quiñones M.D. at SURGERY [facility]    TRIGGER FINGER RELEASE  11/17/2012    Performed by Heriberto Quiñones M.D. at SURGERY [facility] ORS    HIP ARTHROSCOPY  2/23/2009    Performed by SHERLYN CALVO at SURGERY [facility] ORS    ACETABULAR OSTEOTOMY  2/23/2009    Performed by SHERLYN CALVO at SURGERY [facility] ORS    MASS EXCISION ORTHO  2/23/2009    Performed by SHERLYN CALVO at SURGERY [facility] ORS    HIP ARTHROSCOPY  2005    done at [facility]    HAJA BY LAPAROSCOPY  1997    HYSTERECTOMY, TOTAL ABDOMINAL  1979    oopherectomy lacie    BLADDER SUSPENSION      bladder sling    CARPAL TUNNEL RELEASE      HIP REPLACEMENT, TOTAL      HYSTERECTOMY LAPAROSCOPY      LAMINOTOMY      PRIMARY C SECTION  1970/1975     FAMILY HISTORY  Family History   Problem Relation Age of Onset    GI Disease Father         Crohn's    Heart Disease Father         First MI age 30    Hypertension Father     Stroke Father     Hypertension Other     Cancer Brother         ESOPHAGEAL CANCER     SOCIAL HISTORY   reports  that she has never smoked. She has never used smokeless tobacco. She reports that she does not currently use alcohol. She reports that she does not use drugs.    CURRENT MEDICATIONS  Discharge Medication List as of 1/25/2024 10:18 PM        CONTINUE these medications which have NOT CHANGED    Details   Azelastine (ASTELIN) 137 MCG/SPRAY Solution SPRAYS 2 SPRAYS IN EACH NOSTRIL NASALLY TWICE A DAY 90 DAY(S), Historical Med      fluticasone (FLONASE) 50 MCG/ACT nasal spray SPRAY 1 SPRAY INTO EACH NOSTRIL ONCE A DAY, Historical Med      fluticasone-umeclidinium-vilanterol (TRELEGY ELLIPTA) 100-62.5-25 mcg/act inhaler Inhale 1 Puff every day., Disp-2 Each, R-0, Normal      promethazine-dextromethorphan (PROMETHAZINE-DM) 6.25-15 MG/5ML syrup Take 1.3 mL by mouth every four hours as needed for Cough., Disp-120 mL, R-0, Normal      albuterol 108 (90 Base) MCG/ACT Aero Soln inhalation aerosol Inhale 2 Puffs every 6 hours as needed for Shortness of Breath., Disp-8.5 Each, R-5, Normal      fluticasone (FLOVENT HFA) 110 MCG/ACT Aerosol Inhale 1 Puff 2 times a day., Disp-2 Each, R-6, Normal      clobetasol (TEMOVATE) 0.05 % Ointment APPLY A THIN LAYER ONCE DAILY FOR 3 WEEKS, THEN USE 2 TO 3 TIMES A WEEK, Disp-60 g, R-1, Normal      aspirin (ASA) 81 MG Chew Tab chewable tablet Chew 81 mg every day., Historical Med      amitriptyline (ELAVIL) 50 MG Tab TAKE 1 TABLET BY MOUTH EVERY DAY IN THE EVENING, Disp-90 Tablet, R-3, Normal      benzonatate (TESSALON) 100 MG Cap Take 1 Capsule by mouth 3 times a day as needed for Cough., Disp-60 Capsule, R-0, Normal      pantoprazole (PROTONIX) 40 MG Tablet Delayed Response Take 1 Tablet by mouth 2 times a day., Disp-180 Tablet, R-3, Normal      ondansetron (ZOFRAN ODT) 4 MG TABLET DISPERSIBLE Take 1 Tablet by mouth every 6 hours as needed for Nausea/Vomiting., Disp-10 Tablet, R-0, Normal      DULoxetine (CYMBALTA) 60 MG Cap DR Particles delayed-release capsule Take 1 Capsule by mouth every  day., Disp-90 Capsule, R-3, Normal      rizatriptan (MAXALT) 5 MG tablet Take 1 Tablet by mouth one time as needed for Migraine., Disp-10 Tablet, R-5, Normal      atorvastatin (LIPITOR) 10 MG Tab Take 1 Tablet by mouth every day., Disp-100 Tablet, R-3, Normal      dilTIAZem CD (CARDIZEM CD) 240 MG CAPSULE SR 24 HR Take 1 Capsule by mouth every day., Disp-90 Capsule, R-3, Normal      telmisartan (MICARDIS) 40 MG Tab Take 1 Tablet by mouth every day., Disp-100 Tablet, R-3, Normal      zonisamide (ZONEGRAN) 100 MG Cap Take 100 mg by mouth every day., Historical Med      SALINE NA Administer 1 Spray into affected nostril(S) every day., Historical Med           ALLERGIES  Allergies   Allergen Reactions    Sumatriptan Swelling     Tongue swell    Clarithromycin Itching, Nausea and Swelling     Swelling/Itching/Nausea    Ees [Erythromycin] Itching, Nausea and Swelling     Swelling/Itching/Nausea    Levaquin Myalgia and Unspecified     Muscle soreness     Pcn [Penicillins] Itching, Nausea and Swelling     Swelling/Itching/Nausea     Shellfish Allergy Itching, Nausea and Swelling     Swelling/Itching/Nausea    Trazodone Swelling and Unspecified    Clarithromycin Unspecified    Erythromycin Unspecified    Penicillin G Unspecified      PHYSICAL EXAM  BP (!) 163/98   Pulse 68   Temp 36.3 °C (97.3 °F) (Temporal)   Resp 18   Wt 74.8 kg (165 lb)   SpO2 96%   BMI 31.18 kg/m²    General: Pleasant, alert, oriented female sitting on bed in no acute distress  Head: Normocephalic atraumatic  Eyes: Extraocular motion intact  Neck: Supple, no rigidity  Cardiovascular: Regular rate and rhythm no murmurs rubs or gallops  Respiratory: Clear to auscultation bilaterally, equal chest rise and fall, no increased work of breathing  Chest: Tenderness over left anterior chest wall with no overlying ecchymosis   Abdomen: Soft nontender no guarding  Musculoskeletal: Warm and well perfused, no peripheral edema  Neuro: Alert, no focal  deficits  Integumentary: No wounds or rashes    DIAGNOSTIC STUDIES  EKG:   I have independently interpreted this EKG as detailed below.  Results for orders placed or performed during the hospital encounter of 24   EKG (NOW)   Result Value Ref Range    Report       [facility] Emergency Dept.    Test Date:  2024  Pt Name:    CONNIE NORIEGA                    Department: ER  MRN:        [id number]                      Room:       ORTHO  Gender:     Female                       Technician: [id number]  :        [id number]                   Requested By:SHIN SNEED  Order #:    [id number]                    Reading MD: Shin Sneed    Measurements  Intervals                                Axis  Rate:       67                           P:          29  ME:         157                          QRS:        -16  QRSD:       96                           T:          43  QT:         439  QTc:        464    Interpretive Statements  Sinus rhythm, rate of 67, normal axis, normal intervals, no ST elevations or  depressions, Q waves in lead III and aVF, not significant change from prior  2023.  Electronically Signed On 2024 21:38:11 PST by Shin Sneed            Radiology:   This attending emergency physician has independently interpreted the diagnostic imaging associated with this visit and is awaiting the final reading from the radiologist.   Preliminary interpretation is a follows: No displaced fracture no pneumothorax  Radiologist interpretation:   DX-CHEST-2 VIEWS   Final Result      No active disease.           INITIAL ASSESSMENT COURSE AND PLAN  Care Narrative 73-year-old with history of COPD presenting with chest wall pain after traumatic injury 1 week ago, not improving, seen in urgent care, no fractures on x-ray, has dry cough, pain is not significantly worsened, no fever, nonproductive.  Patient reports symptoms or not improving she feels a clicking in her chest patient was evaluated  at bedside. Ordered for DX chest and EKG to evaluate. The patient will be medicated with flexeril 5 mg and Roxicodone 5 mg for her symptoms. Patient verbalizes understanding and support with my plan of care.  Differential diagnoses include but not limited to: Rib fracture, rib separation,/sprain, pneumonia, considered COPD exacerbation, considered atypical ACS/arrhythmia, other cause of chest pain low suspicion overall however given age, risk factors will obtain screening EKG.    9:36 PM - Patient was reevaluated at bedside. Discussed plan with patient which includes treating her symptoms with outpatient medications which include lidocaine, Tylenol, or oxycodone. Advised no drinking alcoholic, driving, or operating heavy equipment while taking. Patient was advised of the sedation effect of these medications. I then informed the patient of my plan for discharge, which includes strict return precautions for any new or worsening symptoms including fever, worsening cough or shortness of breath. Patient understands and verbalizes agreement to plan of care. Patient is comfortable going home at this time.     Chest x-ray without evidence of pneumonia, EKG is unrevealing.  Suspect likely nondisplaced rib fracture versus strain as cause of chest pain and symptoms    Mildly improved, pain level mildly improved, we discussed the potential for nondisplaced rib fracture that would be visible on CT however given likely would not , patient was observed for pain control she was given incentive spirometer for prevention of atelectasis, lidocaine patch Tylenol, oxycodone, we discussed symptoms of pneumonia, return precautions need for outpatient follow-up, patient verbalized understanding agreement of plan of care and was discharged in no acute distress.          DISPOSITION AND DISCUSSIONS    I have discussed management of the patient with the following physicians and JULISA's:  None    Discussion of management with  other [facility] or appropriate source(s): None     Escalation of care considered, and ultimately not performed: diagnostic imaging.  Considered CT thorax however given it would not significant change in management of patient with a nondisplaced rib fracture, this was ultimately deferred      Barriers to care at this time, including but not limited to:  None .     Decision tools and prescription drugs considered including, but not limited to: Tylenol. Lidocaine patch and Roxicodone     The patient will return for new or worsening symptoms and is stable at the time of discharge.    DISPOSITION:  Patient will be discharged home in stable condition.    In prescribing controlled substances to this patient, I certify that I have obtained and reviewed the medical history of Augusta Rodriguez. I have also made a good elli effort to obtain applicable records from other providers who have treated the patient and records did not demonstrate any increased risk of substance abuse that would prevent me from prescribing controlled substances.     I have conducted a physical exam and documented it. I have reviewed Ms. Rodriguez’s prescription history as maintained by the [place] Prescription Monitoring Program.     I have assessed the patient’s risk for abuse, dependency, and addiction using the validated Opioid Risk Tool available at https://www.mdcalc.com/djqgor-shuy-dsly-ort-narcotic-abuse.     Given the above, I believe the benefits of controlled substance therapy outweigh the risks. The reasons for prescribing controlled substances include non-narcotic, oral analgesic alternatives have been inadequate for pain control. Accordingly, I have discussed the risk and benefits, treatment plan, and alternative therapies with the patient.     FOLLOW UP:  Rudy Parsons M.D.  [address] [place] [id number] [phone]    Call in 1 day        OUTPATIENT MEDICATIONS:  Discharge Medication List as of 1/25/2024 10:18 PM        START taking  these medications    Details   lidocaine (LIDODERM) 5 % Patch Place 1 Patch on the skin every 24 hours., Disp-10 Patch, R-0, Normal      acetaminophen (TYLENOL) 500 MG Tab Take 1-2 Tablets by mouth every 6 hours as needed for Mild Pain or Moderate Pain., Disp-30 Tablet, R-0, Normal      oxyCODONE immediate-release (ROXICODONE) 5 MG Tab Take 1 Tablet by mouth every 12 hours as needed for Severe Pain for up to 7 days., Disp-7 Tablet, R-0, Normal           FINAL DIAGNOSIS  1. Contusion of left chest wall, initial encounter       I, Arturo Santiago (Scribe), am scribing for, and in the presence of, Shin Sneed M.D..    Electronically signed by: Arturo Santiago (Scribe), 1/25/2024    IShin M.D. personally performed the services described in this documentation, as scribed by Arturo Santiago in my presence, and it is both accurate and complete.      The note accurately reflects work and decisions made by me.  Shin Sneed M.D.  1/26/2024  1:06 AM

## 2024-01-26 NOTE — ED TRIAGE NOTES
Chief Complaint   Patient presents with    Rib Pain     Patient complains of left sided rib pain after having fallen into shed on Sunday. Patient reports going to urgent care and being told it was just a contusion. Patient complains of continued pain that increases with breathing.         Patient educated on triage process. Informed to notified ED staff of change in symptoms.     Vitals:    01/25/24 1726   BP: (!) 140/81   Pulse: 87   Resp: 18   Temp: 36.3 °C (97.3 °F)   SpO2: 97%

## 2024-01-26 NOTE — DISCHARGE INSTRUCTIONS
Make sure you use the incentive spirometer to prevent pneumonia, return if you develop fever, worsening cough, shortness of breath, otherwise, try to use the lidocaine and Tylenol to control your pain if that is not enough you can also take the oxycodone however do not take it with anything else that makes you breathe less deeply such as Benadryl, other sedating medications, alcohol.

## 2024-01-26 NOTE — ED NOTES
Provided patient with discharge instructions. Patient verbalized understanding of instructions. Patient AxO4 and GCS 15. Patients vitals within normal ranges. Patient takes medication for blood pressure and is aware of blood pressure. Patient is educated on indications and contraindications for prescribed medications as well as necessary precautions to look out for. Patient has a ride home with  at bedside and is aware of driving contraindications while taking prescription medications. Patient ambulatory with steady gait out of ED. Patient informed if symptoms persist or worsen to come back and see us immediately.

## 2024-01-30 NOTE — ASSESSMENT & PLAN NOTE
Patient has had alcoholism for approximately 50 years. She is not attending AA. Last drink was  6 months ago.   PAST MEDICAL HISTORY:  HLD (hyperlipidemia)     HTN (hypertension)     Prostate cancer

## 2024-01-31 ENCOUNTER — OFFICE VISIT (OUTPATIENT)
Dept: MEDICAL GROUP | Facility: PHYSICIAN GROUP | Age: 74
End: 2024-01-31
Payer: MEDICARE

## 2024-01-31 VITALS
TEMPERATURE: 98.1 F | OXYGEN SATURATION: 93 % | WEIGHT: 168.6 LBS | HEIGHT: 61 IN | BODY MASS INDEX: 31.83 KG/M2 | HEART RATE: 86 BPM | SYSTOLIC BLOOD PRESSURE: 130 MMHG | DIASTOLIC BLOOD PRESSURE: 84 MMHG

## 2024-01-31 DIAGNOSIS — I12.9 HYPERTENSIVE KIDNEY DISEASE WITH STAGE 3A CHRONIC KIDNEY DISEASE: ICD-10-CM

## 2024-01-31 DIAGNOSIS — G31.9 CEREBRAL ATROPHY, MILD (HCC): Chronic | ICD-10-CM

## 2024-01-31 DIAGNOSIS — S29.9XXA TRAUMATIC INJURY OF RIB: ICD-10-CM

## 2024-01-31 DIAGNOSIS — I48.0 PAROXYSMAL ATRIAL FIBRILLATION (HCC): ICD-10-CM

## 2024-01-31 DIAGNOSIS — G43.909 MIGRAINE WITHOUT STATUS MIGRAINOSUS, NOT INTRACTABLE, UNSPECIFIED MIGRAINE TYPE: Chronic | ICD-10-CM

## 2024-01-31 DIAGNOSIS — F32.5 MAJOR DEPRESSION IN REMISSION (HCC): ICD-10-CM

## 2024-01-31 DIAGNOSIS — N18.31 HYPERTENSIVE KIDNEY DISEASE WITH STAGE 3A CHRONIC KIDNEY DISEASE: ICD-10-CM

## 2024-01-31 DIAGNOSIS — K21.9 GASTROESOPHAGEAL REFLUX DISEASE WITHOUT ESOPHAGITIS: Chronic | ICD-10-CM

## 2024-01-31 DIAGNOSIS — I77.819 DILATATION OF AORTA (HCC): Chronic | ICD-10-CM

## 2024-01-31 DIAGNOSIS — E66.01 SEVERE OBESITY (HCC): Chronic | ICD-10-CM

## 2024-01-31 PROBLEM — I47.10 PAROXYSMAL SVT (SUPRAVENTRICULAR TACHYCARDIA) (HCC): Status: RESOLVED | Noted: 2023-11-02 | Resolved: 2024-01-31

## 2024-01-31 PROCEDURE — 3079F DIAST BP 80-89 MM HG: CPT | Performed by: INTERNAL MEDICINE

## 2024-01-31 PROCEDURE — 99215 OFFICE O/P EST HI 40 MIN: CPT | Performed by: INTERNAL MEDICINE

## 2024-01-31 PROCEDURE — 3075F SYST BP GE 130 - 139MM HG: CPT | Performed by: INTERNAL MEDICINE

## 2024-01-31 RX ORDER — HYDROCODONE BITARTRATE AND ACETAMINOPHEN 10; 325 MG/1; MG/1
1-2 TABLET ORAL 2 TIMES DAILY PRN
Qty: 60 TABLET | Refills: 0 | Status: SHIPPED | OUTPATIENT
Start: 2024-01-31 | End: 2024-03-01

## 2024-01-31 ASSESSMENT — FIBROSIS 4 INDEX: FIB4 SCORE: 0.88

## 2024-02-01 ENCOUNTER — HOSPITAL ENCOUNTER (OUTPATIENT)
Facility: MEDICAL CENTER | Age: 74
End: 2024-02-01
Attending: INTERNAL MEDICINE
Payer: MEDICARE

## 2024-02-01 DIAGNOSIS — S29.9XXA TRAUMATIC INJURY OF RIB: ICD-10-CM

## 2024-02-01 PROCEDURE — G0481 DRUG TEST DEF 8-14 CLASSES: HCPCS

## 2024-02-01 NOTE — ASSESSMENT & PLAN NOTE
Chronic condition.  The patient is taking pantoprazole 40 Mg twice daily.  Patient denies nausea vomiting dysphagia or unexplained weight loss.

## 2024-02-01 NOTE — ASSESSMENT & PLAN NOTE
Chronic condition noted with echocardiogram October 2020 show ascending aorta borderline dilated at 3.8 cm.

## 2024-02-01 NOTE — ASSESSMENT & PLAN NOTE
New condition.  Approximately 2 weeks ago the patient reported that she accidentally ran into a storage chasing the dog injuring her left ribs.  Patient reports severe pain.  Patient was seen in urgent care and x-ray was done no acute fracture reported.  Patient has been given oxycodone and the patient ran out of the pill and the pain persists.  Patient denies fevers or chills shortness of breath or chest pain.

## 2024-02-01 NOTE — ASSESSMENT & PLAN NOTE
Chronic condition.  Followed by cardiology service.  CHADS2 score of 1.  Patient presently taking aspirin 81 Mg daily and diltiazem 240 Mg daily.  Patient denies chest pain shortness of breath or palpitation.

## 2024-02-01 NOTE — PROGRESS NOTES
PRIMARY CARE CLINIC VISIT        Chief Complaint   Patient presents with    Follow-Up     Hit ribs two weeks ago.       Dilatation of aorta  Obesity  Hypertensive kidney disease with CKD 3  Major depression  Atrial fibrillation  Cerebral atrophy  Acid reflux  Migraine        History of Present Illness     Traumatic injury of rib  New condition.  Approximately 2 weeks ago the patient reported that she accidentally ran into a storage chasing the dog injuring her left ribs.  Patient reports severe pain.  Patient was seen in urgent care and x-ray was done no acute fracture reported.  Patient has been given oxycodone and the patient ran out of the pill and the pain persists.  Patient denies fevers or chills shortness of breath or chest pain.    Dilatation of aorta (HCC)  Chronic condition noted with echocardiogram October 2020 show ascending aorta borderline dilated at 3.8 cm.    Severe obesity (HCC)  Chronic condition.  Discussed with the patient regarding diet, exercise, and lifestyle modification to help achieve and maintain healthy weight         Hypertensive kidney disease with stage 3a chronic kidney disease (HCC)  Chronic condition.  Patient presently taking diltiazem 240 Mg daily.  Blood pressure has been well-controlled.    GFR 50s.  The patient to avoid NSAID.    Major depression in remission (HCC)  Chronic condition.  The patient is being treated with duloxetine 60 Mg daily.  The patient tolerated medication well.  Patient denies SI.    Paroxysmal atrial fibrillation (HCC)  Chronic condition.  Followed by cardiology service.  CHADS2 score of 1.  Patient presently taking aspirin 81 Mg daily and diltiazem 240 Mg daily.  Patient denies chest pain shortness of breath or palpitation.    Cerebral atrophy, mild (HCC)  This is a chronic condition noted with previous imaging August 13, 2020.  Patient asymptomatic.  Continue to monitor.    GERD (gastroesophageal reflux disease)  Chronic condition.  The patient is taking  pantoprazole 40 Mg twice daily.  Patient denies nausea vomiting dysphagia or unexplained weight loss.    Migraine  Chronic condition.  Followed by neurology service.  Patient presently taking amitriptyline 50 Mg daily and rizatriptan as needed.  Currently she is asymptomatic.    Current Outpatient Medications on File Prior to Visit   Medication Sig Dispense Refill    lidocaine (LIDODERM) 5 % Patch Place 1 Patch on the skin every 24 hours. 10 Patch 0    acetaminophen (TYLENOL) 500 MG Tab Take 1-2 Tablets by mouth every 6 hours as needed for Mild Pain or Moderate Pain. 30 Tablet 0    Azelastine (ASTELIN) 137 MCG/SPRAY Solution SPRAYS 2 SPRAYS IN EACH NOSTRIL NASALLY TWICE A DAY 90 DAY(S)      fluticasone (FLONASE) 50 MCG/ACT nasal spray SPRAY 1 SPRAY INTO EACH NOSTRIL ONCE A DAY      fluticasone-umeclidinium-vilanterol (TRELEGY ELLIPTA) 100-62.5-25 mcg/act inhaler Inhale 1 Puff every day. 2 Each 0    promethazine-dextromethorphan (PROMETHAZINE-DM) 6.25-15 MG/5ML syrup Take 1.3 mL by mouth every four hours as needed for Cough. 120 mL 0    albuterol 108 (90 Base) MCG/ACT Aero Soln inhalation aerosol Inhale 2 Puffs every 6 hours as needed for Shortness of Breath. 8.5 Each 5    fluticasone (FLOVENT HFA) 110 MCG/ACT Aerosol Inhale 1 Puff 2 times a day. 2 Each 6    clobetasol (TEMOVATE) 0.05 % Ointment APPLY A THIN LAYER ONCE DAILY FOR 3 WEEKS, THEN USE 2 TO 3 TIMES A WEEK 60 g 1    aspirin (ASA) 81 MG Chew Tab chewable tablet Chew 81 mg every day.      amitriptyline (ELAVIL) 50 MG Tab TAKE 1 TABLET BY MOUTH EVERY DAY IN THE EVENING 90 Tablet 3    pantoprazole (PROTONIX) 40 MG Tablet Delayed Response Take 1 Tablet by mouth 2 times a day. 180 Tablet 3    ondansetron (ZOFRAN ODT) 4 MG TABLET DISPERSIBLE Take 1 Tablet by mouth every 6 hours as needed for Nausea/Vomiting. 10 Tablet 0    DULoxetine (CYMBALTA) 60 MG Cap DR Particles delayed-release capsule Take 1 Capsule by mouth every day. 90 Capsule 3    rizatriptan (MAXALT) 5  MG tablet Take 1 Tablet by mouth one time as needed for Migraine. 10 Tablet 5    atorvastatin (LIPITOR) 10 MG Tab Take 1 Tablet by mouth every day. 100 Tablet 3    dilTIAZem CD (CARDIZEM CD) 240 MG CAPSULE SR 24 HR Take 1 Capsule by mouth every day. 90 Capsule 3    telmisartan (MICARDIS) 40 MG Tab Take 1 Tablet by mouth every day. 100 Tablet 3    zonisamide (ZONEGRAN) 100 MG Cap Take 100 mg by mouth every day.      SALINE NA Administer 1 Spray into affected nostril(S) every day.      benzonatate (TESSALON) 100 MG Cap Take 1 Capsule by mouth 3 times a day as needed for Cough. (Patient not taking: Reported on 1/31/2024) 60 Capsule 0     No current facility-administered medications on file prior to visit.        Allergies: Sumatriptan, Clarithromycin, Ees [erythromycin], Levaquin, Pcn [penicillins], Shellfish allergy, Trazodone, Clarithromycin, Erythromycin, and Penicillin g    Current Outpatient Medications Ordered in Epic   Medication Sig Dispense Refill    HYDROcodone/acetaminophen (NORCO)  MG Tab Take 1-2 Tablets by mouth 2 times a day as needed for Severe Pain for up to 30 days. 60 Tablet 0    lidocaine (LIDODERM) 5 % Patch Place 1 Patch on the skin every 24 hours. 10 Patch 0    acetaminophen (TYLENOL) 500 MG Tab Take 1-2 Tablets by mouth every 6 hours as needed for Mild Pain or Moderate Pain. 30 Tablet 0    Azelastine (ASTELIN) 137 MCG/SPRAY Solution SPRAYS 2 SPRAYS IN EACH NOSTRIL NASALLY TWICE A DAY 90 DAY(S)      fluticasone (FLONASE) 50 MCG/ACT nasal spray SPRAY 1 SPRAY INTO EACH NOSTRIL ONCE A DAY      fluticasone-umeclidinium-vilanterol (TRELEGY ELLIPTA) 100-62.5-25 mcg/act inhaler Inhale 1 Puff every day. 2 Each 0    promethazine-dextromethorphan (PROMETHAZINE-DM) 6.25-15 MG/5ML syrup Take 1.3 mL by mouth every four hours as needed for Cough. 120 mL 0    albuterol 108 (90 Base) MCG/ACT Aero Soln inhalation aerosol Inhale 2 Puffs every 6 hours as needed for Shortness of Breath. 8.5 Each 5     fluticasone (FLOVENT HFA) 110 MCG/ACT Aerosol Inhale 1 Puff 2 times a day. 2 Each 6    clobetasol (TEMOVATE) 0.05 % Ointment APPLY A THIN LAYER ONCE DAILY FOR 3 WEEKS, THEN USE 2 TO 3 TIMES A WEEK 60 g 1    aspirin (ASA) 81 MG Chew Tab chewable tablet Chew 81 mg every day.      amitriptyline (ELAVIL) 50 MG Tab TAKE 1 TABLET BY MOUTH EVERY DAY IN THE EVENING 90 Tablet 3    pantoprazole (PROTONIX) 40 MG Tablet Delayed Response Take 1 Tablet by mouth 2 times a day. 180 Tablet 3    ondansetron (ZOFRAN ODT) 4 MG TABLET DISPERSIBLE Take 1 Tablet by mouth every 6 hours as needed for Nausea/Vomiting. 10 Tablet 0    DULoxetine (CYMBALTA) 60 MG Cap DR Particles delayed-release capsule Take 1 Capsule by mouth every day. 90 Capsule 3    rizatriptan (MAXALT) 5 MG tablet Take 1 Tablet by mouth one time as needed for Migraine. 10 Tablet 5    atorvastatin (LIPITOR) 10 MG Tab Take 1 Tablet by mouth every day. 100 Tablet 3    dilTIAZem CD (CARDIZEM CD) 240 MG CAPSULE SR 24 HR Take 1 Capsule by mouth every day. 90 Capsule 3    telmisartan (MICARDIS) 40 MG Tab Take 1 Tablet by mouth every day. 100 Tablet 3    zonisamide (ZONEGRAN) 100 MG Cap Take 100 mg by mouth every day.      SALINE NA Administer 1 Spray into affected nostril(S) every day.      benzonatate (TESSALON) 100 MG Cap Take 1 Capsule by mouth 3 times a day as needed for Cough. (Patient not taking: Reported on 1/31/2024) 60 Capsule 0     No current Epic-ordered facility-administered medications on file.       Past Medical History:   Diagnosis Date    Apnea, sleep     Arrhythmia     afib    Arthritis     osteo    Asthma     Atrial fibrillation (HCC)     Back pain     Bronchitis     Chickenpox     Constipation     Cough     Daytime sleepiness     Dental disorder     upper dentures    Earache     Frequent urination     Gasping for breath     GERD (gastroesophageal reflux disease) 2/27/2010    Welsh measles     Heartburn     Hypertension     Impaired fasting glucose 2/27/2010     Incontinence of urine     Indigestion     Influenza     Mumps     Nasal drainage     Nausea     Osteopenia 2/24/2010    Osteoporosis     Other specified disorder of intestines     constipation r/t meds    Restless leg syndrome     Scarlet fever     Shortness of breath     Sleep apnea     Snoring     Tremor, essential 2/24/2010    Urinary bladder disorder     Wears glasses     Wheezing     Whooping cough        Past Surgical History:   Procedure Laterality Date    INCISION AND DRAINAGE GENERAL N/A 10/18/2022    Procedure: INCISION AND DRAINAGE ABSCESS TONGUE;  Surgeon: Paz Suazo M.D.;  Location: SURGERY [facility];  Service: Ent    LUMBAR LAMINECTOMY DISKECTOMY Bilateral 2/4/2017    Procedure: LUMBAR LAMINECTOMY DISKECTOMY POSTERIOR L4-S1 ;  Surgeon: Emil Hitchcock M.D.;  Location: SURGERY [facility];  Service:     CARPAL TUNNEL ENDOSCOPIC  11/17/2012    Performed by Heriberto Quiñones M.D. at SURGERY [facility]    TRIGGER FINGER RELEASE  11/17/2012    Performed by Heriberto Quiñones M.D. at SURGERY [facility] ORS    HIP ARTHROSCOPY  2/23/2009    Performed by SHERLYN CALVO at SURGERY [facility] ORS    ACETABULAR OSTEOTOMY  2/23/2009    Performed by SHERLYN CALVO at SURGERY [facility] ORS    MASS EXCISION ORTHO  2/23/2009    Performed by SHERLYN CALVO at SURGERY [facility] ORS    HIP ARTHROSCOPY  2005    done at [facility]    HAJA BY LAPAROSCOPY  1997    HYSTERECTOMY, TOTAL ABDOMINAL  1979    oopherectomy lacie    BLADDER SUSPENSION      bladder sling    CARPAL TUNNEL RELEASE      HIP REPLACEMENT, TOTAL      HYSTERECTOMY LAPAROSCOPY      LAMINOTOMY      PRIMARY C SECTION  1970/1975       Family History   Problem Relation Age of Onset    GI Disease Father         Crohn's    Heart Disease Father         First MI age 30    Hypertension Father     Stroke Father     Hypertension Other     Cancer Brother         ESOPHAGEAL CANCER       Social History     Tobacco Use   Smoking Status Never  "  Smokeless Tobacco Never       Social History     Substance and Sexual Activity   Alcohol Use Not Currently    Comment: Stopped drinking 45 days ago 7/12/17 Going to AA       Review of systems.  As per HPI above. All other systems reviewed and negative.      Past Medical, Social, and Family history reviewed and updated in EPIC     Objective     /84 (BP Location: Right arm, Patient Position: Sitting, BP Cuff Size: Adult)   Pulse 86   Temp 36.7 °C (98.1 °F) (Temporal)   Ht 1.549 m (5' 1\")   Wt 76.5 kg (168 lb 9.6 oz)   SpO2 93%    Body mass index is 31.86 kg/m².    General: alert in no apparent distress.  Cardiovascular: regular rate and rhythm  Pulmonary: lungs : no wheezing.  Good air movement  Gastrointestinal: BS present.   Left rib cage no palpable defects          Lab Results   Component Value Date/Time    SODIUM 137 08/10/2023 01:34 PM    POTASSIUM 4.4 08/10/2023 01:34 PM    GLUCOSE 133 (H) 08/10/2023 01:34 PM    BUN 16 08/10/2023 01:34 PM    CREATININE 1.05 08/10/2023 01:34 PM    CREATININE 0.89 04/27/2009 12:00 AM       Lab Results   Component Value Date/Time    CHOLSTRLTOT 132 04/03/2023 01:01 PM    TRIGLYCERIDE 77 04/03/2023 01:01 PM    HDL 53 04/03/2023 01:01 PM    LDL 64 04/03/2023 01:01 PM       Lab Results   Component Value Date/Time    ALTSGPT 8 08/10/2023 01:34 PM             Assessment and Plan     1. Traumatic injury of rib  - Controlled Substance Treatment Agreement  - Pain Management Screen; Future  - HYDROcodone/acetaminophen (NORCO)  MG Tab; Take 1-2 Tablets by mouth 2 times a day as needed for Severe Pain for up to 30 days.  Dispense: 60 Tablet; Refill: 0    This is a new condition.  Pain is severe not better with over-the-counter medication  Patient ran out of oxycodone.  In prescribing controlled substances to this patient, I certify that I have obtained and reviewed the medical history of the patient. I have also made a good elli effort to obtain applicable records from " other providers who have treated the patient.     I have reviewed patient's prescription history as maintained by the [place] Prescription Monitoring Program.      I have assessed the patient’s risk for abuse, dependency, and addiction using the validated Opioid Risk Tool     Given the above, I believe the benefits of controlled substance therapy outweigh the risks. The reasons for prescribing controlled substances include my professional opinion that controlled substances are a reasonable choice for this patient in the event other methods are ineffective inadequately controlling pain. Accordingly, I have discussed the risks and benefits of opioid therapy, treatment plan, and alternative therapies with the patient.     Verbal informed consent was provided. Counseled pt on risks, benefits, and potential side effects of controlled substance treatment including but not limited to sedation, alcohol, driving, urine drug screens, tolerance, addiction, impaired judgement, and the risk of fatal overdose if not taken as prescribed; increased risk of overdose/death ; and proper storage and disposal of controlled substances.     Advised pt to take pain med only as needed as prescribed for severe pain /and not to exceed the prescribed amount.  -Explained to pt that goals of treatment plan are to 1)improve function 2)reduce pain level if possible 3)develop self management skills for controlling pain.    -Pt advised to go to nearest ER if any of the following symptoms develop: motor weakness below baseline, respiratory depression, or mental status changes, intolerable side effects, bowel/bladder incontinence, or severe exacerbation of pain.    -UDS ordered     -Consent form signed by pt      2. Dilatation of aorta (HCC)  Chronic condition.  Current status unclear.  Echocardiogram requested.    3. Severe obesity (HCC)  Chronic condition.  Uncontrolled.  Recommend diet and lifestyle modification.  Encouraged patient to lose  weight.    4. Hypertensive kidney disease with stage 3a chronic kidney disease (HCC)  Chronic stable condition.  Continue diltiazem 240 Mg daily.    5. Major depression in remission (HCC)  This is a chronic stable.  Continue duloxetine 60 Mg daily      6. Paroxysmal atrial fibrillation (HCC)  Stable.  Continue aspirin 81 Mg daily and diltiazem 240 Mg daily.  Continue follow-up with cardiology service.    7. Cerebral atrophy, mild (HCC)  Chronic condition.  Patient asymptomatic.  Continue to monitor.    8. Gastroesophageal reflux disease without esophagitis  Chronic stable.  Continue pantoprazole 40 Mg daily.    9. Migraine without status migrainosus, not intractable, unspecified migraine type  Chronic condition.  Continue Elavil 50 mg daily and to take rizatriptan as needed.  Patient currently asymptomatic.  Continue to monitor.          Attestation: I spent:   47  min -  That includes time for chart review before the visit, the actual patient visit, and time spent on documentation in EMR after the visit.  Chart review/prep, review of other providers' records, imaging/lab review, face-to-face time for history/examination, pt's counseling/education, ordering, prescribing,  review of results/meds/ treatment plan with patient, and care coordination.           Healthcare Maintenance       Health Maintenance Due   Topic Date Due    COVID-19 Vaccine (4 - 2023-24 season) 09/01/2023               Please note that this dictation was created using voice recognition software. I have made every reasonable attempt to correct obvious errors, but I expect that there are errors of grammar and possibly content that I did not discover before finalizing the note.    Rudy Parsons MD  Internal Medicine  [facility]

## 2024-02-01 NOTE — ASSESSMENT & PLAN NOTE
Chronic condition.  Followed by neurology service.  Patient presently taking amitriptyline 50 Mg daily and rizatriptan as needed.  Currently she is asymptomatic.

## 2024-02-01 NOTE — ASSESSMENT & PLAN NOTE
Chronic condition.  Patient presently taking diltiazem 240 Mg daily.  Blood pressure has been well-controlled.    GFR 50s.  The patient to avoid NSAID.

## 2024-02-01 NOTE — ASSESSMENT & PLAN NOTE
Chronic condition.  The patient is being treated with duloxetine 60 Mg daily.  The patient tolerated medication well.  Patient denies SI.

## 2024-02-01 NOTE — ASSESSMENT & PLAN NOTE
This is a chronic condition noted with previous imaging August 13, 2020.  Patient asymptomatic.  Continue to monitor.

## 2024-02-06 LAB

## 2024-02-21 ENCOUNTER — HOSPITAL ENCOUNTER (OUTPATIENT)
Dept: LAB | Facility: MEDICAL CENTER | Age: 74
End: 2024-02-21
Attending: INTERNAL MEDICINE
Payer: MEDICARE

## 2024-02-21 DIAGNOSIS — N18.31 HYPERTENSIVE KIDNEY DISEASE WITH STAGE 3A CHRONIC KIDNEY DISEASE: ICD-10-CM

## 2024-02-21 DIAGNOSIS — E55.9 VITAMIN D DEFICIENCY: ICD-10-CM

## 2024-02-21 DIAGNOSIS — E78.5 DYSLIPIDEMIA: Chronic | ICD-10-CM

## 2024-02-21 DIAGNOSIS — I12.9 HYPERTENSIVE KIDNEY DISEASE WITH STAGE 3A CHRONIC KIDNEY DISEASE: ICD-10-CM

## 2024-02-21 DIAGNOSIS — R73.03 PREDIABETES: Chronic | ICD-10-CM

## 2024-02-21 LAB
25(OH)D3 SERPL-MCNC: 30 NG/ML (ref 30–100)
ANION GAP SERPL CALC-SCNC: 11 MMOL/L (ref 7–16)
BUN SERPL-MCNC: 14 MG/DL (ref 8–22)
CALCIUM SERPL-MCNC: 9.3 MG/DL (ref 8.5–10.5)
CHLORIDE SERPL-SCNC: 101 MMOL/L (ref 96–112)
CHOLEST SERPL-MCNC: 133 MG/DL (ref 100–199)
CO2 SERPL-SCNC: 24 MMOL/L (ref 20–33)
CREAT SERPL-MCNC: 0.85 MG/DL (ref 0.5–1.4)
CREAT UR-MCNC: 125.79 MG/DL
EST. AVERAGE GLUCOSE BLD GHB EST-MCNC: 126 MG/DL
FASTING STATUS PATIENT QL REPORTED: NORMAL
GFR SERPLBLD CREATININE-BSD FMLA CKD-EPI: 72 ML/MIN/1.73 M 2
GLUCOSE SERPL-MCNC: 94 MG/DL (ref 65–99)
HBA1C MFR BLD: 6 % (ref 4–5.6)
HDLC SERPL-MCNC: 54 MG/DL
LDLC SERPL CALC-MCNC: 64 MG/DL
MICROALBUMIN UR-MCNC: <1.2 MG/DL
MICROALBUMIN/CREAT UR: NORMAL MG/G (ref 0–30)
POTASSIUM SERPL-SCNC: 4.2 MMOL/L (ref 3.6–5.5)
SODIUM SERPL-SCNC: 136 MMOL/L (ref 135–145)
TRIGL SERPL-MCNC: 73 MG/DL (ref 0–149)
TSH SERPL DL<=0.005 MIU/L-ACNC: 1.85 UIU/ML (ref 0.38–5.33)

## 2024-02-21 PROCEDURE — 83036 HEMOGLOBIN GLYCOSYLATED A1C: CPT

## 2024-02-21 PROCEDURE — 80048 BASIC METABOLIC PNL TOTAL CA: CPT

## 2024-02-21 PROCEDURE — 36415 COLL VENOUS BLD VENIPUNCTURE: CPT

## 2024-02-21 PROCEDURE — 82043 UR ALBUMIN QUANTITATIVE: CPT

## 2024-02-21 PROCEDURE — 84443 ASSAY THYROID STIM HORMONE: CPT

## 2024-02-21 PROCEDURE — 82570 ASSAY OF URINE CREATININE: CPT

## 2024-02-21 PROCEDURE — 80061 LIPID PANEL: CPT

## 2024-02-21 PROCEDURE — 82306 VITAMIN D 25 HYDROXY: CPT

## 2024-02-26 RX ORDER — DILTIAZEM HYDROCHLORIDE 240 MG/1
240 CAPSULE, COATED, EXTENDED RELEASE ORAL DAILY
Qty: 90 CAPSULE | Refills: 3 | Status: SHIPPED | OUTPATIENT
Start: 2024-02-26

## 2024-02-26 NOTE — TELEPHONE ENCOUNTER
Received request via: Pharmacy    Was the patient seen in the last year in this department? Yes    Does the patient have an active prescription (recently filled or refills available) for medication(s) requested? No    Pharmacy Name: [facility]    Does the patient have longterm Plus and need 100 day supply (blood pressure, diabetes and cholesterol meds only)? Medication is not for cholesterol, blood pressure or diabetes

## 2024-03-04 DIAGNOSIS — I10 HYPERTENSION, UNSPECIFIED TYPE: ICD-10-CM

## 2024-03-05 RX ORDER — TELMISARTAN 40 MG/1
40 TABLET ORAL DAILY
Qty: 100 TABLET | Refills: 0 | Status: SHIPPED | OUTPATIENT
Start: 2024-03-05

## 2024-03-05 RX ORDER — ATORVASTATIN CALCIUM 10 MG/1
10 TABLET, FILM COATED ORAL DAILY
Qty: 100 TABLET | Refills: 0 | Status: SHIPPED | OUTPATIENT
Start: 2024-03-05

## 2024-03-05 NOTE — TELEPHONE ENCOUNTER
Received request via: Pharmacy    Was the patient seen in the last year in this department? Yes    Does the patient have an active prescription (recently filled or refills available) for medication(s) requested? No        Does the patient have long term Plus and need 100 day supply (blood pressure, diabetes and cholesterol meds only)? Yes, quantity updated to 100 days

## 2024-03-05 NOTE — TELEPHONE ENCOUNTER
Received request via: Pharmacy    Was the patient seen in the last year in this department? Yes    Does the patient have an active prescription (recently filled or refills available) for medication(s) requested? No        Does the patient have shelter Plus and need 100 day supply (blood pressure, diabetes and cholesterol meds only)? Yes, quantity updated to 100 days

## 2024-03-12 ENCOUNTER — OFFICE VISIT (OUTPATIENT)
Dept: MEDICAL GROUP | Facility: PHYSICIAN GROUP | Age: 74
End: 2024-03-12
Payer: MEDICARE

## 2024-03-12 VITALS
DIASTOLIC BLOOD PRESSURE: 74 MMHG | SYSTOLIC BLOOD PRESSURE: 110 MMHG | BODY MASS INDEX: 31.45 KG/M2 | OXYGEN SATURATION: 97 % | HEIGHT: 61 IN | TEMPERATURE: 97.9 F | WEIGHT: 166.6 LBS | HEART RATE: 89 BPM

## 2024-03-12 DIAGNOSIS — K21.9 GASTROESOPHAGEAL REFLUX DISEASE WITHOUT ESOPHAGITIS: Chronic | ICD-10-CM

## 2024-03-12 DIAGNOSIS — F32.5 MAJOR DEPRESSION IN REMISSION (HCC): ICD-10-CM

## 2024-03-12 DIAGNOSIS — J44.89 ASTHMA-COPD OVERLAP SYNDROME (HCC): Chronic | ICD-10-CM

## 2024-03-12 DIAGNOSIS — I12.9 HYPERTENSIVE KIDNEY DISEASE WITH STAGE 3A CHRONIC KIDNEY DISEASE: ICD-10-CM

## 2024-03-12 DIAGNOSIS — E78.5 DYSLIPIDEMIA: Chronic | ICD-10-CM

## 2024-03-12 DIAGNOSIS — N18.31 HYPERTENSIVE KIDNEY DISEASE WITH STAGE 3A CHRONIC KIDNEY DISEASE: ICD-10-CM

## 2024-03-12 DIAGNOSIS — R73.03 PREDIABETES: Chronic | ICD-10-CM

## 2024-03-12 DIAGNOSIS — E66.01 SEVERE OBESITY (HCC): Chronic | ICD-10-CM

## 2024-03-12 DIAGNOSIS — I48.0 PAROXYSMAL ATRIAL FIBRILLATION (HCC): ICD-10-CM

## 2024-03-12 PROCEDURE — 3078F DIAST BP <80 MM HG: CPT | Performed by: INTERNAL MEDICINE

## 2024-03-12 PROCEDURE — 99214 OFFICE O/P EST MOD 30 MIN: CPT | Performed by: INTERNAL MEDICINE

## 2024-03-12 PROCEDURE — 3074F SYST BP LT 130 MM HG: CPT | Performed by: INTERNAL MEDICINE

## 2024-03-12 RX ORDER — HYDROCODONE BITARTRATE 10 MG/1
CAPSULE, EXTENDED RELEASE ORAL
COMMUNITY
End: 2024-03-26

## 2024-03-12 ASSESSMENT — FIBROSIS 4 INDEX: FIB4 SCORE: 0.88

## 2024-03-12 NOTE — PROGRESS NOTES
PRIMARY CARE CLINIC VISIT        Chief Complaint   Patient presents with    Follow-Up     Lab results   Asthma COPD syndrome  Obesity  Hypertension  Depression  Atrial fibrillation  Hyperlipidemia  Acid reflux  Prediabetes          History of Present Illness     Asthma-COPD overlap syndrome  Chronic condition.  Patient has been followed by pulmonology service.  Patient is currently using Trelegy 1 puff daily.  She uses albuterol as needed.  Currently the patient denies shortness of breath or wheezing    Severe obesity (HCC)  Chronic condition.  Discussed with the patient regarding diet, exercise, and lifestyle modification to help achieve and maintain healthy weight         Hypertensive kidney disease with stage 3a chronic kidney disease (HCC)  Chronic condition.  Patient currently taking Micardis 40 Mg daily.  Patient tolerated medication well.    Major depression in remission (HCC)  This is a chronic condition.  The patient presently taking Cymbalta 60 Mg daily.  Patient tolerating medication well.  Patient denies SI.    Paroxysmal atrial fibrillation (HCC)  This is a chronic condition.  Patient followed by cardiology service.  CHADS2 score 1 patient presently taking aspirin 1 Mg daily and diltiazem 240 Mg daily.  She denies chest pain shortness of breath palpitation.    Dyslipidemia  Chronic condition.  The patient is taking atorvastatin 10 mg daily.  Patient tolerating medication well.    GERD (gastroesophageal reflux disease)  This is a chronic condition.  Symptoms associated with hiatal hernia.  The patient currently taking Protonix 40 Mg daily.  Patient denies nausea vomiting dysphagia or unexplained weight loss.    Prediabetes  This is a chronic condition.  Recent lab test showed A1c mildly elevated.  Recommend diet and exercise.    Current Outpatient Medications on File Prior to Visit   Medication Sig Dispense Refill    HYDROcodone Bitartrate ER 10 MG CAPSULE SR 12 HR Take  by mouth.      atorvastatin  (LIPITOR) 10 MG Tab TAKE 1 TABLET BY MOUTH EVERY  Tablet 0    telmisartan (MICARDIS) 40 MG Tab TAKE 1 TABLET BY MOUTH EVERY  Tablet 0    dilTIAZem CD (CARDIZEM CD) 240 MG CAPSULE SR 24 HR TAKE 1 CAPSULE BY MOUTH EVERY DAY 90 Capsule 3    fluticasone (FLONASE) 50 MCG/ACT nasal spray SPRAY 1 SPRAY INTO EACH NOSTRIL ONCE A DAY      fluticasone-umeclidinium-vilanterol (TRELEGY ELLIPTA) 100-62.5-25 mcg/act inhaler Inhale 1 Puff every day. 2 Each 0    albuterol 108 (90 Base) MCG/ACT Aero Soln inhalation aerosol Inhale 2 Puffs every 6 hours as needed for Shortness of Breath. 8.5 Each 5    fluticasone (FLOVENT HFA) 110 MCG/ACT Aerosol Inhale 1 Puff 2 times a day. 2 Each 6    amitriptyline (ELAVIL) 50 MG Tab TAKE 1 TABLET BY MOUTH EVERY DAY IN THE EVENING 90 Tablet 3    pantoprazole (PROTONIX) 40 MG Tablet Delayed Response Take 1 Tablet by mouth 2 times a day. 180 Tablet 3    DULoxetine (CYMBALTA) 60 MG Cap DR Particles delayed-release capsule Take 1 Capsule by mouth every day. 90 Capsule 3    rizatriptan (MAXALT) 5 MG tablet Take 1 Tablet by mouth one time as needed for Migraine. 10 Tablet 5    zonisamide (ZONEGRAN) 100 MG Cap Take 100 mg by mouth every day.      lidocaine (LIDODERM) 5 % Patch Place 1 Patch on the skin every 24 hours. 10 Patch 0    acetaminophen (TYLENOL) 500 MG Tab Take 1-2 Tablets by mouth every 6 hours as needed for Mild Pain or Moderate Pain. 30 Tablet 0    Azelastine (ASTELIN) 137 MCG/SPRAY Solution SPRAYS 2 SPRAYS IN EACH NOSTRIL NASALLY TWICE A DAY 90 DAY(S)      promethazine-dextromethorphan (PROMETHAZINE-DM) 6.25-15 MG/5ML syrup Take 1.3 mL by mouth every four hours as needed for Cough. 120 mL 0    clobetasol (TEMOVATE) 0.05 % Ointment APPLY A THIN LAYER ONCE DAILY FOR 3 WEEKS, THEN USE 2 TO 3 TIMES A WEEK 60 g 1    aspirin (ASA) 81 MG Chew Tab chewable tablet Chew 81 mg every day.      benzonatate (TESSALON) 100 MG Cap Take 1 Capsule by mouth 3 times a day as needed for Cough. 60  Capsule 0    ondansetron (ZOFRAN ODT) 4 MG TABLET DISPERSIBLE Take 1 Tablet by mouth every 6 hours as needed for Nausea/Vomiting. 10 Tablet 0    SALINE NA Administer 1 Spray into affected nostril(S) every day.       No current facility-administered medications on file prior to visit.        Allergies: Sumatriptan, Clarithromycin, Ees [erythromycin], Levaquin, Pcn [penicillins], Shellfish allergy, Trazodone, Clarithromycin, Erythromycin, and Penicillin g    Current Outpatient Medications Ordered in Epic   Medication Sig Dispense Refill    HYDROcodone Bitartrate ER 10 MG CAPSULE SR 12 HR Take  by mouth.      atorvastatin (LIPITOR) 10 MG Tab TAKE 1 TABLET BY MOUTH EVERY  Tablet 0    telmisartan (MICARDIS) 40 MG Tab TAKE 1 TABLET BY MOUTH EVERY  Tablet 0    dilTIAZem CD (CARDIZEM CD) 240 MG CAPSULE SR 24 HR TAKE 1 CAPSULE BY MOUTH EVERY DAY 90 Capsule 3    fluticasone (FLONASE) 50 MCG/ACT nasal spray SPRAY 1 SPRAY INTO EACH NOSTRIL ONCE A DAY      fluticasone-umeclidinium-vilanterol (TRELEGY ELLIPTA) 100-62.5-25 mcg/act inhaler Inhale 1 Puff every day. 2 Each 0    albuterol 108 (90 Base) MCG/ACT Aero Soln inhalation aerosol Inhale 2 Puffs every 6 hours as needed for Shortness of Breath. 8.5 Each 5    fluticasone (FLOVENT HFA) 110 MCG/ACT Aerosol Inhale 1 Puff 2 times a day. 2 Each 6    amitriptyline (ELAVIL) 50 MG Tab TAKE 1 TABLET BY MOUTH EVERY DAY IN THE EVENING 90 Tablet 3    pantoprazole (PROTONIX) 40 MG Tablet Delayed Response Take 1 Tablet by mouth 2 times a day. 180 Tablet 3    DULoxetine (CYMBALTA) 60 MG Cap DR Particles delayed-release capsule Take 1 Capsule by mouth every day. 90 Capsule 3    rizatriptan (MAXALT) 5 MG tablet Take 1 Tablet by mouth one time as needed for Migraine. 10 Tablet 5    zonisamide (ZONEGRAN) 100 MG Cap Take 100 mg by mouth every day.      lidocaine (LIDODERM) 5 % Patch Place 1 Patch on the skin every 24 hours. 10 Patch 0    acetaminophen (TYLENOL) 500 MG Tab Take 1-2  Tablets by mouth every 6 hours as needed for Mild Pain or Moderate Pain. 30 Tablet 0    Azelastine (ASTELIN) 137 MCG/SPRAY Solution SPRAYS 2 SPRAYS IN EACH NOSTRIL NASALLY TWICE A DAY 90 DAY(S)      promethazine-dextromethorphan (PROMETHAZINE-DM) 6.25-15 MG/5ML syrup Take 1.3 mL by mouth every four hours as needed for Cough. 120 mL 0    clobetasol (TEMOVATE) 0.05 % Ointment APPLY A THIN LAYER ONCE DAILY FOR 3 WEEKS, THEN USE 2 TO 3 TIMES A WEEK 60 g 1    aspirin (ASA) 81 MG Chew Tab chewable tablet Chew 81 mg every day.      benzonatate (TESSALON) 100 MG Cap Take 1 Capsule by mouth 3 times a day as needed for Cough. 60 Capsule 0    ondansetron (ZOFRAN ODT) 4 MG TABLET DISPERSIBLE Take 1 Tablet by mouth every 6 hours as needed for Nausea/Vomiting. 10 Tablet 0    SALINE NA Administer 1 Spray into affected nostril(S) every day.       No current [facility]-ordered facility-administered medications on file.       Past Medical History:   Diagnosis Date    Apnea, sleep     Arrhythmia     afib    Arthritis     osteo    Asthma     Atrial fibrillation (HCC)     Back pain     Bronchitis     Chickenpox     Constipation     Cough     Daytime sleepiness     Dental disorder     upper dentures    Earache     Frequent urination     Gasping for breath     GERD (gastroesophageal reflux disease) 2/27/2010    Tamazight measles     Heartburn     Hypertension     Impaired fasting glucose 2/27/2010    Incontinence of urine     Indigestion     Influenza     Mumps     Nasal drainage     Nausea     Osteopenia 2/24/2010    Osteoporosis     Other specified disorder of intestines     constipation r/t meds    Restless leg syndrome     Scarlet fever     Shortness of breath     Sleep apnea     Snoring     Tremor, essential 2/24/2010    Urinary bladder disorder     Wears glasses     Wheezing     Whooping cough        Past Surgical History:   Procedure Laterality Date    INCISION AND DRAINAGE GENERAL N/A 10/18/2022    Procedure: INCISION AND DRAINAGE ABSCESS  TONGUE;  Surgeon: Paz Suazo M.D.;  Location: SURGERY [facility];  Service: Ent    LUMBAR LAMINECTOMY DISKECTOMY Bilateral 2/4/2017    Procedure: LUMBAR LAMINECTOMY DISKECTOMY POSTERIOR L4-S1 ;  Surgeon: Emil Hitchcock M.D.;  Location: SURGERY [facility];  Service:     CARPAL TUNNEL ENDOSCOPIC  11/17/2012    Performed by Heriberto Quiñones M.D. at SURGERY [facility]    TRIGGER FINGER RELEASE  11/17/2012    Performed by Heriberto Quiñones M.D. at SURGERY [facility]    HIP ARTHROSCOPY  2/23/2009    Performed by SHERLYN CALVO at SURGERY [facility]    ACETABULAR OSTEOTOMY  2/23/2009    Performed by SHERLYN CALVO at SURGERY [facility] ORS    MASS EXCISION ORTHO  2/23/2009    Performed by SHERLYN CALVO at SURGERY [facility]    HIP ARTHROSCOPY  2005    done at [facility]    HAJA BY LAPAROSCOPY  1997    HYSTERECTOMY, TOTAL ABDOMINAL  1979    oopherectomy lacie    BLADDER SUSPENSION      bladder sling    CARPAL TUNNEL RELEASE      HIP REPLACEMENT, TOTAL      HYSTERECTOMY LAPAROSCOPY      LAMINOTOMY      PRIMARY C SECTION  1970/1975       Family History   Problem Relation Age of Onset    GI Disease Father         Crohn's    Heart Disease Father         First MI age 30    Hypertension Father     Stroke Father     Hypertension Other     Cancer Brother         ESOPHAGEAL CANCER       Social History     Tobacco Use   Smoking Status Never   Smokeless Tobacco Never       Social History     Substance and Sexual Activity   Alcohol Use Not Currently    Comment: Stopped drinking 45 days ago 7/12/17 Going to AA       Review of systems  As per HPI above. All other systems reviewed and negative.      Past Medical, Social, and Family history reviewed and updated in [facility]       LAB DATA:    Lab Results   Component Value Date/Time    HBA1C 6.0 (H) 02/21/2024 08:14 AM    HBA1C 5.8 (H) 04/03/2023 01:01 PM    HBA1C 6.0 (H) 11/07/2022 09:15 AM       Lab Results   Component Value Date/Time    WBC 12.4  "(H) 08/10/2023 01:34 PM    HEMOGLOBIN 13.3 08/10/2023 01:34 PM    HEMATOCRIT 42.9 08/10/2023 01:34 PM    MCV 78.3 (L) 08/10/2023 01:34 PM    PLATELETCT 375 08/10/2023 01:34 PM       Lab Results   Component Value Date/Time    SODIUM 136 02/21/2024 08:14 AM    POTASSIUM 4.2 02/21/2024 08:14 AM    GLUCOSE 94 02/21/2024 08:14 AM    BUN 14 02/21/2024 08:14 AM    CREATININE 0.85 02/21/2024 08:14 AM    CREATININE 0.89 04/27/2009 12:00 AM       Lab Results   Component Value Date/Time    CHOLSTRLTOT 133 02/21/2024 08:14 AM    TRIGLYCERIDE 73 02/21/2024 08:14 AM    HDL 54 02/21/2024 08:14 AM    LDL 64 02/21/2024 08:14 AM       Lab Results   Component Value Date/Time    ALTSGPT 8 08/10/2023 01:34 PM          Objective     /74 (BP Location: Left arm, Patient Position: Sitting, BP Cuff Size: Adult)   Pulse 89   Temp 36.6 °C (97.9 °F) (Temporal)   Ht 1.549 m (5' 1\")   Wt 75.6 kg (166 lb 9.6 oz)   SpO2 97%    Body mass index is 31.48 kg/m².    General: alert in no apparent distress.  Cardiovascular: regular rate and rhythm  Pulmonary: lungs : no wheezing   Gastrointestinal: BS present.       Assessment and Plan     1. Asthma-COPD overlap syndrome  Chronic stable condition.  Patient to continue with Trelegy 1 puff daily.  Patient may use albuterol as needed.  Continue follow-up with pulmonology service    2. Severe obesity (HCC)  Chronic condition.  Uncontrolled.  Recommend diet and lifestyle modification.  Encouraged patient to lose weight.    3. Hypertensive kidney disease with stage 3a chronic kidney disease (HCC)  Chronic stable.  Continue diltiazem  mg daily.  Continue Micardis 40 Mg daily    4. Major depression in remission (HCC)  Chronic stable.  Continue duloxetine 60 Mg daily.    5. Paroxysmal atrial fibrillation (HCC)  Chronic stable condition.  Continue aspirin 81 Mg daily and diltiazem  mg daily.  Continue follow-up with cardiology service as directed    6. Dyslipidemia  Chronic condition.  Stable.  " Lipid panel result discussed with the patient.  Continue atorvastatin 10 mg daily.    7. Gastroesophageal reflux disease without esophagitis    Chronic stable condition.  Continue pantoprazole 40 Mg daily      8. Prediabetes  - HEMOGLOBIN A1C; Future  - Basic Metabolic Panel; Future  Chronic condition.  Stable.  Patient not interested in taking additional medication.  Recommend diet and exercise and the patient will try to lose weight.          Attestation: I spent:   34  min -  That includes time for chart review before the visit, the actual patient visit, and time spent on documentation in EMR after the visit.  Chart review/prep, review of other providers' records, imaging/lab review, face-to-face time for history/examination, pt's counseling/education, ordering, prescribing,  review of results/meds/ treatment plan with patient, and care coordination.           Healthcare Maintenance       Health Maintenance Due   Topic Date Due    COVID-19 Vaccine (4 - 2023-24 season) 09/01/2023               Please note that this dictation was created using voice recognition software. I have made every reasonable attempt to correct obvious errors, but I expect that there are errors of grammar and possibly content that I did not discover before finalizing the note.    Rudy Parsons MD  Internal Medicine  [facility] care [facility]

## 2024-03-12 NOTE — ASSESSMENT & PLAN NOTE
This is a chronic condition.  Symptoms associated with hiatal hernia.  The patient currently taking Protonix 40 Mg daily.  Patient denies nausea vomiting dysphagia or unexplained weight loss.

## 2024-03-12 NOTE — ASSESSMENT & PLAN NOTE
Chronic condition.  Patient currently taking Micardis 40 Mg daily.  Patient tolerated medication well.

## 2024-03-12 NOTE — ASSESSMENT & PLAN NOTE
This is a chronic condition.  The patient presently taking Cymbalta 60 Mg daily.  Patient tolerating medication well.  Patient denies SI.

## 2024-03-12 NOTE — ASSESSMENT & PLAN NOTE
Chronic condition.  The patient is taking atorvastatin 10 mg daily.  Patient tolerating medication well.

## 2024-03-12 NOTE — ASSESSMENT & PLAN NOTE
This is a chronic condition.  Patient followed by cardiology service.  CHADS2 score 1 patient presently taking aspirin 1 Mg daily and diltiazem 240 Mg daily.  She denies chest pain shortness of breath palpitation.

## 2024-03-12 NOTE — ASSESSMENT & PLAN NOTE
This is a chronic condition.  Recent lab test showed A1c mildly elevated.  Recommend diet and exercise.

## 2024-03-12 NOTE — ASSESSMENT & PLAN NOTE
Chronic condition.  Patient has been followed by pulmonology service.  Patient is currently using Trelegy 1 puff daily.  She uses albuterol as needed.  Currently the patient denies shortness of breath or wheezing

## 2024-03-20 ENCOUNTER — OFFICE VISIT (OUTPATIENT)
Dept: CARDIOLOGY | Facility: MEDICAL CENTER | Age: 74
End: 2024-03-20
Payer: MEDICARE

## 2024-03-20 VITALS
BODY MASS INDEX: 31.38 KG/M2 | OXYGEN SATURATION: 96 % | WEIGHT: 166.2 LBS | HEART RATE: 94 BPM | SYSTOLIC BLOOD PRESSURE: 106 MMHG | RESPIRATION RATE: 16 BRPM | HEIGHT: 61 IN | DIASTOLIC BLOOD PRESSURE: 62 MMHG

## 2024-03-20 DIAGNOSIS — E78.5 DYSLIPIDEMIA: Chronic | ICD-10-CM

## 2024-03-20 DIAGNOSIS — I47.10 PAROXYSMAL SVT (SUPRAVENTRICULAR TACHYCARDIA) (HCC): ICD-10-CM

## 2024-03-20 DIAGNOSIS — G47.33 OSA (OBSTRUCTIVE SLEEP APNEA): Chronic | ICD-10-CM

## 2024-03-20 PROCEDURE — 99213 OFFICE O/P EST LOW 20 MIN: CPT

## 2024-03-20 PROCEDURE — 99214 OFFICE O/P EST MOD 30 MIN: CPT

## 2024-03-20 PROCEDURE — 3074F SYST BP LT 130 MM HG: CPT

## 2024-03-20 PROCEDURE — 3078F DIAST BP <80 MM HG: CPT

## 2024-03-20 ASSESSMENT — ENCOUNTER SYMPTOMS
SHORTNESS OF BREATH: 0
EYES NEGATIVE: 1
DEPRESSION: 0
PALPITATIONS: 1
NERVOUS/ANXIOUS: 0
NEUROLOGICAL NEGATIVE: 1
CONSTITUTIONAL NEGATIVE: 1
ORTHOPNEA: 0
GASTROINTESTINAL NEGATIVE: 1
MUSCULOSKELETAL NEGATIVE: 1
PND: 0

## 2024-03-20 ASSESSMENT — FIBROSIS 4 INDEX: FIB4 SCORE: 0.88

## 2024-03-20 NOTE — PROGRESS NOTES
Chief Complaint   Patient presents with    Hyperlipidemia       Subjective     Augusta Rodriguez is a 73 y.o. female who presents today for follow up.  follow up. They have a history of SVT, questionable history of atrial fibrillation, CKD stage IIIa, dyslipidemia, BRIANA.     They are accompanied today by her  Don     Seen by Dr. Ruff on 12/15/2022, at that visit patient was discontinued on her anticoagulation due to no evidence of A-fib recurrence and history of only one episode.  She was continued on her diltiazem and was requested to complete a lipid panel.      Seen by myself on 11/02/2023 she is doing well from cardiac standpoint. No symptoms of chest pain, palpitations, shortness of breath, exercise intolerance, or lower extremity edema.    Today 3/20/2024 she is doing overall well, no chest pain no shortness of breath.  She does occasionally notice palpitations at night when she is sleeping    Past Medical History:   Diagnosis Date    Apnea, sleep     Arrhythmia     afib    Arthritis     osteo    Asthma     Atrial fibrillation (HCC)     Back pain     Bronchitis     Chickenpox     Constipation     Cough     Daytime sleepiness     Dental disorder     upper dentures    Earache     Frequent urination     Gasping for breath     GERD (gastroesophageal reflux disease) 2/27/2010    [place] measles     Heartburn     Hypertension     Impaired fasting glucose 2/27/2010    Incontinence of urine     Indigestion     Influenza     Mumps     Nasal drainage     Nausea     Osteopenia 2/24/2010    Osteoporosis     Other specified disorder of intestines     constipation r/t meds    Restless leg syndrome     Scarlet fever     Shortness of breath     Sleep apnea     Snoring     Tremor, essential 2/24/2010    Urinary bladder disorder     Wears glasses     Wheezing     Whooping cough      Past Surgical History:   Procedure Laterality Date    INCISION AND DRAINAGE GENERAL N/A 10/18/2022    Procedure: INCISION AND DRAINAGE ABSCESS  TONGUE;  Surgeon: Paz Suazo M.D.;  Location: SURGERY [facility];  Service: Ent    LUMBAR LAMINECTOMY DISKECTOMY Bilateral 2/4/2017    Procedure: LUMBAR LAMINECTOMY DISKECTOMY POSTERIOR L4-S1 ;  Surgeon: Emil Hitchcock M.D.;  Location: SURGERY [facility];  Service:     CARPAL TUNNEL ENDOSCOPIC  11/17/2012    Performed by Heriberto Quiñones M.D. at SURGERY [facility]    TRIGGER FINGER RELEASE  11/17/2012    Performed by Heriberto Quiñones M.D. at SURGERY [facility]    HIP ARTHROSCOPY  2/23/2009    Performed by SHERLYN CALVO at SURGERY [facility]    ACETABULAR OSTEOTOMY  2/23/2009    Performed by SHERLYN CALVO at SURGERY [facility]    MASS EXCISION ORTHO  2/23/2009    Performed by SHERLYN CALVO at SURGERY [facility]    HIP ARTHROSCOPY  2005    done at [facility]    HAJA BY LAPAROSCOPY  1997    HYSTERECTOMY, TOTAL ABDOMINAL  1979    oopherectomy lacie    BLADDER SUSPENSION      bladder sling    CARPAL TUNNEL RELEASE      HIP REPLACEMENT, TOTAL      HYSTERECTOMY LAPAROSCOPY      LAMINOTOMY      PRIMARY C SECTION  1970/1975     Family History   Problem Relation Age of Onset    GI Disease Father         Crohn's    Heart Disease Father         First MI age 30    Hypertension Father     Stroke Father     Hypertension Other     Cancer Brother         ESOPHAGEAL CANCER     Social History     Socioeconomic History    Marital status:      Spouse name: Not on file    Number of children: Not on file    Years of education: Not on file    Highest education level: Not on file   Occupational History    Not on file   Tobacco Use    Smoking status: Never    Smokeless tobacco: Never   Vaping Use    Vaping Use: Never used   Substance and Sexual Activity    Alcohol use: Not Currently     Comment: Stopped drinking 45 days ago 7/12/17 Going to     Drug use: No    Sexual activity: Not Currently   Other Topics Concern    Not on file   Social History Narrative    Not on file     Social  Determinants of Health     Financial Resource Strain: High Risk (7/5/2022)    Overall Financial Resource Strain (CARDIA)     Difficulty of Paying Living Expenses: Hard   Food Insecurity: Food Insecurity Present (7/5/2022)    Hunger Vital Sign     Worried About Running Out of Food in the Last Year: Sometimes true     Ran Out of Food in the Last Year: Sometimes true   Transportation Needs: No Transportation Needs (7/5/2022)    PRAPARE - Transportation     Lack of Transportation (Medical): No     Lack of Transportation (Non-Medical): No   Physical Activity: Not on file   Stress: Not on file   Social Connections: Not on file   Intimate Partner Violence: Not on file   Housing Stability: Low Risk  (7/5/2022)    Housing Stability Vital Sign     Unable to Pay for Housing in the Last Year: No     Number of Places Lived in the Last Year: 1     Unstable Housing in the Last Year: No     Allergies   Allergen Reactions    Sumatriptan Swelling     Tongue swell    Clarithromycin Itching, Nausea and Swelling     Swelling/Itching/Nausea    Ees [Erythromycin] Itching, Nausea and Swelling     Swelling/Itching/Nausea    Levaquin Myalgia and Unspecified     Muscle soreness     Pcn [Penicillins] Itching, Nausea and Swelling     Swelling/Itching/Nausea     Shellfish Allergy Itching, Nausea and Swelling     Swelling/Itching/Nausea    Trazodone Swelling and Unspecified    Clarithromycin Unspecified    Erythromycin Unspecified    Penicillin G Unspecified     Outpatient Encounter Medications as of 3/20/2024   Medication Sig Dispense Refill    HYDROcodone Bitartrate ER 10 MG CAPSULE SR 12 HR Take  by mouth.      atorvastatin (LIPITOR) 10 MG Tab TAKE 1 TABLET BY MOUTH EVERY  Tablet 0    telmisartan (MICARDIS) 40 MG Tab TAKE 1 TABLET BY MOUTH EVERY  Tablet 0    dilTIAZem CD (CARDIZEM CD) 240 MG CAPSULE SR 24 HR TAKE 1 CAPSULE BY MOUTH EVERY DAY 90 Capsule 3    Azelastine (ASTELIN) 137 MCG/SPRAY Solution SPRAYS 2 SPRAYS IN EACH  NOSTRIL NASALLY TWICE A DAY 90 DAY(S)      fluticasone (FLONASE) 50 MCG/ACT nasal spray SPRAY 1 SPRAY INTO EACH NOSTRIL ONCE A DAY      fluticasone-umeclidinium-vilanterol (TRELEGY ELLIPTA) 100-62.5-25 mcg/act inhaler Inhale 1 Puff every day. 2 Each 0    albuterol 108 (90 Base) MCG/ACT Aero Soln inhalation aerosol Inhale 2 Puffs every 6 hours as needed for Shortness of Breath. 8.5 Each 5    fluticasone (FLOVENT HFA) 110 MCG/ACT Aerosol Inhale 1 Puff 2 times a day. 2 Each 6    clobetasol (TEMOVATE) 0.05 % Ointment APPLY A THIN LAYER ONCE DAILY FOR 3 WEEKS, THEN USE 2 TO 3 TIMES A WEEK 60 g 1    aspirin (ASA) 81 MG Chew Tab chewable tablet Chew 81 mg every day.      amitriptyline (ELAVIL) 50 MG Tab TAKE 1 TABLET BY MOUTH EVERY DAY IN THE EVENING 90 Tablet 3    benzonatate (TESSALON) 100 MG Cap Take 1 Capsule by mouth 3 times a day as needed for Cough. 60 Capsule 0    pantoprazole (PROTONIX) 40 MG Tablet Delayed Response Take 1 Tablet by mouth 2 times a day. 180 Tablet 3    ondansetron (ZOFRAN ODT) 4 MG TABLET DISPERSIBLE Take 1 Tablet by mouth every 6 hours as needed for Nausea/Vomiting. 10 Tablet 0    DULoxetine (CYMBALTA) 60 MG Cap DR Particles delayed-release capsule Take 1 Capsule by mouth every day. 90 Capsule 3    rizatriptan (MAXALT) 5 MG tablet Take 1 Tablet by mouth one time as needed for Migraine. 10 Tablet 5    zonisamide (ZONEGRAN) 100 MG Cap Take 100 mg by mouth every day.      SALINE NA Administer 1 Spray into affected nostril(S) every day.      lidocaine (LIDODERM) 5 % Patch Place 1 Patch on the skin every 24 hours. (Patient not taking: Reported on 3/20/2024) 10 Patch 0    acetaminophen (TYLENOL) 500 MG Tab Take 1-2 Tablets by mouth every 6 hours as needed for Mild Pain or Moderate Pain. (Patient not taking: Reported on 3/20/2024) 30 Tablet 0    promethazine-dextromethorphan (PROMETHAZINE-DM) 6.25-15 MG/5ML syrup Take 1.3 mL by mouth every four hours as needed for Cough. (Patient not taking: Reported on  "3/20/2024) 120 mL 0     No facility-administered encounter medications on file as of 3/20/2024.     Review of Systems   Constitutional: Negative.    HENT: Negative.     Eyes: Negative.    Respiratory:  Negative for shortness of breath.    Cardiovascular:  Positive for palpitations. Negative for chest pain, orthopnea, leg swelling and PND.   Gastrointestinal: Negative.    Genitourinary: Negative.    Musculoskeletal: Negative.    Skin: Negative.    Neurological: Negative.    Endo/Heme/Allergies: Negative.    Psychiatric/Behavioral:  Negative for depression. The patient is not nervous/anxious.               Objective     /62 (BP Location: Left arm, Patient Position: Standing, BP Cuff Size: Adult)   Pulse 94   Resp 16   Ht 1.549 m (5' 1\")   Wt 75.4 kg (166 lb 3.2 oz)   SpO2 96%   BMI 31.40 kg/m²     Physical Exam  Constitutional:       Appearance: Normal appearance.   HENT:      Head: Normocephalic and atraumatic.   Neck:      Vascular: No JVD.   Cardiovascular:      Rate and Rhythm: Normal rate and regular rhythm.      Pulses: Normal pulses.      Heart sounds: Normal heart sounds. No murmur heard.     No friction rub.   Pulmonary:      Effort: Pulmonary effort is normal. No respiratory distress.      Breath sounds: Normal breath sounds.   Abdominal:      General: There is no distension.      Palpations: Abdomen is soft.   Musculoskeletal:      Right lower leg: No edema.      Left lower leg: No edema.   Skin:     General: Skin is warm and dry.   Neurological:      General: No focal deficit present.      Mental Status: She is alert and oriented to person, place, and time.   Psychiatric:         Mood and Affect: Mood normal.         Behavior: Behavior normal.                Assessment & Plan     1. BRIANA (obstructive sleep apnea)        2. Dyslipidemia        3. Paroxysmal SVT (supraventricular tachycardia)            Medical Decision Making: Today's Assessment/Status/Plan:          SVT  -Occasional palpitations " at night  -Continue diltiazem 240 mg daily  -She had a questionable history of atrial fibrillation, she was taken off of anticoagulation by Dr. Ruff, no evidence of A-fib recurrence, however we have discussed that patient could consider getting an Apple Watch to monitor for atrial fibrillation, she will let us know if she has any concerning episodes on the Apple Watch     Hyperlipidemia  -Most recent LDL 64  -Continue atorvastatin 10 mg daily  -Goal of less than 100  -Check lipid panel annually     BRIANA   - she is supposed to use a CPAP but uses it infrequently      Follow up with PREM Dorado in 1 year     This note was dictated using Dragon speech recognition software.

## 2024-03-26 ENCOUNTER — NON-PROVIDER VISIT (OUTPATIENT)
Dept: SLEEP MEDICINE | Facility: MEDICAL CENTER | Age: 74
End: 2024-03-26
Attending: NURSE PRACTITIONER
Payer: MEDICARE

## 2024-03-26 VITALS
OXYGEN SATURATION: 96 % | WEIGHT: 167 LBS | RESPIRATION RATE: 14 BRPM | DIASTOLIC BLOOD PRESSURE: 72 MMHG | SYSTOLIC BLOOD PRESSURE: 110 MMHG | HEIGHT: 61 IN | BODY MASS INDEX: 31.53 KG/M2 | HEART RATE: 94 BPM

## 2024-03-26 VITALS — HEIGHT: 61 IN | BODY MASS INDEX: 31.53 KG/M2 | WEIGHT: 167 LBS

## 2024-03-26 DIAGNOSIS — J44.89 ASTHMA-COPD OVERLAP SYNDROME (HCC): ICD-10-CM

## 2024-03-26 DIAGNOSIS — J44.89 ASTHMA-COPD OVERLAP SYNDROME (HCC): Chronic | ICD-10-CM

## 2024-03-26 DIAGNOSIS — G47.33 OSA (OBSTRUCTIVE SLEEP APNEA): Chronic | ICD-10-CM

## 2024-03-26 DIAGNOSIS — Z78.9 NONSMOKER: ICD-10-CM

## 2024-03-26 PROBLEM — R05.1 ACUTE COUGH: Status: RESOLVED | Noted: 2023-12-06 | Resolved: 2024-03-26

## 2024-03-26 PROBLEM — R91.8 PULMONARY NODULES: Status: RESOLVED | Noted: 2021-04-15 | Resolved: 2024-03-26

## 2024-03-26 PROCEDURE — 99213 OFFICE O/P EST LOW 20 MIN: CPT | Performed by: NURSE PRACTITIONER

## 2024-03-26 PROCEDURE — 3074F SYST BP LT 130 MM HG: CPT | Performed by: NURSE PRACTITIONER

## 2024-03-26 PROCEDURE — 3078F DIAST BP <80 MM HG: CPT | Performed by: NURSE PRACTITIONER

## 2024-03-26 PROCEDURE — 94060 EVALUATION OF WHEEZING: CPT | Performed by: NURSE PRACTITIONER

## 2024-03-26 PROCEDURE — 94060 EVALUATION OF WHEEZING: CPT | Mod: 26 | Performed by: INTERNAL MEDICINE

## 2024-03-26 RX ORDER — FLUTICASONE FUROATE, UMECLIDINIUM BROMIDE AND VILANTEROL TRIFENATATE 100; 62.5; 25 UG/1; UG/1; UG/1
1 POWDER RESPIRATORY (INHALATION) DAILY
Qty: 180 EACH | Refills: 3 | Status: SHIPPED | OUTPATIENT
Start: 2024-03-26

## 2024-03-26 ASSESSMENT — FIBROSIS 4 INDEX
FIB4 SCORE: 0.88
FIB4 SCORE: 0.88

## 2024-03-26 ASSESSMENT — PULMONARY FUNCTION TESTS
FEV1/FVC: 69
FEV1/FVC_PERCENT_PREDICTED: 88
FEV1_PERCENT_PREDICTED: 109
FEV1_PERCENT_CHANGE: 2
FEV1/FVC: 69.9
FEV1_PERCENT_CHANGE: 4
FEV1/FVC_PREDICTED: 77.42
FEV1: 2.09
FEV1/FVC_PERCENT_PREDICTED: 91
FVC_PREDICTED: 2.48
FVC: 2.93
FEV1/FVC_PERCENT_CHANGE: 200
FVC_PERCENT_PREDICTED: 120
FVC_PERCENT_PREDICTED: 118
FVC: 2.99
FEV1: 2.01
FEV1_PREDICTED: 104
FEV1_PREDICTED: 1.92

## 2024-03-26 NOTE — PROGRESS NOTES
Chief Complaint   Patient presents with    Follow-Up     Asthma COPD overlap/ Apnea // last seen 8/28/2023    Results     [place]: 3/26/2024        HPI:  Augusta Rodriguez is a 73 y.o. year old female here today for follow-up on asthma/COPD overlap, history of pulmonary nodules that no longer require monitoring.  Never smoker.  Office visit 8/20/2023    Spirometry 3/26/2024 notes mild obstruction with FVC 2.93 L 118%, FEV1 2.01 L 104% and ratio 69.  No significant bronchodilator response.  Reviewed with patient.    MMRC Grade: 0-1  Exacerbations this year: 0    Currently using Trelegy 100 mg 1 puff once daily, and BERKLEY as needed.     Restart auto CPAP 10 to 12 cm, ResMed 2020.     Interval Events:  3/26/24: Continue CPAP 1 time the last 30 days for 10 minutes only.  Reviewed with patient.  She notes putting the mask on and the ripping off at night and generally does not tolerate it well.  We reviewed desensitization techniques.  She notes her breathing be stable without any exacerbations in the last 3 to 6 months.  She denies chest pain, chest tightness, wheezing, cough, phlegm.  She was diagnosed with SVT recently.  She notes taking turmeric and honey and feels generally better.  She has not required nebulizer albuterol HFA inhaler regular basis.  She has Flovent HFA available to her if she runs out of Trelegy inhaler prescriber primary care.  Overall she feels well without complaints.      PULM & SLEEP HX:  Never smoker.  PFT 7/20/20 notes FVC 2.91L or 116%, FEV1 2.37L or 121%, FEV1/FVC ratio 82, RV 97%, % and DLCO 97% without significant bronchodilator response. Reviewed with patient.  PFT 4/20/2022 indicated normal airflows with FVC 2.97 L 120%, FEV1 2.31 L 119%, FEV1/FVC ratio 78, TLC 5.3 L 118%, no hyperinflation with a DLCO of 102% predicted.  No significant bronchodilator response.  Overall normal spirometry.  Spirometry 8/24/2023 notes normal findings with FVC 2.89 L 115%, FEV1 2.29 L or 118%, FEV1/FVC  ratio 79, FEF 25 to 75% 118% with a positive bronchodilator response in small airways mainly.      CXR 3/15/21 notes hypoinflation w/o evidence of acute cardiopulmoary disease.  CT chest 8/26/20 noted multiple nodules with larges measuring 7mm with 6mos f/u CT recommended due 2/2021. Reviewed with patient.  CT chest was updated May 2022 indicating stable bilateral multiple nodules with largest measuring 6 mm in size.  Emphysematous changes noted.  Moderate hiatal hernia.  CT chest 6/1/23 stable bilateral pulmonary nodules with largest measuring 6 mm right middle lobe/right lower lobe. Tubular present mucous plug in right middle lobe again noted. Smaller noncalcified nodules bilaterally but stable. No new nodules. Mild emphysema. No adenopathy. No pleural effusion. Small hiatal hernia mild fluid distended esophagus could be related to reflux. NO further imaging required per guidelines.     CT sinus 3/20/2023 noted paranasal sinuses mostly clear. Very minimal mucosal thickening in the right maxillary sinus. No air-fluid level.      GERD treated with pantoprazole; hx of hiatal hernia.     PSG on 1/16/2020 showed an AHI of 30/carol saturation 82%. On CPAP at 16 cm H2O her AHI normalized to 1.9 with normal saturations. She was noted to have an elevated PLMS arousal index of 22.2.     ROS: As per HPI and otherwise negative if not stated.    Past Medical History:   Diagnosis Date    Apnea, sleep     Arrhythmia     afib    Arthritis     osteo    Asthma     Atrial fibrillation (HCC)     Back pain     Bronchitis     Chickenpox     Constipation     Cough     Daytime sleepiness     Dental disorder     upper dentures    Earache     Frequent urination     Gasping for breath     GERD (gastroesophageal reflux disease) 2/27/2010    [place] measles     Heartburn     Hypertension     Impaired fasting glucose 2/27/2010    Incontinence of urine     Indigestion     Influenza     Mumps     Nasal drainage     Nausea     Osteopenia 2/24/2010     Osteoporosis     Other specified disorder of intestines     constipation r/t meds    Restless leg syndrome     Scarlet fever     Shortness of breath     Sleep apnea     Snoring     Tremor, essential 2/24/2010    Urinary bladder disorder     Wears glasses     Wheezing     Whooping cough        Past Surgical History:   Procedure Laterality Date    INCISION AND DRAINAGE GENERAL N/A 10/18/2022    Procedure: INCISION AND DRAINAGE ABSCESS TONGUE;  Surgeon: Paz Suazo M.D.;  Location: SURGERY [facility];  Service: Ent    LUMBAR LAMINECTOMY DISKECTOMY Bilateral 2/4/2017    Procedure: LUMBAR LAMINECTOMY DISKECTOMY POSTERIOR L4-S1 ;  Surgeon: Emil Hitchcock M.D.;  Location: SURGERY [facility];  Service:     CARPAL TUNNEL ENDOSCOPIC  11/17/2012    Performed by Heriberto Quiñones M.D. at SURGERY [facility]    TRIGGER FINGER RELEASE  11/17/2012    Performed by Heriberto Quiñones M.D. at SURGERY [facility]    HIP ARTHROSCOPY  2/23/2009    Performed by SHERLYN CALVO at SURGERY [facility]    ACETABULAR OSTEOTOMY  2/23/2009    Performed by SHERLYN CALVO at SURGERY [facility]    MASS EXCISION ORTHO  2/23/2009    Performed by SHERLYN CALVO at SURGERY [facility]    HIP ARTHROSCOPY  2005    done at [facility]    HAJA BY LAPAROSCOPY  1997    HYSTERECTOMY, TOTAL ABDOMINAL  1979    oopherectomy lacie    BLADDER SUSPENSION      bladder sling    CARPAL TUNNEL RELEASE      HIP REPLACEMENT, TOTAL      HYSTERECTOMY LAPAROSCOPY      LAMINOTOMY      PRIMARY C SECTION  1970/1975       Family History   Problem Relation Age of Onset    GI Disease Father         Crohn's    Heart Disease Father         First MI age 30    Hypertension Father     Stroke Father     Hypertension Other     Cancer Brother         ESOPHAGEAL CANCER       Social History     Socioeconomic History    Marital status:      Spouse name: Not on file    Number of children: Not on file    Years of education: Not on file     Highest education level: Not on file   Occupational History    Not on file   Tobacco Use    Smoking status: Never    Smokeless tobacco: Never   Vaping Use    Vaping Use: Never used   Substance and Sexual Activity    Alcohol use: Not Currently     Comment: Stopped drinking 45 days ago 7/12/17 Going to     Drug use: No    Sexual activity: Not Currently   Other Topics Concern    Not on file   Social History Narrative    Not on file     Social Determinants of Health     Financial Resource Strain: High Risk (7/5/2022)    Overall Financial Resource Strain (CARDIA)     Difficulty of Paying Living Expenses: Hard   Food Insecurity: Food Insecurity Present (7/5/2022)    Hunger Vital Sign     Worried About Running Out of Food in the Last Year: Sometimes true     Ran Out of Food in the Last Year: Sometimes true   Transportation Needs: No Transportation Needs (7/5/2022)    PRAPARE - Transportation     Lack of Transportation (Medical): No     Lack of Transportation (Non-Medical): No   Physical Activity: Not on file   Stress: Not on file   Social Connections: Not on file   Intimate Partner Violence: Not on file   Housing Stability: Low Risk  (7/5/2022)    Housing Stability Vital Sign     Unable to Pay for Housing in the Last Year: No     Number of Places Lived in the Last Year: 1     Unstable Housing in the Last Year: No       Allergies as of 03/26/2024 - Reviewed 03/26/2024   Allergen Reaction Noted    Sumatriptan Swelling 02/02/2015    Clarithromycin Itching, Nausea, and Swelling 08/13/2007    Ees [erythromycin] Itching, Nausea, and Swelling 08/13/2007    Levaquin Myalgia and Unspecified 11/10/2017    Pcn [penicillins] Itching, Nausea, and Swelling 08/13/2007    Shellfish allergy Itching, Nausea, and Swelling 01/20/2017    Trazodone Swelling and Unspecified 02/16/2009    Clarithromycin Unspecified 11/15/2012    Erythromycin Unspecified 11/02/2022    Penicillin g Unspecified 11/02/2022        Vitals:  /72 (BP Location:  "Left arm, Patient Position: Sitting, BP Cuff Size: Adult)   Pulse 94   Resp 14   Ht 1.549 m (5' 1\")   Wt 75.8 kg (167 lb)   SpO2 96%     Current medications as of today   Current Outpatient Medications   Medication Sig Dispense Refill    atorvastatin (LIPITOR) 10 MG Tab TAKE 1 TABLET BY MOUTH EVERY  Tablet 0    telmisartan (MICARDIS) 40 MG Tab TAKE 1 TABLET BY MOUTH EVERY  Tablet 0    dilTIAZem CD (CARDIZEM CD) 240 MG CAPSULE SR 24 HR TAKE 1 CAPSULE BY MOUTH EVERY DAY 90 Capsule 3    Azelastine (ASTELIN) 137 MCG/SPRAY Solution SPRAYS 2 SPRAYS IN EACH NOSTRIL NASALLY TWICE A DAY 90 DAY(S)      fluticasone (FLONASE) 50 MCG/ACT nasal spray SPRAY 1 SPRAY INTO EACH NOSTRIL ONCE A DAY      fluticasone-umeclidinium-vilanterol (TRELEGY ELLIPTA) 100-62.5-25 mcg/act inhaler Inhale 1 Puff every day. 2 Each 0    albuterol 108 (90 Base) MCG/ACT Aero Soln inhalation aerosol Inhale 2 Puffs every 6 hours as needed for Shortness of Breath. 8.5 Each 5    fluticasone (FLOVENT HFA) 110 MCG/ACT Aerosol Inhale 1 Puff 2 times a day. 2 Each 6    clobetasol (TEMOVATE) 0.05 % Ointment APPLY A THIN LAYER ONCE DAILY FOR 3 WEEKS, THEN USE 2 TO 3 TIMES A WEEK 60 g 1    aspirin (ASA) 81 MG Chew Tab chewable tablet Chew 81 mg every day.      amitriptyline (ELAVIL) 50 MG Tab TAKE 1 TABLET BY MOUTH EVERY DAY IN THE EVENING 90 Tablet 3    benzonatate (TESSALON) 100 MG Cap Take 1 Capsule by mouth 3 times a day as needed for Cough. 60 Capsule 0    pantoprazole (PROTONIX) 40 MG Tablet Delayed Response Take 1 Tablet by mouth 2 times a day. 180 Tablet 3    ondansetron (ZOFRAN ODT) 4 MG TABLET DISPERSIBLE Take 1 Tablet by mouth every 6 hours as needed for Nausea/Vomiting. 10 Tablet 0    DULoxetine (CYMBALTA) 60 MG Cap DR Particles delayed-release capsule Take 1 Capsule by mouth every day. 90 Capsule 3    rizatriptan (MAXALT) 5 MG tablet Take 1 Tablet by mouth one time as needed for Migraine. 10 Tablet 5    zonisamide (ZONEGRAN) 100 MG " Cap Take 100 mg by mouth every day.      SALINE NA Administer 1 Spray into affected nostril(S) every day.      HYDROcodone Bitartrate ER 10 MG CAPSULE SR 12 HR Take  by mouth. (Patient not taking: Reported on 3/26/2024)      lidocaine (LIDODERM) 5 % Patch Place 1 Patch on the skin every 24 hours. (Patient not taking: Reported on 3/26/2024) 10 Patch 0    acetaminophen (TYLENOL) 500 MG Tab Take 1-2 Tablets by mouth every 6 hours as needed for Mild Pain or Moderate Pain. (Patient not taking: Reported on 3/26/2024) 30 Tablet 0    promethazine-dextromethorphan (PROMETHAZINE-DM) 6.25-15 MG/5ML syrup Take 1.3 mL by mouth every four hours as needed for Cough. (Patient not taking: Reported on 3/26/2024) 120 mL 0     No current facility-administered medications for this visit.         Physical Exam:   Gen:           Alert and oriented, No apparent distress. Mood and affect appropriate, normal interaction with examiner.  Eyes:          PERRL, EOM intact, sclere white, conjunctive moist. Glasses.  Ears:          Not examined.   Hearing:     Grossly intact.  Nose:          Normal, no lesions or deformities.  Dentition:    Not examined.   Oropharynx:   Not examined.   Mallampati Classification: Not examined.   Neck:        Supple, trachea midline, no masses.  Respiratory Effort: No intercostal retractions or use of accessory muscles.   Lung Auscultation:      Clear to auscultation bilaterally; no rales, rhonchi or wheezing.  CV:            Regular rate and rhythm. No murmurs, rubs or gallops.  Abd:           Not examined.  Lymphadenopathy: Not examined.   Gait and Station: Normal.  Digits and Nails: No clubbing, cyanosis, petechiae, or nodes.   Cranial Nerves: II-XII grossly intact.  Skin:        No rashes, lesions or ulcers noted.               Ext:           No cyanosis or edema.      Assessment:  1. Asthma-COPD overlap syndrome        2. BRIANA (obstructive sleep apnea)        3. BMI 31.0-31.9,adult  HEIGHT AND WEIGHT      4.  Nonsmoker                 Immunizations:    Flu:recommend  Pneumovax 23:not due  Prevnar 13:not due  PCV 20: 2022  COVID-19: 2022    Plan:  Asthma/COPD overlap is well-controlled.  She will continue Trelegy 100 migrans 1 puff once daily.  She has Flovent HFA only if she runs out of Trelegy prescribed a PCP.  Continue nebulizer and albuterol HFA inhaler as needed.   [place] at next OV  BRIANA is not well-controlled and patient continues to have intolerance issues.  We reviewed desensitization techniques and compliance measures.  She will make an effort.   DME mask/supplies  Encouraged weight loss or healthy diet and activity.  Follow primary care for the health concerns.  Follow-up in 1 year with spirometry and compliance report, sooner if needed.    Please note that this dictation was created using voice recognition software. I have made every reasonable attempt to correct obvious errors, but it is possible there are errors of grammar and possibly content that I did not discover before finalizing the note.

## 2024-03-26 NOTE — PROCEDURES
Good patient effort & cooperation. The results of this test meet the ATS standards for acceptability and repeatability. Predicted values were GLI-2012. A bronchodilator of Ventolin HFA-2 puffs with a space was administered to the patient.    There is borderline mild obstruction with no significant bronchodilator response.    Connie Fletcher,    Pulmonary and Critical Care

## 2024-04-23 ENCOUNTER — TELEPHONE (OUTPATIENT)
Dept: HEALTH INFORMATION MANAGEMENT | Facility: OTHER | Age: 74
End: 2024-04-23
Payer: MEDICARE

## 2024-04-24 ENCOUNTER — OFFICE VISIT (OUTPATIENT)
Dept: FAMILY PLANNING/WOMEN'S HEALTH CLINIC | Facility: PHYSICIAN GROUP | Age: 74
End: 2024-04-24
Payer: MEDICARE

## 2024-04-24 VITALS
WEIGHT: 167 LBS | DIASTOLIC BLOOD PRESSURE: 64 MMHG | HEIGHT: 61 IN | BODY MASS INDEX: 31.53 KG/M2 | SYSTOLIC BLOOD PRESSURE: 114 MMHG

## 2024-04-24 DIAGNOSIS — M85.80 OSTEOPENIA, UNSPECIFIED LOCATION: ICD-10-CM

## 2024-04-24 DIAGNOSIS — N18.31 HYPERTENSIVE KIDNEY DISEASE WITH STAGE 3A CHRONIC KIDNEY DISEASE: ICD-10-CM

## 2024-04-24 DIAGNOSIS — R73.03 PREDIABETES: Chronic | ICD-10-CM

## 2024-04-24 DIAGNOSIS — F32.5 MAJOR DEPRESSION IN REMISSION (HCC): ICD-10-CM

## 2024-04-24 DIAGNOSIS — J44.89 ASTHMA-COPD OVERLAP SYNDROME (HCC): Chronic | ICD-10-CM

## 2024-04-24 DIAGNOSIS — G31.9 CEREBRAL ATROPHY, MILD (HCC): Chronic | ICD-10-CM

## 2024-04-24 DIAGNOSIS — K21.9 GASTROESOPHAGEAL REFLUX DISEASE WITHOUT ESOPHAGITIS: Chronic | ICD-10-CM

## 2024-04-24 DIAGNOSIS — I47.10 PAROXYSMAL SVT (SUPRAVENTRICULAR TACHYCARDIA) (HCC): ICD-10-CM

## 2024-04-24 DIAGNOSIS — I48.0 PAROXYSMAL ATRIAL FIBRILLATION (HCC): ICD-10-CM

## 2024-04-24 DIAGNOSIS — E78.5 DYSLIPIDEMIA: Chronic | ICD-10-CM

## 2024-04-24 DIAGNOSIS — I12.9 HYPERTENSIVE KIDNEY DISEASE WITH STAGE 3A CHRONIC KIDNEY DISEASE: ICD-10-CM

## 2024-04-24 PROCEDURE — 3078F DIAST BP <80 MM HG: CPT

## 2024-04-24 PROCEDURE — 3074F SYST BP LT 130 MM HG: CPT

## 2024-04-24 PROCEDURE — G0439 PPPS, SUBSEQ VISIT: HCPCS

## 2024-04-24 PROCEDURE — 1126F AMNT PAIN NOTED NONE PRSNT: CPT

## 2024-04-24 SDOH — ECONOMIC STABILITY: HOUSING INSECURITY: IN THE LAST 12 MONTHS, HOW MANY PLACES HAVE YOU LIVED?: 1

## 2024-04-24 SDOH — ECONOMIC STABILITY: INCOME INSECURITY: HOW HARD IS IT FOR YOU TO PAY FOR THE VERY BASICS LIKE FOOD, HOUSING, MEDICAL CARE, AND HEATING?: HARD

## 2024-04-24 SDOH — ECONOMIC STABILITY: INCOME INSECURITY: IN THE LAST 12 MONTHS, WAS THERE A TIME WHEN YOU WERE NOT ABLE TO PAY THE MORTGAGE OR RENT ON TIME?: NO

## 2024-04-24 SDOH — ECONOMIC STABILITY: FOOD INSECURITY: WITHIN THE PAST 12 MONTHS, YOU WORRIED THAT YOUR FOOD WOULD RUN OUT BEFORE YOU GOT MONEY TO BUY MORE.: NEVER TRUE

## 2024-04-24 SDOH — ECONOMIC STABILITY: FOOD INSECURITY: WITHIN THE PAST 12 MONTHS, THE FOOD YOU BOUGHT JUST DIDN'T LAST AND YOU DIDN'T HAVE MONEY TO GET MORE.: NEVER TRUE

## 2024-04-24 ASSESSMENT — PATIENT HEALTH QUESTIONNAIRE - PHQ9: CLINICAL INTERPRETATION OF PHQ2 SCORE: 0

## 2024-04-24 ASSESSMENT — ACTIVITIES OF DAILY LIVING (ADL): BATHING_REQUIRES_ASSISTANCE: 0

## 2024-04-24 ASSESSMENT — PAIN SCALES - GENERAL: PAINLEVEL: NO PAIN

## 2024-04-24 ASSESSMENT — FIBROSIS 4 INDEX: FIB4 SCORE: 0.88

## 2024-04-24 ASSESSMENT — ENCOUNTER SYMPTOMS: GENERAL WELL-BEING: GOOD

## 2024-04-24 NOTE — ASSESSMENT & PLAN NOTE
Chronic, stable.  The patient is being followed by pulmonology. Continue with current defined treatment plan: fluticasone-umeclidinium-vilanterol (TRELEGY ELLIPTA) 100-62.5-25 mcg/act inhaler, albuterol 108 (90 Base) MCG/ACT Aero Soln inhalation aerosol . Follow-up at least annually.

## 2024-04-24 NOTE — ASSESSMENT & PLAN NOTE
Chronic, stable. The patient is being followed by cardiology. Continue with current defined treatment plan: dilTIAZem CD (CARDIZEM CD) 240 MG CAPSULE SR 24 HR . Follow-up at least annually.

## 2024-04-24 NOTE — ASSESSMENT & PLAN NOTE
Chronic, stable. The patient denies any side effects from the current medication. Continue with current defined treatment plan: atorvastatin (LIPITOR) 10 MG Tab . Follow-up at least annually.

## 2024-04-24 NOTE — ASSESSMENT & PLAN NOTE
Chronic, stable. Last A1c in February of 2024 was 6.0. No current treatment. Follow-up at least annually.

## 2024-04-24 NOTE — ASSESSMENT & PLAN NOTE
Chronic, stable. PHQ-9 score today is 0.  She denies SI/HI.  She reports good mood as long as she is taking her medication.  Continue with current defined treatment plan: DULoxetine (CYMBALTA) 60 MG Cap DR Particles delayed-release capsule . Follow-up at least annually.

## 2024-04-24 NOTE — ASSESSMENT & PLAN NOTE
Chronic, stable. The patient reports that her symptoms are well controlled with current medication. Continue with current defined treatment plan: pantoprazole (PROTONIX) 40 MG Tablet Delayed Response  . Follow-up at least annually.

## 2024-04-24 NOTE — ASSESSMENT & PLAN NOTE
Chronic, stable. The patient denies any symptoms at this time. No current treatment. Discussed drinking plenty of water and avoiding nephrotoxins such as NSAIDs.  Follow-up at least annually.

## 2024-04-24 NOTE — ASSESSMENT & PLAN NOTE
Chronic, stable.  Noted on on imaging on 08/13/2020.  The patient denies memory problems or cognitive decline.   No current treatment. Follow-up at least annually.

## 2024-04-24 NOTE — PROGRESS NOTES
Comprehensive Health Assessment Program     Augusta Rodriguez is a 73 y.o. here for her comprehensive health assessment.    Patient Active Problem List    Diagnosis Date Noted    Traumatic injury of rib 01/31/2024    Paroxysmal SVT (supraventricular tachycardia) ([facility]) 11/02/2023    Hiatal hernia 08/28/2023    Ulnar neuropathy of right upper extremity 08/22/2023    Right arm pain 08/21/2023    Cerebral atrophy, mild ([facility]) 08/09/2023    Dilatation of aorta ([facility]) 08/09/2023    Numbness and tingling of right arm 05/11/2023    Rash 04/20/2023    Right leg swelling 04/20/2023    Renal insufficiency 11/07/2022    BMI 31.0-31.9,adult 09/13/2022    Sinus congestion 07/05/2022    Postherpetic neuralgia 07/05/2022    Non-intractable vomiting with nausea 05/25/2022    Asthma-COPD overlap syndrome ([facility]) 05/25/2022    Palpitations 04/27/2022    Prediabetes 04/08/2021    Tremor 03/18/2021    Hypertensive kidney disease with stage 3a chronic kidney disease 10/19/2020    Migraine 10/15/2020    Major depression in remission ([facility]) 09/12/2019    BRIANA (obstructive sleep apnea) 09/03/2019    Paroxysmal atrial fibrillation ([facility]) 12/07/2018    Spinal stenosis, lumbar 02/04/2017    Trigger finger, acquired 11/17/2012    GERD (gastroesophageal reflux disease) 02/27/2010    Vitamin D deficiency 02/27/2010    Dyslipidemia 02/24/2010    Osteopenia 02/24/2010       Current Outpatient Medications   Medication Sig Dispense Refill    fluticasone-umeclidinium-vilanterol (TRELEGY ELLIPTA) 100-62.5-25 mcg/act inhaler Inhale 1 Puff every day. 180 Each 3    atorvastatin (LIPITOR) 10 MG Tab TAKE 1 TABLET BY MOUTH EVERY  Tablet 0    telmisartan (MICARDIS) 40 MG Tab TAKE 1 TABLET BY MOUTH EVERY  Tablet 0    dilTIAZem CD (CARDIZEM CD) 240 MG CAPSULE SR 24 HR TAKE 1 CAPSULE BY MOUTH EVERY DAY 90 Capsule 3    Azelastine (ASTELIN) 137 MCG/SPRAY Solution SPRAYS 2 SPRAYS IN EACH NOSTRIL NASALLY TWICE A DAY 90 DAY(S)      fluticasone (FLONASE) 50  MCG/ACT nasal spray SPRAY 1 SPRAY INTO EACH NOSTRIL ONCE A DAY      albuterol 108 (90 Base) MCG/ACT Aero Soln inhalation aerosol Inhale 2 Puffs every 6 hours as needed for Shortness of Breath. 8.5 Each 5    fluticasone (FLOVENT HFA) 110 MCG/ACT Aerosol Inhale 1 Puff 2 times a day. 2 Each 6    clobetasol (TEMOVATE) 0.05 % Ointment APPLY A THIN LAYER ONCE DAILY FOR 3 WEEKS, THEN USE 2 TO 3 TIMES A WEEK 60 g 1    aspirin (ASA) 81 MG Chew Tab chewable tablet Chew 81 mg every day.      amitriptyline (ELAVIL) 50 MG Tab TAKE 1 TABLET BY MOUTH EVERY DAY IN THE EVENING 90 Tablet 3    benzonatate (TESSALON) 100 MG Cap Take 1 Capsule by mouth 3 times a day as needed for Cough. 60 Capsule 0    pantoprazole (PROTONIX) 40 MG Tablet Delayed Response Take 1 Tablet by mouth 2 times a day. 180 Tablet 3    ondansetron (ZOFRAN ODT) 4 MG TABLET DISPERSIBLE Take 1 Tablet by mouth every 6 hours as needed for Nausea/Vomiting. 10 Tablet 0    DULoxetine (CYMBALTA) 60 MG Cap DR Particles delayed-release capsule Take 1 Capsule by mouth every day. 90 Capsule 3    rizatriptan (MAXALT) 5 MG tablet Take 1 Tablet by mouth one time as needed for Migraine. 10 Tablet 5    zonisamide (ZONEGRAN) 100 MG Cap Take 100 mg by mouth every day.      SALINE NA Administer 1 Spray into affected nostril(S) every day.       No current facility-administered medications for this visit.          Current supplements as per medication list.     Allergies:   Sumatriptan, Clarithromycin, Ees [erythromycin], Levaquin, Pcn [penicillins], Shellfish allergy, Trazodone, Clarithromycin, Erythromycin, and Penicillin g  Social History     Tobacco Use    Smoking status: Never    Smokeless tobacco: Never   Vaping Use    Vaping Use: Never used   Substance Use Topics    Alcohol use: Not Currently     Comment: Stopped drinking 45 days ago 7/12/17 Going to     Drug use: No     Family History   Problem Relation Age of Onset    GI Disease Father         Crohn's    Heart Disease Father          First MI age 30    Hypertension Father     Stroke Father     Hypertension Other     Cancer Brother         ESOPHAGEAL CANCER     Augusta  has a past medical history of Apnea, sleep, Arrhythmia, Arthritis, Asthma, Atrial fibrillation (HCC), Back pain, Bronchitis, Chickenpox, Constipation, Cough, Daytime sleepiness, Dental disorder, Earache, Frequent urination, Gasping for breath, GERD (gastroesophageal reflux disease) (2/27/2010), Greek measles, Heartburn, Hypertension, Impaired fasting glucose (2/27/2010), Incontinence of urine, Indigestion, Influenza, Mumps, Nasal drainage, Nausea, Osteopenia (2/24/2010), Osteoporosis, Other specified disorder of intestines, Restless leg syndrome, Scarlet fever, Shortness of breath, Sleep apnea, Snoring, Tremor, essential (2/24/2010), Urinary bladder disorder, Wears glasses, Wheezing, and Whooping cough.   Past Surgical History:   Procedure Laterality Date    INCISION AND DRAINAGE GENERAL N/A 10/18/2022    Procedure: INCISION AND DRAINAGE ABSCESS TONGUE;  Surgeon: Paz Suazo M.D.;  Location: [facility];  Service: Ent    LUMBAR LAMINECTOMY DISKECTOMY Bilateral 2/4/2017    Procedure: LUMBAR LAMINECTOMY DISKECTOMY POSTERIOR L4-S1 ;  Surgeon: Emil Hitchcock M.D.;  Location: [facility];  Service:     CARPAL TUNNEL ENDOSCOPIC  11/17/2012    Performed by Heriberto Quiñones M.D. at [facility]    TRIGGER FINGER RELEASE  11/17/2012    Performed by Heriberto Quiñones M.D. at [facility]    HIP ARTHROSCOPY  2/23/2009    Performed by SHERLYN CALVO at [facility]    ACETABULAR OSTEOTOMY  2/23/2009    Performed by SHERLYN CALVO at [facility] ORS    MASS EXCISION ORTHO  2/23/2009    Performed by SHERLYN CALVO at [facility]    HIP ARTHROSCOPY  2005    done at [facility]    HAJA BY LAPAROSCOPY  1997    HYSTERECTOMY, TOTAL ABDOMINAL  1979    oopherectomy lacie    BLADDER SUSPENSION      bladder  sling    CARPAL TUNNEL RELEASE      HIP REPLACEMENT, TOTAL      HYSTERECTOMY LAPAROSCOPY      LAMINOTOMY      PRIMARY C SECTION  1970/1975       Screening:  In the last six months have you experienced any leakage of urine? Yes    Depression Screening  Little interest or pleasure in doing things?  0 - not at all  Feeling down, depressed , or hopeless? 0 - not at all  Patient Health Questionnaire Score: 0     If depressive symptoms identified deferred to follow up visit unless specifically addressed in assessment and plan.    Interpretation of PHQ-9 Total Score   Score Severity   1-4 No Depression   5-9 Mild Depression   10-14 Moderate Depression   15-19 Moderately Severe Depression   20-27 Severe Depression    Screening for Cognitive Impairment  Do you or any of your friends or family members have any concern about your memory? No  Three Minute Recall (Leader, Season, Table) 3/3    Deric clock face with all 12 numbers and set the hands to show 10 minutes after 11.  Yes 5/5  Cognitive concerns identified deferred for follow up unless specifically addressed in assessment and plan.    Fall Risk Assessment  Has the patient had two or more falls in the last year or any fall with injury in the last year?  No    Safety Assessment  Do you always wear your seatbelt?  No  Any changes to home needed to function safely? No  Difficulty hearing.  Yes  Patient counseled about all safety risks that were identified.    Functional Assessment ADLs  Are there any barriers preventing you from cooking for yourself or meeting nutritional needs?  No.    Are there any barriers preventing you from driving safely or obtaining transportation?  No. Stopped driving,  drives her  Are there any barriers preventing you from using a telephone or calling for help?  No    Are there any barriers preventing you from shopping?  No.    Are there any barriers preventing you from taking care of your own finances?  No    Are there any barriers  preventing you from managing your medications?  No    Are there any barriers preventing you from showering, bathing or dressing yourself? No    Are there any barriers preventing you from doing housework or laundry? No  Are there any barriers preventing you from using the toilet?No  Are you currently engaging in any exercise or physical activity?  Yes. walking    Self-Assessment of Health  What is your perception of your health? Good  Do you sleep more than six hours a night? Yes  In the past 7 days, how much did pain keep you from doing your normal work? None  Do you spend quality time with family or friends (virtually or in person)? Yes  Do you usually eat a heart healthy diet that constists of a variety of fruits, vegetables, whole grains and fiber? Yes  Do you eat foods high in fat and/or Fast Food more than three times per week? Yes    Advance Care Planning  Do you have an Advance Directive, Living Will, Durable Power of , or POLST? No                 Health Maintenance Summary            Overdue - COVID-19 Vaccine (4 - 2023-24 season) Overdue since 9/1/2023 01/20/2022  Imm Admin: MODERNA SARS-COV-2 VACCINE (12+)    04/30/2021  Imm Admin: MODERNA SARS-COV-2 VACCINE (12+)    04/02/2021  Imm Admin: MODERNA SARS-COV-2 VACCINE (12+)              Mammogram (Every 2 Years) Next due on 1/23/2025 01/23/2023  MA-SCREENING MAMMO BILAT W/TOMOSYNTHESIS W/CAD    01/21/2022  MA-SCREENING MAMMO BILAT W/TOMOSYNTHESIS W/CAD    11/05/2020  MA-SCREENING MAMMO BILAT W/TOMOSYNTHESIS W/CAD    04/02/2019  MA-SCREENING MAMMO BILAT W/TOMOSYNTHESIS W/CAD    01/18/2018  MA-MAMMO SCREENING BILAT W/DEBORA W/CAD    Only the first 5 history entries have been loaded, but more history exists.              Annual Pulmonary Function Test / Spirometry (Yearly) Next due on 3/26/2025      03/26/2024  Spirometry    08/24/2023  Spirometry    04/20/2022  PULMONARY FUNCTION TESTS -Test requested: Complete Pulmonary Function Test;  Include MIPS/MEPS? No    07/20/2020  PULMONARY FUNCTION TESTS -Test requested: Complete Pulmonary Function Test              Annual Wellness Visit (Yearly) Next due on 4/24/2025 04/24/2024  Level of Service: AR ANNUAL WELLNESS VISIT-INCLUDES PPPS SUBSEQUE*    08/09/2023  Level of Service: AR ANNUAL WELLNESS VISIT-INCLUDES PPPS SUBSEQUE*    05/11/2021  Done    09/03/2019  Subsequent Annual Wellness Visit - Includes PPPS ()              Bone Density Scan (Every 5 Years) Next due on 11/5/2025 11/05/2020  DS-BONE DENSITY STUDY (DEXA)    12/17/2009  DS-BONE DENSITY STUDY (DEXA)              Colorectal Cancer Screening (Colonoscopy - Preferred) Next due on 8/26/2032      10/31/2022  OCCULT BLOOD FECES IMMUNOASSAY    08/26/2022  REFERRAL TO GI FOR COLONOSCOPY    08/24/2022  REFERRAL TO GI FOR COLONOSCOPY    07/26/2017  REFERRAL TO GI FOR COLONOSCOPY              IMM DTaP/Tdap/Td Vaccine (2 - Td or Tdap) Next due on 4/20/2033 04/20/2023  Imm Admin: Tdap Vaccine              Hepatitis C Screening  Completed      09/12/2019  Hepatitis C Antibody component of HEP C VIRUS ANTIBODY              Pneumococcal Vaccine: 65+ Years (Series Information) Completed      11/28/2022  Imm Admin: Pneumococcal Conjugate Vaccine (PCV20)              Zoster (Shingles) Vaccines (Series Information) Completed      08/20/2023  Imm Admin: Zoster Vaccine Recombinant (RZV) (SHINGRIX)    06/21/2023  Imm Admin: Zoster Vaccine Recombinant (RZV) (SHINGRIX)              Hepatitis A Vaccine (Hep A) (Series Information) Aged Out      No completion history exists for this topic.              HPV Vaccines (Series Information) Aged Out      No completion history exists for this topic.              Polio Vaccine (Inactivated Polio) (Series Information) Aged Out      No completion history exists for this topic.              Meningococcal Immunization (Series Information) Aged Out      No completion history exists for this topic.               "Discontinued - Cervical Cancer Screening  Discontinued        Frequency changed to Never automatically (Topic No Longer Applies)    11/14/2017  Reason not specified - hysterectomy              Discontinued - Hepatitis B Vaccine (Hep B)  Discontinued      No completion history exists for this topic.              Discontinued - Influenza Vaccine  Discontinued      11/06/2006  Imm Admin: Influenza, Unspecified - HISTORICAL DATA    11/06/2006  Imm Admin: Influenza Vaccine Pediatric Split - Historical Data    10/26/2005  Imm Admin: Influenza, Unspecified - HISTORICAL DATA    10/26/2005  Imm Admin: Influenza Vaccine Pediatric Split - Historical Data                    Patient Care Team:  Rudy Parsons M.D. as PCP - General (Internal Medicine)  Rudy Parsons M.D. as PCP - [facility] Paneled (Internal Medicine)  Ankit Puente M.D. (Otolaryngology)  [facility] (Inactive) as    Preferred home care as Respiratory Therapist (DME Supplier)  BRIGITTE ArmentaCColin (Physician Assistant)      Financial Resource Strain: High Risk (4/24/2024)    Overall Financial Resource Strain (CARDIA)     Difficulty of Paying Living Expenses: Hard      Transportation Needs: No Transportation Needs (4/24/2024)    PRAPARE - Transportation     Lack of Transportation (Medical): No     Lack of Transportation (Non-Medical): No      Food Insecurity: No Food Insecurity (4/24/2024)    Hunger Vital Sign     Worried About Running Out of Food in the Last Year: Never true     Ran Out of Food in the Last Year: Never true        Encounter Vitals  Blood Pressure : 114/64  O2 Delivery Device: None - Room Air, CPAP  Weight: 75.8 kg (167 lb)  Height: 154.9 cm (5' 1\")  BMI (Calculated): 31.55  Pain Score: No pain  DME  O2 Delivery Device: None - Room Air, CPAP     Alert, oriented in no acute distress.  Eye contact is good, speech goal directed, affect calm.    Assessment and Plan. The following treatment and " monitoring plan is recommended:  Dyslipidemia  Chronic, stable. The patient denies any side effects from the current medication. Continue with current defined treatment plan: atorvastatin (LIPITOR) 10 MG Tab . Follow-up at least annually.      Asthma-COPD overlap syndrome  Chronic, stable.  The patient is being followed by pulmonology. Continue with current defined treatment plan: fluticasone-umeclidinium-vilanterol (TRELEGY ELLIPTA) 100-62.5-25 mcg/act inhaler, albuterol 108 (90 Base) MCG/ACT Aero Soln inhalation aerosol . Follow-up at least annually.      Paroxysmal atrial fibrillation (HCC)  Chronic, stable. The patient is being followed by cardiology. Continue with current defined treatment plan: dilTIAZem CD (CARDIZEM CD) 240 MG CAPSULE SR 24 HR . Follow-up at least annually.      Major depression in remission (HCC)  Chronic, stable. PHQ-9 score today is 0.  She denies SI/HI.  She reports good mood as long as she is taking her medication.  Continue with current defined treatment plan: DULoxetine (CYMBALTA) 60 MG Cap DR Particles delayed-release capsule . Follow-up at least annually.      Hypertensive kidney disease with stage 3a chronic kidney disease (HCC)  Chronic, stable. The patient denies any symptoms at this time. No current treatment. Discussed drinking plenty of water and avoiding nephrotoxins such as NSAIDs.  Follow-up at least annually.      Paroxysmal SVT (supraventricular tachycardia)  Chronic, stable. The patient is being followed by cardiology. Continue with current defined treatment plan: dilTIAZem CD (CARDIZEM CD) 240 MG CAPSULE SR 24 HR . Follow-up at least annually.     Prediabetes  Chronic, stable. Last A1c in February of 2024 was 6.0. No current treatment. Follow-up at least annually.      Cerebral atrophy, mild (HCC)  Chronic, stable.  Noted on on imaging on 08/13/2020.  The patient denies memory problems or cognitive decline.   No current treatment. Follow-up at least  annually.      Osteopenia  Chronic, stable. The patient denies any recent falls of fractures. Discussed adding weight bearing exercise such as walking for 30 minutes most of the days of the week.  Also discussed adequate calcium and vitamin D ingestion and optimal diet.  Follow up at least annually.       GERD (gastroesophageal reflux disease)  Chronic, stable. The patient reports that her symptoms are well controlled with current medication. Continue with current defined treatment plan: pantoprazole (PROTONIX) 40 MG Tablet Delayed Response  . Follow-up at least annually.      Services suggested: No services needed at this time  Health Care Screening: Age-appropriate preventive services recommended by USPTF and ACIP covered by Medicare were discussed today. Services ordered if indicated and agreed upon by the patient.  Referrals offered: Community-based lifestyle interventions to reduce health risks and promote self-management and wellness, fall prevention, nutrition, physical activity, tobacco-use cessation, weight loss, and mental health services as per orders if indicated.    Discussion today about general wellness and lifestyle habits:    Prevent falls and reduce trip hazards; Cautioned about securing or removing rugs.  Have a working fire alarm and carbon monoxide detector.  Engage in regular physical activity and social activities.    Follow-up: Return for appointment with Primary Care Provider as needed.

## 2024-04-24 NOTE — ASSESSMENT & PLAN NOTE
Chronic, stable. The patient denies any recent falls of fractures. Discussed adding weight bearing exercise such as walking for 30 minutes most of the days of the week.  Also discussed adequate calcium and vitamin D ingestion and optimal diet.  Follow up at least annually.

## 2024-05-20 ENCOUNTER — HOSPITAL ENCOUNTER (OUTPATIENT)
Dept: RADIOLOGY | Facility: MEDICAL CENTER | Age: 74
End: 2024-05-20
Attending: NURSE PRACTITIONER
Payer: MEDICARE

## 2024-05-20 DIAGNOSIS — I48.20 CHRONIC ATRIAL FIBRILLATION (HCC): ICD-10-CM

## 2024-05-20 DIAGNOSIS — G44.89 OTHER HEADACHE SYNDROME: ICD-10-CM

## 2024-05-20 DIAGNOSIS — G25.2 OTHER SPECIFIED FORMS OF TREMOR: ICD-10-CM

## 2024-05-20 DIAGNOSIS — G31.84 MILD COGNITIVE IMPAIRMENT OF UNCERTAIN OR UNKNOWN ETIOLOGY: ICD-10-CM

## 2024-05-20 DIAGNOSIS — R51.9 NONINTRACTABLE HEADACHE, UNSPECIFIED CHRONICITY PATTERN, UNSPECIFIED HEADACHE TYPE: ICD-10-CM

## 2024-05-20 DIAGNOSIS — G44.53 PRIMARY THUNDERCLAP HEADACHE: ICD-10-CM

## 2024-06-04 ENCOUNTER — OFFICE VISIT (OUTPATIENT)
Dept: MEDICAL GROUP | Facility: PHYSICIAN GROUP | Age: 74
End: 2024-06-04
Payer: MEDICARE

## 2024-06-04 VITALS
RESPIRATION RATE: 16 BRPM | BODY MASS INDEX: 32.21 KG/M2 | HEIGHT: 61 IN | DIASTOLIC BLOOD PRESSURE: 64 MMHG | OXYGEN SATURATION: 96 % | SYSTOLIC BLOOD PRESSURE: 108 MMHG | TEMPERATURE: 98.3 F | HEART RATE: 95 BPM | WEIGHT: 170.6 LBS

## 2024-06-04 DIAGNOSIS — J30.2 SEASONAL ALLERGIES: ICD-10-CM

## 2024-06-04 DIAGNOSIS — J44.89 ASTHMA-COPD OVERLAP SYNDROME (HCC): Chronic | ICD-10-CM

## 2024-06-04 DIAGNOSIS — R05.3 CHRONIC COUGH: ICD-10-CM

## 2024-06-04 PROCEDURE — 94640 AIRWAY INHALATION TREATMENT: CPT | Performed by: PHYSICIAN ASSISTANT

## 2024-06-04 PROCEDURE — 3078F DIAST BP <80 MM HG: CPT | Performed by: PHYSICIAN ASSISTANT

## 2024-06-04 PROCEDURE — 3074F SYST BP LT 130 MM HG: CPT | Performed by: PHYSICIAN ASSISTANT

## 2024-06-04 PROCEDURE — 99214 OFFICE O/P EST MOD 30 MIN: CPT | Mod: 25 | Performed by: PHYSICIAN ASSISTANT

## 2024-06-04 RX ORDER — IPRATROPIUM BROMIDE AND ALBUTEROL SULFATE 2.5; .5 MG/3ML; MG/3ML
3 SOLUTION RESPIRATORY (INHALATION) ONCE
Status: COMPLETED | OUTPATIENT
Start: 2024-06-04 | End: 2024-06-04

## 2024-06-04 RX ORDER — FLUTICASONE FUROATE, UMECLIDINIUM BROMIDE AND VILANTEROL TRIFENATATE 200; 62.5; 25 UG/1; UG/1; UG/1
1 POWDER RESPIRATORY (INHALATION) DAILY
Qty: 60 EACH | Refills: 3 | Status: CANCELLED | OUTPATIENT
Start: 2024-06-04

## 2024-06-04 RX ADMIN — IPRATROPIUM BROMIDE AND ALBUTEROL SULFATE 3 ML: 2.5; .5 SOLUTION RESPIRATORY (INHALATION) at 09:43

## 2024-06-04 ASSESSMENT — FIBROSIS 4 INDEX: FIB4 SCORE: 0.88

## 2024-06-04 NOTE — PROGRESS NOTES
"Verbal consent was acquired by the patient to use [employer] ambient listening note generation during this visit     Subjective:     HPI:   History of Present Illness  The patient is a 73-year-old female here for cough.    The patient reports persistent nocturnal coughing episodes, which initially manifested as voice loss. She experiences pulmonary discomfort during these coughing episodes. She has a history of seasonal allergies, for which she occasionally takes Zyrtec due to its potential to interfere with her medication regimen. Previous attempts to alleviate her symptoms with Zyrtec and TheraFlu resulted in dizziness throughout the day. She consulted her pulmonologist in 03/2024. She was advised to adjust her CPAP machine, however, this resulted in nasal swelling and pain. A pulmonary function test conducted in 03/2024 yielded normal results according to the patient, and she was advised to return in a year. She has a diagnosis of COPD and asthma, for which she takes Trelegy as needed, in addition to Flovent and albuterol. She applies azelastine nasal spray daily and uses a saline spray as needed. She does not possess a nebulizer and has previously received Kenalog injections.        Health Maintenance: Completed    ROS:  Gen: no fevers/chills  Eyes: no changes in vision  ENT: no sore throat  Pulm: Positive for cough and shortness of breath  CV: no chest pain  GI: no nausea/vomiting  : no dysuria  Skin: no rash  Neuro: no headaches    Objective:     Exam:  /64   Pulse 95   Temp 36.8 °C (98.3 °F) (Temporal)   Resp 16   Ht 1.549 m (5' 1\")   Wt 77.4 kg (170 lb 9.6 oz)   SpO2 96%   BMI 32.23 kg/m²  Body mass index is 32.23 kg/m².    PHYSICAL EXAM  Gen: Alert and oriented, No apparent distress.  Skin: Warm, dry, good turgor, no rashes in visible areas.  HEENT: Normocephalic. Eyes conjunctiva clear lids without ptosis, pupils equal and reactive to light accommodation, ears normal shape and contour, " canals are clear bilaterally, tympanic membranes are benign, Nasal turbinate hypertrophy bilaterally with clear nasal discharge, mildly erythematous oropharynx with clear postnasal drip and cobblestoning without exudate  Neck: Trachea midline, no masses, no thyromegaly  Lungs: Normal effort, moderate to severe expiratory wheezing throughout  CV: Regular rate and rhythm. No murmurs, rubs, or gallops.  MSK: Normal gait, moves all extremities.  Neuro: Grossly non-focal.  Ext: No clubbing, cyanosis, edema.  Psych: Alert and oriented x3, normal affect and mood.     Results      Assessment & Plan:     1. Chronic cough  ipratropium-albuterol (DUONEB) nebulizer solution      2. Asthma-COPD overlap syndrome (HCC)        3. Seasonal allergies            Assessment & Plan      1. Asthma-COPD overlap syndrome (HCC)  Chronic, acute exacerbation.  Spent a lengthy amount of time with the patient today explaining the difference between a maintenance inhaler and a controller inhaler.  Reviewed pulmonology's note recommended Trelegy daily which the patient has not been taking because she felt this was as needed.  Recommended using this daily in addition to albuterol as needed.  Suspect exacerbated by seasonal allergies as well also discussed that below.  Overall patient is well-appearing but had significant cough on exam.  Cough significantly improved following DuoNeb in the office and patient admitted to significant improvement in respirations post DuoNeb.  Patient already has follow-up scheduled with his PCP in a couple of weeks so recommended maintaining appointment to further discuss or sooner if needed for new or worsening symptoms.    2. Chronic cough  - ipratropium-albuterol (DUONEB) nebulizer solution    3. Seasonal allergies  Reported history and exam findings are most consistent with seasonal allergies and PND. Recommended daily sinus rinses as well as azelastine once or twice daily. Patient is instructed on how to  correctly use azelastine to avoid epistaxis. Patient can also try Zyrtec or Claritin as needed.    I spent a total of 35 minutes with record review, exam, communication with the patient, communication with other providers, and documentation of this encounter.    Please note that this dictation was created using voice recognition software. I have made every reasonable attempt to correct obvious errors, but I expect that there are errors of grammar and possibly content that I did not discover before finalizing the note.

## 2024-06-07 DIAGNOSIS — I10 HYPERTENSION, UNSPECIFIED TYPE: ICD-10-CM

## 2024-06-10 RX ORDER — TELMISARTAN 40 MG/1
40 TABLET ORAL DAILY
Qty: 100 TABLET | Refills: 0 | Status: SHIPPED | OUTPATIENT
Start: 2024-06-10

## 2024-06-10 RX ORDER — ATORVASTATIN CALCIUM 10 MG/1
10 TABLET, FILM COATED ORAL DAILY
Qty: 100 TABLET | Refills: 0 | Status: SHIPPED | OUTPATIENT
Start: 2024-06-10

## 2024-06-10 NOTE — TELEPHONE ENCOUNTER
Received request via: Pharmacy    Was the patient seen in the last year in this department? Yes    Does the patient have an active prescription (recently filled or refills available) for medication(s) requested? No        Does the patient have group home Plus and need 100 day supply (blood pressure, diabetes and cholesterol meds only)? Yes, quantity updated to 100 days

## 2024-07-10 ENCOUNTER — HOSPITAL ENCOUNTER (OUTPATIENT)
Dept: LAB | Facility: MEDICAL CENTER | Age: 74
End: 2024-07-10
Attending: INTERNAL MEDICINE
Payer: MEDICARE

## 2024-07-10 DIAGNOSIS — E78.5 DYSLIPIDEMIA: Chronic | ICD-10-CM

## 2024-07-10 DIAGNOSIS — R73.03 PREDIABETES: Chronic | ICD-10-CM

## 2024-07-10 LAB
ALT SERPL-CCNC: 8 U/L (ref 2–50)
ANION GAP SERPL CALC-SCNC: 12 MMOL/L (ref 7–16)
BUN SERPL-MCNC: 14 MG/DL (ref 8–22)
CALCIUM SERPL-MCNC: 9.3 MG/DL (ref 8.5–10.5)
CHLORIDE SERPL-SCNC: 104 MMOL/L (ref 96–112)
CHOLEST SERPL-MCNC: 120 MG/DL (ref 100–199)
CO2 SERPL-SCNC: 24 MMOL/L (ref 20–33)
CREAT SERPL-MCNC: 0.98 MG/DL (ref 0.5–1.4)
EST. AVERAGE GLUCOSE BLD GHB EST-MCNC: 126 MG/DL
GFR SERPLBLD CREATININE-BSD FMLA CKD-EPI: 61 ML/MIN/1.73 M 2
GLUCOSE SERPL-MCNC: 103 MG/DL (ref 65–99)
HBA1C MFR BLD: 6 % (ref 4–5.6)
HDLC SERPL-MCNC: 50 MG/DL
LDLC SERPL CALC-MCNC: 55 MG/DL
POTASSIUM SERPL-SCNC: 4 MMOL/L (ref 3.6–5.5)
SODIUM SERPL-SCNC: 140 MMOL/L (ref 135–145)
TRIGL SERPL-MCNC: 76 MG/DL (ref 0–149)

## 2024-07-10 PROCEDURE — 80048 BASIC METABOLIC PNL TOTAL CA: CPT

## 2024-07-10 PROCEDURE — 36415 COLL VENOUS BLD VENIPUNCTURE: CPT

## 2024-07-10 PROCEDURE — 80061 LIPID PANEL: CPT

## 2024-07-10 PROCEDURE — 83036 HEMOGLOBIN GLYCOSYLATED A1C: CPT

## 2024-07-10 PROCEDURE — 84460 ALANINE AMINO (ALT) (SGPT): CPT

## 2024-07-15 RX ORDER — DULOXETIN HYDROCHLORIDE 60 MG/1
60 CAPSULE, DELAYED RELEASE ORAL DAILY
Qty: 90 CAPSULE | Refills: 0 | Status: SHIPPED | OUTPATIENT
Start: 2024-07-15 | End: 2024-07-16 | Stop reason: SDUPTHER

## 2024-07-16 ENCOUNTER — OFFICE VISIT (OUTPATIENT)
Dept: MEDICAL GROUP | Facility: PHYSICIAN GROUP | Age: 74
End: 2024-07-16
Payer: MEDICARE

## 2024-07-16 VITALS
HEART RATE: 96 BPM | SYSTOLIC BLOOD PRESSURE: 110 MMHG | BODY MASS INDEX: 32.27 KG/M2 | TEMPERATURE: 97.6 F | HEIGHT: 61 IN | OXYGEN SATURATION: 96 % | DIASTOLIC BLOOD PRESSURE: 68 MMHG | WEIGHT: 170.9 LBS

## 2024-07-16 DIAGNOSIS — J44.89 ASTHMA-COPD OVERLAP SYNDROME (HCC): Chronic | ICD-10-CM

## 2024-07-16 DIAGNOSIS — I48.0 PAROXYSMAL ATRIAL FIBRILLATION (HCC): ICD-10-CM

## 2024-07-16 DIAGNOSIS — F32.5 MAJOR DEPRESSION IN REMISSION (HCC): ICD-10-CM

## 2024-07-16 DIAGNOSIS — I77.819 DILATATION OF AORTA (HCC): Chronic | ICD-10-CM

## 2024-07-16 DIAGNOSIS — K21.9 GASTROESOPHAGEAL REFLUX DISEASE WITHOUT ESOPHAGITIS: Chronic | ICD-10-CM

## 2024-07-16 DIAGNOSIS — Z86.73 HISTORY OF STROKE: ICD-10-CM

## 2024-07-16 DIAGNOSIS — I47.10 PAROXYSMAL SVT (SUPRAVENTRICULAR TACHYCARDIA) (HCC): Chronic | ICD-10-CM

## 2024-07-16 DIAGNOSIS — R73.03 PREDIABETES: Chronic | ICD-10-CM

## 2024-07-16 DIAGNOSIS — E78.5 DYSLIPIDEMIA: Chronic | ICD-10-CM

## 2024-07-16 DIAGNOSIS — N18.31 HYPERTENSIVE KIDNEY DISEASE WITH STAGE 3A CHRONIC KIDNEY DISEASE: ICD-10-CM

## 2024-07-16 DIAGNOSIS — I12.9 HYPERTENSIVE KIDNEY DISEASE WITH STAGE 3A CHRONIC KIDNEY DISEASE: ICD-10-CM

## 2024-07-16 PROBLEM — R00.2 PALPITATIONS: Status: RESOLVED | Noted: 2022-04-27 | Resolved: 2024-07-16

## 2024-07-16 PROBLEM — R25.1 TREMOR: Status: RESOLVED | Noted: 2021-03-18 | Resolved: 2024-07-16

## 2024-07-16 PROBLEM — R20.2 NUMBNESS AND TINGLING OF RIGHT ARM: Status: RESOLVED | Noted: 2023-05-11 | Resolved: 2024-07-16

## 2024-07-16 PROBLEM — N28.9 RENAL INSUFFICIENCY: Status: RESOLVED | Noted: 2022-11-07 | Resolved: 2024-07-16

## 2024-07-16 PROBLEM — S29.9XXA TRAUMATIC INJURY OF RIB: Status: RESOLVED | Noted: 2024-01-31 | Resolved: 2024-07-16

## 2024-07-16 PROBLEM — M79.89 RIGHT LEG SWELLING: Status: RESOLVED | Noted: 2023-04-20 | Resolved: 2024-07-16

## 2024-07-16 PROBLEM — G56.21 ULNAR NEUROPATHY OF RIGHT UPPER EXTREMITY: Status: RESOLVED | Noted: 2023-08-22 | Resolved: 2024-07-16

## 2024-07-16 PROBLEM — M79.601 RIGHT ARM PAIN: Status: RESOLVED | Noted: 2023-08-21 | Resolved: 2024-07-16

## 2024-07-16 PROBLEM — R21 RASH: Status: RESOLVED | Noted: 2023-04-20 | Resolved: 2024-07-16

## 2024-07-16 PROBLEM — R11.2 NON-INTRACTABLE VOMITING WITH NAUSEA: Status: RESOLVED | Noted: 2022-05-25 | Resolved: 2024-07-16

## 2024-07-16 PROBLEM — R20.0 NUMBNESS AND TINGLING OF RIGHT ARM: Status: RESOLVED | Noted: 2023-05-11 | Resolved: 2024-07-16

## 2024-07-16 PROBLEM — K44.9 HIATAL HERNIA: Status: RESOLVED | Noted: 2023-08-28 | Resolved: 2024-07-16

## 2024-07-16 PROBLEM — R09.81 SINUS CONGESTION: Status: RESOLVED | Noted: 2022-07-05 | Resolved: 2024-07-16

## 2024-07-16 PROCEDURE — 3074F SYST BP LT 130 MM HG: CPT | Performed by: INTERNAL MEDICINE

## 2024-07-16 PROCEDURE — 99214 OFFICE O/P EST MOD 30 MIN: CPT | Performed by: INTERNAL MEDICINE

## 2024-07-16 PROCEDURE — 3078F DIAST BP <80 MM HG: CPT | Performed by: INTERNAL MEDICINE

## 2024-07-16 RX ORDER — DULOXETIN HYDROCHLORIDE 60 MG/1
60 CAPSULE, DELAYED RELEASE ORAL DAILY
Qty: 90 CAPSULE | Refills: 3 | Status: SHIPPED | OUTPATIENT
Start: 2024-07-16

## 2024-07-16 ASSESSMENT — FIBROSIS 4 INDEX: FIB4 SCORE: 0.89

## 2024-08-12 ENCOUNTER — HOSPITAL ENCOUNTER (OUTPATIENT)
Dept: CARDIOLOGY | Facility: MEDICAL CENTER | Age: 74
End: 2024-08-12
Attending: INTERNAL MEDICINE
Payer: MEDICARE

## 2024-08-12 DIAGNOSIS — I51.89 DIASTOLIC DYSFUNCTION: ICD-10-CM

## 2024-08-12 DIAGNOSIS — I77.819 DILATATION OF AORTA (HCC): Chronic | ICD-10-CM

## 2024-08-12 DIAGNOSIS — I77.819 DILATATION OF AORTA (HCC): ICD-10-CM

## 2024-08-12 LAB
LV EJECT FRACT MOD 2C 99903: 58.23
LV EJECT FRACT MOD 4C 99902: 53.9
LV EJECT FRACT MOD BP 99901: 53.85

## 2024-08-12 PROCEDURE — 93306 TTE W/DOPPLER COMPLETE: CPT

## 2024-08-12 PROCEDURE — 93306 TTE W/DOPPLER COMPLETE: CPT | Mod: 26 | Performed by: STUDENT IN AN ORGANIZED HEALTH CARE EDUCATION/TRAINING PROGRAM

## 2024-08-14 ENCOUNTER — APPOINTMENT (OUTPATIENT)
Dept: RADIOLOGY | Facility: MEDICAL CENTER | Age: 74
End: 2024-08-14
Attending: EMERGENCY MEDICINE
Payer: MEDICARE

## 2024-08-14 ENCOUNTER — HOSPITAL ENCOUNTER (OUTPATIENT)
Facility: MEDICAL CENTER | Age: 74
End: 2024-08-15
Attending: EMERGENCY MEDICINE | Admitting: STUDENT IN AN ORGANIZED HEALTH CARE EDUCATION/TRAINING PROGRAM
Payer: MEDICARE

## 2024-08-14 DIAGNOSIS — G43.001 MIGRAINE WITHOUT AURA AND WITH STATUS MIGRAINOSUS, NOT INTRACTABLE: Chronic | ICD-10-CM

## 2024-08-14 DIAGNOSIS — R07.9 CHEST PAIN, UNSPECIFIED TYPE: ICD-10-CM

## 2024-08-14 DIAGNOSIS — D50.9 IRON DEFICIENCY ANEMIA, UNSPECIFIED IRON DEFICIENCY ANEMIA TYPE: ICD-10-CM

## 2024-08-14 DIAGNOSIS — I47.19 ATRIAL TACHYCARDIA (HCC): ICD-10-CM

## 2024-08-14 PROBLEM — R00.0 TACHYCARDIA: Status: ACTIVE | Noted: 2024-08-14

## 2024-08-14 PROBLEM — I95.9 HYPOTENSION: Status: ACTIVE | Noted: 2024-08-14

## 2024-08-14 PROBLEM — R91.1 LUNG NODULE: Status: ACTIVE | Noted: 2021-04-15

## 2024-08-14 PROBLEM — Z71.89 ACP (ADVANCE CARE PLANNING): Status: ACTIVE | Noted: 2024-08-14

## 2024-08-14 PROBLEM — E66.9 OBESE: Status: ACTIVE | Noted: 2022-09-13

## 2024-08-14 LAB
ALBUMIN SERPL BCP-MCNC: 3.6 G/DL (ref 3.2–4.9)
ALBUMIN/GLOB SERPL: 1.3 G/DL
ALP SERPL-CCNC: 100 U/L (ref 30–99)
ALT SERPL-CCNC: 7 U/L (ref 2–50)
ANION GAP SERPL CALC-SCNC: 16 MMOL/L (ref 7–16)
AST SERPL-CCNC: 13 U/L (ref 12–45)
BASOPHILS # BLD AUTO: 1.1 % (ref 0–1.8)
BASOPHILS # BLD: 0.12 K/UL (ref 0–0.12)
BILIRUB SERPL-MCNC: 0.6 MG/DL (ref 0.1–1.5)
BUN SERPL-MCNC: 9 MG/DL (ref 8–22)
CALCIUM ALBUM COR SERPL-MCNC: 8.3 MG/DL (ref 8.5–10.5)
CALCIUM SERPL-MCNC: 8 MG/DL (ref 8.5–10.5)
CHLORIDE SERPL-SCNC: 105 MMOL/L (ref 96–112)
CO2 SERPL-SCNC: 17 MMOL/L (ref 20–33)
CREAT SERPL-MCNC: 1.1 MG/DL (ref 0.5–1.4)
D DIMER PPP IA.FEU-MCNC: 0.57 UG/ML (FEU) (ref 0–0.5)
EKG IMPRESSION: NORMAL
EOSINOPHIL # BLD AUTO: 0.56 K/UL (ref 0–0.51)
EOSINOPHIL NFR BLD: 5.1 % (ref 0–6.9)
ERYTHROCYTE [DISTWIDTH] IN BLOOD BY AUTOMATED COUNT: 45.9 FL (ref 35.9–50)
FERRITIN SERPL-MCNC: 13.2 NG/ML (ref 10–291)
GFR SERPLBLD CREATININE-BSD FMLA CKD-EPI: 53 ML/MIN/1.73 M 2
GLOBULIN SER CALC-MCNC: 2.8 G/DL (ref 1.9–3.5)
GLUCOSE SERPL-MCNC: 90 MG/DL (ref 65–99)
HCT VFR BLD AUTO: 38.4 % (ref 37–47)
HGB BLD-MCNC: 12.1 G/DL (ref 12–16)
IMM GRANULOCYTES # BLD AUTO: 0.07 K/UL (ref 0–0.11)
IMM GRANULOCYTES NFR BLD AUTO: 0.6 % (ref 0–0.9)
IRON SATN MFR SERPL: 9 % (ref 15–55)
IRON SERPL-MCNC: 25 UG/DL (ref 40–170)
LYMPHOCYTES # BLD AUTO: 2.5 K/UL (ref 1–4.8)
LYMPHOCYTES NFR BLD: 22.6 % (ref 22–41)
MCH RBC QN AUTO: 24.7 PG (ref 27–33)
MCHC RBC AUTO-ENTMCNC: 31.5 G/DL (ref 32.2–35.5)
MCV RBC AUTO: 78.4 FL (ref 81.4–97.8)
MONOCYTES # BLD AUTO: 1.12 K/UL (ref 0–0.85)
MONOCYTES NFR BLD AUTO: 10.1 % (ref 0–13.4)
NEUTROPHILS # BLD AUTO: 6.7 K/UL (ref 1.82–7.42)
NEUTROPHILS NFR BLD: 60.5 % (ref 44–72)
NRBC # BLD AUTO: 0 K/UL
NRBC BLD-RTO: 0 /100 WBC (ref 0–0.2)
NT-PROBNP SERPL IA-MCNC: 425 PG/ML (ref 0–125)
PLATELET # BLD AUTO: 388 K/UL (ref 164–446)
PMV BLD AUTO: 9.3 FL (ref 9–12.9)
POTASSIUM SERPL-SCNC: 3.6 MMOL/L (ref 3.6–5.5)
PROT SERPL-MCNC: 6.4 G/DL (ref 6–8.2)
RBC # BLD AUTO: 4.9 M/UL (ref 4.2–5.4)
SODIUM SERPL-SCNC: 138 MMOL/L (ref 135–145)
TIBC SERPL-MCNC: 289 UG/DL (ref 250–450)
TROPONIN T SERPL-MCNC: 10 NG/L (ref 6–19)
TROPONIN T SERPL-MCNC: 15 NG/L (ref 6–19)
TROPONIN T SERPL-MCNC: 9 NG/L (ref 6–19)
UIBC SERPL-MCNC: 264 UG/DL (ref 110–370)
VIT B12 SERPL-MCNC: 618 PG/ML (ref 211–911)
WBC # BLD AUTO: 11.1 K/UL (ref 4.8–10.8)

## 2024-08-14 PROCEDURE — 84484 ASSAY OF TROPONIN QUANT: CPT

## 2024-08-14 PROCEDURE — 93005 ELECTROCARDIOGRAM TRACING: CPT | Performed by: EMERGENCY MEDICINE

## 2024-08-14 PROCEDURE — 700117 HCHG RX CONTRAST REV CODE 255: Performed by: EMERGENCY MEDICINE

## 2024-08-14 PROCEDURE — 85379 FIBRIN DEGRADATION QUANT: CPT

## 2024-08-14 PROCEDURE — 83540 ASSAY OF IRON: CPT

## 2024-08-14 PROCEDURE — 99285 EMERGENCY DEPT VISIT HI MDM: CPT

## 2024-08-14 PROCEDURE — 82728 ASSAY OF FERRITIN: CPT

## 2024-08-14 PROCEDURE — 700105 HCHG RX REV CODE 258: Performed by: EMERGENCY MEDICINE

## 2024-08-14 PROCEDURE — 85025 COMPLETE CBC W/AUTO DIFF WBC: CPT

## 2024-08-14 PROCEDURE — 96372 THER/PROPH/DIAG INJ SC/IM: CPT

## 2024-08-14 PROCEDURE — 80053 COMPREHEN METABOLIC PANEL: CPT

## 2024-08-14 PROCEDURE — 99497 ADVNCD CARE PLAN 30 MIN: CPT | Performed by: STUDENT IN AN ORGANIZED HEALTH CARE EDUCATION/TRAINING PROGRAM

## 2024-08-14 PROCEDURE — 770020 HCHG ROOM/CARE - TELE (206)

## 2024-08-14 PROCEDURE — 71045 X-RAY EXAM CHEST 1 VIEW: CPT

## 2024-08-14 PROCEDURE — 82607 VITAMIN B-12: CPT

## 2024-08-14 PROCEDURE — 99223 1ST HOSP IP/OBS HIGH 75: CPT | Mod: 25 | Performed by: STUDENT IN AN ORGANIZED HEALTH CARE EDUCATION/TRAINING PROGRAM

## 2024-08-14 PROCEDURE — 36415 COLL VENOUS BLD VENIPUNCTURE: CPT

## 2024-08-14 PROCEDURE — 71275 CT ANGIOGRAPHY CHEST: CPT

## 2024-08-14 PROCEDURE — 83550 IRON BINDING TEST: CPT

## 2024-08-14 PROCEDURE — 83880 ASSAY OF NATRIURETIC PEPTIDE: CPT

## 2024-08-14 PROCEDURE — 700111 HCHG RX REV CODE 636 W/ 250 OVERRIDE (IP): Mod: JZ | Performed by: EMERGENCY MEDICINE

## 2024-08-14 PROCEDURE — 93005 ELECTROCARDIOGRAM TRACING: CPT

## 2024-08-14 PROCEDURE — 700111 HCHG RX REV CODE 636 W/ 250 OVERRIDE (IP): Mod: JZ | Performed by: STUDENT IN AN ORGANIZED HEALTH CARE EDUCATION/TRAINING PROGRAM

## 2024-08-14 RX ORDER — ATORVASTATIN CALCIUM 10 MG/1
10 TABLET, FILM COATED ORAL DAILY
Status: DISCONTINUED | OUTPATIENT
Start: 2024-08-15 | End: 2024-08-15 | Stop reason: HOSPADM

## 2024-08-14 RX ORDER — HYDROMORPHONE HYDROCHLORIDE 1 MG/ML
0.25 INJECTION, SOLUTION INTRAMUSCULAR; INTRAVENOUS; SUBCUTANEOUS
Status: DISCONTINUED | OUTPATIENT
Start: 2024-08-14 | End: 2024-08-15 | Stop reason: HOSPADM

## 2024-08-14 RX ORDER — PANTOPRAZOLE SODIUM 40 MG/1
40 TABLET, DELAYED RELEASE ORAL DAILY
Status: DISCONTINUED | OUTPATIENT
Start: 2024-08-14 | End: 2024-08-14

## 2024-08-14 RX ORDER — ASPIRIN 81 MG/1
81 TABLET, CHEWABLE ORAL EVERY MORNING
Status: DISCONTINUED | OUTPATIENT
Start: 2024-08-15 | End: 2024-08-15 | Stop reason: HOSPADM

## 2024-08-14 RX ORDER — DILTIAZEM HYDROCHLORIDE 240 MG/1
240 CAPSULE, COATED, EXTENDED RELEASE ORAL DAILY
Status: DISCONTINUED | OUTPATIENT
Start: 2024-08-15 | End: 2024-08-15 | Stop reason: HOSPADM

## 2024-08-14 RX ORDER — ACETAMINOPHEN 325 MG/1
650 TABLET ORAL EVERY 6 HOURS PRN
Status: DISCONTINUED | OUTPATIENT
Start: 2024-08-14 | End: 2024-08-15 | Stop reason: HOSPADM

## 2024-08-14 RX ORDER — SODIUM CHLORIDE 9 MG/ML
1000 INJECTION, SOLUTION INTRAVENOUS ONCE
Status: COMPLETED | OUTPATIENT
Start: 2024-08-14 | End: 2024-08-14

## 2024-08-14 RX ORDER — ENOXAPARIN SODIUM 100 MG/ML
40 INJECTION SUBCUTANEOUS DAILY
Status: DISCONTINUED | OUTPATIENT
Start: 2024-08-14 | End: 2024-08-15 | Stop reason: HOSPADM

## 2024-08-14 RX ORDER — DULOXETIN HYDROCHLORIDE 60 MG/1
60 CAPSULE, DELAYED RELEASE ORAL DAILY
Status: DISCONTINUED | OUTPATIENT
Start: 2024-08-15 | End: 2024-08-15 | Stop reason: HOSPADM

## 2024-08-14 RX ORDER — OXYCODONE HYDROCHLORIDE 5 MG/1
5 TABLET ORAL
Status: DISCONTINUED | OUTPATIENT
Start: 2024-08-14 | End: 2024-08-15 | Stop reason: HOSPADM

## 2024-08-14 RX ORDER — SODIUM CHLORIDE 9 MG/ML
500 INJECTION, SOLUTION INTRAVENOUS ONCE
Status: COMPLETED | OUTPATIENT
Start: 2024-08-14 | End: 2024-08-14

## 2024-08-14 RX ORDER — DILTIAZEM HYDROCHLORIDE 5 MG/ML
10 INJECTION INTRAVENOUS ONCE
Status: ACTIVE | OUTPATIENT
Start: 2024-08-14 | End: 2024-08-15

## 2024-08-14 RX ORDER — OXYCODONE HYDROCHLORIDE 5 MG/1
2.5 TABLET ORAL
Status: DISCONTINUED | OUTPATIENT
Start: 2024-08-14 | End: 2024-08-15 | Stop reason: HOSPADM

## 2024-08-14 RX ADMIN — ENOXAPARIN SODIUM 40 MG: 100 INJECTION SUBCUTANEOUS at 17:05

## 2024-08-14 RX ADMIN — IOHEXOL 80 ML: 350 INJECTION, SOLUTION INTRAVENOUS at 13:30

## 2024-08-14 RX ADMIN — SODIUM CHLORIDE 500 ML: 9 INJECTION, SOLUTION INTRAVENOUS at 11:56

## 2024-08-14 RX ADMIN — SODIUM CHLORIDE 1000 ML: 9 INJECTION, SOLUTION INTRAVENOUS at 11:30

## 2024-08-14 SDOH — ECONOMIC STABILITY: TRANSPORTATION INSECURITY
IN THE PAST 12 MONTHS, HAS LACK OF RELIABLE TRANSPORTATION KEPT YOU FROM MEDICAL APPOINTMENTS, MEETINGS, WORK OR FROM GETTING THINGS NEEDED FOR DAILY LIVING?: NO

## 2024-08-14 ASSESSMENT — ENCOUNTER SYMPTOMS
HEMOPTYSIS: 0
BLURRED VISION: 0
PALPITATIONS: 1
FEVER: 0
DOUBLE VISION: 0
CHILLS: 0
DIZZINESS: 0
HEADACHES: 0
NAUSEA: 0
VOMITING: 0
NECK PAIN: 0
COUGH: 0

## 2024-08-14 ASSESSMENT — LIFESTYLE VARIABLES
ON A TYPICAL DAY WHEN YOU DRINK ALCOHOL HOW MANY DRINKS DO YOU HAVE: 0
TOTAL SCORE: 0
HAVE PEOPLE ANNOYED YOU BY CRITICIZING YOUR DRINKING: NO
HOW MANY TIMES IN THE PAST YEAR HAVE YOU HAD 5 OR MORE DRINKS IN A DAY: 0
HAVE YOU EVER FELT YOU SHOULD CUT DOWN ON YOUR DRINKING: NO
EVER HAD A DRINK FIRST THING IN THE MORNING TO STEADY YOUR NERVES TO GET RID OF A HANGOVER: NO
EVER FELT BAD OR GUILTY ABOUT YOUR DRINKING: NO
CONSUMPTION TOTAL: NEGATIVE
ALCOHOL_USE: NO
TOTAL SCORE: 0
AVERAGE NUMBER OF DAYS PER WEEK YOU HAVE A DRINK CONTAINING ALCOHOL: 0
TOTAL SCORE: 0

## 2024-08-14 ASSESSMENT — COGNITIVE AND FUNCTIONAL STATUS - GENERAL
SUGGESTED CMS G CODE MODIFIER DAILY ACTIVITY: CH
DAILY ACTIVITIY SCORE: 24
SUGGESTED CMS G CODE MODIFIER MOBILITY: CH
MOBILITY SCORE: 24

## 2024-08-14 ASSESSMENT — SOCIAL DETERMINANTS OF HEALTH (SDOH)
WITHIN THE LAST YEAR, HAVE YOU BEEN AFRAID OF YOUR PARTNER OR EX-PARTNER?: NO
WITHIN THE PAST 12 MONTHS, THE FOOD YOU BOUGHT JUST DIDN'T LAST AND YOU DIDN'T HAVE MONEY TO GET MORE: OFTEN TRUE
WITHIN THE LAST YEAR, HAVE TO BEEN RAPED OR FORCED TO HAVE ANY KIND OF SEXUAL ACTIVITY BY YOUR PARTNER OR EX-PARTNER?: NO
WITHIN THE PAST 12 MONTHS, YOU WORRIED THAT YOUR FOOD WOULD RUN OUT BEFORE YOU GOT THE MONEY TO BUY MORE: OFTEN TRUE
IN THE PAST 12 MONTHS, HAS THE ELECTRIC, GAS, OIL, OR WATER COMPANY THREATENED TO SHUT OFF SERVICE IN YOUR HOME?: YES
WITHIN THE LAST YEAR, HAVE YOU BEEN HUMILIATED OR EMOTIONALLY ABUSED IN OTHER WAYS BY YOUR PARTNER OR EX-PARTNER?: NO
WITHIN THE LAST YEAR, HAVE YOU BEEN KICKED, HIT, SLAPPED, OR OTHERWISE PHYSICALLY HURT BY YOUR PARTNER OR EX-PARTNER?: NO

## 2024-08-14 ASSESSMENT — PATIENT HEALTH QUESTIONNAIRE - PHQ9
SUM OF ALL RESPONSES TO PHQ9 QUESTIONS 1 AND 2: 0
1. LITTLE INTEREST OR PLEASURE IN DOING THINGS: NOT AT ALL
2. FEELING DOWN, DEPRESSED, IRRITABLE, OR HOPELESS: NOT AT ALL

## 2024-08-14 ASSESSMENT — PAIN DESCRIPTION - PAIN TYPE: TYPE: ACUTE PAIN

## 2024-08-14 ASSESSMENT — HEART SCORE
HEART SCORE: 4
RISK FACTORS: NO KNOWN RISK FACTORS
TROPONIN: LESS THAN OR EQUAL TO NORMAL LIMIT
AGE: 65+
HISTORY: MODERATELY SUSPICIOUS
ECG: NON-SPECIFIC REPOLARIZATION DISTURBANCE

## 2024-08-14 ASSESSMENT — FIBROSIS 4 INDEX
FIB4 SCORE: 0.89
FIB4 SCORE: 0.92
FIB4 SCORE: 0.92

## 2024-08-14 NOTE — PROGRESS NOTES
4 Eyes Skin Assessment Completed by MIKE Blanco and MIKE Garcia.    Head WDL  Ears WDL  Nose WDL  Mouth WDL  Neck WDL  Breast/Chest WDL  Shoulder Blades WDL  Spine Redness from zoll pads  (R) Arm/Elbow/Hand WDL  (L) Arm/Elbow/Hand WDL  Abdomen WDL  Groin Redness  Scrotum/Coccyx/Buttocks Redness, Excoriation, and Moisture Fissure, partial thickness wound  (R) Leg WDL  (L) Leg WDL  (R) Heel/Foot/Toe WDL  (L) Heel/Foot/Toe WDL          Devices In Places Tele Box, Blood Pressure Cuff, and Pulse Ox      Interventions In Place Pillows and Barrier Cream, education to promote skin integrity    Possible Skin Injury Yes    Pictures Uploaded Into Epic Yes  Wound Consult Placed Yes  RN Wound Prevention Protocol Ordered Yes

## 2024-08-14 NOTE — H&P
Hospital Medicine History & Physical Note    Date of Service  8/14/2024    Primary Care Physician  Rudy Parsons M.D.    Consultants      Code Status  Full Code    Chief Complaint  Chief Complaint   Patient presents with    Chest Pain     X 1 hour    Hypotension     79/43 when pt took her own pressure this morning        History of Presenting Illness  Augusta Rodriguez is a 74 y.o. female with past medical history of hypertension, SVT, questionable A-fib, BRIANA, asthma, GERD, and obesity who presented 8/14/2024 with chest pain and tachycardia.  Patient reported sudden onset of chest pain while watching TV, associated with a tight throat, radiated to both arms.  She noted her SBP was 85, and also tachycardia. She denies history of heart attack. History of SVT on Cardizem, followed by her cardiologist    At the ER , Troponin 10>9, EKG showed junctional tachycardia, no acute ischemic ST-T changes. Echo 2 days ago showed EF 60%, grade 1 diastolic dysfunction    , RR 22, BP 90/63, SaO2 98% on room air.   Sodium 138, potassium 3.6, creatinine 1.1, GFR 53, proBNP 425, troponin 10, WBC 11, Hgb 12, MCV 78, platelet 388  D-dimer 0.57, CTA negative for PE    I discussed the plan of care with patient, family, and ERP .    Review of Systems  Review of Systems   Constitutional:  Positive for malaise/fatigue. Negative for chills and fever.   HENT:  Negative for hearing loss and tinnitus.    Eyes:  Negative for blurred vision and double vision.   Respiratory:  Negative for cough and hemoptysis.    Cardiovascular:  Positive for chest pain and palpitations. Negative for leg swelling.   Gastrointestinal:  Negative for nausea and vomiting.   Genitourinary:  Negative for dysuria and urgency.   Musculoskeletal:  Negative for neck pain.   Skin:  Negative for rash.   Neurological:  Negative for dizziness and headaches.       Past Medical History   has a past medical history of Apnea, sleep, Arrhythmia, Arthritis, Asthma, Atrial  fibrillation (HCC), Back pain, Bronchitis, Chickenpox, Constipation, Cough, Daytime sleepiness, Dental disorder, Earache, Frequent urination, Gasping for breath, GERD (gastroesophageal reflux disease) (2/27/2010), Portuguese measles, Heartburn, Hypertension, Impaired fasting glucose (2/27/2010), Incontinence of urine, Indigestion, Influenza, Mumps, Nasal drainage, Nausea, Osteopenia (2/24/2010), Osteoporosis, Other specified disorder of intestines, Restless leg syndrome, Scarlet fever, Shortness of breath, Sleep apnea, Snoring, Tremor, essential (2/24/2010), Urinary bladder disorder, Wears glasses, Wheezing, and Whooping cough.    Surgical History   has a past surgical history that includes bladder suspension; stephon by laparoscopy (1997); primary c section (1970/1975); hysterectomy, total abdominal (1979); hip arthroscopy (2/23/2009); acetabular osteotomy (2/23/2009); mass excision ortho (2/23/2009); carpal tunnel endoscopic (11/17/2012); trigger finger release (11/17/2012); lumbar laminectomy diskectomy (Bilateral, 2/4/2017); hip arthroscopy (2005); laminotomy; carpal tunnel release; hip replacement, total; hysterectomy laparoscopy; and incision and drainage general (N/A, 10/18/2022).     Family History  family history includes Cancer in her brother; GI Disease in her father; Heart Disease in her father; Hypertension in her father and another family member; Stroke in her father.   Family history reviewed with patient. There is no family history that is pertinent to the chief complaint.     Social History   reports that she has never smoked. She has never used smokeless tobacco. She reports that she does not currently use alcohol. She reports that she does not use drugs.    Allergies  Allergies   Allergen Reactions    Sumatriptan Swelling     Tongue swell    Clarithromycin Itching, Nausea and Swelling     Swelling/Itching/Nausea    Pcn [Penicillins] Itching, Nausea and Swelling     Swelling/Itching/Nausea     Shellfish  Allergy Itching, Nausea and Swelling     Swelling/Itching/Nausea    Trazodone Swelling    Erythromycin Itching, Nausea and Swelling    Levofloxacin Myalgia       Medications  Prior to Admission Medications   Prescriptions Last Dose Informant Patient Reported? Taking?   Azelastine (ASTELIN) 137 MCG/SPRAY Solution 8/14/2024 at 0800 Patient Yes Yes   Sig: Administer 2 Sprays into affected nostril(S) every day.   Cyanocobalamin (VITAMIN B-12 PO) FEW DAYS AGO at PRN Patient Yes Yes   Sig: Take 1 Tablet by mouth 1 time a day as needed (shaking).   DULoxetine (CYMBALTA) 60 MG Cap DR Particles delayed-release capsule 8/14/2024 at 0800 Patient No Yes   Sig: Take 1 Capsule by mouth every day.   albuterol 108 (90 Base) MCG/ACT Aero Soln inhalation aerosol PRN at PRN Patient No No   Sig: Inhale 2 Puffs every 6 hours as needed for Shortness of Breath.   amitriptyline (ELAVIL) 25 MG Tab 8/14/2024 at 0800 Patient Yes Yes   Sig: Take 25 mg by mouth every morning.   aspirin (ASA) 81 MG Chew Tab chewable tablet 8/14/2024 at 0800 Patient Yes Yes   Sig: Chew 81 mg every morning.   atorvastatin (LIPITOR) 10 MG Tab 8/14/2024 at 0800 Patient No Yes   Sig: TAKE 1 TABLET BY MOUTH EVERY DAY   clobetasol (TEMOVATE) 0.05 % Ointment 8/13/2024 at PM Patient No Yes   Sig: APPLY A THIN LAYER ONCE DAILY FOR 3 WEEKS, THEN USE 2 TO 3 TIMES A WEEK   dilTIAZem CD (CARDIZEM CD) 240 MG CAPSULE SR 24 HR 8/14/2024 at 0800 Patient No Yes   Sig: TAKE 1 CAPSULE BY MOUTH EVERY DAY   fluticasone (FLONASE) 50 MCG/ACT nasal spray 8/14/2024 at 0800 Patient Yes Yes   Sig: SPRAY 1 SPRAY INTO EACH NOSTRIL ONCE A DAY   fluticasone (FLOVENT HFA) 110 MCG/ACT Aerosol 8/14/2024 at 0800 Patient No Yes   Sig: Inhale 1 Puff 2 times a day.   fluticasone-umeclidinium-vilanterol (TRELEGY ELLIPTA) 100-62.5-25 mcg/act inhaler 8/13/2024 at AM Patient No Yes   Sig: Inhale 1 Puff every day.   ondansetron (ZOFRAN ODT) 4 MG TABLET DISPERSIBLE 8/13/2024 at AM Patient No Yes   Sig: Take  1 Tablet by mouth every 6 hours as needed for Nausea/Vomiting.   pantoprazole (PROTONIX) 40 MG Tablet Delayed Response 8/14/2024 at 0800 Patient No Yes   Sig: Take 1 Tablet by mouth 2 times a day.   Patient taking differently: Take 80 mg by mouth every morning.   rizatriptan (MAXALT) 5 MG tablet PRN at PRN Patient No No   Sig: Take 1 Tablet by mouth one time as needed for Migraine.   telmisartan (MICARDIS) 40 MG Tab 8/14/2024 at 0800 Patient No Yes   Sig: TAKE 1 TABLET BY MOUTH EVERY DAY   zonisamide (ZONEGRAN) 100 MG Cap 8/14/2024 at 0800 Patient Yes Yes   Sig: Take 100 mg by mouth every day.      Facility-Administered Medications: None       Physical Exam  Temp:  [36.2 °C (97.2 °F)] 36.2 °C (97.2 °F)  Pulse:  [] 74  Resp:  [10-22] 13  BP: ()/(52-90) 114/90  SpO2:  [94 %-98 %] 96 %  Blood Pressure : 116/73   Temperature: 36.2 °C (97.2 °F)   Pulse: 73   Respiration: 16   Pulse Oximetry: 98 %       Physical Exam  Constitutional:       General: She is in acute distress.      Appearance: She is obese. She is ill-appearing.   HENT:      Head: Normocephalic.      Nose: Nose normal.      Mouth/Throat:      Mouth: Mucous membranes are moist.   Eyes:      Extraocular Movements: Extraocular movements intact.      Conjunctiva/sclera: Conjunctivae normal.   Cardiovascular:      Rate and Rhythm: Tachycardia present.      Pulses: Normal pulses.      Heart sounds: Normal heart sounds.   Pulmonary:      Effort: Pulmonary effort is normal.      Breath sounds: Normal breath sounds. No wheezing or rales.   Abdominal:      General: Bowel sounds are normal.      Palpations: Abdomen is soft.   Musculoskeletal:         General: No swelling or tenderness.      Cervical back: Normal range of motion and neck supple. No rigidity.   Skin:     General: Skin is warm.   Neurological:      Mental Status: She is alert and oriented to person, place, and time. Mental status is at baseline.         Laboratory:  Recent Labs      08/14/24  1115   WBC 11.1*   RBC 4.90   HEMOGLOBIN 12.1   HEMATOCRIT 38.4   MCV 78.4*   MCH 24.7*   MCHC 31.5*   RDW 45.9   PLATELETCT 388   MPV 9.3     Recent Labs     08/14/24  1115   SODIUM 138   POTASSIUM 3.6   CHLORIDE 105   CO2 17*   GLUCOSE 90   BUN 9   CREATININE 1.10   CALCIUM 8.0*     Recent Labs     08/14/24  1115   ALTSGPT 7   ASTSGOT 13   ALKPHOSPHAT 100*   TBILIRUBIN 0.6   GLUCOSE 90         Recent Labs     08/14/24  1115   NTPROBNP 425*         Recent Labs     08/14/24  1115 08/14/24  1251   TROPONINT 10 9       Imaging:  CT-CTA CHEST PULMONARY ARTERY W/ RECONS   Final Result      1. No acute pulmonary embolus.   2. Moderate hiatal hernia.   3. Bilateral renal cortical cysts, stable.   4. Multiple healed left rib fractures and chronic-appearing superior endplate deformities of the T3 and T12 vertebral bodies.         No pulmonary nodule that warrants further imaging follow-up as per Fleischner Society guidelines.      DX-CHEST-PORTABLE (1 VIEW)   Final Result      1.  6 mm nodule peripheral right mid to lower lung.          X-Ray:  I have personally reviewed the images and compared with prior images.  EKG:  I have personally reviewed the images and compared with prior images.    Assessment/Plan:  Justification for Admission Status  I anticipate this patient will require at least two midnights for appropriate medical management, necessitating inpatient admission because chest pain tachycardia hypotension    Patient will need a Telemetry bed on EMERGENCY service .  The need is secondary to chest pain tachycardia hypotension.    * Chest pain- (present on admission)  Assessment & Plan  Reported sudden onset of chest pain while watching TV, associated with a tight throat, radiated to both arms.  She noted her SBP was 85, and also tachycardia   denies history of heart attack  History of SVT on Cardizem, followed by her cardiologist  D-dimer 0.57, CTA negative for PE  Troponin 10>9  EKG I personally  reviewed, no acute ischemic ST-T changes  Echo 2 days ago showed EF 60%, grade 1 diastolic dysfunction  Chest pain likely due to tachycardia/hypotension    Continue home aspirin and statin  I admitted the patient to telemetry floor    Tachycardia  Assessment & Plan  EKG at admission showed junctional to sinus tachycardia  Patient reported a history of tachycardia but not A-fib.  She is not on anticoagulation.  She stated that her oncologist told her to it was SVT  Heart rate now controlled after IV diltiazem    I resumed the home diltiazem, may increase dose if BP tolerates  Continue to monitor on telemetry  May consult cardiology if tachycardia recurs  I ordered mag  Keep potassium over 4 and mag over 2      Hypotension  Assessment & Plan  BP low at admission  Improved with IV fluid and heart control    Obese- (present on admission)  Assessment & Plan  BMI 32  Weight loss education    Lung nodule  Assessment & Plan  Cxr showed 6 mm nodule peripheral right mid to lower lung.   Outpatient monitor    Microcytic anemia  Assessment & Plan  Hgb 12, MCV 78  I ordered anemia workup    GERD (gastroesophageal reflux disease)- (present on admission)  Assessment & Plan  Continue home PPI    Dyslipidemia- (present on admission)  Assessment & Plan  Continue home statin    ACP (advance care planning)  Assessment & Plan  Patient admitted with chest pain and tachycardia.  Age of 74.  I discussed the diagnosis and treatment plan with her and the  at the bedside.  We discussed risks and benefits.  We discussed the CODE STATUS including CPR and intubation.  The patient is willing to try but she does not want to stay on machine for long-term.  Continue full code.  ACP 16 minutes.         VTE prophylaxis: SCDs/TEDs and enoxaparin ppx

## 2024-08-14 NOTE — ED PROVIDER NOTES
ED Provider Note    CHIEF COMPLAINT  Chief Complaint   Patient presents with    Chest Pain     X 1 hour    Hypotension     79/43 when pt took her own pressure this morning        EXTERNAL RECORDS REVIEWED  Reviewed previous cardiology notes and baseline EKGs.    HPI/ROS  LIMITATION TO HISTORY   Select: : None  OUTSIDE HISTORIAN(S):  None.    Augusta Rodriguez is a 74 y.o. female who presents to the emergency department for evaluation of chest pain and hypotension.  He has been having chest pain since this morning.  His anterior chest pain described as pressure across her anterior chest radiates up towards her neck and jaw and down her left arm.  This been associate with a rapid heartbeat.  She states she took her blood pressure today it was low of 79/43 and she was lightheaded and dizzy.  Chest pain is described as pressure and tightness.  No tearing pain rating the scapula.  Denies any cough fevers or chills or history of PE or DVT.  Does have a history of a cardiac arrhythmia.  She states that she has been told this SVT and A-fib in the past.    PAST MEDICAL HISTORY   has a past medical history of Apnea, sleep, Arrhythmia, Arthritis, Asthma, Atrial fibrillation (HCC), Back pain, Bronchitis, Chickenpox, Constipation, Cough, Daytime sleepiness, Dental disorder, Earache, Frequent urination, Gasping for breath, GERD (gastroesophageal reflux disease) (2/27/2010), Israeli measles, Heartburn, Hypertension, Impaired fasting glucose (2/27/2010), Incontinence of urine, Indigestion, Influenza, Mumps, Nasal drainage, Nausea, Osteopenia (2/24/2010), Osteoporosis, Other specified disorder of intestines, Restless leg syndrome, Scarlet fever, Shortness of breath, Sleep apnea, Snoring, Tremor, essential (2/24/2010), Urinary bladder disorder, Wears glasses, Wheezing, and Whooping cough.    SURGICAL HISTORY   has a past surgical history that includes bladder suspension; stephon by laparoscopy (1997); primary c section (1970/1975);  hysterectomy, total abdominal (1979); hip arthroscopy (2/23/2009); acetabular osteotomy (2/23/2009); mass excision ortho (2/23/2009); carpal tunnel endoscopic (11/17/2012); trigger finger release (11/17/2012); lumbar laminectomy diskectomy (Bilateral, 2/4/2017); hip arthroscopy (2005); laminotomy; carpal tunnel release; hip replacement, total; hysterectomy laparoscopy; and incision and drainage general (N/A, 10/18/2022).    FAMILY HISTORY  Family History   Problem Relation Age of Onset    GI Disease Father         Crohn's    Heart Disease Father         First MI age 30    Hypertension Father     Stroke Father     Hypertension Other     Cancer Brother         ESOPHAGEAL CANCER       SOCIAL HISTORY  Social History     Tobacco Use    Smoking status: Never    Smokeless tobacco: Never   Vaping Use    Vaping status: Never Used   Substance and Sexual Activity    Alcohol use: Not Currently     Comment: Stopped drinking 45 days ago 7/12/17 Going to AA    Drug use: No    Sexual activity: Not Currently       CURRENT MEDICATIONS  Home Medications       Reviewed by Chloé Sarmiento (Pharmacy Tech) on 08/14/24 at 1254  Med List Status: Complete     Medication Last Dose Status   albuterol 108 (90 Base) MCG/ACT Aero Soln inhalation aerosol PRN Active   amitriptyline (ELAVIL) 25 MG Tab 8/14/2024 Active   aspirin (ASA) 81 MG Chew Tab chewable tablet 8/14/2024 Active   atorvastatin (LIPITOR) 10 MG Tab 8/14/2024 Active   Azelastine (ASTELIN) 137 MCG/SPRAY Solution 8/14/2024 Active   clobetasol (TEMOVATE) 0.05 % Ointment 8/13/2024 Active   Cyanocobalamin (VITAMIN B-12 PO) FEW DAYS AGO Active   dilTIAZem CD (CARDIZEM CD) 240 MG CAPSULE SR 24 HR 8/14/2024 Active   DULoxetine (CYMBALTA) 60 MG Cap DR Particles delayed-release capsule 8/14/2024 Active   fluticasone (FLONASE) 50 MCG/ACT nasal spray 8/14/2024 Active   fluticasone (FLOVENT HFA) 110 MCG/ACT Aerosol 8/14/2024 Active   fluticasone-umeclidinium-vilanterol (TRELEGY ELLIPTA)  "100-62.5-25 mcg/act inhaler 8/13/2024 Active   ondansetron (ZOFRAN ODT) 4 MG TABLET DISPERSIBLE 8/13/2024 Active   pantoprazole (PROTONIX) 40 MG Tablet Delayed Response 8/14/2024 Active   rizatriptan (MAXALT) 5 MG tablet PRN Active   telmisartan (MICARDIS) 40 MG Tab 8/14/2024 Active   zonisamide (ZONEGRAN) 100 MG Cap 8/14/2024 Active                  Audit from Redirected Encounters    **Home medications have not yet been reviewed for this encounter**         ALLERGIES  Allergies   Allergen Reactions    Sumatriptan Swelling     Tongue swell    Clarithromycin Itching, Nausea and Swelling     Swelling/Itching/Nausea    Ees [Erythromycin] Itching, Nausea and Swelling     Swelling/Itching/Nausea    Levaquin Myalgia and Unspecified     Muscle soreness     Pcn [Penicillins] Itching, Nausea and Swelling     Swelling/Itching/Nausea     Shellfish Allergy Itching, Nausea and Swelling     Swelling/Itching/Nausea    Trazodone Swelling and Unspecified    Clarithromycin Unspecified    Erythromycin Unspecified    Penicillin G Unspecified       PHYSICAL EXAM  VITAL SIGNS: BP 97/71   Pulse (!) 122   Temp 36.2 °C (97.2 °F) (Temporal)   Resp 19   Ht 1.549 m (5' 1\")   Wt 77.1 kg (170 lb)   SpO2 95%   BMI 32.12 kg/m²    Constitutional: Wake alert anxious in moderate distress  HENT: Normocephalic, Atraumati  Eyes: PERRL, EOMI, Conjunctiva normal, No discharge.   Neck: Normal range of motion, No tenderness, Supple, No stridor.   Cardiovascular: \Tachycardic but regular.  Thorax & Lungs: Normal breath sounds, No respiratory distress, No wheezing,  Abdomen: Soft, No tenderness  Skin: Warm, Dry, No erythema, No rash.   Back: No tenderness, No CVA tenderness.   Musculoskeletal: Good range of motion in all major joints.  No asymmetric edema.  Neurologic: Alert,No focal deficits noted.   Psychiatric: Affect normal      EKG/LABS  Results for orders placed or performed during the hospital encounter of 08/14/24   CBC with Differential "   Result Value Ref Range    WBC 11.1 (H) 4.8 - 10.8 K/uL    RBC 4.90 4.20 - 5.40 M/uL    Hemoglobin 12.1 12.0 - 16.0 g/dL    Hematocrit 38.4 37.0 - 47.0 %    MCV 78.4 (L) 81.4 - 97.8 fL    MCH 24.7 (L) 27.0 - 33.0 pg    MCHC 31.5 (L) 32.2 - 35.5 g/dL    RDW 45.9 35.9 - 50.0 fL    Platelet Count 388 164 - 446 K/uL    MPV 9.3 9.0 - 12.9 fL    Neutrophils-Polys 60.50 44.00 - 72.00 %    Lymphocytes 22.60 22.00 - 41.00 %    Monocytes 10.10 0.00 - 13.40 %    Eosinophils 5.10 0.00 - 6.90 %    Basophils 1.10 0.00 - 1.80 %    Immature Granulocytes 0.60 0.00 - 0.90 %    Nucleated RBC 0.00 0.00 - 0.20 /100 WBC    Neutrophils (Absolute) 6.70 1.82 - 7.42 K/uL    Lymphs (Absolute) 2.50 1.00 - 4.80 K/uL    Monos (Absolute) 1.12 (H) 0.00 - 0.85 K/uL    Eos (Absolute) 0.56 (H) 0.00 - 0.51 K/uL    Baso (Absolute) 0.12 0.00 - 0.12 K/uL    Immature Granulocytes (abs) 0.07 0.00 - 0.11 K/uL    NRBC (Absolute) 0.00 K/uL   Complete Metabolic Panel (CMP)   Result Value Ref Range    Sodium 138 135 - 145 mmol/L    Potassium 3.6 3.6 - 5.5 mmol/L    Chloride 105 96 - 112 mmol/L    Co2 17 (L) 20 - 33 mmol/L    Anion Gap 16.0 7.0 - 16.0    Glucose 90 65 - 99 mg/dL    Bun 9 8 - 22 mg/dL    Creatinine 1.10 0.50 - 1.40 mg/dL    Calcium 8.0 (L) 8.5 - 10.5 mg/dL    Correct Calcium 8.3 (L) 8.5 - 10.5 mg/dL    AST(SGOT) 13 12 - 45 U/L    ALT(SGPT) 7 2 - 50 U/L    Alkaline Phosphatase 100 (H) 30 - 99 U/L    Total Bilirubin 0.6 0.1 - 1.5 mg/dL    Albumin 3.6 3.2 - 4.9 g/dL    Total Protein 6.4 6.0 - 8.2 g/dL    Globulin 2.8 1.9 - 3.5 g/dL    A-G Ratio 1.3 g/dL   Troponins NOW   Result Value Ref Range    Troponin T 10 6 - 19 ng/L   Troponins in two (2) hours   Result Value Ref Range    Troponin T 9 6 - 19 ng/L   proBrain Natriuretic Peptide, NT   Result Value Ref Range    NT-proBNP 425 (H) 0 - 125 pg/mL   D-DIMER   Result Value Ref Range    D-Dimer 0.57 (H) 0.00 - 0.50 ug/mL (FEU)   ESTIMATED GFR   Result Value Ref Range    GFR (CKD-EPI) 53 (A) >60  mL/min/1.73 m 2   Troponin in two (2) hours   Result Value Ref Range    Troponin T 15 6 - 19 ng/L   IRON/TOTAL IRON BIND   Result Value Ref Range    Iron 25 (L) 40 - 170 ug/dL    Total Iron Binding 289 250 - 450 ug/dL    Unsat Iron Binding 264 110 - 370 ug/dL    % Saturation 9 (L) 15 - 55 %   EKG   Result Value Ref Range    Report       [facility] Emergency Dept.    Test Date:  2024  Pt Name:    CONNIE NORIEGA                    Department: ER  MRN:        [id number]                      Room:       32  Gender:     Female                       Technician: [id number]  :        1950                   Requested By:ER TRIAGE PROTOCOL  Order #:    [id number]                    Reading MD: DARÍO SAWYER. [facility]    Measurements  Intervals                                Axis  Rate:       139                          P:          0  MS:         0                            QRS:        53  QRSD:       90                           T:          16  QT:         313  QTc:        476    Interpretive Statements  Junctional tachycardia  Low voltage, extremity leads  Repolarization abnormality, prob rate related  Compared to ECG 2024 20:23:50  Junctional tachycardia now present  Low QRS voltage now present  Early repolarization now present  Sinus rhythm no longer present  Q waves no longer present  Electronica lly Signed On 2024 17:45:33 PDT by DARÍO SAWYER. AMD     EKG   Result Value Ref Range    Report       [facility] Emergency Dept.    Test Date:  2024  Pt Name:    CONNIE NORIEGA                    Department: ER  MRN:        [id number]                      Room:  Gender:     Female                       Technician: [id number]  :        1950                   Requested By:ER TRIAGE PROTOCOL  Order #:    [id number]                    Reading MD:    Measurements  Intervals                                Axis  Rate:       121                          P:          0  MS:          196                          QRS:        61  QRSD:       93                           T:          -67  QT:         351  QTc:        498    Interpretive Statements  Sinus tachycardia  Low voltage, extremity leads  Nonspecific T abnormalities, diffuse leads  Borderline prolonged QT interval  Baseline wander in lead(s) II,III,aVF,V2  Compared to ECG 2024 11:23:09  T-wave abnormality now present  Right-axis deviation no longer present     EKG (NOW)   Result Value Ref Range    Report       [facility] Emergency Dept.    Test Date:  2024  Pt Name:    CONNIE NORIEGA                    Department: ER  MRN:        [id number]                      Room:       [facility]  Gender:     Female                       Technician: [id number]  :        1950                   Requested By:DARÍO SAWYER  Order #:    [id number]                    Reading MD: DARÍO SAWYER. MEHUL    Measurements  Intervals                                Axis  Rate:       76                           P:          14  NV:         153                          QRS:        7  QRSD:       94                           T:          54  QT:         431  QTc:        485    Interpretive Statements  Sinus rhythm  Atrial premature complex  Low voltage, extremity leads  Compared to ECG 2024 11:54:18  Atrial premature complex(es) now present  Sinus tachycardia no longer present  T-wave abnormality no longer present  Electronically Signed On 2024 17:45:29 PDT by DARÍO SAWYER. AMD        I have independently interpreted this EKG    RADIOLOGY/PROCEDURES   I have independently interpreted the diagnostic imaging associated with this visit and am waiting the final reading from the radiologist.     Radiologist interpretation:  CT-CTA CHEST PULMONARY ARTERY W/ RECONS   Final Result      1. No acute pulmonary embolus.   2. Moderate hiatal hernia.   3. Bilateral renal cortical cysts, stable.   4. Multiple healed left rib fractures and  chronic-appearing superior endplate deformities of the T3 and T12 vertebral bodies.         No pulmonary nodule that warrants further imaging follow-up as per Fleischner Society guidelines.      DX-CHEST-PORTABLE (1 VIEW)   Final Result      1.  6 mm nodule peripheral right mid to lower lung.          COURSE & MEDICAL DECISION MAKING    ASSESSMENT, COURSE AND PLAN  Care Narrative:     74-year-old female presents to the ED with a narrow complex tachycardia without P waves.  She also has chest pain.  Differential diagnose includes acute coronary syndrome, PE, thyroid dysfunction, and atrial tachycardia with atrial fibs or flutter, or possible SVT or junctional tachycardia.    Her chart is reviewed based on labs and previous workup.  She had fairly recent thyroid studies.  I do not see a previously documented atrial tachycardia or atrial dysrhythmia.    Patient be worked up initially with labs and imaging and relievers and fluids since her blood pressure was low.  The plan to be to resuscitate her and then give her some diltiazem.    Although her blood pressure is low she has good mentation and she is perfusing her brain and peripheral tissues.  Take a watchful waiting approach.    Patient was given a liter of saline.  Over this time her blood pressure improved to around 100 systolic no send give her some diltiazem however the patient's blood pressure went down again.    At this point she is consented for electrical cardioversion and sedation.  She is agreeable to plan.  Pacer pads are in place my plan is to cardiovert her with propofol sedation and electrocardioversion.        Patient was being is set up for this and she is receiving fluids and she spontaneously converted to normal sinus rhythm.    Part of workup with D-dimer was obtained she did have chest pain with her tachycardia and because of her elevated D-dimer I did a chest CT this is negative for PE.        The patient's tachycardia has resolved.  It is  unclear to me what her dysrhythmia is but she had a lot of chest pain with it throughout presenting she has a chest pain evaluation.  Do not see a recent cardiac stress test or cardiac catheterization.  Her heart score is 4.    Evaluation to be hospitalized further workup and show I spoke with the hospitalist Dr. Encarnacion and care transferred at that time.    Hydration: Based on the patient's presentation of Tachycardia the patient was given IV fluids. IV Hydration was used because oral hydration was not adequate alone. Upon recheck following hydration, the patient was improved.          CRITICAL CARE  The very real possibilty of a deterioration of this patient's condition required the highest level of my preparedness for sudden, emergent intervention.  I provided critical care services, which included medication orders, frequent reevaluations of the patient's condition and response to treatment, ordering and reviewing test results, and discussing the case with various consultants.  The critical care time associated with the care of the patient was 45 minutes. Review chart for interventions. This time is exclusive of any other billable procedures.     Patient had a heart rate as high as 150 with a cardiac arrhythmia and hypotension.  This a very real concern for cardiovascular collapse during this time.      DISPOSITION AND DISCUSSIONS  I have discussed management of the patient with the following physicians and JULISA's: Hospitalist Dr. Encarnacion.    PERC criteria and heart score were used      FINAL DIAGNOSIS  1. Chest pain, unspecified type    2. Atrial tachycardia (HCC)         Electronically signed by: Lonnie Doyle M.D., 8/14/2024 11:36 AM

## 2024-08-14 NOTE — ASSESSMENT & PLAN NOTE
Patient admitted with chest pain and tachycardia.  Age of 74.  I discussed the diagnosis and treatment plan with her and the  at the bedside.  We discussed risks and benefits.  We discussed the CODE STATUS including CPR and intubation.  The patient is willing to try but she does not want to stay on machine for long-term.  Continue full code.  ACP 16 minutes.

## 2024-08-14 NOTE — ED NOTES
Pharmacy Medication Reconciliation      ~Medication reconciliation updated and complete per patient at bedside   ~Allergies have been verified and updated   ~No oral ABX within the last 30 days  ~Patient home pharmacy :  [facility]       ~Anticoagulants (rivaroxaban, apixaban, edoxaban, dabigatran, warfarin, enoxaparin) taken in the last 14 days? No

## 2024-08-14 NOTE — ED NOTES
2 large IV's placed. NS bolus infusing. Pt symptomatic. Pacer pads placed and hooked up to zoll monitor

## 2024-08-14 NOTE — ASSESSMENT & PLAN NOTE
Reported sudden onset of chest pain while watching TV, associated with a tight throat, radiated to both arms.  She noted her SBP was 85, and also tachycardia   denies history of heart attack  History of SVT on Cardizem, followed by her cardiologist  D-dimer 0.57, CTA negative for PE  Troponin 10>9  EKG I personally reviewed, no acute ischemic ST-T changes  Echo 2 days ago showed EF 60%, grade 1 diastolic dysfunction  Chest pain likely due to tachycardia/hypotension    Continue home aspirin and statin  I admitted the patient to telemetry floor

## 2024-08-14 NOTE — ED TRIAGE NOTES
Augusta Rodriguez  74 y.o. female  Chief Complaint   Patient presents with    Chest Pain     X 1 hour    Hypotension     79/43 when pt took her own pressure this morning        Pt amb to triage with steady gait for above complaint.   Pt is alert and oriented, speaking in full sentences, follows commands and responds appropriately to questions. Not in any apparent distress. Respirations are even and unlabored.    After EKG, pt brought back to room immediately via gurney

## 2024-08-14 NOTE — ASSESSMENT & PLAN NOTE
EKG at admission showed junctional to sinus tachycardia  Patient reported a history of tachycardia but not A-fib.  She is not on anticoagulation.  She stated that her oncologist told her to it was SVT  Heart rate now controlled after IV diltiazem    I resumed the home diltiazem, may increase dose if BP tolerates  Continue to monitor on telemetry  May consult cardiology if tachycardia recurs  I ordered mag  Keep potassium over 4 and mag over 2

## 2024-08-14 NOTE — ED NOTES
Pt self cardioverted. Erp aware. Repeat EKG done. States she feels better.   Purewick placed and hooked up to suction.

## 2024-08-15 ENCOUNTER — PATIENT OUTREACH (OUTPATIENT)
Dept: HEALTH INFORMATION MANAGEMENT | Facility: OTHER | Age: 74
End: 2024-08-15
Payer: MEDICARE

## 2024-08-15 VITALS
OXYGEN SATURATION: 92 % | DIASTOLIC BLOOD PRESSURE: 86 MMHG | HEART RATE: 72 BPM | WEIGHT: 172.4 LBS | HEIGHT: 61 IN | RESPIRATION RATE: 16 BRPM | TEMPERATURE: 97.9 F | SYSTOLIC BLOOD PRESSURE: 125 MMHG | BODY MASS INDEX: 32.55 KG/M2

## 2024-08-15 LAB
ANION GAP SERPL CALC-SCNC: 10 MMOL/L (ref 7–16)
BUN SERPL-MCNC: 14 MG/DL (ref 8–22)
CALCIUM SERPL-MCNC: 8.6 MG/DL (ref 8.5–10.5)
CHLORIDE SERPL-SCNC: 108 MMOL/L (ref 96–112)
CHOLEST SERPL-MCNC: 104 MG/DL (ref 100–199)
CO2 SERPL-SCNC: 22 MMOL/L (ref 20–33)
CREAT SERPL-MCNC: 1.01 MG/DL (ref 0.5–1.4)
ERYTHROCYTE [DISTWIDTH] IN BLOOD BY AUTOMATED COUNT: 46.3 FL (ref 35.9–50)
GFR SERPLBLD CREATININE-BSD FMLA CKD-EPI: 59 ML/MIN/1.73 M 2
GLUCOSE SERPL-MCNC: 98 MG/DL (ref 65–99)
HCT VFR BLD AUTO: 33.9 % (ref 37–47)
HDLC SERPL-MCNC: 45 MG/DL
HGB BLD-MCNC: 11 G/DL (ref 12–16)
LDLC SERPL CALC-MCNC: 44 MG/DL
MAGNESIUM SERPL-MCNC: 1.9 MG/DL (ref 1.5–2.5)
MCH RBC QN AUTO: 24.8 PG (ref 27–33)
MCHC RBC AUTO-ENTMCNC: 32.4 G/DL (ref 32.2–35.5)
MCV RBC AUTO: 76.5 FL (ref 81.4–97.8)
PHOSPHATE SERPL-MCNC: 3.2 MG/DL (ref 2.5–4.5)
PLATELET # BLD AUTO: 311 K/UL (ref 164–446)
PMV BLD AUTO: 9.7 FL (ref 9–12.9)
POTASSIUM SERPL-SCNC: 4 MMOL/L (ref 3.6–5.5)
RBC # BLD AUTO: 4.43 M/UL (ref 4.2–5.4)
SODIUM SERPL-SCNC: 140 MMOL/L (ref 135–145)
TRIGL SERPL-MCNC: 77 MG/DL (ref 0–149)
TSH SERPL-ACNC: 1.81 UIU/ML (ref 0.35–5.5)
WBC # BLD AUTO: 8.5 K/UL (ref 4.8–10.8)

## 2024-08-15 PROCEDURE — 84100 ASSAY OF PHOSPHORUS: CPT

## 2024-08-15 PROCEDURE — 80061 LIPID PANEL: CPT

## 2024-08-15 PROCEDURE — 85027 COMPLETE CBC AUTOMATED: CPT

## 2024-08-15 PROCEDURE — A9270 NON-COVERED ITEM OR SERVICE: HCPCS | Performed by: STUDENT IN AN ORGANIZED HEALTH CARE EDUCATION/TRAINING PROGRAM

## 2024-08-15 PROCEDURE — 83735 ASSAY OF MAGNESIUM: CPT

## 2024-08-15 PROCEDURE — 80048 BASIC METABOLIC PNL TOTAL CA: CPT

## 2024-08-15 PROCEDURE — G0378 HOSPITAL OBSERVATION PER HR: HCPCS

## 2024-08-15 PROCEDURE — 700102 HCHG RX REV CODE 250 W/ 637 OVERRIDE(OP): Performed by: STUDENT IN AN ORGANIZED HEALTH CARE EDUCATION/TRAINING PROGRAM

## 2024-08-15 PROCEDURE — 99239 HOSP IP/OBS DSCHRG MGMT >30: CPT | Performed by: STUDENT IN AN ORGANIZED HEALTH CARE EDUCATION/TRAINING PROGRAM

## 2024-08-15 PROCEDURE — 84443 ASSAY THYROID STIM HORMONE: CPT

## 2024-08-15 RX ORDER — FERROUS SULFATE 325(65) MG
325 TABLET ORAL DAILY
Qty: 30 TABLET | Refills: 0 | Status: SHIPPED | OUTPATIENT
Start: 2024-08-15 | End: 2024-08-15

## 2024-08-15 RX ORDER — FERROUS SULFATE 325(65) MG
325 TABLET ORAL DAILY
Qty: 30 TABLET | Refills: 0
Start: 2024-08-15

## 2024-08-15 RX ORDER — FERROUS SULFATE 325(65) MG
325 TABLET ORAL
Status: CANCELLED | OUTPATIENT
Start: 2024-08-16

## 2024-08-15 RX ORDER — TELMISARTAN 80 MG/1
40 TABLET ORAL DAILY
Status: DISCONTINUED | OUTPATIENT
Start: 2024-08-15 | End: 2024-08-15 | Stop reason: HOSPADM

## 2024-08-15 RX ADMIN — ATORVASTATIN CALCIUM 10 MG: 10 TABLET, FILM COATED ORAL at 05:13

## 2024-08-15 RX ADMIN — OMEPRAZOLE 20 MG: 20 CAPSULE, DELAYED RELEASE ORAL at 05:13

## 2024-08-15 RX ADMIN — TELMISARTAN 40 MG: 80 TABLET ORAL at 09:38

## 2024-08-15 RX ADMIN — ASPIRIN 81 MG: 81 TABLET, CHEWABLE ORAL at 05:13

## 2024-08-15 RX ADMIN — DILTIAZEM HYDROCHLORIDE 240 MG: 240 CAPSULE, EXTENDED RELEASE ORAL at 05:13

## 2024-08-15 RX ADMIN — DULOXETINE HYDROCHLORIDE 60 MG: 60 CAPSULE, DELAYED RELEASE ORAL at 05:13

## 2024-08-15 ASSESSMENT — ENCOUNTER SYMPTOMS
PALPITATIONS: 0
DIARRHEA: 0
NAUSEA: 0
SHORTNESS OF BREATH: 0
VOMITING: 0

## 2024-08-15 ASSESSMENT — PAIN DESCRIPTION - PAIN TYPE: TYPE: ACUTE PAIN

## 2024-08-15 ASSESSMENT — FIBROSIS 4 INDEX: FIB4 SCORE: 0.92

## 2024-08-15 NOTE — RESPIRATORY CARE
COPD EDUCATION by COPD CLINICAL EDUCATOR  8/15/2024 at 6:10 AM by Mary Sloan RRT     Patient reviewed by COPD education team. Patient does not have a history or diagnosis of COPD and is a non-smoker.  Therefore, patient does not qualify for the COPD program.

## 2024-08-15 NOTE — DISCHARGE PLANNING
"HTH/SCP TCN chart review completed. The most current review of medical record, knowledge of pt's PLOF and social support, LACE+ score of 72, 6 clicks scores of 24 ADL and 24 mobility were considered.      TCN met with patient at bedside. Introduced TCN program. Provided education regarding post acute levels of care and HTH/SCP plan benefits. Pt verbalized understanding.     Patient endorsed she resides with her  and daughter stating independence in ADLs, IADLs. Patient endorsed she no longer drives and  provides transportation assistance. Patient stated no mobility concerns anticipating return home with no further needs. Patient did note concern regarding cost of medication and noted use of local food [place].     In collaboration with CM, current discharge considerations are noted to be home. TCN will continue to follow and collaborate with discharge planning team as additional post acute needs arise. Thank you.     Completed today:  Choice obtained: none (please see above; CM assessment)  SCP with [facility] PCP. discussed possible outpatient transitional PCP follow up if indicated and pt stated she has \"an appointment in October\" and declined TCN assistance in scheduling after hospital visit follow up  "

## 2024-08-15 NOTE — PROGRESS NOTES
"THIS IS A MEDICAL STUDENT NOTE FOR EDUCATIONAL PURPOSES ONLY    PROGRESS NOTE  Attending:   Senior Resident:   Jose E Resident:   Medical Student: Moon Whiteside, MS3  PATIENT: Augusta Rodriguez; [id number]; 1950    Hospital Day # Hospital Day: 2    ID:   Ms. Rodriguez is a 73 YO female with a PMHx of HTN, SVT, BRIANA, asthma, GERD, obesity, and migraines admitted for evaluation of chest pain and tachycardia.     HOSPITAL COURSE:   Patient presented to the ED yesterday complaining of chest pain that occurred while she was watching TV. She states the chest pain was associated with dizziness and a \"tight throat\" and radiated to both of her arms. Home BP at the time of the incident was 79/43, and she was tachycardic. In the ER, BP 90/63, , proBNP 425, and troponin 10. ECG showed junctional tachycardia, low voltage, and no ischemic changes. Echo 2 days ago showed EF 60% and grade 1 diastolic dysfunction. CTA was negative for PE. Hgb 12 and MCV 78. Patient was given IVF and started on enoxaparin in the ED, and diltiazem was not administered.    SUBJECTIVE:  Denies malaise, chest pain, palpitations, shortness of breath, nausea, vomiting, leg swelling, or recent vomiting or diarrhea. She states that she drinks around 3-4 bottles of water per day and is compliant with her medications. Reports additional PMHx of chronic constipation for which she takes senna every other day.    INTERVAL:   Hgb 11. Magnesium 1.9. Potassium 4.     ROS:  Review of Systems   Constitutional:  Negative for malaise/fatigue.   Respiratory:  Negative for shortness of breath.    Cardiovascular:  Negative for chest pain, palpitations and leg swelling.   Gastrointestinal:  Negative for diarrhea, nausea and vomiting.        Vitals:    08/14/24 1927 08/14/24 2321 08/15/24 0352 08/15/24 0729   BP: 119/83 (!) 144/89 (!) 139/95 (!) 149/109   Pulse:  85 (!) 110 82   Resp: 16 20 18 16   Temp: 36.9 °C (98.4 °F) 36.7 °C (98.1 °F) 36.6 °C (97.9 °F) 36.5 °C (97.7 °F) "   TempSrc: Temporal Temporal Temporal Temporal   SpO2: 92% 95% 93% 92%   Weight:   78.2 kg (172 lb 6.4 oz)    Height:          Physical Exam  Constitutional:       General: She is not in acute distress.     Appearance: Normal appearance.   Cardiovascular:      Rate and Rhythm: Normal rate and regular rhythm.      Heart sounds: Normal heart sounds. No murmur heard.     No gallop.   Pulmonary:      Effort: Pulmonary effort is normal.      Breath sounds: Normal breath sounds. No wheezing, rhonchi or rales.   Musculoskeletal:      Right lower leg: No edema.      Left lower leg: No edema.   Neurological:      General: No focal deficit present.      Mental Status: She is alert.         LABS:  Recent Labs     08/14/24  1115 08/15/24  0412   WBC 11.1* 8.5   RBC 4.90 4.43   HEMOGLOBIN 12.1 11.0*   HEMATOCRIT 38.4 33.9*   MCV 78.4* 76.5*   MCH 24.7* 24.8*   RDW 45.9 46.3   PLATELETCT 388 311   MPV 9.3 9.7   NEUTSPOLYS 60.50  --    LYMPHOCYTES 22.60  --    MONOCYTES 10.10  --    EOSINOPHILS 5.10  --    BASOPHILS 1.10  --        Recent Labs     08/14/24  1115 08/15/24  0412   SODIUM 138 140   POTASSIUM 3.6 4.0   CHLORIDE 105 108   CO2 17* 22   GLUCOSE 90 98   BUN 9 14      Recent Labs     08/14/24  1115 08/15/24  0412   ALBUMIN 3.6  --    TBILIRUBIN 0.6  --    ALKPHOSPHAT 100*  --    TOTPROTEIN 6.4  --    ALTSGPT 7  --    ASTSGOT 13  --    CREATININE 1.10 1.01      Lab Results   Component Value Date/Time    PROTHROMBTM 13.3 12/07/2018 12:03 PM    INR 1.00 12/07/2018 12:03 PM     Results       ** No results found for the last 168 hours. **             Intake/Output Summary (Last 24 hours) at 8/15/2024 0847  Last data filed at 8/15/2024 0718  Gross per 24 hour   Intake 2592 ml   Output 1650 ml   Net 942 ml        IMAGING:  CT-CTA CHEST PULMONARY ARTERY W/ RECONS   Final Result      1. No acute pulmonary embolus.   2. Moderate hiatal hernia.   3. Bilateral renal cortical cysts, stable.   4. Multiple healed left rib fractures and  chronic-appearing superior endplate deformities of the T3 and T12 vertebral bodies.         No pulmonary nodule that warrants further imaging follow-up as per Fleischner Society guidelines.      DX-CHEST-PORTABLE (1 VIEW)   Final Result      1.  6 mm nodule peripheral right mid to lower lung.           ASSESSMENT AND PLAN:  Ms. Rodriguez is a 75 YO female with a PMHx of HTN, SVT, BRIANA, asthma, GERD, obesity, and migraines admitted for evaluation of chest pain and tachycardia.     #SVT  SVT likely etiology for the patient's chest pain, hypotension, and tachycardia. Patient's symptoms resolved with IV fluids in the ED.  - patient is currently taking aspirin and diltiazem 240 mg PO QD   - plan to discharge with outpatient follow up with PCP and cardiology for possible diltiazem dose titration if symptoms continue    #iron deficiency anemia  Iron deficiency anemia likely diagnosis due to Hgb 11, MCV 76.5, ferritin 13.2, and TIBC 289. No clinical findings suggestive of acute blood loss.  - patient started on iron supplements and counseled on follow up with PCP for further outpatient workup    #lung nodule  Chest x-ray revealed 6 mm nodule to the peripheral right mid to lower lung.  - outpatient monitor    #dyslipidemia  - continue home statin    #GERD  - continue home PPI    #obesity  BMI 32.57.  - weight loss education    Moon Whiteside, MS3  Medical Student, [employer]

## 2024-08-15 NOTE — DISCHARGE PLANNING
Pt rolled back to Observation status per attending MD determination, Dr. Steel, and UR committee MD secondary review, Dr. Benjamin.  Condition Code 44 completed.

## 2024-08-15 NOTE — PROGRESS NOTES
Bedside report received from off going RN/tech: Francis assumed care of patient.     Fall Risk Score: NO RISK  Fall risk interventions in place: Provide patient/family education based on risk assessment  Bed type: Regular (Alex Score less than 17 interventions in place)  Patient on cardiac monitor: Yes  IVF/IV medications: Not Applicable   Oxygen: Room Air  Bedside sitter: Not Applicable   Isolation: Not applicable

## 2024-08-15 NOTE — PROGRESS NOTES
Monitor Summary  Rhythm: Normal Sinus Rhythm  Rate: 76  Ectopy: None Noted  .16 / .07 / .32

## 2024-08-15 NOTE — DISCHARGE PLANNING
Care Transition Team Assessment  Patient is a 77 year-old female admitted for CP. Please see pt's H&P for prior medical history. RNCM met with pt at bedside to complete assessment. Pt A&Ox4 and able to verify the information on the face sheet. Pt lives with spouse in a mobile home with 4 steps to enter. Emergency contact is spouse Gen Rodriguez [phone]. Prior to admission patient is mostly independent with ADL's and IADL’s. Pt has a cane and uses a CPAP at night, does not drive but her  does. Pt reported that S/O is good support. The patient's PCP is Dr. Parsons. Patient's preferred pharmacy is [facility]. Patient denies a history of SNF/IPR nor HHC use in the past. Pt denies any SA or MH concerns. Patients confirmed medical coverage with SCP.  Patient has means to transportation and Gen will provide transport once medically stable for discharge. RNCM discussed discharge planning. Patient reported pt's goal is to return home once medically stable.      Information Source  Orientation Level: Oriented X4  Information Given By: Patient  Who is responsible for making decisions for patient? : Patient    Readmission Evaluation  Is this a readmission?: No    Elopement Risk  Legal Hold: No  Ambulatory or Self Mobile in Wheelchair: Yes  Disoriented: No  Psychiatric Symptoms: None  History of Wandering: No  Elopement this Admit: No  Vocalizing Wanting to Leave: No  Displays Behaviors, Body Language Wanting to Leave: No-Not at Risk for Elopement  Elopement Risk: Not at Risk for Elopement    Interdisciplinary Discharge Planning  Lives with - Patient's Self Care Capacity: Spouse, Adult Children  Patient or legal guardian wants to designate a caregiver: No  Support Systems: Children, Family Member(s), Spouse / Significant Other  Housing / Facility: Mobile Home    Discharge Preparedness  What is your plan after discharge?: Home with help  What are your discharge supports?: Spouse  Prior Functional Level:  Ambulatory, Independent with Medication Management, Needs Assist with Activities of Daily Living, Uses Cane  Difficulity with ADLs: Walking  Difficulty with ADLs Comment: cane  Difficulity with IADLs: Driving  Difficulity with IADL Comments: spouse    Functional Assesment  Prior Functional Level: Ambulatory, Independent with Medication Management, Needs Assist with Activities of Daily Living, Uses Cane    Vision / Hearing Impairment  Vision Impairment : Yes  Right Eye Vision: Impaired, Wears Glasses  Left Eye Vision: Impaired, Wears Glasses  Hearing Impairment : No    Advance Directive  Advance Directive?: None  Advance Directive offered?: AD Booklet refused    Domestic Abuse  Have you ever been the victim of abuse or violence?: No  Possible Abuse/Neglect Reported to:: Not Applicable    Psychological Assessment  History of Substance Abuse: None  History of Psychiatric Problems: No  Non-compliant with Treatment: No    Discharge Risks or Barriers  Discharge risks or barriers?: No    Anticipated Discharge Information  Discharge Disposition: Discharged to home/self care (01)  Case Management Discharge Planning    Admission Date: 8/14/2024  GMLOS: 2.7  ALOS: 1    6-Clicks ADL Score: 24  6-Clicks Mobility Score: 24    Anticipated Discharge Dispo: Discharge Disposition: Discharged to home/self care (01)    DME Needed: No    Action(s) Taken: DC Assessment Complete (See below) Patient declined need for any additional resources.   RNCM requested CHW to meet with patient.    Escalations Completed: None    Medically Clear: No    Next Steps: RNCM to continue to follow patient for DCP needs.     Barriers to Discharge: Medical clearance

## 2024-08-15 NOTE — CARE PLAN
The patient is Stable - Low risk of patient condition declining or worsening    Shift Goals  Clinical Goals: VSS, monitor HR  Patient Goals: rest, get stress test done  Family Goals: sherly    Progress made toward(s) clinical / shift goals:    Problem: Knowledge Deficit - Standard  Goal: Patient and family/care givers will demonstrate understanding of plan of care, disease process/condition, diagnostic tests and medications  Description: Target End Date:  1-3 days or as soon as patient condition allows    Document in Patient Education    1.  Patient and family/caregiver oriented to unit, equipment, visitation policy and means for communicating concern  2.  Complete/review Learning Assessment  3.  Assess knowledge level of disease process/condition, treatment plan, diagnostic tests and medications  4.  Explain disease process/condition, treatment plan, diagnostic tests and medications  Outcome: Progressing  Note: Pt updated on POC     Problem: Optimal Care for the Acute Coronary Syndrome Patient  Goal: Optimal Care for the Acute Coronary Syndrome Patient  Description: Target End Date:  1 to 3 days  Outcome: Progressing  Note: Pt to get stress test in AM, education provided       Patient is not progressing towards the following goals:

## 2024-08-15 NOTE — PROGRESS NOTES
"Community Health Worker Follow-Up    Reason for outreach: CHW Kimi received referral from CHW Moon stating \" Pt is having difficulty paying for medications and food.\"     CHW Interventions: CHW sent food resources via mail and sent referral to [facility] to assist with medication costs.     Specific Resources Provided:  Housing/Shelter: N/A  Transportation: N/A  Food: Food Pantry List       POW and [facility]   Financial: N/A  Social Supports: N/A  Other: N/A    Plan: CHW to follow up with pt the week of 8/26.    "

## 2024-08-15 NOTE — PROGRESS NOTES
Bedside report received from off going RN/tech: Namita, assumed care of patient.     Fall Risk Score: NO RISK  Fall risk interventions in place: Provide patient/family education based on risk assessment and Educate patient/family to call staff for assistance when getting out of bed  Bed type: Regular (Alex Score less than 17 interventions in place)  Patient on cardiac monitor: Yes  IVF/IV medications: Not Applicable   Oxygen: Room Air  Bedside sitter: Not Applicable   Isolation: Not applicable

## 2024-08-15 NOTE — DISCHARGE SUMMARY
Discharge Summary    CHIEF COMPLAINT ON ADMISSION  Chief Complaint   Patient presents with    Chest Pain     X 1 hour    Hypotension     79/43 when pt took her own pressure this morning        Reason for Admission  Chest Pain     Admission Date  8/14/2024    CODE STATUS  Full Code    HPI & HOSPITAL COURSE  Augusta Rodriguez is a 74 y.o. female with past medical history of hypertension, SVT, BRIANA, asthma, GERD, and obesity who presented 8/14/2024 with chest pain and tachycardia.  Patient reported sudden onset of chest pain while watching TV, associated with a tight throat, radiated to both arms.  She noted her SBP was 85, and also tachycardia. She denies history of heart attack. History of SVT on Cardizem, followed by her cardiologist     At the ER , Troponin normal, EKG showed junctional tachycardia, no acute ischemic ST-T changes. Echo 2 days ago showed EF 60%, grade 1 diastolic dysfunction. Patient was found to have hemodynamic instability with hypotension systolic 80-90's, with SVT 's. She was given IV fluids, was prepped for cardioversion, however SVT resolved with fluids and cardioversion was not required. Her home diltiazem was resumed and she was monitored overnight on telemetry. She did not have any recurrence of SVT. Labs overall reassuring. Patient without any recent illness, hypovolemia. CTA was negative for pulmonary embolism. At this time patient is feeling well and can be discharged home. Her HEART score is moderate at 4, she can consider getting outpatient stress test, although I do not believe this is required here in the hospital given her symptoms were due to SVT. She is advised to follow up with her cardiologist to consider diltiazem dose titration if she has any SVT recurrence. She is also to follow up with her PCP. She was also found to have iron deficiency and so iron supplementation is recommended, she states she has iron at home she can take.    Therefore, she is discharged in fair and  stable condition to home with close outpatient follow-up.        Discharge Date  8/15/2024    FOLLOW UP ITEMS POST DISCHARGE  Take medications as prescribed.  Follow up with PCP and cardiology.    DISCHARGE DIAGNOSES  Principal Problem:    Chest pain (POA: Yes)  Active Problems:    Dyslipidemia (Chronic) (POA: Yes)    GERD (gastroesophageal reflux disease) (Chronic) (POA: Yes)      Overview: associated with hiatal hernia.  The patient takes pantoprazole 40 mg twice           Microcytic anemia (POA: Unknown)    Lung nodule (POA: Unknown)      Overview: fb  PUL. NO FURTHER IMAGINGS as of Aug 2023      Plan:      1. Asthma/COPD is well controlled this time.  She is currently off       bronchodilators per ENT due to current use of sinus spray.  I will request       records from ENT to review.  Spirometry today is normal.  Continue Monalisa as       needed.                  [place] at next OV      2. BRIANA is not controlled when she continues to have intolerance issues.        She uses a Sally loop mask.  Reviewed proper use of sleep apnea therapy.      3. CT imaging is stable.  No further follow-up of lung nodules required at       this time.      4. History of GERD improved on PPI twice daily but she continues to note       having vomiting episodes 1 time per month due to excess reflux.      5. Hiatal hernia contributing to GERD.  Follow-up with GI for further       recommendations.      6. Encourage weight loss or healthy eating and regular exercise.      7. Follow-up with primary care for other health concerns.      8. Follow-up in 7 months with updated spirometry to review asthma/COPD       overlap and sleep apnea therapy, sooner if needed.                             Obese (POA: Yes)    Hypotension (POA: Unknown)    Tachycardia (POA: Unknown)    ACP (advance care planning) (POA: Unknown)  Resolved Problems:    * No resolved hospital problems. *      FOLLOW UP  Future Appointments   Date Time Provider Department Center    11/18/2024 10:20 AM Rudy Parsons M.D. [facility] [place]     Rudy Parsons M.D.  [address]  [facility] [address]  [phone]    Follow up in 1 week(s)        MEDICATIONS ON DISCHARGE     Medication List        START taking these medications        Instructions   ferrous sulfate 325 (65 Fe) MG tablet   Take 1 Tablet by mouth every day.  Dose: 325 mg            CHANGE how you take these medications        Instructions   amitriptyline 25 MG Tabs  What changed: when to take this  Commonly known as: Elavil   Take 1 Tablet by mouth every evening.  Dose: 25 mg            CONTINUE taking these medications        Instructions   albuterol 108 (90 Base) MCG/ACT Aers inhalation aerosol   Inhale 2 Puffs every 6 hours as needed for Shortness of Breath.  Dose: 2 Puff     aspirin 81 MG Chew chewable tablet  Commonly known as: Asa   Chew 81 mg every morning.  Dose: 81 mg     atorvastatin 10 MG Tabs  Commonly known as: Lipitor   TAKE 1 TABLET BY MOUTH EVERY DAY  Dose: 10 mg     Azelastine 137 MCG/SPRAY Soln  Commonly known as: Astelin   Administer 2 Sprays into affected nostril(S) every day.  Dose: 2 Spray     clobetasol 0.05 % Oint  Commonly known as: Temovate   APPLY A THIN LAYER ONCE DAILY FOR 3 WEEKS, THEN USE 2 TO 3 TIMES A WEEK     dilTIAZem  MG Cp24  Commonly known as: Cardizem CD   TAKE 1 CAPSULE BY MOUTH EVERY DAY  Dose: 240 mg     DULoxetine 60 MG Cpep delayed-release capsule  Commonly known as: Cymbalta   Take 1 Capsule by mouth every day.  Dose: 60 mg     fluticasone 110 MCG/ACT Aero  Commonly known as: Flovent HFA   Inhale 1 Puff 2 times a day.  Dose: 1 Puff     fluticasone 50 MCG/ACT nasal spray  Commonly known as: Flonase   SPRAY 1 SPRAY INTO EACH NOSTRIL ONCE A DAY     ondansetron 4 MG Tbdp  Commonly known as: Zofran ODT   Take 1 Tablet by mouth every 6 hours as needed for Nausea/Vomiting.  Dose: 4 mg     pantoprazole 40 MG Tbec  Commonly known as: Protonix   Take 1 Tablet by mouth 2 times a  day.  Dose: 40 mg     rizatriptan 5 MG tablet  Commonly known as: Maxalt   Take 1 Tablet by mouth one time as needed for Migraine.  Dose: 5 mg     telmisartan 40 MG Tabs  Commonly known as: Micardis   TAKE 1 TABLET BY MOUTH EVERY DAY  Dose: 40 mg     Trelegy Ellipta 100-62.5-25 MCG/ACT inhaler  Generic drug: fluticasone-umeclidinium-vilanterol   Inhale 1 Puff every day.  Dose: 1 Puff     VITAMIN B-12 PO   Take 1 Tablet by mouth 1 time a day as needed (shaking).  Dose: 1 Tablet     zonisamide 100 MG Caps  Commonly known as: Zonegran   Take 100 mg by mouth every day.  Dose: 100 mg              Allergies  Allergies   Allergen Reactions    Sumatriptan Swelling     Tongue swell    Clarithromycin Itching, Nausea and Swelling     Swelling/Itching/Nausea    Pcn [Penicillins] Itching, Nausea and Swelling     Swelling/Itching/Nausea     Shellfish Allergy Itching, Nausea and Swelling     Swelling/Itching/Nausea    Trazodone Swelling    Erythromycin Itching, Nausea and Swelling    Levofloxacin Myalgia       DIET  Orders Placed This Encounter   Procedures    Diet Order Diet: Cardiac; Miscellaneous modifications: (optional): No Decaf, No Caffeine(for test)     Standing Status:   Standing     Number of Occurrences:   1     Order Specific Question:   Diet:     Answer:   Cardiac [6]     Order Specific Question:   Miscellaneous modifications: (optional)     Answer:   No Decaf, No Caffeine(for test) [11]       ACTIVITY  As tolerated.  Weight bearing as tolerated    CONSULTATIONS  none    PROCEDURES  none    LABORATORY  Lab Results   Component Value Date    SODIUM 140 08/15/2024    POTASSIUM 4.0 08/15/2024    CHLORIDE 108 08/15/2024    CO2 22 08/15/2024    GLUCOSE 98 08/15/2024    BUN 14 08/15/2024    CREATININE 1.01 08/15/2024    CREATININE 0.89 04/27/2009        Lab Results   Component Value Date    WBC 8.5 08/15/2024    HEMOGLOBIN 11.0 (L) 08/15/2024    HEMATOCRIT 33.9 (L) 08/15/2024    PLATELETCT 311 08/15/2024        Total time of  the discharge process exceeds 35 minutes.

## 2024-08-16 ENCOUNTER — PATIENT OUTREACH (OUTPATIENT)
Dept: MEDICAL GROUP | Facility: PHYSICIAN GROUP | Age: 74
End: 2024-08-16
Payer: MEDICARE

## 2024-08-19 NOTE — PROGRESS NOTES
Abhi Rosado MD [place], James W Md ~ [facility] Clinical Support Pool 4 days ago       I sent prescriptions for another year, but she will need to find a provider after that to continue medications.      You  Abhi Rosado MD 4 days ago     DH  Please review. Mother requesting guanfacine refill. Last seen 05/11/2022. Patient is 18 yo.      Attempted to contact parent regarding Dr. Rosado's response. Parent has not answered. Left a voice message providing office contact information.    Transitional Care Management    Two attempts were made to contact this patient within two business days to review discharge per CMS guidelines, but were unsuccessful.  This patient, however still qualifies for a Transitional Care Management visit as they are being seen within the required time .  You therefore can code and charge appropriately for the TCM.

## 2024-08-22 ENCOUNTER — OFFICE VISIT (OUTPATIENT)
Dept: MEDICAL GROUP | Facility: PHYSICIAN GROUP | Age: 74
End: 2024-08-22
Payer: MEDICARE

## 2024-08-22 VITALS
WEIGHT: 170 LBS | OXYGEN SATURATION: 96 % | HEIGHT: 61 IN | HEART RATE: 81 BPM | BODY MASS INDEX: 32.1 KG/M2 | SYSTOLIC BLOOD PRESSURE: 130 MMHG | DIASTOLIC BLOOD PRESSURE: 78 MMHG | TEMPERATURE: 97.4 F

## 2024-08-22 DIAGNOSIS — E78.5 DYSLIPIDEMIA: Chronic | ICD-10-CM

## 2024-08-22 DIAGNOSIS — N18.31 HYPERTENSIVE KIDNEY DISEASE WITH STAGE 3A CHRONIC KIDNEY DISEASE: Chronic | ICD-10-CM

## 2024-08-22 DIAGNOSIS — I47.10 PAROXYSMAL SVT (SUPRAVENTRICULAR TACHYCARDIA) (HCC): Chronic | ICD-10-CM

## 2024-08-22 DIAGNOSIS — G47.33 OSA (OBSTRUCTIVE SLEEP APNEA): Chronic | ICD-10-CM

## 2024-08-22 DIAGNOSIS — I12.9 HYPERTENSIVE KIDNEY DISEASE WITH STAGE 3A CHRONIC KIDNEY DISEASE: Chronic | ICD-10-CM

## 2024-08-22 DIAGNOSIS — K21.9 GASTROESOPHAGEAL REFLUX DISEASE WITHOUT ESOPHAGITIS: Chronic | ICD-10-CM

## 2024-08-22 DIAGNOSIS — I48.0 PAROXYSMAL ATRIAL FIBRILLATION (HCC): Chronic | ICD-10-CM

## 2024-08-22 DIAGNOSIS — N18.31 STAGE 3A CHRONIC KIDNEY DISEASE: ICD-10-CM

## 2024-08-22 DIAGNOSIS — J44.89 ASTHMA-COPD OVERLAP SYNDROME (HCC): Chronic | ICD-10-CM

## 2024-08-22 RX ORDER — DILTIAZEM HYDROCHLORIDE 240 MG/1
240 CAPSULE, COATED, EXTENDED RELEASE ORAL DAILY
Qty: 90 CAPSULE | Refills: 3 | Status: SHIPPED | OUTPATIENT
Start: 2024-08-22

## 2024-08-22 ASSESSMENT — FIBROSIS 4 INDEX: FIB4 SCORE: 1.15

## 2024-08-22 NOTE — ASSESSMENT & PLAN NOTE
Chronic condition.  The patient is taking diltiazem.  Blood pressure has been well-controlled since discharge.  She also take Micardis 40 Mg daily.  No new symptoms reported.

## 2024-08-22 NOTE — ASSESSMENT & PLAN NOTE
Chronic condition.  Previous GFR in the 50s.  Advised the patient to avoid NSAID.  Currently the patient asymptomatic.

## 2024-08-22 NOTE — ASSESSMENT & PLAN NOTE
Chronic condition.  Patient being treated with Trelegy daily.  The patient uses albuterol as needed for rescue treatment.  Currently the patient denies shortness of breath or wheezing.

## 2024-08-22 NOTE — PROGRESS NOTES
Augusta Rodriguez is a 74 y.o. female who presents for Hospital Follow-up.    POST DISCHARGE CALL:  Patient Outreach    8/16/2024  [facility]     Francis Pratt R.N.     All Conversations  (Oldest Message First)August 16, 2024  Francis Pratt R.N.   to Augusta Rodriguez         8/16/24 11:05 AM  no answer pt's vm is full  August 19, 2024  Francis Pratt R.N.   to Augusta Rodriguez         8/19/24  9:47 AM  second call v/m full  Francis Pratt R.N.         8/19/24  9:48 AM  Note     Transitional Care Management     Two attempts were made to contact this patient within two business days to review discharge per CMS guidelines, but were unsuccessful.  This patient, however still qualifies for a Transitional Care Management visit as they are being seen within the required time .  You therefore can code and charge appropriately for the TCM.          HPI:   Recently hospitalized for     Paroxysmal atrial fibrillation (HCC)  Chronic condition.  Patient followed by cardiology service.  Patient presently taking aspirin and diltiazem.  Patient request Rx refill.    Asthma-COPD overlap syndrome (HCC)  Chronic condition.  Patient being treated with Trelegy daily.  The patient uses albuterol as needed for rescue treatment.  Currently the patient denies shortness of breath or wheezing.    Paroxysmal SVT (supraventricular tachycardia) (HCC)  Patient was admitted to the hospital from August 14 to August 15, 2024.    Patient presented to the hospital with chest pain and tachycardia.  Patient was noted with SVT.  Troponin normal.  EKG showed junctional tachycardia.  No acute ischemic changes.  Echo done 2 days prior showed ejection fraction 60%.  The patient was found to have hemodynamically unstable with systolic blood pressure in the 80s and SVT in the 150s.  Patient was given IV fluid and was prepped for cardioversion however the SVT resolved spontaneously.  Cardioversion was not done.  Patient was  subsequently discharged.  CTA was negative for pulmonary embolism.  The patient stated that she will contact her cardiologist for an appointment.    Stage 3a chronic kidney disease  Chronic condition.  Previous GFR in the 50s.  Advised the patient to avoid NSAID.  Currently the patient asymptomatic.    Dyslipidemia  Chronic condition.  The patient currently taking atorvastatin.  Tolerating medication well.    GERD (gastroesophageal reflux disease)  Chronic condition.  Patient is taking Protonix daily.  The patient denies nausea vomiting or dysphagia.    BRIANA (obstructive sleep apnea)  Chronic condition.  The patient has been using CPAP on nightly basis.  No new symptoms reported.    Hypertensive kidney disease with stage 3a chronic kidney disease  Chronic condition.  The patient is taking diltiazem.  Blood pressure has been well-controlled since discharge.  She also take Micardis 40 Mg daily.  No new symptoms reported.       Current medicines (including reconciliation performed today)  Current Outpatient Medications   Medication Sig Dispense Refill    dilTIAZem CD (CARDIZEM CD) 240 MG CAPSULE SR 24 HR Take 1 Capsule by mouth every day. 90 Capsule 3    amitriptyline (ELAVIL) 25 MG Tab Take 1 Tablet by mouth every evening. 30 Tablet 0    ferrous sulfate 325 (65 Fe) MG tablet Take 1 Tablet by mouth every day. 30 Tablet 0    DULoxetine (CYMBALTA) 60 MG Cap DR Particles delayed-release capsule Take 1 Capsule by mouth every day. 90 Capsule 3    telmisartan (MICARDIS) 40 MG Tab TAKE 1 TABLET BY MOUTH EVERY  Tablet 0    atorvastatin (LIPITOR) 10 MG Tab TAKE 1 TABLET BY MOUTH EVERY  Tablet 0    fluticasone-umeclidinium-vilanterol (TRELEGY ELLIPTA) 100-62.5-25 mcg/act inhaler Inhale 1 Puff every day. 180 Each 3    fluticasone (FLONASE) 50 MCG/ACT nasal spray SPRAY 1 SPRAY INTO EACH NOSTRIL ONCE A DAY      albuterol 108 (90 Base) MCG/ACT Aero Soln inhalation aerosol Inhale 2 Puffs every 6 hours as needed for  "Shortness of Breath. 8.5 Each 5    fluticasone (FLOVENT HFA) 110 MCG/ACT Aerosol Inhale 1 Puff 2 times a day. 2 Each 6    aspirin (ASA) 81 MG Chew Tab chewable tablet Chew 81 mg every morning.      pantoprazole (PROTONIX) 40 MG Tablet Delayed Response Take 1 Tablet by mouth 2 times a day. (Patient taking differently: Take 80 mg by mouth every morning.) 180 Tablet 3    ondansetron (ZOFRAN ODT) 4 MG TABLET DISPERSIBLE Take 1 Tablet by mouth every 6 hours as needed for Nausea/Vomiting. 10 Tablet 0    rizatriptan (MAXALT) 5 MG tablet Take 1 Tablet by mouth one time as needed for Migraine. 10 Tablet 5    zonisamide (ZONEGRAN) 100 MG Cap Take 100 mg by mouth every day.      Cyanocobalamin (VITAMIN B-12 PO) Take 1 Tablet by mouth 1 time a day as needed (shaking).      Azelastine (ASTELIN) 137 MCG/SPRAY Solution Administer 2 Sprays into affected nostril(S) every day.      clobetasol (TEMOVATE) 0.05 % Ointment APPLY A THIN LAYER ONCE DAILY FOR 3 WEEKS, THEN USE 2 TO 3 TIMES A WEEK 60 g 1     No current facility-administered medications for this visit.       Allergies:   Sumatriptan, Clarithromycin, Pcn [penicillins], Shellfish allergy, Trazodone, Erythromycin, and Levofloxacin    Social History     Tobacco Use    Smoking status: Never    Smokeless tobacco: Never   Vaping Use    Vaping status: Never Used   Substance Use Topics    Alcohol use: Not Currently     Comment: Stopped drinking 45 days ago 7/12/17 Going to     Drug use: No       ROS:  As noted in HPI otherwise negative    Objective:     /78 (BP Location: Left arm, Patient Position: Sitting, BP Cuff Size: Adult)   Pulse 81   Temp 36.3 °C (97.4 °F) (Temporal)   Ht 1.549 m (5' 1\")   Wt 77.1 kg (170 lb)   SpO2 96%    Body mass index is 32.12 kg/m².      General: alert in no apparent distress.  Cardiovascular: regular rate and rhythm  Pulmonary: lungs : no wheezing   Gastrointestinal: BS present.            Assessment and Plan:     1. Paroxysmal SVT " (supraventricular tachycardia) (HCC)  This is a new condition.  Patient was admitted to hospital as above.  Please refer to hospital record for detail.  Advised the patient to resume diltiazem to 40 Mg daily.  Sent to the pharmacy.  Patient to follow-up with cardiologist.    2. Paroxysmal atrial fibrillation (HCC)  - dilTIAZem CD (CARDIZEM CD) 240 MG CAPSULE SR 24 HR; Take 1 Capsule by mouth every day.  Dispense: 90 Capsule; Refill: 3  Chronic stable condition.  Clinically patient is in sinus rhythm.  Resume diltiazem to 40 Mg daily.  Patient to follow-up with cardiology as above    3. Hypertensive kidney disease with stage 3a chronic kidney disease  Chronic stable.  BP as above.  Continue diltiazem to 40 Mg daily and Micardis 40 Mg daily    4. Stage 3a chronic kidney disease  Chronic stable condition.  GFR in the 50s.  Stable.  Patient to avoid NSAID.  Continue to monitor.    5. Asthma-COPD overlap syndrome (HCC)  Chronic stable.  Continue Trelegy 1 puff daily.  The patient may use albuterol as needed.    6. Dyslipidemia  Chronic stable.  Continue atorvastatin 10 mg daily  Previous lipid panel result discussed with the patient    7. Gastroesophageal reflux disease without esophagitis  Chronic condition.  Stable.  Continue Protonix 40 Mg daily    8. BRIANA (obstructive sleep apnea)  Chronic stable.  Continue CPAP use nightly.    - Chart and discharge summary were reviewed.   - Hospitalization and results reviewed with patient.   - Medications reviewed including instructions regarding high risk medications, dosing and side effects.    Please note that this dictation was created using voice recognition software. I have made every reasonable attempt to correct obvious errors, but I expect that there are errors of grammar and possibly content that I did not discover before finalizing the note.    Face-to-face transitional care management services with HIGH (today's visit is within days post discharge & LACE+ score 59+)  medical decision complexity were provided.     LACE+ Historical Score Over Time (0-28: Low, 29-58: Medium, 59+: High): 72

## 2024-08-22 NOTE — ASSESSMENT & PLAN NOTE
Patient was admitted to the hospital from August 14 to August 15, 2024.    Patient presented to the hospital with chest pain and tachycardia.  Patient was noted with SVT.  Troponin normal.  EKG showed junctional tachycardia.  No acute ischemic changes.  Echo done 2 days prior showed ejection fraction 60%.  The patient was found to have hemodynamically unstable with systolic blood pressure in the 80s and SVT in the 150s.  Patient was given IV fluid and was prepped for cardioversion however the SVT resolved spontaneously.  Cardioversion was not done.  Patient was subsequently discharged.  CTA was negative for pulmonary embolism.  The patient stated that she will contact her cardiologist for an appointment.

## 2024-08-22 NOTE — ASSESSMENT & PLAN NOTE
Chronic condition.  Patient is taking Protonix daily.  The patient denies nausea vomiting or dysphagia.

## 2024-08-22 NOTE — ASSESSMENT & PLAN NOTE
Chronic condition.  Patient followed by cardiology service.  Patient presently taking aspirin and diltiazem.  Patient request Rx refill.

## 2024-08-29 NOTE — PROGRESS NOTES
CHW Kimi reached out to pt to follow up on provided resources. Unable to reach pt at this time. Unable to LVM. José Miguel helm SCP is in touch with pt regarding medication cost assistance. CHW to not follow at this time.

## 2024-08-30 RX ORDER — PANTOPRAZOLE SODIUM 40 MG/1
40 TABLET, DELAYED RELEASE ORAL 2 TIMES DAILY
Qty: 180 TABLET | Refills: 2 | Status: SHIPPED | OUTPATIENT
Start: 2024-08-30

## 2024-08-30 NOTE — TELEPHONE ENCOUNTER
Received request via: Pharmacy    Was the patient seen in the last year in this department? Yes    Does the patient have an active prescription (recently filled or refills available) for medication(s) requested? No    Ph[facility] Name: [facility]    Does the patient have [facility] and need 100-day supply? (This applies to ALL medications) Yes, quantity updated to 100 days

## 2024-09-19 ENCOUNTER — OFFICE VISIT (OUTPATIENT)
Dept: CARDIOLOGY | Facility: MEDICAL CENTER | Age: 74
End: 2024-09-19
Payer: MEDICARE

## 2024-09-19 VITALS
RESPIRATION RATE: 16 BRPM | WEIGHT: 165 LBS | DIASTOLIC BLOOD PRESSURE: 66 MMHG | BODY MASS INDEX: 31.15 KG/M2 | HEART RATE: 92 BPM | SYSTOLIC BLOOD PRESSURE: 100 MMHG | OXYGEN SATURATION: 97 % | HEIGHT: 61 IN

## 2024-09-19 DIAGNOSIS — N18.31 HYPERTENSIVE KIDNEY DISEASE WITH STAGE 3A CHRONIC KIDNEY DISEASE: Chronic | ICD-10-CM

## 2024-09-19 DIAGNOSIS — I47.10 PAROXYSMAL SVT (SUPRAVENTRICULAR TACHYCARDIA) (HCC): Chronic | ICD-10-CM

## 2024-09-19 DIAGNOSIS — G47.33 OSA (OBSTRUCTIVE SLEEP APNEA): Chronic | ICD-10-CM

## 2024-09-19 DIAGNOSIS — I12.9 HYPERTENSIVE KIDNEY DISEASE WITH STAGE 3A CHRONIC KIDNEY DISEASE: Chronic | ICD-10-CM

## 2024-09-19 DIAGNOSIS — E78.5 DYSLIPIDEMIA: Chronic | ICD-10-CM

## 2024-09-19 PROCEDURE — 99214 OFFICE O/P EST MOD 30 MIN: CPT

## 2024-09-19 PROCEDURE — 3074F SYST BP LT 130 MM HG: CPT

## 2024-09-19 PROCEDURE — 3078F DIAST BP <80 MM HG: CPT

## 2024-09-19 RX ORDER — LEVETIRACETAM 500 MG/1
500 TABLET ORAL 2 TIMES DAILY
COMMUNITY
Start: 2024-09-12

## 2024-09-19 ASSESSMENT — ENCOUNTER SYMPTOMS
MUSCULOSKELETAL NEGATIVE: 1
ORTHOPNEA: 0
DEPRESSION: 0
PALPITATIONS: 0
SHORTNESS OF BREATH: 0
NERVOUS/ANXIOUS: 0
PND: 0
NEUROLOGICAL NEGATIVE: 1
EYES NEGATIVE: 1
GASTROINTESTINAL NEGATIVE: 1
CONSTITUTIONAL NEGATIVE: 1

## 2024-09-19 ASSESSMENT — FIBROSIS 4 INDEX: FIB4 SCORE: 1.15

## 2024-09-19 NOTE — PROGRESS NOTES
Chief Complaint   Patient presents with    Supraventricular Tachycardia (SVT)    Hypertension    Hyperlipidemia       Subjective     Augusta Rodriguez is a 74 y.o. female who presents today for hospital follow up. They have a history of SVT, questionable history of atrial fibrillation, CKD stage IIIa, dyslipidemia, BRIANA.     They are accompanied today by her  Don     Seen by Dr. Ruff on 12/15/2022, at that visit patient was discontinued on her anticoagulation due to no evidence of A-fib recurrence and history of only one episode.  She was continued on her diltiazem and was requested to complete a lipid panel.      Seen by myself on 11/02/2023 she is doing well from cardiac standpoint. No symptoms of chest pain, palpitations, shortness of breath, exercise intolerance, or lower extremity edema.     Seen by myself on 3/20/2024 she is doing overall well, no chest pain no shortness of breath.  She does occasionally notice palpitations at night when she is sleeping    She was evaluated in the ER on 8/14/2024 with complaint of chest pain, found to have junctional tachycardia/SVT, treated with IV fluids, self converted prior to cardioversion.  Today 9/19/2024 she has been feeling well since her ER visit, no recurrence of chest pain or significant palpitations.    Past Medical History:   Diagnosis Date    Apnea, sleep     Arrhythmia     afib    Arthritis     osteo    Asthma     Atrial fibrillation (HCC)     Back pain     Bronchitis     Chickenpox     Constipation     Cough     Daytime sleepiness     Dental disorder     upper dentures    Earache     Frequent urination     Gasping for breath     GERD (gastroesophageal reflux disease) 2/27/2010    Mongolian measles     Heartburn     Hypertension     Impaired fasting glucose 2/27/2010    Incontinence of urine     Indigestion     Influenza     Mumps     Nasal drainage     Nausea     Osteopenia 2/24/2010    Osteoporosis     Other specified disorder of intestines     constipation  r/t meds    Restless leg syndrome     Scarlet fever     Shortness of breath     Sleep apnea     Snoring     Tremor, essential 2/24/2010    Urinary bladder disorder     Wears glasses     Wheezing     Whooping cough      Past Surgical History:   Procedure Laterality Date    INCISION AND DRAINAGE GENERAL N/A 10/18/2022    Procedure: INCISION AND DRAINAGE ABSCESS TONGUE;  Surgeon: Paz Suazo M.D.;  Location: SURGERY [facility];  Service: Ent    LUMBAR LAMINECTOMY DISKECTOMY Bilateral 2/4/2017    Procedure: LUMBAR LAMINECTOMY DISKECTOMY POSTERIOR L4-S1 ;  Surgeon: Emil Hitchcock M.D.;  Location: SURGERY [facility];  Service:     CARPAL TUNNEL ENDOSCOPIC  11/17/2012    Performed by Heriberto Quiñones M.D. at SURGERY [facility] ORS    TRIGGER FINGER RELEASE  11/17/2012    Performed by Heriberto Quiñones M.D. at SURGERY [facility]    HIP ARTHROSCOPY  2/23/2009    Performed by SHERLYN CALVO at SURGERY [facility] ORS    ACETABULAR OSTEOTOMY  2/23/2009    Performed by SHERLYN CALVO at SURGERY [facility] ORS    MASS EXCISION ORTHO  2/23/2009    Performed by SHERLYN CALVO at SURGERY [facility] ORS    HIP ARTHROSCOPY  2005    done at [facility]    HAJA BY LAPAROSCOPY  1997    HYSTERECTOMY, TOTAL ABDOMINAL  1979    oopherectomy lacie    BLADDER SUSPENSION      bladder sling    CARPAL TUNNEL RELEASE      HIP REPLACEMENT, TOTAL      HYSTERECTOMY LAPAROSCOPY      LAMINOTOMY      PRIMARY C SECTION  1970/1975     Family History   Problem Relation Age of Onset    GI Disease Father         Crohn's    Heart Disease Father         First MI age 30    Hypertension Father     Stroke Father     Hypertension Other     Cancer Brother         ESOPHAGEAL CANCER     Social History     Socioeconomic History    Marital status:      Spouse name: Not on file    Number of children: Not on file    Years of education: Not on file    Highest education level: Not on file   Occupational History    Not on file    Tobacco Use    Smoking status: Never    Smokeless tobacco: Never   Vaping Use    Vaping status: Never Used   Substance and Sexual Activity    Alcohol use: Not Currently     Comment: Stopped drinking 45 days ago 7/12/17 Going to     Drug use: No    Sexual activity: Not Currently   Other Topics Concern    Not on file   Social History Narrative    Not on file     Social Determinants of Health     Financial Resource Strain: High Risk (4/24/2024)    Overall Financial Resource Strain (CARDIA)     Difficulty of Paying Living Expenses: Hard   Food Insecurity: Food Insecurity Present (8/14/2024)    Hunger Vital Sign     Worried About Running Out of Food in the Last Year: Often true     Ran Out of Food in the Last Year: Often true   Transportation Needs: No Transportation Needs (8/14/2024)    PRAPARE - Transportation     Lack of Transportation (Medical): No     Lack of Transportation (Non-Medical): No   Physical Activity: Not on file   Stress: Not on file   Social Connections: Not on file   Intimate Partner Violence: Not At Risk (8/14/2024)    Humiliation, Afraid, Rape, and Kick questionnaire     Fear of Current or Ex-Partner: No     Emotionally Abused: No     Physically Abused: No     Sexually Abused: No   Housing Stability: High Risk (8/14/2024)    Housing Stability Vital Sign     Unable to Pay for Housing in the Last Year: Yes     Number of Times Moved in the Last Year: 1     Homeless in the Last Year: No     Allergies   Allergen Reactions    Sumatriptan Swelling     Tongue swell    Clarithromycin Itching, Nausea and Swelling     Swelling/Itching/Nausea    Pcn [Penicillins] Itching, Nausea and Swelling     Swelling/Itching/Nausea     Shellfish Allergy Itching, Nausea and Swelling     Swelling/Itching/Nausea    Trazodone Swelling    Erythromycin Itching, Nausea and Swelling    Levofloxacin Myalgia     Outpatient Encounter Medications as of 9/19/2024   Medication Sig Dispense Refill    levETIRAcetam (KEPPRA) 500 MG Tab  Take 500 mg by mouth 2 times a day.      pantoprazole (PROTONIX) 40 MG Tablet Delayed Response TAKE 1 TABLET BY MOUTH TWICE A  Tablet 2    dilTIAZem CD (CARDIZEM CD) 240 MG CAPSULE SR 24 HR Take 1 Capsule by mouth every day. 90 Capsule 3    amitriptyline (ELAVIL) 25 MG Tab Take 1 Tablet by mouth every evening. 30 Tablet 0    ferrous sulfate 325 (65 Fe) MG tablet Take 1 Tablet by mouth every day. 30 Tablet 0    Cyanocobalamin (VITAMIN B-12 PO) Take 1 Tablet by mouth 1 time a day as needed (shaking).      DULoxetine (CYMBALTA) 60 MG Cap DR Particles delayed-release capsule Take 1 Capsule by mouth every day. 90 Capsule 3    telmisartan (MICARDIS) 40 MG Tab TAKE 1 TABLET BY MOUTH EVERY  Tablet 0    atorvastatin (LIPITOR) 10 MG Tab TAKE 1 TABLET BY MOUTH EVERY  Tablet 0    fluticasone-umeclidinium-vilanterol (TRELEGY ELLIPTA) 100-62.5-25 mcg/act inhaler Inhale 1 Puff every day. 180 Each 3    Azelastine (ASTELIN) 137 MCG/SPRAY Solution Administer 2 Sprays into affected nostril(S) every day.      fluticasone (FLONASE) 50 MCG/ACT nasal spray SPRAY 1 SPRAY INTO EACH NOSTRIL ONCE A DAY      albuterol 108 (90 Base) MCG/ACT Aero Soln inhalation aerosol Inhale 2 Puffs every 6 hours as needed for Shortness of Breath. 8.5 Each 5    fluticasone (FLOVENT HFA) 110 MCG/ACT Aerosol Inhale 1 Puff 2 times a day. 2 Each 6    clobetasol (TEMOVATE) 0.05 % Ointment APPLY A THIN LAYER ONCE DAILY FOR 3 WEEKS, THEN USE 2 TO 3 TIMES A WEEK 60 g 1    aspirin (ASA) 81 MG Chew Tab chewable tablet Chew 81 mg every morning.      ondansetron (ZOFRAN ODT) 4 MG TABLET DISPERSIBLE Take 1 Tablet by mouth every 6 hours as needed for Nausea/Vomiting. 10 Tablet 0    rizatriptan (MAXALT) 5 MG tablet Take 1 Tablet by mouth one time as needed for Migraine. 10 Tablet 5    zonisamide (ZONEGRAN) 100 MG Cap Take 100 mg by mouth every day.       No facility-administered encounter medications on file as of 9/19/2024.     Review of Systems  "  Constitutional: Negative.    HENT: Negative.     Eyes: Negative.    Respiratory:  Negative for shortness of breath.    Cardiovascular:  Negative for chest pain, palpitations, orthopnea, leg swelling and PND.   Gastrointestinal: Negative.    Genitourinary: Negative.    Musculoskeletal: Negative.    Skin: Negative.    Neurological: Negative.    Endo/Heme/Allergies: Negative.    Psychiatric/Behavioral:  Negative for depression. The patient is not nervous/anxious.               Objective     /66 (BP Location: Left arm, Patient Position: Sitting, BP Cuff Size: Adult)   Pulse 92   Resp 16   Ht 1.549 m (5' 1\")   Wt 74.8 kg (165 lb)   SpO2 97%   BMI 31.18 kg/m²     Physical Exam  Constitutional:       Appearance: Normal appearance.   HENT:      Head: Normocephalic and atraumatic.   Neck:      Vascular: No JVD.   Cardiovascular:      Rate and Rhythm: Normal rate and regular rhythm.      Pulses: Normal pulses.      Heart sounds: Normal heart sounds. No murmur heard.     No friction rub.   Pulmonary:      Effort: Pulmonary effort is normal. No respiratory distress.      Breath sounds: Normal breath sounds.   Abdominal:      General: There is no distension.      Palpations: Abdomen is soft.   Musculoskeletal:      Right lower leg: No edema.      Left lower leg: No edema.   Skin:     General: Skin is warm and dry.   Neurological:      General: No focal deficit present.      Mental Status: She is alert and oriented to person, place, and time.   Psychiatric:         Mood and Affect: Mood normal.         Behavior: Behavior normal.            Lab Results   Component Value Date/Time    WBC 8.5 08/15/2024 04:12 AM    RBC 4.43 08/15/2024 04:12 AM    HEMOGLOBIN 11.0 (L) 08/15/2024 04:12 AM    HEMATOCRIT 33.9 (L) 08/15/2024 04:12 AM    MCV 76.5 (L) 08/15/2024 04:12 AM    MCH 24.8 (L) 08/15/2024 04:12 AM    MCHC 32.4 08/15/2024 04:12 AM    MPV 9.7 08/15/2024 04:12 AM    NEUTSPOLYS 60.50 08/14/2024 11:15 AM    LYMPHOCYTES " 22.60 08/14/2024 11:15 AM    MONOCYTES 10.10 08/14/2024 11:15 AM    EOSINOPHILS 5.10 08/14/2024 11:15 AM    BASOPHILS 1.10 08/14/2024 11:15 AM    HYPOCHROMIA 1+ 08/20/2013 08:14 AM      Lab Results   Component Value Date/Time    CHOLSTRLTOT 104 08/15/2024 04:12 AM    LDL 44 08/15/2024 04:12 AM    HDL 45 08/15/2024 04:12 AM    TRIGLYCERIDE 77 08/15/2024 04:12 AM       Lab Results   Component Value Date/Time    SODIUM 140 08/15/2024 04:12 AM    POTASSIUM 4.0 08/15/2024 04:12 AM    CHLORIDE 108 08/15/2024 04:12 AM    CO2 22 08/15/2024 04:12 AM    GLUCOSE 98 08/15/2024 04:12 AM    BUN 14 08/15/2024 04:12 AM    CREATININE 1.01 08/15/2024 04:12 AM    CREATININE 0.89 04/27/2009 12:00 AM     Lab Results   Component Value Date/Time    ALKPHOSPHAT 100 (H) 08/14/2024 11:15 AM    ASTSGOT 13 08/14/2024 11:15 AM    ALTSGPT 7 08/14/2024 11:15 AM    TBILIRUBIN 0.6 08/14/2024 11:15 AM      Echocardiogram 8/12/2024  CONCLUSIONS  Normal left ventricular systolic function. the left ventricular   ejection fraction is visually estimated to be 60%.   Grade I diastolic dysfunction.  Normal right ventricular size. Normal right ventricular systolic   function.  Mild tricuspid regurgitation. Estimated right ventricular systolic   pressure is 22 mmHg (normal).   The ascending aorta diameter is 3.7 cm.  Compared to the prior study on 10/22/2020, there are no significant   changes .    Assessment & Plan     1. Paroxysmal SVT (supraventricular tachycardia) (HCC)        2. BRIANA (obstructive sleep apnea)        3. Dyslipidemia            Medical Decision Making: Today's Assessment/Status/Plan:        SVT  -No recurrence of significant palpitations  -Continue diltiazem 240 mg daily   -She had a questionable history of atrial fibrillation, she was taken off of anticoagulation by Dr. Ruff, no evidence of A-fib recurrence, however we have discussed that patient could consider getting an Apple Watch to monitor for atrial fibrillation, she will let us  know if she has any concerning episodes on the Apple Watch  -For now we will proceed with watchful waiting, could consider up titration of diltiazem if needed in the future     Hyperlipidemia  -Most recent LDL 64  -Continue atorvastatin 10 mg daily  -Goal of less than 100  -Check lipid panel annually     Hypertension  - good control  - continue current regimen  - goal < 130/80    BRIANA   - she is supposed to use a CPAP but uses it infrequently      Follow up with PREM [place]ado in 1 year     This note was dictated using Dragon speech recognition software.

## 2024-09-20 ENCOUNTER — APPOINTMENT (OUTPATIENT)
Dept: RADIOLOGY | Facility: MEDICAL CENTER | Age: 74
End: 2024-09-20
Attending: STUDENT IN AN ORGANIZED HEALTH CARE EDUCATION/TRAINING PROGRAM
Payer: MEDICARE

## 2024-09-20 ENCOUNTER — HOSPITAL ENCOUNTER (EMERGENCY)
Facility: MEDICAL CENTER | Age: 74
End: 2024-09-20
Attending: STUDENT IN AN ORGANIZED HEALTH CARE EDUCATION/TRAINING PROGRAM
Payer: MEDICARE

## 2024-09-20 VITALS
BODY MASS INDEX: 31.72 KG/M2 | OXYGEN SATURATION: 94 % | RESPIRATION RATE: 18 BRPM | HEIGHT: 61 IN | TEMPERATURE: 97.8 F | WEIGHT: 167.99 LBS | DIASTOLIC BLOOD PRESSURE: 52 MMHG | HEART RATE: 65 BPM | SYSTOLIC BLOOD PRESSURE: 107 MMHG

## 2024-09-20 DIAGNOSIS — R42 LIGHTHEADEDNESS: Primary | ICD-10-CM

## 2024-09-20 LAB
ALBUMIN SERPL BCP-MCNC: 3.7 G/DL (ref 3.2–4.9)
ALBUMIN/GLOB SERPL: 1.9 G/DL
ALP SERPL-CCNC: 100 U/L (ref 30–99)
ALT SERPL-CCNC: 7 U/L (ref 2–50)
ANION GAP SERPL CALC-SCNC: 13 MMOL/L (ref 7–16)
AST SERPL-CCNC: 13 U/L (ref 12–45)
BILIRUB SERPL-MCNC: 0.2 MG/DL (ref 0.1–1.5)
BUN SERPL-MCNC: 21 MG/DL (ref 8–22)
CALCIUM ALBUM COR SERPL-MCNC: 9.1 MG/DL (ref 8.5–10.5)
CALCIUM SERPL-MCNC: 8.9 MG/DL (ref 8.5–10.5)
CHLORIDE SERPL-SCNC: 102 MMOL/L (ref 96–112)
CO2 SERPL-SCNC: 21 MMOL/L (ref 20–33)
CREAT SERPL-MCNC: 1.36 MG/DL (ref 0.5–1.4)
EKG IMPRESSION: NORMAL
ERYTHROCYTE [DISTWIDTH] IN BLOOD BY AUTOMATED COUNT: 46.9 FL (ref 35.9–50)
GFR SERPLBLD CREATININE-BSD FMLA CKD-EPI: 41 ML/MIN/1.73 M 2
GLOBULIN SER CALC-MCNC: 2 G/DL (ref 1.9–3.5)
GLUCOSE SERPL-MCNC: 106 MG/DL (ref 65–99)
HCT VFR BLD AUTO: 34.3 % (ref 37–47)
HGB BLD-MCNC: 10.9 G/DL (ref 12–16)
MAGNESIUM SERPL-MCNC: 2 MG/DL (ref 1.5–2.5)
MCH RBC QN AUTO: 24.9 PG (ref 27–33)
MCHC RBC AUTO-ENTMCNC: 31.8 G/DL (ref 32.2–35.5)
MCV RBC AUTO: 78.5 FL (ref 81.4–97.8)
PLATELET # BLD AUTO: 339 K/UL (ref 164–446)
PMV BLD AUTO: 9.3 FL (ref 9–12.9)
POTASSIUM SERPL-SCNC: 4 MMOL/L (ref 3.6–5.5)
PROT SERPL-MCNC: 5.7 G/DL (ref 6–8.2)
RBC # BLD AUTO: 4.37 M/UL (ref 4.2–5.4)
SODIUM SERPL-SCNC: 136 MMOL/L (ref 135–145)
TROPONIN T SERPL-MCNC: 11 NG/L (ref 6–19)
WBC # BLD AUTO: 10.2 K/UL (ref 4.8–10.8)

## 2024-09-20 PROCEDURE — 84484 ASSAY OF TROPONIN QUANT: CPT

## 2024-09-20 PROCEDURE — 71045 X-RAY EXAM CHEST 1 VIEW: CPT

## 2024-09-20 PROCEDURE — 80053 COMPREHEN METABOLIC PANEL: CPT

## 2024-09-20 PROCEDURE — 93005 ELECTROCARDIOGRAM TRACING: CPT | Performed by: STUDENT IN AN ORGANIZED HEALTH CARE EDUCATION/TRAINING PROGRAM

## 2024-09-20 PROCEDURE — 36415 COLL VENOUS BLD VENIPUNCTURE: CPT

## 2024-09-20 PROCEDURE — 99284 EMERGENCY DEPT VISIT MOD MDM: CPT

## 2024-09-20 PROCEDURE — 85027 COMPLETE CBC AUTOMATED: CPT

## 2024-09-20 PROCEDURE — 83735 ASSAY OF MAGNESIUM: CPT

## 2024-09-20 ASSESSMENT — PAIN DESCRIPTION - PAIN TYPE: TYPE: ACUTE PAIN

## 2024-09-20 ASSESSMENT — FIBROSIS 4 INDEX: FIB4 SCORE: 1.15

## 2024-09-21 NOTE — ED TRIAGE NOTES
"Chief Complaint   Patient presents with    Other     Pt noted her BP to be 70s/40s @ home.   Pt arrives normotensive with no medical complaint. \"I just wanted to see what my blood pressure was\"      /74   Pulse 86   Temp 36.6 °C (97.8 °F) (Temporal)   Resp 16   Ht 1.549 m (5' 1\")   Wt 76.2 kg (167 lb 15.9 oz)   SpO2 97%   BMI 31.74 kg/m²     Pt is alert/oriented and follows commands. Pt speaking in full sentences and responds appropriately to questions. No acute distress noted in triage and respirations are even and unlabored.      Pt placed in lobby and educated on triage process. Pt encouraged to alert staff for any changes in condition.    "

## 2024-09-21 NOTE — ED NOTES
Pt ambulated to room Yellow 57 with steady gait. Pt placed on SpO2 and BP monitors. Chart up for ERP.

## 2024-09-21 NOTE — ED NOTES
Patient provided discharge instructions. Patient verbalized understanding. Patient leaving ER in stable condition. Patient ambulatory with steady gait. Wristband and IV removed.

## 2024-09-21 NOTE — ED PROVIDER NOTES
"ED Provider Note    CHIEF COMPLAINT  Chief Complaint   Patient presents with    Other     Pt noted her BP to be 70s/40s @ home.   Pt arrives normotensive with no medical complaint. \"I just wanted to see what my blood pressure was\"        EXTERNAL RECORDS REVIEWED  Outpatient Notes from cardiology visit yesterday which patient was seen for her paroxysmal supraventricular tachycardia, hypertension and hyperlipidemia.  Recommendations were to continue diltiazem to 40 mg daily, not currently on anticoagulation    HPI/ROS  LIMITATION TO HISTORY   Select: : None  OUTSIDE HISTORIAN(S):  Significant other     Augusta Rodriguez is a 74 y.o. female who presents to the emergency department with chief complaint of feeling lightheaded, like she is about to pass out with stars in her vision.  She denies any associated palpitations, chest pain, shortness of breath.  Patient states she has a history of SVT, is currently on diltiazem 240 mg long-acting.  She states that she took her blood pressure at home and it was low, 70/30.  Patient denies any headaches, vision changes, focal numbness or weakness.  Patient has not had any chest pain, cough, fever or infectious symptoms.  She denies any urinary symptoms.  Patient was mostly worried about her blood pressure, repeat blood pressure measurement here is largely normal, 103/65 with no significant tachycardia.    PAST MEDICAL HISTORY   has a past medical history of Apnea, sleep, Arrhythmia, Arthritis, Asthma, Atrial fibrillation (HCC), Back pain, Bronchitis, Chickenpox, Constipation, Cough, Daytime sleepiness, Dental disorder, Earache, Frequent urination, Gasping for breath, GERD (gastroesophageal reflux disease) (2/27/2010), Slovenian measles, Heartburn, Hypertension, Impaired fasting glucose (2/27/2010), Incontinence of urine, Indigestion, Influenza, Mumps, Nasal drainage, Nausea, Osteopenia (2/24/2010), Osteoporosis, Other specified disorder of intestines, Restless leg syndrome, " Scarlet fever, Shortness of breath, Sleep apnea, Snoring, Tremor, essential (2/24/2010), Urinary bladder disorder, Wears glasses, Wheezing, and Whooping cough.    SURGICAL HISTORY   has a past surgical history that includes bladder suspension; stephon by laparoscopy (1997); primary c section (1970/1975); hysterectomy, total abdominal (1979); hip arthroscopy (2/23/2009); acetabular osteotomy (2/23/2009); mass excision ortho (2/23/2009); carpal tunnel endoscopic (11/17/2012); trigger finger release (11/17/2012); lumbar laminectomy diskectomy (Bilateral, 2/4/2017); hip arthroscopy (2005); laminotomy; carpal tunnel release; hip replacement, total; hysterectomy laparoscopy; and incision and drainage general (N/A, 10/18/2022).    FAMILY HISTORY  Family History   Problem Relation Age of Onset    GI Disease Father         Crohn's    Heart Disease Father         First MI age 30    Hypertension Father     Stroke Father     Hypertension Other     Cancer Brother         ESOPHAGEAL CANCER       SOCIAL HISTORY  Social History     Tobacco Use    Smoking status: Never    Smokeless tobacco: Never   Vaping Use    Vaping status: Never Used   Substance and Sexual Activity    Alcohol use: Not Currently     Comment: Stopped drinking 45 days ago 7/12/17 Going to     Drug use: No    Sexual activity: Not Currently       CURRENT MEDICATIONS  Home Medications       Reviewed by Esperanza Rader R.N. (Registered Nurse) on 09/20/24 at 1818  Med List Status: Not Addressed     Medication Last Dose Status   albuterol 108 (90 Base) MCG/ACT Aero Soln inhalation aerosol  Active   amitriptyline (ELAVIL) 25 MG Tab  Active   aspirin (ASA) 81 MG Chew Tab chewable tablet  Active   atorvastatin (LIPITOR) 10 MG Tab  Active   Azelastine (ASTELIN) 137 MCG/SPRAY Solution  Active   clobetasol (TEMOVATE) 0.05 % Ointment  Active   Cyanocobalamin (VITAMIN B-12 PO)  Active   dilTIAZem CD (CARDIZEM CD) 240 MG CAPSULE SR 24 HR  Active   DULoxetine (CYMBALTA) 60 MG  "Cap DR Particles delayed-release capsule  Active   ferrous sulfate 325 (65 Fe) MG tablet  Active   fluticasone (FLONASE) 50 MCG/ACT nasal spray  Active   fluticasone (FLOVENT HFA) 110 MCG/ACT Aerosol  Active   fluticasone-umeclidinium-vilanterol (TRELEGY ELLIPTA) 100-62.5-25 mcg/act inhaler  Active   levETIRAcetam (KEPPRA) 500 MG Tab  Active   ondansetron (ZOFRAN ODT) 4 MG TABLET DISPERSIBLE  Active   pantoprazole (PROTONIX) 40 MG Tablet Delayed Response  Active   rizatriptan (MAXALT) 5 MG tablet  Active   telmisartan (MICARDIS) 40 MG Tab  Active   zonisamide (ZONEGRAN) 100 MG Cap  Active                    ALLERGIES  Allergies   Allergen Reactions    Sumatriptan Swelling     Tongue swell    Clarithromycin Itching, Nausea and Swelling     Swelling/Itching/Nausea    Pcn [Penicillins] Itching, Nausea and Swelling     Swelling/Itching/Nausea     Shellfish Allergy Itching, Nausea and Swelling     Swelling/Itching/Nausea    Trazodone Swelling    Erythromycin Itching, Nausea and Swelling    Levofloxacin Myalgia       PHYSICAL EXAM  VITAL SIGNS: /65   Pulse 74   Temp 36.6 °C (97.8 °F) (Temporal)   Resp 18   Ht 1.549 m (5' 1\")   Wt 76.2 kg (167 lb 15.9 oz)   SpO2 94%   BMI 31.74 kg/m²    Constitutional: Awake and alert  HENT: Normal inspection  Eyes: Normal inspection  Neck: Grossly normal range of motion.  Cardiovascular: Normal heart rate, Normal rhythm.  Symmetric peripheral pulses.   Thorax & Lungs: No respiratory distress, No wheezing, No rales, No rhonchi, No chest tenderness.   Abdomen: Bowel sounds normal, soft, non-distended, nontender, no mass  Skin: No obvious rash.  Back: No tenderness, No CVA tenderness.   Extremities: No clubbing, cyanosis, edema, no Homans or cords.  Neurologic: Grossly normal, no cranial nerve deficits, intact strength and sensation in bilateral upper and lower extremities, normal gait  Psychiatric: Normal for situation      EKG/LABS  Results for orders placed or performed " during the hospital encounter of 24   EKG (NOW)   Result Value Ref Range    Report       [facility] Emergency Dept.    Test Date:  2024  Pt Name:    CONNIE NORIEGA                    Department: ER  MRN:        [id number]                      Room:        57  Gender:     Female                       Technician: [id number]  :        1950                   Requested By:TUAN MARTEL  Order #:    [id number]                    Reading MD:    Measurements  Intervals                                Axis  Rate:       73                           P:          33  SD:         160                          QRS:        14  QRSD:       101                          T:          70  QT:         425  QTc:        469    Interpretive Statements  Sinus rhythm  Nonspecific T abnrm, anterolateral leads  Compared to ECG 2024 12:06:07  Atrial premature complex(es) no longer present         I have independently interpreted this EKG    Patient's labs reviewed, hemoglobin 10.9, similar to baseline.  Largely normal metabolic panel and electrolytes including magnesium.  Troponin normal.    RADIOLOGY/PROCEDURES   I have independently interpreted the diagnostic imaging associated with this visit and am waiting the final reading from the radiologist.   My preliminary interpretation is as follows: Chest x-ray did not show any focal consolidation, pneumothorax, wide mediastinum.    Radiologist interpretation:  DX-CHEST-PORTABLE (1 VIEW)   Final Result      No acute cardiac or pulmonary abnormalities are identified.          COURSE & MEDICAL DECISION MAKING    ASSESSMENT, COURSE AND PLAN  Care Narrative: 74-year-old female with history of paroxysmal supraventricular tachycardia, dyslipidemia, hypertension, BRIANA presenting to the emergency department for lightheadedness and low blood pressure.  Patient denies any associated chest pain, palpitations.  Here patient is hemodynamically stable and largely asymptomatic.   Physical exam is largely unremarkable.  Will order CBC, CMP, troponin, EKG, chest x-ray, urinalysis.  Will continue to monitor the patient, p.o. hydrate.    Patient's labs reviewed, largely unremarkable, normal troponin.  EKG interpretation as above, no ischemic changes or signs of arrhythmia.  Patient remained hemodynamically stable during observation in the emergency department, tolerating p.o.'s.  Patient had no more symptoms while here.  She states that she wishes to go home.  Had shared discussion and shared decision making regarding inpatient versus outpatient follow-up.  Patient wishes to go home and follow-up outpatient with her specialists.  Advised patient to continue medications, and encourage p.o. hydration, given strict return precautions and anticipatory guidance which patient understood at time of discharge.  Differential diagnosis considered.  Patient had likely presyncopal episode.  Doubt acute ischemic stroke, intracranial hemorrhage, cardiogenic syncope, malignant arrhythmia, significant electrolyte disturbance, PE.          ADDITIONAL PROBLEMS MANAGED  none    DISPOSITION AND DISCUSSIONS  I have discussed management of the patient with the following physicians and JULISA's: None    Discussion of management with other QHP or appropriate source(s): None     Escalation of care considered, and ultimately not performed:acute inpatient care management, however at this time, the patient is most appropriate for outpatient management    Barriers to care at this time, including but not limited to:  None .       FINAL DIAGNOSIS  1. Lightheadedness Acute        Electronically signed by: Hever Baca M.D., 9/20/2024 7:01 PM

## 2024-09-21 NOTE — DISCHARGE INSTRUCTIONS
You are seen and evaluated for your concerns today in the emergency department.  Your labs were largely normal, consistent with your baseline.  This did not show any evidence of heart damage or electrolyte abnormality.  Your EKG was also not concerning for any heart attack or malignant arrhythmia.  You were observed in the emergency department had normal blood pressure and other vital signs while here.  You did not have any more symptoms in the emergency department.  I encourage you to follow-up with your specialist and PCP as needed, increase your hydration and return to the emergency department for any other concerning symptoms.

## 2024-09-23 RX ORDER — ATORVASTATIN CALCIUM 10 MG/1
10 TABLET, FILM COATED ORAL DAILY
Qty: 100 TABLET | Refills: 0 | Status: SHIPPED | OUTPATIENT
Start: 2024-09-23

## 2024-09-23 NOTE — TELEPHONE ENCOUNTER
Received request via: Pharmacy    Was the patient seen in the last year in this department? Yes    Does the patient have an active prescription (recently filled or refills available) for medication(s) requested? No        Does the patient have senior living Plus and need 100-day supply? (This applies to ALL medications) Yes, quantity updated to 100 days

## 2024-10-05 DIAGNOSIS — I10 HYPERTENSION, UNSPECIFIED TYPE: ICD-10-CM

## 2024-10-07 RX ORDER — TELMISARTAN 40 MG/1
40 TABLET ORAL DAILY
Qty: 100 TABLET | Refills: 0 | Status: SHIPPED | OUTPATIENT
Start: 2024-10-07

## 2024-11-12 ENCOUNTER — HOSPITAL ENCOUNTER (OUTPATIENT)
Dept: LAB | Facility: MEDICAL CENTER | Age: 74
End: 2024-11-12
Attending: INTERNAL MEDICINE
Payer: MEDICARE

## 2024-11-12 DIAGNOSIS — I12.9 HYPERTENSIVE KIDNEY DISEASE WITH STAGE 3A CHRONIC KIDNEY DISEASE: ICD-10-CM

## 2024-11-12 DIAGNOSIS — N18.31 HYPERTENSIVE KIDNEY DISEASE WITH STAGE 3A CHRONIC KIDNEY DISEASE: ICD-10-CM

## 2024-11-12 DIAGNOSIS — R73.03 PREDIABETES: Chronic | ICD-10-CM

## 2024-11-12 DIAGNOSIS — E78.5 DYSLIPIDEMIA: Chronic | ICD-10-CM

## 2024-11-12 LAB
BASOPHILS # BLD AUTO: 0.7 % (ref 0–1.8)
BASOPHILS # BLD: 0.06 K/UL (ref 0–0.12)
EOSINOPHIL # BLD AUTO: 0.51 K/UL (ref 0–0.51)
EOSINOPHIL NFR BLD: 6.2 % (ref 0–6.9)
ERYTHROCYTE [DISTWIDTH] IN BLOOD BY AUTOMATED COUNT: 45.9 FL (ref 35.9–50)
EST. AVERAGE GLUCOSE BLD GHB EST-MCNC: 131 MG/DL
HBA1C MFR BLD: 6.2 % (ref 4–5.6)
HCT VFR BLD AUTO: 38.6 % (ref 37–47)
HGB BLD-MCNC: 12.2 G/DL (ref 12–16)
IMM GRANULOCYTES # BLD AUTO: 0.03 K/UL (ref 0–0.11)
IMM GRANULOCYTES NFR BLD AUTO: 0.4 % (ref 0–0.9)
LYMPHOCYTES # BLD AUTO: 1.44 K/UL (ref 1–4.8)
LYMPHOCYTES NFR BLD: 17.5 % (ref 22–41)
MCH RBC QN AUTO: 24.8 PG (ref 27–33)
MCHC RBC AUTO-ENTMCNC: 31.6 G/DL (ref 32.2–35.5)
MCV RBC AUTO: 78.5 FL (ref 81.4–97.8)
MONOCYTES # BLD AUTO: 0.74 K/UL (ref 0–0.85)
MONOCYTES NFR BLD AUTO: 9 % (ref 0–13.4)
NEUTROPHILS # BLD AUTO: 5.46 K/UL (ref 1.82–7.42)
NEUTROPHILS NFR BLD: 66.2 % (ref 44–72)
NRBC # BLD AUTO: 0 K/UL
NRBC BLD-RTO: 0 /100 WBC (ref 0–0.2)
PLATELET # BLD AUTO: 324 K/UL (ref 164–446)
PMV BLD AUTO: 10.1 FL (ref 9–12.9)
RBC # BLD AUTO: 4.92 M/UL (ref 4.2–5.4)
WBC # BLD AUTO: 8.2 K/UL (ref 4.8–10.8)

## 2024-11-12 PROCEDURE — 85025 COMPLETE CBC W/AUTO DIFF WBC: CPT

## 2024-11-12 PROCEDURE — 82043 UR ALBUMIN QUANTITATIVE: CPT

## 2024-11-12 PROCEDURE — 83036 HEMOGLOBIN GLYCOSYLATED A1C: CPT

## 2024-11-12 PROCEDURE — 36415 COLL VENOUS BLD VENIPUNCTURE: CPT

## 2024-11-12 PROCEDURE — 80061 LIPID PANEL: CPT

## 2024-11-12 PROCEDURE — 84460 ALANINE AMINO (ALT) (SGPT): CPT

## 2024-11-12 PROCEDURE — 82570 ASSAY OF URINE CREATININE: CPT

## 2024-11-13 LAB
ALT SERPL-CCNC: 13 U/L (ref 2–50)
CHOLEST SERPL-MCNC: 130 MG/DL (ref 100–199)
CREAT UR-MCNC: 70.85 MG/DL
HDLC SERPL-MCNC: 40 MG/DL
LDLC SERPL CALC-MCNC: 62 MG/DL
MICROALBUMIN UR-MCNC: <1.2 MG/DL
MICROALBUMIN/CREAT UR: NORMAL MG/G (ref 0–30)
TRIGL SERPL-MCNC: 140 MG/DL (ref 0–149)

## 2024-11-18 ENCOUNTER — OFFICE VISIT (OUTPATIENT)
Dept: MEDICAL GROUP | Facility: PHYSICIAN GROUP | Age: 74
End: 2024-11-18
Payer: MEDICARE

## 2024-11-18 VITALS
OXYGEN SATURATION: 93 % | DIASTOLIC BLOOD PRESSURE: 82 MMHG | BODY MASS INDEX: 31.45 KG/M2 | WEIGHT: 166.6 LBS | SYSTOLIC BLOOD PRESSURE: 120 MMHG | TEMPERATURE: 97.3 F | HEIGHT: 61 IN | HEART RATE: 84 BPM

## 2024-11-18 DIAGNOSIS — K63.5 POLYP OF COLON, UNSPECIFIED PART OF COLON, UNSPECIFIED TYPE: ICD-10-CM

## 2024-11-18 DIAGNOSIS — J44.89 ASTHMA-COPD OVERLAP SYNDROME (HCC): Chronic | ICD-10-CM

## 2024-11-18 DIAGNOSIS — I48.0 PAROXYSMAL ATRIAL FIBRILLATION (HCC): Chronic | ICD-10-CM

## 2024-11-18 DIAGNOSIS — R73.03 PREDIABETES: Chronic | ICD-10-CM

## 2024-11-18 DIAGNOSIS — G43.909 MIGRAINE WITHOUT STATUS MIGRAINOSUS, NOT INTRACTABLE, UNSPECIFIED MIGRAINE TYPE: Chronic | ICD-10-CM

## 2024-11-18 DIAGNOSIS — E78.5 DYSLIPIDEMIA: Chronic | ICD-10-CM

## 2024-11-18 DIAGNOSIS — Z12.31 ENCOUNTER FOR SCREENING MAMMOGRAM FOR MALIGNANT NEOPLASM OF BREAST: ICD-10-CM

## 2024-11-18 DIAGNOSIS — F32.5 MAJOR DEPRESSION IN REMISSION (HCC): Chronic | ICD-10-CM

## 2024-11-18 PROBLEM — R07.9 CHEST PAIN: Status: RESOLVED | Noted: 2024-08-14 | Resolved: 2024-11-18

## 2024-11-18 PROBLEM — D50.9 MICROCYTIC ANEMIA: Status: RESOLVED | Noted: 2018-06-07 | Resolved: 2024-11-18

## 2024-11-18 PROBLEM — R00.0 TACHYCARDIA: Status: RESOLVED | Noted: 2024-08-14 | Resolved: 2024-11-18

## 2024-11-18 PROBLEM — B02.29 POSTHERPETIC NEURALGIA: Status: RESOLVED | Noted: 2022-07-05 | Resolved: 2024-11-18

## 2024-11-18 PROBLEM — I95.9 HYPOTENSION: Status: RESOLVED | Noted: 2024-08-14 | Resolved: 2024-11-18

## 2024-11-18 PROCEDURE — 3074F SYST BP LT 130 MM HG: CPT | Performed by: INTERNAL MEDICINE

## 2024-11-18 PROCEDURE — 99214 OFFICE O/P EST MOD 30 MIN: CPT | Performed by: INTERNAL MEDICINE

## 2024-11-18 PROCEDURE — 3079F DIAST BP 80-89 MM HG: CPT | Performed by: INTERNAL MEDICINE

## 2024-11-18 RX ORDER — SULFAMETHOXAZOLE AND TRIMETHOPRIM 800; 160 MG/1; MG/1
1 TABLET ORAL 2 TIMES DAILY
Qty: 10 TABLET | Refills: 0 | Status: SHIPPED | OUTPATIENT
Start: 2024-11-18

## 2024-11-18 ASSESSMENT — FIBROSIS 4 INDEX: FIB4 SCORE: 0.81

## 2024-11-18 NOTE — ASSESSMENT & PLAN NOTE
Chronic condition.  The patient being treated with atorvastatin.  Recent lipid panel result discussed with the patient.  Patient tolerating medication well.

## 2024-11-18 NOTE — ASSESSMENT & PLAN NOTE
Condition.  The patient followed by neurology service.  Patient currently taking amitriptyline and rizatriptan as needed.  Currently the patient asymptomatic.  No new symptoms reported.

## 2024-11-18 NOTE — ASSESSMENT & PLAN NOTE
Chronic condition.  Colonoscopy completed 2022.  2 polyps were removed.  GI recommend to repeat the study in 2032.  Currently the patient denies fever chills abdominal pain change in bowel movement or GI bleeding

## 2024-11-18 NOTE — ASSESSMENT & PLAN NOTE
This is a chronic condition.  Patient being treated with Trelegy 1 puff daily.  The patient is using albuterol as needed.  She denies shortness of breath or wheezing.

## 2024-11-18 NOTE — ASSESSMENT & PLAN NOTE
Chronic condition.  The patient presently taking aspirin and diltiazem.  The patient denies chest pain shortness of breath or palpitation.

## 2024-11-18 NOTE — ASSESSMENT & PLAN NOTE
Chronic condition.  The patient being treated with duloxetine.  Tolerating medication well.  The patient denies SI.

## 2024-11-18 NOTE — PROGRESS NOTES
PRIMARY CARE CLINIC VISIT        Chief Complaint   Patient presents with    Follow-Up     Check up       Atrial fibrillation  Depression  Asthma  Hyperlipidemia  Migraine  Prediabetes  Lab test result  Colon polyp  Request mammogram  Medication review        History of Present Illness     Paroxysmal atrial fibrillation (HCC)  Chronic condition.  The patient presently taking aspirin and diltiazem.  The patient denies chest pain shortness of breath or palpitation.    Major depression in remission (HCC)  Chronic condition.  The patient being treated with duloxetine.  Tolerating medication well.  The patient denies SI.    Asthma-COPD overlap syndrome (HCC)  This is a chronic condition.  Patient being treated with Trelegy 1 puff daily.  The patient is using albuterol as needed.  She denies shortness of breath or wheezing.    Dyslipidemia  Chronic condition.  The patient being treated with atorvastatin.  Recent lipid panel result discussed with the patient.  Patient tolerating medication well.    Migraine  Condition.  The patient followed by neurology service.  Patient currently taking amitriptyline and rizatriptan as needed.  Currently the patient asymptomatic.  No new symptoms reported.    Prediabetes  This is a chronic condition.  The patient is currently on diet therapy.  Lab test result discussed with the patient.    Colon polyps  Chronic condition.  Colonoscopy completed 2022.  2 polyps were removed.  GI recommend to repeat the study in 2032.  Currently the patient denies fever chills abdominal pain change in bowel movement or GI bleeding    Current Outpatient Medications on File Prior to Visit   Medication Sig Dispense Refill    telmisartan (MICARDIS) 40 MG Tab TAKE 1 TABLET BY MOUTH EVERY  Tablet 0    atorvastatin (LIPITOR) 10 MG Tab TAKE 1 TABLET BY MOUTH EVERY  Tablet 0    levETIRAcetam (KEPPRA) 500 MG Tab Take 500 mg by mouth 2 times a day.      pantoprazole (PROTONIX) 40 MG Tablet Delayed  Response TAKE 1 TABLET BY MOUTH TWICE A  Tablet 2    dilTIAZem CD (CARDIZEM CD) 240 MG CAPSULE SR 24 HR Take 1 Capsule by mouth every day. 90 Capsule 3    amitriptyline (ELAVIL) 25 MG Tab Take 1 Tablet by mouth every evening. 30 Tablet 0    ferrous sulfate 325 (65 Fe) MG tablet Take 1 Tablet by mouth every day. 30 Tablet 0    Cyanocobalamin (VITAMIN B-12 PO) Take 1 Tablet by mouth 1 time a day as needed (shaking).      DULoxetine (CYMBALTA) 60 MG Cap DR Particles delayed-release capsule Take 1 Capsule by mouth every day. 90 Capsule 3    fluticasone-umeclidinium-vilanterol (TRELEGY ELLIPTA) 100-62.5-25 mcg/act inhaler Inhale 1 Puff every day. 180 Each 3    Azelastine (ASTELIN) 137 MCG/SPRAY Solution Administer 2 Sprays into affected nostril(S) every day.      fluticasone (FLONASE) 50 MCG/ACT nasal spray SPRAY 1 SPRAY INTO EACH NOSTRIL ONCE A DAY      albuterol 108 (90 Base) MCG/ACT Aero Soln inhalation aerosol Inhale 2 Puffs every 6 hours as needed for Shortness of Breath. 8.5 Each 5    fluticasone (FLOVENT HFA) 110 MCG/ACT Aerosol Inhale 1 Puff 2 times a day. 2 Each 6    clobetasol (TEMOVATE) 0.05 % Ointment APPLY A THIN LAYER ONCE DAILY FOR 3 WEEKS, THEN USE 2 TO 3 TIMES A WEEK 60 g 1    aspirin (ASA) 81 MG Chew Tab chewable tablet Chew 81 mg every morning.      ondansetron (ZOFRAN ODT) 4 MG TABLET DISPERSIBLE Take 1 Tablet by mouth every 6 hours as needed for Nausea/Vomiting. 10 Tablet 0    rizatriptan (MAXALT) 5 MG tablet Take 1 Tablet by mouth one time as needed for Migraine. 10 Tablet 5    zonisamide (ZONEGRAN) 100 MG Cap Take 100 mg by mouth every day.       No current facility-administered medications on file prior to visit.        Allergies: Sumatriptan, Clarithromycin, Pcn [penicillins], Shellfish allergy, Trazodone, Erythromycin, and Levofloxacin    Current Outpatient Medications Ordered in Epic   Medication Sig Dispense Refill    sulfamethoxazole-trimethoprim (BACTRIM DS) 800-160 MG tablet Take 1  Tablet by mouth 2 times a day. 10 Tablet 0    telmisartan (MICARDIS) 40 MG Tab TAKE 1 TABLET BY MOUTH EVERY  Tablet 0    atorvastatin (LIPITOR) 10 MG Tab TAKE 1 TABLET BY MOUTH EVERY  Tablet 0    levETIRAcetam (KEPPRA) 500 MG Tab Take 500 mg by mouth 2 times a day.      pantoprazole (PROTONIX) 40 MG Tablet Delayed Response TAKE 1 TABLET BY MOUTH TWICE A  Tablet 2    dilTIAZem CD (CARDIZEM CD) 240 MG CAPSULE SR 24 HR Take 1 Capsule by mouth every day. 90 Capsule 3    amitriptyline (ELAVIL) 25 MG Tab Take 1 Tablet by mouth every evening. 30 Tablet 0    ferrous sulfate 325 (65 Fe) MG tablet Take 1 Tablet by mouth every day. 30 Tablet 0    Cyanocobalamin (VITAMIN B-12 PO) Take 1 Tablet by mouth 1 time a day as needed (shaking).      DULoxetine (CYMBALTA) 60 MG Cap DR Particles delayed-release capsule Take 1 Capsule by mouth every day. 90 Capsule 3    fluticasone-umeclidinium-vilanterol (TRELEGY ELLIPTA) 100-62.5-25 mcg/act inhaler Inhale 1 Puff every day. 180 Each 3    Azelastine (ASTELIN) 137 MCG/SPRAY Solution Administer 2 Sprays into affected nostril(S) every day.      fluticasone (FLONASE) 50 MCG/ACT nasal spray SPRAY 1 SPRAY INTO EACH NOSTRIL ONCE A DAY      albuterol 108 (90 Base) MCG/ACT Aero Soln inhalation aerosol Inhale 2 Puffs every 6 hours as needed for Shortness of Breath. 8.5 Each 5    fluticasone (FLOVENT HFA) 110 MCG/ACT Aerosol Inhale 1 Puff 2 times a day. 2 Each 6    clobetasol (TEMOVATE) 0.05 % Ointment APPLY A THIN LAYER ONCE DAILY FOR 3 WEEKS, THEN USE 2 TO 3 TIMES A WEEK 60 g 1    aspirin (ASA) 81 MG Chew Tab chewable tablet Chew 81 mg every morning.      ondansetron (ZOFRAN ODT) 4 MG TABLET DISPERSIBLE Take 1 Tablet by mouth every 6 hours as needed for Nausea/Vomiting. 10 Tablet 0    rizatriptan (MAXALT) 5 MG tablet Take 1 Tablet by mouth one time as needed for Migraine. 10 Tablet 5    zonisamide (ZONEGRAN) 100 MG Cap Take 100 mg by mouth every day.       No current  Epic-ordered facility-administered medications on file.       Past Medical History:   Diagnosis Date    Apnea, sleep     Arrhythmia     afib    Arthritis     osteo    Asthma     Atrial fibrillation (HCC)     Back pain     Bronchitis     Chickenpox     Constipation     Cough     Daytime sleepiness     Dental disorder     upper dentures    Earache     Frequent urination     Gasping for breath     GERD (gastroesophageal reflux disease) 2/27/2010    Sl[place] measles     Heartburn     Hypertension     Impaired fasting glucose 2/27/2010    Incontinence of urine     Indigestion     Influenza     Mumps     Nasal drainage     Nausea     Osteopenia 2/24/2010    Osteoporosis     Other specified disorder of intestines     constipation r/t meds    Restless leg syndrome     Scarlet fever     Shortness of breath     Sleep apnea     Snoring     Tremor, essential 2/24/2010    Urinary bladder disorder     Wears glasses     Wheezing     Whooping cough        Past Surgical History:   Procedure Laterality Date    INCISION AND DRAINAGE GENERAL N/A 10/18/2022    Procedure: INCISION AND DRAINAGE ABSCESS TONGUE;  Surgeon: Paz Suazo M.D.;  Location: [facility];  Service: Ent    LUMBAR LAMINECTOMY DISKECTOMY Bilateral 2/4/2017    Procedure: LUMBAR LAMINECTOMY DISKECTOMY POSTERIOR L4-S1 ;  Surgeon: Emil Hitchcock M.D.;  Location: [facility];  Service:     CARPAL TUNNEL ENDOSCOPIC  11/17/2012    Performed by Heriberto Quiñones M.D. at SURGERY [facility]    TRIGGER FINGER RELEASE  11/17/2012    Performed by Heriberto Quiñones M.D. at SURGERY [facility]    HIP ARTHROSCOPY  2/23/2009    Performed by SHERLYN CALVO at SURGERY [facility]    ACETABULAR OSTEOTOMY  2/23/2009    Performed by SHERLYN CALVO at SURGERY [facility]    MASS EXCISION ORTHO  2/23/2009    Performed by SHERLYN CALVO at SURGERY [facility]    HIP ARTHROSCOPY  2005    done at [facility] BY LAPAROSCOPY  1997     HYSTERECTOMY, TOTAL ABDOMINAL  1979    oopherectomy lacie    BLADDER SUSPENSION      bladder sling    CARPAL TUNNEL RELEASE      HIP REPLACEMENT, TOTAL      HYSTERECTOMY LAPAROSCOPY      LAMINOTOMY      PRIMARY C SECTION  1970/1975       Family History   Problem Relation Age of Onset    GI Disease Father         Crohn's    Heart Disease Father         First MI age 30    Hypertension Father     Stroke Father     Hypertension Other     Cancer Brother         ESOPHAGEAL CANCER       Social History     Tobacco Use   Smoking Status Never   Smokeless Tobacco Never       Social History     Substance and Sexual Activity   Alcohol Use Not Currently    Comment: Stopped drinking 45 days ago 7/12/17 Going to [facility]       Review of systems  As per HPI above. All other systems reviewed and negative.      Past Medical, Social, and Family history reviewed and updated in [facility]       LAB DATA:     I have independently reviewed / interpreted labs    Lab Results   Component Value Date/Time    HBA1C 6.2 (H) 11/12/2024 08:08 AM    HBA1C 6.0 (H) 07/10/2024 06:53 AM    HBA1C 6.0 (H) 02/21/2024 08:14 AM        Lab Results   Component Value Date/Time    WBC 8.2 11/12/2024 08:08 AM    HEMOGLOBIN 12.2 11/12/2024 08:08 AM    HEMATOCRIT 38.6 11/12/2024 08:08 AM    MCV 78.5 (L) 11/12/2024 08:08 AM    PLATELETCT 324 11/12/2024 08:08 AM       Lab Results   Component Value Date/Time    SODIUM 136 09/20/2024 07:25 PM    POTASSIUM 4.0 09/20/2024 07:25 PM    GLUCOSE 106 (H) 09/20/2024 07:25 PM    BUN 21 09/20/2024 07:25 PM    CREATININE 1.36 09/20/2024 07:25 PM    CREATININE 0.89 04/27/2009 12:00 AM       Lab Results   Component Value Date/Time    CHOLSTRLTOT 130 11/12/2024 08:08 AM    TRIGLYCERIDE 140 11/12/2024 08:08 AM    HDL 40 11/12/2024 08:08 AM    LDL 62 11/12/2024 08:08 AM       Lab Results   Component Value Date/Time    ALTSGPT 13 11/12/2024 08:08 AM          Objective     /82 (BP Location: Left arm, Patient Position: Sitting, BP Cuff Size:  "Adult)   Pulse 84   Temp 36.3 °C (97.3 °F) (Temporal)   Ht 1.549 m (5' 1\")   Wt 75.6 kg (166 lb 9.6 oz)   SpO2 93%    Body mass index is 31.48 kg/m².    General: alert in no apparent distress.  Cardiovascular: regular rate and rhythm  Pulmonary: lungs : no wheezing   Gastrointestinal: BS present.       Assessment and Plan     1. Paroxysmal atrial fibrillation (HCC)  Chronic stable condition.  Heart rate well-controlled.  The patient will continue aspirin and diltiazem.  Continue follow-up with cardiology service as directed    2. Major depression in remission (HCC)  Chronic stable.  Continue duloxetine 60 Mg daily.    3. Asthma-COPD overlap syndrome (HCC)  Chronic stable.  Continue Trelegy 1 puff daily.  The patient may use albuterol as needed for rescue treatment.  At the present time the patient asymptomatic    4. Dyslipidemia  - ALANINE AMINO-TRANS; Future  - Lipid Profile; Future  Chronic stable.  Continue lovastatin 10 mg daily.  Lab test result discussed with the patient    5. Migraine without status migrainosus, not intractable, unspecified migraine type  Chronic stable.  The patient may use rizatriptan as needed.  Continue amitriptyline 25 mg at bedtime.  Continue follow-up with neurology service as directed    6. Prediabetes  - HEMOGLOBIN A1C; Future  - Basic Metabolic Panel; Future  Chronic stable.  Recommend diet and lifestyle modification.  Lab test result discussed with the patient.  Continue to monitor    7. Polyp of colon, unspecified part of colon, unspecified type  Chronic condition.  Patient to repeat colonoscopy in 2032.  Currently the patient asymptomatic    8. Encounter for screening mammogram for malignant neoplasm of breast  - MA-SCREENING MAMMO BILAT W/TOMOSYNTHESIS W/CAD; Future    9.  Patient with recurrent UTI patient requesting prescription for Bactrim to have on hand in case of a flareup.  Patient currently asymptomatic.                Please note that this dictation was created " using voice recognition software. I have made every reasonable attempt to correct obvious errors, but I expect that there are errors of grammar and possibly content that I did not discover before finalizing the note.    Rudy Parsons MD  Internal Medicine  [facility]

## 2024-11-20 NOTE — TELEPHONE ENCOUNTER
----- Message from Rudy Parsons M.D. sent at 4/21/2021  2:55 PM PDT -----  Please call patient :     Chest CT scan showed STABLE nodules noted in both lungs  Recommendation: please repeat chest CT scan for f.u trends in year as per our radiologist. CT ordered for April 2022    In addition, : A referral has been submitted to  Pulmonologist  for further evaluation.  Please call [phone] and ask for Referral Department.          1. Continue same medications  2. Take extra lasix if weight increases > 3/ pounds in 2 days  3. See Dr Isela Vo and Thompson Rhodes alternatively with us. See us in 3 months  4. Continue daily weights (in the morning, after passing your urine). Notify HF team of overnight weight gains > 2 lbs or weekly >5 lbs or if any of the following Sx. Continue to limit sodium intake & monitor your fluid intake (not to exceed 2L per day). 5. Labs today   6.  Echo pending today 

## 2024-12-06 DIAGNOSIS — F32.5 MAJOR DEPRESSION IN REMISSION (HCC): ICD-10-CM

## 2024-12-07 RX ORDER — PANTOPRAZOLE SODIUM 40 MG/1
40 TABLET, DELAYED RELEASE ORAL 2 TIMES DAILY
Qty: 180 TABLET | Refills: 2 | Status: SHIPPED | OUTPATIENT
Start: 2024-12-07

## 2024-12-07 RX ORDER — DULOXETIN HYDROCHLORIDE 60 MG/1
60 CAPSULE, DELAYED RELEASE ORAL DAILY
Qty: 90 CAPSULE | Refills: 3 | Status: SHIPPED | OUTPATIENT
Start: 2024-12-07

## 2024-12-07 NOTE — TELEPHONE ENCOUNTER
Received request via: Patient    Was the patient seen in the last year in this department? Yes    Does the patient have an active prescription (recently filled or refills available) for medication(s) requested? No    Pharmacy Name: [facility]/pharmacy #[id number] - [place], [place] - [address]     Does the patient have intermediate Plus and need 100-day supply? (This applies to ALL medications) Yes, quantity updated to 100 days

## 2025-01-03 ENCOUNTER — HOSPITAL ENCOUNTER (OUTPATIENT)
Dept: RADIOLOGY | Facility: MEDICAL CENTER | Age: 75
End: 2025-01-03
Attending: INTERNAL MEDICINE
Payer: MEDICARE

## 2025-01-03 DIAGNOSIS — Z12.31 ENCOUNTER FOR SCREENING MAMMOGRAM FOR MALIGNANT NEOPLASM OF BREAST: ICD-10-CM

## 2025-01-03 PROCEDURE — 77067 SCR MAMMO BI INCL CAD: CPT

## 2025-01-07 DIAGNOSIS — I10 HYPERTENSION, UNSPECIFIED TYPE: ICD-10-CM

## 2025-01-09 NOTE — TELEPHONE ENCOUNTER
Received request via: Pharmacy    Was the patient seen in the last year in this department? Yes    Does the patient have an active prescription (recently filled or refills available) for medication(s) requested? No    Pharmacy Name:   [facility]/pharmacy #[id number] - [place], [place] - [address]  [address] [address]  Phone: [phone] Fax: [phone]        Does the patient have [facility] Plus and need 100-day supply? (This applies to ALL medications) Yes, quantity updated to 100 days

## 2025-01-10 RX ORDER — TELMISARTAN 40 MG/1
40 TABLET ORAL DAILY
Qty: 100 TABLET | Refills: 3 | Status: SHIPPED | OUTPATIENT
Start: 2025-01-10 | End: 2025-01-14 | Stop reason: SDUPTHER

## 2025-01-14 DIAGNOSIS — I10 HYPERTENSION, UNSPECIFIED TYPE: ICD-10-CM

## 2025-01-14 DIAGNOSIS — F32.5 MAJOR DEPRESSION IN REMISSION (HCC): ICD-10-CM

## 2025-01-15 RX ORDER — ATORVASTATIN CALCIUM 10 MG/1
10 TABLET, FILM COATED ORAL DAILY
Qty: 100 TABLET | Refills: 3 | Status: SHIPPED | OUTPATIENT
Start: 2025-01-15

## 2025-01-15 RX ORDER — DULOXETIN HYDROCHLORIDE 60 MG/1
60 CAPSULE, DELAYED RELEASE ORAL DAILY
Qty: 100 CAPSULE | Refills: 3 | Status: SHIPPED | OUTPATIENT
Start: 2025-01-15

## 2025-01-15 RX ORDER — TELMISARTAN 40 MG/1
40 TABLET ORAL DAILY
Qty: 100 TABLET | Refills: 3 | Status: SHIPPED | OUTPATIENT
Start: 2025-01-15

## 2025-01-15 NOTE — TELEPHONE ENCOUNTER
Received request via: Patient    Was the patient seen in the last year in this department? Yes    Does the patient have an active prescription (recently filled or refills available) for medication(s) requested? No        Does the patient have nursing home Plus and need 100-day supply? (This applies to ALL medications) Yes, quantity updated to 100 days

## 2025-01-30 ENCOUNTER — OFFICE VISIT (OUTPATIENT)
Dept: URGENT CARE | Facility: PHYSICIAN GROUP | Age: 75
End: 2025-01-30
Payer: MEDICARE

## 2025-01-30 VITALS
HEART RATE: 91 BPM | DIASTOLIC BLOOD PRESSURE: 78 MMHG | TEMPERATURE: 97.5 F | WEIGHT: 165.4 LBS | BODY MASS INDEX: 31.23 KG/M2 | RESPIRATION RATE: 16 BRPM | SYSTOLIC BLOOD PRESSURE: 112 MMHG | HEIGHT: 61 IN | OXYGEN SATURATION: 91 %

## 2025-01-30 DIAGNOSIS — J06.9 VIRAL URI WITH COUGH: ICD-10-CM

## 2025-01-30 ASSESSMENT — ENCOUNTER SYMPTOMS
FEVER: 0
COUGH: 1
SPUTUM PRODUCTION: 1
WHEEZING: 1
SHORTNESS OF BREATH: 1

## 2025-01-30 ASSESSMENT — FIBROSIS 4 INDEX: FIB4 SCORE: 0.81

## 2025-01-30 NOTE — PROGRESS NOTES
Verbal consent was acquired by the patient to use [employer] ambient listening note generation during this visit   Subjective:   Augusta Rodriguez is a 74 y.o. female who presents for Cough (Cough,sore throat x 2 days )      HPI:  History of Present Illness  The patient is a 74-year-old female who presents for evaluation of a cough and sore throat.    She reports the onset of a cough and sore throat 2 days ago.The patient does not report any associated fevers, chills, or body aches. Her cough is productive with clear sputum. She also experiences frequent wheezing and shortness of breath. Her symptoms do not improve when she is outside the house. She has been managing her symptoms with albuterol and Mucinex. She has a known history of COPD but has never smoked.            Review of Systems   Constitutional:  Negative for fever.   Respiratory:  Positive for cough, sputum production, shortness of breath and wheezing.        Medications:    Current Outpatient Medications on File Prior to Visit   Medication Sig Dispense Refill    telmisartan (MICARDIS) 40 MG Tab Take 1 Tablet by mouth every day. 100 Tablet 3    atorvastatin (LIPITOR) 10 MG Tab Take 1 Tablet by mouth every day. 100 Tablet 3    DULoxetine (CYMBALTA) 60 MG Cap DR Particles delayed-release capsule Take 1 Capsule by mouth every day. 100 Capsule 3    pantoprazole (PROTONIX) 40 MG Tablet Delayed Response Take 1 Tablet by mouth 2 times a day. 180 Tablet 2    sulfamethoxazole-trimethoprim (BACTRIM DS) 800-160 MG tablet Take 1 Tablet by mouth 2 times a day. 10 Tablet 0    levETIRAcetam (KEPPRA) 500 MG Tab Take 500 mg by mouth 2 times a day.      dilTIAZem CD (CARDIZEM CD) 240 MG CAPSULE SR 24 HR Take 1 Capsule by mouth every day. 90 Capsule 3    amitriptyline (ELAVIL) 25 MG Tab Take 1 Tablet by mouth every evening. 30 Tablet 0    ferrous sulfate 325 (65 Fe) MG tablet Take 1 Tablet by mouth every day. 30 Tablet 0    Cyanocobalamin (VITAMIN B-12 PO) Take 1 Tablet by  mouth 1 time a day as needed (shaking).      fluticasone-umeclidinium-vilanterol (TRELEGY ELLIPTA) 100-62.5-25 mcg/act inhaler Inhale 1 Puff every day. 180 Each 3    Azelastine (ASTELIN) 137 MCG/SPRAY Solution Administer 2 Sprays into affected nostril(S) every day.      fluticasone (FLONASE) 50 MCG/ACT nasal spray SPRAY 1 SPRAY INTO EACH NOSTRIL ONCE A DAY      albuterol 108 (90 Base) MCG/ACT Aero Soln inhalation aerosol Inhale 2 Puffs every 6 hours as needed for Shortness of Breath. 8.5 Each 5    fluticasone (FLOVENT HFA) 110 MCG/ACT Aerosol Inhale 1 Puff 2 times a day. 2 Each 6    clobetasol (TEMOVATE) 0.05 % Ointment APPLY A THIN LAYER ONCE DAILY FOR 3 WEEKS, THEN USE 2 TO 3 TIMES A WEEK 60 g 1    aspirin (ASA) 81 MG Chew Tab chewable tablet Chew 81 mg every morning.      ondansetron (ZOFRAN ODT) 4 MG TABLET DISPERSIBLE Take 1 Tablet by mouth every 6 hours as needed for Nausea/Vomiting. 10 Tablet 0    rizatriptan (MAXALT) 5 MG tablet Take 1 Tablet by mouth one time as needed for Migraine. 10 Tablet 5    zonisamide (ZONEGRAN) 100 MG Cap Take 100 mg by mouth every day.       No current facility-administered medications on file prior to visit.        Allergies:   Sumatriptan, Clarithromycin, Pcn [penicillins], Shellfish allergy, Trazodone, Erythromycin, and Levofloxacin    Problem List:   Patient Active Problem List   Diagnosis    Dyslipidemia    Osteopenia    GERD (gastroesophageal reflux disease)    Vitamin D deficiency    Spinal stenosis, lumbar    Paroxysmal atrial fibrillation (HCC)    BRIANA (obstructive sleep apnea)    Major depression in remission (HCC)    Migraine    Hypertensive kidney disease with stage 3a chronic kidney disease    Prediabetes    Lung nodule    Asthma-COPD overlap syndrome (HCC)    Obese    Cerebral atrophy, mild (HCC)    Dilatation of aorta (HCC)    Paroxysmal SVT (supraventricular tachycardia) (HCC)    History of stroke    Diastolic dysfunction    ACP (advance care planning)    Stage 3a  "chronic kidney disease    Colon polyps        Surgical History:  Past Surgical History:   Procedure Laterality Date    INCISION AND DRAINAGE GENERAL N/A 10/18/2022    Procedure: INCISION AND DRAINAGE ABSCESS TONGUE;  Surgeon: Paz Suazo M.D.;  Location: SURGERY [facility];  Service: Ent    LUMBAR LAMINECTOMY DISKECTOMY Bilateral 2/4/2017    Procedure: LUMBAR LAMINECTOMY DISKECTOMY POSTERIOR L4-S1 ;  Surgeon: Emil Hitchcock M.D.;  Location: SURGERY [facility];  Service:     CARPAL TUNNEL ENDOSCOPIC  11/17/2012    Performed by Heriberto Quiñones M.D. at SURGERY [facility]    TRIGGER FINGER RELEASE  11/17/2012    Performed by Heriberto Quiñones M.D. at SURGERY [facility]    HIP ARTHROSCOPY  2/23/2009    Performed by SHERLYN CALVO at SURGERY [facility]    ACETABULAR OSTEOTOMY  2/23/2009    Performed by SHERLYN CALVO at SURGERY [facility]    MASS EXCISION ORTHO  2/23/2009    Performed by SHERLYN CALVO at SURGERY [facility]    HIP ARTHROSCOPY  2005    done at [facility]    HAJA BY LAPAROSCOPY  1997    HYSTERECTOMY, TOTAL ABDOMINAL  1979    oopherectomy lacie    BLADDER SUSPENSION      bladder sling    CARPAL TUNNEL RELEASE      HIP REPLACEMENT, TOTAL      HYSTERECTOMY LAPAROSCOPY      LAMINOTOMY      PRIMARY C SECTION  1970/1975       Past Social Hx:   Social History     Tobacco Use    Smoking status: Never    Smokeless tobacco: Never   Vaping Use    Vaping status: Never Used   Substance Use Topics    Alcohol use: Not Currently     Comment: Stopped drinking 45 days ago 7/12/17 Going to     Drug use: No          Problem list, medications, and allergies reviewed by myself today in Epic.     Objective:     /78   Pulse 91   Temp 36.4 °C (97.5 °F)   Resp 16   Ht 1.549 m (5' 1\")   Wt 75 kg (165 lb 6.4 oz)   SpO2 91%   BMI 31.25 kg/m²     Physical Exam  Vitals and nursing note reviewed.   Constitutional:       General: She is not in acute distress.     Appearance: " Normal appearance. She is normal weight. She is not ill-appearing, toxic-appearing or diaphoretic.   HENT:      Head: Normocephalic and atraumatic.      Right Ear: Tympanic membrane, ear canal and external ear normal. There is no impacted cerumen.      Left Ear: Tympanic membrane, ear canal and external ear normal. There is no impacted cerumen.      Nose: Nose normal. No congestion or rhinorrhea.      Mouth/Throat:      Mouth: Mucous membranes are moist.      Pharynx: Oropharynx is clear. No oropharyngeal exudate or posterior oropharyngeal erythema.   Cardiovascular:      Rate and Rhythm: Normal rate and regular rhythm.      Pulses: Normal pulses.      Heart sounds: Normal heart sounds. No murmur heard.     No friction rub. No gallop.   Pulmonary:      Effort: Pulmonary effort is normal. No respiratory distress.      Breath sounds: Normal breath sounds. No stridor. No wheezing, rhonchi or rales.   Chest:      Chest wall: No tenderness.   Musculoskeletal:      Cervical back: Neck supple. No tenderness.   Lymphadenopathy:      Cervical: No cervical adenopathy.   Skin:     General: Skin is warm and dry.      Capillary Refill: Capillary refill takes less than 2 seconds.   Neurological:      General: No focal deficit present.      Mental Status: She is alert and oriented to person, place, and time. Mental status is at baseline.      Cranial Nerves: No cranial nerve deficit.      Motor: No weakness.      Gait: Gait normal.   Psychiatric:         Mood and Affect: Mood normal.         Behavior: Behavior normal.         Thought Content: Thought content normal.         Judgment: Judgment normal.         Assessment/Plan:     Diagnosis and associated orders:   1. Viral URI with cough        Comments/MDM:   Pt is clinically stable at today's acute urgent care visit.  No acute distress noted. Appropriate for outpatient management at this time.     Assessment & Plan    Her symptoms are likely attributable to a viral infection. She  has a history of COPD but does not smoke. Her lungs sound clear, and her vital signs are stable. She is advised to maintain adequate hydration and utilize a humidifier. She can continue using Mucinex as needed. If her condition deteriorates, she should seek immediate medical attention. She declined viral testing today.             Discussed DDx, management options (risks,benefits, and alternatives to planned treatment), natural progression and supportive care.  Expressed understanding and the treatment plan was agreed upon. Questions were encouraged and answered   Return to urgent care prn if new or worsening sx or if there is no improvement in condition prn.    Educated in Red flags and indications to immediately call 911 or present to the Emergency Department.   Advised the patient to follow-up with the primary care physician for recheck, reevaluation, and consideration of further management.    I personally reviewed prior external notes and test results pertinent to today's visit.  I have independently reviewed and interpreted all diagnostics ordered during this urgent care acute visit.       Please note that this dictation was created using voice recognition software. I have made a reasonable attempt to correct obvious errors, but I expect that there are errors of grammar and possibly content that I did not discover before finalizing the note.    This note was electronically signed by PREM Brian

## 2025-01-31 ENCOUNTER — HOSPITAL ENCOUNTER (EMERGENCY)
Facility: MEDICAL CENTER | Age: 75
End: 2025-01-31
Attending: EMERGENCY MEDICINE
Payer: MEDICARE

## 2025-01-31 ENCOUNTER — APPOINTMENT (OUTPATIENT)
Dept: RADIOLOGY | Facility: MEDICAL CENTER | Age: 75
End: 2025-01-31
Attending: EMERGENCY MEDICINE
Payer: MEDICARE

## 2025-01-31 VITALS
RESPIRATION RATE: 18 BRPM | HEART RATE: 105 BPM | WEIGHT: 164.02 LBS | BODY MASS INDEX: 30.99 KG/M2 | OXYGEN SATURATION: 90 % | SYSTOLIC BLOOD PRESSURE: 142 MMHG | DIASTOLIC BLOOD PRESSURE: 84 MMHG | TEMPERATURE: 98 F

## 2025-01-31 DIAGNOSIS — J44.89 ASTHMA-COPD OVERLAP SYNDROME (HCC): Chronic | ICD-10-CM

## 2025-01-31 DIAGNOSIS — J44.1 ACUTE EXACERBATION OF CHRONIC OBSTRUCTIVE PULMONARY DISEASE (COPD) (HCC): ICD-10-CM

## 2025-01-31 DIAGNOSIS — N39.0 URINARY TRACT INFECTION WITHOUT HEMATURIA, SITE UNSPECIFIED: ICD-10-CM

## 2025-01-31 DIAGNOSIS — J45.21 MILD INTERMITTENT ASTHMA WITH ACUTE EXACERBATION: ICD-10-CM

## 2025-01-31 DIAGNOSIS — B34.9 VIRAL SYNDROME: ICD-10-CM

## 2025-01-31 LAB
ALBUMIN SERPL BCP-MCNC: 4.2 G/DL (ref 3.2–4.9)
ALBUMIN/GLOB SERPL: 1.3 G/DL
ALP SERPL-CCNC: 111 U/L (ref 30–99)
ALT SERPL-CCNC: 8 U/L (ref 2–50)
ANION GAP SERPL CALC-SCNC: 12 MMOL/L (ref 7–16)
APPEARANCE UR: ABNORMAL
AST SERPL-CCNC: 19 U/L (ref 12–45)
BACTERIA #/AREA URNS HPF: ABNORMAL /HPF
BASOPHILS # BLD AUTO: 0.5 % (ref 0–1.8)
BASOPHILS # BLD: 0.05 K/UL (ref 0–0.12)
BILIRUB SERPL-MCNC: 0.8 MG/DL (ref 0.1–1.5)
BILIRUB UR QL STRIP.AUTO: NEGATIVE
BUN SERPL-MCNC: 10 MG/DL (ref 8–22)
CALCIUM ALBUM COR SERPL-MCNC: 8.9 MG/DL (ref 8.5–10.5)
CALCIUM SERPL-MCNC: 9.1 MG/DL (ref 8.5–10.5)
CASTS URNS QL MICRO: ABNORMAL /LPF (ref 0–2)
CHLORIDE SERPL-SCNC: 101 MMOL/L (ref 96–112)
CO2 SERPL-SCNC: 22 MMOL/L (ref 20–33)
COLOR UR: YELLOW
CREAT SERPL-MCNC: 1 MG/DL (ref 0.5–1.4)
EKG IMPRESSION: NORMAL
EOSINOPHIL # BLD AUTO: 0.46 K/UL (ref 0–0.51)
EOSINOPHIL NFR BLD: 4.5 % (ref 0–6.9)
EPITHELIAL CELLS 1715: ABNORMAL /HPF (ref 0–5)
ERYTHROCYTE [DISTWIDTH] IN BLOOD BY AUTOMATED COUNT: 46.5 FL (ref 35.9–50)
FLUAV RNA SPEC QL NAA+PROBE: NEGATIVE
FLUBV RNA SPEC QL NAA+PROBE: NEGATIVE
GFR SERPLBLD CREATININE-BSD FMLA CKD-EPI: 59 ML/MIN/1.73 M 2
GLOBULIN SER CALC-MCNC: 3.2 G/DL (ref 1.9–3.5)
GLUCOSE SERPL-MCNC: 129 MG/DL (ref 65–99)
GLUCOSE UR STRIP.AUTO-MCNC: NEGATIVE MG/DL
HCT VFR BLD AUTO: 36.2 % (ref 37–47)
HGB BLD-MCNC: 11.5 G/DL (ref 12–16)
IMM GRANULOCYTES # BLD AUTO: 0.05 K/UL (ref 0–0.11)
IMM GRANULOCYTES NFR BLD AUTO: 0.5 % (ref 0–0.9)
KETONES UR STRIP.AUTO-MCNC: NEGATIVE MG/DL
LACTATE SERPL-SCNC: 0.9 MMOL/L (ref 0.5–2)
LEUKOCYTE ESTERASE UR QL STRIP.AUTO: ABNORMAL
LYMPHOCYTES # BLD AUTO: 0.77 K/UL (ref 1–4.8)
LYMPHOCYTES NFR BLD: 7.5 % (ref 22–41)
MCH RBC QN AUTO: 24 PG (ref 27–33)
MCHC RBC AUTO-ENTMCNC: 31.8 G/DL (ref 32.2–35.5)
MCV RBC AUTO: 75.6 FL (ref 81.4–97.8)
MICRO URNS: ABNORMAL
MONOCYTES # BLD AUTO: 0.81 K/UL (ref 0–0.85)
MONOCYTES NFR BLD AUTO: 7.9 % (ref 0–13.4)
NEUTROPHILS # BLD AUTO: 8.09 K/UL (ref 1.82–7.42)
NEUTROPHILS NFR BLD: 79.1 % (ref 44–72)
NITRITE UR QL STRIP.AUTO: NEGATIVE
NRBC # BLD AUTO: 0 K/UL
NRBC BLD-RTO: 0 /100 WBC (ref 0–0.2)
NT-PROBNP SERPL IA-MCNC: 239 PG/ML (ref 0–125)
PH UR STRIP.AUTO: 6.5 [PH] (ref 5–8)
PLATELET # BLD AUTO: 320 K/UL (ref 164–446)
PMV BLD AUTO: 9.1 FL (ref 9–12.9)
POTASSIUM SERPL-SCNC: 4.2 MMOL/L (ref 3.6–5.5)
PROT SERPL-MCNC: 7.4 G/DL (ref 6–8.2)
PROT UR QL STRIP: 30 MG/DL
RBC # BLD AUTO: 4.79 M/UL (ref 4.2–5.4)
RBC # URNS HPF: ABNORMAL /HPF (ref 0–2)
RBC UR QL AUTO: ABNORMAL
RSV RNA SPEC QL NAA+PROBE: NEGATIVE
SARS-COV-2 RNA RESP QL NAA+PROBE: NOTDETECTED
SODIUM SERPL-SCNC: 135 MMOL/L (ref 135–145)
SP GR UR STRIP.AUTO: 1.02
TROPONIN T SERPL-MCNC: 11 NG/L (ref 6–19)
UROBILINOGEN UR STRIP.AUTO-MCNC: 2 EU/DL
WBC # BLD AUTO: 10.2 K/UL (ref 4.8–10.8)
WBC #/AREA URNS HPF: >100 /HPF

## 2025-01-31 PROCEDURE — 84484 ASSAY OF TROPONIN QUANT: CPT

## 2025-01-31 PROCEDURE — 700101 HCHG RX REV CODE 250

## 2025-01-31 PROCEDURE — 93005 ELECTROCARDIOGRAM TRACING: CPT | Mod: TC | Performed by: EMERGENCY MEDICINE

## 2025-01-31 PROCEDURE — 80053 COMPREHEN METABOLIC PANEL: CPT

## 2025-01-31 PROCEDURE — 93005 ELECTROCARDIOGRAM TRACING: CPT | Mod: TC

## 2025-01-31 PROCEDURE — 83880 ASSAY OF NATRIURETIC PEPTIDE: CPT

## 2025-01-31 PROCEDURE — 87086 URINE CULTURE/COLONY COUNT: CPT

## 2025-01-31 PROCEDURE — 94640 AIRWAY INHALATION TREATMENT: CPT

## 2025-01-31 PROCEDURE — 87186 SC STD MICRODIL/AGAR DIL: CPT

## 2025-01-31 PROCEDURE — 87077 CULTURE AEROBIC IDENTIFY: CPT

## 2025-01-31 PROCEDURE — 81001 URINALYSIS AUTO W/SCOPE: CPT

## 2025-01-31 PROCEDURE — 0241U HCHG SARS-COV-2 COVID-19 NFCT DS RESP RNA 4 TRGT ED POC: CPT

## 2025-01-31 PROCEDURE — 99285 EMERGENCY DEPT VISIT HI MDM: CPT

## 2025-01-31 PROCEDURE — 87040 BLOOD CULTURE FOR BACTERIA: CPT

## 2025-01-31 PROCEDURE — 71045 X-RAY EXAM CHEST 1 VIEW: CPT

## 2025-01-31 PROCEDURE — 83605 ASSAY OF LACTIC ACID: CPT

## 2025-01-31 PROCEDURE — 700105 HCHG RX REV CODE 258: Performed by: EMERGENCY MEDICINE

## 2025-01-31 PROCEDURE — 700111 HCHG RX REV CODE 636 W/ 250 OVERRIDE (IP): Performed by: EMERGENCY MEDICINE

## 2025-01-31 PROCEDURE — 96374 THER/PROPH/DIAG INJ IV PUSH: CPT

## 2025-01-31 PROCEDURE — 85025 COMPLETE CBC W/AUTO DIFF WBC: CPT

## 2025-01-31 PROCEDURE — 36415 COLL VENOUS BLD VENIPUNCTURE: CPT

## 2025-01-31 RX ORDER — SODIUM CHLORIDE 9 MG/ML
1000 INJECTION, SOLUTION INTRAVENOUS ONCE
Status: DISCONTINUED | OUTPATIENT
Start: 2025-01-31 | End: 2025-01-31

## 2025-01-31 RX ORDER — ALBUTEROL SULFATE 5 MG/ML
2.5 SOLUTION RESPIRATORY (INHALATION)
Status: COMPLETED | OUTPATIENT
Start: 2025-01-31 | End: 2025-01-31

## 2025-01-31 RX ORDER — CEFDINIR 300 MG/1
300 CAPSULE ORAL 2 TIMES DAILY
Qty: 14 CAPSULE | Refills: 0 | Status: ACTIVE | OUTPATIENT
Start: 2025-01-31 | End: 2025-02-05 | Stop reason: SDUPTHER

## 2025-01-31 RX ORDER — ALBUTEROL SULFATE 5 MG/ML
SOLUTION RESPIRATORY (INHALATION)
Status: COMPLETED
Start: 2025-01-31 | End: 2025-01-31

## 2025-01-31 RX ORDER — FLUTICASONE PROPIONATE 110 UG/1
1 AEROSOL, METERED RESPIRATORY (INHALATION) 2 TIMES DAILY
Qty: 12 G | Refills: 0 | Status: SHIPPED | OUTPATIENT
Start: 2025-01-31

## 2025-01-31 RX ORDER — PREDNISONE 20 MG/1
60 TABLET ORAL DAILY
Qty: 12 TABLET | Refills: 0 | Status: SHIPPED | OUTPATIENT
Start: 2025-01-31 | End: 2025-02-04

## 2025-01-31 RX ORDER — ALBUTEROL SULFATE 90 UG/1
2 INHALANT RESPIRATORY (INHALATION) EVERY 6 HOURS PRN
Qty: 8.5 G | Refills: 0 | Status: SHIPPED | OUTPATIENT
Start: 2025-01-31 | End: 2025-01-31

## 2025-01-31 RX ORDER — ALBUTEROL SULFATE 90 UG/1
2 INHALANT RESPIRATORY (INHALATION) EVERY 6 HOURS PRN
Qty: 8.5 G | Refills: 0 | Status: SHIPPED | OUTPATIENT
Start: 2025-01-31

## 2025-01-31 RX ORDER — PREDNISONE 20 MG/1
60 TABLET ORAL ONCE
Status: COMPLETED | OUTPATIENT
Start: 2025-01-31 | End: 2025-01-31

## 2025-01-31 RX ORDER — FLUTICASONE FUROATE, UMECLIDINIUM BROMIDE AND VILANTEROL TRIFENATATE 100; 62.5; 25 UG/1; UG/1; UG/1
1 POWDER RESPIRATORY (INHALATION) DAILY
Qty: 180 EACH | Refills: 0 | Status: SHIPPED | OUTPATIENT
Start: 2025-01-31 | End: 2025-01-31

## 2025-01-31 RX ORDER — FLUTICASONE FUROATE, UMECLIDINIUM BROMIDE AND VILANTEROL TRIFENATATE 100; 62.5; 25 UG/1; UG/1; UG/1
1 POWDER RESPIRATORY (INHALATION) DAILY
Qty: 180 EACH | Refills: 0 | Status: SHIPPED | OUTPATIENT
Start: 2025-01-31

## 2025-01-31 RX ORDER — CEFTRIAXONE 2 G/1
2000 INJECTION, POWDER, FOR SOLUTION INTRAMUSCULAR; INTRAVENOUS ONCE
Status: COMPLETED | OUTPATIENT
Start: 2025-01-31 | End: 2025-01-31

## 2025-01-31 RX ORDER — CEFDINIR 300 MG/1
300 CAPSULE ORAL 2 TIMES DAILY
Qty: 14 CAPSULE | Refills: 0 | Status: ACTIVE | OUTPATIENT
Start: 2025-01-31 | End: 2025-01-31

## 2025-01-31 RX ORDER — SODIUM CHLORIDE, SODIUM LACTATE, POTASSIUM CHLORIDE, AND CALCIUM CHLORIDE .6; .31; .03; .02 G/100ML; G/100ML; G/100ML; G/100ML
1000 INJECTION, SOLUTION INTRAVENOUS ONCE
Status: COMPLETED | OUTPATIENT
Start: 2025-01-31 | End: 2025-01-31

## 2025-01-31 RX ORDER — FLUTICASONE PROPIONATE 110 UG/1
1 AEROSOL, METERED RESPIRATORY (INHALATION) 2 TIMES DAILY
Qty: 12 G | Refills: 0 | Status: SHIPPED | OUTPATIENT
Start: 2025-01-31 | End: 2025-01-31

## 2025-01-31 RX ORDER — PREDNISONE 20 MG/1
60 TABLET ORAL DAILY
Qty: 12 TABLET | Refills: 0 | Status: SHIPPED | OUTPATIENT
Start: 2025-01-31 | End: 2025-01-31

## 2025-01-31 RX ORDER — ALBUTEROL SULFATE 5 MG/ML
7.5 SOLUTION RESPIRATORY (INHALATION)
Status: DISCONTINUED | OUTPATIENT
Start: 2025-01-31 | End: 2025-01-31

## 2025-01-31 RX ADMIN — SODIUM CHLORIDE, POTASSIUM CHLORIDE, SODIUM LACTATE AND CALCIUM CHLORIDE 1000 ML: 600; 310; 30; 20 INJECTION, SOLUTION INTRAVENOUS at 10:30

## 2025-01-31 RX ADMIN — CEFTRIAXONE SODIUM 2000 MG: 2 INJECTION, POWDER, FOR SOLUTION INTRAMUSCULAR; INTRAVENOUS at 10:38

## 2025-01-31 RX ADMIN — ALBUTEROL SULFATE 2.5 MG: 5 SOLUTION RESPIRATORY (INHALATION) at 11:10

## 2025-01-31 RX ADMIN — PREDNISONE 60 MG: 20 TABLET ORAL at 10:38

## 2025-01-31 RX ADMIN — ALBUTEROL SULFATE 2.5 MG: 2.5 SOLUTION RESPIRATORY (INHALATION) at 11:10

## 2025-01-31 ASSESSMENT — PAIN DESCRIPTION - PAIN TYPE: TYPE: ACUTE PAIN

## 2025-01-31 ASSESSMENT — FIBROSIS 4 INDEX: FIB4 SCORE: 0.81

## 2025-01-31 NOTE — ED TRIAGE NOTES
"Chief Complaint   Patient presents with    Cough     Pt reports croupy cough x 3 days    Painful Urination     PT reports \"I might have a UTI, I burn a lot. \"  X 2 weeks.  She states she takes AZO otc daily but the burning persists.     PT has h/o asthma.  SOB and Dysuria protocols initiated.    "

## 2025-01-31 NOTE — LETTER
2/3/2025               Augusta Rodriguez  [address] [address]        Dear Augusta (MR#[id number])    This letter is sent in regards to your recent visit to the [facility] Emergency Department on 1/31/2025. During the visit, tests were performed to assist the physician in your medical diagnosis. A review of your tests requires that we notify you of the following:    Your urine culture was POSITIVE for a bacteria called Escherichia coli. The antibiotic prescribed for you (cefdinir) should be active to treat this bacteria. It is important that you continue taking your antibiotic until it is finished.     Please feel free to contact me at the number below if you have any questions or concerns. Thank you for your cooperation in the matter.    Sincerely,  ED Culture Follow-Up Staff  Veronique Singh, PharmD    [facility], Emergency Department  [address] [place] [id number] [phone] (ED Culture Line)

## 2025-01-31 NOTE — ED NOTES
Blood drawn and sent lab. Pt medicated per MAR. Pt resting with even chest rise and fall, reports no needs at this time, call light available and in reach.

## 2025-01-31 NOTE — DISCHARGE INSTRUCTIONS
Chronic Obstructive Pulmonary Disease  (COPD)    Use all your inhalers as prescribed and return if you need albuterol more often than every 4 hours.  Take prednisone for 4 days and then restart the Trelegy and fluticasone.  Return also for ill appearance or worsening shortness of breath.  See your doctor if not better in 3 days.  Take antibiotics for UTI.    Your physician has diagnosed you as having chronic obstructive pulmonary disease (COPD). This is a process in which the air flow is restricted from leaving the lungs. It is also an end stage process of previous damage with the most common cause being smoking. It is essentially irreversible and treatment is mainly supportive. The lungs can never return to normal, but there are measures you can take which will improve them and make you feel better.    HOME CARE INSTRUCTIONS  If you smoke, STOP SMOKING. The carbon monoxide build-up in the blood robs you of your already short oxygen supply.  If antibiotics were prescribed, take as directed.  Avoid antihistamines and cough syrups. (They dry your system up and slow down the elimination of secretions. This decreases respiratory capacity and may lead to infections.)  Drink 8 large glasses of fluids per day. (This loosens secretions.)  Use humidifiers in home and at bedside if they do not make breathing difficult.  Receive all protective vaccines your caregiver suggests, especially pneumococcal and influenza.  Use home oxygen as suggested once started.    seek medical attention if:  Sputum becomes purulent (infected looking).  An oral temperature that lasts 2 or more days above 102° F (38.9° C) or as your caregiver suggests, and is not controlled by medication.  Breathing is more labored or exercise tolerance is decreasing.  You are running out of medicine you take for your breathing.    seek immediate medical care if:  You have rapid heart rate, agitation, confusion or stupor (family members may need to observe this),  or tremors.  Any time it becomes difficult to breathe.  You develop chest pain.    Document Released: 09/27/2006  Document Re-Released: 10/04/2007  [employer]® Patient Information ©2008 [employer], [employer].

## 2025-01-31 NOTE — ED NOTES
Bedside report from MIKE Alexander. Pt resting in [facility] comfortably, on monitor, call light in reach.  Pt is on RA, GCS 15.  Necessary fall precautions in place.  IVF continuing

## 2025-01-31 NOTE — ED NOTES
Pt escorted from lobby to room 54. Pt placed in hospital gown and is now sitting up in bed on monitor, in no apparent distress at this time. Chart up for ERP.

## 2025-01-31 NOTE — ED PROVIDER NOTES
"ED Provider Note    CHIEF COMPLAINT  Chief Complaint   Patient presents with    Cough     Pt reports croupy cough x 3 days    Painful Urination     PT reports \"I might have a UTI, I burn a lot. \"  X 2 weeks.  She states she takes AZO otc daily but the burning persists.         EXTERNAL RECORDS REVIEWED  External clinic note from yesterday reviewed.  Patient was seen for cough and sore throat.  She has been taking albuterol and Mucinex and has a history of COPD but is never smoked.  She was diagnosed with viral URI with cough.  No apparent testing was done    HPI    Augusta Rodriguez is a 74 y.o. female who presents to the Emergency Department with 3 days of cough subjective fever mild sore throat some chest pain with cough but no real dyspnea.  She was seen yesterday in urgent care and told she was fine.  She has a history of COPD without tobacco exposure.  She is using albuterol Trelegy and fluticasone.  She is vaccinated for RSV and COVID but not influenza.  No ill contacts.  Her  denies that he is ill.  He provided the history that she takes Trelegy.  No leg swelling or blood clots no heart failure diabetes no abdominal pain vomiting or diarrhea.  She is had dysuria for 2 weeks.    LIMITATION TO HISTORY   None    OUTSIDE HISTORIAN(S):   provided some history as documented above    REVIEW OF SYSTEMS  Pertinent positives include: Fever cough pleuritic chest pain dysuria wheezing COPD history.  Pertinent negatives include: Abdominal pain vomiting diarrhea immune compromise.      PAST MEDICAL HISTORY  Past Medical History:   Diagnosis Date    Apnea, sleep     Arrhythmia     afib    Arthritis     osteo    Asthma     Atrial fibrillation (HCC)     Back pain     Bronchitis     Chickenpox     Constipation     Cough     Daytime sleepiness     Dental disorder     upper dentures    Earache     Frequent urination     Gasping for breath     GERD (gastroesophageal reflux disease) 2/27/2010    Syrian measles     " Heartburn     Hypertension     Impaired fasting glucose 2/27/2010    Incontinence of urine     Indigestion     Influenza     Mumps     Nasal drainage     Nausea     Osteopenia 2/24/2010    Osteoporosis     Other specified disorder of intestines     constipation r/t meds    Restless leg syndrome     Scarlet fever     Shortness of breath     Sleep apnea     Snoring     Tremor, essential 2/24/2010    Urinary bladder disorder     Wears glasses     Wheezing     Whooping cough        FAMILY HISTORY  Family History   Problem Relation Age of Onset    GI Disease Father         Crohn's    Heart Disease Father         First MI age 30    Hypertension Father     Stroke Father     Hypertension Other     Cancer Brother         ESOPHAGEAL CANCER       SOCIAL HISTORY  Social History     Tobacco Use    Smoking status: Never    Smokeless tobacco: Never   Vaping Use    Vaping status: Never Used   Substance Use Topics    Alcohol use: Not Currently     Comment: Stopped drinking 45 days ago 7/12/17 Going to     Drug use: No     Social History     Substance and Sexual Activity   Drug Use No       SURGICAL HISTORY  Past Surgical History:   Procedure Laterality Date    INCISION AND DRAINAGE GENERAL N/A 10/18/2022    Procedure: INCISION AND DRAINAGE ABSCESS TONGUE;  Surgeon: Paz Suazo M.D.;  Location: SURGERY [facility];  Service: Ent    LUMBAR LAMINECTOMY DISKECTOMY Bilateral 2/4/2017    Procedure: LUMBAR LAMINECTOMY DISKECTOMY POSTERIOR L4-S1 ;  Surgeon: Emil Hitchcock M.D.;  Location: [facility];  Service:     CARPAL TUNNEL ENDOSCOPIC  11/17/2012    Performed by Heriberto Quiñones M.D. at SURGERY [facility]    TRIGGER FINGER RELEASE  11/17/2012    Performed by Heriberto Quiñones M.D. at [facility]    HIP ARTHROSCOPY  2/23/2009    Performed by SHERLYN CALVO at SURGERY [facility]    ACETABULAR OSTEOTOMY  2/23/2009    Performed by SHERLYN CALVO at SURGERY [facility]    MASS  EXCISION ORTHO  2/23/2009    Performed by SHERLYN CALVO at SURGERY [facility]    HIP ARTHROSCOPY  2005    done at [facility]    HAJA BY LAPAROSCOPY  1997    HYSTERECTOMY, TOTAL ABDOMINAL  1979    oopherectomy lacie    BLADDER SUSPENSION      bladder sling    CARPAL TUNNEL RELEASE      HIP REPLACEMENT, TOTAL      HYSTERECTOMY LAPAROSCOPY      LAMINOTOMY      PRIMARY C SECTION  1970/1975       CURRENT MEDICATIONS  No current facility-administered medications for this encounter.    Current Outpatient Medications:     telmisartan (MICARDIS) 40 MG Tab, Take 1 Tablet by mouth every day., Disp: 100 Tablet, Rfl: 3    atorvastatin (LIPITOR) 10 MG Tab, Take 1 Tablet by mouth every day., Disp: 100 Tablet, Rfl: 3    DULoxetine (CYMBALTA) 60 MG Cap DR Particles delayed-release capsule, Take 1 Capsule by mouth every day., Disp: 100 Capsule, Rfl: 3    pantoprazole (PROTONIX) 40 MG Tablet Delayed Response, Take 1 Tablet by mouth 2 times a day., Disp: 180 Tablet, Rfl: 2    sulfamethoxazole-trimethoprim (BACTRIM DS) 800-160 MG tablet, Take 1 Tablet by mouth 2 times a day., Disp: 10 Tablet, Rfl: 0    levETIRAcetam (KEPPRA) 500 MG Tab, Take 500 mg by mouth 2 times a day., Disp: , Rfl:     dilTIAZem CD (CARDIZEM CD) 240 MG CAPSULE SR 24 HR, Take 1 Capsule by mouth every day., Disp: 90 Capsule, Rfl: 3    amitriptyline (ELAVIL) 25 MG Tab, Take 1 Tablet by mouth every evening., Disp: 30 Tablet, Rfl: 0    ferrous sulfate 325 (65 Fe) MG tablet, Take 1 Tablet by mouth every day., Disp: 30 Tablet, Rfl: 0    Cyanocobalamin (VITAMIN B-12 PO), Take 1 Tablet by mouth 1 time a day as needed (shaking)., Disp: , Rfl:     fluticasone-umeclidinium-vilanterol (TRELEGY ELLIPTA) 100-62.5-25 mcg/act inhaler, Inhale 1 Puff every day., Disp: 180 Each, Rfl: 3    Azelastine (ASTELIN) 137 MCG/SPRAY Solution, Administer 2 Sprays into affected nostril(S) every day., Disp: , Rfl:     fluticasone (FLONASE) 50 MCG/ACT nasal spray, SPRAY 1 SPRAY INTO EACH NOSTRIL  ONCE A DAY, Disp: , Rfl:     albuterol 108 (90 Base) MCG/ACT Aero Soln inhalation aerosol, Inhale 2 Puffs every 6 hours as needed for Shortness of Breath., Disp: 8.5 Each, Rfl: 5    fluticasone (FLOVENT HFA) 110 MCG/ACT Aerosol, Inhale 1 Puff 2 times a day., Disp: 2 Each, Rfl: 6    clobetasol (TEMOVATE) 0.05 % Ointment, APPLY A THIN LAYER ONCE DAILY FOR 3 WEEKS, THEN USE 2 TO 3 TIMES A WEEK, Disp: 60 g, Rfl: 1    aspirin (ASA) 81 MG Chew Tab chewable tablet, Chew 81 mg every morning., Disp: , Rfl:     ondansetron (ZOFRAN ODT) 4 MG TABLET DISPERSIBLE, Take 1 Tablet by mouth every 6 hours as needed for Nausea/Vomiting., Disp: 10 Tablet, Rfl: 0    rizatriptan (MAXALT) 5 MG tablet, Take 1 Tablet by mouth one time as needed for Migraine., Disp: 10 Tablet, Rfl: 5    zonisamide (ZONEGRAN) 100 MG Cap, Take 100 mg by mouth every day., Disp: , Rfl:     ALLERGIES  Allergies   Allergen Reactions    Sumatriptan Swelling     Tongue swell    Clarithromycin Itching, Nausea and Swelling     Swelling/Itching/Nausea    Levaquin Myalgia and Unspecified     Muscle soreness     Pcn [Penicillins] Itching, Nausea and Swelling     Swelling/Itching/Nausea     Shellfish Allergy Itching, Nausea and Swelling     Swelling/Itching/Nausea    Trazodone Swelling    Biaxin [Clarithromycin] Unspecified     Pt report    Erythromycin Itching, Nausea and Swelling    Levofloxacin Myalgia       PHYSICAL EXAM  VITAL SIGNS: BP (!) 172/87   Pulse (!) 111   Temp 36.2 °C (97.1 °F) (Temporal)   Resp 16   Wt 74.4 kg (164 lb 0.4 oz)   SpO2 92%   BMI 30.99 kg/m²   Reviewed and hypertensive tachycardic afebrile  Constitutional: Well developed, Well nourished, well-appearing elevated BMI.  HENT: Normocephalic, atraumatic, bilateral external ears normal, No intraoral erythema, edema, exudate  Eyes: PERRLA, conjunctiva pink, no scleral icterus.   Cardiovascular: Regular rate and rhythm. No murmurs, rubs or gallops.  No dependent edema or calf  tenderness  Respiratory: Diffuse inspiratory and expiratory wheezes  Abdominal:  Abdomen soft, non-tender, non distended. No rebound, or guarding.    Skin: No erythema, no rash. No wounds or bruising.  Genitourinary: No costovertebral angle tenderness.   Musculoskeletal: no deformities.   Neurologic: Alert & oriented x 3, cranial nerves 2-12 intact by passive exam.  Moves 4 limbs with symmetric strength.  Psychiatric: Affect normal, Judgment normal, Mood normal.     LABS Ordered and Reviewed by Me:  Results for orders placed or performed during the hospital encounter of 01/31/25   Urinalysis, Culture if Indicated    Collection Time: 01/31/25  9:10 AM    Specimen: Urine, Clean Catch; Blood   Result Value Ref Range    Color Yellow     Character Cloudy (A)     Specific Gravity 1.016 <1.035    Ph 6.5 5.0 - 8.0    Glucose Negative Negative mg/dL    Ketones Negative Negative mg/dL    Protein 30 (A) Negative mg/dL    Bilirubin Negative Negative    Urobilinogen, Urine 2.0 (A) <=1.0 EU/dL    Nitrite Negative Negative    Leukocyte Esterase Large (A) Negative    Occult Blood Trace (A) Negative    Micro Urine Req Microscopic    URINE MICROSCOPIC (W/UA)    Collection Time: 01/31/25  9:10 AM   Result Value Ref Range    WBC >100 (A) /hpf    RBC 3-5 (A) 0 - 2 /hpf    Bacteria Many (A) None /hpf    Epithelial Cells 0-2 0 - 5 /hpf    Urine Casts 0-2 0 - 2 /lpf   CBC with Differential    Collection Time: 01/31/25  9:15 AM   Result Value Ref Range    WBC 10.2 4.8 - 10.8 K/uL    RBC 4.79 4.20 - 5.40 M/uL    Hemoglobin 11.5 (L) 12.0 - 16.0 g/dL    Hematocrit 36.2 (L) 37.0 - 47.0 %    MCV 75.6 (L) 81.4 - 97.8 fL    MCH 24.0 (L) 27.0 - 33.0 pg    MCHC 31.8 (L) 32.2 - 35.5 g/dL    RDW 46.5 35.9 - 50.0 fL    Platelet Count 320 164 - 446 K/uL    MPV 9.1 9.0 - 12.9 fL    Neutrophils-Polys 79.10 (H) 44.00 - 72.00 %    Lymphocytes 7.50 (L) 22.00 - 41.00 %    Monocytes 7.90 0.00 - 13.40 %    Eosinophils 4.50 0.00 - 6.90 %    Basophils 0.50 0.00 -  1.80 %    Immature Granulocytes 0.50 0.00 - 0.90 %    Nucleated RBC 0.00 0.00 - 0.20 /100 WBC    Neutrophils (Absolute) 8.09 (H) 1.82 - 7.42 K/uL    Lymphs (Absolute) 0.77 (L) 1.00 - 4.80 K/uL    Monos (Absolute) 0.81 0.00 - 0.85 K/uL    Eos (Absolute) 0.46 0.00 - 0.51 K/uL    Baso (Absolute) 0.05 0.00 - 0.12 K/uL    Immature Granulocytes (abs) 0.05 0.00 - 0.11 K/uL    NRBC (Absolute) 0.00 K/uL   Comp Metabolic Panel    Collection Time: 01/31/25  9:15 AM   Result Value Ref Range    Sodium 135 135 - 145 mmol/L    Potassium 4.2 3.6 - 5.5 mmol/L    Chloride 101 96 - 112 mmol/L    Co2 22 20 - 33 mmol/L    Anion Gap 12.0 7.0 - 16.0    Glucose 129 (H) 65 - 99 mg/dL    Bun 10 8 - 22 mg/dL    Creatinine 1.00 0.50 - 1.40 mg/dL    Calcium 9.1 8.5 - 10.5 mg/dL    Correct Calcium 8.9 8.5 - 10.5 mg/dL    AST(SGOT) 19 12 - 45 U/L    ALT(SGPT) 8 2 - 50 U/L    Alkaline Phosphatase 111 (H) 30 - 99 U/L    Total Bilirubin 0.8 0.1 - 1.5 mg/dL    Albumin 4.2 3.2 - 4.9 g/dL    Total Protein 7.4 6.0 - 8.2 g/dL    Globulin 3.2 1.9 - 3.5 g/dL    A-G Ratio 1.3 g/dL   proBrain Natriuretic Peptide, NT    Collection Time: 01/31/25  9:15 AM   Result Value Ref Range    NT-proBNP 239 (H) 0 - 125 pg/mL   Troponin    Collection Time: 01/31/25  9:15 AM   Result Value Ref Range    Troponin T 11 6 - 19 ng/L   ESTIMATED GFR    Collection Time: 01/31/25  9:15 AM   Result Value Ref Range    GFR (CKD-EPI) 59 (A) >60 mL/min/1.73 m 2   POC CoV-2, FLU A/B, RSV by PCR    Collection Time: 01/31/25  9:15 AM   Result Value Ref Range    POC Influenza A RNA, PCR Negative Negative    POC Influenza B RNA, PCR Negative Negative    POC RSV, by PCR Negative Negative    POC SARS-CoV-2, PCR NotDetected NotDetected   URINE CULTURE(NEW)    Collection Time: 01/31/25  9:44 AM    Specimen: Urine   Result Value Ref Range    Significant Indicator NEG     Source UR     Site -     Culture Result -    Lactic Acid    Collection Time: 01/31/25 10:19 AM   Result Value Ref Range     Lactic Acid 0.9 0.5 - 2.0 mmol/L     *Note: Due to a large number of results and/or encounters for the requested time period, some results have not been displayed. A complete set of results can be found in Results Review.       Interpretations:    Pulse Ox: Low normal at 92% on room air      EKG Interpretation by me    Indication: Nursing triage protocol    Rhythm: normal sinus   Rate: Tachycardic at 107  Axis: normal  Ectopy: none  Conduction: normal  ST/T Waves: no acute change  Q Waves: none  R Wave progression: normal  Hypertrophy changes: Absent    Comparison: Tachycardia is new    Clinical Impression: Sinus tachycardia    RADIOLOGY  I have independently interpreted the chest x-ray associated with this visit demonstrating no pneumonia.  I am awaiting the final reading from the radiologist.     Final Radiology Report  DX-CHEST-PORTABLE (1 VIEW)   Final Result      No acute cardiac or pulmonary abnormalities are identified.        Radiologist interpretation have been reviewed by me.     ED COURSE:      INTERVENTIONS BY ME:  Medications   predniSONE (Deltasone) tablet 60 mg (60 mg Oral Given 1/31/25 1038)   cefTRIAXone (Rocephin) injection 2,000 mg (2,000 mg Intravenous Given 1/31/25 1038)   LR (Bolus) infusion 1,000 mL (1,000 mL Intravenous New Bag 1/31/25 1030)   albuterol (Proventil) 2.5mg/0.5ml nebulizer solution 2.5 mg (2.5 mg Nebulization Given 1/31/25 1110)       11:23 AM - Patient reassessed and response to intervention reports improved dyspnea and has louder expiratory wheezes on repeat auscultation.      ASSESSMENT, COURSE AND PLAN:  PROBLEMS EVALUATED THIS VISIT:    This patient presents with subjective fever and cough and has a COPD asthma exacerbation likely triggered by a viral syndrome.  Otherwise concerned she may have pneumonia there is none on chest x-ray.  She tested negative for influenza COVID and RSV.  She does not appear to be septic.  She responded well to albuterol.  She does not smoke.   She is not hypoxic.  She also has dysuria and indeed has a UTI.  This is probably cystitis but early pyelonephritis is possible.  The patient's not septic.  There is no renal insufficiency.  The patient also has a mild anemia which is chronic and stable per chart review    Measures: HYDRATION: Intravenous fluids were medically necessary given suspected sepsis at presentation.       DISPOSITION AND DISCUSSIONS    RISK:  Moderate given need for prescription medication management     PLAN:  New Prescriptions    CEFDINIR (OMNICEF) 300 MG CAP    Take 1 Capsule by mouth 2 times a day.    PREDNISONE (DELTASONE) 20 MG TAB    Take 3 Tablets by mouth every day for 4 days.   Patient prescribed refills of Trelegy fluticasone and albuterol    Urine culture    UTI and COPD exacerbation handout given    Return for worsening shortness of breath dizziness vomiting or ill appearance    Followup:  Rudy Parsons M.D.  [address] [place] [id number]  [phone]    Schedule an appointment as soon as possible for a visit in 3 days  As needed if not better      CONDITION: Stable improved.     FINAL IMPRESSION  1. Acute exacerbation of chronic obstructive pulmonary disease (COPD) ([facility])    2. Urinary tract infection without hematuria, site unspecified    3. Viral syndrome    4. Mild intermittent asthma with acute exacerbation    5. Asthma-COPD overlap syndrome (HCC)         Clem Brennan M.D., 01/31/25 11:25 AM

## 2025-01-31 NOTE — ED NOTES
Pt discharged to lobby with steady gait. GCS 15. IV discontinued and gauze placed, pt in possession of belongings. Pt provided discharge education and information pertaining to medications and follow up appointments. Pt received copy of discharge instructions and verbalized understanding.     Vitals:    01/31/25 1203   BP: (!) 142/84   Pulse: (!) 105   Resp:    Temp: 36.7 °C (98 °F)   SpO2: 90%

## 2025-02-04 NOTE — ED NOTES
ED Positive Culture Follow-up/Notification Note:    Date: 2/3/2025     Patient seen in the ED on 1/31/2025 for viral syndrome and UTI. She reported cough, fever, sore throat, chest pain with cough, and dysuria.    1. Acute exacerbation of chronic obstructive pulmonary disease (COPD) (HCC)    2. Urinary tract infection without hematuria, site unspecified    3. Viral syndrome    4. Mild intermittent asthma with acute exacerbation    5. Asthma-COPD overlap syndrome (HCC)       Discharge Medication List as of 1/31/2025  1:00 PM        START taking these medications    Details   predniSONE (DELTASONE) 20 MG Tab Take 3 Tablets by mouth every day for 4 days., Disp-12 Tablet, R-0, Normal      cefdinir (OMNICEF) 300 MG Cap Take 1 Capsule by mouth 2 times a day., Disp-14 Capsule, R-0, Normal           Allergies: Sumatriptan, Clarithromycin, Levaquin, Pcn [penicillins], Shellfish allergy, Trazodone, Biaxin [clarithromycin], Erythromycin, and Levofloxacin     Vitals:    01/31/25 1003 01/31/25 1111 01/31/25 1124 01/31/25 1203   BP: (!) 160/93  (!) 149/73 (!) 142/84   Pulse: (!) 113 (!) 108 (!) 114 (!) 105   Resp: 19 20 18    Temp:    36.7 °C (98 °F)   TempSrc:    Temporal   SpO2: 90% 94% 91% 90%   Weight:         Final cultures:   Results       Procedure Component Value Units Date/Time    URINE CULTURE(NEW) [[id number]]  (Abnormal)  (Susceptibility) Collected: 01/31/25 0910    Order Status: Completed Specimen: Urine Updated: 02/02/25 1050     Significant Indicator POS     Source UR     Site -     Culture Result Usual urogenital salty 10-50,000 cfu/mL      Escherichia coli  >100,000 cfu/mL  Presumptive identification      Susceptibility       Escherichia coli (1)       Antibiotic Interpretation Microscan   Method Status    Ampicillin/sulbactam Sensitive <=4/2 mcg/mL LAURA Final    Amikacin  [*]  Sensitive <=16 mcg/mL LAURA Final    Ampicillin Sensitive <=8 mcg/mL LAURA Final    Amoxicillin/Clavulanic Acid Sensitive <=8/4 mcg/mL LAURA  Final    Aztreonam  [*]  Sensitive <=4 mcg/mL LAURA Final    Ceftolozane+Tazobactam  [*]  Sensitive <=2 mcg/mL LAURA Final    Ceftriaxone Sensitive <=1 mcg/mL LAURA Final    Ceftazidime  [*]  Sensitive <=1 mcg/mL LAURA Final    Cefazolin Sensitive <=2 mcg/mL LAURA Final     Breakpoints when Cefazolin is used for therapy of infections  other than uncomplicated UTIs due to Enterobacterales are as  follows:  LAURA and Interpretation:  <=2 S  4 I  >=8 R         Ciprofloxacin Sensitive <=0.25 mcg/mL LAURA Final    Cefepime Sensitive <=2 mcg/mL LAURA Final    Cefuroxime Sensitive <=4 mcg/mL LAURA Final    Ceftazidime+Avibactam  [*]  Sensitive <=4 mcg/mL LAURA Final    Ertapenem  [*]  Sensitive <=0.5 mcg/mL LAURA Final    Nitrofurantoin Sensitive <=32 mcg/mL LAURA Final    Gentamicin Sensitive <=2 mcg/mL LAURA Final    Imipenem  [*]  Sensitive <=1 mcg/mL LAURA Final    Levofloxacin Sensitive <=0.5 mcg/mL LAURA Final    Meropenem Sensitive <=1 mcg/mL LAURA Final    Meropenem/Vaborbactam Sensitive <=2 mcg/mL LAURA Final    Minocycline Sensitive <=4 mcg/mL LAURA Final    Pip/Tazobactam Sensitive <=8 mcg/mL LAURA Final    Trimeth/Sulfa Sensitive <=0.5/9.5 mcg/mL LAURA Final    Tetracycline  [*]  Sensitive <=4 mcg/mL LAURA Final    Tigecycline Sensitive <=2 mcg/mL LAURA Final    Tobramycin Sensitive <=2 mcg/mL LAURA Final               [*]  Suppressed Antibiotic                   Blood Culture - Draw one from central line and one from peripheral site [[id number]] Collected: 01/31/25 1019    Order Status: Completed Specimen: Blood from Line Updated: 01/31/25 1408     Significant Indicator NEG     Source BLD     Site Peripheral     Culture Result No Growth  Note: Blood cultures are incubated for 5 days and  are monitored continuously.Positive blood cultures  are called to the RN and reported as soon as  they are identified.      Blood Culture - Draw one from central line and one from peripheral site [[id number]] Collected: 01/31/25 1028    Order Status: Completed Specimen:  Blood from Peripheral Updated: 01/31/25 1408     Significant Indicator NEG     Source BLD     Site PERIPHERAL     Culture Result No Growth  Note: Blood cultures are incubated for 5 days and  are monitored continuously.Positive blood cultures  are called to the RN and reported as soon as  they are identified.      Urinalysis [[id number]]     Order Status: Canceled Specimen: Urine     Urine Culture (New) [[id number]]     Order Status: Canceled Specimen: Urine, Clean Catch     Urinalysis, Culture if Indicated [[id number]]  (Abnormal) Collected: 01/31/25 0910    Order Status: Completed Specimen: Blood from Urine, Clean Catch Updated: 01/31/25 0944     Color Yellow     Character Cloudy     Specific Gravity 1.016     Ph 6.5     Glucose Negative mg/dL      Ketones Negative mg/dL      Protein 30 mg/dL      Bilirubin Negative     Urobilinogen, Urine 2.0 EU/dL      Nitrite Negative     Leukocyte Esterase Large     Occult Blood Trace     Micro Urine Req Microscopic            Plan:   Appropriate antibiotic therapy prescribed for the E. coli isolated from the urine culture. No changes required based upon culture result.  Sent letter to patient via [employer] to notify of positive culture result and encourage compliance with prescribed antibiotics.     Veronique Singh, PharmD

## 2025-02-05 DIAGNOSIS — N39.0 URINARY TRACT INFECTION WITHOUT HEMATURIA, SITE UNSPECIFIED: ICD-10-CM

## 2025-02-05 RX ORDER — CEFDINIR 300 MG/1
300 CAPSULE ORAL 2 TIMES DAILY
Qty: 14 CAPSULE | Refills: 0 | Status: SHIPPED | OUTPATIENT
Start: 2025-02-05

## 2025-02-05 NOTE — TELEPHONE ENCOUNTER
Received request via: Patient    Was the patient seen in the last year in this department? Yes    Does the patient have an active prescription (recently filled or refills available) for medication(s) requested? No    Pharmacy Name:   [facility]/pharmacy #[id number] - [place], [place] - [address]  [address] [address]  Phone: [phone] Fax: [phone]        Does the patient have shelter Plus and need 100-day supply? (This applies to ALL medications) abx

## 2025-02-06 ENCOUNTER — OFFICE VISIT (OUTPATIENT)
Dept: MEDICAL GROUP | Facility: PHYSICIAN GROUP | Age: 75
End: 2025-02-06
Payer: MEDICARE

## 2025-02-06 VITALS
HEART RATE: 108 BPM | BODY MASS INDEX: 31.89 KG/M2 | OXYGEN SATURATION: 98 % | TEMPERATURE: 97.9 F | SYSTOLIC BLOOD PRESSURE: 114 MMHG | HEIGHT: 61 IN | DIASTOLIC BLOOD PRESSURE: 72 MMHG | WEIGHT: 168.9 LBS | RESPIRATION RATE: 16 BRPM

## 2025-02-06 DIAGNOSIS — N18.31 STAGE 3A CHRONIC KIDNEY DISEASE: ICD-10-CM

## 2025-02-06 DIAGNOSIS — N18.31 HYPERTENSIVE KIDNEY DISEASE WITH STAGE 3A CHRONIC KIDNEY DISEASE: Chronic | ICD-10-CM

## 2025-02-06 DIAGNOSIS — I47.10 PAROXYSMAL SVT (SUPRAVENTRICULAR TACHYCARDIA) (HCC): Chronic | ICD-10-CM

## 2025-02-06 DIAGNOSIS — I12.9 HYPERTENSIVE KIDNEY DISEASE WITH STAGE 3A CHRONIC KIDNEY DISEASE: Chronic | ICD-10-CM

## 2025-02-06 DIAGNOSIS — G43.001 MIGRAINE WITHOUT AURA AND WITH STATUS MIGRAINOSUS, NOT INTRACTABLE: Chronic | ICD-10-CM

## 2025-02-06 DIAGNOSIS — J44.89 ASTHMA-COPD OVERLAP SYNDROME (HCC): Chronic | ICD-10-CM

## 2025-02-06 DIAGNOSIS — J45.41 MODERATE PERSISTENT ASTHMA WITH EXACERBATION: ICD-10-CM

## 2025-02-06 DIAGNOSIS — I48.0 PAROXYSMAL ATRIAL FIBRILLATION (HCC): Chronic | ICD-10-CM

## 2025-02-06 DIAGNOSIS — E78.5 DYSLIPIDEMIA: Chronic | ICD-10-CM

## 2025-02-06 DIAGNOSIS — F32.5 MAJOR DEPRESSION IN REMISSION (HCC): Chronic | ICD-10-CM

## 2025-02-06 DIAGNOSIS — R73.03 PREDIABETES: Chronic | ICD-10-CM

## 2025-02-06 PROCEDURE — 3074F SYST BP LT 130 MM HG: CPT | Performed by: INTERNAL MEDICINE

## 2025-02-06 PROCEDURE — 99214 OFFICE O/P EST MOD 30 MIN: CPT | Performed by: INTERNAL MEDICINE

## 2025-02-06 PROCEDURE — 3078F DIAST BP <80 MM HG: CPT | Performed by: INTERNAL MEDICINE

## 2025-02-06 RX ORDER — PREDNISONE 10 MG/1
TABLET ORAL
Qty: 20 TABLET | Refills: 0 | Status: SHIPPED | OUTPATIENT
Start: 2025-02-06 | End: 2025-02-14

## 2025-02-06 RX ORDER — BENZONATATE 100 MG/1
100 CAPSULE ORAL 3 TIMES DAILY PRN
Qty: 60 CAPSULE | Refills: 0 | Status: SHIPPED | OUTPATIENT
Start: 2025-02-06

## 2025-02-06 ASSESSMENT — PATIENT HEALTH QUESTIONNAIRE - PHQ9
6. FEELING BAD ABOUT YOURSELF - OR THAT YOU ARE A FAILURE OR HAVE LET YOURSELF OR YOUR FAMILY DOWN: NOT AL ALL
1. LITTLE INTEREST OR PLEASURE IN DOING THINGS: NOT AT ALL
5. POOR APPETITE OR OVEREATING: NOT AT ALL
8. MOVING OR SPEAKING SO SLOWLY THAT OTHER PEOPLE COULD HAVE NOTICED. OR THE OPPOSITE, BEING SO FIGETY OR RESTLESS THAT YOU HAVE BEEN MOVING AROUND A LOT MORE THAN USUAL: NOT AT ALL
2. FEELING DOWN, DEPRESSED, IRRITABLE, OR HOPELESS: NOT AT ALL
SUM OF ALL RESPONSES TO PHQ9 QUESTIONS 1 AND 2: 0
7. TROUBLE CONCENTRATING ON THINGS, SUCH AS READING THE NEWSPAPER OR WATCHING TELEVISION: NOT AT ALL
9. THOUGHTS THAT YOU WOULD BE BETTER OFF DEAD, OR OF HURTING YOURSELF: NOT AT ALL
4. FEELING TIRED OR HAVING LITTLE ENERGY: NOT AT ALL
3. TROUBLE FALLING OR STAYING ASLEEP OR SLEEPING TOO MUCH: NOT AT ALL
SUM OF ALL RESPONSES TO PHQ QUESTIONS 1-9: 0

## 2025-02-06 ASSESSMENT — FIBROSIS 4 INDEX: FIB4 SCORE: 1.53

## 2025-02-06 NOTE — ASSESSMENT & PLAN NOTE
Chronic condition.  The patient is presently taking atorvastatin.  Patient tolerating medication well.

## 2025-02-06 NOTE — ASSESSMENT & PLAN NOTE
Chronic condition.  Patient followed by cardiology service. .  Patient presently taking aspirin and diltiazem.

## 2025-02-06 NOTE — ASSESSMENT & PLAN NOTE
Chronic condition.  Previous GFR was in the 50s.  Patient currently asymptomatic.  Lab test showed GFR 59.

## 2025-02-06 NOTE — ASSESSMENT & PLAN NOTE
This is a chronic condition.  The patient is currently taking diltiazem 240 Mg daily.  Blood pressure has been well-controlled at home per patient report.

## 2025-02-06 NOTE — ASSESSMENT & PLAN NOTE
This is a new condition.  The patient reported worsening cough/wheezing the patient was seen in the emergency room recently.  Please refer to ER record for detail.  Patient stated that she had swab testing done which was negative for COVID, influenza and RSV.  Chest x-ray also completed no evidence of pneumonia.  Patient is presently using Trelegy.  Patient reported intermittent wheezing.  She also noted cough.

## 2025-02-06 NOTE — ASSESSMENT & PLAN NOTE
Chronic condition.  Patient followed by neurology service.  Patient is taking amitriptyline and rizatriptan as needed.  No new symptoms reported.

## 2025-02-06 NOTE — ASSESSMENT & PLAN NOTE
This is a chronic condition.  Patient followed by cardiology.  Patient is presently taking diltiazem.  She denies chest pain shortness of breath or palpitation.

## 2025-02-06 NOTE — ASSESSMENT & PLAN NOTE
This is a chronic condition.  The patient is currently on diet therapy.  She denies significant hypo or hyperglycemia.

## 2025-02-06 NOTE — PROGRESS NOTES
PRIMARY CARE CLINIC VISIT        Chief Complaint   Patient presents with     Cough/asthma exacerbation  Atrial fibrillation  Depression  Asthma-COPD overlap syndrome  CKD 3  Hypertension kidney disease  Hyperlipidemia  Paroxysmal SVT  Prediabetes    History of Present Illness     Moderate persistent asthma with exacerbation  This is a new condition.  The patient reported worsening cough/wheezing the patient was seen in the emergency room recently.  Please refer to ER record for detail.  Patient stated that she had swab testing done which was negative for COVID, influenza and RSV.  Chest x-ray also completed no evidence of pneumonia.  Patient is presently using Trelegy.  Patient reported intermittent wheezing.  She also noted cough.    Paroxysmal atrial fibrillation (HCC)  Chronic condition.  Patient followed by cardiology service. .  Patient presently taking aspirin and diltiazem.    Major depression in remission (HCC)  Condition.  Patient presently taking duloxetine.  Patient denies SI.    Asthma-COPD overlap syndrome (HCC)  This is a chronic condition.  The patient followed by pulmonology service.  Patient denies fever or chills.  Patient was seen in the ER recently.  Chest x-ray done negative for pneumonia.    Paroxysmal SVT (supraventricular tachycardia) (HCC)  This is a chronic condition.  Patient followed by cardiology.  Patient is presently taking diltiazem.  She denies chest pain shortness of breath or palpitation.    Stage 3a chronic kidney disease  Chronic condition.  Previous GFR was in the 50s.  Patient currently asymptomatic.  Lab test showed GFR 59.    Dyslipidemia  Chronic condition.  The patient is presently taking atorvastatin.  Patient tolerating medication well.    Migraine  Chronic condition.  Patient followed by neurology service.  Patient is taking amitriptyline and rizatriptan as needed.  No new symptoms reported.    Hypertensive kidney disease with stage 3a chronic kidney disease  This is a  chronic condition.  The patient is currently taking diltiazem 240 Mg daily.  Blood pressure has been well-controlled at home per patient report.    Prediabetes  This is a chronic condition.  The patient is currently on diet therapy.  She denies significant hypo or hyperglycemia.    Current Outpatient Medications on File Prior to Visit   Medication Sig Dispense Refill    albuterol 108 (90 Base) MCG/ACT Aero Soln inhalation aerosol Inhale 2 Puffs every 6 hours as needed for Shortness of Breath. 8.5 g 0    fluticasone (FLOVENT HFA) 110 MCG/ACT Aerosol Inhale 1 Puff 2 times a day. 12 g 0    fluticasone-umeclidinium-vilanterol (TRELEGY ELLIPTA) 100-62.5-25 mcg/act inhaler Inhale 1 Puff every day. 180 Each 0    telmisartan (MICARDIS) 40 MG Tab Take 1 Tablet by mouth every day. 100 Tablet 3    atorvastatin (LIPITOR) 10 MG Tab Take 1 Tablet by mouth every day. 100 Tablet 3    DULoxetine (CYMBALTA) 60 MG Cap DR Particles delayed-release capsule Take 1 Capsule by mouth every day. 100 Capsule 3    pantoprazole (PROTONIX) 40 MG Tablet Delayed Response Take 1 Tablet by mouth 2 times a day. 180 Tablet 2    levETIRAcetam (KEPPRA) 500 MG Tab Take 500 mg by mouth 2 times a day.      dilTIAZem CD (CARDIZEM CD) 240 MG CAPSULE SR 24 HR Take 1 Capsule by mouth every day. 90 Capsule 3    ferrous sulfate 325 (65 Fe) MG tablet Take 1 Tablet by mouth every day. 30 Tablet 0    Cyanocobalamin (VITAMIN B-12 PO) Take 1 Tablet by mouth 1 time a day as needed (shaking).      Azelastine (ASTELIN) 137 MCG/SPRAY Solution Administer 2 Sprays into affected nostril(S) every day.      fluticasone (FLONASE) 50 MCG/ACT nasal spray SPRAY 1 SPRAY INTO EACH NOSTRIL ONCE A DAY      clobetasol (TEMOVATE) 0.05 % Ointment APPLY A THIN LAYER ONCE DAILY FOR 3 WEEKS, THEN USE 2 TO 3 TIMES A WEEK 60 g 1    aspirin (ASA) 81 MG Chew Tab chewable tablet Chew 81 mg every morning.      ondansetron (ZOFRAN ODT) 4 MG TABLET DISPERSIBLE Take 1 Tablet by mouth every 6 hours  as needed for Nausea/Vomiting. 10 Tablet 0    rizatriptan (MAXALT) 5 MG tablet Take 1 Tablet by mouth one time as needed for Migraine. 10 Tablet 5    zonisamide (ZONEGRAN) 100 MG Cap Take 100 mg by mouth every day.      cefdinir (OMNICEF) 300 MG Cap Take 1 Capsule by mouth 2 times a day. (Patient not taking: Reported on 2/6/2025) 14 Capsule 0    sulfamethoxazole-trimethoprim (BACTRIM DS) 800-160 MG tablet Take 1 Tablet by mouth 2 times a day. (Patient not taking: Reported on 2/6/2025) 10 Tablet 0     No current facility-administered medications on file prior to visit.        Allergies: Sumatriptan, Clarithromycin, Levaquin, Pcn [penicillins], Shellfish allergy, Trazodone, Biaxin [clarithromycin], Erythromycin, and Levofloxacin    Current Outpatient Medications Ordered in Epic   Medication Sig Dispense Refill    predniSONE (DELTASONE) 10 MG Tab Take 4 Tablets by mouth every day for 2 days, THEN 3 Tablets every day for 2 days, THEN 2 Tablets every day for 2 days, THEN 1 Tablet every day for 2 days. 20 Tablet 0    benzonatate (TESSALON) 100 MG Cap Take 1 Capsule by mouth 3 times a day as needed for Cough. 60 Capsule 0    amitriptyline (ELAVIL) 25 MG Tab Take 1 Tablet by mouth every evening. 30 Tablet 3    albuterol 108 (90 Base) MCG/ACT Aero Soln inhalation aerosol Inhale 2 Puffs every 6 hours as needed for Shortness of Breath. 8.5 g 0    fluticasone (FLOVENT HFA) 110 MCG/ACT Aerosol Inhale 1 Puff 2 times a day. 12 g 0    fluticasone-umeclidinium-vilanterol (TRELEGY ELLIPTA) 100-62.5-25 mcg/act inhaler Inhale 1 Puff every day. 180 Each 0    telmisartan (MICARDIS) 40 MG Tab Take 1 Tablet by mouth every day. 100 Tablet 3    atorvastatin (LIPITOR) 10 MG Tab Take 1 Tablet by mouth every day. 100 Tablet 3    DULoxetine (CYMBALTA) 60 MG Cap DR Particles delayed-release capsule Take 1 Capsule by mouth every day. 100 Capsule 3    pantoprazole (PROTONIX) 40 MG Tablet Delayed Response Take 1 Tablet by mouth 2 times a day. 180  Tablet 2    levETIRAcetam (KEPPRA) 500 MG Tab Take 500 mg by mouth 2 times a day.      dilTIAZem CD (CARDIZEM CD) 240 MG CAPSULE SR 24 HR Take 1 Capsule by mouth every day. 90 Capsule 3    ferrous sulfate 325 (65 Fe) MG tablet Take 1 Tablet by mouth every day. 30 Tablet 0    Cyanocobalamin (VITAMIN B-12 PO) Take 1 Tablet by mouth 1 time a day as needed (shaking).      Azelastine (ASTELIN) 137 MCG/SPRAY Solution Administer 2 Sprays into affected nostril(S) every day.      fluticasone (FLONASE) 50 MCG/ACT nasal spray SPRAY 1 SPRAY INTO EACH NOSTRIL ONCE A DAY      clobetasol (TEMOVATE) 0.05 % Ointment APPLY A THIN LAYER ONCE DAILY FOR 3 WEEKS, THEN USE 2 TO 3 TIMES A WEEK 60 g 1    aspirin (ASA) 81 MG Chew Tab chewable tablet Chew 81 mg every morning.      ondansetron (ZOFRAN ODT) 4 MG TABLET DISPERSIBLE Take 1 Tablet by mouth every 6 hours as needed for Nausea/Vomiting. 10 Tablet 0    rizatriptan (MAXALT) 5 MG tablet Take 1 Tablet by mouth one time as needed for Migraine. 10 Tablet 5    zonisamide (ZONEGRAN) 100 MG Cap Take 100 mg by mouth every day.      cefdinir (OMNICEF) 300 MG Cap Take 1 Capsule by mouth 2 times a day. (Patient not taking: Reported on 2/6/2025) 14 Capsule 0    sulfamethoxazole-trimethoprim (BACTRIM DS) 800-160 MG tablet Take 1 Tablet by mouth 2 times a day. (Patient not taking: Reported on 2/6/2025) 10 Tablet 0     No current Epic-ordered facility-administered medications on file.       Past Medical History:   Diagnosis Date    Apnea, sleep     Arrhythmia     afib    Arthritis     osteo    Asthma     Atrial fibrillation (HCC)     Back pain     Bronchitis     Chickenpox     Constipation     Cough     Daytime sleepiness     Dental disorder     upper dentures    Earache     Frequent urination     Gasping for breath     GERD (gastroesophageal reflux disease) 2/27/2010    [place]oven[place] measles     Heartburn     Hypertension     Impaired fasting glucose 2/27/2010    Incontinence of urine     Indigestion      Influenza     Mumps     Nasal drainage     Nausea     Osteopenia 2/24/2010    Osteoporosis     Other specified disorder of intestines     constipation r/t meds    Restless leg syndrome     Scarlet fever     Shortness of breath     Sleep apnea     Snoring     Tremor, essential 2/24/2010    Urinary bladder disorder     Wears glasses     Wheezing     Whooping cough        Past Surgical History:   Procedure Laterality Date    INCISION AND DRAINAGE GENERAL N/A 10/18/2022    Procedure: INCISION AND DRAINAGE ABSCESS TONGUE;  Surgeon: Paz Suazo M.D.;  Location: SURGERY [facility];  Service: Ent    LUMBAR LAMINECTOMY DISKECTOMY Bilateral 2/4/2017    Procedure: LUMBAR LAMINECTOMY DISKECTOMY POSTERIOR L4-S1 ;  Surgeon: Emil Hitchcock M.D.;  [place] [facility] [place]  Service:     CARPAL TUNNEL ENDOSCOPIC  11/17/2012    Performed by Heriberto Quiñones M.D. at SURGERY [facility] ORS    TRIGGER FINGER RELEASE  11/17/2012    Performed by Heriberto Quiñones M.D. at SURGERY [facility] ORS    HIP ARTHROSCOPY  2/23/2009    Performed by SHERLYN CALVO at SURGERY [facility] ORS    ACETABULAR OSTEOTOMY  2/23/2009    Performed by SHERLYN CALVO at SURGERY [facility] ORS    MASS EXCISION ORTHO  2/23/2009    Performed by SHERLYN CALVO at SURGERY [facility] ORS    HIP ARTHROSCOPY  2005    done at [facility]    HAJA BY LAPAROSCOPY  1997    HYSTERECTOMY, TOTAL ABDOMINAL  1979    oopherectomy lacie    BLADDER SUSPENSION      bladder sling    CARPAL TUNNEL RELEASE      HIP REPLACEMENT, TOTAL      HYSTERECTOMY LAPAROSCOPY      LAMINOTOMY      PRIMARY C SECTION  1970/1975       Family History   Problem Relation Age of Onset    GI Disease Father         Crohn's    Heart Disease Father         First MI age 30    Hypertension Father     Stroke Father     Hypertension Other     Cancer Brother         ESOPHAGEAL CANCER       Social History     Tobacco Use   Smoking Status Never   Smokeless Tobacco Never       Social  "History     Substance and Sexual Activity   Alcohol Use Not Currently    Comment: Stopped drinking 45 days ago 7/12/17 Going to AA       Review of systems  As per HPI above. All other systems reviewed and negative.      Past Medical, Social, and Family history reviewed and updated in [facility]       LAB DATA:     I have independently reviewed / interpreted labs    Lab Results   Component Value Date/Time    HBA1C 6.2 (H) 11/12/2024 08:08 AM    HBA1C 6.0 (H) 07/10/2024 06:53 AM    HBA1C 6.0 (H) 02/21/2024 08:14 AM        Lab Results   Component Value Date/Time    WBC 10.2 01/31/2025 09:15 AM    HEMOGLOBIN 11.5 (L) 01/31/2025 09:15 AM    HEMATOCRIT 36.2 (L) 01/31/2025 09:15 AM    MCV 75.6 (L) 01/31/2025 09:15 AM    PLATELETCT 320 01/31/2025 09:15 AM       Lab Results   Component Value Date/Time    SODIUM 135 01/31/2025 09:15 AM    POTASSIUM 4.2 01/31/2025 09:15 AM    GLUCOSE 129 (H) 01/31/2025 09:15 AM    BUN 10 01/31/2025 09:15 AM    CREATININE 1.00 01/31/2025 09:15 AM    CREATININE 0.89 04/27/2009 12:00 AM       Lab Results   Component Value Date/Time    CHOLSTRLTOT 130 11/12/2024 08:08 AM    TRIGLYCERIDE 140 11/12/2024 08:08 AM    HDL 40 11/12/2024 08:08 AM    LDL 62 11/12/2024 08:08 AM       Lab Results   Component Value Date/Time    ALTSGPT 8 01/31/2025 09:15 AM          Objective     /72 (BP Location: Left arm, Patient Position: Sitting, BP Cuff Size: Adult)   Pulse (!) 108   Temp 36.6 °C (97.9 °F) (Temporal)   Resp 16   Ht 1.549 m (5' 1\")   Wt 76.6 kg (168 lb 14.4 oz)   SpO2 98%    Body mass index is 31.91 kg/m².    General: alert in no apparent distress.  Cardiovascular: regular rate and rhythm  Pulmonary: lungs : Minimal expiratory wheezing   Gastrointestinal: BS present.   Sinus exam no purulent drainage  Oropharynx no exudates    Assessment and Plan     1. Moderate persistent asthma with exacerbation  This is an acute condition.  Uncontrolled.    - predniSONE (DELTASONE) 10 MG Tab; Take 4 Tablets by " mouth every day for 2 days, THEN 3 Tablets every day for 2 days, THEN 2 Tablets every day for 2 days, THEN 1 Tablet every day for 2 days.  Dispense: 20 Tablet; Refill: 0  - benzonatate (TESSALON) 100 MG Cap; Take 1 Capsule by mouth 3 times a day as needed for Cough.  Dispense: 60 Capsule; Refill: 0  Patient to follow-up with pulmonology service    2. Migraine without aura and with status migrainosus, not intractable  - amitriptyline (ELAVIL) 25 MG Tab; Take 1 Tablet by mouth every evening.  Dispense: 30 Tablet; Refill: 3  Chronic stable.  Patient may take amitriptyline 25 mg daily and rizatriptan as needed.  Continue follow-up with neurology service    3. Paroxysmal atrial fibrillation (HCC)  Chronic stable condition.  Continue diltiazem to 40 Mg daily and aspirin 81 Mg daily.  Continue follow-up with cardiology service    4. Major depression in remission (HCC)  Chronic stable condition.  Continue Cymbalta 60 Mg daily    5. Stage 3a chronic kidney disease  Chronic condition.  Patient to avoid NSAID.  Continue to monitor    6. Hypertensive kidney disease with stage 3a chronic kidney disease  Chronic stable.  Continue diltiazem to 40 Mg daily    7. Dyslipidemia  Chronic stable.  Lipid panel result discussed with the patient.  Continue atorvastatin 10 mg daily    8. Asthma-COPD overlap syndrome (HCC)  Chronic condition.  Continue Trelegy 1 puff daily.  Patient to follow-up with pulmonology service as directed    9. Paroxysmal SVT (supraventricular tachycardia) (HCC)  Chronic stable.  Continue diltiazem to 40 Mg daily.  Patient to follow-up with cardiology service    10. Prediabetes  Chronic condition.  Recommend diet and lifestyle modification.  Continue to monitor.                  Reviewed medical records including:  ER record dated February 3, 2025  January 30, 2025 urgent care note  November 18, 2024 medical office note  September 19, 2024 cardiology note  August 22, 2024 medical office note  July 16, 2024 medical  office note  Radiology report dated January 31, 2025, January 3, 2025, September 20, 2024    That includes face to face time and non-face to face time.  Chart review before the visit, the actual patient visit, and time spent on documentation in the EMR after the visit.  Chart review/prep, review of other providers' records, Independent review of imagings/labs ,  time for history/examination , pt's counseling/education, ordering, prescribing, treatment plan discussed with patient, and care coordination.            Please note that this dictation was created using voice recognition software. I have made every reasonable attempt to correct obvious errors, but I expect that there are errors of grammar and possibly content that I did not discover before finalizing the note.    Rudy Parsons MD  Internal Medicine  [facility]

## 2025-02-06 NOTE — ASSESSMENT & PLAN NOTE
This is a chronic condition.  The patient followed by pulmonology service.  Patient denies fever or chills.  Patient was seen in the ER recently.  Chest x-ray done negative for pneumonia.

## 2025-02-26 ENCOUNTER — TELEPHONE (OUTPATIENT)
Dept: HEALTH INFORMATION MANAGEMENT | Facility: OTHER | Age: 75
End: 2025-02-26
Payer: MEDICARE

## 2025-03-02 DIAGNOSIS — G43.001 MIGRAINE WITHOUT AURA AND WITH STATUS MIGRAINOSUS, NOT INTRACTABLE: Chronic | ICD-10-CM

## 2025-03-03 NOTE — TELEPHONE ENCOUNTER
Received request via: Pharmacy    Was the patient seen in the last year in this department? Yes    Does the patient have an active prescription (recently filled or refills available) for medication(s) requested? Yes.    Pharmacy Name:   [facility]/pharmacy #[id number] - [place], [place] - [address]  [address] [address]  Phone: [phone] Fax: [phone]        Does the patient have custodial Plus and need 100-day supply? (This applies to ALL medications) Yes, quantity updated to 100 days

## 2025-03-11 ENCOUNTER — NON-PROVIDER VISIT (OUTPATIENT)
Dept: SLEEP MEDICINE | Facility: MEDICAL CENTER | Age: 75
End: 2025-03-11
Attending: NURSE PRACTITIONER
Payer: MEDICARE

## 2025-03-11 DIAGNOSIS — Z78.9 NONSMOKER: ICD-10-CM

## 2025-03-11 DIAGNOSIS — J44.89 ASTHMA-COPD OVERLAP SYNDROME (HCC): Chronic | ICD-10-CM

## 2025-03-11 PROCEDURE — 94060 EVALUATION OF WHEEZING: CPT | Performed by: NURSE PRACTITIONER

## 2025-03-11 PROCEDURE — 94060 EVALUATION OF WHEEZING: CPT | Mod: 26 | Performed by: INTERNAL MEDICINE

## 2025-03-11 NOTE — PROCEDURES
Good patient effort & cooperation. The results of this test meet the ATS standards for acceptability and repeatability. Predicted values used are GLI-2019 for Spirometry. Equipment used : [employer] system. Albuterol HFA administered via spacer- 2 puffs.    Interpretation:  There is no significant obstructive ventilatory defect on spirometry.  There is no significant response to bronchodilators.  This does not rule out transient obstructive airway disorders such as the reported diagnosis of asthma.    Flow-volume loop is consistent with the above interpretation.    Compared to prior testing in 2024, the FEV1 has declined slightly from 2.09 L to 1.94 L, however still 102% predicted.    IKayden M.D. am the author of this note (PFT interpretation).  __________  Kayden Mckay MD  Pulmonary and Critical Care Medicine  [facility]

## 2025-03-13 ENCOUNTER — TELEPHONE (OUTPATIENT)
Dept: MEDICAL GROUP | Facility: PHYSICIAN GROUP | Age: 75
End: 2025-03-13
Payer: MEDICARE

## 2025-03-14 NOTE — TELEPHONE ENCOUNTER
Phone Number Called: [phone] (home)       Call outcome:  unable to leave msg  for pt to r/s appointment 5/14/25    Message: 1st attempt

## 2025-03-17 ENCOUNTER — TELEPHONE (OUTPATIENT)
Dept: MEDICAL GROUP | Facility: PHYSICIAN GROUP | Age: 75
End: 2025-03-17
Payer: MEDICARE

## 2025-03-17 NOTE — TELEPHONE ENCOUNTER
VOICEMAIL  1. Caller Name: Augusta Rodriguez                        Call Back Number: [phone]      2. Message: pt is moving to [place] and is asking if she should get a measle vaccine    3. Patient approves office to leave a detailed voicemail/MyChart message: N\A

## (undated) DEVICE — CLOSURE WOUND 1/4 X 4 (STERI - STRIP) (50/BX 4BX/CA)

## (undated) DEVICE — SET LEADWIRE 5 LEAD BEDSIDE DISPOSABLE ECG (1SET OF 5/EA)

## (undated) DEVICE — LACTATED RINGERS INJ. 500 ML - (24EA/CA)

## (undated) DEVICE — NEEDLE NON-SAFETY HYPO 21 GA X 1 1/2 IN HYPO (100/BX)

## (undated) DEVICE — ELECTRODE 850 FOAM ADHESIVE - HYDROGEL RADIOTRNSPRNT (50/PK)

## (undated) DEVICE — ELECTRODE DUAL RETURN W/ CORD - (50/PK)

## (undated) DEVICE — SENSOR SPO2 NEO LNCS ADHESIVE (20/BX) SEE USER NOTES

## (undated) DEVICE — PACK NEURO - (2EA/CA)

## (undated) DEVICE — SUTURE 3-0 VICRYL PLUS RB-1 - 8 X 18 INCH (12/BX)

## (undated) DEVICE — SLEEVE, VASO, THIGH, MED

## (undated) DEVICE — GLOVE BIOGEL PI INDICATOR SZ 8.0 SURGICAL PF LF -(50/BX 4BX/CA)

## (undated) DEVICE — MASK ANESTHESIA ADULT  - (100/CA)

## (undated) DEVICE — TUBING C&T SET FLYING LEADS DRAIN TUBING (10EA/BX)

## (undated) DEVICE — KIT ROOM DECONTAMINATION

## (undated) DEVICE — GLOVE BIOGEL SZ 7.5 SURGICAL PF LTX - (50PR/BX 4BX/CA)

## (undated) DEVICE — DRAPE SURGICAL U 77X120 - (10/CA)

## (undated) DEVICE — SUTURE 1 VICRYL PLUS CT-1 - 18 INCH (12/BX)

## (undated) DEVICE — KIT ANESTHESIA W/CIRCUIT & 3/LT BAG W/FILTER (20EA/CA)

## (undated) DEVICE — CANISTER SUCTION 3000ML MECHANICAL FILTER AUTO SHUTOFF MEDI-VAC NONSTERILE LF DISP  (40EA/CA)

## (undated) DEVICE — DRAPE STRLE REG TOWEL 18X24 - (10/BX 4BX/CA)"

## (undated) DEVICE — SENSOR OXIMETER ADULT SPO2 RD SET (20EA/BX)

## (undated) DEVICE — NEPTUNE 4 PORT MANIFOLD - (20/PK)

## (undated) DEVICE — TOOL DISSECT MATCH HEAD

## (undated) DEVICE — SYRINGE SAFETY 10 ML 18 GA X 1 1/2 BLUNT LL (100/BX 4BX/CA)

## (undated) DEVICE — GLOVE BIOGEL SZ 8 SURGICAL PF LTX - (50PR/BX 4BX/CA)

## (undated) DEVICE — SODIUM CHL IRRIGATION 0.9% 1000ML (12EA/CA)

## (undated) DEVICE — GLOVE BIOGEL ECLIPSE PF LATEX SIZE 7.5

## (undated) DEVICE — GOWN WARMING STANDARD FLEX - (30/CA)

## (undated) DEVICE — PROTECTOR ULNA NERVE - (36PR/CA)

## (undated) DEVICE — PACK MINOR BASIN - (2EA/CA)

## (undated) DEVICE — TUBE E-T HI-LO CUFF 7.0MM (10EA/PK)

## (undated) DEVICE — SET EXTENSION WITH 2 PORTS (48EA/CA) ***PART #2C8610 IS A SUBSTITUTE*****

## (undated) DEVICE — SUCTION INSTRUMENT YANKAUER BULBOUS TIP W/O VENT (50EA/CA)

## (undated) DEVICE — STERI STRIP COMPOUND BENZOIN - TINCTURE 0.6ML WITH APPLICATOR (40EA/BX)

## (undated) DEVICE — KIT EVACUATER 3 SPRING PVC LF 1/8 DRAIN SIZE (10EA/CA)"

## (undated) DEVICE — TRAY SKIN SCRUB PVP WET (20EA/CA) PART #DYND70356 DISCONTINUED

## (undated) DEVICE — MIDAS LUBRICATOR DIFFUSER PACK (4EA/CA)

## (undated) DEVICE — TUBING CLEARLINK DUO-VENT - C-FLO (48EA/CA)

## (undated) DEVICE — KIT SURGIFLO W/OUT THROMBIN - (6EA/CA)

## (undated) DEVICE — SUTURE GENERAL

## (undated) DEVICE — GLOVE BIOGEL PI INDICATOR SZ 6.5 SURGICAL PF LF - (50/BX 4BX/CA)

## (undated) DEVICE — SPONGE GAUZESTER 4 X 4 4PLY - (128PK/CA)

## (undated) DEVICE — LACTATED RINGERS INJ 1000 ML - (14EA/CA 60CA/PF)

## (undated) DEVICE — DRAPE MICROSCOPE X-LONG (10EA/CA)

## (undated) DEVICE — CHLORAPREP 26 ML APPLICATOR - ORANGE TINT(25/CA)

## (undated) DEVICE — DRAPE LAPAROTOMY T SHEET - (12EA/CA)

## (undated) DEVICE — DRESSING TRANSPARENT FILM TEGADERM 4 X 4.75" (50EA/BX)"

## (undated) DEVICE — SYRINGE SAFETY 3 ML 18 GA X 1 1/2 BLUNT LL (100/BX 8BX/CA)

## (undated) DEVICE — ARMREST CRADLE FOAM - (2PR/PK 12PR/CA)

## (undated) DEVICE — SUTURE 2-0 VICRYL PLUS CT-1 - 8 X 18 INCH(12/BX)